# Patient Record
Sex: MALE | Race: WHITE | NOT HISPANIC OR LATINO | Employment: FULL TIME | URBAN - METROPOLITAN AREA
[De-identification: names, ages, dates, MRNs, and addresses within clinical notes are randomized per-mention and may not be internally consistent; named-entity substitution may affect disease eponyms.]

---

## 2019-03-26 ENCOUNTER — OFFICE VISIT (OUTPATIENT)
Dept: NEUROSURGERY | Facility: CLINIC | Age: 54
End: 2019-03-26

## 2019-03-26 VITALS
HEART RATE: 119 BPM | WEIGHT: 219 LBS | BODY MASS INDEX: 28.11 KG/M2 | HEIGHT: 74 IN | DIASTOLIC BLOOD PRESSURE: 90 MMHG | SYSTOLIC BLOOD PRESSURE: 112 MMHG

## 2019-03-26 DIAGNOSIS — M54.16 LUMBAR RADICULOPATHY: ICD-10-CM

## 2019-03-26 DIAGNOSIS — G89.29 CHRONIC PAIN OF LEFT KNEE: ICD-10-CM

## 2019-03-26 DIAGNOSIS — G89.29 CHRONIC LOW BACK PAIN, UNSPECIFIED BACK PAIN LATERALITY, WITH SCIATICA PRESENCE UNSPECIFIED: ICD-10-CM

## 2019-03-26 DIAGNOSIS — M25.562 CHRONIC PAIN OF LEFT KNEE: ICD-10-CM

## 2019-03-26 DIAGNOSIS — M54.5 CHRONIC LOW BACK PAIN, UNSPECIFIED BACK PAIN LATERALITY, WITH SCIATICA PRESENCE UNSPECIFIED: ICD-10-CM

## 2019-03-26 DIAGNOSIS — I10 HYPERTENSION, UNSPECIFIED TYPE: ICD-10-CM

## 2019-03-26 DIAGNOSIS — M25.559 ARTHRALGIA OF HIP, UNSPECIFIED LATERALITY: ICD-10-CM

## 2019-03-26 DIAGNOSIS — R26.9 GAIT DISORDER: Primary | ICD-10-CM

## 2019-03-26 PROBLEM — M54.50 LOW BACK PAIN: Status: ACTIVE | Noted: 2019-03-26

## 2019-03-26 PROCEDURE — 99243 OFF/OP CNSLTJ NEW/EST LOW 30: CPT | Performed by: NEUROLOGICAL SURGERY

## 2019-03-26 RX ORDER — IBUPROFEN 800 MG/1
800 TABLET ORAL EVERY 6 HOURS PRN
Status: ON HOLD | COMMUNITY
Start: 2019-03-13 | End: 2019-06-05

## 2019-03-26 RX ORDER — LISINOPRIL AND HYDROCHLOROTHIAZIDE 12.5; 1 MG/1; MG/1
1 TABLET ORAL DAILY
Status: ON HOLD | COMMUNITY
Start: 2019-03-15 | End: 2019-06-14

## 2019-03-26 RX ORDER — CYCLOBENZAPRINE HCL 10 MG
10 TABLET ORAL 3 TIMES DAILY PRN
Status: ON HOLD | COMMUNITY
Start: 2019-03-15 | End: 2019-06-14

## 2019-03-26 NOTE — PROGRESS NOTES
"Subjective   Patient ID: Archie Oquendo is a 53 y.o. male is being seen for consultation today at the request of VERONICA Morgan lumbar radiculopathy.    History of Present Illness  Patient reports intermittent back pain since his last visit in 2012; it typically takes 6 months to resolve. The current episode began mid December with aching in left hip and knee. It persisted and severely worsened with increase leg and back pain on 3/13. His pain was so severe that he couldn't move and went to [facility]. He was evaluated with CT and DC home. He was referred to  spine and then to [facility] but they referred him back here due to prior surgery.  He has chronic numbness in left foot since 2000. It has \"radiated up\" recently. He has noticed inability to raise toes on left for about 2 weeks (first noticed the day after being in ER). No trouble with bowel or bladder control. No falls. He is using a cane presently. Leg worsens with prolonged walking. He did notice some swelling in both feet due to sleeping on couch; it has resolved after returning to bed and using compression socks.     The following portions of the patient's history were reviewed and updated as appropriate: allergies, current medications, past family history, past medical history, past social history, past surgical history and problem list.    Review of Systems   Constitutional: Positive for fatigue.   HENT: Negative for trouble swallowing.    Eyes: Negative for visual disturbance.   Respiratory: Negative for shortness of breath.    Cardiovascular: Positive for leg swelling.   Gastrointestinal: Negative for nausea.   Genitourinary: Positive for difficulty urinating.   Musculoskeletal: Positive for back pain.   Skin: Negative for rash and wound.   Neurological: Positive for numbness.   Psychiatric/Behavioral: Positive for sleep disturbance.       Objective   Physical Exam   Constitutional: He is oriented to person, place, and time. He " "appears well-developed and well-nourished.   Chronically ill appearing   Pulmonary/Chest: Effort normal.   Musculoskeletal:        Left hip: He exhibits decreased range of motion.        Lumbar back: He exhibits decreased range of motion (Subdural hematoma minimal range of motion.  Patient states that he was told not to do any bending or twisting since 2000.). He exhibits no tenderness, no bony tenderness and no pain ( Negative straight leg raise).   Neurological: He is alert and oriented to person, place, and time.   Reflex Scores:       Patellar reflexes are 2+ on the right side and 2+ on the left side.       Achilles reflexes are 1+ on the right side and 1+ on the left side.  Skin: Skin is warm and dry.   Multiple thoracic and lumbar incisions well-healed   Psychiatric: He has a normal mood and affect. His behavior is normal. Judgment normal.   Vitals reviewed.    Neurologic Exam     Mental Status   Oriented to person, place, and time.   Level of consciousness: alert  Knowledge: good.   Normal comprehension.     Motor Exam   Muscle bulk: normal    Strength   Strength 5/5 except as noted.   Left anterior tibial: 1/5    Sensory Exam   Right leg light touch: normal  Left leg light touch: normal  Right leg vibration: decreased from ankle  Left leg vibration: decreased from knee  Right leg pinprick: normal  Left leg pinprick: normal    Able to distinguish pinprick from dull in bilateral lower extremities.  Temperature intact bilateral lower extremities to cold    Somewhat inconsistent with exam.  When touched with dull able to say dull but when touched with pen states \"I do not feel anything\".  After re-discussing the purpose of the exam being to say dull or sharp, the patient is able to complete exam appropriately.     Gait, Coordination, and Reflexes     Gait  Gait: (Unusual wide-based gait)    Reflexes   Right patellar: 2+  Left patellar: 2+  Right achilles: 1+  Left achilles: 1+  Able to toe walk bilaterally.  " Unable to raise foot on left for heel walking     Overall exam is elaborated. Very unusual gait hard ot describe. Motor deficits on exam today hard to specifically reproduce.    Assessment/Plan   Independent Review of Radiographic Studies:      I personally reviewed the plain x-rays as well as the lumbar CT done recently: These demonstrate complete fusion having taken place at L1-2 and a complete fusion having taken place posterior lateral from T12-L2.  Instrumentation is in good position without evidence of fracture or haloing and no displacement is identified.  Medical Decision Making:    I confirmed and obtained the above history as recorded by the nurse practitioner acting as a scribe. I performed the above examination and it is documented by the nurse practitioner acting as a scribe.  This unfortunate sofia got 11 years of reasonable service from his operations that occurred in 2008.  Unfortunately he now is developed increasing problems.    To best characterize the neural elements I have recommending that he undergo a myelogram above the thoracic spine and the lumbar spine.  Of also recommended in order to better characterize the somewhat unusual weakness that we have discovered during his office visit today would like him to have an EMG nerve conduction test in the legs.  This will help me to determine if there is any surgical intervention that we can do or whether or not he will be best served by advanced pain management techniques.      Archie was seen today for back pain.    Diagnoses and all orders for this visit:    Gait disorder  -     CT lumbar spine without contrast; Future  -     IR MYELOGRAM LUMBAR SPINE; Future  -     CT thoracic spine without contrast; Future  -     IR MYELOGRAM THORACIC SPINE; Future  -     EMG Both Legs; Future  -     Nerve Conduction Test Both Legs; Future    Chronic pain of left knee    Hypertension, unspecified type    Lumbar radiculopathy    Chronic low back pain, unspecified  back pain laterality, with sciatica presence unspecified    Arthralgia of hip, unspecified laterality      Return for Follow up after testing.

## 2019-04-15 ENCOUNTER — HOSPITAL ENCOUNTER (OUTPATIENT)
Dept: INFUSION THERAPY | Facility: HOSPITAL | Age: 54
Discharge: HOME | End: 2019-04-15
Admitting: PSYCHIATRY & NEUROLOGY

## 2019-04-15 DIAGNOSIS — R26.9 GAIT DISORDER: ICD-10-CM

## 2019-04-15 PROCEDURE — 95911 NRV CNDJ TEST 9-10 STUDIES: CPT | Performed by: PSYCHIATRY & NEUROLOGY

## 2019-04-15 PROCEDURE — 95886 MUSC TEST DONE W/N TEST COMP: CPT | Performed by: PSYCHIATRY & NEUROLOGY

## 2019-04-15 PROCEDURE — 95886 MUSC TEST DONE W/N TEST COMP: CPT

## 2019-04-15 PROCEDURE — 95911 NRV CNDJ TEST 9-10 STUDIES: CPT

## 2019-04-15 NOTE — PROCEDURES
EMG and Nerve Conduction Studies    Please see data sheets for details on methods, temperatures and lab standards. EMG muscles tested for upper extremity studies include the deltoid, biceps, triceps, pronator teres, extensor digitorum communis, first dorsal interosseous and abductor pollicis brevis.  EMG muscles tested for lower extremity studies include the vastus lateralis, tibialis anterior, peroneus longus, medial gastrocnemius and extensor digitorum brevis.  Additional muscles tested as needed.  Paraspinal muscles tested as needed.  The complete report includes the data sheets.    Indication: Lumbar radiculopathy  History: 53-year-old male with history of an L1/2 disc herniation with cauda equina syndrome 2000 status post surgery with some persistent weakness and numbness in the right leg with a partial foot drop and numbness to the mid shin with some numbness in the left foot.  He had a second operation in 2008.  He now has recent onset of flareup of low back pain radiating into the left leg and new weakness and numbness in the left leg with a left foot drop.  He denies that the right lower extremity symptoms are any worse than previously      Ht: 188 cm  Wt: 99.3 kg; BMI 28.12  HbA1C: No results found for: HGBA1C  TSH: No results found for: TSH    Technical summary:  Nerve conduction studies were obtained in the left leg with 1 comparison on the right as well as some nerve conductions were obtained in the left arm.  Skin temperature was a very cold in the feet and so the feet were warmed prior to study but it was difficult to maintain them and at times the temperatures were significantly lower.  Temperature of the left palm was at least 33 °C.  Needle examination was obtained on selected muscles in both legs.    Results:  1.  Normal left sural sensory study.  2.  Normal left superficial peroneal sensory study.  3.  Absent left peroneal motor potentials from proximal and distal stimulation sites while  recording over the extensor digitorum brevis.  Recorded over the tibialis anterior there was a slow velocity of 21.1 m/s across the fibular head with a low amplitude of 1.8 mV.  4.  Slow tibial motor velocities bilaterally at 35.8 m/s on the left and 37.8 m/s on the right.  The distal latencies and amplitudes were normal.  5.  Normal left median sensory study.  6.  Normal left ulnar sensory latency with low amplitude of 8.3 µV.  7.  Normal left median motor study.  8.  Slow left ulnar motor conduction velocity in the short segment across the elbow at 39 m/s with normal velocity below the elbow.  Normal distal latency and amplitudes.  9.  Needle examination of selected muscles in both legs showed increased insertional activities with fibrillations and/or positive sharp waves in the left tibialis anterior and vastus lateralis.  There was a moderate increased number of large motor units in the tibialis anterior and peroneus longus muscles bilaterally with increased firing rates and reduced interference patterns.  The left vastus lateralis showed normal motor units and recruitment.  The right vastus lateralis was normal.  Both medial gastrocnemius muscles showed normal insertional activities, motor units and recruitment.  The extensor digitorum brevis muscles showed no insertional activities or motor units.  Short head of the left biceps femoris was normal.  The left semi-tendinosis showed 1+ positive sharp waves with a mild increased number of large motor units increased firing rate and reduced interference pattern.  Lumbar paraspinals were not sampled due to previous surgery.    Impression:  Complex abnormal study showing evidence of peripheral neuropathy which is unusual in that 3 of the 4 sensory nerves that were studied were normal and it was the motor conduction velocities were slowed.  Strong suspicion of a more focal left peroneal neuropathy at the knee present.  In addition there is evidence of a left  acute/chronic L5 radiculopathy and acute L4 radiculopathy.  There was evidence of an old right L5 radiculopathy.  Clinical correlation suggested.  Study results were discussed with the patient.    Neto Pressley M.D.              Dictated utilizing Dragon dictation.

## 2019-04-19 ENCOUNTER — APPOINTMENT (OUTPATIENT)
Dept: CT IMAGING | Facility: HOSPITAL | Age: 54
End: 2019-04-19

## 2019-04-19 ENCOUNTER — HOSPITAL ENCOUNTER (OUTPATIENT)
Dept: CT IMAGING | Facility: HOSPITAL | Age: 54
Discharge: HOME | End: 2019-04-19
Admitting: NEUROLOGICAL SURGERY

## 2019-04-19 ENCOUNTER — HOSPITAL ENCOUNTER (OUTPATIENT)
Dept: GENERAL RADIOLOGY | Facility: HOSPITAL | Age: 54
End: 2019-04-19

## 2019-04-19 ENCOUNTER — HOSPITAL ENCOUNTER (OUTPATIENT)
Dept: GENERAL RADIOLOGY | Facility: HOSPITAL | Age: 54
Discharge: HOME | End: 2019-04-19

## 2019-04-19 VITALS
WEIGHT: 220 LBS | HEART RATE: 93 BPM | DIASTOLIC BLOOD PRESSURE: 70 MMHG | RESPIRATION RATE: 16 BRPM | SYSTOLIC BLOOD PRESSURE: 127 MMHG | BODY MASS INDEX: 28.23 KG/M2 | HEIGHT: 74 IN | OXYGEN SATURATION: 100 % | TEMPERATURE: 97.8 F

## 2019-04-19 DIAGNOSIS — R26.9 GAIT DISORDER: ICD-10-CM

## 2019-04-19 PROCEDURE — 72129 CT CHEST SPINE W/DYE: CPT

## 2019-04-19 PROCEDURE — 72132 CT LUMBAR SPINE W/DYE: CPT

## 2019-04-19 PROCEDURE — 62305 MYELOGRAPHY LUMBAR INJECTION: CPT

## 2019-04-19 PROCEDURE — 25010000002 IOPAMIDOL 61 % SOLUTION: Performed by: RADIOLOGY

## 2019-04-19 PROCEDURE — 72240 MYELOGRAPHY NECK SPINE: CPT

## 2019-04-19 RX ORDER — LIDOCAINE HYDROCHLORIDE 10 MG/ML
10 INJECTION, SOLUTION INFILTRATION; PERINEURAL ONCE
Status: COMPLETED | OUTPATIENT
Start: 2019-04-19 | End: 2019-04-19

## 2019-04-19 RX ADMIN — LIDOCAINE HYDROCHLORIDE 6 ML: 10 INJECTION, SOLUTION INFILTRATION; PERINEURAL at 07:53

## 2019-04-19 RX ADMIN — LIDOCAINE HYDROCHLORIDE 4 ML: 10 INJECTION, SOLUTION INFILTRATION; PERINEURAL at 07:47

## 2019-04-19 RX ADMIN — IOPAMIDOL 15 ML: 612 INJECTION, SOLUTION INTRATHECAL at 07:54

## 2019-04-19 NOTE — DISCHARGE INSTRUCTIONS
EDUCATION /DISCHARGE INSTRUCTIONS:  A myelogram is a special radiology procedure of the spinal cord, spinal nerves and other related structures.  You will be awake during the examination.  An area of your lower back will be cleansed with an antiseptic solution.  The physician will inject a numbing medication in your lower back.  While your back is numb, a needle will be placed in the lower back area.  A small amount of spinal fluid may be withdrawn and sent to the lab if ordered by your physician. While the needle is in the back, an injection of a contrast material (xray dye) will be given through the needle.  The contrast material will allow the physician to see the spinal cord and spinal nerves.  Once injected, the needle will be removed and a band aid will be placed over the injection site.  The table will be tilted during the process to allow the contrast material to flow to particular areas in the spine.  Following the injection and xrays, you will be taken to the CT scan where more pictures will be taken. After the procedure is finished, the contrast material will be absorbed by your body and eliminated through your kidneys.  The radiologist will study and interpret your myelogram and send the results to your physician.    Procedure risks of a myelogram include, but are not limited to:  *  Bleeding   *  seizure  *  Infection   *  Headache, possibly severe requiring  *  Contrast reaction      a blood patch  *  Nerve or cord injury  *  Paralysis and death    Benefits of the procedure:  *  Best examination for delineating pathology related to spinal cord compression from a disc and/or nerve root compression    Alternatives to the procedure:  MRI - a non invasive procedure requiring intravenous contrast injection.  Cannot be done on patients with certain pacemakers or metal in the body.  MRI risks include possible reaction to the contrast material, movement of metal located in the body.  Benefit to MRI:   Non-invasive and usually painless procedure.  THIS EDUCATION INFORMATION WAS REVIEWED PRIOR TO THE PROCEDURE AND CONSENT. Patient initials __________________Time___0655______________  Important information following your myelogram:  *  Lie down with your head elevated no more than 2 pillows for the next 24 hours.   *  Sit up in the car going home.  Sit up to eat and use the restroom only,  for 24 hours.  *  24 HOURS COMPLETE AT ___1000___  *  Tomorrow, after 24 hours complete, take it easy and rest.  *  Do not drive for 48 hours following a myelogram  *  You may remove the bandage and shower in the morning  *  Increase your fluids for the next 24 hours.  Caffeinated drinks are encouraged.   Resume taking your blood thinner or Aspirin on __4/20/19 after 1000__  CALL YOUR PHYSICIAN FOR THE FOLLOWING:  * Pain at the injection site  * Reddness, swelling, bruising or drainage at the injection site  * A fever by mouth of 101.0  * Any new symptoms    If you have problems with a headache that is not relieved with rest and medication, please call the Radiology Triage Nurse desk  [phone]

## 2019-04-23 ENCOUNTER — OFFICE VISIT (OUTPATIENT)
Dept: NEUROSURGERY | Facility: CLINIC | Age: 54
End: 2019-04-23

## 2019-04-23 VITALS
DIASTOLIC BLOOD PRESSURE: 76 MMHG | WEIGHT: 217 LBS | SYSTOLIC BLOOD PRESSURE: 120 MMHG | HEART RATE: 120 BPM | BODY MASS INDEX: 27.85 KG/M2 | HEIGHT: 74 IN | RESPIRATION RATE: 20 BRPM

## 2019-04-23 DIAGNOSIS — M48.062 SPINAL STENOSIS, LUMBAR REGION, WITH NEUROGENIC CLAUDICATION: ICD-10-CM

## 2019-04-23 DIAGNOSIS — M51.26 LUMBAR DISC HERNIATION: ICD-10-CM

## 2019-04-23 DIAGNOSIS — R26.9 GAIT DISORDER: ICD-10-CM

## 2019-04-23 DIAGNOSIS — M54.16 LUMBAR RADICULOPATHY: Primary | ICD-10-CM

## 2019-04-23 DIAGNOSIS — Z98.1 HISTORY OF LUMBAR FUSION: ICD-10-CM

## 2019-04-23 PROCEDURE — 99215 OFFICE O/P EST HI 40 MIN: CPT | Performed by: NEUROLOGICAL SURGERY

## 2019-04-23 RX ORDER — SODIUM CHLORIDE, SODIUM LACTATE, POTASSIUM CHLORIDE, CALCIUM CHLORIDE 600; 310; 30; 20 MG/100ML; MG/100ML; MG/100ML; MG/100ML
100 INJECTION, SOLUTION INTRAVENOUS CONTINUOUS
Status: CANCELLED | OUTPATIENT
Start: 2019-06-03

## 2019-04-23 RX ORDER — CEFAZOLIN SODIUM 2 G/100ML
2 INJECTION, SOLUTION INTRAVENOUS ONCE
Status: CANCELLED | OUTPATIENT
Start: 2019-06-03 | End: 2019-04-23

## 2019-04-23 NOTE — PROGRESS NOTES
"Subjective   Patient ID: Archie Oquendo is a 53 y.o. male is here today for follow-up for gait disturbance/lumbar radiculopathy. Pt is unaccompanied.    History of Present Illness     He returns the office today for follow-up of low back pain that started in December with radiation into the left hip and knee.  He has undergone prior lumbar fusion at L1-2 with Dr. Bailey in 2011 and did well following that procedure.  He has undergone lumbar and thoracic CT myelogram as well as EMG and nerve conduction studies of both legs.     Continues to report chronic, constant pain in both legs.  He has numbness and tingling in the right lateral calf radiating into the right foot that has been present since 2000.  He denies any new pain at this time.  The biggest issue is the weakness in the left foot.  He is unable to dorsiflex the left foot greater than an inch or 2.  This is been walking very difficult.  He reports a fall recently and has to be very cautious with walking.  He denies any new problems since his last office visit.    He presents unaccompanied.    /76 (BP Location: Right arm, Patient Position: Sitting, Cuff Size: Large Adult)   Pulse 120   Resp 20   Ht 188 cm (74\")   Wt 98.4 kg (217 lb)   BMI 27.86 kg/m² \    The following portions of the patient's history were reviewed and updated as appropriate: allergies, current medications, past family history, past medical history, past social history, past surgical history and problem list.    Review of Systems   Genitourinary: Negative for difficulty urinating and enuresis.   Musculoskeletal: Positive for back pain (bilateral ) and gait problem.   Neurological: Positive for weakness (bilateral legs) and numbness (R leg).   Psychiatric/Behavioral: Positive for sleep disturbance.       Objective   Physical Exam   Constitutional: He is oriented to person, place, and time. Vital signs are normal. He appears well-developed and well-nourished. He is cooperative.  " Non-toxic appearance. He does not have a sickly appearance.   Pleasant well-appearing middle-aged male   HENT:   Head: Normocephalic and atraumatic.   Neck: Neck supple. No tracheal deviation present.   Pulmonary/Chest: Effort normal.   Abdominal: Soft.   Musculoskeletal: Normal range of motion. He exhibits no tenderness or deformity.   Strength equal bilateral lower extremities with the exception of 1+/5 left anterior tibialis   Neurological: He is alert and oriented to person, place, and time. He displays no tremor and normal reflexes. A sensory deficit (Chronic sensory changes bilateral lower extremities that are unchanged) is present. He exhibits normal muscle tone. Gait abnormal. Coordination normal. GCS eye subscore is 4. GCS verbal subscore is 5. GCS motor subscore is 6.   Reflex Scores:       Patellar reflexes are 0 on the right side and 0 on the left side.       Achilles reflexes are 1+ on the right side and 1+ on the left side.  Positive straight leg raise on the left  Gait is slow, purposeful, unstable, he is having to raise the left knee in order to raise the left foot to walk   3 beat clonus B     Skin: Skin is warm and dry.   Psychiatric: He has a normal mood and affect. His behavior is normal. Thought content normal.   Vitals reviewed.    Neurologic Exam     Mental Status   Oriented to person, place, and time.     Gait, Coordination, and Reflexes     Reflexes   Right patellar: 0  Left patellar: 0  Right achilles: 1+  Left achilles: 1+      Assessment/Plan   Independent Review of Radiographic Studies:    I personally reviewed the lumbar myelogram/CT and thoracic myelogram CT that was done recently: The thoracic spine shows some mild degenerative arthritic changes without significant spinal cord compression.    Lumbar myelogram CT demonstrates a small filling defect to the left at L4-5 on the myelographic imagery and severe compression at L2-3.  At the CT images demonstrate very severe compression  "bilaterally at L2-3 secondary to facet and ligamentum flavum hypertrophy and disc osteophyte bulging.  At L4-5 on the left there is disc osteophyte complex that is compressing the lateral recess of the traversing and exiting nerve roots.    EMG results:\" Impression:  Complex abnormal study showing evidence of peripheral neuropathy which is unusual in that 3 of the 4 sensory nerves that were studied were normal and it was the motor conduction velocities were slowed.  Strong suspicion of a more focal left peroneal neuropathy at the knee present.  In addition there is evidence of a left acute/chronic L5 radiculopathy and acute L4 radiculopathy.  There was evidence of an old right L5 radiculopathy.  Clinical correlation suggested.  Study results were discussed with the patient.\"    Medical Decision Making:    I confirmed and obtained the above history as recorded by the nurse practitioner acting as a scribe. I performed the above examination and it is documented by the nurse practitioner acting as a scribe.    This very pleasant and unfortunate patient returns to the office today with continuing weakness of the left lower extremity.  He had a pain syndrome but that pain seems to have improved and now is left with significant weakness of dorsiflexion of the left foot.    EMG study results are as above.  There is a question of the neurologist brings out of the proximal nerve palsy but also a left L4 and L5 radiculopathy that would of course correlate with the foot drop.    Myelographic/CT results showed compression of the lateral recess and neural foramen at L4-5 as well as severe lumbar stenosis at L3-4.    Treatment options include continued conservative treatment for consideration of yet further surgery on this poor sofia who is already undergone a lot of spine surgery.    Surgery would entail lumbar decompression at L2-3 and revision lumbar decompression at L4-5 on the left.  The real question is whether or not he " requires further fusion considering the fairly extensive and complete fusion taking place at L1 through L3.  Thankfully he has not developed significant disease above the fusion but rather below.    Certainly the most minimally invasive procedure would be simply to decompress the L3-4 level and try to mobilize minimally invasively the decompression on the left at L4-5.    My concern is however that the reason for the development of the stenosis at L3-4 is likely because of increased mobility at this level which would only be exacerbated by any surgical intervention at this level.  Additionally there is facet disease throughout the lumbar spine as described and he is only 53 years old and he has been through a lot and if we were to successfully fuse the rest of his lumbar spine he is unlikely to develop further problems with regard to the lumbar spine for the rest of his next 40-50 years of life.    The risks, benefits, and alternatives of decompression and fusion were explained in detail to the patient. The alternative is not to perform an operative procedure. The benefit should be, though is not guaranteed, primarily a potential reduction in the neurosensory and/or motor dysfunction; secondarily, a possible reduction in back pain. The risks include, but are not limited to, the possibility of death, infection, stroke, bleeding, blindness, paralysis, blindness, lack of improvement in the patient's pain syndrome, worsening of the pain syndrome, meningitis, osteomyelitis, recurrent disc herniation, need for further operative intervention, recurrence of the pain syndrome, sexual dysfunction, incontinence, inability to urinate without catheterization, inability to have a normal bowel movement, epidural scarring resulting in chronic back pain, leakage of cerebral spinal fluid at the time of surgery or after recovery from surgery requiring further operative intervention, lack of bony fusion requiring further surgery,  instrumentation breakdown or pull out, adjacent level spinal degeneration, spinal instability. I also explained the realistic expectations as they pertain to the procedure. The patient voiced understanding of these risks, benefits, alternatives, and realistic expectations and requests that we proceed with the operative intervention.    After a complete physical exam, the patient has been informed of the consequences, benefits, appropriate us, and office policies regarding the medication being prescribed. A HAROON check will be made on-line, and will be repeated if prescription is renewed after a 90 day period. The patient agrees to adhering to the medication regimen as prescribed.    The patient has been advised that we will manage post-operative pain for 1 month. If further narcotic medication is needed beyond that period, a referral back to the primary care physician or to a pain management specialist will be made. If the patient cancels or fails to show for scheduled follow-up visits or the pain management referral,  further narcotic prescriptions from this practice may cease.    Plan: Lumbar 2 through lumbar 3 lumbar decompression with transforaminal lumbar interbody fusion, L4 through S2 posterior decompression with revision left L4-5 lumbar discectomy, posterior lateral lumbar fusion L2 through S2, possible removal of T12 through L2 spinal instrumentation and replacement and posterior spinal instrumentation T12 through S2.      Archie was seen today for gait disturbance.    Diagnoses and all orders for this visit:    Lumbar radiculopathy  -     Case Request; Standing  -     CBC and Differential; Future  -     Basic metabolic panel; Future  -     Sedimentation rate; Future  -     lactated ringers infusion  -     ECG 12 Lead; Future  -     ceFAZolin (ANCEF) 2 g in sodium chloride 0.9 % 100 mL IVPB  -     Case Request    Gait disorder  -     Case Request; Standing  -     CBC and Differential; Future  -     Basic  metabolic panel; Future  -     Sedimentation rate; Future  -     lactated ringers infusion  -     ECG 12 Lead; Future  -     ceFAZolin (ANCEF) 2 g in sodium chloride 0.9 % 100 mL IVPB  -     Case Request    Spinal stenosis, L3-4  -     Case Request; Standing  -     CBC and Differential; Future  -     Basic metabolic panel; Future  -     Sedimentation rate; Future  -     lactated ringers infusion  -     ECG 12 Lead; Future  -     ceFAZolin (ANCEF) 2 g in sodium chloride 0.9 % 100 mL IVPB  -     Case Request    Lumbar disc herniation -left L4-5  -     Case Request; Standing  -     CBC and Differential; Future  -     Basic metabolic panel; Future  -     Sedimentation rate; Future  -     lactated ringers infusion  -     ECG 12 Lead; Future  -     ceFAZolin (ANCEF) 2 g in sodium chloride 0.9 % 100 mL IVPB  -     Case Request    History of lumbar fusion  -     Case Request; Standing  -     CBC and Differential; Future  -     Basic metabolic panel; Future  -     Sedimentation rate; Future  -     lactated ringers infusion  -     ECG 12 Lead; Future  -     ceFAZolin (ANCEF) 2 g in sodium chloride 0.9 % 100 mL IVPB  -     Case Request    Other orders  -     Follow Anesthesia Guidelines / Standing Orders; Future  -     Obtain informed consent  -     Provide NPO Instructions to Patient; Future  -     Clorhexidine skin prep  -     Follow Anesthesia Guidelines / Standing Orders; Standing  -     Verify NPO Status; Standing  -     SCD (sequential compression device)- to be placed on patient in Pre-op; Standing  -     Inpatient Admission; Standing  -     Type & Screen; Standing      Return for Follow up with nurse practitioner, Recheck 2 weeks after surgery.           75 minutes in encounter, more than 50% [place] wrt to the above topics

## 2019-04-26 ENCOUNTER — TELEPHONE (OUTPATIENT)
Dept: NEUROSURGERY | Facility: CLINIC | Age: 54
End: 2019-04-26

## 2019-04-26 DIAGNOSIS — M54.16 LUMBAR RADICULOPATHY: Primary | ICD-10-CM

## 2019-04-26 DIAGNOSIS — M51.26 LUMBAR DISC HERNIATION: ICD-10-CM

## 2019-04-26 DIAGNOSIS — Z98.890 POST-OPERATIVE STATE: ICD-10-CM

## 2019-04-26 DIAGNOSIS — M48.062 SPINAL STENOSIS, LUMBAR REGION, WITH NEUROGENIC CLAUDICATION: ICD-10-CM

## 2019-04-28 ENCOUNTER — RESULTS ENCOUNTER (OUTPATIENT)
Dept: NEUROSURGERY | Facility: CLINIC | Age: 54
End: 2019-04-28

## 2019-04-28 DIAGNOSIS — R26.9 GAIT DISORDER: ICD-10-CM

## 2019-04-28 DIAGNOSIS — M54.16 LUMBAR RADICULOPATHY: ICD-10-CM

## 2019-04-28 DIAGNOSIS — Z98.1 HISTORY OF LUMBAR FUSION: ICD-10-CM

## 2019-04-28 DIAGNOSIS — M51.26 LUMBAR DISC HERNIATION: ICD-10-CM

## 2019-04-28 DIAGNOSIS — M48.062 SPINAL STENOSIS, LUMBAR REGION, WITH NEUROGENIC CLAUDICATION: ICD-10-CM

## 2019-05-21 ENCOUNTER — TELEPHONE (OUTPATIENT)
Dept: NEUROSURGERY | Facility: CLINIC | Age: 54
End: 2019-05-21

## 2019-05-23 ENCOUNTER — APPOINTMENT (OUTPATIENT)
Dept: PREADMISSION TESTING | Facility: HOSPITAL | Age: 54
End: 2019-05-23

## 2019-05-24 ENCOUNTER — APPOINTMENT (OUTPATIENT)
Dept: PREADMISSION TESTING | Facility: HOSPITAL | Age: 54
End: 2019-05-24

## 2019-05-24 ENCOUNTER — OFFICE VISIT (OUTPATIENT)
Dept: NEUROSURGERY | Facility: CLINIC | Age: 54
End: 2019-05-24

## 2019-05-24 VITALS
HEIGHT: 74 IN | SYSTOLIC BLOOD PRESSURE: 118 MMHG | RESPIRATION RATE: 16 BRPM | DIASTOLIC BLOOD PRESSURE: 70 MMHG | BODY MASS INDEX: 27.34 KG/M2 | WEIGHT: 213 LBS | HEART RATE: 92 BPM

## 2019-05-24 VITALS
TEMPERATURE: 98.4 F | SYSTOLIC BLOOD PRESSURE: 146 MMHG | OXYGEN SATURATION: 97 % | HEART RATE: 94 BPM | HEIGHT: 74 IN | DIASTOLIC BLOOD PRESSURE: 78 MMHG | RESPIRATION RATE: 16 BRPM | BODY MASS INDEX: 27.48 KG/M2 | WEIGHT: 214.1 LBS

## 2019-05-24 DIAGNOSIS — M48.062 SPINAL STENOSIS, LUMBAR REGION, WITH NEUROGENIC CLAUDICATION: ICD-10-CM

## 2019-05-24 DIAGNOSIS — M54.16 LUMBAR RADICULOPATHY: ICD-10-CM

## 2019-05-24 DIAGNOSIS — R26.9 GAIT DISORDER: ICD-10-CM

## 2019-05-24 DIAGNOSIS — M51.26 LUMBAR DISC HERNIATION: ICD-10-CM

## 2019-05-24 DIAGNOSIS — Z98.1 HISTORY OF LUMBAR FUSION: Primary | ICD-10-CM

## 2019-05-24 DIAGNOSIS — Z98.1 HISTORY OF LUMBAR FUSION: ICD-10-CM

## 2019-05-24 LAB
ANION GAP SERPL CALCULATED.3IONS-SCNC: 12.1 MMOL/L
BASOPHILS # BLD AUTO: 0.06 10*3/MM3 (ref 0–0.2)
BASOPHILS NFR BLD AUTO: 0.7 % (ref 0–1.5)
BUN BLD-MCNC: 13 MG/DL (ref 6–20)
BUN/CREAT SERPL: 13.8 (ref 7–25)
CALCIUM SPEC-SCNC: 9.8 MG/DL (ref 8.6–10.5)
CHLORIDE SERPL-SCNC: 99 MMOL/L (ref 98–107)
CO2 SERPL-SCNC: 26.9 MMOL/L (ref 22–29)
CREAT BLD-MCNC: 0.94 MG/DL (ref 0.76–1.27)
DEPRECATED RDW RBC AUTO: 44 FL (ref 37–54)
EOSINOPHIL # BLD AUTO: 0.29 10*3/MM3 (ref 0–0.4)
EOSINOPHIL NFR BLD AUTO: 3.4 % (ref 0.3–6.2)
ERYTHROCYTE [DISTWIDTH] IN BLOOD BY AUTOMATED COUNT: 12.7 % (ref 12.3–15.4)
ERYTHROCYTE [SEDIMENTATION RATE] IN BLOOD: 5 MM/HR (ref 0–20)
GFR SERPL CREATININE-BSD FRML MDRD: 83 ML/MIN/1.73
GLUCOSE BLD-MCNC: 94 MG/DL (ref 65–99)
HCT VFR BLD AUTO: 47.1 % (ref 37.5–51)
HGB BLD-MCNC: 15.5 G/DL (ref 13–17.7)
IMM GRANULOCYTES # BLD AUTO: 0.03 10*3/MM3 (ref 0–0.05)
IMM GRANULOCYTES NFR BLD AUTO: 0.3 % (ref 0–0.5)
LYMPHOCYTES # BLD AUTO: 3.25 10*3/MM3 (ref 0.7–3.1)
LYMPHOCYTES NFR BLD AUTO: 37.8 % (ref 19.6–45.3)
MCH RBC QN AUTO: 31.2 PG (ref 26.6–33)
MCHC RBC AUTO-ENTMCNC: 32.9 G/DL (ref 31.5–35.7)
MCV RBC AUTO: 94.8 FL (ref 79–97)
MONOCYTES # BLD AUTO: 0.77 10*3/MM3 (ref 0.1–0.9)
MONOCYTES NFR BLD AUTO: 9 % (ref 5–12)
NEUTROPHILS # BLD AUTO: 4.19 10*3/MM3 (ref 1.7–7)
NEUTROPHILS NFR BLD AUTO: 48.8 % (ref 42.7–76)
NRBC BLD AUTO-RTO: 0 /100 WBC (ref 0–0.2)
PLATELET # BLD AUTO: 208 10*3/MM3 (ref 140–450)
PMV BLD AUTO: 9.9 FL (ref 6–12)
POTASSIUM BLD-SCNC: 4 MMOL/L (ref 3.5–5.2)
RBC # BLD AUTO: 4.97 10*6/MM3 (ref 4.14–5.8)
SODIUM BLD-SCNC: 138 MMOL/L (ref 136–145)
WBC NRBC COR # BLD: 8.59 10*3/MM3 (ref 3.4–10.8)

## 2019-05-24 PROCEDURE — 80048 BASIC METABOLIC PNL TOTAL CA: CPT | Performed by: NEUROLOGICAL SURGERY

## 2019-05-24 PROCEDURE — 99213 OFFICE O/P EST LOW 20 MIN: CPT | Performed by: NEUROLOGICAL SURGERY

## 2019-05-24 PROCEDURE — 85025 COMPLETE CBC W/AUTO DIFF WBC: CPT | Performed by: NEUROLOGICAL SURGERY

## 2019-05-24 PROCEDURE — 85652 RBC SED RATE AUTOMATED: CPT | Performed by: NEUROLOGICAL SURGERY

## 2019-05-24 PROCEDURE — 93005 ELECTROCARDIOGRAM TRACING: CPT

## 2019-05-24 PROCEDURE — 93010 ELECTROCARDIOGRAM REPORT: CPT | Performed by: INTERNAL MEDICINE

## 2019-05-24 PROCEDURE — 36415 COLL VENOUS BLD VENIPUNCTURE: CPT | Performed by: NEUROLOGICAL SURGERY

## 2019-05-24 NOTE — DISCHARGE INSTRUCTIONS
PLEASE ARRIVE AT 6AM ON 6/5/2019      Take the following medications the morning of surgery with a small sip of water:        General Instructions:  • Do not eat or drink anything after midnight the night before surgery.  • Infants may have breast milk up to four hours before surgery.  • Infants drinking formula may drink formula up to six hours before surgery.   • Patients who avoid smoking, chewing tobacco and alcohol for 4 weeks prior to surgery have a reduced risk of post-operative complications.  Quit smoking as many days before surgery as you can.  • Do not smoke, use chewing tobacco or drink alcohol the day of surgery.   • If applicable bring your C-PAP/ BI-PAP machine.  • Bring any papers given to you in the doctor’s office.  • Wear clean comfortable clothes and socks.  • Do not wear contact lenses, false eyelashes or make-up.  Bring a case for your glasses.   • Bring crutches or walker if applicable.  • Remove all piercings.  Leave jewelry and any other valuables at home.  • Hair extensions with metal clips must be removed prior to surgery.  • The Pre-Admission Testing nurse will instruct you to bring medications if unable to obtain an accurate list in Pre-Admission Testing.        If you were given a blood bank ID arm band remember to bring it with you the day of surgery.    Preventing a Surgical Site Infection:  • For 2 to 3 days before surgery, avoid shaving with a razor because the razor can irritate skin and make it easier to develop an infection.    • Any areas of open skin can increase the risk of a post-operative wound infection by allowing bacteria to enter and travel throughout the body.  Notify your surgeon if you have any skin wounds / rashes even if it is not near the expected surgical site.  The area will need assessed to determine if surgery should be delayed until it is healed.  • The night prior to surgery sleep in a clean bed with clean clothing.  Do not allow pets to sleep with  you.  • Shower on the morning of surgery using a fresh bar of anti-bacterial soap (such as Dial) and clean washcloth.  Dry with a clean towel and dress in clean clothing.  • Ask your surgeon if you will be receiving antibiotics prior to surgery.  • Make sure you, your family, and all healthcare providers clean their hands with soap and water or an alcohol based hand  before caring for you or your wound.    Day of surgery:  Upon arrival, a Pre-op nurse and Anesthesiologist will review your health history, obtain vital signs, and answer questions you may have.  The only belongings needed at this time will be your home medications and if applicable your C-PAP/BI-PAP machine.  If you are staying overnight your family can leave the rest of your belongings in the car and bring them to your room later.  A Pre-op nurse will start an IV and you may receive medication in preparation for surgery, including something to help you relax.  Your family will be able to see you in the Pre-op area.  While you are in surgery your family should notify the waiting room  if they leave the waiting room area and provide a contact phone number.    Please be aware that surgery does come with discomfort.  We want to make every effort to control your discomfort so please discuss any uncontrolled symptoms with your nurse.   Your doctor will most likely have prescribed pain medications.      If you are going home after surgery you will receive individualized written care instructions before being discharged.  A responsible adult must drive you to and from the hospital on the day of your surgery and stay with you for 24 hours.    If you are staying overnight following surgery, you will be transported to your hospital room following the recovery period.  [facility] has all private rooms.    You have received a list of surgical assistants for your reference.  If you have any questions please call Pre-Admission  Testing at [phone].  Deductibles and co-payments are collected on the day of service. Please be prepared to pay the required co-pay, deductible or deposit on the day of service as defined by your plan.  2% CHLORAHEXIDINE GLUCONATE* CLOTH  Preparing or “prepping” skin before surgery can reduce the risk of infection at the surgical site. To make the process easier, [facility] has chosen disposable cloths moistened with a rinse-free, 2% Chlorhexidine Gluconate (CHG) antiseptic solution. The steps below outline the prepping process and should be carefully followed.        Use the prep cloth on the area that is circled in the diagram             Directions Night before Surgery  1) Shower using a fresh bar of anti-bacterial soap (such as Dial) and clean washcloth.  Use a clean towel to completely dry your skin.  2) Do not use any lotions, oils or creams on your skin.  3) Open the package and remove 1 cloth, wipe your skin for 30 seconds in a circular motion.  Allow to dry for 3 minutes.  4) Repeat #3 with second cloth.  5) Do not touch your eyes, ears, or mouth with the prep cloth.  6) Allow the wet prep solution to air dry.  7) Discard the prep cloth and wash your hands with soap and water.   8) Dress in clean bed clothes and sleep on fresh clean bed sheets.   9) You may experience some temporary itching after the prep.    Directions Day of Surgery  1) Repeat steps 1,2,3,4,5,6,7, and 9.   2) Dress in clean clothes before coming to the hospital.

## 2019-05-24 NOTE — PROGRESS NOTES
Subjective   Patient ID: Archie Oquendo is a 53 y.o. male is here today for follow-up of H&P for a lumbar fusion and decompression,6/3. Pt is unaccompanied.    History of Present Illness    The patient returns the office today with progression in symptomatology.  Walking causes fatigue and inability to use the left leg particularly well.  Sitting for a long period of time because of the right leg to go to sleep.  He has trouble dorsiflexing his feet.  Left side much worse than the right side.    The following portions of the patient's history were reviewed and updated as appropriate: allergies, current medications, past family history, past medical history, past social history, past surgical history and problem list.    Review of Systems   Genitourinary: Negative for difficulty urinating and enuresis.   Musculoskeletal: Positive for gait problem.   Neurological: Positive for weakness (L leg) and numbness (back/legs).   Psychiatric/Behavioral: Positive for sleep disturbance (leg cramps).     Past Medical History:   Diagnosis Date   • Hip pain    • Hypertension    • Low back pain    • Pain in left knee        Past Surgical History:   Procedure Laterality Date   • LUMBAR DISCECTOMY     • LUMBAR FUSION     • VASECTOMY         Social History     Socioeconomic History   • Marital status:      Spouse name: Not on file   • Number of children: 2   • Years of education: Not on file   • Highest education level: Not on file   Occupational History   • Occupation:      Comment: full time currently working   Tobacco Use   • Smoking status: Current Every Day Smoker     Packs/day: 0.50     Years: 10.00     Pack years: 5.00     Types: Cigarettes   • Smokeless tobacco: Never Used   Substance and Sexual Activity   • Alcohol use: Yes   • Drug use: Defer   • Sexual activity: Defer       Family History   Problem Relation Age of Onset   • Diabetes Mother    • Hypertension Father    • Gout Father    • Sleep apnea  Father         No Known Allergies      Current Outpatient Medications:   •  cyclobenzaprine (FLEXERIL) 10 MG tablet, , Disp: , Rfl:   •  ibuprofen (IBU) 800 MG tablet, , Disp: , Rfl:   •  lisinopril-hydrochlorothiazide (PRINZIDE,ZESTORETIC) 10-12.5 MG per tablet, , Disp: , Rfl:   •  Multiple Vitamin (MULTI-VITAMIN DAILY PO), Take  by mouth., Disp: , Rfl:   •  Omega-3 Fatty Acids (FISH OIL) 1200 MG capsule delayed-release, Take  by mouth., Disp: , Rfl:     Objective   Physical Exam   Musculoskeletal:   The patient stands slightly bent forward at the waist with his knees bent with an erect lumbar spine slightly angled perhaps 8 to 10 degrees anterior.  Lateral flexion is limited to 8 degrees bilaterally.  Hyperextension is 0 degrees.  Anterior flexion is completely at the hips with no flexion of the lumbar spine.     There is a positive straight leg raising at 50 degrees on the left at 60 degrees on the right with pain into the anterior thigh and in the low back.  Negative Lesegue.  Negative Prasad.   Neurological: He has an abnormal Tandem Gait Test. He has a normal Finger-Nose-Finger Test, a normal Heel to Mccormick Test and a normal Romberg Test.   Reflex Scores:       Tricep reflexes are 1+ on the right side and 1+ on the left side.       Bicep reflexes are 2+ on the right side and 2+ on the left side.       Brachioradialis reflexes are 1+ on the right side and 1+ on the left side.       Patellar reflexes are 0 on the right side and 0 on the left side.       Achilles reflexes are 0 on the right side and 0 on the left side.    Neurologic Exam     Motor Exam     Strength   Right iliopsoas: 4/5  Left iliopsoas: 4/5  Right quadriceps: 5/5  Left quadriceps: 4/5  Right hamstrin/5  Left hamstrin/5  Right glutei: 4/5  Left glutei: 4/5  Right anterior tibial: 4/5  Left anterior tibial: 1/5  Right gastroc: 4/5  Left gastroc: 4/5    Sensory Exam   Sensory deficit distribution on left: L5  And left L5 and S1 decrease in LT  and PP     Gait, Coordination, and Reflexes     Gait  Gait: wide-based    Coordination   Romberg: negative  Finger to nose coordination: normal  Heel to shin coordination: normal  Tandem walking coordination: abnormal    Reflexes   Right brachioradialis: 1+  Left brachioradialis: 1+  Right biceps: 2+  Left biceps: 2+  Right triceps: 1+  Left triceps: 1+  Right patellar: 0  Left patellar: 0  Right achilles: 0  Left achilles: 0  Right plantar: equivocal  Left plantar: equivocal  Right Serna: absent  Left Serna: absentLeft foot drop       Assessment/Plan   Independent Review of Radiographic Studies:    None new  Medical Decision Making:    The patient has appropriate concerns with regard to undergoing a lumbar fusion.    He has specific concerns, brought up by his primary care physician, with regard to mobility and lumbar spine following a lumbar fusion.    I explained to the patient in my opinion at L2 through sacral lumbar fusion is unlikely to increase the patient's near total immobility of the lumbar spine.    I went over various options of treatment with the patient; simple lumbar decompression at  L3-4 L4-5 discectomy, epidural spinal stimulation without decompression to try to deal with the patient's back pain, or a lumbar fusion as previously recommended and described.    I explained that a lumbar fusion has about a 50% chance of reducing low back pain but that a lumbar fusion would likely prevent further deterioration at the fused levels once the areas are decompressed.  The patient previously has undergone an L1-L3 lumbar fusion at these levels there is no evidence of further deterioration neural foraminal narrowing spinal stenosis etc.  At the levels below these areas he is previously undergone an L4-5 discectomy and he has a recurrence of disc protrusion and further degeneration at L4-5 and L3-4 he has severe lumbar spinal stenosis secondary to facet and ligamentum flavum hypertrophy.  I explained to  the patient in my opinion decompressing these areas and then performing fusion should prevent further stenosis/narrowing from developing at these areas again.  I also explained the concept of adjacent level disease and therefore I recommended also extending the fusion to the sacrum bridging the L5-S1 disc space to avoid accelerated degeneration of this disc space because of the fusion mass above.    I explained to the patient that this was my opinion and that certainly there are several different ways to address his primary problems which include weakness in the lower extremity and severe back pain.  These are described above there is a simple decompression or consideration/and or consideration of epidural spinal stimulation with or without lumbar decompression.  I explained that in my opinion either of these procedures would likely result and, over time, further deterioration in the lumbar spine requiring further surgical intervention.    The risks, benefits, and alternatives of decompression and fusion were explained in detail to the patient. The alternative is not to perform an operative procedure. The benefit should be, though is not guaranteed, primarily a potential reduction in the neurosensory and/or motor dysfunction; secondarily, a possible reduction in back pain. The risks include, but are not limited to, the possibility of death, infection, stroke, bleeding, blindness, paralysis, blindness, lack of improvement in the patient's pain syndrome, worsening of the pain syndrome, meningitis, osteomyelitis, recurrent disc herniation, need for further operative intervention, recurrence of the pain syndrome, sexual dysfunction, incontinence, inability to urinate without catheterization, inability to have a normal bowel movement, epidural scarring resulting in chronic back pain, leakage of cerebral spinal fluid at the time of surgery or after recovery from surgery requiring further operative intervention, lack of  bony fusion requiring further surgery, instrumentation breakdown or pull out, adjacent level spinal degeneration, spinal instability. I also explained the realistic expectations as they pertain to the procedure. The patient voiced understanding of these risks, benefits, alternatives, and realistic expectations and requests that we proceed with the operative intervention.    The patient voiced understanding that this was a completely elective spinal procedure and that it was completely his choice to undergo this surgery knowing the risks involved in undergoing the procedure.  I tried to allay his appropriate fears with regard to the surgical intervention and I believe that I was able to do so.      The patient will be prescribed a lumbar brace postoperatively -often a lumbosacral orthosis (OTS TLSO).  This brace is prescribed to control anterior, posterior, and lateral movement in order to restrict this motion and facilitate healing in the postoperative period. This device is to be worn as directed by the medical provider. The name of the company representative will be made available to you for any questions regarding the cost, fit, or adjustment of this brace or any other concern.  This will be an LSO with anterior, posterior, and lateral support to facilitate healing, reduce pain, and restrict mobility after this procedure.  The brace will support weak muscles and stabilize the trunk.    Plan: Lumbar surgery    (The patient has vertebrae)  He is undergone fusion from L1-L3, he has significant lumbar stenosis at L3-4 L4-5 and a disc herniation to the left at L5-L6.    L3-4, L4-5, and L5-L6 lumbar decompression, discectomy, and T11 through S2 lumbar decompression and fusion.      Archie was seen today for h&p for a huge lumbar fusion and decompression,6/3.    Diagnoses and all orders for this visit:    History of lumbar fusion    Lumbar disc herniation -left L4-5    Spinal stenosis, L3-4    Lumbar  radiculopathy      Return for Follow up with nurse practitioner, Recheck 2 weeks after surgery.

## 2019-05-29 ENCOUNTER — HOSPITAL ENCOUNTER (OUTPATIENT)
Dept: CARDIOLOGY | Facility: HOSPITAL | Age: 54
Discharge: HOME | End: 2019-05-29
Admitting: INTERNAL MEDICINE

## 2019-05-29 ENCOUNTER — OFFICE VISIT (OUTPATIENT)
Dept: CARDIOLOGY | Facility: CLINIC | Age: 54
End: 2019-05-29

## 2019-05-29 ENCOUNTER — TELEPHONE (OUTPATIENT)
Dept: CARDIOLOGY | Facility: CLINIC | Age: 54
End: 2019-05-29

## 2019-05-29 VITALS
BODY MASS INDEX: 27.46 KG/M2 | WEIGHT: 214 LBS | SYSTOLIC BLOOD PRESSURE: 122 MMHG | DIASTOLIC BLOOD PRESSURE: 72 MMHG | HEART RATE: 80 BPM | HEIGHT: 74 IN

## 2019-05-29 DIAGNOSIS — R06.09 DYSPNEA ON EXERTION: Primary | ICD-10-CM

## 2019-05-29 DIAGNOSIS — I10 HYPERTENSION, UNSPECIFIED TYPE: ICD-10-CM

## 2019-05-29 DIAGNOSIS — Z01.810 PREOPERATIVE CARDIOVASCULAR EXAMINATION: ICD-10-CM

## 2019-05-29 DIAGNOSIS — R06.09 DYSPNEA ON EXERTION: ICD-10-CM

## 2019-05-29 DIAGNOSIS — E78.2 MIXED HYPERLIPIDEMIA: ICD-10-CM

## 2019-05-29 LAB
BH CV NUCLEAR PRIOR STUDY: 2
BH CV STRESS BP STAGE 1: NORMAL
BH CV STRESS COMMENTS STAGE 1: NORMAL
BH CV STRESS DOSE REGADENOSON STAGE 1: 0.4
BH CV STRESS DURATION MIN STAGE 1: 0
BH CV STRESS DURATION SEC STAGE 1: 10
BH CV STRESS HR STAGE 1: 145
BH CV STRESS PROTOCOL 1: NORMAL
BH CV STRESS RECOVERY BP: NORMAL MMHG
BH CV STRESS RECOVERY HR: 115 BPM
BH CV STRESS STAGE 1: 1
LV EF NUC BP: 56 %
MAXIMAL PREDICTED HEART RATE: 165 BPM
PERCENT MAX PREDICTED HR: 87.88 %
STRESS BASELINE BP: NORMAL MMHG
STRESS BASELINE HR: 88 BPM
STRESS PERCENT HR: 103 %
STRESS POST EXERCISE DUR SEC: 10 SEC
STRESS POST PEAK BP: NORMAL MMHG
STRESS POST PEAK HR: 145 BPM
STRESS TARGET HR: 140 BPM

## 2019-05-29 PROCEDURE — 93017 CV STRESS TEST TRACING ONLY: CPT

## 2019-05-29 PROCEDURE — 78452 HT MUSCLE IMAGE SPECT MULT: CPT | Performed by: INTERNAL MEDICINE

## 2019-05-29 PROCEDURE — A9502 TC99M TETROFOSMIN: HCPCS | Performed by: INTERNAL MEDICINE

## 2019-05-29 PROCEDURE — 93016 CV STRESS TEST SUPVJ ONLY: CPT | Performed by: INTERNAL MEDICINE

## 2019-05-29 PROCEDURE — 78452 HT MUSCLE IMAGE SPECT MULT: CPT

## 2019-05-29 PROCEDURE — 25010000002 REGADENOSON 0.4 MG/5ML SOLUTION: Performed by: INTERNAL MEDICINE

## 2019-05-29 PROCEDURE — 93018 CV STRESS TEST I&R ONLY: CPT | Performed by: INTERNAL MEDICINE

## 2019-05-29 PROCEDURE — 99204 OFFICE O/P NEW MOD 45 MIN: CPT | Performed by: INTERNAL MEDICINE

## 2019-05-29 PROCEDURE — 0 TECHNETIUM TETROFOSMIN KIT: Performed by: INTERNAL MEDICINE

## 2019-05-29 PROCEDURE — 93000 ELECTROCARDIOGRAM COMPLETE: CPT | Performed by: INTERNAL MEDICINE

## 2019-05-29 RX ADMIN — TETROFOSMIN 1 DOSE: 1.38 INJECTION, POWDER, LYOPHILIZED, FOR SOLUTION INTRAVENOUS at 09:35

## 2019-05-29 RX ADMIN — REGADENOSON 0.4 MG: 0.08 INJECTION, SOLUTION INTRAVENOUS at 09:35

## 2019-05-29 RX ADMIN — TETROFOSMIN 1 DOSE: 1.38 INJECTION, POWDER, LYOPHILIZED, FOR SOLUTION INTRAVENOUS at 08:45

## 2019-05-29 NOTE — PROGRESS NOTES
PATIENTINFORMATION    Date of Office Visit: 2019  Encounter Provider: Luzmaria Swift MD  Place of Service: [facility] CARDIOLOGY  Patient Name: Archie Oquendo  : 1965    Subjective:     Encounter Date:2019      Patient ID: Archie Oquendo is a 53 y.o. male.      History of Present Illness    He has a history of hypertension and hyperlipidemia.  He has had two back surgeries and is looking at a third surgery.  He has a lot of symptoms in his legs, which really limit his mobility and makes him unsteady on his feet.  He says, when he does walk, he feels short of breath more than what he feels like is normal for the amount of work he is doing.  He does not have any chest pain, edema, or palpitations.        Review of Systems   Constitution: Negative for fever, malaise/fatigue, weight gain and weight loss.   HENT: Negative for ear pain, hearing loss, nosebleeds and sore throat.    Eyes: Negative for double vision, pain, vision loss in left eye and vision loss in right eye.   Cardiovascular:        See history of present illness.   Respiratory: Positive for snoring. Negative for cough, shortness of breath, sleep disturbances due to breathing and wheezing.    Endocrine: Negative for cold intolerance, heat intolerance and polyuria.   Skin: Negative for itching, poor wound healing and rash.   Musculoskeletal: Positive for joint pain. Negative for joint swelling and myalgias.   Gastrointestinal: Negative for abdominal pain, diarrhea, hematochezia, nausea and vomiting.   Genitourinary: Negative for hematuria and hesitancy.   Neurological: Positive for numbness. Negative for paresthesias and seizures.   Psychiatric/Behavioral: Negative for depression. The patient is not nervous/anxious.            ECG 12 Lead  Date/Time: 2019 8:36 AM  Performed by: Luzmaria Swift MD  Authorized by: Luzmaria Swift MD   Comparison: compared with previous ECG from  "5/24/2019  Rhythm: sinus rhythm  BPM: 80  Conduction: left anterior fascicular block  ST Segments: ST segments normal  T Waves: T waves normal    Clinical impression: normal ECG               Objective:     /72 (BP Location: Right arm)   Pulse 80   Ht 188 cm (74\")   Wt 97.1 kg (214 lb)   BMI 27.48 kg/m²  Body mass index is 27.48 kg/m².     Physical Exam   Constitutional: He appears well-developed.   HENT:   Head: Normocephalic and atraumatic.   Eyes: Conjunctivae and lids are normal. Pupils are equal, round, and reactive to light. Lids are everted and swept, no foreign bodies found.   Neck: Normal range of motion. No JVD present. Carotid bruit is not present. No tracheal deviation present. No thyroid mass present.   Cardiovascular: Normal rate, regular rhythm and normal heart sounds.   Pulses:       Dorsalis pedis pulses are 2+ on the right side, and 2+ on the left side.   Pulmonary/Chest: Effort normal and breath sounds normal.   Abdominal: Normal appearance and bowel sounds are normal.   Musculoskeletal: Normal range of motion.   Neurological: He is alert. He has normal strength.   Skin: Skin is warm, dry and intact.   Psychiatric: He has a normal mood and affect. His behavior is normal.   Vitals reviewed.          Assessment/Plan:         Orders Placed This Encounter   Procedures   • Stress Test With Myocardial Perfusion One Day     Standing Status:   Future     Standing Expiration Date:   5/28/2020     Order Specific Question:   What stress agent will be used?     Answer:   Regadenoson (Lexiscan)     Order Specific Question:   Difficulty walking criteria?     Answer:   Musculoskeletal (hips, knees, feet, back, amputee)     Order Specific Question:   Reason for exam?     Answer:   Angina   • ECG 12 Lead     This order was created via procedure documentation   Preoperative evaluation.  He sounds like is looking at a pretty big surgery here and has limited activity.  I do not think he can complete four " METS.  I recommend a stress test.  He does not feel like he can do the treadmill because of numbness in his feet and inability to lift one of his feet up.  I am going to try to get a Lexiscan nuclear stress test on him today so that we do not hold up his surgery.    If the stress test looks good, I will let Dr. Bailey know that he is okay for surgery and, of course, we will be available if there are any complications.          Discharge Medications           Accurate as of 5/29/19  8:41 AM. If you have any questions, ask your nurse or doctor.               Continue These Medications      Instructions Start Date   Chlorhexidine Gluconate 2 % pads   Apply externally, AS DIRECTED FOR SURGERY       cyclobenzaprine 10 MG tablet  Commonly known as:  FLEXERIL   10 mg, Oral, 3 Times Daily PRN      Fish Oil 1200 MG capsule delayed-release   1 capsule, Oral, Daily       MG tablet  Generic drug:  ibuprofen   800 mg, Oral, Every 6 Hours PRN      lisinopril-hydrochlorothiazide 10-12.5 MG per tablet  Commonly known as:  PRINZIDE,ZESTORETIC   1 tablet, Oral, Daily      MULTI-VITAMIN DAILY PO   1 tablet, Oral, Daily                    Luzmaria Swift MD  05/29/19  8:41 AM

## 2019-05-29 NOTE — TELEPHONE ENCOUNTER
Please let him know that his stress test was normal and I have sent a letter to Dr. Bailey stating that he is safe from a cardiac standpoint to proceed with surgery

## 2019-06-05 ENCOUNTER — ANESTHESIA EVENT (OUTPATIENT)
Dept: PERIOP | Facility: HOSPITAL | Age: 54
End: 2019-06-05

## 2019-06-05 ENCOUNTER — APPOINTMENT (OUTPATIENT)
Dept: GENERAL RADIOLOGY | Facility: HOSPITAL | Age: 54
End: 2019-06-05

## 2019-06-05 ENCOUNTER — ANESTHESIA (OUTPATIENT)
Dept: PERIOP | Facility: HOSPITAL | Age: 54
End: 2019-06-05

## 2019-06-05 ENCOUNTER — HOSPITAL ENCOUNTER (INPATIENT)
Facility: HOSPITAL | Age: 54
LOS: 9 days | Discharge: NURSING, REHAB OR LONG-TERM CARE | End: 2019-06-14
Attending: NEUROLOGICAL SURGERY | Admitting: NEUROLOGICAL SURGERY

## 2019-06-05 DIAGNOSIS — R26.9 GAIT DISORDER: ICD-10-CM

## 2019-06-05 DIAGNOSIS — M54.16 LUMBAR RADICULOPATHY: ICD-10-CM

## 2019-06-05 DIAGNOSIS — E87.1 HYPONATREMIA: Primary | ICD-10-CM

## 2019-06-05 DIAGNOSIS — M48.062 SPINAL STENOSIS, LUMBAR REGION, WITH NEUROGENIC CLAUDICATION: ICD-10-CM

## 2019-06-05 DIAGNOSIS — Z98.1 HISTORY OF LUMBAR FUSION: ICD-10-CM

## 2019-06-05 DIAGNOSIS — M51.26 LUMBAR DISC HERNIATION: ICD-10-CM

## 2019-06-05 LAB
ABO GROUP BLD: NORMAL
BLD GP AB SCN SERPL QL: NEGATIVE
GLUCOSE BLDC GLUCOMTR-MCNC: 139 MG/DL (ref 70–130)
RH BLD: POSITIVE
T&S EXPIRATION DATE: NORMAL

## 2019-06-05 PROCEDURE — 76000 FLUOROSCOPY <1 HR PHYS/QHP: CPT

## 2019-06-05 PROCEDURE — 25010000002 MORPHINE PER 10 MG: Performed by: NEUROLOGICAL SURGERY

## 2019-06-05 PROCEDURE — 25010000003 CEFAZOLIN IN DEXTROSE 2-4 GM/100ML-% SOLUTION: Performed by: NEUROLOGICAL SURGERY

## 2019-06-05 PROCEDURE — 22614 ARTHRD PST TQ 1NTRSPC EA ADD: CPT | Performed by: SPECIALIST/TECHNOLOGIST, OTHER

## 2019-06-05 PROCEDURE — 25010000002 FENTANYL CITRATE (PF) 100 MCG/2ML SOLUTION: Performed by: NURSE ANESTHETIST, CERTIFIED REGISTERED

## 2019-06-05 PROCEDURE — C1713 ANCHOR/SCREW BN/BN,TIS/BN: HCPCS | Performed by: NEUROLOGICAL SURGERY

## 2019-06-05 PROCEDURE — 63047 LAM FACETEC & FORAMOT LUMBAR: CPT | Performed by: NEUROLOGICAL SURGERY

## 2019-06-05 PROCEDURE — 0RGA071 FUSION OF THORACOLUMBAR VERTEBRAL JOINT WITH AUTOLOGOUS TISSUE SUBSTITUTE, POSTERIOR APPROACH, POSTERIOR COLUMN, OPEN APPROACH: ICD-10-PCS | Performed by: NEUROLOGICAL SURGERY

## 2019-06-05 PROCEDURE — 25010000002 VANCOMYCIN 1 G RECONSTITUTED SOLUTION 1 EACH VIAL: Performed by: NEUROLOGICAL SURGERY

## 2019-06-05 PROCEDURE — 0SG1071 FUSION OF 2 OR MORE LUMBAR VERTEBRAL JOINTS WITH AUTOLOGOUS TISSUE SUBSTITUTE, POSTERIOR APPROACH, POSTERIOR COLUMN, OPEN APPROACH: ICD-10-PCS | Performed by: NEUROLOGICAL SURGERY

## 2019-06-05 PROCEDURE — 25010000002 HEPARIN (PORCINE) PER 1000 UNITS: Performed by: NEUROLOGICAL SURGERY

## 2019-06-05 PROCEDURE — 25010000002 ONDANSETRON PER 1 MG: Performed by: NURSE ANESTHETIST, CERTIFIED REGISTERED

## 2019-06-05 PROCEDURE — 25010000002 MIDAZOLAM PER 1 MG: Performed by: ANESTHESIOLOGY

## 2019-06-05 PROCEDURE — 72080 X-RAY EXAM THORACOLMB 2/> VW: CPT

## 2019-06-05 PROCEDURE — 86901 BLOOD TYPING SEROLOGIC RH(D): CPT | Performed by: NEUROLOGICAL SURGERY

## 2019-06-05 PROCEDURE — 25010000002 DEXAMETHASONE PER 1 MG: Performed by: NURSE ANESTHETIST, CERTIFIED REGISTERED

## 2019-06-05 PROCEDURE — 22843 INSERT SPINE FIXATION DEVICE: CPT | Performed by: SPECIALIST/TECHNOLOGIST, OTHER

## 2019-06-05 PROCEDURE — 0QP004Z REMOVAL OF INTERNAL FIXATION DEVICE FROM LUMBAR VERTEBRA, OPEN APPROACH: ICD-10-PCS | Performed by: NEUROLOGICAL SURGERY

## 2019-06-05 PROCEDURE — 22843 INSERT SPINE FIXATION DEVICE: CPT | Performed by: NEUROLOGICAL SURGERY

## 2019-06-05 PROCEDURE — 22614 ARTHRD PST TQ 1NTRSPC EA ADD: CPT | Performed by: NEUROLOGICAL SURGERY

## 2019-06-05 PROCEDURE — 25010000002 PROPOFOL 10 MG/ML EMULSION: Performed by: NURSE ANESTHETIST, CERTIFIED REGISTERED

## 2019-06-05 PROCEDURE — 86850 RBC ANTIBODY SCREEN: CPT | Performed by: NEUROLOGICAL SURGERY

## 2019-06-05 PROCEDURE — 25010000002 SUCCINYLCHOLINE PER 20 MG: Performed by: NURSE ANESTHETIST, CERTIFIED REGISTERED

## 2019-06-05 PROCEDURE — 25010000003 CEFAZOLIN IN DEXTROSE 2-4 GM/100ML-% SOLUTION: Performed by: NURSE ANESTHETIST, CERTIFIED REGISTERED

## 2019-06-05 PROCEDURE — 63047 LAM FACETEC & FORAMOT LUMBAR: CPT | Performed by: SPECIALIST/TECHNOLOGIST, OTHER

## 2019-06-05 PROCEDURE — 22612 ARTHRD PST TQ 1NTRSPC LUMBAR: CPT | Performed by: SPECIALIST/TECHNOLOGIST, OTHER

## 2019-06-05 PROCEDURE — 22612 ARTHRD PST TQ 1NTRSPC LUMBAR: CPT | Performed by: NEUROLOGICAL SURGERY

## 2019-06-05 PROCEDURE — 63048 LAM FACETEC &FORAMOT EA ADDL: CPT | Performed by: NEUROLOGICAL SURGERY

## 2019-06-05 PROCEDURE — 25010000002 PHENYLEPHRINE PER 1 ML: Performed by: NURSE ANESTHETIST, CERTIFIED REGISTERED

## 2019-06-05 PROCEDURE — 82962 GLUCOSE BLOOD TEST: CPT

## 2019-06-05 PROCEDURE — 61783 SCAN PROC SPINAL: CPT | Performed by: NEUROLOGICAL SURGERY

## 2019-06-05 PROCEDURE — 0SG3071 FUSION OF LUMBOSACRAL JOINT WITH AUTOLOGOUS TISSUE SUBSTITUTE, POSTERIOR APPROACH, POSTERIOR COLUMN, OPEN APPROACH: ICD-10-PCS | Performed by: NEUROLOGICAL SURGERY

## 2019-06-05 PROCEDURE — 0SB20ZZ EXCISION OF LUMBAR VERTEBRAL DISC, OPEN APPROACH: ICD-10-PCS | Performed by: NEUROLOGICAL SURGERY

## 2019-06-05 PROCEDURE — 86900 BLOOD TYPING SEROLOGIC ABO: CPT | Performed by: NEUROLOGICAL SURGERY

## 2019-06-05 PROCEDURE — 63048 LAM FACETEC &FORAMOT EA ADDL: CPT | Performed by: SPECIALIST/TECHNOLOGIST, OTHER

## 2019-06-05 PROCEDURE — 0RG6071 FUSION OF THORACIC VERTEBRAL JOINT WITH AUTOLOGOUS TISSUE SUBSTITUTE, POSTERIOR APPROACH, POSTERIOR COLUMN, OPEN APPROACH: ICD-10-PCS | Performed by: NEUROLOGICAL SURGERY

## 2019-06-05 DEVICE — CONNECTOR 5444220 4.75-4.75 SIDE LAT 20
Type: IMPLANTABLE DEVICE | Site: SPINE LUMBAR | Status: FUNCTIONAL
Brand: CD HORIZON® SPINAL SYSTEM

## 2019-06-05 DEVICE — GRFT BONE MAGNIFUSE PC 1.75X10CM: Type: IMPLANTABLE DEVICE | Site: SPINE LUMBAR | Status: FUNCTIONAL

## 2019-06-05 DEVICE — SEALANT WND FIBRIN TISSEEL VAPOR/HEAT/PREFIL/SYR 10ML: Type: IMPLANTABLE DEVICE | Site: SPINE LUMBAR | Status: FUNCTIONAL

## 2019-06-05 RX ORDER — DEXAMETHASONE SODIUM PHOSPHATE 10 MG/ML
INJECTION INTRAMUSCULAR; INTRAVENOUS AS NEEDED
Status: DISCONTINUED | OUTPATIENT
Start: 2019-06-05 | End: 2019-06-05 | Stop reason: SURG

## 2019-06-05 RX ORDER — CYCLOBENZAPRINE HCL 10 MG
10 TABLET ORAL 3 TIMES DAILY PRN
Status: DISCONTINUED | OUTPATIENT
Start: 2019-06-05 | End: 2019-06-14 | Stop reason: HOSPADM

## 2019-06-05 RX ORDER — OXYCODONE AND ACETAMINOPHEN 10; 325 MG/1; MG/1
1 TABLET ORAL EVERY 4 HOURS PRN
Status: DISCONTINUED | OUTPATIENT
Start: 2019-06-05 | End: 2019-06-13

## 2019-06-05 RX ORDER — GABAPENTIN 300 MG/1
600 CAPSULE ORAL ONCE
Status: COMPLETED | OUTPATIENT
Start: 2019-06-05 | End: 2019-06-05

## 2019-06-05 RX ORDER — FLUMAZENIL 0.1 MG/ML
0.2 INJECTION INTRAVENOUS AS NEEDED
Status: DISCONTINUED | OUTPATIENT
Start: 2019-06-05 | End: 2019-06-05 | Stop reason: HOSPADM

## 2019-06-05 RX ORDER — ONDANSETRON 2 MG/ML
INJECTION INTRAMUSCULAR; INTRAVENOUS AS NEEDED
Status: DISCONTINUED | OUTPATIENT
Start: 2019-06-05 | End: 2019-06-05 | Stop reason: SURG

## 2019-06-05 RX ORDER — MORPHINE SULFATE 10 MG/ML
6 INJECTION INTRAMUSCULAR; INTRAVENOUS; SUBCUTANEOUS
Status: DISCONTINUED | OUTPATIENT
Start: 2019-06-05 | End: 2019-06-13

## 2019-06-05 RX ORDER — CEFAZOLIN SODIUM 2 G/100ML
INJECTION, SOLUTION INTRAVENOUS AS NEEDED
Status: DISCONTINUED | OUTPATIENT
Start: 2019-06-05 | End: 2019-06-05 | Stop reason: SURG

## 2019-06-05 RX ORDER — SODIUM CHLORIDE, SODIUM LACTATE, POTASSIUM CHLORIDE, CALCIUM CHLORIDE 600; 310; 30; 20 MG/100ML; MG/100ML; MG/100ML; MG/100ML
9 INJECTION, SOLUTION INTRAVENOUS CONTINUOUS
Status: DISCONTINUED | OUTPATIENT
Start: 2019-06-05 | End: 2019-06-06

## 2019-06-05 RX ORDER — SODIUM CHLORIDE, SODIUM LACTATE, POTASSIUM CHLORIDE, CALCIUM CHLORIDE 600; 310; 30; 20 MG/100ML; MG/100ML; MG/100ML; MG/100ML
75 INJECTION, SOLUTION INTRAVENOUS CONTINUOUS
Status: DISCONTINUED | OUTPATIENT
Start: 2019-06-05 | End: 2019-06-06

## 2019-06-05 RX ORDER — LIDOCAINE HYDROCHLORIDE 20 MG/ML
INJECTION, SOLUTION INFILTRATION; PERINEURAL AS NEEDED
Status: DISCONTINUED | OUTPATIENT
Start: 2019-06-05 | End: 2019-06-05 | Stop reason: SURG

## 2019-06-05 RX ORDER — MIDAZOLAM HYDROCHLORIDE 1 MG/ML
2 INJECTION INTRAMUSCULAR; INTRAVENOUS
Status: DISCONTINUED | OUTPATIENT
Start: 2019-06-05 | End: 2019-06-05 | Stop reason: HOSPADM

## 2019-06-05 RX ORDER — NALOXONE HCL 0.4 MG/ML
0.2 VIAL (ML) INJECTION AS NEEDED
Status: DISCONTINUED | OUTPATIENT
Start: 2019-06-05 | End: 2019-06-05 | Stop reason: HOSPADM

## 2019-06-05 RX ORDER — MIDAZOLAM HYDROCHLORIDE 1 MG/ML
1 INJECTION INTRAMUSCULAR; INTRAVENOUS
Status: DISCONTINUED | OUTPATIENT
Start: 2019-06-05 | End: 2019-06-05 | Stop reason: HOSPADM

## 2019-06-05 RX ORDER — PROMETHAZINE HYDROCHLORIDE 25 MG/1
25 SUPPOSITORY RECTAL ONCE AS NEEDED
Status: DISCONTINUED | OUTPATIENT
Start: 2019-06-05 | End: 2019-06-05 | Stop reason: HOSPADM

## 2019-06-05 RX ORDER — ONDANSETRON 2 MG/ML
4 INJECTION INTRAMUSCULAR; INTRAVENOUS EVERY 6 HOURS PRN
Status: DISCONTINUED | OUTPATIENT
Start: 2019-06-05 | End: 2019-06-14 | Stop reason: HOSPADM

## 2019-06-05 RX ORDER — OXYCODONE AND ACETAMINOPHEN 7.5; 325 MG/1; MG/1
1 TABLET ORAL ONCE AS NEEDED
Status: DISCONTINUED | OUTPATIENT
Start: 2019-06-05 | End: 2019-06-05 | Stop reason: HOSPADM

## 2019-06-05 RX ORDER — FAMOTIDINE 10 MG/ML
20 INJECTION, SOLUTION INTRAVENOUS ONCE
Status: COMPLETED | OUTPATIENT
Start: 2019-06-05 | End: 2019-06-05

## 2019-06-05 RX ORDER — ONDANSETRON 2 MG/ML
4 INJECTION INTRAMUSCULAR; INTRAVENOUS ONCE AS NEEDED
Status: DISCONTINUED | OUTPATIENT
Start: 2019-06-05 | End: 2019-06-05 | Stop reason: HOSPADM

## 2019-06-05 RX ORDER — MEPERIDINE HYDROCHLORIDE 25 MG/ML
12.5 INJECTION INTRAMUSCULAR; INTRAVENOUS; SUBCUTANEOUS
Status: DISCONTINUED | OUTPATIENT
Start: 2019-06-05 | End: 2019-06-05 | Stop reason: HOSPADM

## 2019-06-05 RX ORDER — SODIUM CHLORIDE 9 MG/ML
INJECTION, SOLUTION INTRAVENOUS CONTINUOUS PRN
Status: DISCONTINUED | OUTPATIENT
Start: 2019-06-05 | End: 2019-06-05 | Stop reason: SURG

## 2019-06-05 RX ORDER — SODIUM CHLORIDE 0.9 % (FLUSH) 0.9 %
3 SYRINGE (ML) INJECTION EVERY 12 HOURS SCHEDULED
Status: DISCONTINUED | OUTPATIENT
Start: 2019-06-05 | End: 2019-06-05 | Stop reason: HOSPADM

## 2019-06-05 RX ORDER — SUCCINYLCHOLINE CHLORIDE 20 MG/ML
INJECTION INTRAMUSCULAR; INTRAVENOUS AS NEEDED
Status: DISCONTINUED | OUTPATIENT
Start: 2019-06-05 | End: 2019-06-05 | Stop reason: SURG

## 2019-06-05 RX ORDER — DOCUSATE SODIUM 100 MG/1
100 CAPSULE, LIQUID FILLED ORAL 2 TIMES DAILY PRN
Status: DISCONTINUED | OUTPATIENT
Start: 2019-06-05 | End: 2019-06-14 | Stop reason: HOSPADM

## 2019-06-05 RX ORDER — BISACODYL 5 MG/1
10 TABLET, DELAYED RELEASE ORAL DAILY PRN
Status: DISCONTINUED | OUTPATIENT
Start: 2019-06-05 | End: 2019-06-14 | Stop reason: HOSPADM

## 2019-06-05 RX ORDER — HYDROCODONE BITARTRATE AND ACETAMINOPHEN 7.5; 325 MG/1; MG/1
1 TABLET ORAL EVERY 4 HOURS PRN
Status: DISCONTINUED | OUTPATIENT
Start: 2019-06-05 | End: 2019-06-13

## 2019-06-05 RX ORDER — MORPHINE SULFATE 2 MG/ML
4 INJECTION, SOLUTION INTRAMUSCULAR; INTRAVENOUS
Status: DISCONTINUED | OUTPATIENT
Start: 2019-06-05 | End: 2019-06-13

## 2019-06-05 RX ORDER — SODIUM CHLORIDE, SODIUM LACTATE, POTASSIUM CHLORIDE, CALCIUM CHLORIDE 600; 310; 30; 20 MG/100ML; MG/100ML; MG/100ML; MG/100ML
100 INJECTION, SOLUTION INTRAVENOUS CONTINUOUS
Status: DISCONTINUED | OUTPATIENT
Start: 2019-06-05 | End: 2019-06-06

## 2019-06-05 RX ORDER — CEFAZOLIN SODIUM 2 G/100ML
2 INJECTION, SOLUTION INTRAVENOUS ONCE
Status: DISCONTINUED | OUTPATIENT
Start: 2019-06-05 | End: 2019-06-05 | Stop reason: HOSPADM

## 2019-06-05 RX ORDER — FENTANYL CITRATE 50 UG/ML
50 INJECTION, SOLUTION INTRAMUSCULAR; INTRAVENOUS
Status: DISCONTINUED | OUTPATIENT
Start: 2019-06-05 | End: 2019-06-05 | Stop reason: HOSPADM

## 2019-06-05 RX ORDER — PROMETHAZINE HYDROCHLORIDE 25 MG/ML
12.5 INJECTION, SOLUTION INTRAMUSCULAR; INTRAVENOUS ONCE AS NEEDED
Status: DISCONTINUED | OUTPATIENT
Start: 2019-06-05 | End: 2019-06-05 | Stop reason: HOSPADM

## 2019-06-05 RX ORDER — HYDROCODONE BITARTRATE AND ACETAMINOPHEN 7.5; 325 MG/1; MG/1
1 TABLET ORAL ONCE AS NEEDED
Status: DISCONTINUED | OUTPATIENT
Start: 2019-06-05 | End: 2019-06-05 | Stop reason: HOSPADM

## 2019-06-05 RX ORDER — PROPOFOL 10 MG/ML
VIAL (ML) INTRAVENOUS AS NEEDED
Status: DISCONTINUED | OUTPATIENT
Start: 2019-06-05 | End: 2019-06-05 | Stop reason: SURG

## 2019-06-05 RX ORDER — ALBUTEROL SULFATE 2.5 MG/3ML
2.5 SOLUTION RESPIRATORY (INHALATION) ONCE AS NEEDED
Status: DISCONTINUED | OUTPATIENT
Start: 2019-06-05 | End: 2019-06-05 | Stop reason: HOSPADM

## 2019-06-05 RX ORDER — PROMETHAZINE HYDROCHLORIDE 25 MG/1
25 TABLET ORAL ONCE AS NEEDED
Status: DISCONTINUED | OUTPATIENT
Start: 2019-06-05 | End: 2019-06-05 | Stop reason: HOSPADM

## 2019-06-05 RX ORDER — NALOXONE HCL 0.4 MG/ML
0.4 VIAL (ML) INJECTION
Status: DISCONTINUED | OUTPATIENT
Start: 2019-06-05 | End: 2019-06-13

## 2019-06-05 RX ORDER — HYDRALAZINE HYDROCHLORIDE 20 MG/ML
5 INJECTION INTRAMUSCULAR; INTRAVENOUS
Status: DISCONTINUED | OUTPATIENT
Start: 2019-06-05 | End: 2019-06-05 | Stop reason: HOSPADM

## 2019-06-05 RX ORDER — ACETAMINOPHEN 500 MG
1000 TABLET ORAL ONCE
Status: COMPLETED | OUTPATIENT
Start: 2019-06-05 | End: 2019-06-05

## 2019-06-05 RX ORDER — PROMETHAZINE HYDROCHLORIDE 25 MG/ML
6.25 INJECTION, SOLUTION INTRAMUSCULAR; INTRAVENOUS
Status: DISCONTINUED | OUTPATIENT
Start: 2019-06-05 | End: 2019-06-05 | Stop reason: HOSPADM

## 2019-06-05 RX ORDER — CEFAZOLIN SODIUM 2 G/100ML
2 INJECTION, SOLUTION INTRAVENOUS EVERY 8 HOURS
Status: COMPLETED | OUTPATIENT
Start: 2019-06-05 | End: 2019-06-06

## 2019-06-05 RX ORDER — SODIUM CHLORIDE 0.9 % (FLUSH) 0.9 %
3-10 SYRINGE (ML) INJECTION AS NEEDED
Status: DISCONTINUED | OUTPATIENT
Start: 2019-06-05 | End: 2019-06-05 | Stop reason: HOSPADM

## 2019-06-05 RX ORDER — EPHEDRINE SULFATE 50 MG/ML
5 INJECTION, SOLUTION INTRAVENOUS ONCE AS NEEDED
Status: DISCONTINUED | OUTPATIENT
Start: 2019-06-05 | End: 2019-06-05 | Stop reason: HOSPADM

## 2019-06-05 RX ORDER — ROCURONIUM BROMIDE 10 MG/ML
INJECTION, SOLUTION INTRAVENOUS AS NEEDED
Status: DISCONTINUED | OUTPATIENT
Start: 2019-06-05 | End: 2019-06-05 | Stop reason: SURG

## 2019-06-05 RX ORDER — SUFENTANIL CITRATE 50 UG/ML
INJECTION EPIDURAL; INTRAVENOUS AS NEEDED
Status: DISCONTINUED | OUTPATIENT
Start: 2019-06-05 | End: 2019-06-05 | Stop reason: SURG

## 2019-06-05 RX ORDER — ACETAMINOPHEN 325 MG/1
650 TABLET ORAL ONCE AS NEEDED
Status: DISCONTINUED | OUTPATIENT
Start: 2019-06-05 | End: 2019-06-05 | Stop reason: HOSPADM

## 2019-06-05 RX ORDER — DIPHENHYDRAMINE HCL 25 MG
25 CAPSULE ORAL
Status: DISCONTINUED | OUTPATIENT
Start: 2019-06-05 | End: 2019-06-05 | Stop reason: HOSPADM

## 2019-06-05 RX ORDER — ONDANSETRON 4 MG/1
4 TABLET, FILM COATED ORAL EVERY 6 HOURS PRN
Status: DISCONTINUED | OUTPATIENT
Start: 2019-06-05 | End: 2019-06-14 | Stop reason: HOSPADM

## 2019-06-05 RX ORDER — HYDROMORPHONE HYDROCHLORIDE 1 MG/ML
0.5 INJECTION, SOLUTION INTRAMUSCULAR; INTRAVENOUS; SUBCUTANEOUS
Status: DISCONTINUED | OUTPATIENT
Start: 2019-06-05 | End: 2019-06-05 | Stop reason: HOSPADM

## 2019-06-05 RX ORDER — GLYCOPYRROLATE 0.2 MG/ML
INJECTION INTRAMUSCULAR; INTRAVENOUS AS NEEDED
Status: DISCONTINUED | OUTPATIENT
Start: 2019-06-05 | End: 2019-06-05 | Stop reason: SURG

## 2019-06-05 RX ORDER — MAGNESIUM HYDROXIDE 1200 MG/15ML
LIQUID ORAL AS NEEDED
Status: DISCONTINUED | OUTPATIENT
Start: 2019-06-05 | End: 2019-06-05 | Stop reason: HOSPADM

## 2019-06-05 RX ADMIN — SODIUM CHLORIDE: 9 INJECTION, SOLUTION INTRAVENOUS at 08:23

## 2019-06-05 RX ADMIN — CEFAZOLIN SODIUM 2 G: 2 INJECTION, SOLUTION INTRAVENOUS at 08:30

## 2019-06-05 RX ADMIN — PHENYLEPHRINE HYDROCHLORIDE 200 MCG: 10 INJECTION INTRAVENOUS at 16:47

## 2019-06-05 RX ADMIN — SODIUM CHLORIDE, POTASSIUM CHLORIDE, SODIUM LACTATE AND CALCIUM CHLORIDE 100 ML/HR: 600; 310; 30; 20 INJECTION, SOLUTION INTRAVENOUS at 07:46

## 2019-06-05 RX ADMIN — DEXAMETHASONE SODIUM PHOSPHATE 8 MG: 10 INJECTION INTRAMUSCULAR; INTRAVENOUS at 09:36

## 2019-06-05 RX ADMIN — PHENYLEPHRINE HYDROCHLORIDE 100 MCG: 10 INJECTION INTRAVENOUS at 09:00

## 2019-06-05 RX ADMIN — PHENYLEPHRINE HYDROCHLORIDE 100 MCG: 10 INJECTION INTRAVENOUS at 10:47

## 2019-06-05 RX ADMIN — PHENYLEPHRINE HYDROCHLORIDE 100 MCG: 10 INJECTION INTRAVENOUS at 09:28

## 2019-06-05 RX ADMIN — SUFENTANIL CITRATE 10 MCG: 50 INJECTION EPIDURAL; INTRAVENOUS at 09:23

## 2019-06-05 RX ADMIN — PHENYLEPHRINE HYDROCHLORIDE 100 MCG: 10 INJECTION INTRAVENOUS at 11:07

## 2019-06-05 RX ADMIN — MIDAZOLAM 2 MG: 1 INJECTION INTRAMUSCULAR; INTRAVENOUS at 07:51

## 2019-06-05 RX ADMIN — SUFENTANIL CITRATE 10 MCG: 50 INJECTION EPIDURAL; INTRAVENOUS at 15:36

## 2019-06-05 RX ADMIN — MORPHINE SULFATE 4 MG: 2 INJECTION, SOLUTION INTRAMUSCULAR; INTRAVENOUS at 22:47

## 2019-06-05 RX ADMIN — PHENYLEPHRINE HYDROCHLORIDE 100 MCG: 10 INJECTION INTRAVENOUS at 08:41

## 2019-06-05 RX ADMIN — CEFAZOLIN SODIUM 2 G: 2 INJECTION, SOLUTION INTRAVENOUS at 12:35

## 2019-06-05 RX ADMIN — PHENYLEPHRINE HYDROCHLORIDE 100 MCG: 10 INJECTION INTRAVENOUS at 09:35

## 2019-06-05 RX ADMIN — PHENYLEPHRINE HYDROCHLORIDE 100 MCG: 10 INJECTION INTRAVENOUS at 09:10

## 2019-06-05 RX ADMIN — FENTANYL CITRATE 50 MCG: 50 INJECTION INTRAMUSCULAR; INTRAVENOUS at 19:44

## 2019-06-05 RX ADMIN — PHENYLEPHRINE HYDROCHLORIDE 100 MCG: 10 INJECTION INTRAVENOUS at 08:50

## 2019-06-05 RX ADMIN — PHENYLEPHRINE HYDROCHLORIDE 100 MCG: 10 INJECTION INTRAVENOUS at 10:57

## 2019-06-05 RX ADMIN — ROCURONIUM BROMIDE 45 MG: 10 INJECTION INTRAVENOUS at 08:24

## 2019-06-05 RX ADMIN — LIDOCAINE HYDROCHLORIDE 100 MG: 20 INJECTION, SOLUTION INFILTRATION; PERINEURAL at 08:14

## 2019-06-05 RX ADMIN — SODIUM CHLORIDE, POTASSIUM CHLORIDE, SODIUM LACTATE AND CALCIUM CHLORIDE: 600; 310; 30; 20 INJECTION, SOLUTION INTRAVENOUS at 11:35

## 2019-06-05 RX ADMIN — PHENYLEPHRINE HYDROCHLORIDE 100 MCG: 10 INJECTION INTRAVENOUS at 12:21

## 2019-06-05 RX ADMIN — PHENYLEPHRINE HYDROCHLORIDE 100 MCG: 10 INJECTION INTRAVENOUS at 14:51

## 2019-06-05 RX ADMIN — PHENYLEPHRINE HYDROCHLORIDE 100 MCG: 10 INJECTION INTRAVENOUS at 08:25

## 2019-06-05 RX ADMIN — PHENYLEPHRINE HYDROCHLORIDE 100 MCG: 10 INJECTION INTRAVENOUS at 11:40

## 2019-06-05 RX ADMIN — SODIUM CHLORIDE: 9 INJECTION, SOLUTION INTRAVENOUS at 10:30

## 2019-06-05 RX ADMIN — PHENYLEPHRINE HYDROCHLORIDE 100 MCG: 10 INJECTION INTRAVENOUS at 12:24

## 2019-06-05 RX ADMIN — SUFENTANIL CITRATE 10 MCG: 50 INJECTION EPIDURAL; INTRAVENOUS at 08:12

## 2019-06-05 RX ADMIN — PHENYLEPHRINE HYDROCHLORIDE 100 MCG: 10 INJECTION INTRAVENOUS at 16:52

## 2019-06-05 RX ADMIN — PHENYLEPHRINE HYDROCHLORIDE 100 MCG: 10 INJECTION INTRAVENOUS at 11:44

## 2019-06-05 RX ADMIN — CEFAZOLIN SODIUM 2 G: 2 INJECTION, SOLUTION INTRAVENOUS at 22:47

## 2019-06-05 RX ADMIN — SUFENTANIL CITRATE 10 MCG: 50 INJECTION EPIDURAL; INTRAVENOUS at 16:30

## 2019-06-05 RX ADMIN — PHENYLEPHRINE HYDROCHLORIDE 100 MCG: 10 INJECTION INTRAVENOUS at 09:24

## 2019-06-05 RX ADMIN — PHENYLEPHRINE HYDROCHLORIDE 100 MCG: 10 INJECTION INTRAVENOUS at 14:42

## 2019-06-05 RX ADMIN — FENTANYL CITRATE 50 MCG: 50 INJECTION INTRAMUSCULAR; INTRAVENOUS at 20:00

## 2019-06-05 RX ADMIN — FAMOTIDINE 20 MG: 10 INJECTION INTRAVENOUS at 07:51

## 2019-06-05 RX ADMIN — SUFENTANIL CITRATE 10 MCG: 50 INJECTION EPIDURAL; INTRAVENOUS at 17:06

## 2019-06-05 RX ADMIN — PROPOFOL 180 MG: 10 INJECTION, EMULSION INTRAVENOUS at 08:14

## 2019-06-05 RX ADMIN — ONDANSETRON 4 MG: 2 INJECTION INTRAMUSCULAR; INTRAVENOUS at 17:33

## 2019-06-05 RX ADMIN — SUFENTANIL CITRATE 10 MCG: 50 INJECTION EPIDURAL; INTRAVENOUS at 15:59

## 2019-06-05 RX ADMIN — ACETAMINOPHEN 1000 MG: 500 TABLET, FILM COATED ORAL at 07:49

## 2019-06-05 RX ADMIN — SUCCINYLCHOLINE CHLORIDE 100 MG: 20 INJECTION, SOLUTION INTRAMUSCULAR; INTRAVENOUS; PARENTERAL at 08:14

## 2019-06-05 RX ADMIN — PHENYLEPHRINE HYDROCHLORIDE 100 MCG: 10 INJECTION INTRAVENOUS at 13:41

## 2019-06-05 RX ADMIN — PHENYLEPHRINE HYDROCHLORIDE 100 MCG: 10 INJECTION INTRAVENOUS at 11:28

## 2019-06-05 RX ADMIN — PHENYLEPHRINE HYDROCHLORIDE 100 MCG: 10 INJECTION INTRAVENOUS at 09:05

## 2019-06-05 RX ADMIN — SUFENTANIL CITRATE 10 MCG: 50 INJECTION EPIDURAL; INTRAVENOUS at 13:31

## 2019-06-05 RX ADMIN — SUFENTANIL CITRATE 20 MCG: 50 INJECTION EPIDURAL; INTRAVENOUS at 17:23

## 2019-06-05 RX ADMIN — PHENYLEPHRINE HYDROCHLORIDE 100 MCG: 10 INJECTION INTRAVENOUS at 11:31

## 2019-06-05 RX ADMIN — GABAPENTIN 600 MG: 300 CAPSULE ORAL at 07:49

## 2019-06-05 RX ADMIN — SUFENTANIL CITRATE 10 MCG: 50 INJECTION EPIDURAL; INTRAVENOUS at 10:32

## 2019-06-05 RX ADMIN — ROCURONIUM BROMIDE 5 MG: 10 INJECTION INTRAVENOUS at 08:14

## 2019-06-05 RX ADMIN — SODIUM CHLORIDE: 9 INJECTION, SOLUTION INTRAVENOUS at 14:19

## 2019-06-05 RX ADMIN — PHENYLEPHRINE HYDROCHLORIDE 100 MCG: 10 INJECTION INTRAVENOUS at 09:29

## 2019-06-05 RX ADMIN — PHENYLEPHRINE HYDROCHLORIDE 100 MCG: 10 INJECTION INTRAVENOUS at 16:40

## 2019-06-05 RX ADMIN — GLYCOPYRROLATE 0.2 MG: 0.2 INJECTION INTRAMUSCULAR; INTRAVENOUS at 08:12

## 2019-06-05 RX ADMIN — SODIUM CHLORIDE, POTASSIUM CHLORIDE, SODIUM LACTATE AND CALCIUM CHLORIDE: 600; 310; 30; 20 INJECTION, SOLUTION INTRAVENOUS at 15:55

## 2019-06-05 RX ADMIN — PHENYLEPHRINE HYDROCHLORIDE 100 MCG: 10 INJECTION INTRAVENOUS at 12:03

## 2019-06-05 NOTE — ANESTHESIA PROCEDURE NOTES
Airway  Urgency: elective    Airway not difficult    General Information and Staff    Patient location during procedure: OR  Anesthesiologist: Maksim Yuen MD  CRNA: Brijesh Rodriguez CRNA    Indications and Patient Condition  Indications for airway management: airway protection    Preoxygenated: yes  MILS maintained throughout  Mask difficulty assessment: 1 - vent by mask    Final Airway Details  Final airway type: endotracheal airway      Successful airway: ETT and reinforced tube  Cuffed: yes   Successful intubation technique: direct laryngoscopy  Facilitating devices/methods: cricoid pressure and intubating stylet  Endotracheal tube insertion site: oral  Blade: Elvi  Blade size: 3  ETT size (mm): 7.5  Cormack-Lehane Classification: grade I - full view of glottis  Placement verified by: chest auscultation and capnometry   Inital cuff pressure (cm H2O): 22  Cuff volume (mL): 9  Measured from: lips  ETT to teeth (cm): 24  Number of attempts at approach: 1

## 2019-06-05 NOTE — BRIEF OP NOTE
LUMBAR DISCECTOMY FUSION INSTRUMENTATION WITH STEALTH  Progress Note    Archie Khanave  6/5/2019    Pre-op Diagnosis:   Lumbar radiculopathy [M54.16]  Gait disorder [R26.9]  Spinal stenosis, lumbar region, with neurogenic claudication [M48.062]  Lumbar disc herniation [M51.26]  History of lumbar fusion [Z98.1]       Post-Op Diagnosis Codes:     * Lumbar radiculopathy [M54.16]     * Gait disorder [R26.9]     * Spinal stenosis, lumbar region, with neurogenic claudication [M48.062]     * Lumbar disc herniation [M51.26]     * History of lumbar fusion [Z98.1]    Procedure/CPT® Codes:      Procedure(s):  T11-S2 posterior fusion with L3/4 lumbar decompression and left L5/L6 decompression    Surgeon(s):  Corbin Bailey MD    Anesthesia: General    Staff:   Cell Saver : Kaylan Lai; Vickey Wilkinson  Circulator: Megan Figueroa RN; Jose James RN  Radiology Technologist: Sonja Wren RRT  Scrub Person: Lou Waldrop; Katey Villafuerte  Vendor Representative: Misael Giraldo  Assistant: Luzmaria Child CSA    Estimated Blood Loss: 700 mL    Urine Voided: 800 mL    Specimens:                None          Drains:   Closed/Suction Drain Bulb 15 Fr. (Active)       Closed/Suction Drain Back Bulb 15 Fr. (Active)       Urethral Catheter Silicone 16 Fr. (Active)       Findings: Severe stenosis as exprected    Complications: none      Corbin Bailey MD     Date: 6/5/2019  Time: 4:59 PM

## 2019-06-05 NOTE — INTERVAL H&P NOTE
H&P reviewed. The patient was examined and there are no changes to the H&P.       I again went over the various risks benefits and alternatives of the operative intervention in detail with the patient his daughter and his mother.  I also explained the fact that he has a 6 lumbar vertebrae and that we would be fusing from the second to last, last thoracic vertebrae down into the bottom of the sacrum.  I explained also that we might want to other procedures during the operative intervention as dictated by the circumstances.  The patient again voiced understanding of these risks benefits and alternatives as did his daughter and his mother and request that we proceed.

## 2019-06-05 NOTE — ANESTHESIA PREPROCEDURE EVALUATION
Anesthesia Evaluation     no history of anesthetic complications:               Airway   Mallampati: II  Neck ROM: full  no difficulty expected  Dental - normal exam     Pulmonary - normal exam   (+) sleep apnea,   (-) COPD, asthma, not a smoker    PE comment: nonlabored  Cardiovascular - normal exam    Rhythm: regular  Rate: normal    (+) hypertension well controlled 2 medications or greater,   (-) valvular problems/murmurs, past MI, CAD, dysrhythmias, angina      Neuro/Psych  (+) numbness (lumbar radiculopathy),     (-) seizures, TIA, CVA  GI/Hepatic/Renal/Endo - negative ROS   (-) GERD, liver disease, no renal disease, diabetes, hypothyroidism, hyperthyroidism    Musculoskeletal     (+) arthralgias, back pain,   Abdominal    Substance History      OB/GYN          Other                        Anesthesia Plan    ASA 2     general     intravenous induction   Anesthetic plan, all risks, benefits, and alternatives have been provided, discussed and informed consent has been obtained with: patient.

## 2019-06-06 LAB
ANION GAP SERPL CALCULATED.3IONS-SCNC: 9.5 MMOL/L
BUN BLD-MCNC: 13 MG/DL (ref 6–20)
BUN/CREAT SERPL: 14.9 (ref 7–25)
CALCIUM SPEC-SCNC: 7.8 MG/DL (ref 8.6–10.5)
CHLORIDE SERPL-SCNC: 98 MMOL/L (ref 98–107)
CO2 SERPL-SCNC: 26.5 MMOL/L (ref 22–29)
CREAT BLD-MCNC: 0.87 MG/DL (ref 0.76–1.27)
DEPRECATED RDW RBC AUTO: 45 FL (ref 37–54)
ERYTHROCYTE [DISTWIDTH] IN BLOOD BY AUTOMATED COUNT: 12.8 % (ref 12.3–15.4)
GFR SERPL CREATININE-BSD FRML MDRD: 91 ML/MIN/1.73
GLUCOSE BLD-MCNC: 120 MG/DL (ref 65–99)
HCT VFR BLD AUTO: 35.5 % (ref 37.5–51)
HGB BLD-MCNC: 11.6 G/DL (ref 13–17.7)
MCH RBC QN AUTO: 31.4 PG (ref 26.6–33)
MCHC RBC AUTO-ENTMCNC: 32.7 G/DL (ref 31.5–35.7)
MCV RBC AUTO: 95.9 FL (ref 79–97)
PLATELET # BLD AUTO: 165 10*3/MM3 (ref 140–450)
PMV BLD AUTO: 9.7 FL (ref 6–12)
POTASSIUM BLD-SCNC: 3.7 MMOL/L (ref 3.5–5.2)
RBC # BLD AUTO: 3.7 10*6/MM3 (ref 4.14–5.8)
SODIUM BLD-SCNC: 134 MMOL/L (ref 136–145)
WBC NRBC COR # BLD: 10.75 10*3/MM3 (ref 3.4–10.8)

## 2019-06-06 PROCEDURE — 25010000002 MORPHINE PER 10 MG: Performed by: NEUROLOGICAL SURGERY

## 2019-06-06 PROCEDURE — 80048 BASIC METABOLIC PNL TOTAL CA: CPT | Performed by: NEUROLOGICAL SURGERY

## 2019-06-06 PROCEDURE — 0T9B70Z DRAINAGE OF BLADDER WITH DRAINAGE DEVICE, VIA NATURAL OR ARTIFICIAL OPENING: ICD-10-PCS | Performed by: NEUROLOGICAL SURGERY

## 2019-06-06 PROCEDURE — 99024 POSTOP FOLLOW-UP VISIT: CPT | Performed by: NURSE PRACTITIONER

## 2019-06-06 PROCEDURE — 25010000003 CEFAZOLIN IN DEXTROSE 2-4 GM/100ML-% SOLUTION: Performed by: NEUROLOGICAL SURGERY

## 2019-06-06 PROCEDURE — 25010000002 ONDANSETRON PER 1 MG: Performed by: NEUROLOGICAL SURGERY

## 2019-06-06 PROCEDURE — 51701 INSERT BLADDER CATHETER: CPT

## 2019-06-06 PROCEDURE — 51798 US URINE CAPACITY MEASURE: CPT

## 2019-06-06 PROCEDURE — 25810000003 SODIUM CHLORIDE 0.9 % WITH KCL 20 MEQ 20-0.9 MEQ/L-% SOLUTION: Performed by: NEUROLOGICAL SURGERY

## 2019-06-06 PROCEDURE — 97110 THERAPEUTIC EXERCISES: CPT

## 2019-06-06 PROCEDURE — 85027 COMPLETE CBC AUTOMATED: CPT | Performed by: NEUROLOGICAL SURGERY

## 2019-06-06 PROCEDURE — 97162 PT EVAL MOD COMPLEX 30 MIN: CPT

## 2019-06-06 RX ORDER — BISACODYL 10 MG
10 SUPPOSITORY, RECTAL RECTAL DAILY PRN
Status: DISCONTINUED | OUTPATIENT
Start: 2019-06-06 | End: 2019-06-14 | Stop reason: HOSPADM

## 2019-06-06 RX ORDER — SODIUM CHLORIDE AND POTASSIUM CHLORIDE 150; 900 MG/100ML; MG/100ML
100 INJECTION, SOLUTION INTRAVENOUS CONTINUOUS
Status: DISCONTINUED | OUTPATIENT
Start: 2019-06-06 | End: 2019-06-10

## 2019-06-06 RX ADMIN — OXYCODONE HYDROCHLORIDE AND ACETAMINOPHEN 1 TABLET: 10; 325 TABLET ORAL at 21:02

## 2019-06-06 RX ADMIN — CEFAZOLIN SODIUM 2 G: 2 INJECTION, SOLUTION INTRAVENOUS at 06:16

## 2019-06-06 RX ADMIN — POTASSIUM CHLORIDE AND SODIUM CHLORIDE 100 ML/HR: 900; 150 INJECTION, SOLUTION INTRAVENOUS at 09:35

## 2019-06-06 RX ADMIN — DOCUSATE SODIUM 100 MG: 100 CAPSULE, LIQUID FILLED ORAL at 08:17

## 2019-06-06 RX ADMIN — OXYCODONE HYDROCHLORIDE AND ACETAMINOPHEN 1 TABLET: 10; 325 TABLET ORAL at 12:13

## 2019-06-06 RX ADMIN — ONDANSETRON HYDROCHLORIDE 4 MG: 2 SOLUTION INTRAMUSCULAR; INTRAVENOUS at 21:46

## 2019-06-06 RX ADMIN — MORPHINE SULFATE 4 MG: 2 INJECTION, SOLUTION INTRAMUSCULAR; INTRAVENOUS at 01:39

## 2019-06-06 RX ADMIN — MORPHINE SULFATE 4 MG: 2 INJECTION, SOLUTION INTRAMUSCULAR; INTRAVENOUS at 19:25

## 2019-06-06 RX ADMIN — LISINOPRIL: 10 TABLET ORAL at 09:01

## 2019-06-06 RX ADMIN — OXYCODONE HYDROCHLORIDE AND ACETAMINOPHEN 1 TABLET: 10; 325 TABLET ORAL at 02:38

## 2019-06-06 RX ADMIN — MORPHINE SULFATE 6 MG: 10 INJECTION INTRAVENOUS at 05:51

## 2019-06-06 RX ADMIN — BISACODYL 10 MG: 10 SUPPOSITORY RECTAL at 12:13

## 2019-06-06 RX ADMIN — CYCLOBENZAPRINE 10 MG: 10 TABLET, FILM COATED ORAL at 17:09

## 2019-06-06 RX ADMIN — OXYCODONE HYDROCHLORIDE AND ACETAMINOPHEN 1 TABLET: 10; 325 TABLET ORAL at 16:19

## 2019-06-06 RX ADMIN — CYCLOBENZAPRINE 10 MG: 10 TABLET, FILM COATED ORAL at 09:34

## 2019-06-06 RX ADMIN — CYCLOBENZAPRINE 10 MG: 10 TABLET, FILM COATED ORAL at 03:35

## 2019-06-06 RX ADMIN — POTASSIUM CHLORIDE AND SODIUM CHLORIDE 100 ML/HR: 900; 150 INJECTION, SOLUTION INTRAVENOUS at 18:47

## 2019-06-06 RX ADMIN — OXYCODONE HYDROCHLORIDE AND ACETAMINOPHEN 1 TABLET: 10; 325 TABLET ORAL at 08:17

## 2019-06-06 NOTE — PAYOR COMM NOTE
"Bj Oquendo (53 y.o. Male)                      ATTENTION;   BRIAN MICKIEDedra CASE REF # [id number] , CLINICALS FOR REVIEW.                  REPLY TO UR DEPT, MIKIE MATAMOROS [facility]  OR UR  [phone]         Date of Birth Social Security Number Address Home Phone MRN    1965  [address] [place], [place] [id number] [phone] [id number]    [employer] Marital Status          Patient Refused        Admission Date Admission Type Admitting Provider Attending Provider Department, Room/Bed    6/5/19 Elective Corbin Bailey MD Hodes, Jonathan E, MD [facility] INTENSIVE CARE, I388/1    Discharge Date Discharge Disposition Discharge Destination                       Attending Provider:  Corbin Bailey MD    Allergies:  No Known Allergies    Isolation:  None   Infection:  None   Code Status:  CPR    Ht:  188 cm (74\")   Wt:  94.3 kg (207 lb 14.3 oz)    Admission Cmt:  None   Principal Problem:  None                Active Insurance as of 6/5/2019     Primary Coverage     Payor Plan Insurance Group Employer/Plan Group    [employer] BLUE SHIELD PPO [id number]     Payor Plan Address Payor Plan Phone Number Payor Plan Fax Number Effective Dates    [address] [phone]  7/1/2016 - None Entered    [place] [address]       Subscriber Name Subscriber Birth Date Member ID       BJ OQUENDO 1965 [id number]                 Emergency Contacts      (Rel.) Home Phone Work Phone Mobile Phone    Patricia Oquendo (Daughter) -- -- [phone]    IBIS OQUENDO (Daughter) -- -- [phone]               History & Physical      Corbin Bailey MD at 6/5/2019  7:49 AM          H&P reviewed. The patient was examined and there are no changes to the H&P.       I again went over the various risks benefits and alternatives of the operative intervention in detail with the patient his daughter and his mother.  I also explained " the fact that he has a 6 lumbar vertebrae and that we would be fusing from the second to last, last thoracic vertebrae down into the bottom of the sacrum.  I explained also that we might want to other procedures during the operative intervention as dictated by the circumstances.  The patient again voiced understanding of these risks benefits and alternatives as did his daughter and his mother and request that we proceed.          Electronically signed by Corbin Bailey MD at 6/5/2019  7:54 AM   Source Note             Subjective   Patient ID: Archie Oquendo is a 53 y.o. male is here today for follow-up of H&P for a lumbar fusion and decompression,6/3. Pt is unaccompanied.    History of Present Illness    The patient returns the office today with progression in symptomatology.  Walking causes fatigue and inability to use the left leg particularly well.  Sitting for a long period of time because of the right leg to go to sleep.  He has trouble dorsiflexing his feet.  Left side much worse than the right side.    The following portions of the patient's history were reviewed and updated as appropriate: allergies, current medications, past family history, past medical history, past social history, past surgical history and problem list.    Review of Systems   Genitourinary: Negative for difficulty urinating and enuresis.   Musculoskeletal: Positive for gait problem.   Neurological: Positive for weakness (L leg) and numbness (back/legs).   Psychiatric/Behavioral: Positive for sleep disturbance (leg cramps).     Past Medical History:   Diagnosis Date   • Hip pain    • Hypertension    • Low back pain    • Pain in left knee        Past Surgical History:   Procedure Laterality Date   • LUMBAR DISCECTOMY     • LUMBAR FUSION     • VASECTOMY         Social History     Socioeconomic History   • Marital status:      Spouse name: Not on file   • Number of children: 2   • Years of education: Not on file   • Highest  education level: Not on file   Occupational History   • Occupation:      Comment: full time currently working   Tobacco Use   • Smoking status: Current Every Day Smoker     Packs/day: 0.50     Years: 10.00     Pack years: 5.00     Types: Cigarettes   • Smokeless tobacco: Never Used   Substance and Sexual Activity   • Alcohol use: Yes   • Drug use: Defer   • Sexual activity: Defer       Family History   Problem Relation Age of Onset   • Diabetes Mother    • Hypertension Father    • Gout Father    • Sleep apnea Father         No Known Allergies      Current Outpatient Medications:   •  cyclobenzaprine (FLEXERIL) 10 MG tablet, , Disp: , Rfl:   •  ibuprofen (IBU) 800 MG tablet, , Disp: , Rfl:   •  lisinopril-hydrochlorothiazide (PRINZIDE,ZESTORETIC) 10-12.5 MG per tablet, , Disp: , Rfl:   •  Multiple Vitamin (MULTI-VITAMIN DAILY PO), Take  by mouth., Disp: , Rfl:   •  Omega-3 Fatty Acids (FISH OIL) 1200 MG capsule delayed-release, Take  by mouth., Disp: , Rfl:     Objective   Physical Exam   Musculoskeletal:   The patient stands slightly bent forward at the waist with his knees bent with an erect lumbar spine slightly angled perhaps 8 to 10 degrees anterior.  Lateral flexion is limited to 8 degrees bilaterally.  Hyperextension is 0 degrees.  Anterior flexion is completely at the hips with no flexion of the lumbar spine.     There is a positive straight leg raising at 50 degrees on the left at 60 degrees on the right with pain into the anterior thigh and in the low back.  Negative Lesegue.  Negative Prasad.   Neurological: He has an abnormal Tandem Gait Test. He has a normal Finger-Nose-Finger Test, a normal Heel to Mccormick Test and a normal Romberg Test.   Reflex Scores:       Tricep reflexes are 1+ on the right side and 1+ on the left side.       Bicep reflexes are 2+ on the right side and 2+ on the left side.       Brachioradialis reflexes are 1+ on the right side and 1+ on the left side.       Patellar  reflexes are 0 on the right side and 0 on the left side.       Achilles reflexes are 0 on the right side and 0 on the left side.    Neurologic Exam     Motor Exam     Strength   Right iliopsoas: 4/5  Left iliopsoas: 4/5  Right quadriceps: 5/5  Left quadriceps: 4/5  Right hamstrin/5  Left hamstrin/5  Right glutei: 4/5  Left glutei: 4/5  Right anterior tibial: 4/5  Left anterior tibial: 1/5  Right gastroc: 4/5  Left gastroc: 4/5    Sensory Exam   Sensory deficit distribution on left: L5  And left L5 and S1 decrease in LT and PP     Gait, Coordination, and Reflexes     Gait  Gait: wide-based    Coordination   Romberg: negative  Finger to nose coordination: normal  Heel to shin coordination: normal  Tandem walking coordination: abnormal    Reflexes   Right brachioradialis: 1+  Left brachioradialis: 1+  Right biceps: 2+  Left biceps: 2+  Right triceps: 1+  Left triceps: 1+  Right patellar: 0  Left patellar: 0  Right achilles: 0  Left achilles: 0  Right plantar: equivocal  Left plantar: equivocal  Right Serna: absent  Left Serna: absentLeft foot drop       Assessment/Plan   Independent Review of Radiographic Studies:    None new  Medical Decision Making:    The patient has appropriate concerns with regard to undergoing a lumbar fusion.    He has specific concerns, brought up by his primary care physician, with regard to mobility and lumbar spine following a lumbar fusion.    I explained to the patient in my opinion at L2 through sacral lumbar fusion is unlikely to increase the patient's near total immobility of the lumbar spine.    I went over various options of treatment with the patient; simple lumbar decompression at  L3-4 L4-5 discectomy, epidural spinal stimulation without decompression to try to deal with the patient's back pain, or a lumbar fusion as previously recommended and described.    I explained that a lumbar fusion has about a 50% chance of reducing low back pain but that a lumbar fusion would  likely prevent further deterioration at the fused levels once the areas are decompressed.  The patient previously has undergone an L1-L3 lumbar fusion at these levels there is no evidence of further deterioration neural foraminal narrowing spinal stenosis etc.  At the levels below these areas he is previously undergone an L4-5 discectomy and he has a recurrence of disc protrusion and further degeneration at L4-5 and L3-4 he has severe lumbar spinal stenosis secondary to facet and ligamentum flavum hypertrophy.  I explained to the patient in my opinion decompressing these areas and then performing fusion should prevent further stenosis/narrowing from developing at these areas again.  I also explained the concept of adjacent level disease and therefore I recommended also extending the fusion to the sacrum bridging the L5-S1 disc space to avoid accelerated degeneration of this disc space because of the fusion mass above.    I explained to the patient that this was my opinion and that certainly there are several different ways to address his primary problems which include weakness in the lower extremity and severe back pain.  These are described above there is a simple decompression or consideration/and or consideration of epidural spinal stimulation with or without lumbar decompression.  I explained that in my opinion either of these procedures would likely result and, over time, further deterioration in the lumbar spine requiring further surgical intervention.    The risks, benefits, and alternatives of decompression and fusion were explained in detail to the patient. The alternative is not to perform an operative procedure. The benefit should be, though is not guaranteed, primarily a potential reduction in the neurosensory and/or motor dysfunction; secondarily, a possible reduction in back pain. The risks include, but are not limited to, the possibility of death, infection, stroke, bleeding, blindness, paralysis,  blindness, lack of improvement in the patient's pain syndrome, worsening of the pain syndrome, meningitis, osteomyelitis, recurrent disc herniation, need for further operative intervention, recurrence of the pain syndrome, sexual dysfunction, incontinence, inability to urinate without catheterization, inability to have a normal bowel movement, epidural scarring resulting in chronic back pain, leakage of cerebral spinal fluid at the time of surgery or after recovery from surgery requiring further operative intervention, lack of bony fusion requiring further surgery, instrumentation breakdown or pull out, adjacent level spinal degeneration, spinal instability. I also explained the realistic expectations as they pertain to the procedure. The patient voiced understanding of these risks, benefits, alternatives, and realistic expectations and requests that we proceed with the operative intervention.    The patient voiced understanding that this was a completely elective spinal procedure and that it was completely his choice to undergo this surgery knowing the risks involved in undergoing the procedure.  I tried to allay his appropriate fears with regard to the surgical intervention and I believe that I was able to do so.    Plan: Lumbar surgery    (The patient has vertebrae)  He is undergone fusion from L1-L3, he has significant lumbar stenosis at L3-4 L4-5 and a disc herniation to the left at L5-L6.    L3-4, L4-5, and L5-L6 lumbar decompression, discectomy, and T11 through S2 lumbar decompression and fusion.      Archie was seen today for h&p for a huge lumbar fusion and decompression,6/3.    Diagnoses and all orders for this visit:    History of lumbar fusion    Lumbar disc herniation -left L4-5    Spinal stenosis, L3-4    Lumbar radiculopathy      Return for Follow up with nurse practitioner, Recheck 2 weeks after surgery.     Electronically signed by Corbin Bailey MD at 5/24/2019  2:04 PM                 ICU  Vital Signs     Row Name 06/06/19 0838 06/06/19 0603 06/06/19 0503 06/06/19 0403 06/06/19 0400       Vitals    Temp  98.8 °F (37.1 °C)  --  --  --  99.9 °F (37.7 °C)    Temp src  Oral  --  --  --  --    Pulse  --  113  113  110  --    BP  --  113/69  114/67  108/68  --    Noninvasive MAP (mmHg)  --  81  85  81  --       Oxygen Therapy    SpO2  --  91 %  92 %  92 %  --    Row Name 06/06/19 0303 06/06/19 0203 06/06/19 0103 06/06/19 0003 06/06/19 0000       Vitals    Pulse  108  104  101  97  --    BP  105/71  108/66  114/70  105/69  --    Noninvasive MAP (mmHg)  79  79  79  80  --       Oxygen Therapy    SpO2  91 %  90 %  94 %  95 %  --    Device (Oxygen Therapy)  --  --  --  --  room air    Row Name 06/05/19 2303 06/05/19 2300 06/05/19 2203 06/05/19 2103 06/05/19 2100       Vitals    Temp  --  99 °F (37.2 °C)  --  --  --    Pulse  101  --  101  98  --    BP  112/68  --  110/71  127/71  --    Noninvasive MAP (mmHg)  78  --  84  87  --       Oxygen Therapy    SpO2  93 %  --  94 %  99 %  --    Device (Oxygen Therapy)  --  --  --  --  room air    Row Name 06/05/19 2003 06/05/19 2000 06/05/19 1945 06/05/19 1930 06/05/19 1915       Vitals    Temp  --  98.4 °F (36.9 °C)  --  --  --    Temp src  --  Oral  --  --  --    Pulse  97  99  109  107  98    Heart Rate Source  --  Monitor  Monitor  Monitor  Monitor    Resp  --  16  16  16  16    Resp Rate Source  --  Visual  Visual  Visual  Visual    BP  117/77  117/77  119/70  126/69  113/68    Noninvasive MAP (mmHg)  82  82  91  --  79    BP Location  --  --  --  --  Left arm    BP Method  --  --  --  --  Automatic    Patient Position  --  --  --  --  Lying       Oxygen Therapy    SpO2  98 %  97 %  98 %  97 %  100 %    Pulse Oximetry Type  --  --  Continuous  Continuous  Continuous    Device (Oxygen Therapy)  --  nasal cannula  nasal cannula  nasal cannula  nasal cannula    Flow (L/min)  --  2  2  2  2    Row Name 06/05/19 1900 06/05/19 1845 06/05/19 1830 06/05/19 1825 06/05/19  1820       Vitals    Pulse  106  113  105  106  108    Heart Rate Source  Monitor  Monitor  Monitor  Monitor  Monitor    Resp  16  16  16  16  16    Resp Rate Source  Visual  Visual  Visual  Visual  Visual    BP  123/65  114/77  118/80  117/74  120/81    Noninvasive MAP (mmHg)  94  90  87  83  86    BP Location  Left arm  Left arm  Left arm  Left arm  Left arm    BP Method  Automatic  Automatic  Automatic  Automatic  Automatic    Patient Position  Lying  Lying  Lying  Lying  Lying       Oxygen Therapy    SpO2  100 %  100 %  93 %  99 %  99 %    Pulse Oximetry Type  Continuous  Continuous  Continuous  Continuous  Continuous    Device (Oxygen Therapy)  nasal cannula  nasal cannula  nasal cannula  nasal cannula  nasal cannula    Flow (L/min)  2  2  2  4  4    Row Name 06/05/19 1815                   Vitals    Temp  99 °F (37.2 °C)        Temp src  Oral        Pulse  107        Heart Rate Source  Monitor        Resp  16        Resp Rate Source  Visual        BP  118/73        Noninvasive MAP (mmHg)  79        BP Location  Left arm        BP Method  Automatic        Patient Position  Lying           Oxygen Therapy    SpO2  99 %        Pulse Oximetry Type  Continuous        Device (Oxygen Therapy)  nasal cannula        Flow (L/min)  4            Lines, Drains & Airways    Active LDAs     Name:   Placement date:   Placement time:   Site:   Days:    Peripheral IV 06/05/19 0701 Right Hand   06/05/19    0701    Hand   1    Closed/Suction Drain Bulb 15 Fr.   06/05/19    1617    --   less than 1    Closed/Suction Drain Back Bulb 15 Fr.   06/05/19    1621    Back   less than 1         Inactive LDAs     Name:   Placement date:   Placement time:   Removal date:   Removal time:   Site:   Days:    [REMOVED] Peripheral IV 05/29/19 0845 Right Antecubital   05/29/19    0845    05/29/19    0939    Antecubital   less than 1    [REMOVED] Urethral Catheter Silicone 16 Fr.   06/05/19    0820    06/06/19    0630     less than 1    [REMOVED]  ETT    06/05/19    0834 created via procedure documentation    06/05/19    1808     less than 1                Hospital Medications (all)       Dose Frequency Start End    acetaminophen (TYLENOL) tablet 1,000 mg 1,000 mg Once 6/5/2019 6/5/2019    Sig - Route: Take 2 tablets by mouth 1 (One) Time. - Oral    bisacodyl (DULCOLAX) EC tablet 10 mg 10 mg Daily PRN 6/5/2019     Sig - Route: Take 2 tablets by mouth Daily As Needed for Constipation. - Oral    bisacodyl (DULCOLAX) suppository 10 mg 10 mg Daily PRN 6/6/2019     Sig - Route: Insert 1 suppository into the rectum Daily As Needed for Constipation. - Rectal    ceFAZolin in dextrose (ANCEF) IVPB solution 2 g 2 g Every 8 Hours 6/5/2019 6/6/2019    Sig - Route: Infuse 100 mL into a venous catheter Every 8 (Eight) Hours. - Intravenous    cyclobenzaprine (FLEXERIL) tablet 10 mg 10 mg 3 Times Daily PRN 6/5/2019     Sig - Route: Take 1 tablet by mouth 3 (Three) Times a Day As Needed for Muscle Spasms. - Oral    docusate sodium (COLACE) capsule 100 mg 100 mg 2 Times Daily PRN 6/5/2019     Sig - Route: Take 1 capsule by mouth 2 (Two) Times a Day As Needed for Constipation. - Oral    famotidine (PEPCID) injection 20 mg 20 mg Once 6/5/2019 6/5/2019    Sig - Route: Infuse 2 mL into a venous catheter 1 (One) Time. - Intravenous    gabapentin (NEURONTIN) capsule 600 mg 600 mg Once 6/5/2019 6/5/2019    Sig - Route: Take 2 capsules by mouth 1 (One) Time. - Oral    HYDROcodone-acetaminophen (NORCO) 7.5-325 MG per tablet 1 tablet 1 tablet Every 4 Hours PRN 6/5/2019 6/15/2019    Sig - Route: Take 1 tablet by mouth Every 4 (Four) Hours As Needed for Moderate Pain . - Oral    lactated ringers infusion 9 mL/hr Continuous 6/5/2019     Sig - Route: Infuse 9 mL/hr into a venous catheter Continuous. - Intravenous    lactated ringers infusion 75 mL/hr Continuous 6/5/2019     Sig - Route: Infuse 75 mL/hr into a venous catheter Continuous. - Intravenous    lisinopril (PRINIVIL,ZESTRIL) 10 mg,  "hydrochlorothiazide (MICROZIDE) 12.5 mg for ZESTORETIC 10-12.5  Every 24 Hours Scheduled 6/6/2019     Sig - Route: Take  by mouth Daily. - Oral    morphine injection 4 mg 4 mg Every 2 Hours PRN 6/5/2019 6/15/2019    Sig - Route: Infuse 2 mL into a venous catheter Every 2 (Two) Hours As Needed for Moderate Pain . - Intravenous    Linked Group 1:  \"And\" Linked Group Details        Morphine injection 6 mg 6 mg Every 2 Hours PRN 6/5/2019 6/15/2019    Sig - Route: Infuse 0.6 mL into a venous catheter Every 2 (Two) Hours As Needed for Severe Pain . - Intravenous    Linked Group 2:  \"And\" Linked Group Details        naloxone (NARCAN) injection 0.4 mg 0.4 mg Every 5 Minutes PRN 6/5/2019     Sig - Route: Infuse 1 mL into a venous catheter Every 5 (Five) Minutes As Needed for Respiratory Depression. - Intravenous    Linked Group 2:  \"And\" Linked Group Details        naloxone (NARCAN) injection 0.4 mg 0.4 mg Every 5 Minutes PRN 6/5/2019     Sig - Route: Infuse 1 mL into a venous catheter Every 5 (Five) Minutes As Needed for Respiratory Depression. - Intravenous    Linked Group 1:  \"And\" Linked Group Details        ondansetron (ZOFRAN) injection 4 mg 4 mg Every 6 Hours PRN 6/5/2019     Sig - Route: Infuse 2 mL into a venous catheter Every 6 (Six) Hours As Needed for Nausea or Vomiting. - Intravenous    Linked Group 3:  \"Or\" Linked Group Details        ondansetron (ZOFRAN) tablet 4 mg 4 mg Every 6 Hours PRN 6/5/2019     Sig - Route: Take 1 tablet by mouth Every 6 (Six) Hours As Needed for Nausea or Vomiting. - Oral    Linked Group 3:  \"Or\" Linked Group Details        oxyCODONE-acetaminophen (PERCOCET)  MG per tablet 1 tablet 1 tablet Every 4 Hours PRN 6/5/2019 6/15/2019    Sig - Route: Take 1 tablet by mouth Every 4 (Four) Hours As Needed for Severe Pain . - Oral    sodium chloride 0.9 % with KCl 20 mEq/L infusion 100 mL/hr Continuous 6/6/2019     Sig - Route: Infuse 100 mL/hr into a venous catheter Continuous. - " "Intravenous    acetaminophen (TYLENOL) suppository 650 mg (Discontinued) 650 mg Once As Needed 6/5/2019 6/5/2019    Sig - Route: Insert 2 suppositories into the rectum 1 (One) Time As Needed for Mild Pain . - Rectal    Reason for Discontinue: Patient Transfer    Linked Group 4:  \"Or\" Linked Group Details        acetaminophen (TYLENOL) tablet 650 mg (Discontinued) 650 mg Once As Needed 6/5/2019 6/5/2019    Sig - Route: Take 2 tablets by mouth 1 (One) Time As Needed for Mild Pain . - Oral    Reason for Discontinue: Patient Transfer    Linked Group 4:  \"Or\" Linked Group Details        albuterol (PROVENTIL) nebulizer solution 0.083% 2.5 mg/3mL (Discontinued) 2.5 mg Once As Needed 6/5/2019 6/5/2019    Sig - Route: Take 2.5 mg by nebulization 1 (One) Time As Needed for Wheezing or Shortness of Air (bronchospasm). - Nebulization    Reason for Discontinue: Patient Transfer    bupivacaine-EPINEPHrine PF 30 mL, lidocaine 1 % 30 mL mixture (Discontinued)  As Needed 6/5/2019 6/5/2019    Sig: As Needed.    Reason for Discontinue: Patient Discharge    ceFAZolin in dextrose (ANCEF) IVPB solution 2 g (Discontinued) 2 g Once 6/5/2019 6/5/2019    Sig - Route: Infuse 100 mL into a venous catheter 1 (One) Time. - Intravenous    Reason for Discontinue: Patient Transfer    diphenhydrAMINE (BENADRYL) capsule 25 mg (Discontinued) 25 mg Every 30 Minutes PRN 6/5/2019 6/5/2019    Sig - Route: Take 1 capsule by mouth Every 30 (Thirty) Minutes As Needed for Itching (May repeat x 1). - Oral    Reason for Discontinue: Patient Transfer    ePHEDrine injection 5 mg (Discontinued) 5 mg Once As Needed 6/5/2019 6/5/2019    Sig - Route: Infuse 0.1 mL into a venous catheter 1 (One) Time As Needed (symptomatic hypotension - Notify attending anesthesiologist if this needs to be given). - Intravenous    Reason for Discontinue: Patient Transfer    fentaNYL citrate (PF) (SUBLIMAZE) injection 50 mcg (Discontinued) 50 mcg Every 5 Minutes PRN 6/5/2019 6/5/2019 "    Sig - Route: Infuse 1 mL into a venous catheter Every 5 (Five) Minutes As Needed for Moderate Pain  or Severe Pain . - Intravenous    Reason for Discontinue: Patient Transfer    flumazenil (ROMAZICON) injection 0.2 mg (Discontinued) 0.2 mg As Needed 6/5/2019 6/5/2019    Sig - Route: Infuse 2 mL into a venous catheter As Needed (for benzodiazepine induced unresponsiveness or sedation). - Intravenous    Reason for Discontinue: Patient Transfer    gelatin absorbable (GELFOAM) sponge (Discontinued)  As Needed 6/5/2019 6/5/2019    Sig: As Needed.    Reason for Discontinue: Patient Discharge    hydrALAZINE (APRESOLINE) injection 5 mg (Discontinued) 5 mg Every 10 Minutes PRN 6/5/2019 6/5/2019    Sig - Route: Infuse 0.25 mL into a venous catheter Every 10 (Ten) Minutes As Needed for High Blood Pressure (for systolic blood pressure greater than 180 mmHg or diastolic blood pressure greater than 105 mmHg). - Intravenous    Reason for Discontinue: Patient Transfer    HYDROcodone-acetaminophen (NORCO) 7.5-325 MG per tablet 1 tablet (Discontinued) 1 tablet Once As Needed 6/5/2019 6/5/2019    Sig - Route: Take 1 tablet by mouth 1 (One) Time As Needed for Mild Pain . - Oral    Reason for Discontinue: Patient Transfer    HYDROmorphone (DILAUDID) injection 0.5 mg (Discontinued) 0.5 mg Every 5 Minutes PRN 6/5/2019 6/5/2019    Sig - Route: Infuse 0.5 mL into a venous catheter Every 5 (Five) Minutes As Needed for Moderate Pain  or Severe Pain . - Intravenous    Reason for Discontinue: Patient Transfer    lactated ringers infusion (Discontinued) 100 mL/hr Continuous 6/5/2019 6/6/2019    Sig - Route: Infuse 100 mL/hr into a venous catheter Continuous. - Intravenous    meperidine (DEMEROL) injection 12.5 mg (Discontinued) 12.5 mg Every 5 Minutes PRN 6/5/2019 6/5/2019    Sig - Route: Infuse 0.5 mL into a venous catheter Every 5 (Five) Minutes As Needed for Shivering (May repeat x 1). - Intravenous    Reason for Discontinue: Patient  "Transfer    midazolam (VERSED) injection 1 mg (Discontinued) 1 mg Every 5 Minutes PRN 6/5/2019 6/5/2019    Sig - Route: Infuse 1 mL into a venous catheter Every 5 (Five) Minutes As Needed for Anxiety (Max 4mg midazolam TOTAL). - Intravenous    Reason for Discontinue: Patient Transfer    Linked Group 5:  \"Or\" Linked Group Details        midazolam (VERSED) injection 1 mg (Discontinued) 1 mg Every 5 Minutes PRN 6/5/2019 6/5/2019    Sig - Route: Infuse 1 mL into a venous catheter Every 5 (Five) Minutes As Needed for Anxiety. - Intravenous    Reason for Discontinue: Patient Transfer    midazolam (VERSED) injection 2 mg (Discontinued) 2 mg Every 5 Minutes PRN 6/5/2019 6/5/2019    Sig - Route: Infuse 2 mL into a venous catheter Every 5 (Five) Minutes As Needed for Anxiety (Max 4mg midazolam TOTAL). - Intravenous    Reason for Discontinue: Patient Transfer    Linked Group 5:  \"Or\" Linked Group Details        naloxone (NARCAN) injection 0.2 mg (Discontinued) 0.2 mg As Needed 6/5/2019 6/5/2019    Sig - Route: Infuse 0.5 mL into a venous catheter As Needed for Opioid Reversal or Respiratory Depression (unresponsiveness, decrease oxygen saturation). - Intravenous    Reason for Discontinue: Patient Transfer    ondansetron (ZOFRAN) injection 4 mg (Discontinued) 4 mg Once As Needed 6/5/2019 6/5/2019    Sig - Route: Infuse 2 mL into a venous catheter 1 (One) Time As Needed for Nausea or Vomiting. - Intravenous    Reason for Discontinue: Patient Transfer    oxyCODONE-acetaminophen (PERCOCET) 7.5-325 MG per tablet 1 tablet (Discontinued) 1 tablet Once As Needed 6/5/2019 6/5/2019    Sig - Route: Take 1 tablet by mouth 1 (One) Time As Needed for Moderate Pain . - Oral    Reason for Discontinue: Patient Transfer    promethazine (PHENERGAN) injection 12.5 mg (Discontinued) 12.5 mg Once As Needed 6/5/2019 6/5/2019    Sig - Route: Inject 0.5 mL into the appropriate muscle as directed by prescriber 1 (One) Time As Needed for Nausea or " "Vomiting. - Intramuscular    Reason for Discontinue: Patient Transfer    Linked Group 6:  \"Or\" Linked Group Details        promethazine (PHENERGAN) injection 6.25 mg (Discontinued) 6.25 mg Every 10 Minutes PRN 6/5/2019 6/5/2019    Sig - Route: Infuse 0.25 mL into a venous catheter Every 10 (Ten) Minutes As Needed for Nausea or Vomiting. - Intravenous    Reason for Discontinue: Patient Transfer    Linked Group 6:  \"Or\" Linked Group Details        promethazine (PHENERGAN) suppository 25 mg (Discontinued) 25 mg Once As Needed 6/5/2019 6/5/2019    Sig - Route: Insert 1 suppository into the rectum 1 (One) Time As Needed for Nausea or Vomiting. - Rectal    Reason for Discontinue: Patient Transfer    Linked Group 6:  \"Or\" Linked Group Details        promethazine (PHENERGAN) tablet 25 mg (Discontinued) 25 mg Once As Needed 6/5/2019 6/5/2019    Sig - Route: Take 1 tablet by mouth 1 (One) Time As Needed for Nausea or Vomiting. - Oral    Reason for Discontinue: Patient Transfer    Linked Group 6:  \"Or\" Linked Group Details        sodium chloride (NS) irrigation solution (Discontinued)  As Needed 6/5/2019 6/5/2019    Sig: As Needed.    Reason for Discontinue: Patient Discharge    sodium chloride 0.9 % flush 3 mL (Discontinued) 3 mL Every 12 Hours Scheduled 6/5/2019 6/5/2019    Sig - Route: Infuse 3 mL into a venous catheter Every 12 (Twelve) Hours. - Intravenous    Reason for Discontinue: Patient Transfer    sodium chloride 0.9 % flush 3-10 mL (Discontinued) 3-10 mL As Needed 6/5/2019 6/5/2019    Sig - Route: Infuse 3-10 mL into a venous catheter As Needed for Line Care. - Intravenous    Reason for Discontinue: Patient Transfer    sodium chloride 1,000 mL with heparin (porcine) 30,000 Units mixture (Discontinued)  As Needed 6/5/2019 6/5/2019    Sig: As Needed.    Reason for Discontinue: Patient Discharge    sodium chloride 1,000 mL with vancomycin 1,000 mg irrigation (Discontinued)  As Needed 6/5/2019 6/5/2019    Sig: As " Needed.    Reason for Discontinue: Patient Discharge    thrombin (THROMBIN-JMI) spray kit (Discontinued)  As Needed 6/5/2019 6/5/2019    Sig: As Needed.    Reason for Discontinue: Patient Discharge            Orders (last 48 hrs)     Start     Ordered    06/06/19 0900  lisinopril (PRINIVIL,ZESTRIL) 10 mg, hydrochlorothiazide (MICROZIDE) 12.5 mg for ZESTORETIC 10-12.5  Every 24 Hours Scheduled      06/05/19 2114 06/06/19 0859  bisacodyl (DULCOLAX) suppository 10 mg  Daily PRN      06/06/19 0900    06/06/19 0845  sodium chloride 0.9 % with KCl 20 mEq/L infusion  Continuous      06/06/19 0751    06/06/19 0751  CBC (No Diff)  Once      06/06/19 0750    06/06/19 0751  Basic Metabolic Panel  Once      06/06/19 0750    06/06/19 0600  Discontinue Indwelling Urinary Catheter in AM  Once      06/05/19 2114 06/06/19 0000  Vital Signs  Every 4 Hours     Comments:  Do neuro checks with vital signs please    06/05/19 2023 06/05/19 2315  ceFAZolin in dextrose (ANCEF) IVPB solution 2 g  Every 8 Hours      06/05/19 2114 06/05/19 2200  Ambulate Patient  4 Times Daily      06/05/19 2114 06/05/19 2200  Turn cough deep breathe  Every Hour      06/05/19 2114 06/05/19 2200  lactated ringers infusion  Continuous      06/05/19 2114 06/05/19 2115  Code Status and Medical Interventions:  Continuous      06/05/19 2114 06/05/19 2115  Place sequential compression device  Once      06/05/19 2114 06/05/19 2115  Incentive Spirometry  Every Hour While Awake      06/05/19 2114 06/05/19 2115  Diet Regular  Diet Effective Now      06/05/19 2114 06/05/19 2115  PT Consult: Eval & Treat As Tolerated; post op spine  Once      06/05/19 2114 06/05/19 2115  Continue Indwelling Urinary Catheter Already in Place  Once     Comments:  If Patient Alert & Urine Output Greater Than 25 mL/hr    06/05/19 2114    06/05/19 2115  Notify Provider if Bladder Distention Continues  Until Discontinued      06/05/19 2114 06/05/19 2115   Catheter Care  Every Shift      06/05/19 2114 06/05/19 2114  Morphine injection 6 mg  Every 2 Hours PRN      06/05/19 2114 06/05/19 2114  naloxone (NARCAN) injection 0.4 mg  Every 5 Minutes PRN      06/05/19 2114 06/05/19 2114  morphine injection 4 mg  Every 2 Hours PRN      06/05/19 2114 06/05/19 2114  naloxone (NARCAN) injection 0.4 mg  Every 5 Minutes PRN      06/05/19 2114 06/05/19 2114  ondansetron (ZOFRAN) tablet 4 mg  Every 6 Hours PRN      06/05/19 2114 06/05/19 2114  ondansetron (ZOFRAN) injection 4 mg  Every 6 Hours PRN      06/05/19 2114 06/05/19 2114  oxyCODONE-acetaminophen (PERCOCET)  MG per tablet 1 tablet  Every 4 Hours PRN      06/05/19 2114 06/05/19 2114  cyclobenzaprine (FLEXERIL) tablet 10 mg  3 Times Daily PRN      06/05/19 2114 06/05/19 2114  docusate sodium (COLACE) capsule 100 mg  2 Times Daily PRN      06/05/19 2114 06/05/19 2114  bisacodyl (DULCOLAX) EC tablet 10 mg  Daily PRN      06/05/19 2114 06/05/19 2114  HYDROcodone-acetaminophen (NORCO) 7.5-325 MG per tablet 1 tablet  Every 4 Hours PRN      06/05/19 2114 06/05/19 2113  Inpatient Pulmonology Consult  Once     Specialty:  Pulmonary Disease  Provider:  Maximiliano Coates MD    06/05/19 2113 06/05/19 2047  POC Glucose Once  Once      06/05/19 2042 06/05/19 2024  Notify physician (specify)  Until Discontinued      06/05/19 2023 06/05/19 2024  Place Sequential Compression Device  Once      06/05/19 2023 06/05/19 2024  Maintain Sequential Compression Device  Continuous      06/05/19 2023 06/05/19 1909  Transfer Patient  Once      06/05/19 1908 06/05/19 1720  Assess Need for Indwelling Urinary Catheter - Follow Removal Protocol  Continuous     Comments:  Indwelling Urinary Catheter Removal Criteria  Discontinue Indwelling Urinary Catheter Unless One of the Following is Present  Urinary Retention or Obstruction  Chronic Rae Catheter Use  End of Life  Critical Illness with Strict  I/O   Tract or Abdominal Surgery  Stage 3/4 Sacral / Perineal Wound  Required Activity Restriction: Trauma  Required Activity Restriction: Spine Surgery  If Patient is Being Followed by Urology Contact Them PRIOR to Removal  Do Not Remove Indwelling Urinary Catheter Order is Present with a CLINICAL REASON to Maintain the Catheter. Provider is Required to Include a Clinical Reason to Maintain a Urinary Catheter    Chronic Rae Catheter Use (Present on Admission)  Assess for Continued Need & Document Medical Necessity  If Infection is Suspected, Contact the Provider        06/05/19 1719 06/05/19 1720  Catheter Care  Every Shift      06/05/19 1719 06/05/19 1720  Vital signs every 5 minutes for 15 minutes, every 15 minutes thereafter.  Once,   Status:  Canceled      06/05/19 1719 06/05/19 1720  Call Anesthesiologist for additional IV Fluid bolus for Hypotension/Tachycardia  Until Discontinued,   Status:  Canceled      06/05/19 1719 06/05/19 1720  Notify Anesthesia of Any Acute Changes in Patient Condition  Until Discontinued,   Status:  Canceled      06/05/19 1719 06/05/19 1720  Notify Anesthesia for Unrelieved Pain  Until Discontinued,   Status:  Canceled      06/05/19 1719 06/05/19 1720  Pulse Oximetry, Continuous  Continuous      06/05/19 1719 06/05/19 1720  Once DC criteria to floor met, follow surgeon's orders.  Until Discontinued,   Status:  Canceled      06/05/19 1719 06/05/19 1720  Discharge patient from PACU when discharge criteria is met.  Until Discontinued,   Status:  Canceled      06/05/19 1719 06/05/19 1719  acetaminophen (TYLENOL) tablet 650 mg  Once As Needed,   Status:  Discontinued      06/05/19 1719 06/05/19 1719  acetaminophen (TYLENOL) suppository 650 mg  Once As Needed,   Status:  Discontinued      06/05/19 1719 06/05/19 1719  promethazine (PHENERGAN) injection 6.25 mg  Every 10 Minutes PRN,   Status:  Discontinued      06/05/19 1719 06/05/19 1719   promethazine (PHENERGAN) injection 12.5 mg  Once As Needed,   Status:  Discontinued      06/05/19 1719 06/05/19 1719  promethazine (PHENERGAN) suppository 25 mg  Once As Needed,   Status:  Discontinued      06/05/19 1719 06/05/19 1719  promethazine (PHENERGAN) tablet 25 mg  Once As Needed,   Status:  Discontinued      06/05/19 1719 06/05/19 1719  HYDROcodone-acetaminophen (NORCO) 7.5-325 MG per tablet 1 tablet  Once As Needed,   Status:  Discontinued      06/05/19 1719 06/05/19 1719  HYDROmorphone (DILAUDID) injection 0.5 mg  Every 5 Minutes PRN,   Status:  Discontinued      06/05/19 1719 06/05/19 1719  oxyCODONE-acetaminophen (PERCOCET) 7.5-325 MG per tablet 1 tablet  Once As Needed,   Status:  Discontinued      06/05/19 1719 06/05/19 1719  fentaNYL citrate (PF) (SUBLIMAZE) injection 50 mcg  Every 5 Minutes PRN,   Status:  Discontinued      06/05/19 1719 06/05/19 1719  hydrALAZINE (APRESOLINE) injection 5 mg  Every 10 Minutes PRN,   Status:  Discontinued      06/05/19 1719 06/05/19 1719  naloxone (NARCAN) injection 0.2 mg  As Needed,   Status:  Discontinued      06/05/19 1719 06/05/19 1719  flumazenil (ROMAZICON) injection 0.2 mg  As Needed,   Status:  Discontinued      06/05/19 1719 06/05/19 1719  ondansetron (ZOFRAN) injection 4 mg  Once As Needed,   Status:  Discontinued      06/05/19 1719 06/05/19 1719  midazolam (VERSED) injection 1 mg  Every 5 Minutes PRN,   Status:  Discontinued      06/05/19 1719 06/05/19 1719  meperidine (DEMEROL) injection 12.5 mg  Every 5 Minutes PRN,   Status:  Discontinued      06/05/19 1719 06/05/19 1719  POC Glucose PRN  As Needed,   Status:  Canceled     Comments:  On all diabetic patients...Notify Anesthesia if blood sugar is less than 80 mg/dL or greater than 220 mg/dL      06/05/19 1719 06/05/19 1719  ePHEDrine injection 5 mg  Once As Needed,   Status:  Discontinued      06/05/19 1719 06/05/19 1719  diphenhydrAMINE (BENADRYL) capsule  25 mg  Every 30 Minutes PRN,   Status:  Discontinued      06/05/19 1719    06/05/19 1719  albuterol (PROVENTIL) nebulizer solution 0.083% 2.5 mg/3mL  Once As Needed,   Status:  Discontinued      06/05/19 1719    06/05/19 1616  FL C Arm During Surgery  1 Time Imaging      06/05/19 1615    06/05/19 1615  XR Spine Thoracolumbar 2 View  1 Time Imaging      06/05/19 1615    06/05/19 1120  sodium chloride 1,000 mL with heparin (porcine) 30,000 Units mixture  As Needed,   Status:  Discontinued      06/05/19 1120    06/05/19 0935  gelatin absorbable (GELFOAM) sponge  As Needed,   Status:  Discontinued      06/05/19 0935    06/05/19 0935  bupivacaine-EPINEPHrine PF 30 mL, lidocaine 1 % 30 mL mixture  As Needed,   Status:  Discontinued      06/05/19 0935    06/05/19 0935  sodium chloride (NS) irrigation solution  As Needed,   Status:  Discontinued      06/05/19 0935    06/05/19 0935  thrombin (THROMBIN-JMI) spray kit  As Needed,   Status:  Discontinued      06/05/19 0935    06/05/19 0934  sodium chloride 1,000 mL with vancomycin 1,000 mg irrigation  As Needed,   Status:  Discontinued      06/05/19 0935    06/05/19 0900  sodium chloride 0.9 % flush 3 mL  Every 12 Hours Scheduled,   Status:  Discontinued      06/05/19 0646    06/05/19 0820  Insert Rae Catheter  Once,   Status:  Canceled      06/05/19 0834    06/05/19 0648  lactated ringers infusion  Continuous      06/05/19 0646    06/05/19 0648  famotidine (PEPCID) injection 20 mg  Once      06/05/19 0646    06/05/19 0648  acetaminophen (TYLENOL) tablet 1,000 mg  Once      06/05/19 0646    06/05/19 0648  gabapentin (NEURONTIN) capsule 600 mg  Once      06/05/19 0646    06/05/19 0647  Oxygen Therapy- Nasal Cannula; Titrate for SPO2: equal to or greater than, 90%  Continuous,   Status:  Canceled      06/05/19 0646    06/05/19 0647  Pulse Oximetry, Continuous  Continuous,   Status:  Canceled      06/05/19 0646    06/05/19 0647  POC Glucose Once  Once,   Status:  Canceled      Comments:  For all diabetic patients and all patients who are to be admitted. Notify Anesthesiologist for blood sugar > 180.      06/05/19 0646    06/05/19 0647  Insert Peripheral IV  Once,   Status:  Canceled      06/05/19 0646    06/05/19 0647  Saline Lock & Maintain IV Access  Continuous,   Status:  Canceled      06/05/19 0646    06/05/19 0647  May take Beta Blocker from home with sip of water.  Once,   Status:  Canceled      06/05/19 0646    06/05/19 0647  POC Glucose Once  Once     Comments:  For all diabetic patients. Notify Anesthesiologist for blood sugar greater than 180 or less than 60..      06/05/19 0646    06/05/19 0646  midazolam (VERSED) injection 1 mg  Every 5 Minutes PRN,   Status:  Discontinued      06/05/19 0646    06/05/19 0646  midazolam (VERSED) injection 2 mg  Every 5 Minutes PRN,   Status:  Discontinued      06/05/19 0646    06/05/19 0646  Vital Signs - Per Anesthesia Protocol  As Needed,   Status:  Canceled      06/05/19 0646    06/05/19 0646  sodium chloride 0.9 % flush 3-10 mL  As Needed,   Status:  Discontinued      06/05/19 0646    06/05/19 0621  lactated ringers infusion  Continuous,   Status:  Discontinued      06/05/19 0619    06/05/19 0621  ceFAZolin in dextrose (ANCEF) IVPB solution 2 g  Once,   Status:  Discontinued      06/05/19 0619    06/05/19 0620  Type & Screen  STAT      06/05/19 0619    06/05/19 0620  Inpatient Admission  Once      06/05/19 0619    06/05/19 0620  Follow Anesthesia Guidelines / Standing Orders  Once,   Status:  Canceled      06/05/19 0619    06/05/19 0620  SCD (sequential compression device)- to be placed on patient in Pre-op  Once,   Status:  Canceled      06/05/19 0619    06/05/19 0620  Verify NPO Status  Until Discontinued,   Status:  Canceled      06/05/19 0619    Unscheduled  Oxygen Therapy- Nasal Cannula; Titrate for SPO2: per policy  Continuous PRN      06/05/19 1719    Unscheduled  Bladder Scan if Patient Unable to Void 4-6 Hours After Catheter  Removal  As Needed      06/05/19 2114    Unscheduled  Straight Cath Every 4-6 Hours As Needed If Patient is Unable to Void After 4-6 Hours, Bladder Scan Volume is Greater Than 350-500mL & Patient Has Symptoms of Bladder Discomfort / Distention  As Needed      06/05/19 2114    Unscheduled  Consult Pharmacist For Review of Medications That May Cause Urinary Retention - RN To Place Order for Consult it Needed  As Needed      06/05/19 2114    Unscheduled  Schedule / Prompt Voiding For Patients With Urinary Incontinence  As Needed      06/05/19 2114           Operative/Procedure Notes (last 48 hours) (Notes from 6/4/2019 11:00 AM through 6/6/2019 11:00 AM)      Corbin Bailey MD at 6/5/2019  9:13 AM          LUMBAR DISCECTOMY FUSION INSTRUMENTATION WITH STEALTH  Progress Note    Archie Oquendo  6/5/2019    Pre-op Diagnosis:   Lumbar radiculopathy [M54.16]  Gait disorder [R26.9]  Spinal stenosis, lumbar region, with neurogenic claudication [M48.062]  Lumbar disc herniation [M51.26]  History of lumbar fusion [Z98.1]       Post-Op Diagnosis Codes:     * Lumbar radiculopathy [M54.16]     * Gait disorder [R26.9]     * Spinal stenosis, lumbar region, with neurogenic claudication [M48.062]     * Lumbar disc herniation [M51.26]     * History of lumbar fusion [Z98.1]    Procedure/CPT® Codes:      Procedure(s):  T11-S2 posterior fusion with L3/4 lumbar decompression and left L5/L6 decompression    Surgeon(s):  Corbin Bailey MD    Anesthesia: General    Staff:   Cell Saver : Kaylan Lai; Vickey Wilkinson  Circulator: Megan Figueroa RN; Jose James RN  Radiology Technologist: Sonja Wren RRT  Scrub Person: Lou Waldrop; Katey Villafuerte  Vendor Representative: Misael Giraldo  Assistant: Luzmaria Child CSA    Estimated Blood Loss: 700 mL    Urine Voided: 800 mL    Specimens:                None          Drains:   Closed/Suction Drain Bulb 15 Fr. (Active)       Closed/Suction Drain Back Bulb 15 Fr.  (Active)       Urethral Catheter Silicone 16 Fr. (Active)       Findings: Severe stenosis as exprected    Complications: none      Corbin Bailey MD     Date: 6/5/2019  Time: 4:59 PM        Electronically signed by Corbin Bailey MD at 6/5/2019  5:01 PM          Physician Progress Notes (last 48 hours) (Notes from 6/4/2019 11:00 AM through 6/6/2019 11:00 AM)      Nidia Castañeda APRN at 6/6/2019  9:23 AM            [facility] PROGRESS NOTE    PATIENT IDENTIFICATION:   Name:  Archie Oquendo      MRN:  [id number]     53 y.o.  male               CC: POD #1 s/p T11-S2 posterior fusion with L3/4 lumbar decompression and left L5/L6 decompression      Subjective     Interval History: has complaints of muscle tightness in his chest, arms and low back. He continues to report N/T in both legs, R>L. L distal weakness unchanged post op. rates overall pain 2/10. + flatus    Objective     Vital signs in last 24 hours:  Temp:  [98.4 °F (36.9 °C)-99.9 °F (37.7 °C)] 98.8 °F (37.1 °C)  Heart Rate:  [] 113  Resp:  [16] 16  BP: (105-127)/(65-81) 113/69  ICP ranges-    Intake/Output last 3 shifts:  I/O last 3 completed shifts:  In: 6312 [I.V.:5967; Blood:345]  Out: 3790 [Urine:2550; Drains:540; Blood:700]    Intake/Output this shift:  No intake/output data recorded.      JV drain OP last 24 hours:  #1 380  #2 160      Physical Exam:  General:   Awake, alert, and oriented x 3. NAD  Appears well  Thoracic/lumbar incision site CDI, flat, well approximated  JV drains with high-volume bloody  Decreased strength distal left lower extremity, unchanged postop L: 3+/5 dorsiflexion, 4+/5 plantarflexion  Numbness unchanged right lower extremity below the knee with decreased sensation to soft touch  Able to turn in bed with assistance  FC Dc'd  Extremities:  Patient is wearing DON and SCD bilaterally    LABS:    Lab Results (last 24 hours)     Procedure Component Value Units Date/Time    POC Glucose Once  [[id number]]  (Abnormal) Collected:  06/05/19 2042    Specimen:  Blood Updated:  06/05/19 2046     Glucose 139 mg/dL     CBC (No Diff) [[id number]] Collected:  06/06/19 0908    Specimen:  Blood Updated:  06/06/19 0920    Basic Metabolic Panel [[id number]] Collected:  06/06/19 0908    Specimen:  Blood Updated:  06/06/19 0920          IMAGING STUDIES:  No new imaging      Meds reviewed/changed: Yes    Current Facility-Administered Medications   Medication Dose Route Frequency Provider Last Rate Last Dose   • bisacodyl (DULCOLAX) EC tablet 10 mg  10 mg Oral Daily PRN Corbin Bailey MD       • bisacodyl (DULCOLAX) suppository 10 mg  10 mg Rectal Daily PRN Corbin Bailey MD       • cyclobenzaprine (FLEXERIL) tablet 10 mg  10 mg Oral TID PRN Corbin Bailey MD   10 mg at 06/06/19 0335   • docusate sodium (COLACE) capsule 100 mg  100 mg Oral BID PRN Corbin Bailey MD   100 mg at 06/06/19 0817   • HYDROcodone-acetaminophen (NORCO) 7.5-325 MG per tablet 1 tablet  1 tablet Oral Q4H PRN Corbin Bailey MD       • lactated ringers infusion  9 mL/hr Intravenous Continuous Maksim Yuen MD       • lactated ringers infusion  75 mL/hr Intravenous Continuous Corbin Bailey MD 75 mL/hr at 06/05/19 2249 75 mL/hr at 06/05/19 2249   • lisinopril (PRINIVIL,ZESTRIL) 10 mg, hydrochlorothiazide (MICROZIDE) 12.5 mg for ZESTORETIC 10-12.5   Oral Q24H Corbin Bailey MD       • morphine injection 4 mg  4 mg Intravenous Q2H PRN Corbin Bailey MD   4 mg at 06/06/19 0139    And   • naloxone (NARCAN) injection 0.4 mg  0.4 mg Intravenous Q5 Min PRN Corbin Bailey MD       • Morphine injection 6 mg  6 mg Intravenous Q2H PRN Corbin Bailey MD   6 mg at 06/06/19 0551    And   • naloxone (NARCAN) injection 0.4 mg  0.4 mg Intravenous Q5 Min PRN Corbin Bailey MD       • ondansetron (ZOFRAN) tablet 4 mg  4 mg Oral Q6H PRN Corbin Bailey MD        Or   • ondansetron (ZOFRAN) injection 4 mg  4 mg  Intravenous Q6H PRN Corbin Bailey MD       • oxyCODONE-acetaminophen (PERCOCET)  MG per tablet 1 tablet  1 tablet Oral Q4H PRN Corbin Bailey MD   1 tablet at 06/06/19 0817   • sodium chloride 0.9 % with KCl 20 mEq/L infusion  100 mL/hr Intravenous Continuous Corbin Bailey MD           Assessment/Plan     ASSESSMENT:      Gait disorder    Lumbar radiculopathy    Spinal stenosis, L3-4    Lumbar disc herniation -left L4-5    History of lumbar fusion      PLAN: He is doing well and stable postop day 1 status post multilevel thoracic and lumbar decompression and fusion.  TLSO brace has been ordered and is pending delivery this morning.  He is not to get out of bed or have the head of bed higher than 30 degrees until the brace has been delivered.  Pain is well controlled with muscle relaxers and narcotics.  JV drains have high volume output, will remain in likely for a couple of days.    The thoracic/lumbar incision site is healing well.  Rae catheter has been discontinued.  Positive flatus, pending suppository for BM.  Continue the same for now.  Hopeful up with PT seen.    I discussed the patients findings and my recommendations with patient, family, nursing staff and Dr Bailey       LOS: 1 day       VERONICA Pearce  6/6/2019  9:23 AM          Electronically signed by Nidia Castañeda APRN at 6/6/2019  9:45 AM          Consult Notes (last 48 hours) (Notes from 6/4/2019 11:00 AM through 6/6/2019 11:00 AM)      Maximiliano Coates MD at 6/5/2019 10:50 PM      Consult Orders    1. Inpatient Pulmonology Consult [[id number]] ordered by Corbin Bailey MD at 06/05/19 2033                               Referring Provider: Dr. Bailey  Reason for Consultation: ICU care    Patient Care Team:  Yudy Luna APRN as PCP - General (Family Medicine)  Corbin Bailey MD as Surgeon (Neurosurgery)    Chief complaint:   Post op lumbar discectomy and fusion    History of present illness:  Subjective   This is  a 53 year old male patient who has been suffering of low back pain radiating to the legs in addition to weakness and numbness in his legs.  He was diagnosed with spinal stenosis and underwent a lengthy surgery today with lumbar discectomy and fusion.  He returned to the ICU postoperatively.  We are consulted for medical management.    He has back pain obviously after his surgery tonight but denies worsening weakness or numbness in his legs.  He has a Rae catheter.  Last bowel movement was yesterday.    Patient has hypertension which is reportedly controlled with medications.  He does not have a diagnosis of sleep apnea he said that he was told that he needs to be tested and hasn't had the chance to do so.  He did endorse loud snoring and daytime fatigue.  He is not aware of witnessed apnea.     Review of Systems  Constitutional: No fever or chills.  No change in appetite  ENMT: No sinus congestion or postnasal drip.  Loud snoring  Cardiovascular: No chest pain, palpitation or legs swelling.    Respiratory: No dyspnea, cough or wheezing.  Gastrointestinal: No constipation, diarrhea or abdominal pain.  Last bowel movement was yesterday.  Neurology: No headache or dizziness..  Has numbness and lower extremities which is the same as usual.  Also reported weakness in his legs, similar to baseline.  Musculoskeletal: No joints pain, stiffness or swelling.  Has chronic low back pain radiating to the legs and chronic numbness in his extremities.  Psychiatry: No anxiety or depression.  Genitourinary: No dysuria or frequent urination but reported current sensation of fullness despite having a catheter  Endo: No weight changes. No cold or warm intolerance.  Lymphatic: No swollen glands.  Integumentary: No rash.    History  Past Medical History:   Diagnosis Date   • Hip pain    • Hypertension    • Low back pain    • Lumbar disc herniation    • Pain in left knee    • Sleep apnea     DOES NOT WEAR CPAP   • Spinal stenosis of  lumbar region    ,   Past Surgical History:   Procedure Laterality Date   • LUMBAR DISCECTOMY     • LUMBAR FUSION     • VASECTOMY     ,   Family History   Problem Relation Age of Onset   • Diabetes Mother    • Hypertension Father    • Gout Father    • Sleep apnea Father    • Malig Hyperthermia Neg Hx    ,   Social History     Tobacco Use   • Smoking status: Current Every Day Smoker     Packs/day: 0.50     Years: 10.00     Pack years: 5.00     Types: Cigarettes   • Smokeless tobacco: Never Used   Substance Use Topics   • Alcohol use: Yes     Alcohol/week: 3.0 oz     Types: 5 Cans of beer per week   • Drug use: No   ,   Medications Prior to Admission   Medication Sig Dispense Refill Last Dose   • cyclobenzaprine (FLEXERIL) 10 MG tablet Take 10 mg by mouth 3 (Three) Times a Day As Needed for Muscle Spasms.   5/31/2019 at Unknown time   • Multiple Vitamin (MULTI-VITAMIN DAILY PO) Take 1 tablet by mouth Daily.   5/31/2019 at Unknown time   • Omega-3 Fatty Acids (FISH OIL) 1200 MG capsule delayed-release Take 1 capsule by mouth Daily.   5/31/2019 at Unknown time   • lisinopril-hydrochlorothiazide (PRINZIDE,ZESTORETIC) 10-12.5 MG per tablet Take 1 tablet by mouth Daily.   6/5/2019 at 0600   , Scheduled Meds:    ceFAZolin 2 g Intravenous Q8H   [START ON 6/6/2019] lisinopril-hydroCHLOROthiazide (ZESTORETIC) 10-12.5 mg combo dose  Oral Q24H   , Continuous Infusions:    lactated ringers 100 mL/hr Last Rate: 0 mL/hr (06/05/19 1029)   lactated ringers 9 mL/hr    lactated ringers 75 mL/hr Last Rate: 75 mL/hr (06/05/19 2249)    and Allergies:  Patient has no known allergies.    Objective     Vital Signs   Temp:  [98.4 °F (36.9 °C)-99 °F (37.2 °C)] 98.4 °F (36.9 °C)  Heart Rate:  [] 99  Resp:  [16] 16  BP: (113-130)/(65-81) 117/77    Physical Exam:  Constitutional: Middle-aged white male.  Not in acute distress.  Eyes: Injected conjunctiva, EOMI. pupils equal reactive light  ENMT: Neal 3.  Mallampati score 4.  Large  tongue no oral thrush.   Neck: Large. Trachea midline. No thyromegaly  Heart: RRR, no murmur.  No legs edema.  Lungs: Good and equal air entry bilaterally.  Non labored breathing.   Abdomen: Obese. Soft. No tenderness or dullness. No HSM.  Extremities: No cyanosis, clubbing or pitting edema: . Moves all extremities.  Neuro: Conscious, alert, oriented x3.  Strength 5/5 overall.  No sensory deficit in legs.  Psych: Appropriate mood and affect.    Integumentary: No rash or ecchymosis.  Lymphatic: No palpable cervical or supraclavicular lymph nodes.        Assessment   1. Lumbar spine stenosis with radiculopathy s/p lumbar discectomy 6/5/19  2. Loud snoring with suspected sleep apnea  3. HTN    Recommendations:  · Continue home dose of Lisinopril/HCTZ for HTN  · Pain management per surgery  · Neuro check and monitor post operatively in ICU  · DVT proph with SCD for now and pharmacological DVT proph with ok with surgery          Maximiliano Coates MD  06/05/19  10:50 PM              Electronically signed by Maximiliano Coates MD at 6/6/2019 12:51 AM

## 2019-06-06 NOTE — PROGRESS NOTES
"  PROGRESS NOTE  Patient Name: Archie Oquendo  Age/Sex: 53 y.o. male  : 1965  MRN: [id number]    Date of Admission: 2019  Date of Encounter Visit: 19   LOS: 1 day   Patient Care Team:  Yudy Luna APRN as PCP - General (Family Medicine)  Corbin Bailey MD as Surgeon (Neurosurgery)    Chief Complaint: s/p lumbar disc fusion    Hospital course: Doing well postoperatively, was having some pain after he just work with the physical therapy, denies any respiratory problem or GI issues  No fever or chills    REVIEW OF SYSTEMS:   CONSTITUTIONAL: no fever or chills  CARDIOVASCULAR: No chest pain, chest pressure or chest discomfort. No palpitations or edema.   RESPIRATORY: No shortness of breath, cough or sputum.   GASTROINTESTINAL: No anorexia, nausea, vomiting or diarrhea. No abdominal pain or blood.   HEMATOLOGIC: No bleeding or bruising.     Ventilator/Non-Invasive Ventilation Settings (From admission, onward)    None            Vital Signs  Temp:  [98.4 °F (36.9 °C)-99.9 °F (37.7 °C)] 99.4 °F (37.4 °C)  Heart Rate:  [] 120  Resp:  [16] 16  BP: (103-127)/(65-81) 115/78  SpO2:  [87 %-100 %] 95 %  on  Flow (L/min):  [2-4] 2 Device (Oxygen Therapy): room air    Intake/Output Summary (Last 24 hours) at 2019 1449  Last data filed at 2019 1400  Gross per 24 hour   Intake 4635 ml   Output 5220 ml   Net -585 ml     Flowsheet Rows      First Filed Value   Admission Height  188 cm (74\") Documented at 2019 0659   Admission Weight  94.3 kg (207 lb 14.3 oz) Documented at 2019 0659        Body mass index is 26.69 kg/m².      19  0659   Weight: 94.3 kg (207 lb 14.3 oz)       Physical Exam:  GEN:  No acute distress, alert, cooperative, well developed   EYES:   Sclera clear. No icterus. PERRL. Normal EOM  ENT:   External ears/nose normal, no oral lesions, no thrush, mucous membranes moist  NECK:  Supple, midline trachea, no JVD  LUNGS: Normal chest on inspection, CTAB, no " wheezes. No rhonchi. No crackles. Respirations regular, even and unlabored.   CV:  Regular rhythm and rate. Normal S1/S2. No murmurs, gallops, or rubs noted.  ABD:  Soft, non-tender and non-distended. Normal bowel sounds. No guarding  EXT:   No cyanosis. No redness. No edema.   Skin: dry, intact, no bleeding    Results Review:      Results from last 7 days   Lab Units 06/06/19  0908   SODIUM mmol/L 134*   POTASSIUM mmol/L 3.7   CHLORIDE mmol/L 98   CO2 mmol/L 26.5   BUN mg/dL 13   CREATININE mg/dL 0.87   CALCIUM mg/dL 7.8*   ANION GAP mmol/L 9.5                 Results from last 7 days   Lab Units 06/06/19  0908   WBC 10*3/mm3 10.75   HEMOGLOBIN g/dL 11.6*   HEMATOCRIT % 35.5*   PLATELETS 10*3/mm3 165   MCV fL 95.9                   Invalid input(s): LDLCALC          Glucose   Date/Time Value Ref Range Status   06/05/2019 2042 139 (H) 70 - 130 mg/dL Final                           Imaging:   Imaging Results (all)     Procedure Component Value Units Date/Time    XR Spine Thoracolumbar 2 View [[id number]] Collected:  06/06/19 0740     Updated:  06/06/19 0814    Narrative:       LUMBAR SPINE OPERATIVE X-RAYS     HISTORY: Spinal fusion.     A total of 24 seconds fluoroscopy and 582 operative images were provided  for Dr. Bailey during spinal surgery. Images acquired show the  lumbosacral region following multilevel laminectomy and it appears that  posterior fusion hardware seen on preoperative imaging in April 2019 has  been removed.     This report was finalized on 6/6/2019 8:11 AM by Dr. Romeo Gaston M.D.       FL C Arm During Surgery [[id number]] Resulted:  06/05/19 1637     Updated:  06/05/19 1637    Narrative:       This procedure was auto-finalized with no dictation required.            Medication Review:     lisinopril-hydroCHLOROthiazide (ZESTORETIC) 10-12.5 mg combo dose  Oral Q24H         sodium chloride 0.9 % with KCl 20 mEq 100 mL/hr Last Rate: 100 mL/hr (06/06/19 0935)       ASSESSMENT:   1. Lumbar spine  stenosis with radiculopathy s/p lumbar discectomy 6/5/19  2. Loud snoring with suspected sleep apnea  3. HTN    PLAN:  Seems to be well controlled on the current regimen  Discussed with patient  Blood pressure is well controlled on the lisinopril/hydrochlorothiazide home regimen  We will follow  Disposition: Out of the ICU once okay with neurosurgery    Cristian Gomez MD  06/06/19  2:49 PM          Dictated utilizing Dragon dictation

## 2019-06-06 NOTE — PROGRESS NOTES
Discharge Planning Assessment  [facility]     Patient Name: Archie Oquendo  MRN: [id number]  Today's Date: 6/6/2019    Admit Date: 6/5/2019    Discharge Needs Assessment     Row Name 06/06/19 1546       Living Environment    Lives With  alone    Current Living Arrangements  home/apartment/condo    Primary Care Provided by  self;parent(s)    Provides Primary Care For  no one    Family Caregiver if Needed  child(evette), adult;parent(s)    Quality of Family Relationships  supportive    Able to Return to Prior Arrangements  yes       Resource/Environmental Concerns    Resource/Environmental Concerns  none    Transportation Concerns  car, none       Transition Planning    Patient/Family Anticipates Transition to  home with family    Patient/Family Anticipated Services at Transition  none    Transportation Anticipated  family or friend will provide       Discharge Needs Assessment    Concerns to be Addressed  no discharge needs identified    Equipment Currently Used at Home  cane, straight;walker, standard    Anticipated Changes Related to Illness  none    Equipment Needed After Discharge  none        Discharge Plan     Row Name 06/06/19 1547       Plan    Plan  Home  May agree to Home health      Plan Comments  CCP spoke with patient at bedside to discuss discharge planning.  CCP role explained.  Verified face sheet.  Pt emergency contact is his daughter Patricia, [phone] , and Sandra [phone]. Pt PCP is Yudy MARTIN.  Pt lives in ran house with a basement alone.  He states he has difficulty with stairs occasionally.  He is normally independent with ADL's.  He uses a cane or a rolling walker to ambulate.   He has no home health history.  She has not been to rehab.  Pt obtains his  medications from [facility] on [address].   Plan is home He may agree to  at discharge for Physicial therapy.  CCP to follow up for choice          Destination      No service coordination in this encounter.       Durable Medical Equipment      No service coordination in this encounter.      Dialysis/Infusion      No service coordination in this encounter.      Home Medical Care      No service coordination in this encounter.      Therapy      No service coordination in this encounter.      Community Resources      No service coordination in this encounter.          Demographic Summary    No documentation.       Functional Status    No documentation.       Psychosocial    No documentation.       Abuse/Neglect    No documentation.       Legal    No documentation.       Substance Abuse    No documentation.       Patient Forms    No documentation.           Tierney Jin RN

## 2019-06-06 NOTE — ANESTHESIA POSTPROCEDURE EVALUATION
Patient: Archie Oquendo    Procedure Summary     Date:  06/05/19 Room / Location:  [facility] OR [address] [place] [address] MAIN OR    Anesthesia Start:  0810 Anesthesia Stop:  1815    Procedure:  LUMBAR THREE LUMBAR FOUR  AND LUMBAR FIVE LUMBAR SIX DECOMPRESSION. THORACIC ELEVEN TO SACREL TWO POSTERIOR LATERAL ARTHRODESIS(FUSION) WITH SPINAL INSTRUMENTATION. REMOVAL OF LUMBAR INSTRUMENTATION. (Left Spine Lumbar) Diagnosis:       Lumbar radiculopathy      Gait disorder      Spinal stenosis, lumbar region, with neurogenic claudication      Lumbar disc herniation      History of lumbar fusion      (Lumbar radiculopathy [M54.16])      (Gait disorder [R26.9])      (Spinal stenosis, lumbar region, with neurogenic claudication [M48.062])      (Lumbar disc herniation [M51.26])      (History of lumbar fusion [Z98.1])    Surgeon:  Corbin Bailey MD Provider:  Maksim Yuen MD    Anesthesia Type:  general ASA Status:  2          Anesthesia Type: general  Last vitals  BP   119/70 (06/05/19 1945)   Temp   37.2 °C (99 °F) (06/05/19 1815)   Pulse   109 (06/05/19 1945)   Resp   16 (06/05/19 1945)     SpO2   98 % (06/05/19 1945)     Post Anesthesia Care and Evaluation    Patient location during evaluation: bedside  Patient participation: complete - patient participated  Level of consciousness: awake  Pain score: 1  Pain management: adequate  Airway patency: patent  Anesthetic complications: No anesthetic complications    Cardiovascular status: acceptable  Respiratory status: acceptable  Hydration status: acceptable    Comments: --------------------            06/05/19 1945     --------------------   BP:       119/70     Pulse:     109       Resp:       16       Temp:                SpO2:      98%      --------------------

## 2019-06-06 NOTE — PLAN OF CARE
Problem: Patient Care Overview  Goal: Plan of Care Review   06/06/19 1802   Coping/Psychosocial   Plan of Care Reviewed With patient   Plan of Care Review   Progress no change   OTHER   Outcome Summary po pain meds q4; sat to side of bed with pt; lg output from drains; unable to void x1 so str. cath performed; flat affect; hopeful to move out of icu

## 2019-06-06 NOTE — THERAPY EVALUATION
Acute Care - Physical Therapy Initial Evaluation  [facility]     Patient Name: Archie Oquendo  : 1965  MRN: [id number]  Today's Date: 2019   Onset of Illness/Injury or Date of Surgery: 19     Referring Physician: Leo      Admit Date: 2019    Visit Dx:     ICD-10-CM ICD-9-CM   1. Lumbar radiculopathy M54.16 724.4   2. Gait disorder R26.9 781.2   3. Spinal stenosis, L3-4 M48.062 724.03   4. Lumbar disc herniation -left L4-5 M51.26 722.10   5. History of lumbar fusion Z98.1 V45.4     Patient Active Problem List   Diagnosis   • Hip pain   • Low back pain   • Gait disorder   • Pain in left knee   • Hypertension   • Lumbar radiculopathy   • Spinal stenosis, L3-4   • Lumbar disc herniation -left L4-5   • History of lumbar fusion     Past Medical History:   Diagnosis Date   • Hip pain    • Hypertension    • Low back pain    • Lumbar disc herniation    • Pain in left knee    • Sleep apnea     DOES NOT WEAR CPAP   • Spinal stenosis of lumbar region      Past Surgical History:   Procedure Laterality Date   • LUMBAR DISCECTOMY     • LUMBAR FUSION     • VASECTOMY          PT ASSESSMENT (last 12 hours)      Physical Therapy Evaluation     Row Name 19 1358          PT Evaluation Time/Intention    Subjective Information  complains of;pain  -AR     Document Type  evaluation  -AR     Mode of Treatment  physical therapy  -AR     Patient Effort  good  -AR     Symptoms Noted During/After Treatment  none  -AR     Row Name 19 4642          General Information    Patient Profile Reviewed?  yes  -AR     Onset of Illness/Injury or Date of Surgery  19  -AR     Referring Physician  Leo  -AR     Patient Observations  alert;cooperative;agree to therapy  -AR     General Observations of Patient  wearin back brace on arrival  -AR     Prior Level of Function  independent:  -AR     Equipment Currently Used at Home  none  -AR     Pertinent History of Current Functional Problem  T11-S2 fusion w/  decompression  -AR     Existing Precautions/Restrictions  fall;spinal  (Significant)  Brace on when HOB >30 deg or out of bed   -AR     Barriers to Rehab  none identified  -AR     Row Name 06/06/19 1358          Relationship/Environment    Lives With  alone  -AR     Row Name 06/06/19 1358          Resource/Environmental Concerns    Current Living Arrangements  home/apartment/condo  -AR     Row Name 06/06/19 1358          Home Main Entrance    Number of Stairs, Main Entrance  four  -AR     Row Name 06/06/19 1358          Cognitive Assessment/Intervention- PT/OT    Orientation Status (Cognition)  oriented x 4  -AR     Follows Commands (Cognition)  WNL  -AR     Row Name 06/06/19 1358          Bed Mobility Assessment/Treatment    Bed Mobility Assessment/Treatment  supine-sit;sit-supine  -AR     Supine-Sit Wise (Bed Mobility)  minimum assist (75% patient effort);moderate assist (50% patient effort);2 person assist  -AR     Sit-Supine Wise (Bed Mobility)  moderate assist (50% patient effort);2 person assist  -AR     Assistive Device (Bed Mobility)  bed rails  -AR     Comment (Bed Mobility)  HOB flat, VC and TC for log rolling  -AR     Row Name 06/06/19 1358          Transfer Assessment/Treatment    Transfer Assessment/Treatment  stand-sit transfer;sit-stand transfer  -AR     Sit-Stand Wise (Transfers)  minimum assist (75% patient effort);2 person assist  -AR     Stand-Sit Wise (Transfers)  minimum assist (75% patient effort);2 person assist  -AR     Row Name 06/06/19 1353          Sit-Stand Transfer    Assistive Device (Sit-Stand Transfers)  walker, front-wheeled  -AR     Row Name 06/06/19 1358          Stand-Sit Transfer    Assistive Device (Stand-Sit Transfers)  walker, front-wheeled  -AR     Row Name 06/06/19 1356          Gait/Stairs Assessment/Training    Gait/Stairs Assessment/Training  gait pattern;distance ambulated  -AR     Wise Level (Gait)  minimum assist (75% patient  effort);2 person assist  -AR     Assistive Device (Gait)  walker, front-wheeled  -AR     Distance in Feet (Gait)  2  -AR     Pattern (Gait)  swing-to  -AR     Deviations/Abnormal Patterns (Gait)  gait speed decreased;festinating/shuffling  -AR     Bilateral Gait Deviations  heel strike decreased;weight shift ability decreased  -AR     Row Name 06/06/19 1358          General ROM    GENERAL ROM COMMENTS  B LE WFL   -AR     Row Name 06/06/19 1358          MMT (Manual Muscle Testing)    General MMT Comments  R LE grossly 4/5, LLE grossly -3/5, unable to DF L ankle  -AR     Row Name 06/06/19 1358          Motor Assessment/Intervention    Additional Documentation  Therapeutic Exercise (Group);Therapeutic Exercise Interventions (Group);Balance (Group)  -AR     Row Name 06/06/19 1358          Therapeutic Exercise    Comment (Therapeutic Exercise)  R AP 10x, LAQ 10x sitting EOB  -AR     Row Name 06/06/19 1358          Balance    Balance  static sitting balance  -AR     Row Name 06/06/19 1358          Static Sitting Balance    Level of [place] (Unsupported Sitting, Static Balance)  contact guard assist  -AR     Sitting Position (Unsupported Sitting, Static Balance)  sitting on edge of bed  -AR     Time Able to Maintain Position (Unsupported Sitting, Static Balance)  4 to 5 minutes  -AR     Row Name 06/06/19 1358          Pain Assessment    Additional Documentation  Pain Scale: Numbers Pre/Post-Treatment (Group)  -AR     Row Name 06/06/19 1358          Pain Scale: Numbers Pre/Post-Treatment    Pain Scale: Numbers, Pretreatment  5/10 reports 5/10  -AR     Pain Location - Orientation  incisional  -AR     Pain Location  back  -AR     Pain Intervention(s)  Repositioned;Medication (See MAR)  -AR     Row Name             Wound 06/05/19 1741 Other (See comments) back incision    Wound - Properties Group Date first assessed: 06/05/19  -SL Time first assessed: 1741  -SL Side: Other (See comments)  -SL Location: back  -SL Type:  incision  -SL    Row Name 06/06/19 1350          Physical Therapy Clinical Impression    Patient/Family Goals Statement (PT Clinical Impression)  DC home  -AR     Criteria for Skilled Interventions Met (PT Clinical Impression)  yes  -AR     Pathology/Pathophysiology Noted (Describe Specifically for Each System)  musculoskeletal  -AR     Impairments Found (describe specific impairments)  aerobic capacity/endurance;gait, locomotion, and balance  -AR     Rehab Potential (PT Clinical Summary)  good, to achieve stated therapy goals  -AR     Patient/Family Concerns, Equipment Needs at Discharge (PT)  possible need for BSC pending DC plans  -AR     Row Name 06/06/19 1358          Vital Signs    Pretreatment Heart Rate (beats/min)  120  -AR     Intratreatment Heart Rate (beats/min)  152 peak witness 152, RN aware.  -AR     Posttreatment Heart Rate (beats/min)  125  -AR     Row Name 06/06/19 1359          Physical Therapy Goals    Bed Mobility Goal Selection (PT)  bed mobility, PT goal 1  -AR     Transfer Goal Selection (PT)  transfer, PT goal 1  -AR     Gait Training Goal Selection (PT)  gait training, PT goal 1  -AR     Row Name 06/06/19 2361          Bed Mobility Goal 1 (PT)    Activity/Assistive Device (Bed Mobility Goal 1, PT)  sit to supine/supine to sit  -AR     [place] Level/Cues Needed (Bed Mobility Goal 1, PT)  supervision required  -AR     Time Frame (Bed Mobility Goal 1, PT)  1 week  -AR     Row Name 06/06/19 6709          Transfer Goal 1 (PT)    Activity/Assistive Device (Transfer Goal 1, PT)  sit-to-stand/stand-to-sit;walker, rolling  -AR     [place] Level/Cues Needed (Transfer Goal 1, PT)  standby assist  -AR     Time Frame (Transfer Goal 1, PT)  1 week  -AR     Row Name 06/06/19 1351          Gait Training Goal 1 (PT)    Activity/Assistive Device (Gait Training Goal 1, PT)  gait (walking locomotion);walker, rolling  -AR     [place] Level (Gait Training Goal 1, PT)  contact guard assist  -AR      Distance (Gait Goal 1, PT)  150  -AR     Time Frame (Gait Training Goal 1, PT)  1 week  -AR     Row Name 06/06/19 1359          Positioning and Restraints    Pre-Treatment Position  in bed  -AR     Post Treatment Position  bed  -AR     In Bed  supine;notified nsg;call light within reach;encouraged to call for assist;exit alarm on  -AR     Row Name 06/06/19 1359          Living Environment    Home Accessibility  stairs to enter home  -AR       User Key  (r) = Recorded By, (t) = Taken By, (c) = Cosigned By    Initials Name Provider Type    Corrine Zafar, RN Registered Nurse    Kassidy Correa, PT Physical Therapist        Physical Therapy Education     Title: PT OT SLP Therapies (In Progress)     Topic: Physical Therapy (In Progress)     Point: Mobility training (In Progress)     Learning Progress Summary           Patient Acceptance, E, NR by AR at 6/6/2019  3:25 PM                   Point: Home exercise program (In Progress)     Learning Progress Summary           Patient Acceptance, E, NR by AR at 6/6/2019  3:25 PM                   Point: Body mechanics (In Progress)     Learning Progress Summary           Patient Acceptance, E, NR by AR at 6/6/2019  3:25 PM                   Point: Precautions (In Progress)     Learning Progress Summary           Patient Acceptance, E, NR by AR at 6/6/2019  3:25 PM                               User Key     Initials Effective Dates Name Provider Type Discipline    AR 04/03/18 -  Kassidy Jorge, PT Physical Therapist PT              PT Recommendation and Plan  Anticipated Discharge Disposition (PT): (pending progress while inpatient - pt hopeful for home)  Planned Therapy Interventions (PT Eval): balance training, bed mobility training, gait training, home exercise program, patient/family education, ROM (range of motion), stair training, strengthening, transfer training  Therapy Frequency (PT Clinical Impression): daily  Outcome Summary/Treatment Plan  (PT)  Anticipated Equipment Needs at Discharge (PT): (owns a RW and cane)  Patient/Family Concerns, Equipment Needs at Discharge (PT): possible need for BSC pending DC plans  Anticipated Discharge Disposition (PT): (pending progress while inpatient - pt hopeful for home)  Plan of Care Reviewed With: patient  Outcome Summary: Pt admitted 6/5 w/ T11-S2 fusion.  Pt reports he lives alones, owns RW but was not using.  Today pt demonstrates L leg weakness - unable to DF L ankle, impaired activity tolerance, and independence w/ mobility but able to take few shuffling steps at EOB w/ min A x2 using RW.  Educated on spinal precautions, PT POC, and brace precautions.  Pt hopeful for DC to home w/ mother assisting as needed - plan to update as pt progress.       Time Calculation:   PT Charges     Row Name 06/06/19 1527             Time Calculation    Start Time  1358  -AR      Stop Time  1425  -AR      Time Calculation (min)  27 min  -AR      PT Received On  06/06/19  -AR      PT - Next Appointment  06/07/19  -AR      PT Goal Re-Cert Due Date  06/13/19  -AR        User Key  (r) = Recorded By, (t) = Taken By, (c) = Cosigned By    Initials Name Provider Type    AR Kassidy Jorge PT Physical Therapist        Therapy Charges for Today     Code Description Service Date Service Provider Modifiers Qty    28724619315 HC PT EVAL MOD COMPLEXITY 2 6/6/2019 Kassidy Jorge, PT GP 1    93912663731 HC PT THER PROC EA 15 MIN 6/6/2019 Kassidy Jorge, PT GP 1    47000159200 HC PT THER SUPP EA 15 MIN 6/6/2019 Kassidy Jorge PT GP 2                 Kassidy Jorge PT  6/6/2019

## 2019-06-06 NOTE — PLAN OF CARE
Problem: Patient Care Overview  Goal: Plan of Care Review  Outcome: Ongoing (interventions implemented as appropriate)   06/06/19 0632   Coping/Psychosocial   Plan of Care Reviewed With patient   Plan of Care Review   Progress no change   OTHER   Outcome Summary Pt admitted to ICU overnight. VSS. Neuro status stable other than L foot dorsiflexion and difficulty wiggling toes on L side. Pain has high pain tolerance, scoring his pain levels from 2-4 but unable to move extremeties without wincing or elevate HOB above 15 degrees. Morphine and percocet given with little changes. Flexeril appeared to have the best impact. Drains working well, filling every few hours. 10-15 inch midline incision on back open to air. Rae catheter pulled at 0630. Will continue to monitor.

## 2019-06-06 NOTE — CONSULTS
Referring Provider: Dr. Bailey  Reason for Consultation: ICU care    Patient Care Team:  Yudy Luna APRN as PCP - General (Family Medicine)  Corbin Bailey MD as Surgeon (Neurosurgery)    Chief complaint:   Post op lumbar discectomy and fusion    History of present illness:  Subjective   This is a 53 year old male patient who has been suffering of low back pain radiating to the legs in addition to weakness and numbness in his legs.  He was diagnosed with spinal stenosis and underwent a lengthy surgery today with lumbar discectomy and fusion.  He returned to the ICU postoperatively.  We are consulted for medical management.    He has back pain obviously after his surgery tonight but denies worsening weakness or numbness in his legs.  He has a Rae catheter.  Last bowel movement was yesterday.    Patient has hypertension which is reportedly controlled with medications.  He does not have a diagnosis of sleep apnea he said that he was told that he needs to be tested and hasn't had the chance to do so.  He did endorse loud snoring and daytime fatigue.  He is not aware of witnessed apnea.     Review of Systems  Constitutional: No fever or chills.  No change in appetite  ENMT: No sinus congestion or postnasal drip.  Loud snoring  Cardiovascular: No chest pain, palpitation or legs swelling.    Respiratory: No dyspnea, cough or wheezing.  Gastrointestinal: No constipation, diarrhea or abdominal pain.  Last bowel movement was yesterday.  Neurology: No headache or dizziness..  Has numbness and lower extremities which is the same as usual.  Also reported weakness in his legs, similar to baseline.  Musculoskeletal: No joints pain, stiffness or swelling.  Has chronic low back pain radiating to the legs and chronic numbness in his extremities.  Psychiatry: No anxiety or depression.  Genitourinary: No dysuria or frequent urination but reported current sensation of fullness despite having a  catheter  Endo: No weight changes. No cold or warm intolerance.  Lymphatic: No swollen glands.  Integumentary: No rash.    History  Past Medical History:   Diagnosis Date   • Hip pain    • Hypertension    • Low back pain    • Lumbar disc herniation    • Pain in left knee    • Sleep apnea     DOES NOT WEAR CPAP   • Spinal stenosis of lumbar region    ,   Past Surgical History:   Procedure Laterality Date   • LUMBAR DISCECTOMY     • LUMBAR FUSION     • VASECTOMY     ,   Family History   Problem Relation Age of Onset   • Diabetes Mother    • Hypertension Father    • Gout Father    • Sleep apnea Father    • Malig Hyperthermia Neg Hx    ,   Social History     Tobacco Use   • Smoking status: Current Every Day Smoker     Packs/day: 0.50     Years: 10.00     Pack years: 5.00     Types: Cigarettes   • Smokeless tobacco: Never Used   Substance Use Topics   • Alcohol use: Yes     Alcohol/week: 3.0 oz     Types: 5 Cans of beer per week   • Drug use: No   ,   Medications Prior to Admission   Medication Sig Dispense Refill Last Dose   • cyclobenzaprine (FLEXERIL) 10 MG tablet Take 10 mg by mouth 3 (Three) Times a Day As Needed for Muscle Spasms.   5/31/2019 at Unknown time   • Multiple Vitamin (MULTI-VITAMIN DAILY PO) Take 1 tablet by mouth Daily.   5/31/2019 at Unknown time   • Omega-3 Fatty Acids (FISH OIL) 1200 MG capsule delayed-release Take 1 capsule by mouth Daily.   5/31/2019 at Unknown time   • lisinopril-hydrochlorothiazide (PRINZIDE,ZESTORETIC) 10-12.5 MG per tablet Take 1 tablet by mouth Daily.   6/5/2019 at 0600   , Scheduled Meds:    ceFAZolin 2 g Intravenous Q8H   [START ON 6/6/2019] lisinopril-hydroCHLOROthiazide (ZESTORETIC) 10-12.5 mg combo dose  Oral Q24H   , Continuous Infusions:    lactated ringers 100 mL/hr Last Rate: 0 mL/hr (06/05/19 1029)   lactated ringers 9 mL/hr    lactated ringers 75 mL/hr Last Rate: 75 mL/hr (06/05/19 4939)    and Allergies:  Patient has no known allergies.    Objective     Vital  Signs   Temp:  [98.4 °F (36.9 °C)-99 °F (37.2 °C)] 98.4 °F (36.9 °C)  Heart Rate:  [] 99  Resp:  [16] 16  BP: (113-130)/(65-81) 117/77    Physical Exam:  Constitutional: Middle-aged white male.  Not in acute distress.  Eyes: Injected conjunctiva, EOMI. pupils equal reactive light  ENMT: Neal 3.  Mallampati score 4.  Large tongue no oral thrush.   Neck: Large. Trachea midline. No thyromegaly  Heart: RRR, no murmur.  No legs edema.  Lungs: Good and equal air entry bilaterally.  Non labored breathing.   Abdomen: Obese. Soft. No tenderness or dullness. No HSM.  Extremities: No cyanosis, clubbing or pitting edema: . Moves all extremities.  Neuro: Conscious, alert, oriented x3.  Strength 5/5 overall.  No sensory deficit in legs.  Psych: Appropriate mood and affect.    Integumentary: No rash or ecchymosis.  Lymphatic: No palpable cervical or supraclavicular lymph nodes.        Assessment   1. Lumbar spine stenosis with radiculopathy s/p lumbar discectomy 6/5/19  2. Loud snoring with suspected sleep apnea  3. HTN    Recommendations:  · Continue home dose of Lisinopril/HCTZ for HTN  · Pain management per surgery  · Neuro check and monitor post operatively in ICU  · DVT proph with SCD for now and pharmacological DVT proph with ok with surgery          Maximiliano Coates MD  06/05/19  10:50 PM

## 2019-06-06 NOTE — PLAN OF CARE
Problem: Patient Care Overview  Goal: Plan of Care Review   06/06/19 8165   Coping/Psychosocial   Plan of Care Reviewed With patient   OTHER   Outcome Summary Pt admitted 6/5 w/ T11-S2 fusion. Pt reports he lives alones, owns RW but was not using. Today pt demonstrates L leg weakness - unable to DF L ankle, impaired activity tolerance, and independence w/ mobility but able to take few shuffling steps at EOB w/ min A x2 using RW. Educated on spinal precautions, PT POC, and brace precautions. Pt hopeful for DC to home w/ mother assisting as needed - plan to update as pt progress.

## 2019-06-06 NOTE — PROGRESS NOTES
[facility] PROGRESS NOTE    PATIENT IDENTIFICATION:   Name:  Archie Oquendo      MRN:  [id number]     53 y.o.  male               CC: POD #1 s/p T11-S2 posterior fusion with L3/4 lumbar decompression and left L5/L6 decompression      Subjective     Interval History: has complaints of muscle tightness in his chest, arms and low back. He continues to report N/T in both legs, R>L. L distal weakness unchanged post op. rates overall pain 2/10. + flatus    Objective     Vital signs in last 24 hours:  Temp:  [98.4 °F (36.9 °C)-99.9 °F (37.7 °C)] 98.8 °F (37.1 °C)  Heart Rate:  [] 113  Resp:  [16] 16  BP: (105-127)/(65-81) 113/69  ICP ranges-    Intake/Output last 3 shifts:  I/O last 3 completed shifts:  In: 6312 [I.V.:5967; Blood:345]  Out: 3790 [Urine:2550; Drains:540; Blood:700]    Intake/Output this shift:  No intake/output data recorded.      JV drain OP last 24 hours:  #1 380  #2 160      Physical Exam:  General:   Awake, alert, and oriented x 3. NAD  Appears well  Thoracic/lumbar incision site CDI, flat, well approximated  JV drains with high-volume bloody  Decreased strength distal left lower extremity, unchanged postop L: 3+/5 dorsiflexion, 4+/5 plantarflexion  Numbness unchanged right lower extremity below the knee with decreased sensation to soft touch  Able to turn in bed with assistance  FC Dc'd  Extremities:  Patient is wearing DON and SCD bilaterally    LABS:    Lab Results (last 24 hours)     Procedure Component Value Units Date/Time    POC Glucose Once [[id number]]  (Abnormal) Collected:  06/05/19 2042    Specimen:  Blood Updated:  06/05/19 2046     Glucose 139 mg/dL     CBC (No Diff) [[id number]] Collected:  06/06/19 0908    Specimen:  Blood Updated:  06/06/19 0920    Basic Metabolic Panel [[id number]] Collected:  06/06/19 0908    Specimen:  Blood Updated:  06/06/19 0920          IMAGING STUDIES:  No new imaging      Meds reviewed/changed: Yes    Current Facility-Administered  Medications   Medication Dose Route Frequency Provider Last Rate Last Dose   • bisacodyl (DULCOLAX) EC tablet 10 mg  10 mg Oral Daily PRN Corbin Bailey MD       • bisacodyl (DULCOLAX) suppository 10 mg  10 mg Rectal Daily PRN Corbin Bailey MD       • cyclobenzaprine (FLEXERIL) tablet 10 mg  10 mg Oral TID PRN Corbin Bailey MD   10 mg at 06/06/19 0335   • docusate sodium (COLACE) capsule 100 mg  100 mg Oral BID PRN Corbin Bailey MD   100 mg at 06/06/19 0817   • HYDROcodone-acetaminophen (NORCO) 7.5-325 MG per tablet 1 tablet  1 tablet Oral Q4H PRN Corbin Bailey MD       • lactated ringers infusion  9 mL/hr Intravenous Continuous Maksim Yuen MD       • lactated ringers infusion  75 mL/hr Intravenous Continuous Corbin Bailey MD 75 mL/hr at 06/05/19 2249 75 mL/hr at 06/05/19 2249   • lisinopril (PRINIVIL,ZESTRIL) 10 mg, hydrochlorothiazide (MICROZIDE) 12.5 mg for ZESTORETIC 10-12.5   Oral Q24H Corbin Bailey MD       • morphine injection 4 mg  4 mg Intravenous Q2H PRN Corbin Bailey MD   4 mg at 06/06/19 0139    And   • naloxone (NARCAN) injection 0.4 mg  0.4 mg Intravenous Q5 Min PRN Corbin Bailey MD       • Morphine injection 6 mg  6 mg Intravenous Q2H PRN Corbin Bailey MD   6 mg at 06/06/19 0551    And   • naloxone (NARCAN) injection 0.4 mg  0.4 mg Intravenous Q5 Min PRN Corbin Bailey MD       • ondansetron (ZOFRAN) tablet 4 mg  4 mg Oral Q6H PRN Corbin Bailey MD        Or   • ondansetron (ZOFRAN) injection 4 mg  4 mg Intravenous Q6H PRN Corbin Bailey MD       • oxyCODONE-acetaminophen (PERCOCET)  MG per tablet 1 tablet  1 tablet Oral Q4H PRN Corbin Bailey MD   1 tablet at 06/06/19 0817   • sodium chloride 0.9 % with KCl 20 mEq/L infusion  100 mL/hr Intravenous Continuous Corbin Bailey MD           Assessment/Plan     ASSESSMENT:      Gait disorder    Lumbar radiculopathy    Spinal stenosis, L3-4    Lumbar disc herniation -left  L4-5    History of lumbar fusion      PLAN: He is doing well and stable postop day 1 status post multilevel thoracic and lumbar decompression and fusion.  TLSO brace has been ordered and is pending delivery this morning.  He is not to get out of bed or have the head of bed higher than 30 degrees until the brace has been delivered.  Pain is well controlled with muscle relaxers and narcotics.  JV drains have high volume output, will remain in likely for a couple of days.    The thoracic/lumbar incision site is healing well.  Rae catheter has been discontinued.  Positive flatus, pending suppository for BM.  Continue the same for now.  Hopeful up with PT seen.    I discussed the patients findings and my recommendations with patient, family, nursing staff and Dr Bailey       LOS: 1 day       VERONICA Pearce  6/6/2019  9:23 AM

## 2019-06-07 LAB
ANION GAP SERPL CALCULATED.3IONS-SCNC: 9.4 MMOL/L
BASOPHILS # BLD AUTO: 0.02 10*3/MM3 (ref 0–0.2)
BASOPHILS NFR BLD AUTO: 0.2 % (ref 0–1.5)
BUN BLD-MCNC: 10 MG/DL (ref 6–20)
BUN/CREAT SERPL: 15.9 (ref 7–25)
CALCIUM SPEC-SCNC: 7.7 MG/DL (ref 8.6–10.5)
CHLORIDE SERPL-SCNC: 95 MMOL/L (ref 98–107)
CO2 SERPL-SCNC: 24.6 MMOL/L (ref 22–29)
CREAT BLD-MCNC: 0.63 MG/DL (ref 0.76–1.27)
DEPRECATED RDW RBC AUTO: 46.6 FL (ref 37–54)
EOSINOPHIL # BLD AUTO: 0.01 10*3/MM3 (ref 0–0.4)
EOSINOPHIL NFR BLD AUTO: 0.1 % (ref 0.3–6.2)
ERYTHROCYTE [DISTWIDTH] IN BLOOD BY AUTOMATED COUNT: 13 % (ref 12.3–15.4)
GFR SERPL CREATININE-BSD FRML MDRD: 132 ML/MIN/1.73
GLUCOSE BLD-MCNC: 129 MG/DL (ref 65–99)
HCT VFR BLD AUTO: 31.7 % (ref 37.5–51)
HGB BLD-MCNC: 10.3 G/DL (ref 13–17.7)
IMM GRANULOCYTES # BLD AUTO: 0.1 10*3/MM3 (ref 0–0.05)
IMM GRANULOCYTES NFR BLD AUTO: 0.9 % (ref 0–0.5)
LYMPHOCYTES # BLD AUTO: 1.16 10*3/MM3 (ref 0.7–3.1)
LYMPHOCYTES NFR BLD AUTO: 10.6 % (ref 19.6–45.3)
MCH RBC QN AUTO: 31.6 PG (ref 26.6–33)
MCHC RBC AUTO-ENTMCNC: 32.5 G/DL (ref 31.5–35.7)
MCV RBC AUTO: 97.2 FL (ref 79–97)
MONOCYTES # BLD AUTO: 1.1 10*3/MM3 (ref 0.1–0.9)
MONOCYTES NFR BLD AUTO: 10.1 % (ref 5–12)
NEUTROPHILS # BLD AUTO: 8.55 10*3/MM3 (ref 1.7–7)
NEUTROPHILS NFR BLD AUTO: 78.1 % (ref 42.7–76)
NRBC BLD AUTO-RTO: 0 /100 WBC (ref 0–0.2)
PLATELET # BLD AUTO: 136 10*3/MM3 (ref 140–450)
PMV BLD AUTO: 9.6 FL (ref 6–12)
POTASSIUM BLD-SCNC: 3.7 MMOL/L (ref 3.5–5.2)
RBC # BLD AUTO: 3.26 10*6/MM3 (ref 4.14–5.8)
SODIUM BLD-SCNC: 129 MMOL/L (ref 136–145)
WBC NRBC COR # BLD: 10.94 10*3/MM3 (ref 3.4–10.8)

## 2019-06-07 PROCEDURE — 99024 POSTOP FOLLOW-UP VISIT: CPT | Performed by: NURSE PRACTITIONER

## 2019-06-07 PROCEDURE — 80048 BASIC METABOLIC PNL TOTAL CA: CPT | Performed by: NURSE PRACTITIONER

## 2019-06-07 PROCEDURE — 0T9B70Z DRAINAGE OF BLADDER WITH DRAINAGE DEVICE, VIA NATURAL OR ARTIFICIAL OPENING: ICD-10-PCS | Performed by: NEUROLOGICAL SURGERY

## 2019-06-07 PROCEDURE — 51798 US URINE CAPACITY MEASURE: CPT

## 2019-06-07 PROCEDURE — 51702 INSERT TEMP BLADDER CATH: CPT

## 2019-06-07 PROCEDURE — 85025 COMPLETE CBC W/AUTO DIFF WBC: CPT | Performed by: NURSE PRACTITIONER

## 2019-06-07 PROCEDURE — 97110 THERAPEUTIC EXERCISES: CPT

## 2019-06-07 PROCEDURE — 25810000003 SODIUM CHLORIDE 0.9 % WITH KCL 20 MEQ 20-0.9 MEQ/L-% SOLUTION: Performed by: NEUROLOGICAL SURGERY

## 2019-06-07 RX ORDER — SODIUM CHLORIDE 1000 MG
2 TABLET, SOLUBLE MISCELLANEOUS
Status: DISCONTINUED | OUTPATIENT
Start: 2019-06-07 | End: 2019-06-14 | Stop reason: HOSPADM

## 2019-06-07 RX ADMIN — BISACODYL 10 MG: 5 TABLET ORAL at 14:08

## 2019-06-07 RX ADMIN — LISINOPRIL: 10 TABLET ORAL at 10:20

## 2019-06-07 RX ADMIN — OXYCODONE HYDROCHLORIDE AND ACETAMINOPHEN 1 TABLET: 10; 325 TABLET ORAL at 01:08

## 2019-06-07 RX ADMIN — OXYCODONE HYDROCHLORIDE AND ACETAMINOPHEN 1 TABLET: 10; 325 TABLET ORAL at 06:46

## 2019-06-07 RX ADMIN — POTASSIUM CHLORIDE AND SODIUM CHLORIDE 100 ML/HR: 900; 150 INJECTION, SOLUTION INTRAVENOUS at 04:42

## 2019-06-07 RX ADMIN — CYCLOBENZAPRINE 10 MG: 10 TABLET, FILM COATED ORAL at 15:09

## 2019-06-07 RX ADMIN — POTASSIUM CHLORIDE AND SODIUM CHLORIDE 100 ML/HR: 900; 150 INJECTION, SOLUTION INTRAVENOUS at 17:14

## 2019-06-07 RX ADMIN — SODIUM CHLORIDE TAB 1 GM 2 G: 1 TAB at 12:52

## 2019-06-07 RX ADMIN — OXYCODONE HYDROCHLORIDE AND ACETAMINOPHEN 1 TABLET: 10; 325 TABLET ORAL at 13:21

## 2019-06-07 RX ADMIN — DOCUSATE SODIUM 100 MG: 100 CAPSULE, LIQUID FILLED ORAL at 09:00

## 2019-06-07 RX ADMIN — OXYCODONE HYDROCHLORIDE AND ACETAMINOPHEN 1 TABLET: 10; 325 TABLET ORAL at 18:25

## 2019-06-07 RX ADMIN — CYCLOBENZAPRINE 10 MG: 10 TABLET, FILM COATED ORAL at 06:47

## 2019-06-07 NOTE — PAYOR COMM NOTE
"Bj Oquendo (53 y.o. Male)                            ATTENTION ;    CLINICAL FOR REVIEW, CASE REF [id number]                           REPLY TO UR DEPT,  [phone] OR CALL  MIKIE MATAMOROS NEVAEH                          Date of Birth Social Security Number Address Home Phone MRN    1965  [address] [place] [phone] [id number]    [facility] Mar[facility] Status          Patient Refused        Admission Date Admission Type Admitting Provider Attending Provider Department, Room/Bed    6/5/19 Elective Corbin Bailey MD Hodes, Jonathan E, MD [facility] INTENSIVE CARE, I388/1    Discharge Date Discharge Disposition Discharge Destination                       Attending Provider:  Corbin Bailey MD    Allergies:  No Known Allergies    Isolation:  None   Infection:  None   Code Status:  CPR    Ht:  188 cm (74\")   Wt:  94.3 kg (207 lb 14.3 oz)    Admission Cmt:  None   Principal Problem:  None                Active Insurance as of 6/5/2019     Primary Coverage     Payor Plan Insurance Group Employer/Plan Group    [employer] BLUE [employer] BLUE SHI[employer] PPO [id number]     Payor Plan Address Payor Plan Phone Number Payor Plan Fax Number Effective Dates    [phone]  7/1/2016 - None Entered    [place] [address]       Subscriber Name Subscriber Birth Date Member ID       BJ OQUENDO 1965 [id number]                 Emergency Contacts      (Rel.) Home Phone Work Phone Mobile Phone    Patricia Oquendo (Daughter) -- -- [phone]    IBIS OQUENDO (Daughter) -- -- [phone]            Lines, Drains & Airways    Active LDAs     Name:   Placement date:   Placement time:   Site:   Days:    Peripheral IV 06/05/19 0701 Right Hand   06/05/19    0701    Hand   2    Peripheral IV 06/06/19 0000 Anterior;Left Wrist   06/06/19    0000    Wrist   1    Closed/Suction Drain Bulb 15 Fr.   06/05/19    " "1617    --   1    Closed/Suction Drain Back Bulb 15 Fr.   06/05/19    1621    Back   1    Urethral Catheter Latex   06/07/19    0000     less than 1                Hospital Medications (active)       Dose Frequency Start End    bisacodyl (DULCOLAX) EC tablet 10 mg 10 mg Daily PRN 6/5/2019     Sig - Route: Take 2 tablets by mouth Daily As Needed for Constipation. - Oral    bisacodyl (DULCOLAX) suppository 10 mg 10 mg Daily PRN 6/6/2019     Sig - Route: Insert 1 suppository into the rectum Daily As Needed for Constipation. - Rectal    cyclobenzaprine (FLEXERIL) tablet 10 mg 10 mg 3 Times Daily PRN 6/5/2019     Sig - Route: Take 1 tablet by mouth 3 (Three) Times a Day As Needed for Muscle Spasms. - Oral    docusate sodium (COLACE) capsule 100 mg 100 mg 2 Times Daily PRN 6/5/2019     Sig - Route: Take 1 capsule by mouth 2 (Two) Times a Day As Needed for Constipation. - Oral    HYDROcodone-acetaminophen (NORCO) 7.5-325 MG per tablet 1 tablet 1 tablet Every 4 Hours PRN 6/5/2019 6/15/2019    Sig - Route: Take 1 tablet by mouth Every 4 (Four) Hours As Needed for Moderate Pain . - Oral    lisinopril (PRINIVIL,ZESTRIL) 10 mg, hydrochlorothiazide (MICROZIDE) 12.5 mg for ZESTORETIC 10-12.5  Every 24 Hours Scheduled 6/6/2019     Sig - Route: Take  by mouth Daily. - Oral    morphine injection 4 mg 4 mg Every 2 Hours PRN 6/5/2019 6/15/2019    Sig - Route: Infuse 2 mL into a venous catheter Every 2 (Two) Hours As Needed for Moderate Pain . - Intravenous    Linked Group 1:  \"And\" Linked Group Details        Morphine injection 6 mg 6 mg Every 2 Hours PRN 6/5/2019 6/15/2019    Sig - Route: Infuse 0.6 mL into a venous catheter Every 2 (Two) Hours As Needed for Severe Pain . - Intravenous    Linked Group 2:  \"And\" Linked Group Details        naloxone (NARCAN) injection 0.4 mg 0.4 mg Every 5 Minutes PRN 6/5/2019     Sig - Route: Infuse 1 mL into a venous catheter Every 5 (Five) Minutes As Needed for Respiratory Depression. - Intravenous " "   Linked Group 2:  \"And\" Linked Group Details        naloxone (NARCAN) injection 0.4 mg 0.4 mg Every 5 Minutes PRN 6/5/2019     Sig - Route: Infuse 1 mL into a venous catheter Every 5 (Five) Minutes As Needed for Respiratory Depression. - Intravenous    Linked Group 1:  \"And\" Linked Group Details        ondansetron (ZOFRAN) injection 4 mg 4 mg Every 6 Hours PRN 6/5/2019     Sig - Route: Infuse 2 mL into a venous catheter Every 6 (Six) Hours As Needed for Nausea or Vomiting. - Intravenous    Linked Group 3:  \"Or\" Linked Group Details        ondansetron (ZOFRAN) tablet 4 mg 4 mg Every 6 Hours PRN 6/5/2019     Sig - Route: Take 1 tablet by mouth Every 6 (Six) Hours As Needed for Nausea or Vomiting. - Oral    Linked Group 3:  \"Or\" Linked Group Details        oxyCODONE-acetaminophen (PERCOCET)  MG per tablet 1 tablet 1 tablet Every 4 Hours PRN 6/5/2019 6/15/2019    Sig - Route: Take 1 tablet by mouth Every 4 (Four) Hours As Needed for Severe Pain . - Oral    sodium chloride 0.9 % with KCl 20 mEq/L infusion 100 mL/hr Continuous 6/6/2019     Sig - Route: Infuse 100 mL/hr into a venous catheter Continuous. - Intravenous    sodium chloride tablet 2 g 2 g 3 Times Daily With Meals 6/7/2019     Sig - Route: Take 2 tablets by mouth 3 (Three) Times a Day With Meals. - Oral          Orders (last 24 hrs)     Start     Ordered    06/08/19 0600  CBC & Differential  Morning Draw      06/07/19 1001    06/07/19 1200  sodium chloride tablet 2 g  3 Times Daily With Meals      06/07/19 0938    06/07/19 1055  Transfer Patient  Once      06/07/19 1054    06/07/19 1000  Inpatient Physical Medicine Rehab Consult  Once     Specialty:  Physical Medicine and Rehabilitation  Provider:  Romeo Francisco MD    06/07/19 1000    06/07/19 1000  Inpatient Case Management  Consult  Once     Provider:  (Not yet assigned)    06/07/19 1000    06/07/19 0600  CBC & Differential  Morning Draw      06/06/19 1455    06/07/19 0600  " Basic Metabolic Panel  Morning Draw      06/06/19 1455    06/07/19 0600  CBC Auto Differential  PROCEDURE ONCE      06/07/19 0002    06/07/19 0008  Insert Indwelling Urinary Catheter  Once      06/07/19 0007    06/07/19 0008  Assess Need for Indwelling Urinary Catheter - Follow Removal Protocol  Continuous     Comments:  Indwelling Urinary Catheter Removal Criteria  Discontinue Indwelling Urinary Catheter Unless One of the Following is Present  Urinary Retention or Obstruction  Chronic Rae Catheter Use  End of Life  Critical Illness with Strict I/O   Tract or Abdominal Surgery  Stage 3/4 Sacral / Perineal Wound  Required Activity Restriction: Trauma  Required Activity Restriction: Spine Surgery  If Patient is Being Followed by Urology Contact Them PRIOR to Removal  Do Not Remove Indwelling Urinary Catheter Order is Present with a CLINICAL REASON to Maintain the Catheter. Provider is Required to Include a Clinical Reason to Maintain a Urinary Catheter    Chronic Rae Catheter Use (Present on Admission)  Assess for Continued Need & Document Medical Necessity  If Infection is Suspected, Contact the Provider        06/07/19 0007 06/07/19 0008  Catheter Care  Every Shift      06/07/19 0007 06/06/19 0900  lisinopril (PRINIVIL,ZESTRIL) 10 mg, hydrochlorothiazide (MICROZIDE) 12.5 mg for ZESTORETIC 10-12.5  Every 24 Hours Scheduled      06/05/19 2114 06/06/19 0859  bisacodyl (DULCOLAX) suppository 10 mg  Daily PRN      06/06/19 0900    06/06/19 0845  sodium chloride 0.9 % with KCl 20 mEq/L infusion  Continuous      06/06/19 0751    06/05/19 2114  Morphine injection 6 mg  Every 2 Hours PRN      06/05/19 2114 06/05/19 2114  naloxone (NARCAN) injection 0.4 mg  Every 5 Minutes PRN      06/05/19 2114 06/05/19 2114  morphine injection 4 mg  Every 2 Hours PRN      06/05/19 2114 06/05/19 2114  naloxone (NARCAN) injection 0.4 mg  Every 5 Minutes PRN      06/05/19 2114 06/05/19 2114  ondansetron (ZOFRAN)  tablet 4 mg  Every 6 Hours PRN      06/05/19 2114 06/05/19 2114  ondansetron (ZOFRAN) injection 4 mg  Every 6 Hours PRN      06/05/19 2114 06/05/19 2114  oxyCODONE-acetaminophen (PERCOCET)  MG per tablet 1 tablet  Every 4 Hours PRN      06/05/19 2114 06/05/19 2114  cyclobenzaprine (FLEXERIL) tablet 10 mg  3 Times Daily PRN      06/05/19 2114 06/05/19 2114  docusate sodium (COLACE) capsule 100 mg  2 Times Daily PRN      06/05/19 2114 06/05/19 2114  bisacodyl (DULCOLAX) EC tablet 10 mg  Daily PRN      06/05/19 2114 06/05/19 2114  HYDROcodone-acetaminophen (NORCO) 7.5-325 MG per tablet 1 tablet  Every 4 Hours PRN      06/05/19 2114    Unscheduled  Oxygen Therapy- Nasal Cannula; Titrate for SPO2: per policy  Continuous PRN      06/05/19 1719    Unscheduled  Bladder Scan if Patient Unable to Void 4-6 Hours After Catheter Removal  As Needed      06/05/19 2114    Unscheduled  Straight Cath Every 4-6 Hours As Needed If Patient is Unable to Void After 4-6 Hours, Bladder Scan Volume is Greater Than 350-500mL & Patient Has Symptoms of Bladder Discomfort / Distention  As Needed      06/05/19 2114    Unscheduled  Consult Pharmacist For Review of Medications That May Cause Urinary Retention - RN To Place Order for Consult it Needed  As Needed      06/05/19 2114    Unscheduled  Schedule / Prompt Voiding For Patients With Urinary Incontinence  As Needed      06/05/19 2114             Operative/Procedure Notes       Corbin Bailey MD at 6/5/2019  9:13 AM          LUMBAR DISCECTOMY FUSION INSTRUMENTATION WITH STEALTH  Progress Note    Archie Oquendo  6/5/2019    Pre-op Diagnosis:   Lumbar radiculopathy [M54.16]  Gait disorder [R26.9]  Spinal stenosis, lumbar region, with neurogenic claudication [M48.062]  Lumbar disc herniation [M51.26]  History of lumbar fusion [Z98.1]       Post-Op Diagnosis Codes:     * Lumbar radiculopathy [M54.16]     * Gait disorder [R26.9]     * Spinal stenosis, lumbar region,  "with neurogenic claudication [M48.062]     * Lumbar disc herniation [M51.26]     * History of lumbar fusion [Z98.1]    Procedure/CPT® Codes:      Procedure(s):  T11-S2 posterior fusion with L3/4 lumbar decompression and left L5/L6 decompression    Surgeon(s):  Corbin Bailey MD    Anesthesia: General    Staff:   Cell Saver : Kaylan Lai; Vickey Wilkinson  Circulator: Megan Figueroa RN; Jose James RN  Radiology Technologist: Sonja Wren RRT  Scrub Person: Lou Waldrop; Katey Villafuerte  Vendor Representative: Misael Giraldo  Assistant: Luzmaria Child CSA    Estimated Blood Loss: 700 mL    Urine Voided: 800 mL    Specimens:                None          Drains:   Closed/Suction Drain Bulb 15 Fr. (Active)       Closed/Suction Drain Back Bulb 15 Fr. (Active)       Urethral Catheter Silicone 16 Fr. (Active)       Findings: Severe stenosis as exprected    Complications: none      Corbin Bailey MD     Date: 6/5/2019  Time: 4:59 PM        Electronically signed by Corbin Bailey MD at 6/5/2019  5:01 PM          Physician Progress Notes (last 24 hours) (Notes from 6/6/2019  2:22 PM through 6/7/2019  2:22 PM)      Nidia Castañeda APRN at 6/7/2019  9:23 AM            [facility] PROGRESS NOTE    PATIENT IDENTIFICATION:   Name:  Archie Oquendo      MRN:  [id number]     53 y.o.  male               CC: POD #2 s/p T11-S2 posterior fusion with L3/4 lumbar decompression and left L5/L6 decompression      Subjective     Interval History: has complaints of ongoing BLE weakness, R LE numbness, low back pain, \"hurts to move\"    Objective     Vital signs in last 24 hours:  Temp:  [98 °F (36.7 °C)-100.3 °F (37.9 °C)] 98 °F (36.7 °C)  Heart Rate:  [108-152] 109  Resp:  [16-18] 18  BP: ()/(65-80) 122/72  ICP ranges-    Intake/Output last 3 shifts:  I/O last 3 completed shifts:  In: 2290 [P.O.:600; I.V.:1690]  Out: 6035 [Urine:4450; Drains:1585]    Intake/Output this shift:  No " intake/output data recorded.        JV drain OP last 24 hours:   # 1: 640  # 2: 305    Physical Exam:  General:   Awake, alert, and oriented x 3. NAD  Appears well  Bruising on forehead  Cranial nerves grossly intact  O2 per nasal cannula  Rae catheter   well-healing thoracic/lumbar incision site, flat intact and well approximated   left lower extremity weakness unchanged postop  Decreased sensory to soft touch right lower extremity  SCDs bilaterally  JV drains with high-volume bloody output      LABS:    Lab Results (last 24 hours)     Procedure Component Value Units Date/Time    CBC & Differential [[id number]] Collected:  06/07/19 0436    Specimen:  Blood Updated:  06/07/19 0453    Narrative:       The following orders were created for panel order CBC & Differential.  Procedure                               Abnormality         Status                     ---------                               -----------         ------                     CBC Auto Differential[[id number]]        Abnormal            Final result                 Please view results for these tests on the individual orders.    Basic Metabolic Panel [[id number]]  (Abnormal) Collected:  06/07/19 0436    Specimen:  Blood Updated:  06/07/19 0522     Glucose 129 mg/dL      BUN 10 mg/dL      Creatinine 0.63 mg/dL      Sodium 129 mmol/L      Potassium 3.7 mmol/L      Chloride 95 mmol/L      CO2 24.6 mmol/L      Calcium 7.7 mg/dL      eGFR Non African Amer 132 mL/min/1.73      BUN/Creatinine Ratio 15.9     Anion Gap 9.4 mmol/L     Narrative:       GFR Normal >60  Chronic Kidney Disease <60  Kidney Failure <15    CBC Auto Differential [[id number]]  (Abnormal) Collected:  06/07/19 0436    Specimen:  Blood Updated:  06/07/19 0453     WBC 10.94 10*3/mm3      RBC 3.26 10*6/mm3      Hemoglobin 10.3 g/dL      Hematocrit 31.7 %      MCV 97.2 fL      MCH 31.6 pg      MCHC 32.5 g/dL      RDW 13.0 %      RDW-SD 46.6 fl      MPV 9.6 fL      Platelets 136 10*3/mm3       Neutrophil % 78.1 %      Lymphocyte % 10.6 %      Monocyte % 10.1 %      Eosinophil % 0.1 %      Basophil % 0.2 %      Immature Grans % 0.9 %      Neutrophils, Absolute 8.55 10*3/mm3      Lymphocytes, Absolute 1.16 10*3/mm3      Monocytes, Absolute 1.10 10*3/mm3      Eosinophils, Absolute 0.01 10*3/mm3      Basophils, Absolute 0.02 10*3/mm3      Immature Grans, Absolute 0.10 10*3/mm3      nRBC 0.0 /100 WBC           IMAGING STUDIES:  No new imaging      Meds reviewed/changed: Yes    Current Facility-Administered Medications   Medication Dose Route Frequency Provider Last Rate Last Dose   • bisacodyl (DULCOLAX) EC tablet 10 mg  10 mg Oral Daily PRN Corbin Bailey MD       • bisacodyl (DULCOLAX) suppository 10 mg  10 mg Rectal Daily PRN Corbin Bailey MD   10 mg at 06/06/19 1213   • cyclobenzaprine (FLEXERIL) tablet 10 mg  10 mg Oral TID PRN Corbin Bailey MD   10 mg at 06/07/19 0647   • docusate sodium (COLACE) capsule 100 mg  100 mg Oral BID PRN Corbin Bailey MD   100 mg at 06/07/19 0900   • HYDROcodone-acetaminophen (NORCO) 7.5-325 MG per tablet 1 tablet  1 tablet Oral Q4H PRN Corbin Bailey MD       • lisinopril (PRINIVIL,ZESTRIL) 10 mg, hydrochlorothiazide (MICROZIDE) 12.5 mg for ZESTORETIC 10-12.5   Oral Q24H Corbin Bailey MD       • morphine injection 4 mg  4 mg Intravenous Q2H PRN Corbin Bailey MD   4 mg at 06/06/19 1925    And   • naloxone (NARCAN) injection 0.4 mg  0.4 mg Intravenous Q5 Min PRN Corbin Bailey MD       • Morphine injection 6 mg  6 mg Intravenous Q2H PRN Corbin Bailey MD   6 mg at 06/06/19 0551    And   • naloxone (NARCAN) injection 0.4 mg  0.4 mg Intravenous Q5 Min PRN Hodes, Corbin E, MD       • ondansetron (ZOFRAN) tablet 4 mg  4 mg Oral Q6H PRN Corbin Bailey MD        Or   • ondansetron (ZOFRAN) injection 4 mg  4 mg Intravenous Q6H PRN Corbin Bailey MD   4 mg at 06/06/19 5666   • oxyCODONE-acetaminophen (PERCOCET)  MG per tablet 1  tablet  1 tablet Oral Q4H PRN Corbin Bailey MD   1 tablet at 19 0646   • sodium chloride 0.9 % with KCl 20 mEq/L infusion  100 mL/hr Intravenous Continuous Corbin Bailey  mL/hr at 19 0442 100 mL/hr at 19 0442       Assessment/Plan     ASSESSMENT:      Gait disorder    Lumbar radiculopathy    Spinal stenosis, L3-4    Lumbar disc herniation -left L4-5    History of lumbar fusion      PLAN: Doing well postop day 2.  Okay to transfer out of ICU. Hgb lower today, will check CBC tomorrow. Drains will remain in.  Sodium down to 129 today.  We will start salt tablets.  Dr. Francisco to be consulted for BAR eval. Out of bed 3 times daily and brace, up in chair.  Continue aggressive PT.      I discussed the patients findings and my recommendations with patient, nursing staff and Dr Bailey       LOS: 2 days       VERONICA Pearce  2019  9:23 AM          Electronically signed by Nidia Castañeda APRN at 2019 10:01 AM     Cristian Gomez MD at 2019  2:49 PM            PROGRESS NOTE  Patient Name: Archie Oquendo  Age/Sex: 53 y.o. male  : 1965  MRN: [id number]    Date of Admission: 2019  Date of Encounter Visit: 19   LOS: 1 day   Patient Care Team:  Yudy Luna APRN as PCP - General (Family Medicine)  Corbin Bailey MD as Surgeon (Neurosurgery)    Chief Complaint: s/p lumbar disc fusion    Hospital course: Doing well postoperatively, was having some pain after he just work with the physical therapy, denies any respiratory problem or GI issues  No fever or chills    REVIEW OF SYSTEMS:   CONSTITUTIONAL: no fever or chills  CARDIOVASCULAR: No chest pain, chest pressure or chest discomfort. No palpitations or edema.   RESPIRATORY: No shortness of breath, cough or sputum.   GASTROINTESTINAL: No anorexia, nausea, vomiting or diarrhea. No abdominal pain or blood.   HEMATOLOGIC: No bleeding or bruising.     Ventilator/Non-Invasive Ventilation Settings (From  "admission, onward)    None            Vital Signs  Temp:  [98.4 °F (36.9 °C)-99.9 °F (37.7 °C)] 99.4 °F (37.4 °C)  Heart Rate:  [] 120  Resp:  [16] 16  BP: (103-127)/(65-81) 115/78  SpO2:  [87 %-100 %] 95 %  on  Flow (L/min):  [2-4] 2 Device (Oxygen Therapy): room air    Intake/Output Summary (Last 24 hours) at 6/6/2019 1449  Last data filed at 6/6/2019 1400  Gross per 24 hour   Intake 4635 ml   Output 5220 ml   Net -585 ml     Flowsheet Rows      First Filed Value   Admission Height  188 cm (74\") Documented at 06/05/2019 0659   Admission Weight  94.3 kg (207 lb 14.3 oz) Documented at 06/05/2019 0659        Body mass index is 26.69 kg/m².      06/05/19  0659   Weight: 94.3 kg (207 lb 14.3 oz)       Physical Exam:  GEN:  No acute distress, alert, cooperative, well developed   EYES:   Sclera clear. No icterus. PERRL. Normal EOM  ENT:   External ears/nose normal, no oral lesions, no thrush, mucous membranes moist  NECK:  Supple, midline trachea, no JVD  LUNGS: Normal chest on inspection, CTAB, no wheezes. No rhonchi. No crackles. Respirations regular, even and unlabored.   CV:  Regular rhythm and rate. Normal S1/S2. No murmurs, gallops, or rubs noted.  ABD:  Soft, non-tender and non-distended. Normal bowel sounds. No guarding  EXT:   No cyanosis. No redness. No edema.   Skin: dry, intact, no bleeding    Results Review:      Results from last 7 days   Lab Units 06/06/19  0908   SODIUM mmol/L 134*   POTASSIUM mmol/L 3.7   CHLORIDE mmol/L 98   CO2 mmol/L 26.5   BUN mg/dL 13   CREATININE mg/dL 0.87   CALCIUM mg/dL 7.8*   ANION GAP mmol/L 9.5                 Results from last 7 days   Lab Units 06/06/19  0908   WBC 10*3/mm3 10.75   HEMOGLOBIN g/dL 11.6*   HEMATOCRIT % 35.5*   PLATELETS 10*3/mm3 165   MCV fL 95.9                   Invalid input(s): LDLCALC          Glucose   Date/Time Value Ref Range Status   06/05/2019 2042 139 (H) 70 - 130 mg/dL Final                           Imaging:   Imaging Results (all)     " Procedure Component Value Units Date/Time    XR Spine Thoracolumbar 2 View [[id number]] Collected:  06/06/19 0740     Updated:  06/06/19 0814    Narrative:       LUMBAR SPINE OPERATIVE X-RAYS     HISTORY: Spinal fusion.     A total of 24 seconds fluoroscopy and 582 operative images were provided  for Dr. Bailey during spinal surgery. Images acquired show the  lumbosacral region following multilevel laminectomy and it appears that  posterior fusion hardware seen on preoperative imaging in April 2019 has  been removed.     This report was finalized on 6/6/2019 8:11 AM by Dr. Romeo Gaston M.D.       FL C Arm During Surgery [[id number]] Resulted:  06/05/19 1637     Updated:  06/05/19 1637    Narrative:       This procedure was auto-finalized with no dictation required.            Medication Review:     lisinopril-hydroCHLOROthiazide (ZESTORETIC) 10-12.5 mg combo dose  Oral Q24H         sodium chloride 0.9 % with KCl 20 mEq 100 mL/hr Last Rate: 100 mL/hr (06/06/19 0935)       ASSESSMENT:   1. Lumbar spine stenosis with radiculopathy s/p lumbar discectomy 6/5/19  2. Loud snoring with suspected sleep apnea  3. HTN    PLAN:  Seems to be well controlled on the current regimen  Discussed with patient  Blood pressure is well controlled on the lisinopril/hydrochlorothiazide home regimen  We will follow  Disposition: Out of the ICU once okay with neurosurgery    Cristian Gomez MD  06/06/19  2:49 PM          Dictated utilizing Dragon dictation    Electronically signed by Cristian Gomez MD at 6/6/2019  5:56 PM

## 2019-06-07 NOTE — PROGRESS NOTES
"  [facility] PROGRESS NOTE    PATIENT IDENTIFICATION:   Name:  Archie Oquendo      MRN:  [id number]     53 y.o.  male               CC: POD #2 s/p T11-S2 posterior fusion with L3/4 lumbar decompression and left L5/L6 decompression      Subjective     Interval History: has complaints of ongoing BLE weakness, R LE numbness, low back pain, \"hurts to move\"    Objective     Vital signs in last 24 hours:  Temp:  [98 °F (36.7 °C)-100.3 °F (37.9 °C)] 98 °F (36.7 °C)  Heart Rate:  [108-152] 109  Resp:  [16-18] 18  BP: ()/(65-80) 122/72  ICP ranges-    Intake/Output last 3 shifts:  I/O last 3 completed shifts:  In: 2290 [P.O.:600; I.V.:1690]  Out: 6035 [Urine:4450; Drains:1585]    Intake/Output this shift:  No intake/output data recorded.        JV drain OP last 24 hours:   # 1: 640  # 2: 305    Physical Exam:  General:   Awake, alert, and oriented x 3. NAD  Appears well  Bruising on forehead  Cranial nerves grossly intact  O2 per nasal cannula  Rae catheter   well-healing thoracic/lumbar incision site, flat intact and well approximated   left lower extremity weakness unchanged postop  Decreased sensory to soft touch right lower extremity  SCDs bilaterally  JV drains with high-volume bloody output      LABS:    Lab Results (last 24 hours)     Procedure Component Value Units Date/Time    CBC & Differential [[id number]] Collected:  06/07/19 0436    Specimen:  Blood Updated:  06/07/19 0453    Narrative:       The following orders were created for panel order CBC & Differential.  Procedure                               Abnormality         Status                     ---------                               -----------         ------                     CBC Auto Differential[[id number]]        Abnormal            Final result                 Please view results for these tests on the individual orders.    Basic Metabolic Panel [[id number]]  (Abnormal) Collected:  06/07/19 0436    Specimen:  Blood " Updated:  06/07/19 0522     Glucose 129 mg/dL      BUN 10 mg/dL      Creatinine 0.63 mg/dL      Sodium 129 mmol/L      Potassium 3.7 mmol/L      Chloride 95 mmol/L      CO2 24.6 mmol/L      Calcium 7.7 mg/dL      eGFR Non African Amer 132 mL/min/1.73      BUN/Creatinine Ratio 15.9     Anion Gap 9.4 mmol/L     Narrative:       GFR Normal >60  Chronic Kidney Disease <60  Kidney Failure <15    CBC Auto Differential [[id number]]  (Abnormal) Collected:  06/07/19 0436    Specimen:  Blood Updated:  06/07/19 0453     WBC 10.94 10*3/mm3      RBC 3.26 10*6/mm3      Hemoglobin 10.3 g/dL      Hematocrit 31.7 %      MCV 97.2 fL      MCH 31.6 pg      MCHC 32.5 g/dL      RDW 13.0 %      RDW-SD 46.6 fl      MPV 9.6 fL      Platelets 136 10*3/mm3      Neutrophil % 78.1 %      Lymphocyte % 10.6 %      Monocyte % 10.1 %      Eosinophil % 0.1 %      Basophil % 0.2 %      Immature Grans % 0.9 %      Neutrophils, Absolute 8.55 10*3/mm3      Lymphocytes, Absolute 1.16 10*3/mm3      Monocytes, Absolute 1.10 10*3/mm3      Eosinophils, Absolute 0.01 10*3/mm3      Basophils, Absolute 0.02 10*3/mm3      Immature Grans, Absolute 0.10 10*3/mm3      nRBC 0.0 /100 WBC           IMAGING STUDIES:  No new imaging      Meds reviewed/changed: Yes    Current Facility-Administered Medications   Medication Dose Route Frequency Provider Last Rate Last Dose   • bisacodyl (DULCOLAX) EC tablet 10 mg  10 mg Oral Daily PRN Corbin Bailey MD       • bisacodyl (DULCOLAX) suppository 10 mg  10 mg Rectal Daily PRN Corbin Bailey MD   10 mg at 06/06/19 1213   • cyclobenzaprine (FLEXERIL) tablet 10 mg  10 mg Oral TID PRN Corbin Bailey MD   10 mg at 06/07/19 0647   • docusate sodium (COLACE) capsule 100 mg  100 mg Oral BID PRN Corbin Bailey MD   100 mg at 06/07/19 0900   • HYDROcodone-acetaminophen (NORCO) 7.5-325 MG per tablet 1 tablet  1 tablet Oral Q4H PRN Corbin Bailey MD       • lisinopril (PRINIVIL,ZESTRIL) 10 mg, hydrochlorothiazide  (MICROZIDE) 12.5 mg for ZESTORETIC 10-12.5   Oral Q24H Corbin Bailey MD       • morphine injection 4 mg  4 mg Intravenous Q2H PRN Corbin Bailey MD   4 mg at 06/06/19 1925    And   • naloxone (NARCAN) injection 0.4 mg  0.4 mg Intravenous Q5 Min PRN Corbin Bailey MD       • Morphine injection 6 mg  6 mg Intravenous Q2H PRN Corbin Bailey MD   6 mg at 06/06/19 0551    And   • naloxone (NARCAN) injection 0.4 mg  0.4 mg Intravenous Q5 Min PRN Corbin Bailey MD       • ondansetron (ZOFRAN) tablet 4 mg  4 mg Oral Q6H PRN Corbin Bailey MD        Or   • ondansetron (ZOFRAN) injection 4 mg  4 mg Intravenous Q6H PRN Corbin Bailey MD   4 mg at 06/06/19 2146   • oxyCODONE-acetaminophen (PERCOCET)  MG per tablet 1 tablet  1 tablet Oral Q4H PRN Corbin Bailey MD   1 tablet at 06/07/19 0646   • sodium chloride 0.9 % with KCl 20 mEq/L infusion  100 mL/hr Intravenous Continuous Corbin Bailey  mL/hr at 06/07/19 0442 100 mL/hr at 06/07/19 0442       Assessment/Plan     ASSESSMENT:      Gait disorder    Lumbar radiculopathy    Spinal stenosis, L3-4    Lumbar disc herniation -left L4-5    History of lumbar fusion      PLAN: Doing well postop day 2.  Okay to transfer out of ICU. Hgb lower today, will check CBC tomorrow. Drains will remain in.  Sodium down to 129 today.  We will start salt tablets.  Dr. Francisco to be consulted for BAR eval. Out of bed 3 times daily and brace, up in chair.  Continue aggressive PT.      I discussed the patients findings and my recommendations with patient, nursing staff and Dr Bailey       LOS: 2 days       Nidia Castañeda, VERONICA  6/7/2019  9:23 AM

## 2019-06-07 NOTE — PROGRESS NOTES
"  PROGRESS NOTE  Patient Name: Archie Oquendo  Age/Sex: 53 y.o. male  : 1965  MRN: [id number]    Date of Admission: 2019  Date of Encounter Visit: 19   LOS: 2 days   Patient Care Team:  Yudy Luna APRN as PCP - General (Family Medicine)  Corbin Bailey MD as Surgeon (Neurosurgery)    Chief Complaint: s/p lumbar disc fusion, constipation    Hospital course: Doing well postoperatively, patient is having some abdominal distention and constipation despite laxatives.  He is unable to do much walking given his recent surgery.  No fever or chills    REVIEW OF SYSTEMS:   CONSTITUTIONAL: no fever or chills  CARDIOVASCULAR: No chest pain, chest pressure or chest discomfort. No palpitations or edema.   RESPIRATORY: No shortness of breath, cough or sputum.   GASTROINTESTINAL: No anorexia, nausea, vomiting or diarrhea.  Positive abdominal distention.   HEMATOLOGIC: No bleeding or bruising.     Ventilator/Non-Invasive Ventilation Settings (From admission, onward)    None            Vital Signs  Temp:  [98 °F (36.7 °C)-100.3 °F (37.9 °C)] 98.2 °F (36.8 °C)  Heart Rate:  [108-129] 122  Resp:  [16-18] 18  BP: ()/(65-83) 116/83  SpO2:  [91 %-99 %] 97 %  on  Flow (L/min):  [2] 2 Device (Oxygen Therapy): nasal cannula    Intake/Output Summary (Last 24 hours) at 2019 1455  Last data filed at 2019 1433  Gross per 24 hour   Intake 582 ml   Output 3475 ml   Net -2893 ml     Flowsheet Rows      First Filed Value   Admission Height  188 cm (74\") Documented at 2019 0659   Admission Weight  94.3 kg (207 lb 14.3 oz) Documented at 2019 0659        Body mass index is 26.69 kg/m².      19  0659   Weight: 94.3 kg (207 lb 14.3 oz)       Physical Exam:  GEN:  No acute distress, alert, cooperative, well developed   EYES:   Sclera clear. No icterus. PERRL. Normal EOM  ENT:   External ears/nose normal, no oral lesions, no thrush, mucous membranes moist  NECK:  Supple, midline trachea, " no JVD  LUNGS: Normal chest on inspection, CTAB, no wheezes. No rhonchi. No crackles. Respirations regular, even and unlabored.   CV:  Regular rhythm and rate. Normal S1/S2. No murmurs, gallops, or rubs noted.  ABD: Soft but slightly distended, nontender except to deep palpation, positive bowel sounds   EXT:   No cyanosis. No redness. No edema.   Skin: dry, intact, no bleeding    Results Review:      Results from last 7 days   Lab Units 06/07/19  0436 06/06/19  0908   SODIUM mmol/L 129* 134*   POTASSIUM mmol/L 3.7 3.7   CHLORIDE mmol/L 95* 98   CO2 mmol/L 24.6 26.5   BUN mg/dL 10 13   CREATININE mg/dL 0.63* 0.87   CALCIUM mg/dL 7.7* 7.8*   ANION GAP mmol/L 9.4 9.5                 Results from last 7 days   Lab Units 06/07/19  0436 06/06/19  0908   WBC 10*3/mm3 10.94* 10.75   HEMOGLOBIN g/dL 10.3* 11.6*   HEMATOCRIT % 31.7* 35.5*   PLATELETS 10*3/mm3 136* 165   MCV fL 97.2* 95.9   NEUTROPHIL % % 78.1*  --    LYMPHOCYTE % % 10.6*  --    MONOCYTES % % 10.1  --    EOSINOPHIL % % 0.1*  --    BASOPHIL % % 0.2  --    IMM GRAN % % 0.9*  --                    Invalid input(s): LDLCALC          Glucose   Date/Time Value Ref Range Status   06/05/2019 2042 139 (H) 70 - 130 mg/dL Final                           Imaging:   Imaging Results (all)     Procedure Component Value Units Date/Time    XR Spine Thoracolumbar 2 View [[id number]] Collected:  06/06/19 0740     Updated:  06/06/19 0814    Narrative:       LUMBAR SPINE OPERATIVE X-RAYS     HISTORY: Spinal fusion.     A total of 24 seconds fluoroscopy and 582 operative images were provided  for Dr. Bailey during spinal surgery. Images acquired show the  lumbosacral region following multilevel laminectomy and it appears that  posterior fusion hardware seen on preoperative imaging in April 2019 has  been removed.     This report was finalized on 6/6/2019 8:11 AM by Dr. Romeo Gaston M.D.       FL C Arm During Surgery [[id number]] Resulted:  06/05/19 1637     Updated:  06/05/19 1637     Narrative:       This procedure was auto-finalized with no dictation required.            Medication Review:     lisinopril-hydroCHLOROthiazide (ZESTORETIC) 10-12.5 mg combo dose  Oral Q24H   sodium chloride 2 g Oral TID With Meals         sodium chloride 0.9 % with KCl 20 mEq 100 mL/hr Last Rate: 100 mL/hr (06/07/19 0442)       ASSESSMENT:   1. Lumbar spine stenosis with radiculopathy s/p lumbar discectomy 6/5/19  2. Loud snoring with suspected sleep apnea  3. HTN    PLAN:  Seems to be well controlled on the current regimen  Laxatives were added, consider relistor if no better since he is getting his Lortab almost every 4 hours  Discussed with patient  Blood pressure is well controlled on the lisinopril/hydrochlorothiazide home regimen  We will follow  Disposition: Out of the ICU once okay with neurosurgery    Cristian Gomez MD  06/07/19  2:55 PM          Dictated utilizing Dragon dictation

## 2019-06-07 NOTE — THERAPY TREATMENT NOTE
Acute Care - Physical Therapy Treatment Note  [facility]     Patient Name: Archie Oquendo  : 1965  MRN: [id number]  Today's Date: 2019  Onset of Illness/Injury or Date of Surgery: 19     Referring Physician: Leo    Admit Date: 2019    Visit Dx:    ICD-10-CM ICD-9-CM   1. Hyponatremia E87.1 276.1   2. Lumbar radiculopathy M54.16 724.4   3. Gait disorder R26.9 781.2   4. Spinal stenosis, L3-4 M48.062 724.03   5. Lumbar disc herniation -left L4-5 M51.26 722.10   6. History of lumbar fusion Z98.1 V45.4     Patient Active Problem List   Diagnosis   • Hip pain   • Low back pain   • Gait disorder   • Pain in left knee   • Hypertension   • Lumbar radiculopathy   • Spinal stenosis, L3-4   • Lumbar disc herniation -left L4-5   • History of lumbar fusion       Therapy Treatment    Rehabilitation Treatment Summary     Row Name 19 1511             Treatment Time/Intention    Discipline  physical therapist  -AR      Document Type  therapy note (daily note)  -AR      Subjective Information  complains of;pain  -AR      Mode of Treatment  physical therapy  -AR      Therapy Frequency (PT Clinical Impression)  daily  -AR      Patient Effort  good  -AR      Existing Precautions/Restrictions  fall;spinal;brace worn when out of bed brace on when HOB >30 deg, and when OOB  -AR      Recorded by [AR] Kassidy Jorge, PT 19 151      Row Name 19 151             Vital Signs    Pretreatment Heart Rate (beats/min)  121  -AR      Intratreatment Heart Rate (beats/min)  135  -AR      Posttreatment Heart Rate (beats/min)  124  -AR      Recorded by [AR] Kassidy Jorge, PT 19 151      Row Name 19 151             Cognitive Assessment/Intervention- PT/OT    Orientation Status (Cognition)  oriented x 4  -AR      Follows Commands (Cognition)  WNL  -AR      Recorded by [AR] Kassidy Jorge, PT 19 1515      Row Name 19 151             Bed Mobility Assessment/Treatment    Bed  Mobility Assessment/Treatment  rolling right;rolling left  -AR      Rolling Left [place] (Bed Mobility)  moderate assist (50% patient effort);minimum assist (75% patient effort)  -AR      Rolling Right [place] (Bed Mobility)  moderate assist (50% patient effort);minimum assist (75% patient effort)  -AR      Supine-Sit [place] (Bed Mobility)  moderate assist (50% patient effort);minimum assist (75% patient effort);2 person assist  -AR      Sit-Supine [place] (Bed Mobility)  not tested  -AR      Assistive Device (Bed Mobility)  bed rails  -AR      Comment (Bed Mobility)  HOB flat, cues for log rolling  -AR      Recorded by [AR] Kassidy Jorge, PT 06/07/19 1515      Row Name 06/07/19 1511             Sit-Stand Transfer    Sit-Stand [place] (Transfers)  minimum assist (75% patient effort);2 person assist  -AR      Assistive Device (Sit-Stand Transfers)  walker, front-wheeled  -AR      Recorded by [AR] Kassidy Jorge, PT 06/07/19 1515      Row Name 06/07/19 1511             Stand-Sit Transfer    Stand-Sit [place] (Transfers)  minimum assist (75% patient effort);2 person assist  -AR      Assistive Device (Stand-Sit Transfers)  walker, front-wheeled  -AR      Recorded by [AR] Kassidy Jorge, PT 06/07/19 1515      Row Name 06/07/19 1511             Gait/Stairs Assessment/Training    Gait/Stairs Assessment/Training  gait pattern;distance ambulated  -AR      [place] Level (Gait)  minimum assist (75% patient effort);contact guard  -AR      Assistive Device (Gait)  walker, front-wheeled  -AR      Distance in Feet (Gait)  3  -AR      Pattern (Gait)  swing-to  -AR      Deviations/Abnormal Patterns (Gait)  gait speed decreased;festinating/shuffling;left sided deviations  -AR      Left Sided Gait Deviations  foot drop/toe drag;weight shift ability decreased  -AR      Recorded by [AR] Kassidy Jorge, PT 06/07/19 1515      Row Name 06/07/19 1511             Therapeutic Exercise    Comment  (Therapeutic Exercise)  R AP, B LE 5x  -AR      Recorded by [AR] Kassidy Jorge, PT 06/07/19 1515      Row Name 06/07/19 1511             Balance    Balance  static sitting balance  -AR      Recorded by [AR] Kassidy Jorge, PT 06/07/19 1515      Row Name 06/07/19 1511             Static Sitting Balance    Level of [place] (Unsupported Sitting, Static Balance)  contact guard assist  -AR      Sitting Position (Unsupported Sitting, Static Balance)  sitting on edge of bed  -AR      Time Able to Maintain Position (Unsupported Sitting, Static Balance)  4 to 5 minutes  -AR      Recorded by [AR] Kassidy Jorge, PT 06/07/19 1515      Row Name 06/07/19 1511             Positioning and Restraints    Pre-Treatment Position  in bed  -AR      Post Treatment Position  chair  -AR      In Chair  notified nsg;sitting;call light within reach;with family/caregiver  -AR      Recorded by [AR] Kassidy Jorge, PT 06/07/19 1515      Row Name 06/07/19 1511             Pain Assessment    Additional Documentation  Pain Scale: Numbers Pre/Post-Treatment (Group)  -AR      Recorded by [AR] Kassidy Jorge, PT 06/07/19 1515      Row Name 06/07/19 1511             Pain Scale: Numbers Pre/Post-Treatment    Pain Scale: Numbers, Pretreatment  2/10  -AR      Pain Scale: Numbers, Post-Treatment  3/10 appears in 6/10 pain, Rn aware  -AR      Pain Location - Orientation  incisional  -AR      Pain Location  back  -AR      Pain Intervention(s)  Repositioned;Medication (See MAR)  -AR      Recorded by [AR] Kassidy Jorge, PT 06/07/19 1515      Row Name                Wound 06/05/19 1741 Other (See comments) back incision    Wound - Properties Group Date first assessed: 06/05/19 [SL] Time first assessed: 1741 [SL] Side: Other (See comments) [SL] Location: back [SL] Type: incision [SL] Recorded by:  [SL] Corrine Vigil, RN 06/05/19 1741    Row Name 06/07/19 1511             Outcome Summary/Treatment Plan (PT)    Anticipated Discharge  Disposition (PT)  inpatient rehabilitation facility  -AR      Recorded by [AR] Kassidy Jorge, PT 06/07/19 4981        User Key  (r) = Recorded By, (t) = Taken By, (c) = Cosigned By    Initials Name Effective Dates Discipline    Corrine Zafar RN 06/16/16 -  Nurse    Kassidy Correa, PT 04/03/18 -  PT          Wound 06/05/19 1745 Other (See comments) back incision (Active)   Dressing Appearance open to air 6/7/2019  4:00 AM   Closure Liquid skin adhesive;Open to air 6/7/2019  4:00 AM   Periwound intact;dry 6/7/2019  4:00 AM   Drainage Amount none 6/7/2019  4:00 AM   Dressing Care, Wound open to air 6/6/2019  8:00 PM           Physical Therapy Education     Title: PT OT SLP Therapies (In Progress)     Topic: Physical Therapy (In Progress)     Point: Mobility training (In Progress)     Learning Progress Summary           Patient Acceptance, E, NR by AR at 6/7/2019  3:15 PM    Acceptance, E, NR by AR at 6/6/2019  3:25 PM                   Point: Home exercise program (In Progress)     Learning Progress Summary           Patient Acceptance, E, NR by AR at 6/7/2019  3:15 PM    Acceptance, E, NR by AR at 6/6/2019  3:25 PM                   Point: Body mechanics (In Progress)     Learning Progress Summary           Patient Acceptance, E, NR by AR at 6/7/2019  3:15 PM    Acceptance, E, NR by AR at 6/6/2019  3:25 PM                   Point: Precautions (In Progress)     Learning Progress Summary           Patient Acceptance, E, NR by AR at 6/7/2019  3:15 PM    Acceptance, E, NR by AR at 6/6/2019  3:25 PM                               User Key     Initials Effective Dates Name Provider Type Discipline    AR 04/03/18 -  Kassidy Jorge, PT Physical Therapist PT                PT Recommendation and Plan  Anticipated Discharge Disposition (PT): inpatient rehabilitation facility  Planned Therapy Interventions (PT Eval): balance training, bed mobility training, gait training, home exercise program,  patient/family education, ROM (range of motion), stair training, strengthening, transfer training  Therapy Frequency (PT Clinical Impression): daily  Outcome Summary/Treatment Plan (PT)  Anticipated Equipment Needs at Discharge (PT): (owns a RW and cane)  Patient/Family Concerns, Equipment Needs at Discharge (PT): possible need for BSC pending DC plans  Anticipated Discharge Disposition (PT): inpatient rehabilitation facility  Plan of Care Reviewed With: patient  Outcome Summary: Slowly improving activity tolerance during PT.  Able to take few steps in room bed>chair w/ min A x2 using RW.  Increased time with all mobility 2/2 pain.  Educated on spinal precautions, activity recom. and PT POC.  May benefit from DC to acute rehab pending progress while inpatient.       Time Calculation:   PT Charges     Row Name 06/07/19 1519             Time Calculation    Start Time  1455  -AR      Stop Time  1519  -AR      Time Calculation (min)  24 min  -AR      PT Received On  06/07/19  -AR      PT - Next Appointment  06/08/19  -AR        User Key  (r) = Recorded By, (t) = Taken By, (c) = Cosigned By    Initials Name Provider Type    AR Kassidy Jorge PT Physical Therapist        Therapy Charges for Today     Code Description Service Date Service Provider Modifiers Qty    23436599701 HC PT EVAL MOD COMPLEXITY 2 6/6/2019 Kassidy Jorge, PT GP 1    38649828544 HC PT THER PROC EA 15 MIN 6/6/2019 Kassidy Jorge, PT GP 1    16902426315 HC PT THER SUPP EA 15 MIN 6/6/2019 Kassidy Jorge, PT GP 2    17314463741 HC PT THER PROC EA 15 MIN 6/7/2019 Kassidy Jorge PT GP 1    36040354776 HC PT THER SUPP EA 15 MIN 6/7/2019 Kassidy Jorge PT GP 1               Kassidy Jorge PT  6/7/2019

## 2019-06-07 NOTE — PLAN OF CARE
Problem: Patient Care Overview  Goal: Plan of Care Review   06/07/19 0182   Coping/Psychosocial   Plan of Care Reviewed With patient   OTHER   Outcome Summary Slowly improving activity tolerance during PT. Able to take few steps in room bed>chair w/ min A x2 using RW. Increased time with all mobility 2/2 pain. Educated on spinal precautions, activity recom. and PT POC. May benefit from DC to acute rehab pending progress while inpatient.

## 2019-06-07 NOTE — PLAN OF CARE
Problem: Patient Care Overview  Goal: Plan of Care Review  Outcome: Ongoing (interventions implemented as appropriate)   06/07/19 0519   Coping/Psychosocial   Plan of Care Reviewed With patient   Plan of Care Review   Progress no change   OTHER   Outcome Summary No significant changes in pt condition noted overnight, vitals stable throughout the shift. Pt still complains of significant pain with movement, taking PRN percocet 10 q4 hrs. Attempted to have BM beginning of shift, assisted to bedside commode 2 assist and with gait belt, was a 2 assist back to bed needing significant assistance. Significant weakness noted in both legs. Only able to pass a moderate amount of tan, red streaked, mucoid appearing stool. Still unable to void despite getting up to bedside commode. Rea re-inserted for retention. Still awaiting AM lab results. Will continue to monitor closely. VERONICA Townsend RN

## 2019-06-08 LAB
BASOPHILS # BLD AUTO: 0.03 10*3/MM3 (ref 0–0.2)
BASOPHILS NFR BLD AUTO: 0.4 % (ref 0–1.5)
DEPRECATED RDW RBC AUTO: 45.4 FL (ref 37–54)
DIFFERENTIAL METHOD BLD: ABNORMAL
EOSINOPHIL # BLD AUTO: 0.1 10*3/MM3 (ref 0–0.4)
EOSINOPHIL NFR BLD AUTO: 1.3 % (ref 0.3–6.2)
ERYTHROCYTE [DISTWIDTH] IN BLOOD BY AUTOMATED COUNT: 12.9 % (ref 12.3–15.4)
HCT VFR BLD AUTO: 27.8 % (ref 37.5–51)
HGB BLD-MCNC: 9.2 G/DL (ref 13–17.7)
IMM GRANULOCYTES # BLD AUTO: 0.04 10*3/MM3 (ref 0–0.05)
IMM GRANULOCYTES NFR BLD AUTO: 0.5 % (ref 0–0.5)
LYMPHOCYTES # BLD AUTO: 1.5 10*3/MM3 (ref 0.7–3.1)
LYMPHOCYTES NFR BLD AUTO: 19.8 % (ref 19.6–45.3)
MCH RBC QN AUTO: 31.7 PG (ref 26.6–33)
MCHC RBC AUTO-ENTMCNC: 33.1 G/DL (ref 31.5–35.7)
MCV RBC AUTO: 95.9 FL (ref 79–97)
MONOCYTES # BLD AUTO: 0.59 10*3/MM3 (ref 0.1–0.9)
MONOCYTES NFR BLD AUTO: 7.8 % (ref 5–12)
NEUTROPHILS # BLD AUTO: 5.32 10*3/MM3 (ref 1.7–7)
NEUTROPHILS NFR BLD AUTO: 70.2 % (ref 42.7–76)
NRBC BLD AUTO-RTO: 0 /100 WBC (ref 0–0.2)
PLATELET # BLD AUTO: 134 10*3/MM3 (ref 140–450)
PMV BLD AUTO: 10 FL (ref 6–12)
RBC # BLD AUTO: 2.9 10*6/MM3 (ref 4.14–5.8)
WBC # BLD AUTO: 7.58 10*3/MM3 (ref 3.4–10.8)
WBC NRBC COR # BLD: 7.58 10*3/MM3 (ref 3.4–10.8)

## 2019-06-08 PROCEDURE — 85025 COMPLETE CBC W/AUTO DIFF WBC: CPT | Performed by: NURSE PRACTITIONER

## 2019-06-08 PROCEDURE — 25810000003 SODIUM CHLORIDE 0.9 % WITH KCL 20 MEQ 20-0.9 MEQ/L-% SOLUTION: Performed by: NEUROLOGICAL SURGERY

## 2019-06-08 PROCEDURE — 97110 THERAPEUTIC EXERCISES: CPT

## 2019-06-08 RX ADMIN — SODIUM CHLORIDE TAB 1 GM 2 G: 1 TAB at 12:22

## 2019-06-08 RX ADMIN — SODIUM CHLORIDE TAB 1 GM 2 G: 1 TAB at 19:08

## 2019-06-08 RX ADMIN — POTASSIUM CHLORIDE AND SODIUM CHLORIDE 100 ML/HR: 900; 150 INJECTION, SOLUTION INTRAVENOUS at 03:24

## 2019-06-08 RX ADMIN — CYCLOBENZAPRINE 10 MG: 10 TABLET, FILM COATED ORAL at 03:24

## 2019-06-08 RX ADMIN — LISINOPRIL: 10 TABLET ORAL at 08:25

## 2019-06-08 RX ADMIN — CYCLOBENZAPRINE 10 MG: 10 TABLET, FILM COATED ORAL at 21:11

## 2019-06-08 RX ADMIN — SODIUM CHLORIDE TAB 1 GM 2 G: 1 TAB at 08:25

## 2019-06-08 RX ADMIN — OXYCODONE HYDROCHLORIDE AND ACETAMINOPHEN 1 TABLET: 10; 325 TABLET ORAL at 01:10

## 2019-06-08 RX ADMIN — POTASSIUM CHLORIDE AND SODIUM CHLORIDE 100 ML/HR: 900; 150 INJECTION, SOLUTION INTRAVENOUS at 14:22

## 2019-06-08 RX ADMIN — OXYCODONE HYDROCHLORIDE AND ACETAMINOPHEN 1 TABLET: 10; 325 TABLET ORAL at 08:26

## 2019-06-08 RX ADMIN — OXYCODONE HYDROCHLORIDE AND ACETAMINOPHEN 1 TABLET: 10; 325 TABLET ORAL at 12:22

## 2019-06-08 RX ADMIN — OXYCODONE HYDROCHLORIDE AND ACETAMINOPHEN 1 TABLET: 10; 325 TABLET ORAL at 17:33

## 2019-06-08 RX ADMIN — SODIUM CHLORIDE TAB 1 GM 2 G: 1 TAB at 00:00

## 2019-06-08 RX ADMIN — CYCLOBENZAPRINE 10 MG: 10 TABLET, FILM COATED ORAL at 12:22

## 2019-06-08 NOTE — PROGRESS NOTES
Chart reviewed. Therapy notes reviewed.  53 year old male s/p June 5 T11-S2 posterior fusion with L3-4 lumbar decompression and left L5/L6 decompression.  Bed mobility min-mod assist. Transfers min 2. Gait CTG-min assist RW for 3 feet.  Patient would require precertification for acute inpt rehab which would not be obtained on the weekend.   See how patient progresses over the weekend and re-assess on Monday.

## 2019-06-08 NOTE — THERAPY TREATMENT NOTE
Acute Care - Physical Therapy Treatment Note  [facility]     Patient Name: Archie Oquendo  : 1965  MRN: [id number]  Today's Date: 2019  Onset of Illness/Injury or Date of Surgery: 19     Referring Physician: Leo    Admit Date: 2019    Visit Dx:    ICD-10-CM ICD-9-CM   1. Hyponatremia E87.1 276.1   2. Lumbar radiculopathy M54.16 724.4   3. Gait disorder R26.9 781.2   4. Spinal stenosis, L3-4 M48.062 724.03   5. Lumbar disc herniation -left L4-5 M51.26 722.10   6. History of lumbar fusion Z98.1 V45.4     Patient Active Problem List   Diagnosis   • Hip pain   • Low back pain   • Gait disorder   • Pain in left knee   • Hypertension   • Lumbar radiculopathy   • Spinal stenosis, L3-4   • Lumbar disc herniation -left L4-5   • History of lumbar fusion       Therapy Treatment    Rehabilitation Treatment Summary     Row Name 19 1438             Treatment Time/Intention    Discipline  physical therapist  -      Document Type  therapy note (daily note)  -      Subjective Information  complains of;pain  -CH      Mode of Treatment  physical therapy  -      Patient/Family Observations  pt supine in bed, no acute distress noted at rest  -      Patient Effort  good  -      Existing Precautions/Restrictions  brace worn when out of bed;fall brace when HOB >30 and with OOB  -CH      Recorded by [CH] Phyllis Otero, PT 19 1621      Row Name 19 1438             Cognitive Assessment/Intervention    Additional Documentation  Cognitive Assessment/Intervention (Group)  -CH      Recorded by [CH] Phyllis Otero, PT 19 1621      Row Name 19 1438             Cognitive Assessment/Intervention- PT/OT    Orientation Status (Cognition)  oriented x 4  -CH      Follows Commands (Cognition)  WFL  -CH      Personal Safety Interventions  fall prevention program maintained;gait belt;nonskid shoes/slippers when out of bed  -CH      Recorded by [CH] Phyllsi Otero, PT  06/08/19 1621      Row Name 06/08/19 1438             Bed Mobility Assessment/Treatment    Rolling Left [place] (Bed Mobility)  verbal cues;nonverbal cues (demo/gesture);minimum assist (75% patient effort)  -      Rolling Right [place] (Bed Mobility)  verbal cues;nonverbal cues (demo/gesture);minimum assist (75% patient effort)  -      Supine-Sit [place] (Bed Mobility)  verbal cues;nonverbal cues (demo/gesture);moderate assist (50% patient effort)  -      Sit-Supine [place] (Bed Mobility)  verbal cues;nonverbal cues (demo/gesture);moderate assist (50% patient effort)  -      Comment (Bed Mobility)  brace donned in supine while rolling  -CH      Recorded by [] Phyllis Otero, PT 06/08/19 1621      Row Name 06/08/19 1438             Transfer Assessment/Treatment    Comment (Transfers)  pt stood for extended period of time >5 minutes while waiting for bsc, pt declined to sit to wait  -CH      Recorded by [] Phyllis Otero, PT 06/08/19 1621      Row Name 06/08/19 1438             Sit-Stand Transfer    Sit-Stand [place] (Transfers)  verbal cues;nonverbal cues (demo/gesture);moderate assist (50% patient effort)  -      Assistive Device (Sit-Stand Transfers)  walker, front-wheeled  -CH      Recorded by [] Phyllis Otero, PT 06/08/19 1621      Row Name 06/08/19 1438             Stand-Sit Transfer    Stand-Sit [place] (Transfers)  verbal cues;nonverbal cues (demo/gesture);moderate assist (50% patient effort)  -      Assistive Device (Stand-Sit Transfers)  walker, front-wheeled  -CH      Recorded by [] Phyllis Otero, PT 06/08/19 1621      Row Name 06/08/19 1438             Gait/Stairs Assessment/Training    [place] Level (Gait)  verbal cues;nonverbal cues (demo/gesture);minimum assist (75% patient effort);2 person assist  -      Assistive Device (Gait)  walker, front-wheeled  -CH      Distance in Feet (Gait)  3 ft, several shuffling steps bed to bs   -CH      Deviations/Abnormal Patterns (Gait)  landy decreased;gait speed decreased;stride length decreased  -CH      Left Sided Gait Deviations  foot drop/toe drag;weight shift ability decreased  -CH      Recorded by [] Phyllis Otero, PT 06/08/19 1621      Row Name 06/08/19 1438             Therapeutic Exercise    Comment (Therapeutic Exercise)  -- standing marchines and weight shifting x10 on each LE  -CH      Recorded by [] Phyllis Otero, PT 06/08/19 1621      Row Name 06/08/19 1438             Positioning and Restraints    Pre-Treatment Position  in bed  -CH      Post Treatment Position  bsc  -CH      On BS commode  sitting;call light within reach;encouraged to call for assist;notified nsg Aide aware pt sitting on BSC and will call out when done  -CH      Recorded by [] Phyllis Otero, PT 06/08/19 1621      Row Name 06/08/19 1438             Pain Assessment    Additional Documentation  Pain Scale: Numbers Pre/Post-Treatment (Group)  -CH      Recorded by [] Phyllis Otero, PT 06/08/19 1621      Row Name 06/08/19 1438             Pain Scale: Numbers Pre/Post-Treatment    Pain Scale: Numbers, Post-Treatment  7/10  -CH      Pain Location  back  -CH      Pain Intervention(s)  Repositioned  -CH      Recorded by [] Phyllis Otero, PT 06/08/19 1621      Row Name                Wound 06/05/19 1741 Other (See comments) back incision    Wound - Properties Group Date first assessed: 06/05/19 [SL] Time first assessed: 1741 [SL] Side: Other (See comments) [SL] Location: back [SL] Type: incision [SL] Recorded by:  [SL] Corrine Vigil RN 06/05/19 1741    Row Name 06/08/19 1438             Plan of Care Review    Plan of Care Reviewed With  patient;family  -CH      Recorded by [] Phyllis Otero, PT 06/08/19 1621      Row Name 06/08/19 1438             Outcome Summary/Treatment Plan (PT)    Anticipated Discharge Disposition (PT)  inpatient rehabilitation facility  -       Recorded by [] Phyllis Otero, PT 06/08/19 1621        User Key  (r) = Recorded By, (t) = Taken By, (c) = Cosigned By    Initials Name Effective Dates Discipline     Phyllis Otero, PT 04/03/18 -  PT    Corrine Zafar RN 06/16/16 -  Nurse          Wound 06/05/19 1741 Other (See comments) back incision (Active)   Dressing Appearance open to air 6/8/2019  8:26 AM   Closure Liquid skin adhesive;Open to air 6/8/2019  8:26 AM   Drainage Amount none 6/8/2019  8:26 AM   Dressing Care, Wound open to air 6/8/2019  8:26 AM           Physical Therapy Education     Title: PT OT SLP Therapies (Done)     Topic: Physical Therapy (Done)     Point: Mobility training (Done)     Learning Progress Summary           Patient Acceptance, E,TB,D, VU,NR by  at 6/8/2019  4:22 PM    Acceptance, E, NR by AR at 6/7/2019  3:15 PM    Acceptance, E, NR by AR at 6/6/2019  3:25 PM                   Point: Home exercise program (Done)     Learning Progress Summary           Patient Acceptance, E,TB,D, VU,NR by  at 6/8/2019  4:22 PM    Acceptance, E, NR by AR at 6/7/2019  3:15 PM    Acceptance, E, NR by AR at 6/6/2019  3:25 PM                   Point: Body mechanics (Done)     Learning Progress Summary           Patient Acceptance, E,TB,D, VU,NR by  at 6/8/2019  4:22 PM    Acceptance, E, NR by AR at 6/7/2019  3:15 PM    Acceptance, E, NR by AR at 6/6/2019  3:25 PM                   Point: Precautions (Done)     Learning Progress Summary           Patient Acceptance, E,TB,D, VU,NR by  at 6/8/2019  4:22 PM    Acceptance, E, NR by AR at 6/7/2019  3:15 PM    Acceptance, E, NR by AR at 6/6/2019  3:25 PM                               User Key     Initials Effective Dates Name Provider Type Discipline     04/03/18 -  Phyllis Otero, PT Physical Therapist PT    AR 04/03/18 -  Kassidy Jorge, PT Physical Therapist PT                PT Recommendation and Plan  Anticipated Discharge Disposition (PT): inpatient  rehabilitation facility  Outcome Summary/Treatment Plan (PT)  Anticipated Discharge Disposition (PT): inpatient rehabilitation facility  Plan of Care Reviewed With: patient  Outcome Summary: Pt demonstates some increased activity tolerance and functional strength as he was able to perform standing exercises for extended period of time. Pt also required less assistance with mobility and transfers.PT will continue to follow to address strength, mobility, and gait.  Outcome Measures     Row Name 06/08/19 1600             How much help from another person do you currently need...    Turning from your back to your side while in flat bed without using bedrails?  3  -CH      Moving from lying on back to sitting on the side of a flat bed without bedrails?  2  -CH      Moving to and from a bed to a chair (including a wheelchair)?  3  -CH      Standing up from a chair using your arms (e.g., wheelchair, bedside chair)?  3  -CH      Climbing 3-5 steps with a railing?  2  -CH      To walk in hospital room?  2  -CH      AM-PAC 6 Clicks Score  15  -CH         Functional Assessment    Outcome Measure Options  AM-PAC 6 Clicks Basic Mobility (PT)  -        User Key  (r) = Recorded By, (t) = Taken By, (c) = Cosigned By    Initials Name Provider Type    Phyllis Mcknight, PT Physical Therapist         Time Calculation:   PT Charges     Row Name 06/08/19 1623             Time Calculation    Start Time  1401  -      Stop Time  1438  -      Time Calculation (min)  37 min  -      PT Received On  06/08/19  -      PT - Next Appointment  06/09/19  -         Time Calculation- PT    Total Timed Code Minutes- PT  25 minute(s)  -        User Key  (r) = Recorded By, (t) = Taken By, (c) = Cosigned By    Initials Name Provider Type    Phyllis Mcknight, PT Physical Therapist        Therapy Charges for Today     Code Description Service Date Service Provider Modifiers Qty    93315733154  PT THER PROC EA 15 MIN 6/8/2019  Phyllis Oetro, PT GP 2          PT G-Codes  Outcome Measure Options: AM-PAC 6 Clicks Basic Mobility (PT)  AM-PAC 6 Clicks Score: 15    Phyllis Otero, PT  6/8/2019

## 2019-06-08 NOTE — PLAN OF CARE
Problem: Patient Care Overview  Goal: Plan of Care Review  Outcome: Ongoing (interventions implemented as appropriate)   06/08/19 1861   Coping/Psychosocial   Plan of Care Reviewed With patient   OTHER   Outcome Summary Pt demonstates some increased activity tolerance and functional strength as he was able to perform standing exercises for extended period of time. Pt also required less assistance with mobility and transfers.PT will continue to follow to address strength, mobility, and gait.

## 2019-06-08 NOTE — PLAN OF CARE
Problem: Patient Care Overview  Goal: Plan of Care Review  Outcome: Ongoing (interventions implemented as appropriate)   06/08/19 0637   Coping/Psychosocial   Plan of Care Reviewed With patient   Plan of Care Review   Progress no change   OTHER   Outcome Summary No changes during night. VSS. Pain meds x1. Will CTM.

## 2019-06-08 NOTE — PROGRESS NOTES
Continued Stay Note  [facility]     Patient Name: Archie Oquendo  MRN: [id number]  Today's Date: 6/8/2019    Admit Date: 6/5/2019    Discharge Plan     Row Name 06/08/19 1143       Plan    Plan  [facility] evaluating- and will reassess on Monday, 6/10/19    Plan Comments  CCP consult for acute rehab versus SNF.  Per Dr. Francisco note on 6/7/19- [facility] will reevaluate on Monday, 6/10/19... CCP will follow and assist as needed... Shelly Talbot RN,CCP         Discharge Codes    No documentation.             Shelly Talbot RN

## 2019-06-08 NOTE — PROGRESS NOTES
POD 3 T11 - S2  Patient still complaining of intense surgical pain  Not able to ambulate without intense back pain  Has been able to stand with physical therapy    No focal neurologic deficits lower extremity  No evidence DVT  Incision is clean dry and intact  Drain still with excessive output    H&H is decreased from 11.6 / 35.4 to 9.2 / 27.8  Na 129 asymptomatic    HR 80 130 pain related    Continue to mobilize  Keep drains in place for today  Recheck H&H in the a.m. and sodium  Plan for rehab

## 2019-06-09 LAB
ALBUMIN SERPL-MCNC: 3.1 G/DL (ref 3.5–5.2)
ALBUMIN/GLOB SERPL: 1.3 G/DL
ALP SERPL-CCNC: 51 U/L (ref 39–117)
ALT SERPL W P-5'-P-CCNC: 36 U/L (ref 1–41)
ANION GAP SERPL CALCULATED.3IONS-SCNC: 8.6 MMOL/L
ANION GAP SERPL CALCULATED.3IONS-SCNC: 9.2 MMOL/L
AST SERPL-CCNC: 37 U/L (ref 1–40)
BASOPHILS # BLD AUTO: 0.02 10*3/MM3 (ref 0–0.2)
BASOPHILS # BLD AUTO: 0.02 10*3/MM3 (ref 0–0.2)
BASOPHILS NFR BLD AUTO: 0.3 % (ref 0–1.5)
BASOPHILS NFR BLD AUTO: 0.3 % (ref 0–1.5)
BILIRUB SERPL-MCNC: 0.3 MG/DL (ref 0.2–1.2)
BUN BLD-MCNC: 6 MG/DL (ref 6–20)
BUN BLD-MCNC: 7 MG/DL (ref 6–20)
BUN/CREAT SERPL: 11.7 (ref 7–25)
BUN/CREAT SERPL: 9.8 (ref 7–25)
CALCIUM SPEC-SCNC: 7.7 MG/DL (ref 8.6–10.5)
CALCIUM SPEC-SCNC: 7.9 MG/DL (ref 8.6–10.5)
CHLORIDE SERPL-SCNC: 104 MMOL/L (ref 98–107)
CHLORIDE SERPL-SCNC: 99 MMOL/L (ref 98–107)
CO2 SERPL-SCNC: 26.8 MMOL/L (ref 22–29)
CO2 SERPL-SCNC: 27.4 MMOL/L (ref 22–29)
CREAT BLD-MCNC: 0.6 MG/DL (ref 0.76–1.27)
CREAT BLD-MCNC: 0.61 MG/DL (ref 0.76–1.27)
DEPRECATED RDW RBC AUTO: 45.2 FL (ref 37–54)
DEPRECATED RDW RBC AUTO: 45.3 FL (ref 37–54)
EOSINOPHIL # BLD AUTO: 0.16 10*3/MM3 (ref 0–0.4)
EOSINOPHIL # BLD AUTO: 0.27 10*3/MM3 (ref 0–0.4)
EOSINOPHIL NFR BLD AUTO: 2.8 % (ref 0.3–6.2)
EOSINOPHIL NFR BLD AUTO: 4.3 % (ref 0.3–6.2)
ERYTHROCYTE [DISTWIDTH] IN BLOOD BY AUTOMATED COUNT: 12.6 % (ref 12.3–15.4)
ERYTHROCYTE [DISTWIDTH] IN BLOOD BY AUTOMATED COUNT: 12.7 % (ref 12.3–15.4)
GFR SERPL CREATININE-BSD FRML MDRD: 137 ML/MIN/1.73
GFR SERPL CREATININE-BSD FRML MDRD: 140 ML/MIN/1.73
GLOBULIN UR ELPH-MCNC: 2.4 GM/DL
GLUCOSE BLD-MCNC: 120 MG/DL (ref 65–99)
GLUCOSE BLD-MCNC: 95 MG/DL (ref 65–99)
HCT VFR BLD AUTO: 27 % (ref 37.5–51)
HCT VFR BLD AUTO: 28 % (ref 37.5–51)
HGB BLD-MCNC: 8.6 G/DL (ref 13–17.7)
HGB BLD-MCNC: 8.8 G/DL (ref 13–17.7)
IMM GRANULOCYTES # BLD AUTO: 0.02 10*3/MM3 (ref 0–0.05)
IMM GRANULOCYTES # BLD AUTO: 0.03 10*3/MM3 (ref 0–0.05)
IMM GRANULOCYTES NFR BLD AUTO: 0.3 % (ref 0–0.5)
IMM GRANULOCYTES NFR BLD AUTO: 0.5 % (ref 0–0.5)
LYMPHOCYTES # BLD AUTO: 1.18 10*3/MM3 (ref 0.7–3.1)
LYMPHOCYTES # BLD AUTO: 1.64 10*3/MM3 (ref 0.7–3.1)
LYMPHOCYTES NFR BLD AUTO: 20.5 % (ref 19.6–45.3)
LYMPHOCYTES NFR BLD AUTO: 26.2 % (ref 19.6–45.3)
MCH RBC QN AUTO: 30.8 PG (ref 26.6–33)
MCH RBC QN AUTO: 30.9 PG (ref 26.6–33)
MCHC RBC AUTO-ENTMCNC: 31.4 G/DL (ref 31.5–35.7)
MCHC RBC AUTO-ENTMCNC: 31.9 G/DL (ref 31.5–35.7)
MCV RBC AUTO: 97.1 FL (ref 79–97)
MCV RBC AUTO: 97.9 FL (ref 79–97)
MONOCYTES # BLD AUTO: 0.57 10*3/MM3 (ref 0.1–0.9)
MONOCYTES # BLD AUTO: 0.57 10*3/MM3 (ref 0.1–0.9)
MONOCYTES NFR BLD AUTO: 9.1 % (ref 5–12)
MONOCYTES NFR BLD AUTO: 9.9 % (ref 5–12)
NEUTROPHILS # BLD AUTO: 3.73 10*3/MM3 (ref 1.7–7)
NEUTROPHILS # BLD AUTO: 3.82 10*3/MM3 (ref 1.7–7)
NEUTROPHILS NFR BLD AUTO: 59.6 % (ref 42.7–76)
NEUTROPHILS NFR BLD AUTO: 66.2 % (ref 42.7–76)
NRBC BLD AUTO-RTO: 0 /100 WBC (ref 0–0.2)
NRBC BLD AUTO-RTO: 0.2 /100 WBC (ref 0–0.2)
PLATELET # BLD AUTO: 180 10*3/MM3 (ref 140–450)
PLATELET # BLD AUTO: 203 10*3/MM3 (ref 140–450)
PMV BLD AUTO: 9.4 FL (ref 6–12)
PMV BLD AUTO: 9.9 FL (ref 6–12)
POTASSIUM BLD-SCNC: 3.4 MMOL/L (ref 3.5–5.2)
POTASSIUM BLD-SCNC: 3.7 MMOL/L (ref 3.5–5.2)
PROT SERPL-MCNC: 5.5 G/DL (ref 6–8.5)
RBC # BLD AUTO: 2.78 10*6/MM3 (ref 4.14–5.8)
RBC # BLD AUTO: 2.86 10*6/MM3 (ref 4.14–5.8)
SODIUM BLD-SCNC: 135 MMOL/L (ref 136–145)
SODIUM BLD-SCNC: 140 MMOL/L (ref 136–145)
WBC NRBC COR # BLD: 5.77 10*3/MM3 (ref 3.4–10.8)
WBC NRBC COR # BLD: 6.26 10*3/MM3 (ref 3.4–10.8)

## 2019-06-09 PROCEDURE — 80048 BASIC METABOLIC PNL TOTAL CA: CPT | Performed by: NEUROLOGICAL SURGERY

## 2019-06-09 PROCEDURE — 97110 THERAPEUTIC EXERCISES: CPT

## 2019-06-09 PROCEDURE — 80053 COMPREHEN METABOLIC PANEL: CPT | Performed by: INTERNAL MEDICINE

## 2019-06-09 PROCEDURE — 97162 PT EVAL MOD COMPLEX 30 MIN: CPT

## 2019-06-09 PROCEDURE — 25810000003 SODIUM CHLORIDE 0.9 % WITH KCL 20 MEQ 20-0.9 MEQ/L-% SOLUTION: Performed by: NEUROLOGICAL SURGERY

## 2019-06-09 PROCEDURE — 85025 COMPLETE CBC W/AUTO DIFF WBC: CPT | Performed by: NEUROLOGICAL SURGERY

## 2019-06-09 PROCEDURE — 85025 COMPLETE CBC W/AUTO DIFF WBC: CPT | Performed by: INTERNAL MEDICINE

## 2019-06-09 RX ORDER — TAMSULOSIN HYDROCHLORIDE 0.4 MG/1
0.4 CAPSULE ORAL DAILY
Status: DISCONTINUED | OUTPATIENT
Start: 2019-06-09 | End: 2019-06-14 | Stop reason: HOSPADM

## 2019-06-09 RX ORDER — POTASSIUM CHLORIDE 750 MG/1
40 CAPSULE, EXTENDED RELEASE ORAL AS NEEDED
Status: DISCONTINUED | OUTPATIENT
Start: 2019-06-09 | End: 2019-06-14 | Stop reason: HOSPADM

## 2019-06-09 RX ADMIN — SODIUM CHLORIDE TAB 1 GM 2 G: 1 TAB at 12:10

## 2019-06-09 RX ADMIN — POTASSIUM CHLORIDE 40 MEQ: 750 CAPSULE, EXTENDED RELEASE ORAL at 23:17

## 2019-06-09 RX ADMIN — CYCLOBENZAPRINE 10 MG: 10 TABLET, FILM COATED ORAL at 09:47

## 2019-06-09 RX ADMIN — SODIUM CHLORIDE TAB 1 GM 2 G: 1 TAB at 18:02

## 2019-06-09 RX ADMIN — POTASSIUM CHLORIDE AND SODIUM CHLORIDE 100 ML/HR: 900; 150 INJECTION, SOLUTION INTRAVENOUS at 20:58

## 2019-06-09 RX ADMIN — OXYCODONE HYDROCHLORIDE AND ACETAMINOPHEN 1 TABLET: 10; 325 TABLET ORAL at 10:27

## 2019-06-09 RX ADMIN — OXYCODONE HYDROCHLORIDE AND ACETAMINOPHEN 1 TABLET: 10; 325 TABLET ORAL at 23:16

## 2019-06-09 RX ADMIN — OXYCODONE HYDROCHLORIDE AND ACETAMINOPHEN 1 TABLET: 10; 325 TABLET ORAL at 06:07

## 2019-06-09 RX ADMIN — TAMSULOSIN HYDROCHLORIDE 0.4 MG: 0.4 CAPSULE ORAL at 12:10

## 2019-06-09 RX ADMIN — OXYCODONE HYDROCHLORIDE AND ACETAMINOPHEN 1 TABLET: 10; 325 TABLET ORAL at 00:30

## 2019-06-09 RX ADMIN — POTASSIUM CHLORIDE AND SODIUM CHLORIDE 100 ML/HR: 900; 150 INJECTION, SOLUTION INTRAVENOUS at 09:45

## 2019-06-09 RX ADMIN — POTASSIUM CHLORIDE AND SODIUM CHLORIDE 100 ML/HR: 900; 150 INJECTION, SOLUTION INTRAVENOUS at 00:30

## 2019-06-09 RX ADMIN — OXYCODONE HYDROCHLORIDE AND ACETAMINOPHEN 1 TABLET: 10; 325 TABLET ORAL at 19:02

## 2019-06-09 RX ADMIN — CYCLOBENZAPRINE 10 MG: 10 TABLET, FILM COATED ORAL at 19:03

## 2019-06-09 RX ADMIN — SODIUM CHLORIDE TAB 1 GM 2 G: 1 TAB at 09:47

## 2019-06-09 RX ADMIN — SODIUM CHLORIDE 250 ML: 9 INJECTION, SOLUTION INTRAVENOUS at 18:17

## 2019-06-09 RX ADMIN — OXYCODONE HYDROCHLORIDE AND ACETAMINOPHEN 1 TABLET: 10; 325 TABLET ORAL at 14:45

## 2019-06-09 RX ADMIN — DOCUSATE SODIUM 100 MG: 100 CAPSULE, LIQUID FILLED ORAL at 09:48

## 2019-06-09 RX ADMIN — LISINOPRIL: 10 TABLET ORAL at 09:48

## 2019-06-09 NOTE — PLAN OF CARE
Problem: Patient Care Overview  Goal: Plan of Care Review  Outcome: Ongoing (interventions implemented as appropriate)   06/09/19 1302   Coping/Psychosocial   Plan of Care Reviewed With patient   OTHER   Outcome Summary Pt mobility is better this visit, able to walk out into the hallway and back to the chair very slowly.

## 2019-06-09 NOTE — THERAPY TREATMENT NOTE
Acute Care - Physical Therapy Treatment Note  [facility]     Patient Name: Archie Oquendo  : 1965  MRN: [id number]  Today's Date: 2019  Onset of Illness/Injury or Date of Surgery: 19     Referring Physician: Leo    Admit Date: 2019    Visit Dx:    ICD-10-CM ICD-9-CM   1. Hyponatremia E87.1 276.1   2. Lumbar radiculopathy M54.16 724.4   3. Gait disorder R26.9 781.2   4. Spinal stenosis, L3-4 M48.062 724.03   5. Lumbar disc herniation -left L4-5 M51.26 722.10   6. History of lumbar fusion Z98.1 V45.4     Patient Active Problem List   Diagnosis   • Hip pain   • Low back pain   • Gait disorder   • Pain in left knee   • Hypertension   • Lumbar radiculopathy   • Spinal stenosis, L3-4   • Lumbar disc herniation -left L4-5   • History of lumbar fusion       Therapy Treatment    Rehabilitation Treatment Summary     Row Name 19 1257             Treatment Time/Intention    Discipline  physical therapist  -CS      Document Type  therapy note (daily note)  -CS      Subjective Information  complains of;pain  -CS      Mode of Treatment  physical therapy  -CS      Patient/Family Observations  pt supine in bed, no acute distress. Family present.   -CS      Therapy Frequency (PT Clinical Impression)  daily  -CS      Patient Effort  good  -CS      Existing Precautions/Restrictions  brace worn when out of bed;fall  -CS      Recorded by [CS] Devin Moore, PT 19 1302      Row Name 19 1257             Vital Signs    Post Systolic BP Rehab  130  -CS      Post Treatment Diastolic BP  90  -CS      Recorded by [CS] Devin Moore, PT 19 1302      Row Name 19 1257             Cognitive Assessment/Intervention- PT/OT    Orientation Status (Cognition)  oriented x 4  -CS      Follows Commands (Cognition)  WFL  -CS      Personal Safety Interventions  fall prevention program maintained;gait belt;nonskid shoes/slippers when out of bed;supervised activity  -CS      Recorded by [CS]  Devin Moore, PT 06/09/19 1302      Row Name 06/09/19 1257             Bed Mobility Assessment/Treatment    Bed Mobility Assessment/Treatment  supine-sit  -CS      Rolling Left [place] (Bed Mobility)  verbal cues;nonverbal cues (demo/gesture);minimum assist (75% patient effort)  -CS      Rolling Right [place] (Bed Mobility)  verbal cues;nonverbal cues (demo/gesture);minimum assist (75% patient effort)  -CS      Supine-Sit [place] (Bed Mobility)  verbal cues;nonverbal cues (demo/gesture);moderate assist (50% patient effort)  -CS      Assistive Device (Bed Mobility)  bed rails  -CS      Comment (Bed Mobility)  brace donned in supine while log rolling  -CS      Recorded by [CS] Devin Moore, PT 06/09/19 1302      Row Name 06/09/19 1257             Transfer Assessment/Treatment    Transfer Assessment/Treatment  stand-sit transfer;sit-stand transfer;bed-chair transfer  -CS      Recorded by [CS] Devin Moore, PT 06/09/19 1302      Row Name 06/09/19 1257             Sit-Stand Transfer    Sit-Stand [place] (Transfers)  verbal cues;nonverbal cues (demo/gesture);minimum assist (75% patient effort);2 person assist  -CS      Assistive Device (Sit-Stand Transfers)  walker, front-wheeled  -CS      Recorded by [CS] Devin Moore, PT 06/09/19 1302      Row Name 06/09/19 1257             Stand-Sit Transfer    Stand-Sit [place] (Transfers)  verbal cues;nonverbal cues (demo/gesture);moderate assist (50% patient effort);2 person assist  -CS      Assistive Device (Stand-Sit Transfers)  walker, front-wheeled  -CS      Recorded by [CS] Devin Moore, PT 06/09/19 1302      Row Name 06/09/19 1257             Gait/Stairs Assessment/Training    Gait/Stairs Assessment/Training  gait pattern;distance ambulated  -CS      [place] Level (Gait)  verbal cues;nonverbal cues (demo/gesture);minimum assist (75% patient effort);2 person assist  -CS      Assistive Device (Gait)  walker, front-wheeled  -CS       Distance in Feet (Gait)  25  -CS      Pattern (Gait)  swing-to  -CS      Deviations/Abnormal Patterns (Gait)  landy decreased;gait speed decreased;stride length decreased  -CS      Bilateral Gait Deviations  heel strike decreased;weight shift ability decreased  -CS      Left Sided Gait Deviations  foot drop/toe drag;weight shift ability decreased  -CS      Recorded by [CS] Devin Moore, PT 06/09/19 1302      Row Name 06/09/19 1257             Positioning and Restraints    Pre-Treatment Position  in bed  -CS      Post Treatment Position  chair  -CS      In Chair  reclined;call light within reach;encouraged to call for assist;with family/caregiver  -CS      Recorded by [CS] Devin Moore, PT 06/09/19 1302      Row Name 06/09/19 1257             Pain Assessment    Additional Documentation  Pain Scale: FACES Pre/Post-Treatment (Group)  -CS      Recorded by [CS] Devin Moore, PT 06/09/19 1302      Row Name 06/09/19 1257             Pain Scale: Numbers Pre/Post-Treatment    Pain Location - Orientation  incisional  -CS      Pain Location  back  -CS      Pain Intervention(s)  Repositioned;Ambulation/increased activity  -CS      Recorded by [CS] Devin Moore, PT 06/09/19 1302      Row Name 06/09/19 1257             Pain Scale: FACES Pre/Post-Treatment    Pain: FACES Scale, Pretreatment  4-->hurts little more  -CS      Pain: FACES Scale, Post-Treatment  6-->hurts even more  -CS      Recorded by [CS] Devin Moore, PT 06/09/19 1302      Row Name                Wound 06/05/19 1741 Other (See comments) back incision    Wound - Properties Group Date first assessed: 06/05/19 [SL] Time first assessed: 1741 [SL] Side: Other (See comments) [SL] Location: back [SL] Type: incision [SL] Recorded by:  [SL] Corrine Vigil RN 06/05/19 1741    Row Name 06/09/19 1257             Coping    Observed Emotional State  accepting;calm;cooperative  -CS      Verbalized Emotional State  acceptance  -CS       Recorded by [CS] Devin Moore, PT 06/09/19 1302      Row Name 06/09/19 1257             Plan of Care Review    Plan of Care Reviewed With  patient;daughter  -CS      Recorded by [CS] Devin Moore, PT 06/09/19 1302      Row Name 06/09/19 1257             Outcome Summary/Treatment Plan (PT)    Anticipated Discharge Disposition (PT)  inpatient rehabilitation facility  -CS      Recorded by [CS] Devin Moore, PT 06/09/19 1302        User Key  (r) = Recorded By, (t) = Taken By, (c) = Cosigned By    Initials Name Effective Dates Discipline    SL Corrine Vigil RN 06/16/16 -  Nurse    CS Devin Moore, PT 05/14/18 -  PT          Wound 06/05/19 1748 Other (See comments) back incision (Active)   Dressing Appearance open to air 6/9/2019  9:53 AM   Closure Liquid skin adhesive;Open to air 6/9/2019  9:53 AM   Periwound intact;dry 6/9/2019  9:53 AM   Periwound Temperature warm 6/9/2019  9:53 AM   Periwound Skin Turgor soft 6/9/2019  9:53 AM   Drainage Amount none 6/9/2019  9:53 AM   Dressing Care, Wound open to air 6/9/2019  9:53 AM   Periwound Care, Wound dry periwound area maintained 6/9/2019  9:53 AM           Physical Therapy Education     Title: PT OT SLP Therapies (Done)     Topic: Physical Therapy (Done)     Point: Mobility training (Done)     Learning Progress Summary           Patient Acceptance, E,TB, VU by CS at 6/9/2019  1:02 PM    Acceptance, E,TB,D, VU,NR by CH at 6/8/2019  4:22 PM    Acceptance, E, NR by AR at 6/7/2019  3:15 PM    Acceptance, E, NR by AR at 6/6/2019  3:25 PM                   Point: Home exercise program (Done)     Learning Progress Summary           Patient Acceptance, E,TB, VU by CS at 6/9/2019  1:02 PM    Acceptance, E,TB,D, VU,NR by CH at 6/8/2019  4:22 PM    Acceptance, E, NR by AR at 6/7/2019  3:15 PM    Acceptance, E, NR by AR at 6/6/2019  3:25 PM                   Point: Body mechanics (Done)     Learning Progress Summary           Patient Acceptance, E,TB, VU by   at 6/9/2019  1:02 PM    Acceptance, E,TB,D, VU,NR by  at 6/8/2019  4:22 PM    Acceptance, E, NR by AR at 6/7/2019  3:15 PM    Acceptance, E, NR by AR at 6/6/2019  3:25 PM                   Point: Precautions (Done)     Learning Progress Summary           Patient Acceptance, E,TB, VU by  at 6/9/2019  1:02 PM    Acceptance, E,TB,D, VU,NR by  at 6/8/2019  4:22 PM    Acceptance, E, NR by AR at 6/7/2019  3:15 PM    Acceptance, E, NR by AR at 6/6/2019  3:25 PM                               User Key     Initials Effective Dates Name Provider Type Discipline     04/03/18 -  Phyllis Otero, PT Physical Therapist PT    AR 04/03/18 -  Kassidy Jorge, PT Physical Therapist PT     05/14/18 -  Devin Moore, PT Physical Therapist PT                PT Recommendation and Plan  Anticipated Discharge Disposition (PT): inpatient rehabilitation facility  Therapy Frequency (PT Clinical Impression): daily  Outcome Summary/Treatment Plan (PT)  Anticipated Discharge Disposition (PT): inpatient rehabilitation facility  Plan of Care Reviewed With: patient  Outcome Summary: Pt mobility is better this visit, able to walk out into the hallway and back to the chair very slowly.   Outcome Measures     Row Name 06/09/19 1300 06/08/19 1600          How much help from another person do you currently need...    Turning from your back to your side while in flat bed without using bedrails?  3  -CS  3  -CH     Moving from lying on back to sitting on the side of a flat bed without bedrails?  2  -CS  2  -CH     Moving to and from a bed to a chair (including a wheelchair)?  3  -CS  3  -CH     Standing up from a chair using your arms (e.g., wheelchair, bedside chair)?  3  -CS  3  -CH     Climbing 3-5 steps with a railing?  2  -CS  2  -CH     To walk in hospital room?  2  -CS  2  -CH     AM-PAC 6 Clicks Score  15  -CS  15  -CH        Functional Assessment    Outcome Measure Options  AM-PAC 6 Clicks Basic Mobility (PT)  -CS  AM-PAC 6  Clicks Basic Mobility (PT)  -       User Key  (r) = Recorded By, (t) = Taken By, (c) = Cosigned By    Initials Name Provider Type     Phyllis Otero, PT Physical Therapist    Devin Abarca PT Physical Therapist         Time Calculation:   PT Charges     Row Name 06/09/19 1304             Time Calculation    Start Time  1128  -CS      Stop Time  1157  -CS      Time Calculation (min)  29 min  -CS      PT Received On  06/09/19  -CS      PT - Next Appointment  06/10/19  -        User Key  (r) = Recorded By, (t) = Taken By, (c) = Cosigned By    Initials Name Provider Type    Devin Abarca, PT Physical Therapist        Therapy Charges for Today     Code Description Service Date Service Provider Modifiers Qty    73422973512 HC PT EVAL MOD COMPLEXITY 2 6/9/2019 Devin Moore, PT GP 1    21595201378 HC PT THER PROC EA 15 MIN 6/9/2019 Devin Moore, PT GP 1    63585185329 HC PT THER SUPP EA 15 MIN 6/9/2019 Devin Moore, PT GP 1          PT G-Codes  Outcome Measure Options: AM-PAC 6 Clicks Basic Mobility (PT)  AM-PAC 6 Clicks Score: 15    Devin Moore PT  6/9/2019

## 2019-06-09 NOTE — PLAN OF CARE
Problem: Patient Care Overview  Goal: Plan of Care Review  Outcome: Ongoing (interventions implemented as appropriate)   06/09/19 9426   Coping/Psychosocial   Plan of Care Reviewed With patient   Plan of Care Review   Progress improving   OTHER   Outcome Summary A+Ox4. Ambulating x2 w/ RW- very slow to ambulate took PT 30 minutes to get patient from bed to door and back. JV #2 pulled per order by Dr. Sung. JV #1 still too much output. Rae d/c'd and patient has voided spontaneously w/o difficulty. Sodium up to 140 but to stay on sodium tablets for now per Dr. Sung. Possibly a candidate for [facility]. Continue to monitor.

## 2019-06-09 NOTE — PLAN OF CARE
Problem: Patient Care Overview  Goal: Plan of Care Review  Outcome: Ongoing (interventions implemented as appropriate)   06/09/19 7753   Coping/Psychosocial   Plan of Care Reviewed With patient   Plan of Care Review   Progress improving   OTHER   Outcome Summary No changes during the night. Pain meds x1. VSS. Will CTM.

## 2019-06-09 NOTE — PROGRESS NOTES
POD 3 T11 - S2  Patient still complaining of intense surgical pain but rating it at 3 out of 10 when resting and 5 out of 10 when ambulating  Denies any radicular leg pain but still weak and right lower extremity  Has been able to stand with physical therapy  Patient wishes to have Garibay removed  Still on clear liquid diet but wishes solid food     Stable deficits lower extremity   no evidence DVT  Incision is clean dry and intact  Drain 1 50 cc 24 hr  Drain 2 200 cc 24 hour    H&H is decreased from 11.6 / 35.4 to 9.2  / 27.8 today 8.6 / 27  HR 90 -110  Na 140 asymptomatic          Continue to mobilize  Keep drains in place for today  H&H slight decrease but feel HR pain related and will continue to observe  Na much improved on Na tabs  Will DC Drain 1  Advance to solid diet  DC garibay and place on Flomax and BS if no void  Plan for rehab

## 2019-06-10 LAB
ABO GROUP BLD: NORMAL
BASOPHILS # BLD AUTO: 0.02 10*3/MM3 (ref 0–0.2)
BASOPHILS NFR BLD AUTO: 0.4 % (ref 0–1.5)
BLD GP AB SCN SERPL QL: NEGATIVE
DEPRECATED RDW RBC AUTO: 45.3 FL (ref 37–54)
EOSINOPHIL # BLD AUTO: 0.25 10*3/MM3 (ref 0–0.4)
EOSINOPHIL NFR BLD AUTO: 4.5 % (ref 0.3–6.2)
ERYTHROCYTE [DISTWIDTH] IN BLOOD BY AUTOMATED COUNT: 12.9 % (ref 12.3–15.4)
HCT VFR BLD AUTO: 26.2 % (ref 37.5–51)
HGB BLD-MCNC: 8.3 G/DL (ref 13–17.7)
IMM GRANULOCYTES # BLD AUTO: 0.02 10*3/MM3 (ref 0–0.05)
IMM GRANULOCYTES NFR BLD AUTO: 0.4 % (ref 0–0.5)
LYMPHOCYTES # BLD AUTO: 1.67 10*3/MM3 (ref 0.7–3.1)
LYMPHOCYTES NFR BLD AUTO: 30.1 % (ref 19.6–45.3)
MCH RBC QN AUTO: 30.9 PG (ref 26.6–33)
MCHC RBC AUTO-ENTMCNC: 31.7 G/DL (ref 31.5–35.7)
MCV RBC AUTO: 97.4 FL (ref 79–97)
MONOCYTES # BLD AUTO: 0.55 10*3/MM3 (ref 0.1–0.9)
MONOCYTES NFR BLD AUTO: 9.9 % (ref 5–12)
NEUTROPHILS # BLD AUTO: 3.04 10*3/MM3 (ref 1.7–7)
NEUTROPHILS NFR BLD AUTO: 54.7 % (ref 42.7–76)
NRBC BLD AUTO-RTO: 0 /100 WBC (ref 0–0.2)
PLATELET # BLD AUTO: 199 10*3/MM3 (ref 140–450)
PMV BLD AUTO: 9.1 FL (ref 6–12)
POTASSIUM BLD-SCNC: 4 MMOL/L (ref 3.5–5.2)
RBC # BLD AUTO: 2.69 10*6/MM3 (ref 4.14–5.8)
RH BLD: POSITIVE
T&S EXPIRATION DATE: NORMAL
WBC NRBC COR # BLD: 5.55 10*3/MM3 (ref 3.4–10.8)

## 2019-06-10 PROCEDURE — 36430 TRANSFUSION BLD/BLD COMPNT: CPT

## 2019-06-10 PROCEDURE — 86900 BLOOD TYPING SEROLOGIC ABO: CPT | Performed by: NEUROLOGICAL SURGERY

## 2019-06-10 PROCEDURE — 25810000003 SODIUM CHLORIDE 0.9 % WITH KCL 20 MEQ 20-0.9 MEQ/L-% SOLUTION: Performed by: NEUROLOGICAL SURGERY

## 2019-06-10 PROCEDURE — 97110 THERAPEUTIC EXERCISES: CPT

## 2019-06-10 PROCEDURE — P9016 RBC LEUKOCYTES REDUCED: HCPCS

## 2019-06-10 PROCEDURE — 86923 COMPATIBILITY TEST ELECTRIC: CPT

## 2019-06-10 PROCEDURE — 97535 SELF CARE MNGMENT TRAINING: CPT

## 2019-06-10 PROCEDURE — 84132 ASSAY OF SERUM POTASSIUM: CPT | Performed by: INTERNAL MEDICINE

## 2019-06-10 PROCEDURE — 86901 BLOOD TYPING SEROLOGIC RH(D): CPT | Performed by: NEUROLOGICAL SURGERY

## 2019-06-10 PROCEDURE — 86850 RBC ANTIBODY SCREEN: CPT | Performed by: NEUROLOGICAL SURGERY

## 2019-06-10 PROCEDURE — 85025 COMPLETE CBC W/AUTO DIFF WBC: CPT | Performed by: NEUROLOGICAL SURGERY

## 2019-06-10 PROCEDURE — 86900 BLOOD TYPING SEROLOGIC ABO: CPT

## 2019-06-10 PROCEDURE — 99024 POSTOP FOLLOW-UP VISIT: CPT | Performed by: NURSE PRACTITIONER

## 2019-06-10 PROCEDURE — 97165 OT EVAL LOW COMPLEX 30 MIN: CPT

## 2019-06-10 RX ADMIN — SODIUM CHLORIDE TAB 1 GM 2 G: 1 TAB at 08:55

## 2019-06-10 RX ADMIN — CYCLOBENZAPRINE 10 MG: 10 TABLET, FILM COATED ORAL at 18:05

## 2019-06-10 RX ADMIN — DOCUSATE SODIUM 100 MG: 100 CAPSULE, LIQUID FILLED ORAL at 08:55

## 2019-06-10 RX ADMIN — SODIUM CHLORIDE TAB 1 GM 2 G: 1 TAB at 17:52

## 2019-06-10 RX ADMIN — POTASSIUM CHLORIDE 40 MEQ: 750 CAPSULE, EXTENDED RELEASE ORAL at 04:37

## 2019-06-10 RX ADMIN — LISINOPRIL: 10 TABLET ORAL at 08:55

## 2019-06-10 RX ADMIN — CYCLOBENZAPRINE 10 MG: 10 TABLET, FILM COATED ORAL at 08:55

## 2019-06-10 RX ADMIN — OXYCODONE HYDROCHLORIDE AND ACETAMINOPHEN 1 TABLET: 10; 325 TABLET ORAL at 22:02

## 2019-06-10 RX ADMIN — OXYCODONE HYDROCHLORIDE AND ACETAMINOPHEN 1 TABLET: 10; 325 TABLET ORAL at 04:37

## 2019-06-10 RX ADMIN — POTASSIUM CHLORIDE AND SODIUM CHLORIDE 100 ML/HR: 900; 150 INJECTION, SOLUTION INTRAVENOUS at 06:47

## 2019-06-10 RX ADMIN — TAMSULOSIN HYDROCHLORIDE 0.4 MG: 0.4 CAPSULE ORAL at 08:55

## 2019-06-10 RX ADMIN — OXYCODONE HYDROCHLORIDE AND ACETAMINOPHEN 1 TABLET: 10; 325 TABLET ORAL at 18:06

## 2019-06-10 RX ADMIN — OXYCODONE HYDROCHLORIDE AND ACETAMINOPHEN 1 TABLET: 10; 325 TABLET ORAL at 08:55

## 2019-06-10 NOTE — PAYOR COMM NOTE
"UR CONTACT:  SAVANNAH  P: [phone]        F: [phone]        Bj Oquendo (53 y.o. Male)     Date of Birth Social Security Number Address Home Phone MRN    1965  [address] [facility] [phone] [id number]    [employer] Status          Patient Refused        Admission Date Admission Type Admitting Provider Attending Provider Department, Room/Bed    6/5/19 Elective Corbin Bailey MD Hodes, Jonathan E, MD [address], P591/1    Discharge Date Discharge Disposition Discharge Destination                       Attending Provider:  Corbin Bailey MD    Allergies:  No Known Allergies    Isolation:  None   Infection:  None   Code Status:  CPR    Ht:  188 cm (74\")   Wt:  94.3 kg (207 lb 14.3 oz)    Admission Cmt:  None   Principal Problem:  None                Active Insurance as of 6/5/2019     Primary Coverage     Payor Plan Insurance Group Employer/Plan Group    [employer]HEM BLUE CROSS [facility] [employer] [facility] PPO [id number]     Payor Plan Address Payor Plan Phone Number Payor Plan Fax Number Effective Dates    [address] [phone]  7/1/2016 - None Entered    [place] [address]       Subscriber Name Subscriber Birth Date Member ID       BJ OQUENDO 1965 [id number]                 Emergency Contacts      (Rel.) Home Phone Work Phone Mobile Phone    Patricia Oquendo (Daughter) -- -- [phone]    IBIS OQUENDO (Daughter) -- -- [phone]            ICU Vital Signs     Row Name 06/10/19 1339 06/10/19 1312 06/10/19 1000 06/10/19 0927 06/10/19 0445       Vitals    Temp  98.5 °F (36.9 °C)  98.6 °F (37 °C)  --  98 °F (36.7 °C)  98.8 °F (37.1 °C)    Temp src  Oral  Oral  --  Oral  Oral    Pulse  111  108  108  111  106    Heart Rate Source  --  Monitor  --  Monitor  Monitor    Resp  --  20  --  21  18    Resp Rate Source  --  Visual  --  Visual  Visual    BP  140/83  140/80  --  142/81  138/85    " Noninvasive MAP (mmHg)  --  --  --  --  101    BP Location  --  Left arm  --  Right arm  Right arm    BP Method  --  Automatic  --  Automatic  Automatic    Patient Position  --  Sitting  --  Lying  Lying       Oxygen Therapy    SpO2  96 %  96 %  94 %  98 %  94 %    Pulse Oximetry Type  Continuous  Continuous  --  Continuous  Continuous    Device (Oxygen Therapy)  room air  room air  --  room air  room air        Lines, Drains & Airways    Active LDAs     Name:   Placement date:   Placement time:   Site:   Days:    Peripheral IV 06/05/19 0701 Right Hand   06/05/19    0701    Hand   5    Peripheral IV 06/10/19 1048 Right Forearm   06/10/19    1048    Forearm   less than 1    Closed/Suction Drain Bulb 15 Fr.   06/05/19    1617    --   4                Hospital Medications (active)       Dose Frequency Start End    bisacodyl (DULCOLAX) EC tablet 10 mg 10 mg Daily PRN 6/5/2019     Sig - Route: Take 2 tablets by mouth Daily As Needed for Constipation. - Oral    bisacodyl (DULCOLAX) suppository 10 mg 10 mg Daily PRN 6/6/2019     Sig - Route: Insert 1 suppository into the rectum Daily As Needed for Constipation. - Rectal    cyclobenzaprine (FLEXERIL) tablet 10 mg 10 mg 3 Times Daily PRN 6/5/2019     Sig - Route: Take 1 tablet by mouth 3 (Three) Times a Day As Needed for Muscle Spasms. - Oral    docusate sodium (COLACE) capsule 100 mg 100 mg 2 Times Daily PRN 6/5/2019     Sig - Route: Take 1 capsule by mouth 2 (Two) Times a Day As Needed for Constipation. - Oral    HYDROcodone-acetaminophen (NORCO) 7.5-325 MG per tablet 1 tablet 1 tablet Every 4 Hours PRN 6/5/2019 6/15/2019    Sig - Route: Take 1 tablet by mouth Every 4 (Four) Hours As Needed for Moderate Pain . - Oral    lisinopril (PRINIVIL,ZESTRIL) 10 mg, hydrochlorothiazide (MICROZIDE) 12.5 mg for ZESTORETIC 10-12.5  Every 24 Hours Scheduled 6/6/2019     Sig - Route: Take  by mouth Daily. - Oral    morphine injection 4 mg 4 mg Every 2 Hours PRN 6/5/2019 6/15/2019    Sig  "- Route: Infuse 2 mL into a venous catheter Every 2 (Two) Hours As Needed for Moderate Pain . - Intravenous    Linked Group 1:  \"And\" Linked Group Details        Morphine injection 6 mg 6 mg Every 2 Hours PRN 6/5/2019 6/15/2019    Sig - Route: Infuse 0.6 mL into a venous catheter Every 2 (Two) Hours As Needed for Severe Pain . - Intravenous    Linked Group 2:  \"And\" Linked Group Details        naloxone (NARCAN) injection 0.4 mg 0.4 mg Every 5 Minutes PRN 6/5/2019     Sig - Route: Infuse 1 mL into a venous catheter Every 5 (Five) Minutes As Needed for Respiratory Depression. - Intravenous    Linked Group 2:  \"And\" Linked Group Details        naloxone (NARCAN) injection 0.4 mg 0.4 mg Every 5 Minutes PRN 6/5/2019     Sig - Route: Infuse 1 mL into a venous catheter Every 5 (Five) Minutes As Needed for Respiratory Depression. - Intravenous    Linked Group 1:  \"And\" Linked Group Details        ondansetron (ZOFRAN) injection 4 mg 4 mg Every 6 Hours PRN 6/5/2019     Sig - Route: Infuse 2 mL into a venous catheter Every 6 (Six) Hours As Needed for Nausea or Vomiting. - Intravenous    Linked Group 3:  \"Or\" Linked Group Details        ondansetron (ZOFRAN) tablet 4 mg 4 mg Every 6 Hours PRN 6/5/2019     Sig - Route: Take 1 tablet by mouth Every 6 (Six) Hours As Needed for Nausea or Vomiting. - Oral    Linked Group 3:  \"Or\" Linked Group Details        oxyCODONE-acetaminophen (PERCOCET)  MG per tablet 1 tablet 1 tablet Every 4 Hours PRN 6/5/2019 6/15/2019    Sig - Route: Take 1 tablet by mouth Every 4 (Four) Hours As Needed for Severe Pain . - Oral    potassium chloride (MICRO-K) CR capsule 40 mEq 40 mEq As Needed 6/9/2019     Sig - Route: Take 4 capsules by mouth As Needed (Potassium Replacement.  See Admin Instructions). - Oral    sodium chloride 0.9 % bolus 250 mL 250 mL Once 6/9/2019 6/9/2019    Sig - Route: Infuse 250 mL into a venous catheter 1 (One) Time. - Intravenous    sodium chloride tablet 2 g 2 g 3 Times Daily " With Meals 6/7/2019     Sig - Route: Take 2 tablets by mouth 3 (Three) Times a Day With Meals. - Oral    tamsulosin (FLOMAX) 24 hr capsule 0.4 mg 0.4 mg Daily 6/9/2019     Sig - Route: Take 1 capsule by mouth Daily. - Oral    sodium chloride 0.9 % with KCl 20 mEq/L infusion (Discontinued) 100 mL/hr Continuous 6/6/2019 6/10/2019    Sig - Route: Infuse 100 mL/hr into a venous catheter Continuous. - Intravenous             Physician Progress Notes      Nidia Castañeda APRN at 6/10/2019 12:04 PM            [facility] ADVANCED NEUROSURGERY PROGRESS NOTE    PATIENT IDENTIFICATION:   Name:  Archie Oquendo      MRN:  [id number]     53 y.o.  male               CC: POD #5 s/p T11-S2 posterior fusion with L3/4 lumbar decompression and left L5/L6 decompression/ LLE weakness      Subjective     Interval History: has complaints of low back pain and muscle tightness, remains numb R LE below the know, strength unchanged post op LLE     Objective     Vital signs in last 24 hours:  Temp:  [98 °F (36.7 °C)-99.3 °F (37.4 °C)] 98 °F (36.7 °C)  Heart Rate:  [106-127] 108  Resp:  [18-21] 21  BP: (134-147)/(76-87) 142/81  ICP ranges-    Intake/Output last 3 shifts:  I/O last 3 completed shifts:  In: 4715 [P.O.:1680; I.V.:2785; IV Piggyback:250]  Out: 39872 [Urine:39669; Drains:715]    Intake/Output this shift:  I/O this shift:  In: -   Out: 695 [Urine:650; Drains:45]    JV drain 405 mL's last 24 hours    Physical Exam:  General:   Awake, alert, and oriented x 3. NAD  Appears well  forehead and B facial bruising, tender with ecchymosis  JV drain with serosanguineous output  Cranial nerves grossly intact  Tachycardia   well-healing thoracic/lumbar incision site, flat intact and well approximated   left lower extremity weakness unchanged postop, 2/5 dorsiflexion strength  Decreased sensory to soft touch right lower extremity  SCDs bilaterally        LABS:    Lab Results (last 24 hours)     Procedure Component Value Units Date/Time    CBC &  Differential [[id number]] Collected:  06/09/19 1839    Specimen:  Blood Updated:  06/09/19 2146    Narrative:       The following orders were created for panel order CBC & Differential.  Procedure                               Abnormality         Status                     ---------                               -----------         ------                     CBC Auto Differential[[id number]]        Abnormal            Final result                 Please view results for these tests on the individual orders.    CBC Auto Differential [[id number]]  (Abnormal) Collected:  06/09/19 1839    Specimen:  Blood Updated:  06/09/19 2146     WBC 6.26 10*3/mm3      RBC 2.86 10*6/mm3      Hemoglobin 8.8 g/dL      Hematocrit 28.0 %      MCV 97.9 fL      MCH 30.8 pg      MCHC 31.4 g/dL      RDW 12.6 %      RDW-SD 45.3 fl      MPV 9.9 fL      Platelets 203 10*3/mm3      Neutrophil % 59.6 %      Lymphocyte % 26.2 %      Monocyte % 9.1 %      Eosinophil % 4.3 %      Basophil % 0.3 %      Immature Grans % 0.5 %      Neutrophils, Absolute 3.73 10*3/mm3      Lymphocytes, Absolute 1.64 10*3/mm3      Monocytes, Absolute 0.57 10*3/mm3      Eosinophils, Absolute 0.27 10*3/mm3      Basophils, Absolute 0.02 10*3/mm3      Immature Grans, Absolute 0.03 10*3/mm3      nRBC 0.2 /100 WBC     Comprehensive Metabolic Panel [[id number]]  (Abnormal) Collected:  06/09/19 1923    Specimen:  Blood Updated:  06/09/19 1951     Glucose 120 mg/dL      BUN 7 mg/dL      Creatinine 0.60 mg/dL      Sodium 135 mmol/L      Potassium 3.4 mmol/L      Chloride 99 mmol/L      CO2 26.8 mmol/L      Calcium 7.7 mg/dL      Total Protein 5.5 g/dL      Albumin 3.10 g/dL      ALT (SGPT) 36 U/L      AST (SGOT) 37 U/L      Alkaline Phosphatase 51 U/L      Total Bilirubin 0.3 mg/dL      eGFR Non African Amer 140 mL/min/1.73      Globulin 2.4 gm/dL      A/G Ratio 1.3 g/dL      BUN/Creatinine Ratio 11.7     Anion Gap 9.2 mmol/L     Narrative:       GFR Normal >60  Chronic Kidney Disease  <60  Kidney Failure <15    Potassium [[id number]]  (Normal) Collected:  06/10/19 0755    Specimen:  Blood Updated:  06/10/19 0831     Potassium 4.0 mmol/L     CBC & Differential [[id number]] Collected:  06/10/19 0755    Specimen:  Blood Updated:  06/10/19 0813    Narrative:       The following orders were created for panel order CBC & Differential.  Procedure                               Abnormality         Status                     ---------                               -----------         ------                     CBC Auto Differential[[id number]]        Abnormal            Final result                 Please view results for these tests on the individual orders.    CBC Auto Differential [[id number]]  (Abnormal) Collected:  06/10/19 0755    Specimen:  Blood Updated:  06/10/19 0813     WBC 5.55 10*3/mm3      RBC 2.69 10*6/mm3      Hemoglobin 8.3 g/dL      Hematocrit 26.2 %      MCV 97.4 fL      MCH 30.9 pg      MCHC 31.7 g/dL      RDW 12.9 %      RDW-SD 45.3 fl      MPV 9.1 fL      Platelets 199 10*3/mm3      Neutrophil % 54.7 %      Lymphocyte % 30.1 %      Monocyte % 9.9 %      Eosinophil % 4.5 %      Basophil % 0.4 %      Immature Grans % 0.4 %      Neutrophils, Absolute 3.04 10*3/mm3      Lymphocytes, Absolute 1.67 10*3/mm3      Monocytes, Absolute 0.55 10*3/mm3      Eosinophils, Absolute 0.25 10*3/mm3      Basophils, Absolute 0.02 10*3/mm3      Immature Grans, Absolute 0.02 10*3/mm3      nRBC 0.0 /100 WBC           IMAGING STUDIES:    No new imaging    Meds reviewed/changed: Yes    Current Facility-Administered Medications   Medication Dose Route Frequency Provider Last Rate Last Dose   • bisacodyl (DULCOLAX) EC tablet 10 mg  10 mg Oral Daily PRN Corbin Bailey MD   10 mg at 06/07/19 1408   • bisacodyl (DULCOLAX) suppository 10 mg  10 mg Rectal Daily PRN Corbin Bailey MD   10 mg at 06/06/19 1213   • cyclobenzaprine (FLEXERIL) tablet 10 mg  10 mg Oral TID PRN Corbin Bailey MD   10 mg at 06/10/19  0855   • docusate sodium (COLACE) capsule 100 mg  100 mg Oral BID PRN Corbin Bailey MD   100 mg at 06/10/19 0855   • HYDROcodone-acetaminophen (NORCO) 7.5-325 MG per tablet 1 tablet  1 tablet Oral Q4H PRN Corbin Bailey MD       • lisinopril (PRINIVIL,ZESTRIL) 10 mg, hydrochlorothiazide (MICROZIDE) 12.5 mg for ZESTORETIC 10-12.5   Oral Q24H Corbin Bailey MD       • morphine injection 4 mg  4 mg Intravenous Q2H PRN Corbin Bailey MD   4 mg at 06/06/19 1925    And   • naloxone (NARCAN) injection 0.4 mg  0.4 mg Intravenous Q5 Min PRN Corbin Bailey MD       • Morphine injection 6 mg  6 mg Intravenous Q2H PRN Corbin Bailey MD   6 mg at 06/06/19 0551    And   • naloxone (NARCAN) injection 0.4 mg  0.4 mg Intravenous Q5 Min PRN Corbin Bailey MD       • ondansetron (ZOFRAN) tablet 4 mg  4 mg Oral Q6H PRN Corbin Bailey MD        Or   • ondansetron (ZOFRAN) injection 4 mg  4 mg Intravenous Q6H PRN Corbin Bailey MD   4 mg at 06/06/19 2146   • oxyCODONE-acetaminophen (PERCOCET)  MG per tablet 1 tablet  1 tablet Oral Q4H PRN Corbin Bailey MD   1 tablet at 06/10/19 0855   • potassium chloride (MICRO-K) CR capsule 40 mEq  40 mEq Oral PRN Veto Lo MD   40 mEq at 06/10/19 0437   • sodium chloride tablet 2 g  2 g Oral TID With Meals Nidia Castañeda APRN   2 g at 06/10/19 0855   • tamsulosin (FLOMAX) 24 hr capsule 0.4 mg  0.4 mg Oral Daily Koko Sung MD   0.4 mg at 06/10/19 0855       Assessment/Plan     ASSESSMENT:      Gait disorder    Lumbar radiculopathy    Spinal stenosis, L3-4    Lumbar disc herniation -left L4-5    History of lumbar fusion      PLAN: 1 JV drain remains in with high volume output, 405 mL's last 24 hours.  Hemoglobin 8.3 this morning.  He is tachycardic, likely due to pain.  He is wearing the TLSO brace when sitting up and out of bed.  Continue working with physical therapy.  Pending Dr. Francisco evaluation.  Hopeful DC to [facility]  "rehab later this week.    I discussed the patients findings and my recommendations with patient, nursing staff and Dr Bailey       LOS: 5 days       VERONICA Pearce  6/10/2019  12:04 PM      \"Dictated utilizing Dragon dictation\".        Electronically signed by Nidia Castañeda APRN at 6/10/2019 12:10 PM           Seun Guzman, RN      Case Management   Progress Notes   Addendum   Date of Service:  6/10/2019 11:25 AM   Creation Time:  6/10/2019 11:25 AM                     Continued Stay Note  [facility]     Patient Name: Archie Oquendo             MRN: [id number]  Today's Date: 6/10/2019                     Admit Date: 6/5/2019          Discharge Plan      Row Name 06/10/19 1124           Plan     Plan Comments  Transfer from ICU to  06/07/19 @ 1846.  Awaiting Dr Francisco to see for poss acute rehab at d/c.  Awaiting OT eval order.  CCP to follow.                        "

## 2019-06-10 NOTE — PAYOR COMM NOTE
"UR CONTACT:  SAVANNAH  P: [phone]        F: [phone]        Bj Oquendo (53 y.o. Male)     Date of Birth Social Security Number Address Home Phone MRN    1965  [address] [facility] [address] [phone] [id number]    [employer] Marital Status          Patient Refused        Admission Date Admission Type Admitting Provider Attending Provider Department, Room/Bed    6/5/19 Elective Corbin Bailey MD Hodes, Jonathan E, MD [address], P591/1    Discharge Date Discharge Disposition Discharge Destination                       Attending Provider:  oCrbin Bailey MD    Allergies:  No Known Allergies    Isolation:  None   Infection:  None   Code Status:  CPR    Ht:  188 cm (74\")   Wt:  94.3 kg (207 lb 14.3 oz)    Admission Cmt:  None   Principal Problem:  None                Active Insurance as of 6/5/2019     Primary Coverage     Payor Plan Insurance Group Employer/Plan Group    [employer] BLUE CROSS [facility] [employer] BLUE SHIELD O [id number]     Payor Plan Address Payor Plan Phone Number Payor Plan Fax Number Effective Dates    [address] [phone]  7/1/2016 - None Entered    [place] [address]       Subscriber Name Subscriber Birth Date Member ID       BJ OQUENDO 1965 [id number]                 Emergency Contacts      (Rel.) Home Phone Work Phone Mobile Phone    Patricia Oquendo (Daughter) -- -- [phone]    IBIS OQUENDO (Daughter) -- -- [phone]            ICU Vital Signs     Row Name 06/10/19 0927 06/10/19 0445 06/10/19 0000 06/09/19 2316 06/09/19 2100       Vitals    Temp  98 °F (36.7 °C)  98.8 °F (37.1 °C)  --  --  99.3 °F (37.4 °C)    Temp src  Oral  Oral  --  --  Oral    Pulse  111  106  108  --  127  (Abnormal)     Heart Rate Source  Monitor  Monitor  --  --  Monitor    Resp  21  18  --  --  18    Resp Rate Source  Visual  Visual  --  --  Visual    BP  142/81  138/85  --  --  142/76    " Noninvasive MAP (mmHg)  --  101  --  --  --    BP Location  Right arm  Right arm  --  --  Right arm    BP Method  Automatic  Automatic  --  --  Automatic    Patient Position  Lying  Lying  --  --  Lying       Oxygen Therapy    SpO2  98 %  94 %  89 %  (Abnormal)   --  95 %    Pulse Oximetry Type  Continuous  Continuous  --  --  --    Device (Oxygen Therapy)  room air  room air  --  room air  room air        Lines, Drains & Airways    Active LDAs     Name:   Placement date:   Placement time:   Site:   Days:    Peripheral IV 06/05/19 0701 Right Hand   06/05/19 0701    Hand   5    Closed/Suction Drain Bulb 15 Fr.   06/05/19    1617    --   4                Hospital Medications (active)       Dose Frequency Start End    bisacodyl (DULCOLAX) EC tablet 10 mg 10 mg Daily PRN 6/5/2019     Sig - Route: Take 2 tablets by mouth Daily As Needed for Constipation. - Oral    bisacodyl (DULCOLAX) suppository 10 mg 10 mg Daily PRN 6/6/2019     Sig - Route: Insert 1 suppository into the rectum Daily As Needed for Constipation. - Rectal    cyclobenzaprine (FLEXERIL) tablet 10 mg 10 mg 3 Times Daily PRN 6/5/2019     Sig - Route: Take 1 tablet by mouth 3 (Three) Times a Day As Needed for Muscle Spasms. - Oral    docusate sodium (COLACE) capsule 100 mg 100 mg 2 Times Daily PRN 6/5/2019     Sig - Route: Take 1 capsule by mouth 2 (Two) Times a Day As Needed for Constipation. - Oral    HYDROcodone-acetaminophen (NORCO) 7.5-325 MG per tablet 1 tablet 1 tablet Every 4 Hours PRN 6/5/2019 6/15/2019    Sig - Route: Take 1 tablet by mouth Every 4 (Four) Hours As Needed for Moderate Pain . - Oral    lisinopril (PRINIVIL,ZESTRIL) 10 mg, hydrochlorothiazide (MICROZIDE) 12.5 mg for ZESTORETIC 10-12.5  Every 24 Hours Scheduled 6/6/2019     Sig - Route: Take  by mouth Daily. - Oral    morphine injection 4 mg 4 mg Every 2 Hours PRN 6/5/2019 6/15/2019    Sig - Route: Infuse 2 mL into a venous catheter Every 2 (Two) Hours As Needed for Moderate Pain . -  "Intravenous    Linked Group 1:  \"And\" Linked Group Details        Morphine injection 6 mg 6 mg Every 2 Hours PRN 6/5/2019 6/15/2019    Sig - Route: Infuse 0.6 mL into a venous catheter Every 2 (Two) Hours As Needed for Severe Pain . - Intravenous    Linked Group 2:  \"And\" Linked Group Details        naloxone (NARCAN) injection 0.4 mg 0.4 mg Every 5 Minutes PRN 6/5/2019     Sig - Route: Infuse 1 mL into a venous catheter Every 5 (Five) Minutes As Needed for Respiratory Depression. - Intravenous    Linked Group 2:  \"And\" Linked Group Details        naloxone (NARCAN) injection 0.4 mg 0.4 mg Every 5 Minutes PRN 6/5/2019     Sig - Route: Infuse 1 mL into a venous catheter Every 5 (Five) Minutes As Needed for Respiratory Depression. - Intravenous    Linked Group 1:  \"And\" Linked Group Details        ondansetron (ZOFRAN) injection 4 mg 4 mg Every 6 Hours PRN 6/5/2019     Sig - Route: Infuse 2 mL into a venous catheter Every 6 (Six) Hours As Needed for Nausea or Vomiting. - Intravenous    Linked Group 3:  \"Or\" Linked Group Details        ondansetron (ZOFRAN) tablet 4 mg 4 mg Every 6 Hours PRN 6/5/2019     Sig - Route: Take 1 tablet by mouth Every 6 (Six) Hours As Needed for Nausea or Vomiting. - Oral    Linked Group 3:  \"Or\" Linked Group Details        oxyCODONE-acetaminophen (PERCOCET)  MG per tablet 1 tablet 1 tablet Every 4 Hours PRN 6/5/2019 6/15/2019    Sig - Route: Take 1 tablet by mouth Every 4 (Four) Hours As Needed for Severe Pain . - Oral    potassium chloride (MICRO-K) CR capsule 40 mEq 40 mEq As Needed 6/9/2019     Sig - Route: Take 4 capsules by mouth As Needed (Potassium Replacement.  See Admin Instructions). - Oral    sodium chloride 0.9 % bolus 250 mL 250 mL Once 6/9/2019 6/9/2019    Sig - Route: Infuse 250 mL into a venous catheter 1 (One) Time. - Intravenous    sodium chloride tablet 2 g 2 g 3 Times Daily With Meals 6/7/2019     Sig - Route: Take 2 tablets by mouth 3 (Three) Times a Day With Meals. - " Oral    tamsulosin (FLOMAX) 24 hr capsule 0.4 mg 0.4 mg Daily 6/9/2019     Sig - Route: Take 1 capsule by mouth Daily. - Oral    sodium chloride 0.9 % with KCl 20 mEq/L infusion (Discontinued) 100 mL/hr Continuous 6/6/2019 6/10/2019    Sig - Route: Infuse 100 mL/hr into a venous catheter Continuous. - Intravenous             Physician Progress Notes       Koko Sung MD at 6/9/2019 11:17 AM        POD 3 T11 - S2  Patient still complaining of intense surgical pain but rating it at 3 out of 10 when resting and 5 out of 10 when ambulating  Denies any radicular leg pain but still weak and right lower extremity  Has been able to stand with physical therapy  Patient wishes to have Garibay removed  Still on clear liquid diet but wishes solid food     Stable deficits lower extremity   no evidence DVT  Incision is clean dry and intact  Drain 1 50 cc 24 hr  Drain 2 200 cc 24 hour    H&H is decreased from 11.6 / 35.4 to  9.2  / 27.8 today 8.6 / 27  HR 90 -110  Na 140 asymptomatic          Continue to mobilize  Keep drains in place for today  H&H slight decrease but feel HR pain related and will continue to observe  Na much improved on Na tabs  Will DC Drain 1  Advance to solid diet  DC garibay and place on Flomax and BS if no void  Plan for rehab        Electronically signed by Koko Sung MD at 6/9/2019 11:22 AM     Koko Sung MD at 6/8/2019  4:42 PM        POD 3 T11 - S2  Patient still complaining of intense surgical pain  Not able to ambulate without intense back pain  Has been able to stand with physical therapy    No focal neurologic deficits lower extremity  No evidence DVT  Incision is clean dry and intact  Drain still with excessive output    H&H is decreased from 11.6 / 35.4 to 9.2 / 27.8  Na 129 asymptomatic    HR 80 130 pain related    Continue to mobilize  Keep drains in place for today  Recheck H&H in the a.m. and sodium  Plan for rehab    Electronically signed by Koko Sung MD at  6/8/2019  4:47 PM               Shelly Talbot, RN      Case Management   Progress Notes   Signed   Date of Service:  6/8/2019 11:46 AM   Creation Time:  6/8/2019 11:46 AM            Signed                 Continued Stay Note  [facility]     Patient Name: Archie Oquendo             MRN: [id number]  Today's Date: 6/8/2019                       Admit Date: 6/5/2019          Discharge Plan      Row Name 06/08/19 1143           Plan     Plan  [facility] evaluating- and will reassess on Monday, 6/10/19     Plan Comments  CCP consult for acute rehab versus SNF.  Per Dr. Francisco note on 6/7/19- [facility] will reevaluate on Monday, 6/10/19... CCP will follow and assist as needed... Shelly Talbot RN,CCP

## 2019-06-10 NOTE — THERAPY EVALUATION
Acute Care - Occupational Therapy Initial Evaluation  [facility]     Patient Name: Archie Oquendo  : 1965  MRN: [id number]  Today's Date: 6/10/2019  Onset of Illness/Injury or Date of Surgery: 19     Referring Physician: Leo    Admit Date: 2019       ICD-10-CM ICD-9-CM   1. Hyponatremia E87.1 276.1   2. Lumbar radiculopathy M54.16 724.4   3. Gait disorder R26.9 781.2   4. Spinal stenosis, L3-4 M48.062 724.03   5. Lumbar disc herniation -left L4-5 M51.26 722.10   6. History of lumbar fusion Z98.1 V45.4     Patient Active Problem List   Diagnosis   • Hip pain   • Low back pain   • Gait disorder   • Pain in left knee   • Hypertension   • Lumbar radiculopathy   • Spinal stenosis, L3-4   • Lumbar disc herniation -left L4-5   • History of lumbar fusion     Past Medical History:   Diagnosis Date   • Hip pain    • Hypertension    • Low back pain    • Lumbar disc herniation    • Pain in left knee    • Sleep apnea     DOES NOT WEAR CPAP   • Spinal stenosis of lumbar region      Past Surgical History:   Procedure Laterality Date   • LUMBAR DISCECTOMY     • LUMBAR FUSION     • VASECTOMY            OT ASSESSMENT FLOWSHEET (last 12 hours)      Occupational Therapy Evaluation     Row Name 06/10/19 1328                   OT Evaluation Time/Intention    Subjective Information  complains of;weakness;fatigue;pain  -SG        Document Type  evaluation  -SG        Mode of Treatment  individual therapy;occupational therapy  -SG        Patient Effort  fair  -SG           General Information    Patient Profile Reviewed?  yes  -SG        Patient Observations  alert;cooperative;agree to therapy  -SG        Prior Level of Function  independent:;ADL's per pt  -SG        Existing Precautions/Restrictions  brace worn when out of bed brace on when HOB >30 degrees  -SG           Cognitive Assessment/Intervention- PT/OT    Orientation Status (Cognition)  oriented x 4  -SG        Follows Commands (Cognition)  WFL  -SG            Bed Mobility Assessment/Treatment    Rolling Right Walton (Bed Mobility)  minimum assist (75% patient effort)  -SG        Comment (Bed Mobility)  c/o pain with attempts of bed mobility. pt states increased pain and fatigue today  -SG           ADL Assessment/Intervention    BADL Assessment/Intervention  upper body dressing;lower body dressing;grooming  -SG           Upper Body Dressing Assessment/Training    Upper Body Dressing Walton Level  upper body dressing skills;doff;don;supervision  -SG           Lower Body Dressing Assessment/Training    Lower Body Dressing Walton Level  lower body dressing skills;dependent (less than 25% patient effort)  -SG           Grooming Assessment/Training    Walton Level (Grooming)  grooming skills;supervision  -SG           BADL Safety/Performance    Impairments, BADL Safety/Performance  endurance/activity tolerance;strength  -SG        Skilled BADL Treatment/Intervention  BADL process/adaptation training  -SG           General ROM    GENERAL ROM COMMENTS  BUE's WFL AROM  -SG           MMT (Manual Muscle Testing)    General MMT Comments  not assessed due to back safety  -SG           Sensory Assessment/Intervention    Sensory General Assessment  no sensation deficits identified BUE's   -SG           Positioning and Restraints    Pre-Treatment Position  in bed  -SG        Post Treatment Position  bed  -SG        In Bed  call light within reach;encouraged to call for assist;exit alarm on  -SG           Pain Scale: Numbers Pre/Post-Treatment    Pain Scale: Numbers, Pretreatment  3/10  -SG        Pain Scale: Numbers, Post-Treatment  3/10  -SG           Wound 06/05/19 1741 Other (See comments) back incision    Wound - Properties Group Date first assessed: 06/05/19  -SL Time first assessed: 1741  -SL Side: Other (See comments)  -SL Location: back  -SL Type: incision  -SL       Clinical Impression (OT)    OT Diagnosis  need for assist with personal care  -SG         Criteria for Skilled Therapeutic Interventions Met (OT Eval)  yes;treatment indicated  -SG        Therapy Frequency (OT Eval)  5 times/wk  -SG        Care Plan Review (OT)  evaluation/treatment results reviewed  -SG           Planned OT Interventions    Planned Therapy Interventions (OT Eval)  adaptive equipment training;BADL retraining;transfer/mobility retraining  -SG           OT Goals    Bed Mobility Goal Selection (OT)  bed mobility, OT goal 1  -SG        Transfer Goal Selection (OT)  transfer, OT goal 1  -SG        Dressing Goal Selection (OT)  dressing, OT goal 1  -SG           Bed Mobility Goal 1 (OT)    Activity/Assistive Device (Bed Mobility Goal 1, OT)  sit to supine/supine to sit  -SG        Dundy Level/Cues Needed (Bed Mobility Goal 1, OT)  minimum assist (75% or more patient effort)  -SG        Time Frame (Bed Mobility Goal 1, OT)  1 week  -SG        Progress/Outcomes (Bed Mobility Goal 1, OT)  goal ongoing  -SG           Dressing Goal 1 (OT)    Activity/Assistive Device (Dressing Goal 1, OT)  lower body dressing  -SG        Dundy/Cues Needed (Dressing Goal 1, OT)  moderate assist (50-74% patient effort)  -SG        Time Frame (Dressing Goal 1, OT)  1 week  -SG        Progress/Outcome (Dressing Goal 1, OT)  goal ongoing  -SG           Patient Education Goal (OT)    Activity (Patient Education Goal, OT)  pt to state good knowledge for back safety and adls and AE  -SG        Dundy/Cues/Accuracy (Memory Goal 2, OT)  demonstrates adequately;verbalizes understanding  -SG        Time Frame (Patient Education Goal, OT)  1 week  -SG        Progress/Outcome (Patient Education Goal, OT)  goal ongoing  -SG          User Key  (r) = Recorded By, (t) = Taken By, (c) = Cosigned By    Initials Name Effective Dates    Gail Alexander, OTR 06/08/18 -     Corrine Zafar RN 06/16/16 -          Occupational Therapy Education     Title: PT OT SLP Therapies (In Progress)      Topic: Occupational Therapy (In Progress)     Point: ADL training (Done)     Description: Instruct learner(s) on proper safety adaptation and remediation techniques during self care or transfers.   Instruct in proper use of assistive devices.    Learning Progress Summary           Patient Acceptance, E,TB,D, VU,NR by  at 6/10/2019  1:36 PM    Comment:  back safety teaching and discussed AE to assist with adls.  role of OT and POC                               User Key     Initials Effective Dates Name Provider Type Discipline     06/08/18 -  Gail Sahu OTR Occupational Therapist OT                  OT Recommendation and Plan  Planned Therapy Interventions (OT Eval): adaptive equipment training, BADL retraining, transfer/mobility retraining  Therapy Frequency (OT Eval): 5 times/wk  Plan of Care Review  Plan of Care Reviewed With: patient  Plan of Care Reviewed With: patient  Outcome Summary: Pt presents to OT with decreased strength and endurance, pt states increased pain today with attempts at movement which limits safety and independence for adls. Pt using BUE's functionally and able to martha/doff gown with SBA, grooming tasks SBA, LE adls max to dependent today.  Pt will continue to benefit from OT for AE teaching, back safety teaching and increasing functional strength and endurance for adls.      Outcome Measures     Row Name 06/10/19 1341 06/10/19 0900 06/09/19 1300       How much help from another person do you currently need...    Turning from your back to your side while in flat bed without using bedrails?  --  3  -MA  3  -CS    Moving from lying on back to sitting on the side of a flat bed without bedrails?  --  3  -MA  2  -CS    Moving to and from a bed to a chair (including a wheelchair)?  --  3  -MA  3  -CS    Standing up from a chair using your arms (e.g., wheelchair, bedside chair)?  --  3  -MA  3  -CS    Climbing 3-5 steps with a railing?  --  2  -MA  2  -CS    To walk in hospital room?  --   2  -MA  2  -CS    AM-PAC 6 Clicks Score  --  16  -MA  15  -CS       How much help from another is currently needed...    Putting on and taking off regular lower body clothing?  1  -SG  --  --    Bathing (including washing, rinsing, and drying)  2  -SG  --  --    Toileting (which includes using toilet bed pan or urinal)  2  -SG  --  --    Putting on and taking off regular upper body clothing  3  -SG  --  --    Taking care of personal grooming (such as brushing teeth)  3  -SG  --  --    Eating meals  4  -SG  --  --    Score  15  -SG  --  --       Functional Assessment    Outcome Measure Options  AM-PAC 6 Clicks Daily Activity (OT)  -SG  AM-PAC 6 Clicks Basic Mobility (PT)  -MA  AM-[facility] 6 Clicks Basic Mobility (PT)  -CS    Row Name 06/08/19 1600             How much help from another person do you currently need...    Turning from your back to your side while in flat bed without using bedrails?  3  -CH      Moving from lying on back to sitting on the side of a flat bed without bedrails?  2  -CH      Moving to and from a bed to a chair (including a wheelchair)?  3  -CH      Standing up from a chair using your arms (e.g., wheelchair, bedside chair)?  3  -CH      Climbing 3-5 steps with a railing?  2  -CH      To walk in hospital room?  2  -CH      AM-PAC 6 Clicks Score  15  -CH         Functional Assessment    Outcome Measure Options  AM-[facility] 6 Clicks Basic Mobility (PT)  -        User Key  (r) = Recorded By, (t) = Taken By, (c) = Cosigned By    Initials Name Provider Type    SG Gail Sahu, OTR Occupational Therapist    CH Phyllis Otero, PT Physical Therapist    Corinna Dave, PT Physical Therapist    Devin Abarca, PT Physical Therapist          Time Calculation:   Time Calculation- OT     Row Name 06/10/19 1341             Time Calculation- OT    OT Start Time  1307  -SG      OT Stop Time  1325  -      OT Time Calculation (min)  18 min  -      Total Timed Code Minutes- OT  9 minute(s)  -       OT Received On  06/10/19  -BRADLEY      OT Goal Re-Cert Due Date  06/17/19  -        User Key  (r) = Recorded By, (t) = Taken By, (c) = Cosigned By    Initials Name Provider Type    Gail Alexander OTR Occupational Therapist        Therapy Charges for Today     Code Description Service Date Service Provider Modifiers Qty    [id number]  OT EVAL LOW COMPLEXITY 2 6/10/2019 Gail Sahu OTR GO 1    [id number]  OT SELF CARE/MGMT/TRAIN EA 15 MIN 6/10/2019 Gail Sahu OTR GO 1               JAMIE Rodriguez  6/10/2019

## 2019-06-10 NOTE — PLAN OF CARE
Problem: Patient Care Overview  Goal: Plan of Care Review  Outcome: Ongoing (interventions implemented as appropriate)   06/10/19 0546   Coping/Psychosocial   Plan of Care Reviewed With patient   Plan of Care Review   Progress improving   OTHER   Outcome Summary Tachycardic at the beginning of the shift with 's-130. This morning, HR= 90's-110's. Pt is taking flexeril and percocet for pain. JV drain has put out 230 mls of serosanguinous fluid. Continues to have some urinary retention. the last time he voided 700 and bladder scan revealed 104. Pt states he feels like he is emptying his bladder. Potassium replaced per protocol this shift. Reched scheduled for 08:00 this morning. No other changes in assessment.        Problem: Skin Injury Risk (Adult)  Goal: Identify Related Risk Factors and Signs and Symptoms  Outcome: Ongoing (interventions implemented as appropriate)    Goal: Skin Health and Integrity  Outcome: Ongoing (interventions implemented as appropriate)      Problem: Fall Risk (Adult)  Goal: Identify Related Risk Factors and Signs and Symptoms  Outcome: Ongoing (interventions implemented as appropriate)    Goal: Absence of Fall  Outcome: Ongoing (interventions implemented as appropriate)      Problem: Pain, Acute (Adult)  Goal: Identify Related Risk Factors and Signs and Symptoms  Outcome: Ongoing (interventions implemented as appropriate)    Goal: Acceptable Pain Control/Comfort Level  Outcome: Ongoing (interventions implemented as appropriate)

## 2019-06-10 NOTE — PLAN OF CARE
Problem: Patient Care Overview  Goal: Plan of Care Review   06/10/19 0906   OTHER   Outcome Summary Pt has increased pain this date and is unable to ambulate as far. Recieved pain meds 1hr prior to session. Will continue to progress as tolerated.

## 2019-06-10 NOTE — NURSING NOTE
RN encouraged patient to turn and allow staff to change soiled sheets.  Patient refuses.  Education patient importance of daily hygiene and turning.  Patient verbalized understanding.

## 2019-06-10 NOTE — NURSING NOTE
Discussion with patient regarding rehab program, evaluation and precert process.  Desires acute inpatient rehab at [facility].  Precert initiated.

## 2019-06-10 NOTE — PLAN OF CARE
Problem: Patient Care Overview  Goal: Plan of Care Review  Outcome: Ongoing (interventions implemented as appropriate)   06/10/19 5084   Coping/Psychosocial   Plan of Care Reviewed With patient   OTHER   Outcome Summary Pt presents to OT with decreased strength and endurance, pt states increased pain today with attempts at movement which limits safety and independence for adls. Pt using BUE's functionally and able to martha/doff gown with SBA, grooming tasks SBA, LE adls max to dependent today. Pt will continue to benefit from OT for AE teaching, back safety teaching and increasing functional strength and endurance for adls.

## 2019-06-10 NOTE — PROGRESS NOTES
[facility] PROGRESS NOTE    PATIENT IDENTIFICATION:   Name:  Archie Oquendo      MRN:  [id number]     53 y.o.  male               CC: POD #5 s/p T11-S2 posterior fusion with L3/4 lumbar decompression and left L5/L6 decompression/ LLE weakness      Subjective     Interval History: has complaints of low back pain and muscle tightness, remains numb R LE below the know, strength unchanged post op LLE     Objective     Vital signs in last 24 hours:  Temp:  [98 °F (36.7 °C)-99.3 °F (37.4 °C)] 98 °F (36.7 °C)  Heart Rate:  [106-127] 108  Resp:  [18-21] 21  BP: (134-147)/(76-87) 142/81  ICP ranges-    Intake/Output last 3 shifts:  I/O last 3 completed shifts:  In: 4715 [P.O.:1680; I.V.:2785; IV Piggyback:250]  Out: 33629 [Urine:62853; Drains:715]    Intake/Output this shift:  I/O this shift:  In: -   Out: 695 [Urine:650; Drains:45]    JV drain 405 mL's last 24 hours    Physical Exam:  General:   Awake, alert, and oriented x 3. NAD  Appears well  forehead and B facial bruising, tender with ecchymosis  JV drain with serosanguineous output  Cranial nerves grossly intact  Tachycardia   well-healing thoracic/lumbar incision site, flat intact and well approximated   left lower extremity weakness unchanged postop, 2/5 dorsiflexion strength  Decreased sensory to soft touch right lower extremity  SCDs bilaterally        LABS:    Lab Results (last 24 hours)     Procedure Component Value Units Date/Time    CBC & Differential [[id number]] Collected:  06/09/19 1839    Specimen:  Blood Updated:  06/09/19 2146    Narrative:       The following orders were created for panel order CBC & Differential.  Procedure                               Abnormality         Status                     ---------                               -----------         ------                     CBC Auto Differential[[id number]]        Abnormal            Final result                 Please view results for these tests on the individual  orders.    CBC Auto Differential [[id number]]  (Abnormal) Collected:  06/09/19 1839    Specimen:  Blood Updated:  06/09/19 2146     WBC 6.26 10*3/mm3      RBC 2.86 10*6/mm3      Hemoglobin 8.8 g/dL      Hematocrit 28.0 %      MCV 97.9 fL      MCH 30.8 pg      MCHC 31.4 g/dL      RDW 12.6 %      RDW-SD 45.3 fl      MPV 9.9 fL      Platelets 203 10*3/mm3      Neutrophil % 59.6 %      Lymphocyte % 26.2 %      Monocyte % 9.1 %      Eosinophil % 4.3 %      Basophil % 0.3 %      Immature Grans % 0.5 %      Neutrophils, Absolute 3.73 10*3/mm3      Lymphocytes, Absolute 1.64 10*3/mm3      Monocytes, Absolute 0.57 10*3/mm3      Eosinophils, Absolute 0.27 10*3/mm3      Basophils, Absolute 0.02 10*3/mm3      Immature Grans, Absolute 0.03 10*3/mm3      nRBC 0.2 /100 WBC     Comprehensive Metabolic Panel [[id number]]  (Abnormal) Collected:  06/09/19 1923    Specimen:  Blood Updated:  06/09/19 1951     Glucose 120 mg/dL      BUN 7 mg/dL      Creatinine 0.60 mg/dL      Sodium 135 mmol/L      Potassium 3.4 mmol/L      Chloride 99 mmol/L      CO2 26.8 mmol/L      Calcium 7.7 mg/dL      Total Protein 5.5 g/dL      Albumin 3.10 g/dL      ALT (SGPT) 36 U/L      AST (SGOT) 37 U/L      Alkaline Phosphatase 51 U/L      Total Bilirubin 0.3 mg/dL      eGFR Non African Amer 140 mL/min/1.73      Globulin 2.4 gm/dL      A/G Ratio 1.3 g/dL      BUN/Creatinine Ratio 11.7     Anion Gap 9.2 mmol/L     Narrative:       GFR Normal >60  Chronic Kidney Disease <60  Kidney Failure <15    Potassium [[id number]]  (Normal) Collected:  06/10/19 0755    Specimen:  Blood Updated:  06/10/19 0831     Potassium 4.0 mmol/L     CBC & Differential [[id number]] Collected:  06/10/19 0755    Specimen:  Blood Updated:  06/10/19 0813    Narrative:       The following orders were created for panel order CBC & Differential.  Procedure                               Abnormality         Status                     ---------                               -----------          ------                     CBC Auto Differential[[id number]]        Abnormal            Final result                 Please view results for these tests on the individual orders.    CBC Auto Differential [[id number]]  (Abnormal) Collected:  06/10/19 0755    Specimen:  Blood Updated:  06/10/19 0813     WBC 5.55 10*3/mm3      RBC 2.69 10*6/mm3      Hemoglobin 8.3 g/dL      Hematocrit 26.2 %      MCV 97.4 fL      MCH 30.9 pg      MCHC 31.7 g/dL      RDW 12.9 %      RDW-SD 45.3 fl      MPV 9.1 fL      Platelets 199 10*3/mm3      Neutrophil % 54.7 %      Lymphocyte % 30.1 %      Monocyte % 9.9 %      Eosinophil % 4.5 %      Basophil % 0.4 %      Immature Grans % 0.4 %      Neutrophils, Absolute 3.04 10*3/mm3      Lymphocytes, Absolute 1.67 10*3/mm3      Monocytes, Absolute 0.55 10*3/mm3      Eosinophils, Absolute 0.25 10*3/mm3      Basophils, Absolute 0.02 10*3/mm3      Immature Grans, Absolute 0.02 10*3/mm3      nRBC 0.0 /100 WBC           IMAGING STUDIES:    No new imaging    Meds reviewed/changed: Yes    Current Facility-Administered Medications   Medication Dose Route Frequency Provider Last Rate Last Dose   • bisacodyl (DULCOLAX) EC tablet 10 mg  10 mg Oral Daily PRN Corbin Bailey MD   10 mg at 06/07/19 1408   • bisacodyl (DULCOLAX) suppository 10 mg  10 mg Rectal Daily PRN Corbin Bailey MD   10 mg at 06/06/19 1213   • cyclobenzaprine (FLEXERIL) tablet 10 mg  10 mg Oral TID PRN Corbin Bailey MD   10 mg at 06/10/19 0855   • docusate sodium (COLACE) capsule 100 mg  100 mg Oral BID PRN Corbin Bailey MD   100 mg at 06/10/19 0855   • HYDROcodone-acetaminophen (NORCO) 7.5-325 MG per tablet 1 tablet  1 tablet Oral Q4H PRN Corbin Bailey MD       • lisinopril (PRINIVIL,ZESTRIL) 10 mg, hydrochlorothiazide (MICROZIDE) 12.5 mg for ZESTORETIC 10-12.5   Oral Q24H Corbin Bailey MD       • morphine injection 4 mg  4 mg Intravenous Q2H PRN Corbin Bailey MD   4 mg at 06/06/19 1925    And   • naloxone  "(NARCAN) injection 0.4 mg  0.4 mg Intravenous Q5 Min PRN Corbin Bailey MD       • Morphine injection 6 mg  6 mg Intravenous Q2H PRN Corbin Bailey MD   6 mg at 06/06/19 0551    And   • naloxone (NARCAN) injection 0.4 mg  0.4 mg Intravenous Q5 Min PRN Corbin Bailey MD       • ondansetron (ZOFRAN) tablet 4 mg  4 mg Oral Q6H PRN Corbin Bailey MD        Or   • ondansetron (ZOFRAN) injection 4 mg  4 mg Intravenous Q6H PRN Corbin Bailey MD   4 mg at 06/06/19 2146   • oxyCODONE-acetaminophen (PERCOCET)  MG per tablet 1 tablet  1 tablet Oral Q4H PRN Corbin Bailey MD   1 tablet at 06/10/19 0855   • potassium chloride (MICRO-K) CR capsule 40 mEq  40 mEq Oral PRN Veto Lo MD   40 mEq at 06/10/19 0437   • sodium chloride tablet 2 g  2 g Oral TID With Meals Nidia Castañeda APRN   2 g at 06/10/19 0855   • tamsulosin (FLOMAX) 24 hr capsule 0.4 mg  0.4 mg Oral Daily Koko Sung MD   0.4 mg at 06/10/19 0855       Assessment/Plan     ASSESSMENT:      Gait disorder    Lumbar radiculopathy    Spinal stenosis, L3-4    Lumbar disc herniation -left L4-5    History of lumbar fusion      PLAN: 1 JV drain remains in with high volume output, 405 mL's last 24 hours.  Hemoglobin 8.3 this morning.  He is tachycardic, likely due to pain.  He is wearing the TLSO brace when sitting up and out of bed.  Continue working with physical therapy.  Pending Dr. Francisco evaluation.  Hopeful DC to [facility] acute rehab later this week.    I discussed the patients findings and my recommendations with patient, nursing staff and Dr Bailey       LOS: 5 days       VERONICA Pearce  6/10/2019  12:04 PM      \"Dictated utilizing Dragon dictation\".      "

## 2019-06-10 NOTE — PLAN OF CARE
Problem: Patient Care Overview  Goal: Plan of Care Review  Outcome: Ongoing (interventions implemented as appropriate)   06/10/19 1509   Coping/Psychosocial   Plan of Care Reviewed With patient;family   Plan of Care Review   Progress improving   OTHER   Outcome Summary c/o pain 6/10 prn pain meds given; AAOx4; anxious and difficult at times; BLE weakness, numbness unchanged; incision to midline back CDI       Problem: Laminectomy/Foraminotomy/Discectomy (Adult)  Goal: Signs and Symptoms of Listed Potential Problems Will be Absent, Minimized or Managed (Laminectomy/Foraminotomy/Discectomy)  Outcome: Ongoing (interventions implemented as appropriate)   06/10/19 1509   Goal/Outcome Evaluation   Problems Assessed (Laminectomy/Laminotomy/Discectomy) all   Problems Present (Laminectomy/otomy) functional decline/self-care deficit       Problem: Fall Risk (Adult)  Goal: Identify Related Risk Factors and Signs and Symptoms  Outcome: Ongoing (interventions implemented as appropriate)    Goal: Absence of Fall  Outcome: Ongoing (interventions implemented as appropriate)   06/10/19 1509   Fall Risk (Adult)   Absence of Fall making progress toward outcome       Problem: Pain, Acute (Adult)  Goal: Identify Related Risk Factors and Signs and Symptoms  Outcome: Ongoing (interventions implemented as appropriate)   06/10/19 1509   Pain, Acute (Adult)   Related Risk Factors (Acute Pain) disease process;surgery   Signs and Symptoms (Acute Pain) verbalization of pain descriptors     Goal: Acceptable Pain Control/Comfort Level  Outcome: Ongoing (interventions implemented as appropriate)   06/10/19 1509   Pain, Acute (Adult)   Acceptable Pain Control/Comfort Level making progress toward outcome

## 2019-06-10 NOTE — THERAPY TREATMENT NOTE
Acute Care - Physical Therapy Treatment Note  [facility]     Patient Name: Archie Oquendo  : 1965  MRN: [id number]  Today's Date: 6/10/2019  Onset of Illness/Injury or Date of Surgery: 19     Referring Physician: Leo    Admit Date: 2019    Visit Dx:    ICD-10-CM ICD-9-CM   1. Hyponatremia E87.1 276.1   2. Lumbar radiculopathy M54.16 724.4   3. Gait disorder R26.9 781.2   4. Spinal stenosis, L3-4 M48.062 724.03   5. Lumbar disc herniation -left L4-5 M51.26 722.10   6. History of lumbar fusion Z98.1 V45.4     Patient Active Problem List   Diagnosis   • Hip pain   • Low back pain   • Gait disorder   • Pain in left knee   • Hypertension   • Lumbar radiculopathy   • Spinal stenosis, L3-4   • Lumbar disc herniation -left L4-5   • History of lumbar fusion       Therapy Treatment    Rehabilitation Treatment Summary     Row Name 06/10/19 0953             Treatment Time/Intention    Discipline  physical therapist  -MA      Document Type  therapy note (daily note)  -MA      Subjective Information  complains of;weakness;fatigue;pain  -MA      Mode of Treatment  physical therapy;individual therapy  -MA      Patient/Family Observations  supine in bed, no acute distress at rest   -MA      Patient Effort  adequate  -MA      Existing Precautions/Restrictions  brace worn when out of bed;fall  -MA      Recorded by [MA] Corinna Mckee, PT 06/10/19 0958      Row Name 06/10/19 0953             Vital Signs    Pretreatment Heart Rate (beats/min)  109  -MA      Intratreatment Heart Rate (beats/min)  131  -MA      Posttreatment Heart Rate (beats/min)  116  -MA      O2 Delivery Pre Treatment  room air  -MA      Recorded by [MA] Corinna Mckee, PT 06/10/19 0958      Row Name 06/10/19 0953             Cognitive Assessment/Intervention- PT/OT    Orientation Status (Cognition)  oriented x 4  -MA      Follows Commands (Cognition)  WNL  -MA      Personal Safety Interventions  fall prevention program maintained;gait  "belt;muscle strengthening facilitated;nonskid shoes/slippers when out of bed;supervised activity  -MA      Recorded by [MA] Corinna Mckee, PT 06/10/19 0958      Row Name 06/10/19 0953             Bed Mobility Assessment/Treatment    Supine-Sit [place] (Bed Mobility)  minimum assist (75% patient effort)  -MA      Sit-Supine [place] (Bed Mobility)  moderate assist (50% patient effort)  -MA      Assistive Device (Bed Mobility)  bed rails  -MA      Recorded by [MA] Corinna Mckee, PT 06/10/19 0958      Row Name 06/10/19 0953             Sit-Stand Transfer    Sit-Stand [place] (Transfers)  minimum assist (75% patient effort);2 person assist  -MA      Assistive Device (Sit-Stand Transfers)  walker, front-wheeled  -MA      Recorded by [MA] Corinna Mckee, PT 06/10/19 0958      Row Name 06/10/19 0953             Stand-Sit Transfer    Stand-Sit [place] (Transfers)  minimum assist (75% patient effort);2 person assist  -MA      Assistive Device (Stand-Sit Transfers)  walker, front-wheeled  -MA      Recorded by [MA] Corinna Mckee, PT 06/10/19 0958      Row Name 06/10/19 0953             Gait/Stairs Assessment/Training    [place] Level (Gait)  minimum assist (75% patient effort);2 person assist  -MA      Assistive Device (Gait)  walker, front-wheeled  -MA      Distance in Feet (Gait)  3  -MA      Pattern (Gait)  step-to  -MA      Deviations/Abnormal Patterns (Gait)  landy decreased;stride length decreased  -MA      Bilateral Gait Deviations  heel strike decreased  -MA      Left Sided Gait Deviations  foot drop/toe drag  -MA      Comment (Gait/Stairs)  steppage gait bilaterally, distance limited by pain, reports LEs feel \"really stuff,\" unwilling to try to continue to walk to loosen LEs  -MA      Recorded by [MA] Corinna Mckee, PT 06/10/19 0958      Row Name 06/10/19 0953             Positioning and Restraints    Pre-Treatment Position  in bed  -MA      Post Treatment Position  bed  -MA      In Bed  " supine;notified nsg;call light within reach;encouraged to call for assist;exit alarm on  -MA      Recorded by [MA] Corinna Mckee, PT 06/10/19 0958      Row Name 06/10/19 0953             Pain Assessment    Additional Documentation  Pain Scale: Word Pre/Post-Treatment (Group)  -MA      Recorded by [MA] Corinna Mckee, PT 06/10/19 0958      Row Name 06/10/19 0953             Pain Scale: Word Pre/Post-Treatment    Pain: Word Scale, Pretreatment  4 - moderate pain  -MA      Pain: Word Scale, Post-Treatment  8 - severe pain  -MA      Pre/Post Treatment Pain Comment  back pain, pt received pain meds about 1 hr ago   -MA      Recorded by [MA] Corinna Mckee, PT 06/10/19 0958      Row Name                Wound 06/05/19 1741 Other (See comments) back incision    Wound - Properties Group Date first assessed: 06/05/19 [SL] Time first assessed: 1741 [SL] Side: Other (See comments) [SL] Location: back [SL] Type: incision [SL] Recorded by:  [SL] Corrine Vigil RN 06/05/19 1741      User Key  (r) = Recorded By, (t) = Taken By, (c) = Cosigned By    Initials Name Effective Dates Discipline    SL Corrine Vigil RN 06/16/16 -  Nurse    Corinna Dave, PT 10/19/18 -  PT          Wound 06/05/19 1741 Other (See comments) back incision (Active)   Dressing Appearance open to air 6/9/2019 11:16 PM   Closure Liquid skin adhesive;Open to air 6/9/2019 11:16 PM   Periwound intact;dry 6/9/2019 11:16 PM   Periwound Temperature warm 6/9/2019 11:16 PM   Periwound Skin Turgor soft 6/9/2019 11:16 PM   Drainage Amount none 6/9/2019 11:16 PM   Dressing Care, Wound open to air 6/9/2019 11:16 PM           Physical Therapy Education     Title: PT OT SLP Therapies (Done)     Topic: Physical Therapy (Done)     Point: Mobility training (Done)     Learning Progress Summary           Patient Acceptance, E, VU,NR by MA at 6/10/2019  9:58 AM    Acceptance, E,TB, VU by ALLA at 6/9/2019  1:02 PM    Acceptance, E,TB,D, VU,NR by  at 6/8/2019   4:22 PM    Acceptance, E, NR by AR at 6/7/2019  3:15 PM    Acceptance, E, NR by AR at 6/6/2019  3:25 PM                   Point: Home exercise program (Done)     Learning Progress Summary           Patient Acceptance, E, VU,NR by MA at 6/10/2019  9:58 AM    Acceptance, E,TB, VU by  at 6/9/2019  1:02 PM    Acceptance, E,TB,D, VU,NR by  at 6/8/2019  4:22 PM    Acceptance, E, NR by AR at 6/7/2019  3:15 PM    Acceptance, E, NR by AR at 6/6/2019  3:25 PM                   Point: Body mechanics (Done)     Learning Progress Summary           Patient Acceptance, E, VU,NR by MA at 6/10/2019  9:58 AM    Acceptance, E,TB, VU by  at 6/9/2019  1:02 PM    Acceptance, E,TB,D, VU,NR by  at 6/8/2019  4:22 PM    Acceptance, E, NR by AR at 6/7/2019  3:15 PM    Acceptance, E, NR by AR at 6/6/2019  3:25 PM                   Point: Precautions (Done)     Learning Progress Summary           Patient Acceptance, E, VU,NR by MA at 6/10/2019  9:58 AM    Acceptance, E,TB, VU by  at 6/9/2019  1:02 PM    Acceptance, E,TB,D, VU,NR by  at 6/8/2019  4:22 PM    Acceptance, E, NR by AR at 6/7/2019  3:15 PM    Acceptance, E, NR by AR at 6/6/2019  3:25 PM                               User Key     Initials Effective Dates Name Provider Type Discipline     04/03/18 -  Phyllis Otero, PT Physical Therapist PT    AR 04/03/18 -  Kassidy Jorge, PT Physical Therapist PT    MA 10/19/18 -  Corinna Mckee, PT Physical Therapist PT     05/14/18 -  Devin Moore, PT Physical Therapist PT                PT Recommendation and Plan     Outcome Summary: Pt has increased pain this date and is unable to ambulate as far. Recieved pain meds 1hr prior to session. Will continue to progress as tolerated.   Outcome Measures     Row Name 06/10/19 0900 06/09/19 1300 06/08/19 1600       How much help from another person do you currently need...    Turning from your back to your side while in flat bed without using bedrails?  3  -MA  3  -CS  3  -CH     Moving from lying on back to sitting on the side of a flat bed without bedrails?  3  -MA  2  -CS  2  -CH    Moving to and from a bed to a chair (including a wheelchair)?  3  -MA  3  -CS  3  -CH    Standing up from a chair using your arms (e.g., wheelchair, bedside chair)?  3  -MA  3  -CS  3  -CH    Climbing 3-5 steps with a railing?  2  -MA  2  -CS  2  -CH    To walk in hospital room?  2  -MA  2  -CS  2  -CH    AM-PAC 6 Clicks Score  16  -MA  15  -CS  15  -CH       Functional Assessment    Outcome Measure Options  AM-PAC 6 Clicks Basic Mobility (PT)  -MA  AM-PAC 6 Clicks Basic Mobility (PT)  -CS  AM-PAC 6 Clicks Basic Mobility (PT)  -CH      User Key  (r) = Recorded By, (t) = Taken By, (c) = Cosigned By    Initials Name Provider Type     Phyllis Otero, PT Physical Therapist    Corinna Dave, PT Physical Therapist    Devin Abarca, PT Physical Therapist         Time Calculation:   PT Charges     Row Name 06/10/19 1000             Time Calculation    Start Time  0931  -MA      Stop Time  0951  -MA      Time Calculation (min)  20 min  -MA      PT Received On  06/10/19  -MA      PT - Next Appointment  06/11/19  -MA         Time Calculation- PT    Total Timed Code Minutes- PT  20 minute(s)  -MA        User Key  (r) = Recorded By, (t) = Taken By, (c) = Cosigned By    Initials Name Provider Type    Corinna Dave, PT Physical Therapist        Therapy Charges for Today     Code Description Service Date Service Provider Modifiers Qty    34281926622 HC PT THER PROC EA 15 MIN 6/10/2019 Corinna Mckee, PT GP 1    16143417742 HC PT THER SUPP EA 15 MIN 6/10/2019 Corinna Mckee, PT GP 1          PT G-Codes  Outcome Measure Options: AM-PAC 6 Clicks Basic Mobility (PT)  AM-PAC 6 Clicks Score: 16    Corinna Mckee, PT  6/10/2019

## 2019-06-10 NOTE — PROGRESS NOTES
Continued Stay Note  [facility]     Patient Name: Archie Oquendo  MRN: [id number]  Today's Date: 6/10/2019    Admit Date: 6/5/2019    Discharge Plan     Row Name 06/10/19 1124       Plan    Plan Comments  Transfer from ICU to  06/07/19 @ 1846.  Awaiting Dr Francisco to see for poss acute rehab at d/c.  Awaiting OT eval order.  CCP to follow.                     Seun Guzman RN

## 2019-06-11 PROBLEM — R33.9 URINARY RETENTION: Status: ACTIVE | Noted: 2019-06-11

## 2019-06-11 PROBLEM — E87.6 HYPOKALEMIA: Status: ACTIVE | Noted: 2019-06-11

## 2019-06-11 PROBLEM — I10 BENIGN ESSENTIAL HTN: Status: ACTIVE | Noted: 2019-06-11

## 2019-06-11 LAB
ABO + RH BLD: NORMAL
ABO + RH BLD: NORMAL
BH BB BLOOD EXPIRATION DATE: NORMAL
BH BB BLOOD EXPIRATION DATE: NORMAL
BH BB BLOOD TYPE BARCODE: 5100
BH BB BLOOD TYPE BARCODE: 5100
BH BB DISPENSE STATUS: NORMAL
BH BB DISPENSE STATUS: NORMAL
BH BB PRODUCT CODE: NORMAL
BH BB PRODUCT CODE: NORMAL
BH BB UNIT NUMBER: NORMAL
BH BB UNIT NUMBER: NORMAL
GLUCOSE BLDC GLUCOMTR-MCNC: 105 MG/DL (ref 70–130)
GLUCOSE BLDC GLUCOMTR-MCNC: 121 MG/DL (ref 70–130)
UNIT  ABO: NORMAL
UNIT  ABO: NORMAL
UNIT  RH: NORMAL
UNIT  RH: NORMAL

## 2019-06-11 PROCEDURE — 99024 POSTOP FOLLOW-UP VISIT: CPT | Performed by: NEUROLOGICAL SURGERY

## 2019-06-11 PROCEDURE — 82962 GLUCOSE BLOOD TEST: CPT

## 2019-06-11 PROCEDURE — 97110 THERAPEUTIC EXERCISES: CPT

## 2019-06-11 RX ADMIN — CYCLOBENZAPRINE 10 MG: 10 TABLET, FILM COATED ORAL at 11:59

## 2019-06-11 RX ADMIN — OXYCODONE HYDROCHLORIDE AND ACETAMINOPHEN 1 TABLET: 10; 325 TABLET ORAL at 16:06

## 2019-06-11 RX ADMIN — HYDROCODONE BITARTRATE AND ACETAMINOPHEN 1 TABLET: 7.5; 325 TABLET ORAL at 20:25

## 2019-06-11 RX ADMIN — LISINOPRIL: 10 TABLET ORAL at 09:24

## 2019-06-11 RX ADMIN — CYCLOBENZAPRINE 10 MG: 10 TABLET, FILM COATED ORAL at 20:25

## 2019-06-11 RX ADMIN — SODIUM CHLORIDE TAB 1 GM 2 G: 1 TAB at 09:25

## 2019-06-11 RX ADMIN — OXYCODONE HYDROCHLORIDE AND ACETAMINOPHEN 1 TABLET: 10; 325 TABLET ORAL at 03:45

## 2019-06-11 RX ADMIN — DOCUSATE SODIUM 100 MG: 100 CAPSULE, LIQUID FILLED ORAL at 08:14

## 2019-06-11 RX ADMIN — HYDROCODONE BITARTRATE AND ACETAMINOPHEN 1 TABLET: 7.5; 325 TABLET ORAL at 08:14

## 2019-06-11 RX ADMIN — OXYCODONE HYDROCHLORIDE AND ACETAMINOPHEN 1 TABLET: 10; 325 TABLET ORAL at 06:00

## 2019-06-11 RX ADMIN — CYCLOBENZAPRINE 10 MG: 10 TABLET, FILM COATED ORAL at 03:45

## 2019-06-11 NOTE — PROGRESS NOTES
Name: Archie Oquendo ADMIT: 2019   : 1965  PCP: Yudy Luna APRN    MRN: [id number] LOS: 6 days   AGE/SEX: 53 y.o. male  ROOM: [facility]   Subjective   No chief complaint on file.  cc- back pain    Pain is fair  Working with therapists    ROS  No f/c  No n/v  No cp/palp  No soa/cough    Objective   Vital Signs  Temp:  [97.7 °F (36.5 °C)-98.6 °F (37 °C)] 98 °F (36.7 °C)  Heart Rate:  [102-114] 113  Resp:  [19-20] 19  BP: (140-155)/(80-99) 146/99  SpO2:  [92 %-99 %] 99 %  on   ;   Device (Oxygen Therapy): room air  Body mass index is 26.69 kg/m².    Physical Exam   Constitutional: He is oriented to person, place, and time. He appears well-developed and well-nourished. No distress.   HENT:   Head: Normocephalic and atraumatic.   Mouth/Throat: Oropharynx is clear and moist.   Eyes: Conjunctivae are normal. No scleral icterus.   Neck: Normal range of motion. Neck supple.   Cardiovascular: Normal rate, regular rhythm and normal heart sounds.   No murmur heard.  Pulmonary/Chest: Effort normal and breath sounds normal. No respiratory distress.   Abdominal: Soft. Bowel sounds are normal. There is no tenderness.   Genitourinary:   Genitourinary Comments: Deferred    Musculoskeletal: He exhibits no edema or deformity.   Neurological: He is alert and oriented to person, place, and time.   Skin: Skin is warm and dry. He is not diaphoretic.   Psychiatric: He has a normal mood and affect. His behavior is normal. Thought content normal.   Nursing note and vitals reviewed.      Results Review:       I reviewed the patient's new clinical results.  Results from last 7 days   Lab Units 06/10/19  0755 19  1839 19  1031 19  0503   WBC 10*3/mm3 5.55 6.26 5.77 7.58  7.58   HEMOGLOBIN g/dL 8.3* 8.8* 8.6* 9.2*   PLATELETS 10*3/mm3 199 203 180 134*     Results from last 7 days   Lab Units 06/10/19  0755 19  1923 19  1031 19  0436 19  0908   SODIUM mmol/L  --  135* 140 129*  134*   POTASSIUM mmol/L 4.0 3.4* 3.7 3.7 3.7   CHLORIDE mmol/L  --  99 104 95* 98   CO2 mmol/L  --  26.8 27.4 24.6 26.5   BUN mg/dL  --  7 6 10 13   CREATININE mg/dL  --  0.60* 0.61* 0.63* 0.87   GLUCOSE mg/dL  --  120* 95 129* 120*   Estimated Creatinine Clearance: 185.5 mL/min (A) (by C-G formula based on SCr of 0.6 mg/dL (L)).  Results from last 7 days   Lab Units 06/09/19  1923   ALBUMIN g/dL 3.10*   BILIRUBIN mg/dL 0.3   ALK PHOS U/L 51   AST (SGOT) U/L 37   ALT (SGPT) U/L 36     Results from last 7 days   Lab Units 06/09/19  1923 06/09/19  1031 06/07/19  0436 06/06/19  0908   CALCIUM mg/dL 7.7* 7.9* 7.7* 7.8*   ALBUMIN g/dL 3.10*  --   --   --          Coag     HbA1C No results found for: HGBA1C  Infection     Radiology(recent) No radiology results for the last day  No results found for: TROPONINT, TROPONINI, BNP  No components found for: TSH;2      lisinopril-hydroCHLOROthiazide (ZESTORETIC) 10-12.5 mg combo dose  Oral Q24H   sodium chloride 2 g Oral TID With Meals   tamsulosin 0.4 mg Oral Daily      Diet Regular; Thin      Assessment/Plan      Active Hospital Problems    Diagnosis  POA   • Benign essential HTN [I10]  Yes   • Urinary retention [R33.9]  Unknown   • Hypokalemia [E87.6]  Unknown   • Spinal stenosis, L3-4 [M48.062]  Yes   • Lumbar disc herniation -left L4-5 [M51.26]  Yes   • History of lumbar fusion [Z98.1]  Not Applicable   • Lumbar radiculopathy [M54.16]  Yes   • Gait disorder [R26.9]  Yes      Resolved Hospital Problems   No resolved problems to display.       · Lisinopril and HCTZ for HTN  · Flomax for likely BPH/retention (improved)  · Working with PT  · K improved      Reviewed records      Jordan Craven MD  [facility]  06/11/19  12:27 PM

## 2019-06-11 NOTE — THERAPY TREATMENT NOTE
Acute Care - Physical Therapy Treatment Note  [facility]     Patient Name: Archie Oquendo  : 1965  MRN: [id number]  Today's Date: 2019  Onset of Illness/Injury or Date of Surgery: 19     Referring Physician: Leo    Admit Date: 2019    Visit Dx:    ICD-10-CM ICD-9-CM   1. Hyponatremia E87.1 276.1   2. Lumbar radiculopathy M54.16 724.4   3. Gait disorder R26.9 781.2   4. Spinal stenosis, L3-4 M48.062 724.03   5. Lumbar disc herniation -left L4-5 M51.26 722.10   6. History of lumbar fusion Z98.1 V45.4     Patient Active Problem List   Diagnosis   • Hip pain   • Low back pain   • Gait disorder   • Pain in left knee   • Hypertension   • Lumbar radiculopathy   • Spinal stenosis, L3-4   • Lumbar disc herniation -left L4-5   • History of lumbar fusion       Therapy Treatment    Rehabilitation Treatment Summary     Row Name 19 1005             Treatment Time/Intention    Discipline  physical therapist  -MA      Document Type  therapy note (daily note)  -MA      Subjective Information  complains of;weakness;fatigue;pain  -MA      Mode of Treatment  physical therapy;individual therapy  -MA2      Patient/Family Observations  supine in bed, no acute distress at rest, family present  -MA2      Patient Effort  fair  -MA2      Existing Precautions/Restrictions  fall;brace worn when out of bed;spinal  -MA2      Recorded by [MA] Corinna Mckee, PT 19 1017  [MA2] Corinna Mckee, PT 19 1021      Row Name 19 1005             Vital Signs    O2 Delivery Pre Treatment  room air  -MA      Recorded by [MA] Corinna Mckee, PT 19 1021      Row Name 19 1005             Cognitive Assessment/Intervention- PT/OT    Orientation Status (Cognition)  oriented x 4  -MA      Follows Commands (Cognition)  WNL  -MA      Personal Safety Interventions  fall prevention program maintained;gait belt;muscle strengthening facilitated;nonskid shoes/slippers when out of bed;supervised activity  -MA       Recorded by [MA] Corinna Mckee, PT 06/11/19 1021      Row Name 06/11/19 1005             Bed Mobility Assessment/Treatment    Supine-Sit [place] (Bed Mobility)  contact guard  -MA      Sit-Supine [place] (Bed Mobility)  moderate assist (50% patient effort);2 person assist  -MA      Assistive Device (Bed Mobility)  bed rails  -MA      Comment (Bed Mobility)  takes increased time, pt will not allow PT to assist for supine to sit   -MA      Recorded by [MA] Corinna Mckee, PT 06/11/19 1021      Row Name 06/11/19 1005             Sit-Stand Transfer    Sit-Stand [place] (Transfers)  minimum assist (75% patient effort);2 person assist  -MA      Assistive Device (Sit-Stand Transfers)  walker, front-wheeled  -MA      Recorded by [MA] Corinna Mckee, PT 06/11/19 1021      Row Name 06/11/19 1005             Stand-Sit Transfer    Stand-Sit [place] (Transfers)  minimum assist (75% patient effort);2 person assist  -MA      Assistive Device (Stand-Sit Transfers)  walker, front-wheeled  -MA      Recorded by [MA] Corinna Mckee, PT 06/11/19 1021      Row Name 06/11/19 1005             Gait/Stairs Assessment/Training    [place] Level (Gait)  minimum assist (75% patient effort);2 person assist  -MA      Assistive Device (Gait)  walker, front-wheeled  -MA      Distance in Feet (Gait)  10  -MA      Pattern (Gait)  step-to  -MA      Deviations/Abnormal Patterns (Gait)  lnady decreased;stride length decreased  -MA      Bilateral Gait Deviations  knee buckling, bilateral  -MA      Comment (Gait/Stairs)  Bilateral knees flexed despite cues for upright posture. minimal clearance of LE with swing phase  -MA      Recorded by [MA] Corinna Mckee, PT 06/11/19 1021      Row Name 06/11/19 1005             Static Sitting Balance    Level of [place] (Unsupported Sitting, Static Balance)  contact guard assist  -MA      Sitting Position (Unsupported Sitting, Static Balance)  sitting on edge of bed  -MA      Time Able to  Maintain Position (Unsupported Sitting, Static Balance)  4 to 5 minutes  -MA      Recorded by [MA] Corinna Mckee, PT 06/11/19 1021      Row Name 06/11/19 1005             Positioning and Restraints    Pre-Treatment Position  in bed  -MA      Post Treatment Position  bed  -MA      In Bed  supine;call light within reach;encouraged to call for assist;exit alarm on;with nsg;with family/caregiver w/ CNA  -MA      Recorded by [MA] Corinna Mckee, PT 06/11/19 1021      Row Name 06/11/19 1005             Pain Scale: Word Pre/Post-Treatment    Pain: Word Scale, Pretreatment  4 - moderate pain  -MA      Pain: Word Scale, Post-Treatment  6 - moderate-severe pain  -MA      Pre/Post Treatment Pain Comment  back pain   -MA      Recorded by [MA] Corinna Mckee, PT 06/11/19 1021      Row Name                Wound 06/05/19 1741 Other (See comments) back incision    Wound - Properties Group Date first assessed: 06/05/19 [SL] Time first assessed: 1741 [SL] Side: Other (See comments) [SL] Location: back [SL] Type: incision [SL] Recorded by:  [SL] Corrine Vigil RN 06/05/19 1741      User Key  (r) = Recorded By, (t) = Taken By, (c) = Cosigned By    Initials Name Effective Dates Discipline    SL Corrine Vigli RN 06/16/16 -  Nurse    Corinna Dave, PT 10/19/18 -  PT          Wound 06/05/19 1741 Other (See comments) back incision (Active)   Dressing Appearance open to air 6/11/2019  9:00 AM   Closure Liquid skin adhesive 6/11/2019  9:00 AM   Periwound intact;dry 6/11/2019  9:00 AM   Periwound Temperature warm 6/11/2019  9:00 AM   Periwound Skin Turgor soft 6/11/2019  9:00 AM   Drainage Amount none 6/11/2019  9:00 AM   Dressing Care, Wound open to air 6/10/2019  8:00 PM           Physical Therapy Education     Title: PT OT SLP Therapies (In Progress)     Topic: Physical Therapy (Done)     Point: Mobility training (Done)     Learning Progress Summary           Patient Acceptance, E, VU,NR by MA at 6/11/2019 10:21 AM     Acceptance, E, VU,NR by MA at 6/10/2019  9:58 AM    Acceptance, E,TB, VU by CS at 6/9/2019  1:02 PM    Acceptance, E,TB,D, VU,NR by  at 6/8/2019  4:22 PM    Acceptance, E, NR by AR at 6/7/2019  3:15 PM    Acceptance, E, NR by AR at 6/6/2019  3:25 PM                   Point: Home exercise program (Done)     Learning Progress Summary           Patient Acceptance, E, VU,NR by MA at 6/11/2019 10:21 AM    Acceptance, E, VU,NR by MA at 6/10/2019  9:58 AM    Acceptance, E,TB, VU by CS at 6/9/2019  1:02 PM    Acceptance, E,TB,D, VU,NR by  at 6/8/2019  4:22 PM    Acceptance, E, NR by AR at 6/7/2019  3:15 PM    Acceptance, E, NR by AR at 6/6/2019  3:25 PM                   Point: Body mechanics (Done)     Learning Progress Summary           Patient Acceptance, E, VU,NR by MA at 6/11/2019 10:21 AM    Acceptance, E, VU,NR by MA at 6/10/2019  9:58 AM    Acceptance, E,TB, VU by CS at 6/9/2019  1:02 PM    Acceptance, E,TB,D, VU,NR by  at 6/8/2019  4:22 PM    Acceptance, E, NR by AR at 6/7/2019  3:15 PM    Acceptance, E, NR by AR at 6/6/2019  3:25 PM                   Point: Precautions (Done)     Learning Progress Summary           Patient Acceptance, E, VU,NR by MA at 6/11/2019 10:21 AM    Acceptance, E, VU,NR by MA at 6/10/2019  9:58 AM    Acceptance, E,TB, VU by CS at 6/9/2019  1:02 PM    Acceptance, E,TB,D, VU,NR by  at 6/8/2019  4:22 PM    Acceptance, E, NR by AR at 6/7/2019  3:15 PM    Acceptance, E, NR by AR at 6/6/2019  3:25 PM                               User Key     Initials Effective Dates Name Provider Type Discipline    CH 04/03/18 -  Phyllis Otero, PT Physical Therapist PT    AR 04/03/18 -  Kassidy Jorge, PT Physical Therapist PT    MA 10/19/18 -  Corinna Mckee, PT Physical Therapist PT    CS 05/14/18 -  Devin Moore, PT Physical Therapist PT                PT Recommendation and Plan     Outcome Summary: Pt requries increased time for all mobility. Reports increased pain with all mobility. 2  assist for safety. Cues for safety throughout. Will continue to progress as tolerated.   Outcome Measures     Row Name 06/11/19 1000 06/10/19 1341 06/10/19 0900       How much help from another person do you currently need...    Turning from your back to your side while in flat bed without using bedrails?  3  -MA  --  3  -MA    Moving from lying on back to sitting on the side of a flat bed without bedrails?  3  -MA  --  3  -MA    Moving to and from a bed to a chair (including a wheelchair)?  3  -MA  --  3  -MA    Standing up from a chair using your arms (e.g., wheelchair, bedside chair)?  3  -MA  --  3  -MA    Climbing 3-5 steps with a railing?  2  -MA  --  2  -MA    To walk in hospital room?  3  -MA  --  2  -MA    AM-PAC 6 Clicks Score  17  -MA  --  16  -MA       How much help from another is currently needed...    Putting on and taking off regular lower body clothing?  --  1  -SG  --    Bathing (including washing, rinsing, and drying)  --  2  -SG  --    Toileting (which includes using toilet bed pan or urinal)  --  2  -SG  --    Putting on and taking off regular upper body clothing  --  3  -SG  --    Taking care of personal grooming (such as brushing teeth)  --  3  -SG  --    Eating meals  --  4  -SG  --    Score  --  15  -SG  --       Functional Assessment    Outcome Measure Options  AM-PAC 6 Clicks Basic Mobility (PT)  -MA  AM-PAC 6 Clicks Daily Activity (OT)  -SG  AM-PAC 6 Clicks Basic Mobility (PT)  -MA    Row Name 06/09/19 1300 06/08/19 1600          How much help from another person do you currently need...    Turning from your back to your side while in flat bed without using bedrails?  3  -CS  3  -CH     Moving from lying on back to sitting on the side of a flat bed without bedrails?  2  -CS  2  -CH     Moving to and from a bed to a chair (including a wheelchair)?  3  -CS  3  -CH     Standing up from a chair using your arms (e.g., wheelchair, bedside chair)?  3  -CS  3  -CH     Climbing 3-5 steps with a  railing?  2  -CS  2  -CH     To walk in hospital room?  2  -CS  2  -CH     AM-PAC 6 Clicks Score  15  -CS  15  -CH        Functional Assessment    Outcome Measure Options  AM-PAC 6 Clicks Basic Mobility (PT)  -CS  AM-PAC 6 Clicks Basic Mobility (PT)  -CH       User Key  (r) = Recorded By, (t) = Taken By, (c) = Cosigned By    Initials Name Provider Type    SG Gail Sahu, OTR Occupational Therapist    CH Phyllis Otero, PT Physical Therapist    MA Corinna Mckee, PT Physical Therapist    CS Devin Moore, PT Physical Therapist         Time Calculation:   PT Charges     Row Name 06/11/19 1022             Time Calculation    Start Time  0936  -MA      Stop Time  1006  -MA      Time Calculation (min)  30 min  -MA      PT Received On  06/11/19  -MA      PT - Next Appointment  06/12/19  -MA         Time Calculation- PT    Total Timed Code Minutes- PT  30 minute(s)  -MA        User Key  (r) = Recorded By, (t) = Taken By, (c) = Cosigned By    Initials Name Provider Type    Corinna Dave, PT Physical Therapist        Therapy Charges for Today     Code Description Service Date Service Provider Modifiers Qty    19301394131 HC PT THER PROC EA 15 MIN 6/10/2019 Corinna Mckee, PT GP 1    35551902366 HC PT THER SUPP EA 15 MIN 6/10/2019 Corinna Mckee, PT GP 1    89081599550 HC PT THER PROC EA 15 MIN 6/11/2019 Corinna Mckee, PT GP 2    83373500229 HC PT THER SUPP EA 15 MIN 6/11/2019 Corinna Mckee, PT GP 2          PT G-Codes  Outcome Measure Options: AM-PAC 6 Clicks Basic Mobility (PT)  AM-PAC 6 Clicks Score: 17  Score: 15    Corinna Mckee PT  6/11/2019

## 2019-06-11 NOTE — PLAN OF CARE
Problem: Patient Care Overview  Goal: Plan of Care Review   06/11/19 1021   OTHER   Outcome Summary Pt requries increased time for all mobility. Reports increased pain with all mobility. 2 assist for safety. Cues for safety throughout. Will continue to progress as tolerated.

## 2019-06-11 NOTE — NURSING NOTE
Denial received from [facility] for acute inpatient rehab.  Dr. Francisco informed--will follow progress today and consider appeal tomorrow.  Discussed with CCP

## 2019-06-11 NOTE — CONSULTS
[facility]    ConsultsDate of Admit: 6/5/2019  Date of Consult: 06/10/19    Subjective   53 y.o. male with history of HTN, MELECIO and back pain here for T11-S2 posterior fusion, L3/4 decompression and L5/6 decompression on June 5 on Dr. Bailey. We were asked to consult for hypertension and patient had been restarted on his home dose of lisinopril on 6/9. Patient denies complaints of chest pain, shortness of breath, abdominal pain, changes in bowel or bladder habits, edema. BP currently 155/88 on exam but he does report that he's still having trouble getting comfortable due to pain from surgery which may likely be causing the BPs to remain slightly elevated.     Past Medical History:   Diagnosis Date   • Hip pain    • Hypertension    • Low back pain    • Lumbar disc herniation    • Pain in left knee    • Sleep apnea     DOES NOT WEAR CPAP   • Spinal stenosis of lumbar region      Past Surgical History:   Procedure Laterality Date   • LUMBAR DISCECTOMY     • LUMBAR FUSION     • VASECTOMY       Current medications reviewed.    Family History   Problem Relation Age of Onset   • Diabetes Mother    • Hypertension Father    • Gout Father    • Sleep apnea Father    • Malig Hyperthermia Neg Hx      Social History     Tobacco Use   • Smoking status: Current Every Day Smoker     Packs/day: 0.50     Years: 10.00     Pack years: 5.00     Types: Cigarettes   • Smokeless tobacco: Never Used   Substance Use Topics   • Alcohol use: Yes     Alcohol/week: 3.0 oz     Types: 5 Cans of beer per week   • Drug use: No     Review of Systems   Constitutional: Negative.  Negative for chills and fever.   HENT: Negative.  Negative for congestion and sore throat.    Eyes: Negative.  Negative for visual disturbance.   Respiratory: Negative.  Negative for cough and shortness of breath.    Cardiovascular: Negative.  Negative for chest pain, palpitations and leg swelling.   Gastrointestinal:  Negative.  Negative for abdominal pain, diarrhea, nausea and vomiting.   Endocrine: Negative.    Genitourinary: Negative.  Negative for dysuria, frequency and urgency.   Musculoskeletal: Positive for back pain. Negative for arthralgias and myalgias.   Skin: Negative.  Negative for color change, pallor, rash and wound.   Allergic/Immunologic: Negative.    Neurological: Positive for numbness (BLE, chronic). Negative for dizziness, weakness and light-headedness.   Hematological: Negative.    Psychiatric/Behavioral: Negative.  Negative for agitation, behavioral problems, confusion and decreased concentration.     Objective      Vital Signs  Temp:  [98 °F (36.7 °C)-98.8 °F (37.1 °C)] 98.5 °F (36.9 °C)  Heart Rate:  [106-112] 106  Resp:  [18-21] 20  BP: (138-155)/(80-97) 155/88    Physical Exam   Constitutional: He is oriented to person, place, and time. He appears well-developed and well-nourished.   HENT:   Head: Normocephalic and atraumatic.   Eyes: EOM are normal. Pupils are equal, round, and reactive to light.   Neck: Normal range of motion. Neck supple.   Cardiovascular: Regular rhythm and intact distal pulses. Tachycardia present.   Murmur heard.  Pulmonary/Chest: Effort normal. No respiratory distress. He has decreased breath sounds in the right lower field and the left lower field.   Abdominal: Bowel sounds are normal. He exhibits distension. There is no tenderness. There is no rigidity, no rebound and no guarding.   Musculoskeletal: He exhibits edema (trace edema BLE). He exhibits no tenderness.   Decreased ROM due to back pain after surgery   Neurological: He is alert and oriented to person, place, and time.   Skin: Skin is warm and dry.   Psychiatric: He has a normal mood and affect. His behavior is normal. Judgment and thought content normal.   Nursing note and vitals reviewed.    Results Review:  I reviewed the patient's new clinical results.  I reviewed the patient's new imaging results and agree with the  interpretation.  I personally viewed and interpreted the patient's EKG/Telemetry data    Estimated Creatinine Clearance: 185.5 mL/min (A) (by C-G formula based on SCr of 0.6 mg/dL (L)).    Assessment/Plan   Active Hospital Problems    Diagnosis  POA   • Spinal stenosis, L3-4 [M48.062]  Yes   • Lumbar disc herniation -left L4-5 [M51.26]  Yes   • History of lumbar fusion [Z98.1]  Not Applicable   • Lumbar radiculopathy [M54.16]  Yes   • Gait disorder [R26.9]  Yes      Resolved Hospital Problems   No resolved problems to display.     HTN  -continue home dose lisinopril and may add PRN hydralazine with parameters, but elevated BPs likely due to pain and should improve with continuation of home regimen and pain control      · discussed with patient.  · Thank you for asking [facility] to be involved in this patient's care. Will follow along with you.    Lauren Moore, VERONICA  06/10/19  9:43 PM

## 2019-06-11 NOTE — CONSULTS
Consults    Patient Care Team:  Yudy Luna APRN as PCP - General (Family Medicine)  Corbin Bailey MD as Surgeon (Neurosurgery)    Chief complaint:  History of lumbar spinal stenosis L3-4 and L4-5/disc herniation left L5-L6 with bilateral lower extremity weakness and numbness and altered gait  s/p June 5 T11-S2 posterior fusion with L3-4 lumbar decompression and left L5/L6 decompression.  Previous history of cauda equina syndrome and fusion L1-3  Hypertension  Anemia          Subjective     History of Present Illness     53 year old male with a history of lumbar spinal stenosis L3-4 and L4-5/disc herniation left L5-L6 with bilateral lower extremity weakness and numbness and altered gait.  He is now s/p June 5 T11-S2 posterior fusion with L3-4 lumbar decompression and left L5/L6 decompression.  He has a previous history of cauda equina syndrome and fusion L1-3 several years ago.  Prior to surgery he had noted increased symptoms in the lower extremities with the right lower extremity numb from the knee down and the left lower extremity numb from the shin down.  He has chronic weakness in the right lower extremity but started in March developing weakness in the left lower extremity with a left foot drop.  He describes a steppage gait.  His right foot would invert and he would not be able to dorsiflex the left foot.  Does not have any bracing on the lower extremities.  Was not using any device for mobility prior to admission.  Denied any bowel or bladder changes.    Postoperatively, he is to wear a TLSO brace when sitting up and out of bed.  He complains of back pain.  Notes difficulty with his mobility.  Slow with his ambulation.  He has been up to a bedside commode but not up to the bathroom.  He reports voiding okay.  No bowel movement yet.  No upper extremity symptoms.  With physical therapy, bed mobility sit to supine moderate assist to, transfers min 2.  Gait 10 feet min 2 RW.  With Occupational  Therapy, LBD dependent.    Tachycardia - up to 131 in PT session.  He still has a drain in place.  Anemia postoperatively.  Hypertension with blood pressure in the 160/98 range.  Pain management-on Flexeril, Percocet .  Flomax for BPH/retention      Review of Systems   Comfortable with his breathing.  No upper extremity weakness or numbness.  Back pain.  Weakness in his legs.  No bowel movement yet.  Past Medical History:   Diagnosis Date   • Hip pain    • Hypertension    • Low back pain    • Lumbar disc herniation    • Pain in left knee    • Sleep apnea     DOES NOT WEAR CPAP   • Spinal stenosis of lumbar region    ,   Past Surgical History:   Procedure Laterality Date   • LUMBAR DISCECTOMY     • LUMBAR FUSION     • VASECTOMY     ,   Family History   Problem Relation Age of Onset   • Diabetes Mother    • Hypertension Father    • Gout Father    • Sleep apnea Father    • Malig Hyperthermia Neg Hx    ,   Social History     Tobacco Use   • Smoking status: Current Every Day Smoker     Packs/day: 0.50     Years: 10.00     Pack years: 5.00     Types: Cigarettes   • Smokeless tobacco: Never Used   Substance Use Topics   • Alcohol use: Yes     Alcohol/week: 3.0 oz     Types: 5 Cans of beer per week   • Drug use: No   , He lives in [place], [facility], in a home with 8 steps to enter that he was managing prior to this hospitalization.  Walkout ranch.  Raised toilet.  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  Medications Prior to Admission   Medication Sig Dispense Refill Last Dose   • cyclobenzaprine (FLEXERIL) 10 MG tablet Take 10 mg by mouth 3 (Three) Times a Day As Needed for Muscle Spasms.   5/31/2019 at Unknown time   • Multiple Vitamin (MULTI-VITAMIN DAILY PO) Take 1 tablet by mouth Daily.   5/31/2019 at Unknown time   • Omega-3 Fatty Acids (FISH OIL) 1200 MG capsule delayed-release Take 1 capsule by mouth Daily.   5/31/2019 at Unknown time   • lisinopril-hydrochlorothiazide (PRINZIDE,ZESTORETIC) 10-12.5 MG per  tablet Take 1 tablet by mouth Daily.   6/5/2019 at 0600   , Scheduled Meds:    lisinopril-hydroCHLOROthiazide (ZESTORETIC) 10-12.5 mg combo dose  Oral Q24H   sodium chloride 2 g Oral TID With Meals   tamsulosin 0.4 mg Oral Daily   , Continuous Infusions:   , PRN Meds:  •  bisacodyl  •  bisacodyl  •  cyclobenzaprine  •  docusate sodium  •  HYDROcodone-acetaminophen  •  Morphine **AND** naloxone  •  Morphine **AND** naloxone  •  ondansetron **OR** ondansetron  •  oxyCODONE-acetaminophen  •  potassium chloride and Allergies:  Patient has no known allergies.    Objective      Vital Signs  Temp:  [97.7 °F (36.5 °C)-98.5 °F (36.9 °C)] 98 °F (36.7 °C)  Heart Rate:  [102-114] 112  Resp:  [17-20] 17  BP: (135-160)/(86-99) 160/98    Physical Exam  MENTAL STATUS -  AWAKE / ALERT  HEENT-   SCLERA NON-ICTERIC, CONJUNCTIVA PINK, OP MOIST, NO JVD, EARS UNREMARKABLE EXTERNALLY  LUNGS - CTA, NO WHEEZES, RALES OR RHONCHI  HEART- RRR, NO RUB, MURMUR, OR GALLOP  ABD - NORMOACTIVE BOWEL SOUNDS, SOFT, NT.  Mildly distended.     Back-drain in place with blood-tinged serous fluid in the bulb  EXT - NO EDEMA OR CYANOSIS  NEURO -good strength with bilateral elbow flexion, elbow extension, finger flexion, hand intrinsics.  Pain inhibition in the lower extremities but is able to take resistance with bilateral knee extension, weak right ankle dorsiflexion and no significant active left dorsiflexion.  Weakness bilateral EHL.  Proprioception present at the bilateral great toes.    Results Review:    I reviewed the patient's new clinical results.  Results from last 7 days   Lab Units 06/10/19  0755 06/09/19  1839 06/09/19  1031   WBC 10*3/mm3 5.55 6.26 5.77   HEMOGLOBIN g/dL 8.3* 8.8* 8.6*   HEMATOCRIT % 26.2* 28.0* 27.0*   PLATELETS 10*3/mm3 199 203 180     Results from last 7 days   Lab Units 06/10/19  0755 06/09/19  1923 06/09/19  1031 06/07/19  0436   SODIUM mmol/L  --  135* 140 129*   POTASSIUM mmol/L 4.0 3.4* 3.7 3.7   CHLORIDE mmol/L  --   99 104 95*   CO2 mmol/L  --  26.8 27.4 24.6   BUN mg/dL  --  7 6 10   CREATININE mg/dL  --  0.60* 0.61* 0.63*   CALCIUM mg/dL  --  7.7* 7.9* 7.7*   BILIRUBIN mg/dL  --  0.3  --   --    ALK PHOS U/L  --  51  --   --    ALT (SGPT) U/L  --  36  --   --    AST (SGOT) U/L  --  37  --   --    GLUCOSE mg/dL  --  120* 95 129*           Assessment/Plan       Gait disorder    Lumbar radiculopathy    Spinal stenosis, L3-4    Lumbar disc herniation -left L4-5    History of lumbar fusion    Benign essential HTN    Urinary retention    Hypokalemia      Assessment & Plan  History of lumbar spinal stenosis L3-4 and L4-5/disc herniation left L5-L6 with bilateral lower extremity weakness and numbness and altered gait  s/p June 5 T11-S2 posterior fusion with L3-4 lumbar decompression and left L5/L6 decompression.  Previous history of cauda equina syndrome and fusion L1-3  Hypertension  Anemia  Postoperative pain  DVT prophylaxis-utilize SCDs    The patient is a 53-year-old male with impairments with his mobility, transfers, gait, functional mobility, and performance of activities of daily living.  Comorbidities include hypertension, anemia, pain management, postoperative drain still in place.  Given his functional impairments and comorbidities, would recommend acute inpatient rehabilitation.  This would be a comprehensive interdisciplinary program with physical therapy 1.5 hours and occupational therapy 1.5 hours 5 days a week to address functional mobility and ADL training with adaptive equipment and proper back mechanics, transfer training, strengthening, balance, progressive ambulation including stairs, and assessment for orthotics for ankle dorsiflexion weakness.  He describes a steppage gait with left foot drop premorbidly.  Redilatation nursing for carryover and monitoring of his incision, drain output, pain management, bowel and bladder, and skin.  Ongoing physician follow-up for pain management, hypertension, anemia,  monitoring his drain output.  Weekly team conferences.  Goal be for the patient to achieve a level of supervision with his mobility and self-care including stairs for return home.  I discussed the patients findings and my recommendations with patient    Romeo Francisco MD  06/11/19  7:53 PM    Time:

## 2019-06-11 NOTE — PROGRESS NOTES
"Postoperative day #6 status post extensive lumbar decompression and fusion.    Subjective: Overall pain is improving.  He was able to ambulate a little bit.    Objective:/86 (BP Location: Right arm, Patient Position: Lying)   Pulse 103   Temp 98.3 °F (36.8 °C) (Oral)   Resp 18   Ht 188 cm (74\")   Wt 94.3 kg (207 lb 14.3 oz)   SpO2 99%   BMI 26.69 kg/m²     He still has preoperative preoperatively present left foot drop.  The wound is clean clear dry.    Intake/Output       06/09/19 0701 - 06/10/19 0700 06/10/19 0701 - 06/11/19 0700 06/11/19 0701 - 06/12/19 0700      3013-9789 6036-5505 Total 2144-0480 3246-7753 Total 1371-5842 2964-8676 Total       Intake    P.O.  --  1680 1680  --  -- --  510  -- 510    I.V.  700  1185 1885  --  -- --  --  -- --    Blood  --  -- --  600  -- 600  --  -- --    Volume (Transfuse RBC) -- -- -- 300 -- 300 -- -- --    Volume (Transfuse RBC) -- -- -- 300 -- 300 -- -- --    IV Piggyback  250  -- 250  --  -- --  --  -- --    Total Intake 950 2865 3815 600 -- 600 510 -- 510       Output    Urine  4550  3250 7800  3350  3375 6725  1400  -- 1400    Drains  175  250 425  195  210 405  100  -- 100    Total Output 4725 3500 8225 3545 3585 7130 1500 -- 1500      Assessment: Overall doing well.    Plan: Rehabilitation when possible.  If the drain is still putting out quite a bit of fluid he can go with the drain.  "

## 2019-06-11 NOTE — PLAN OF CARE
Problem: Patient Care Overview  Goal: Plan of Care Review  Outcome: Ongoing (interventions implemented as appropriate)   06/11/19 7087   Coping/Psychosocial   Plan of Care Reviewed With patient;family   Plan of Care Review   Progress improving   OTHER   Outcome Summary Pt AOx4, VSS, pain controlled with PRN meds. Up x2 assist with TLSO brace and walker. Pt reports unchanged numbness and tingling to BLE. Pt requires much encouragement to mobilize. JV intact. Plan for rehab. Will continue to monitor.        Problem: Skin Injury Risk (Adult)  Goal: Identify Related Risk Factors and Signs and Symptoms  Outcome: Ongoing (interventions implemented as appropriate)    Goal: Skin Health and Integrity  Outcome: Ongoing (interventions implemented as appropriate)      Problem: Fall Risk (Adult)  Goal: Absence of Fall  Outcome: Ongoing (interventions implemented as appropriate)      Problem: Pain, Acute (Adult)  Goal: Identify Related Risk Factors and Signs and Symptoms  Outcome: Ongoing (interventions implemented as appropriate)    Goal: Acceptable Pain Control/Comfort Level  Outcome: Ongoing (interventions implemented as appropriate)

## 2019-06-11 NOTE — PROGRESS NOTES
Continued Stay Note  [facility]     Patient Name: Archie Oquendo  MRN: [id number]  Today's Date: 6/11/2019    Admit Date: 6/5/2019    Discharge Plan     Row Name 06/11/19 0912       Plan    Plan  [facility] initiated precert     Patient/Family in Agreement with Plan  yes    Plan Comments  per Carlos/ [facility] they have initiated precert. MURRAY Hernandez        Discharge Codes    No documentation.       Expected Discharge Date and Time     Expected Discharge Date Expected Discharge Time    Jun 11, 2019             Arlin Le

## 2019-06-12 LAB
ANION GAP SERPL CALCULATED.3IONS-SCNC: 9.9 MMOL/L
BUN BLD-MCNC: 9 MG/DL (ref 6–20)
BUN/CREAT SERPL: 12.9 (ref 7–25)
CALCIUM SPEC-SCNC: 9.3 MG/DL (ref 8.6–10.5)
CHLORIDE SERPL-SCNC: 94 MMOL/L (ref 98–107)
CO2 SERPL-SCNC: 28.1 MMOL/L (ref 22–29)
CREAT BLD-MCNC: 0.7 MG/DL (ref 0.76–1.27)
GFR SERPL CREATININE-BSD FRML MDRD: 117 ML/MIN/1.73
GLUCOSE BLD-MCNC: 110 MG/DL (ref 65–99)
GLUCOSE BLDC GLUCOMTR-MCNC: 148 MG/DL (ref 70–130)
HCT VFR BLD AUTO: 40.8 % (ref 37.5–51)
HGB BLD-MCNC: 13.2 G/DL (ref 13–17.7)
POTASSIUM BLD-SCNC: 4.3 MMOL/L (ref 3.5–5.2)
SODIUM BLD-SCNC: 132 MMOL/L (ref 136–145)

## 2019-06-12 PROCEDURE — 99024 POSTOP FOLLOW-UP VISIT: CPT | Performed by: NURSE PRACTITIONER

## 2019-06-12 PROCEDURE — 85018 HEMOGLOBIN: CPT | Performed by: NURSE PRACTITIONER

## 2019-06-12 PROCEDURE — 25010000002 MORPHINE PER 10 MG: Performed by: NEUROLOGICAL SURGERY

## 2019-06-12 PROCEDURE — 82962 GLUCOSE BLOOD TEST: CPT

## 2019-06-12 PROCEDURE — 85014 HEMATOCRIT: CPT | Performed by: NURSE PRACTITIONER

## 2019-06-12 PROCEDURE — 97110 THERAPEUTIC EXERCISES: CPT

## 2019-06-12 PROCEDURE — 80048 BASIC METABOLIC PNL TOTAL CA: CPT | Performed by: INTERNAL MEDICINE

## 2019-06-12 RX ADMIN — TAMSULOSIN HYDROCHLORIDE 0.4 MG: 0.4 CAPSULE ORAL at 08:59

## 2019-06-12 RX ADMIN — DOCUSATE SODIUM 100 MG: 100 CAPSULE, LIQUID FILLED ORAL at 08:57

## 2019-06-12 RX ADMIN — MORPHINE SULFATE 4 MG: 2 INJECTION, SOLUTION INTRAMUSCULAR; INTRAVENOUS at 10:36

## 2019-06-12 RX ADMIN — HYDROCODONE BITARTRATE AND ACETAMINOPHEN 1 TABLET: 7.5; 325 TABLET ORAL at 04:52

## 2019-06-12 RX ADMIN — SODIUM CHLORIDE TAB 1 GM 2 G: 1 TAB at 08:59

## 2019-06-12 RX ADMIN — OXYCODONE HYDROCHLORIDE AND ACETAMINOPHEN 1 TABLET: 10; 325 TABLET ORAL at 18:34

## 2019-06-12 RX ADMIN — CYCLOBENZAPRINE 10 MG: 10 TABLET, FILM COATED ORAL at 10:37

## 2019-06-12 RX ADMIN — LISINOPRIL: 10 TABLET ORAL at 08:59

## 2019-06-12 RX ADMIN — SODIUM CHLORIDE TAB 1 GM 2 G: 1 TAB at 13:45

## 2019-06-12 RX ADMIN — HYDROCODONE BITARTRATE AND ACETAMINOPHEN 1 TABLET: 7.5; 325 TABLET ORAL at 00:31

## 2019-06-12 RX ADMIN — HYDROCODONE BITARTRATE AND ACETAMINOPHEN 1 TABLET: 7.5; 325 TABLET ORAL at 08:57

## 2019-06-12 RX ADMIN — MORPHINE SULFATE 4 MG: 2 INJECTION, SOLUTION INTRAMUSCULAR; INTRAVENOUS at 13:43

## 2019-06-12 RX ADMIN — CYCLOBENZAPRINE 10 MG: 10 TABLET, FILM COATED ORAL at 04:52

## 2019-06-12 RX ADMIN — MORPHINE SULFATE 4 MG: 2 INJECTION, SOLUTION INTRAMUSCULAR; INTRAVENOUS at 18:34

## 2019-06-12 RX ADMIN — SODIUM CHLORIDE TAB 1 GM 2 G: 1 TAB at 18:34

## 2019-06-12 RX ADMIN — OXYCODONE HYDROCHLORIDE AND ACETAMINOPHEN 1 TABLET: 10; 325 TABLET ORAL at 10:37

## 2019-06-12 RX ADMIN — OXYCODONE HYDROCHLORIDE AND ACETAMINOPHEN 1 TABLET: 10; 325 TABLET ORAL at 13:44

## 2019-06-12 NOTE — PAYOR COMM NOTE
"UR CONTACT:  SAVANNAH  P: [phone]        F: [phone]  REF #[id number]      Bj Oquendo (53 y.o. Male)     Date of Birth Social Security Number Address Home Phone MRN    1965  [address] [place], [place] [id number] [phone] [id number]    Holiness Marital Status          Patient Refused        Admission Date Admission Type Admitting Provider Attending Provider Department, Room/Bed    6/5/19 Elective Corbin Bailey MD Hodes, Jonathan E, MD [address], P591/1    Discharge Date Discharge Disposition Discharge Destination                       Attending Provider:  Coribn Bailey MD    Allergies:  No Known Allergies    Isolation:  None   Infection:  None   Code Status:  CPR    Ht:  188 cm (74\")   Wt:  94.3 kg (207 lb 14.3 oz)    Admission Cmt:  None   Principal Problem:  None                Active Insurance as of 6/5/2019     Primary Coverage     Payor Plan Insurance Group Employer/Plan Group    [employer]UE [employer] PPO [id number]     Payor Plan Address Payor Plan Phone Number Payor Plan Fax Number Effective Dates    [phone]  7/1/2016 - None Entered    [place] [address]       Subscriber Name Subscriber Birth Date Member ID       BJ OQUENDO 1965 [id number]                 Emergency Contacts      (Rel.) Home Phone Work Phone Mobile Phone    Patricia Oquendo (Daughter) -- -- [phone]    IBIS OQUENDO (Daughter) -- -- [phone]            ICU Vital Signs     Row Name 06/12/19 0453 06/11/19 2114 06/11/19 2021 06/11/19 1803 06/11/19 1351       Vitals    Temp  98 °F (36.7 °C)  --  98.4 °F (36.9 °C)  98 °F (36.7 °C)  98.3 °F (36.8 °C)    Temp src  Oral  --  Oral  Oral  Oral    Pulse  102  --  109  112  103    Heart Rate Source  Monitor  --  Monitor  Monitor  Monitor    Resp  17  --  16  17  18    Resp Rate Source  Visual  --  Visual  Visual  Visual    BP  141/92  --  142/98 "  160/98  135/86    BP Location  Right arm  --  Right arm  Right arm  Right arm    BP Method  Automatic  --  Automatic  Automatic  Automatic    Patient Position  Lying  --  Lying  Lying  Lying       Oxygen Therapy    SpO2  93 %  --  95 %  96 %  99 %    Pulse Oximetry Type  Continuous  --  Continuous  Continuous  Continuous    Device (Oxygen Therapy)  room air  room air  room air  room air  room air        Lines, Drains & Airways    Active LDAs     Name:   Placement date:   Placement time:   Site:   Days:    Peripheral IV 06/05/19 0701 Right Hand   06/05/19    0701    Hand   7    Peripheral IV 06/10/19 1048 Right Forearm   06/10/19    1048    Forearm   1    Closed/Suction Drain Bulb 15 Fr.   06/05/19    1617    --   6                Hospital Medications (active)       Dose Frequency Start End    bisacodyl (DULCOLAX) EC tablet 10 mg 10 mg Daily PRN 6/5/2019     Sig - Route: Take 2 tablets by mouth Daily As Needed for Constipation. - Oral    bisacodyl (DULCOLAX) suppository 10 mg 10 mg Daily PRN 6/6/2019     Sig - Route: Insert 1 suppository into the rectum Daily As Needed for Constipation. - Rectal    cyclobenzaprine (FLEXERIL) tablet 10 mg 10 mg 3 Times Daily PRN 6/5/2019     Sig - Route: Take 1 tablet by mouth 3 (Three) Times a Day As Needed for Muscle Spasms. - Oral    docusate sodium (COLACE) capsule 100 mg 100 mg 2 Times Daily PRN 6/5/2019     Sig - Route: Take 1 capsule by mouth 2 (Two) Times a Day As Needed for Constipation. - Oral    HYDROcodone-acetaminophen (NORCO) 7.5-325 MG per tablet 1 tablet 1 tablet Every 4 Hours PRN 6/5/2019 6/15/2019    Sig - Route: Take 1 tablet by mouth Every 4 (Four) Hours As Needed for Moderate Pain . - Oral    lisinopril (PRINIVIL,ZESTRIL) 10 mg, hydrochlorothiazide (MICROZIDE) 12.5 mg for ZESTORETIC 10-12.5  Every 24 Hours Scheduled 6/6/2019     Sig - Route: Take  by mouth Daily. - Oral    morphine injection 4 mg 4 mg Every 2 Hours PRN 6/5/2019 6/15/2019    Sig - Route: Infuse 2  "mL into a venous catheter Every 2 (Two) Hours As Needed for Moderate Pain . - Intravenous    Linked Group 1:  \"And\" Linked Group Details        Morphine injection 6 mg 6 mg Every 2 Hours PRN 6/5/2019 6/15/2019    Sig - Route: Infuse 0.6 mL into a venous catheter Every 2 (Two) Hours As Needed for Severe Pain . - Intravenous    Linked Group 2:  \"And\" Linked Group Details        naloxone (NARCAN) injection 0.4 mg 0.4 mg Every 5 Minutes PRN 6/5/2019     Sig - Route: Infuse 1 mL into a venous catheter Every 5 (Five) Minutes As Needed for Respiratory Depression. - Intravenous    Linked Group 2:  \"And\" Linked Group Details        naloxone (NARCAN) injection 0.4 mg 0.4 mg Every 5 Minutes PRN 6/5/2019     Sig - Route: Infuse 1 mL into a venous catheter Every 5 (Five) Minutes As Needed for Respiratory Depression. - Intravenous    Linked Group 1:  \"And\" Linked Group Details        ondansetron (ZOFRAN) injection 4 mg 4 mg Every 6 Hours PRN 6/5/2019     Sig - Route: Infuse 2 mL into a venous catheter Every 6 (Six) Hours As Needed for Nausea or Vomiting. - Intravenous    Linked Group 3:  \"Or\" Linked Group Details        ondansetron (ZOFRAN) tablet 4 mg 4 mg Every 6 Hours PRN 6/5/2019     Sig - Route: Take 1 tablet by mouth Every 6 (Six) Hours As Needed for Nausea or Vomiting. - Oral    Linked Group 3:  \"Or\" Linked Group Details        oxyCODONE-acetaminophen (PERCOCET)  MG per tablet 1 tablet 1 tablet Every 4 Hours PRN 6/5/2019 6/15/2019    Sig - Route: Take 1 tablet by mouth Every 4 (Four) Hours As Needed for Severe Pain . - Oral    potassium chloride (MICRO-K) CR capsule 40 mEq 40 mEq As Needed 6/9/2019     Sig - Route: Take 4 capsules by mouth As Needed (Potassium Replacement.  See Admin Instructions). - Oral    sodium chloride tablet 2 g 2 g 3 Times Daily With Meals 6/7/2019     Sig - Route: Take 2 tablets by mouth 3 (Three) Times a Day With Meals. - Oral    tamsulosin (FLOMAX) 24 hr capsule 0.4 mg 0.4 mg Daily 6/9/2019 " "    Sig - Route: Take 1 capsule by mouth Daily. - Oral             Physician Progress Notes      Corbin Bailey MD at 2019  5:36 PM          Postoperative day #6 status post extensive lumbar decompression and fusion.    Subjective: Overall pain is improving.  He was able to ambulate a little bit.    Objective:/86 (BP Location: Right arm, Patient Position: Lying)   Pulse 103   Temp 98.3 °F (36.8 °C) (Oral)   Resp 18   Ht 188 cm (74\")   Wt 94.3 kg (207 lb 14.3 oz)   SpO2 99%   BMI 26.69 kg/m²      He still has preoperative preoperatively present left foot drop.  The wound is clean clear dry.    Intake/Output       19 0701 - 06/10/19 0700 06/10/19 0701 - 19 0700 19 0701 - 19 0700      [id number] 6186-7763 Total 8571-8646 4415-0802 Total 6095-0788 2233-5945 Total       Intake    P.O.  --  1680 1680  --  -- --  510  -- 510    I.V.  700  1185 1885  --  -- --  --  -- --    Blood  --  -- --  600  -- 600  --  -- --    Volume (Transfuse RBC) -- -- -- 300 -- 300 -- -- --    Volume (Transfuse RBC) -- -- -- 300 -- 300 -- -- --    IV Piggyback  250  -- 250  --  -- --  --  -- --    Total Intake 950 2865 3815 600 -- 600 510 -- 510       Output    Urine  4550  3250 7800  3350  3375 6725  1400  -- 1400    Drains  175  250 425  195  210 405  100  -- 100    Total Output 4725 3500 8225 3545 3585 7130 1500 -- 1500      Assessment: Overall doing well.    Plan: Rehabilitation when possible.  If the drain is still putting out quite a bit of fluid he can go with the drain.    Electronically signed by Corbin Bailey MD at 2019  5:38 PM     Jordan Craven MD at 2019 12:27 PM              Name: Archie SIMONA Oquendo ADMIT: 2019   : 1965  PCP: Yudy Luna APRN    MRN: [id number] LOS: 6 days   AGE/SEX: 53 y.o. male  ROOM: [facility]   Subjective   No chief complaint on file.  cc- back pain    Pain is fair  Working with therapists    ROS  No f/c  No n/v  No cp/palp  No " soa/cough    Objective   Vital Signs  Temp:  [97.7 °F (36.5 °C)-98.6 °F (37 °C)] 98 °F (36.7 °C)  Heart Rate:  [102-114] 113  Resp:  [19-20] 19  BP: (140-155)/(80-99) 146/99  SpO2:  [92 %-99 %] 99 %  on   ;   Device (Oxygen Therapy): room air  Body mass index is 26.69 kg/m².    Physical Exam   Constitutional: He is oriented to person, place, and time. He appears well-developed and well-nourished. No distress.   HENT:   Head: Normocephalic and atraumatic.   Mouth/Throat: Oropharynx is clear and moist.   Eyes: Conjunctivae are normal. No scleral icterus.   Neck: Normal range of motion. Neck supple.   Cardiovascular: Normal rate, regular rhythm and normal heart sounds.   No murmur heard.  Pulmonary/Chest: Effort normal and breath sounds normal. No respiratory distress.   Abdominal: Soft. Bowel sounds are normal. There is no tenderness.   Genitourinary:   Genitourinary Comments: Deferred    Musculoskeletal: He exhibits no edema or deformity.   Neurological: He is alert and oriented to person, place, and time.   Skin: Skin is warm and dry. He is not diaphoretic.   Psychiatric: He has a normal mood and affect. His behavior is normal. Thought content normal.   Nursing note and vitals reviewed.      Results Review:       I reviewed the patient's new clinical results.  Results from last 7 days   Lab Units 06/10/19  0755 06/09/19  1839 06/09/19  1031 06/08/19  0503   WBC 10*3/mm3 5.55 6.26 5.77 7.58  7.58   HEMOGLOBIN g/dL 8.3* 8.8* 8.6* 9.2*   PLATELETS 10*3/mm3 199 203 180 134*     Results from last 7 days   Lab Units 06/10/19  0755 06/09/19  1923 06/09/19  1031 06/07/19  0436 06/06/19  0908   SODIUM mmol/L  --  135* 140 129* 134*   POTASSIUM mmol/L 4.0 3.4* 3.7 3.7 3.7   CHLORIDE mmol/L  --  99 104 95* 98   CO2 mmol/L  --  26.8 27.4 24.6 26.5   BUN mg/dL  --  7 6 10 13   CREATININE mg/dL  --  0.60* 0.61* 0.63* 0.87   GLUCOSE mg/dL  --  120* 95 129* 120*   Estimated Creatinine Clearance: 185.5 mL/min (A) (by C-G formula  based on SCr of 0.6 mg/dL (L)).  Results from last 7 days   Lab Units 06/09/19  1923   ALBUMIN g/dL 3.10*   BILIRUBIN mg/dL 0.3   ALK PHOS U/L 51   AST (SGOT) U/L 37   ALT (SGPT) U/L 36     Results from last 7 days   Lab Units 06/09/19  1923 06/09/19  1031 06/07/19  0436 06/06/19  0908   CALCIUM mg/dL 7.7* 7.9* 7.7* 7.8*   ALBUMIN g/dL 3.10*  --   --   --          Coag     HbA1C No results found for: HGBA1C  Infection     Radiology(recent) No radiology results for the last day  No results found for: TROPONINT, TROPONINI, BNP  No components found for: TSH;2      lisinopril-hydroCHLOROthiazide (ZESTORETIC) 10-12.5 mg combo dose  Oral Q24H   sodium chloride 2 g Oral TID With Meals   tamsulosin 0.4 mg Oral Daily      Diet Regular; Thin      Assessment/Plan     Active Hospital Problems    Diagnosis  POA   • Benign essential HTN [I10]  Yes   • Urinary retention [R33.9]  Unknown   • Hypokalemia [E87.6]  Unknown   • Spinal stenosis, L3-4 [M48.062]  Yes   • Lumbar disc herniation -left L4-5 [M51.26]  Yes   • History of lumbar fusion [Z98.1]  Not Applicable   • Lumbar radiculopathy [M54.16]  Yes   • Gait disorder [R26.9]  Yes      Resolved Hospital Problems   No resolved problems to display.       · Lisinopril and HCTZ for HTN  · Flomax for likely BPH/retention (improved)  · Working with PT  · K improved      Reviewed records      Jordan Craven MD  [facility]  06/11/19  12:27 PM    Electronically signed by Jordan Craven MD at 6/11/2019 12:43 PM          Consult Notes      Romeo Francisco MD at 6/11/2019  7:53 PM          Consults    Patient Care Team:  Yudy Luna APRN as PCP - General (Family Medicine)  Corbin Bailey MD as Surgeon (Neurosurgery)    Chief complaint:  History of lumbar spinal stenosis L3-4 and L4-5/disc herniation left L5-L6 with bilateral lower extremity weakness and numbness and altered gait  s/p Flavia 5 T11-S2 posterior fusion with L3-4 lumbar  decompression and left L5/L6 decompression.  Previous history of cauda equina syndrome and fusion L1-3  Hypertension  Anemia          Subjective     History of Present Illness     53 year old male with a history of lumbar spinal stenosis L3-4 and L4-5/disc herniation left L5-L6 with bilateral lower extremity weakness and numbness and altered gait.  He is now s/p June 5 T11-S2 posterior fusion with L3-4 lumbar decompression and left L5/L6 decompression.  He has a previous history of cauda equina syndrome and fusion L1-3 several years ago.  Prior to surgery he had noted increased symptoms in the lower extremities with the right lower extremity numb from the knee down and the left lower extremity numb from the shin down.  He has chronic weakness in the right lower extremity but started in March developing weakness in the left lower extremity with a left foot drop.  He describes a steppage gait.  His right foot would invert and he would not be able to dorsiflex the left foot.  Does not have any bracing on the lower extremities.  Was not using any device for mobility prior to admission.  Denied any bowel or bladder changes.    Postoperatively, he is to wear a TLSO brace when sitting up and out of bed.  He complains of back pain.  Notes difficulty with his mobility.  Slow with his ambulation.  He has been up to a bedside commode but not up to the bathroom.  He reports voiding okay.  No bowel movement yet.  No upper extremity symptoms.  With physical therapy, bed mobility sit to supine moderate assist to, transfers min 2.  Gait 10 feet min 2 RW.  With Occupational Therapy, LBD dependent.    Tachycardia - up to 131 in PT session.  He still has a drain in place.  Anemia postoperatively.  Hypertension with blood pressure in the 160/98 range.  Pain management-on Flexeril, Percocet .  Flomax for BPH/retention      Review of Systems   Comfortable with his breathing.  No upper extremity weakness or numbness.  Back pain.  Weakness  in his legs.  No bowel movement yet.  Past Medical History:   Diagnosis Date   • Hip pain    • Hypertension    • Low back pain    • Lumbar disc herniation    • Pain in left knee    • Sleep apnea     DOES NOT WEAR CPAP   • Spinal stenosis of lumbar region    ,   Past Surgical History:   Procedure Laterality Date   • LUMBAR DISCECTOMY     • LUMBAR FUSION     • VASECTOMY     ,   Family History   Problem Relation Age of Onset   • Diabetes Mother    • Hypertension Father    • Gout Father    • Sleep apnea Father    • Malig Hyperthermia Neg Hx    ,   Social History     Tobacco Use   • Smoking status: Current Every Day Smoker     Packs/day: 0.50     Years: 10.00     Pack years: 5.00     Types: Cigarettes   • Smokeless tobacco: Never Used   Substance Use Topics   • Alcohol use: Yes     Alcohol/week: 3.0 oz     Types: 5 Cans of beer per week   • Drug use: No   , He lives in [place], [facility], in a home with 8 steps to enter that he was managing prior to this hospitalization.  Walkout ranch.  Raised toilet.  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  Medications Prior to Admission   Medication Sig Dispense Refill Last Dose   • cyclobenzaprine (FLEXERIL) 10 MG tablet Take 10 mg by mouth 3 (Three) Times a Day As Needed for Muscle Spasms.   5/31/2019 at Unknown time   • Multiple Vitamin (MULTI-VITAMIN DAILY PO) Take 1 tablet by mouth Daily.   5/31/2019 at Unknown time   • Omega-3 Fatty Acids (FISH OIL) 1200 MG capsule delayed-release Take 1 capsule by mouth Daily.   5/31/2019 at Unknown time   • lisinopril-hydrochlorothiazide (PRINZIDE,ZESTORETIC) 10-12.5 MG per tablet Take 1 tablet by mouth Daily.   6/5/2019 at 0600   , Scheduled Meds:    lisinopril-hydroCHLOROthiazide (ZESTORETIC) 10-12.5 mg combo dose  Oral Q24H   sodium chloride 2 g Oral TID With Meals   tamsulosin 0.4 mg Oral Daily   , Continuous Infusions:   , PRN Meds:  •  bisacodyl  •  bisacodyl  •  cyclobenzaprine  •  docusate sodium  •   HYDROcodone-acetaminophen  •  Morphine **AND** naloxone  •  Morphine **AND** naloxone  •  ondansetron **OR** ondansetron  •  oxyCODONE-acetaminophen  •  potassium chloride and Allergies:  Patient has no known allergies.    Objective      Vital Signs  Temp:  [97.7 °F (36.5 °C)-98.5 °F (36.9 °C)] 98 °F (36.7 °C)  Heart Rate:  [102-114] 112  Resp:  [17-20] 17  BP: (135-160)/(86-99) 160/98    Physical Exam  MENTAL STATUS -  AWAKE / ALERT  HEENT-   SCLERA NON-ICTERIC, CONJUNCTIVA PINK, OP MOIST, NO JVD, EARS UNREMARKABLE EXTERNALLY  LUNGS - CTA, NO WHEEZES, RALES OR RHONCHI  HEART- RRR, NO RUB, MURMUR, OR GALLOP  ABD - NORMOACTIVE BOWEL SOUNDS, SOFT, NT.  Mildly distended.     Back-drain in place with blood-tinged serous fluid in the bulb  EXT - NO EDEMA OR CYANOSIS  NEURO -good strength with bilateral elbow flexion, elbow extension, finger flexion, hand intrinsics.  Pain inhibition in the lower extremities but is able to take resistance with bilateral knee extension, weak right ankle dorsiflexion and no significant active left dorsiflexion.  Weakness bilateral EHL.  Proprioception present at the bilateral great toes.    Results Review:    I reviewed the patient's new clinical results.  Results from last 7 days   Lab Units 06/10/19  0755 06/09/19  1839 06/09/19  1031   WBC 10*3/mm3 5.55 6.26 5.77   HEMOGLOBIN g/dL 8.3* 8.8* 8.6*   HEMATOCRIT % 26.2* 28.0* 27.0*   PLATELETS 10*3/mm3 199 203 180     Results from last 7 days   Lab Units 06/10/19  0755 06/09/19  1923 06/09/19  1031 06/07/19  0436   SODIUM mmol/L  --  135* 140 129*   POTASSIUM mmol/L 4.0 3.4* 3.7 3.7   CHLORIDE mmol/L  --  99 104 95*   CO2 mmol/L  --  26.8 27.4 24.6   BUN mg/dL  --  7 6 10   CREATININE mg/dL  --  0.60* 0.61* 0.63*   CALCIUM mg/dL  --  7.7* 7.9* 7.7*   BILIRUBIN mg/dL  --  0.3  --   --    ALK PHOS U/L  --  51  --   --    ALT (SGPT) U/L  --  36  --   --    AST (SGOT) U/L  --  37  --   --    GLUCOSE mg/dL  --  120* 95 129*            Assessment/Plan       Gait disorder    Lumbar radiculopathy    Spinal stenosis, L3-4    Lumbar disc herniation -left L4-5    History of lumbar fusion    Benign essential HTN    Urinary retention    Hypokalemia      Assessment & Plan  History of lumbar spinal stenosis L3-4 and L4-5/disc herniation left L5-L6 with bilateral lower extremity weakness and numbness and altered gait  s/p June 5 T11-S2 posterior fusion with L3-4 lumbar decompression and left L5/L6 decompression.  Previous history of cauda equina syndrome and fusion L1-3  Hypertension  Anemia  Postoperative pain  DVT prophylaxis-utilize SCDs    The patient is a 53-year-old male with impairments with his mobility, transfers, gait, functional mobility, and performance of activities of daily living.  Comorbidities include hypertension, anemia, pain management, postoperative drain still in place.  Given his functional impairments and comorbidities, would recommend acute inpatient rehabilitation.  This would be a comprehensive interdisciplinary program with physical therapy 1.5 hours and occupational therapy 1.5 hours 5 days a week to address functional mobility and ADL training with adaptive equipment and proper back mechanics, transfer training, strengthening, balance, progressive ambulation including stairs, and assessment for orthotics for ankle dorsiflexion weakness.  He describes a steppage gait with left foot drop premorbidly.  Redilatation nursing for carryover and monitoring of his incision, drain output, pain management, bowel and bladder, and skin.  Ongoing physician follow-up for pain management, hypertension, anemia, monitoring his drain output.  Weekly team conferences.  Goal be for the patient to achieve a level of supervision with his mobility and self-care including stairs for return home.  I discussed the patients findings and my recommendations with patient    Romeo Francisco MD  06/11/19  7:53 PM    Time:         Electronically signed  by Romeo Francisco MD at 6/11/2019  8:15 PM         Corrine Voss, RN   Registered Nurse      Plan of Care   Signed   Date of Service:  6/12/2019  6:11 AM   Creation Time:  6/12/2019  6:11 AM            Signed           Problem: Patient Care Overview  Goal: Plan of Care Review  Outcome: Ongoing (interventions implemented as appropriate)    06/11/19 2114 06/12/19 0608   Coping/Psychosocial   Plan of Care Reviewed With patient --    Plan of Care Review   Progress --  no change   OTHER   Outcome Summary --  vss. pt taking norco and flexeril for pain. states that his pain is now a 3 but constant. refuses to use ice on his back. No neuro changes. John drain has put out approximately 200 mls this shift. No other changes in assessment.                  Arlin Le      Case Management   Progress Notes   Addendum   Date of Service:  6/11/2019  9:12 AM   Creation Time:  6/11/2019  9:12 AM                     Continued Stay Note  [facility]     Patient Name: Archie Oquendo             MRN: [id number]  Today's Date: 6/11/2019                     Admit Date: 6/5/2019          Discharge Plan      Row Name 06/11/19 0912           Plan     Plan  BAR initiated precert      Patient/Family in Agreement with Plan  yes     Plan Comments  per Carlos/ YARIEL they have initiated precert. MURRAY Hernandez Frances M, RN   Registered Nurse   Physical Medicine and Rehabilitation   Nursing Note   Signed   Date of Service:  6/11/2019 10:53 AM   Creation Time:  6/11/2019 10:53 AM            Signed                 Denial received from [facility] for acute inpatient rehab.  Dr. Francisco informed--will follow progress today and consider appeal tomorrow.  Discussed with CCP

## 2019-06-12 NOTE — THERAPY TREATMENT NOTE
Acute Care - Physical Therapy Treatment Note  [facility]     Patient Name: Archie Oquendo  : 1965  MRN: [id number]  Today's Date: 2019  Onset of Illness/Injury or Date of Surgery: 19     Referring Physician: Leo    Admit Date: 2019    Visit Dx:    ICD-10-CM ICD-9-CM   1. Hyponatremia E87.1 276.1   2. Lumbar radiculopathy M54.16 724.4   3. Gait disorder R26.9 781.2   4. Spinal stenosis, L3-4 M48.062 724.03   5. Lumbar disc herniation -left L4-5 M51.26 722.10   6. History of lumbar fusion Z98.1 V45.4     Patient Active Problem List   Diagnosis   • Hip pain   • Low back pain   • Gait disorder   • Pain in left knee   • Hypertension   • Lumbar radiculopathy   • Spinal stenosis, L3-4   • Lumbar disc herniation -left L4-5   • History of lumbar fusion   • Benign essential HTN   • Urinary retention   • Hypokalemia       Therapy Treatment    Rehabilitation Treatment Summary     Row Name 19 1000             Treatment Time/Intention    Discipline  physical therapist  -MA      Document Type  therapy note (daily note)  -MA      Subjective Information  complains of;weakness;fatigue;pain  -MA      Mode of Treatment  physical therapy;individual therapy  -MA      Patient/Family Observations  supine in bed, no acute distress  -MA      Patient Effort  fair  -MA      Existing Precautions/Restrictions  fall;brace worn when out of bed;spinal  -MA      Recorded by [MA] Corinna Mckee, PT 19 1004      Row Name 19 1000             Vital Signs    O2 Delivery Pre Treatment  room air  -MA      Recorded by [MA] Corinna Mckee, PT 19 1004      Row Name 19 1000             Cognitive Assessment/Intervention- PT/OT    Orientation Status (Cognition)  oriented x 4  -MA      Follows Commands (Cognition)  WNL  -MA      Personal Safety Interventions  fall prevention program maintained;gait belt;muscle strengthening facilitated;nonskid shoes/slippers when out of bed;supervised activity  -MA       Recorded by [MA] Corinna Mckee, PT 06/12/19 1004      Row Name 06/12/19 1000             Bed Mobility Assessment/Treatment    Supine-Sit Coffey (Bed Mobility)  contact guard  -MA      Sit-Supine Coffey (Bed Mobility)  not tested  -MA2      Recorded by [MA] Corinna Mckee, PT 06/12/19 1004  [MA2] Corinna Mckee, PT 06/12/19 1009      Row Name 06/12/19 1000             Transfer Assessment/Treatment    Comment (Transfers)  2nd person for safety   -MA      Recorded by [MA] Corinna Mckee, PT 06/12/19 1009      Row Name 06/12/19 1000             Sit-Stand Transfer    Sit-Stand Coffey (Transfers)  minimum assist (75% patient effort)  -MA      Assistive Device (Sit-Stand Transfers)  walker, front-wheeled  -MA      Recorded by [MA] Corinna Mckee, PT 06/12/19 1009      Row Name 06/12/19 1000             Stand-Sit Transfer    Stand-Sit Coffey (Transfers)  minimum assist (75% patient effort)  -MA      Assistive Device (Stand-Sit Transfers)  walker, front-wheeled  -MA      Recorded by [MA] Corinna Mckee, PT 06/12/19 1009      Row Name 06/12/19 1000             Gait/Stairs Assessment/Training    Coffey Level (Gait)  minimum assist (75% patient effort);2 person assist  -MA      Assistive Device (Gait)  walker, front-wheeled  -MA      Distance in Feet (Gait)  20  -MA      Pattern (Gait)  step-to  -MA      Deviations/Abnormal Patterns (Gait)  landy decreased;stride length decreased  -MA      Bilateral Gait Deviations  knee buckling, bilateral  -MA      Left Sided Gait Deviations  foot drop/toe drag;weight shift ability decreased  -MA      Comment (Gait/Stairs)  B knee flexion despite cues for upright posture, steppage gait with L LE. May benefit from L AFO   -MA      Recorded by [MA] Corinna Mckee, PT 06/12/19 1009      Row Name 06/12/19 1000             Positioning and Restraints    Pre-Treatment Position  in bed  -MA      Post Treatment Position  chair  -MA      In Chair  sitting;call light within  reach;encouraged to call for assist;notified nsg  -MA      Recorded by [MA] Corinna Mckee, PT 06/12/19 1009      Row Name 06/12/19 1000             Pain Scale: Word Pre/Post-Treatment    Pain: Word Scale, Pretreatment  2 - mild pain  -MA      Pain: Word Scale, Post-Treatment  6 - moderate-severe pain  -MA      Pre/Post Treatment Pain Comment  back pain   -MA      Recorded by [MA] Corinna Mckee, PT 06/12/19 1009      Row Name                Wound 06/05/19 1741 Other (See comments) back incision    Wound - Properties Group Date first assessed: 06/05/19 [SL] Time first assessed: 1741 [SL] Side: Other (See comments) [SL] Location: back [SL] Type: incision [SL] Recorded by:  [SL] Corrine Vigil RN 06/05/19 1741      User Key  (r) = Recorded By, (t) = Taken By, (c) = Cosigned By    Initials Name Effective Dates Discipline    SL Corrine Vigil RN 06/16/16 -  Nurse    Corinan Dave, PT 10/19/18 -  PT          Wound 06/05/19 1741 Other (See comments) back incision (Active)   Dressing Appearance open to air 6/11/2019  9:14 PM   Closure Liquid skin adhesive 6/11/2019  9:14 PM   Periwound intact;dry 6/11/2019  9:14 PM   Periwound Temperature warm 6/11/2019  9:14 PM   Periwound Skin Turgor soft 6/11/2019  9:14 PM   Drainage Amount none 6/11/2019  9:14 PM   Dressing Care, Wound open to air 6/11/2019  9:14 PM           Physical Therapy Education     Title: PT OT SLP Therapies (In Progress)     Topic: Physical Therapy (Done)     Point: Mobility training (Done)     Learning Progress Summary           Patient Acceptance, E, VU,NR by MA at 6/12/2019 10:09 AM    Acceptance, E, VU,NR by MA at 6/11/2019 10:21 AM    Acceptance, E, VU,NR by MA at 6/10/2019  9:58 AM    Acceptance, E,TB, VU by CS at 6/9/2019  1:02 PM    Acceptance, E,TB,D, VU,NR by  at 6/8/2019  4:22 PM    Acceptance, E, NR by AR at 6/7/2019  3:15 PM    Acceptance, E, NR by AR at 6/6/2019  3:25 PM                   Point: Home exercise program  (Done)     Learning Progress Summary           Patient Acceptance, E, VU,NR by MA at 6/12/2019 10:09 AM    Acceptance, E, VU,NR by MA at 6/11/2019 10:21 AM    Acceptance, E, VU,NR by MA at 6/10/2019  9:58 AM    Acceptance, E,TB, VU by  at 6/9/2019  1:02 PM    Acceptance, E,TB,D, VU,NR by  at 6/8/2019  4:22 PM    Acceptance, E, NR by AR at 6/7/2019  3:15 PM    Acceptance, E, NR by AR at 6/6/2019  3:25 PM                   Point: Body mechanics (Done)     Learning Progress Summary           Patient Acceptance, E, VU,NR by MA at 6/12/2019 10:09 AM    Acceptance, E, VU,NR by MA at 6/11/2019 10:21 AM    Acceptance, E, VU,NR by MA at 6/10/2019  9:58 AM    Acceptance, E,TB, VU by  at 6/9/2019  1:02 PM    Acceptance, E,TB,D, VU,NR by  at 6/8/2019  4:22 PM    Acceptance, E, NR by AR at 6/7/2019  3:15 PM    Acceptance, E, NR by AR at 6/6/2019  3:25 PM                   Point: Precautions (Done)     Learning Progress Summary           Patient Acceptance, E, VU,NR by MA at 6/12/2019 10:09 AM    Acceptance, E, VU,NR by MA at 6/11/2019 10:21 AM    Acceptance, E, VU,NR by MA at 6/10/2019  9:58 AM    Acceptance, E,TB, VU by  at 6/9/2019  1:02 PM    Acceptance, E,TB,D, VU,NR by  at 6/8/2019  4:22 PM    Acceptance, E, NR by AR at 6/7/2019  3:15 PM    Acceptance, E, NR by AR at 6/6/2019  3:25 PM                               User Key     Initials Effective Dates Name Provider Type Discipline     04/03/18 -  Phyllis Otero, PT Physical Therapist PT    AR 04/03/18 -  Kassidy Jorge, PT Physical Therapist PT    MA 10/19/18 -  Corinna Mckee, PT Physical Therapist PT     05/14/18 -  Teresa, Devin T, PT Physical Therapist PT                PT Recommendation and Plan     Outcome Summary: Pt continues to require 2 assist for mobility. L foot drop and steppage gait on L. Knees flexed throughout despite cues for upright posture. Will continue to progress as tolerated.   Outcome Measures     Row Name 06/12/19 1000 06/11/19  1000 06/10/19 1341       How much help from another person do you currently need...    Turning from your back to your side while in flat bed without using bedrails?  3  -MA  3  -MA  --    Moving from lying on back to sitting on the side of a flat bed without bedrails?  3  -MA  3  -MA  --    Moving to and from a bed to a chair (including a wheelchair)?  3  -MA  3  -MA  --    Standing up from a chair using your arms (e.g., wheelchair, bedside chair)?  3  -MA  3  -MA  --    Climbing 3-5 steps with a railing?  2  -MA  2  -MA  --    To walk in hospital room?  3  -MA  3  -MA  --    AM-PAC 6 Clicks Score  17  -MA  17  -MA  --       How much help from another is currently needed...    Putting on and taking off regular lower body clothing?  --  --  1  -SG    Bathing (including washing, rinsing, and drying)  --  --  2  -SG    Toileting (which includes using toilet bed pan or urinal)  --  --  2  -SG    Putting on and taking off regular upper body clothing  --  --  3  -SG    Taking care of personal grooming (such as brushing teeth)  --  --  3  -SG    Eating meals  --  --  4  -SG    Score  --  --  15  -SG       Functional Assessment    Outcome Measure Options  AM-PAC 6 Clicks Basic Mobility (PT)  -MA  AM-PAC 6 Clicks Basic Mobility (PT)  -MA  AM-PAC 6 Clicks Daily Activity (OT)  -SG    Row Name 06/10/19 0900 06/09/19 1300          How much help from another person do you currently need...    Turning from your back to your side while in flat bed without using bedrails?  3  -MA  3  -CS     Moving from lying on back to sitting on the side of a flat bed without bedrails?  3  -MA  2  -CS     Moving to and from a bed to a chair (including a wheelchair)?  3  -MA  3  -CS     Standing up from a chair using your arms (e.g., wheelchair, bedside chair)?  3  -MA  3  -CS     Climbing 3-5 steps with a railing?  2  -MA  2  -CS     To walk in hospital room?  2  -MA  2  -CS     AM-PAC 6 Clicks Score  16  -MA  15  -CS        Functional Assessment     Outcome Measure Options  AM-PAC 6 Clicks Basic Mobility (PT)  -MA  AM-PAC 6 Clicks Basic Mobility (PT)  -CS       User Key  (r) = Recorded By, (t) = Taken By, (c) = Cosigned By    Initials Name Provider Type    Gail Alexander, OTR Occupational Therapist    Corinna Dave, PT Physical Therapist    Devin Abarca, PT Physical Therapist         Time Calculation:   PT Charges     Row Name 06/12/19 1012             Time Calculation    Start Time  0928  -MA      Stop Time  1001  -MA      Time Calculation (min)  33 min  -MA      PT Received On  06/12/19  -MA      PT - Next Appointment  06/13/19  -MA         Time Calculation- PT    Total Timed Code Minutes- PT  33 minute(s)  -MA        User Key  (r) = Recorded By, (t) = Taken By, (c) = Cosigned By    Initials Name Provider Type    Corinna Dave, PT Physical Therapist        Therapy Charges for Today     Code Description Service Date Service Provider Modifiers Qty    89325422722 HC PT THER PROC EA 15 MIN 6/11/2019 Corinna Mckee, PT GP 2    28546161461 HC PT THER SUPP EA 15 MIN 6/11/2019 Corinna Mckee, PT GP 2    93222448356 HC PT THER PROC EA 15 MIN 6/12/2019 Corinna Mckee, PT GP 2    66534151490 HC PT THER SUPP EA 15 MIN 6/12/2019 Corinna Mckee, PT GP 2          PT G-Codes  Outcome Measure Options: AM-PAC 6 Clicks Basic Mobility (PT)  AM-PAC 6 Clicks Score: 17  Score: 15    Corinna Mckee PT  6/12/2019

## 2019-06-12 NOTE — PROGRESS NOTES
Name: Archie Oquendo ADMIT: 2019   : 1965  PCP: uYdy Luna APRN    MRN: [id number] LOS: 7 days   AGE/SEX: 53 y.o. male  ROOM: [facility]   Subjective   No chief complaint on file.  cc- back pain    Pain is fair  Working with therapists    ROS  No f/c  No n/v  No cp/palp  No soa/cough    Objective   Vital Signs  Temp:  [98 °F (36.7 °C)-98.4 °F (36.9 °C)] 98 °F (36.7 °C)  Heart Rate:  [102-113] 102  Resp:  [16-19] 17  BP: (135-160)/(86-99) 141/92  SpO2:  [93 %-99 %] 93 %  on   ;   Device (Oxygen Therapy): room air  Body mass index is 26.69 kg/m².    Physical Exam   Constitutional: He is oriented to person, place, and time. He appears well-developed and well-nourished. No distress.   HENT:   Head: Normocephalic and atraumatic.   Mouth/Throat: Oropharynx is clear and moist.   Eyes: Conjunctivae are normal. No scleral icterus.   Neck: Normal range of motion. Neck supple.   Cardiovascular: Normal rate, regular rhythm and normal heart sounds.   No murmur heard.  Pulmonary/Chest: Effort normal and breath sounds normal. No respiratory distress.   Abdominal: Soft. Bowel sounds are normal. There is no tenderness.   Genitourinary:   Genitourinary Comments: Deferred    Musculoskeletal: He exhibits no edema or deformity.   Neurological: He is alert and oriented to person, place, and time.   Skin: Skin is warm and dry. He is not diaphoretic.   Psychiatric: He has a normal mood and affect. His behavior is normal. Thought content normal.   Nursing note and vitals reviewed.      Results Review:       I reviewed the patient's new clinical results.  Results from last 7 days   Lab Units 06/10/19  0755 19  1839 19  1031 19  0503   WBC 10*3/mm3 5.55 6.26 5.77 7.58  7.58   HEMOGLOBIN g/dL 8.3* 8.8* 8.6* 9.2*   PLATELETS 10*3/mm3 199 203 180 134*     Results from last 7 days   Lab Units 19  0830 06/10/19  0755 19  1923 19  1031 19  0436   SODIUM mmol/L 132*  --  135* 140  129*   POTASSIUM mmol/L 4.3 4.0 3.4* 3.7 3.7   CHLORIDE mmol/L 94*  --  99 104 95*   CO2 mmol/L 28.1  --  26.8 27.4 24.6   BUN mg/dL 9  --  7 6 10   CREATININE mg/dL 0.70*  --  0.60* 0.61* 0.63*   GLUCOSE mg/dL 110*  --  120* 95 129*   Estimated Creatinine Clearance: 159 mL/min (A) (by C-G formula based on SCr of 0.7 mg/dL (L)).  Results from last 7 days   Lab Units 06/09/19  1923   ALBUMIN g/dL 3.10*   BILIRUBIN mg/dL 0.3   ALK PHOS U/L 51   AST (SGOT) U/L 37   ALT (SGPT) U/L 36     Results from last 7 days   Lab Units 06/12/19  0830 06/09/19  1923 06/09/19  1031 06/07/19  0436   CALCIUM mg/dL 9.3 7.7* 7.9* 7.7*   ALBUMIN g/dL  --  3.10*  --   --          Coag     HbA1C No results found for: HGBA1C  Infection     Radiology(recent) No radiology results for the last day  No results found for: TROPONINT, TROPONINI, BNP  No components found for: TSH;2      lisinopril-hydroCHLOROthiazide (ZESTORETIC) 10-12.5 mg combo dose  Oral Q24H   sodium chloride 2 g Oral TID With Meals   tamsulosin 0.4 mg Oral Daily      Diet Regular; Thin      Assessment/Plan      Active Hospital Problems    Diagnosis  POA   • Benign essential HTN [I10]  Yes   • Urinary retention [R33.9]  Unknown   • Hypokalemia [E87.6]  Unknown   • Spinal stenosis, L3-4 [M48.062]  Yes   • Lumbar disc herniation -left L4-5 [M51.26]  Yes   • History of lumbar fusion [Z98.1]  Not Applicable   • Lumbar radiculopathy [M54.16]  Yes   • Gait disorder [R26.9]  Yes      Resolved Hospital Problems   No resolved problems to display.       · Lisinopril and HCTZ for HTN  · Flomax for likely BPH/retention (improved)  · Working with PT  · K improved  · Renal fx stable    Seems ready for rehab, I am off the rest of the week please re consult my partners if we can be of any further assistance    Reviewed records      Jordan Craven MD  [facility]  06/12/19  12:27 PM

## 2019-06-12 NOTE — PROGRESS NOTES
[facility] [place] [facility] PROGRESS NOTE    PATIENT IDENTIFICATION:   Name:  Archie Oquendo      MRN:  [id number]     53 y.o.  male               CC: POD #7 s/p extensive lumbar decompression and fusion.        Subjective     Interval History: has complaints of low back pain.    Objective     Vital signs in last 24 hours:  Temp:  [98 °F (36.7 °C)-98.7 °F (37.1 °C)] 98.7 °F (37.1 °C)  Heart Rate:  [102-138] 113  Resp:  [16-17] 17  BP: (134-160)/(52-98) 134/52  ICP ranges-    Intake/Output last 3 shifts:  I/O last 3 completed shifts:  In: 910 [P.O.:910]  Out: 56226 [Urine:95345; Drains:580]    Intake/Output this shift:  I/O this shift:  In: -   Out: 40 [Drains:40]    JV drain with high-volume bloody output: 370 mL's last 24 hours    Physical Exam:  General:   Awake, alert, and oriented x 3.   Appears well  Tachycardic  JV drain with serosanguineous output  Well-healing midline lumbar thoracic incision site flat, normal surrounding skin  Left foot drop unchanged postop  Wearing SCDs  Ambulating with assistance          Lab Results (last 24 hours)     Procedure Component Value Units Date/Time    Basic Metabolic Panel [[id number]]  (Abnormal) Collected:  06/12/19 0830    Specimen:  Blood Updated:  06/12/19 0908     Glucose 110 mg/dL      BUN 9 mg/dL      Creatinine 0.70 mg/dL      Sodium 132 mmol/L      Potassium 4.3 mmol/L      Chloride 94 mmol/L      CO2 28.1 mmol/L      Calcium 9.3 mg/dL      eGFR Non African Amer 117 mL/min/1.73      BUN/Creatinine Ratio 12.9     Anion Gap 9.9 mmol/L     Narrative:       GFR Normal >60  Chronic Kidney Disease <60  Kidney Failure <15    Hemoglobin & Hematocrit, Blood [[id number]]  (Normal) Collected:  06/12/19 1049    Specimen:  Blood Updated:  06/12/19 1126     Hemoglobin 13.2 g/dL      Hematocrit 40.8 %           IMAGING STUDIES:    No new imaging      Meds reviewed/changed: Yes    Current Facility-Administered Medications   Medication Dose Route Frequency Provider Last  Rate Last Dose   • bisacodyl (DULCOLAX) EC tablet 10 mg  10 mg Oral Daily PRN Corbin Bailey MD   10 mg at 06/07/19 1408   • bisacodyl (DULCOLAX) suppository 10 mg  10 mg Rectal Daily PRN Corbin Bailey MD   10 mg at 06/06/19 1213   • cyclobenzaprine (FLEXERIL) tablet 10 mg  10 mg Oral TID PRN Corbin Bailey MD   10 mg at 06/12/19 1037   • docusate sodium (COLACE) capsule 100 mg  100 mg Oral BID PRN Corbin Bailey MD   100 mg at 06/12/19 0857   • HYDROcodone-acetaminophen (NORCO) 7.5-325 MG per tablet 1 tablet  1 tablet Oral Q4H PRN Corbin Bailey MD   1 tablet at 06/12/19 0857   • lisinopril (PRINIVIL,ZESTRIL) 10 mg, hydrochlorothiazide (MICROZIDE) 12.5 mg for ZESTORETIC 10-12.5   Oral Q24H Corbin Bailey MD       • morphine injection 4 mg  4 mg Intravenous Q2H PRN Corbin Bailey MD   4 mg at 06/12/19 1343    And   • naloxone (NARCAN) injection 0.4 mg  0.4 mg Intravenous Q5 Min PRN Corbin Bailey MD       • Morphine injection 6 mg  6 mg Intravenous Q2H PRN Corbin Bailey MD   6 mg at 06/06/19 0551    And   • naloxone (NARCAN) injection 0.4 mg  0.4 mg Intravenous Q5 Min PRN Corbin Bailey MD       • ondansetron (ZOFRAN) tablet 4 mg  4 mg Oral Q6H PRN Corbin Bailey MD        Or   • ondansetron (ZOFRAN) injection 4 mg  4 mg Intravenous Q6H PRN Corbin Bailey MD   4 mg at 06/06/19 2146   • oxyCODONE-acetaminophen (PERCOCET)  MG per tablet 1 tablet  1 tablet Oral Q4H PRN Corbin Bailey MD   1 tablet at 06/12/19 1344   • potassium chloride (MICRO-K) CR capsule 40 mEq  40 mEq Oral PRN Veto Lo MD   40 mEq at 06/10/19 0437   • sodium chloride tablet 2 g  2 g Oral TID With Meals Nidia Castañeda APRN   2 g at 06/12/19 1345   • tamsulosin (FLOMAX) 24 hr capsule 0.4 mg  0.4 mg Oral Daily Koko Sung MD   0.4 mg at 06/12/19 0860       Assessment/Plan     ASSESSMENT:      Gait disorder    Lumbar radiculopathy    Spinal stenosis, L3-4    Lumbar disc  "herniation -left L4-5    History of lumbar fusion    Benign essential HTN    Urinary retention    Hypokalemia      PLAN: He continues to improve postop.  He reports ongoing low back pain and states that he is not taking enough pain medication.  I reminded the him that he is to ask for the pain medication as the nurses do not bring it unless asked.  Insurance pre-[facility] for [facility] acute rehab pending.  Continue the same for now.  Hemoglobin 13.2.  Recheck in a.m.  JV drain remains in.    Reiterated the importance of wearing SCDs to decrease risk of postoperative DVTs given his decreased mobility.  He expressed understanding    I discussed the patients findings and my recommendations with patient, family, nursing staff and Dr Bailey       LOS: 7 days       Nidia Castañeda, APRN  6/12/2019  3:31 PM      \"Dictated utilizing Dragon dictation\".      "

## 2019-06-12 NOTE — PLAN OF CARE
Problem: Patient Care Overview  Goal: Plan of Care Review  Outcome: Ongoing (interventions implemented as appropriate)   06/11/19 2114 06/12/19 0608   Coping/Psychosocial   Plan of Care Reviewed With patient --    Plan of Care Review   Progress --  no change   OTHER   Outcome Summary --  vss. pt taking norco and flexeril for pain. states that his pain is now a 3 but constant. refuses to use ice on his back. No neuro changes. John drain has put out approximately 200 mls this shift. No other changes in assessment.        Problem: Skin Injury Risk (Adult)  Goal: Identify Related Risk Factors and Signs and Symptoms  Outcome: Ongoing (interventions implemented as appropriate)    Goal: Skin Health and Integrity  Outcome: Ongoing (interventions implemented as appropriate)      Problem: Fall Risk (Adult)  Goal: Identify Related Risk Factors and Signs and Symptoms  Outcome: Ongoing (interventions implemented as appropriate)    Goal: Absence of Fall  Outcome: Ongoing (interventions implemented as appropriate)      Problem: Pain, Acute (Adult)  Goal: Identify Related Risk Factors and Signs and Symptoms  Outcome: Ongoing (interventions implemented as appropriate)    Goal: Acceptable Pain Control/Comfort Level  Outcome: Ongoing (interventions implemented as appropriate)

## 2019-06-12 NOTE — PROGRESS NOTES
Continued Stay Note  [facility]     Patient Name: Archie Oquendo  MRN: [id number]  Today's Date: 6/12/2019    Admit Date: 6/5/2019    Discharge Plan     Row Name 06/12/19 1135       Plan    Plan  BAR precert pending    Plan Comments  Spoke with Pebbles with [facility] and Dr. Francisco has started the appeal process.  Will follow.  Spoke with Rosanne MORA with Dr. Hodges.        Discharge Codes    No documentation.       Expected Discharge Date and Time     Expected Discharge Date Expected Discharge Time    Jun 11, 2019             Jing Cameron RN

## 2019-06-12 NOTE — PLAN OF CARE
Problem: Patient Care Overview  Goal: Plan of Care Review   06/12/19 1010   OTHER   Outcome Summary Pt continues to require 2 assist for mobility. L foot drop and steppage gait on L. Knees flexed throughout despite cues for upright posture. Will continue to progress as tolerated.

## 2019-06-12 NOTE — NURSING NOTE
Continue to follow, discussed with Dr. Francisco. Await results of peer to peer appeal. Will f/u for result of peer to peer and update bed availability 6/13.    Pebbles Art RN  Rehab Admissions Nurse  [phone]

## 2019-06-12 NOTE — NURSING NOTE
Return call from Starr, peer to peer complete and denial overturned. Anticipate bed available 6/13/19 pending patient medical stability. Will f/u in ICNTIA Art RN  Rehab Admissions Nurse  [phone]

## 2019-06-12 NOTE — OP NOTE
LUMBAR DISCECTOMY FUSION INSTRUMENTATION WITH STEALTH  Procedure Note    Archie Oquendo  6/5/2019  [id number]    SURGEON  Corbin Bailey MD    ASSISTANT:  Luzmaria Child CSA  INDICATION:  The patient has undergone extensive lumbar surgery in the past.  He has had a instrumented fusion L1-L3 and has developed severe lumbar stenosis one level below the fused vertebrae.  Radiographically he has a 6 lumbar vertebrae.  He has foot drop on the left preoperatively as well as significant weakness in both lower extremities and very severe pain.  It is indicated to decompress the lumbar spine satisfactorily and to stabilize the decompressed spine.    The risks, benefits, and alternatives of decompression and fusion were explained in detail to the patient. The alternative is not to perform an operative procedure. The benefit should be, though is not guaranteed, primarily a potential reduction in the neurosensory and/or motor dysfunction; secondarily, a possible reduction in back pain. The risks include, but are not limited to, the possibility of death, infection, stroke, bleeding, blindness, paralysis, blindness, lack of improvement in the patient's pain syndrome, worsening of the pain syndrome, meningitis, osteomyelitis, recurrent disc herniation, need for further operative intervention, recurrence of the pain syndrome, sexual dysfunction, incontinence, inability to urinate without catheterization, inability to have a normal bowel movement, epidural scarring resulting in chronic back pain, leakage of cerebral spinal fluid at the time of surgery or after recovery from surgery requiring further operative intervention, lack of bony fusion requiring further surgery, instrumentation breakdown or pull out, adjacent level spinal degeneration, spinal instability. I also explained the realistic expectations as they pertain to the procedure. The patient voiced understanding of these risks, benefits, alternatives, and  realistic expectations and requests that we proceed with the operative intervention.      Pre-op Diagnosis:   Lumbar radiculopathy [M54.16]  Gait disorder [R26.9]  Spinal stenosis, lumbar region, with neurogenic claudication [M48.062]  Lumbar disc herniation [M51.26]  History of lumbar fusion [Z98.1]    Post-Op Diagnosis Codes:     * Lumbar radiculopathy [M54.16]     * Gait disorder [R26.9]     * Spinal stenosis, lumbar region, with neurogenic claudication [M48.062]     * Lumbar disc herniation [M51.26]     * History of lumbar fusion [Z98.1]    PROCEDURE PERFORMED:  Procedure(s):  LUMBAR THREE LUMBAR FOUR  AND LUMBAR FIVE LUMBAR SIX DECOMPRESSION. THORACIC ELEVEN TO SACREL TWO POSTERIOR LATERAL ARTHRODESIS(FUSION) WITH SPINAL INSTRUMENTATION. REMOVAL OF LUMBAR INSTRUMENTATION.    1.  Lumbar 3/4 and 4/5 revision lumbar laminectomy medial facetectomy and foraminotomies   2.  Left lumbar 5/6 lumbar decompressive laminectomy medial facetectomy and foraminotomy  3.  T11/12, T12/L1, L1/L2, L2/L3, L3/L4, L4/L5, L5/L6, L6/S1, S1/S2 posterior lateral arthrodesis with allograft and autograft bone  4.  T10-S2 segmental pedicle screw and shiela instrumentation (T11 and T12: 5.5 X 50 mm bilaterally, L1,2,3: 6.5 X 55 mm Bilaterally, L4, L5: 7.5 X 55mm bilaterally, L6: 7.5 X 50 mm bilaterally, S1: 7.5 X 45 mm bilaterally, S2: 9.5 X 90 mm bilaterally  - long rods)  5.  Stereotactic guidance for spine surgery  6.  Electrophysiological monitoring 7 hours  7.  Neuro junction monitoring bilaterally L1, L2, L3, L4, L5, L6, S1.  8.  Removal of previously placed lumbar instrumentation L1-L3  9.  Microdissection technique with micro-illumination and the aid of the operating microscope    Anesthesia: General    Staff:   Cell Saver : Kaylan Lai; Vickey Wilkinson  Circulator: Megan Figueroa RN; Corrine Vigil RN; Jose James RN  Radiology Technologist: Sonja Wren RRT  Scrub Person: Lou Waldrop; Christo  Katey  Vendor Representative: Misael Giraldo  Assistant: Luzmaria Child CSA    Estimated Blood Loss: 700 mL    Specimens:   Order Name Source Comment Collection Info Order Time   TYPE AND SCREEN   Collected By: Ignacia Rivera RN 6/5/2019  6:19 AM            Drains:   Closed/Suction Drain Bulb 15 Fr. (Active)   Site Description Unable to view 6/11/2019  9:14 PM   Dressing Status Clean;Dry;Intact 6/11/2019  9:14 PM   Drainage Appearance Serosanguineous 6/11/2019  9:14 PM   Status To bulb suction 6/11/2019  9:14 PM   Output (mL) 40 mL 6/12/2019  2:10 PM       [REMOVED] Closed/Suction Drain Back Bulb 15 Fr. (Removed)   Site Description Unable to view 6/9/2019  9:53 AM   Dressing Status Clean;Dry;Intact 6/9/2019  9:53 AM   Drainage Appearance Serosanguineous 6/9/2019  9:53 AM   Status To bulb suction 6/9/2019  9:53 AM   Output (mL) 20 mL 6/9/2019 10:15 AM       [REMOVED] Urethral Catheter Silicone 16 Fr. (Removed)   Daily Indications < 24 hr post op 6/6/2019  4:00 AM   Site Assessment Clean;Skin intact 6/5/2019  9:00 PM   Collection Container Standard drainage bag 6/6/2019  4:00 AM   Securement Method Securing device 6/6/2019  4:00 AM   Catheter care complete Yes 6/5/2019  8:00 PM   Output (mL) 1150 mL 6/6/2019  6:16 AM       Findings: Severe lumbar spinal stenosis 3 4 and at L4-5 and on the left at L5-L6    Complications: None    Details: Detail: The patient was brought to the operating room where general endotracheal anesthesia was induced.   Supine on the gurney EMG electrodes were placed into the adductor medius, vastus lateralis, extensor hallucis longus, and medial gastrocnemius muscles bilaterally.  Continuous EMG monitoring was then accomplished throughout the  procedure.  No changes in EMG electrode recording were noted that could not be rapidly changed and eliminated by a minor change and operative intervention.  Neural junction monitoring: During placement of pedicle screws an electrified tap at 6  mA was advanced under stereotactic guidance into the pedicles at L4 and L5 bilaterally. Finally placement of the pedicle screws was accomplished with electrification of the screws to 20 mA.  No abnormal EMG activity was noted during the neural junction monitoring tests.    Positioning/approach: The patient required four people to position the patient on the operative table.  Once the patient was placed onto the Herber table care was taken to pad all susceptible areas.  The thoraco- lumbar area was prepped and draped in the usual sterile manner.    Incision was made from the T10 to the lower sacral spinous processes and the paraspinous musculature was then elevated from the spinous process and the lamina and and thoracic spine the transverse process and rib heads and on the lumbar spine spurs in the lateral aspect of the pedicles and of the transverse processes.  X-ray confirmation of the appropriate levels was then accomplished. I identified the previously placed screws and removed the 3 pedicle screws on the left of the 2 pedicle screws on the right after removing the rods.     A post was placed into the right iliac crest and attached to a stereotactic array.  Also, a clamp was used in order to immobilize the stereotactic array attached to the exposed spinous process of T10.  A rotational CT was then accomplished and reconstructed on a separate workstation for stereotactic guidance.  Using a stereotactic localizing device to identify the appropriate entry point over each pedicle a stereotactic trajectory was devised and using the stereotactic guided Midas drill a starting hole was created on the pedicle.    Microscope: The operating microscope was then draped sterilely and brought into the field and the rest of the operation performed under operative magnification with microdissection technique and micro-illumination.    Decompression: Using stereotaxis identified the L3-4 and L4-5 severely stenotic areas I  removed the spinous process of L3-L4 and L5.  Performed a complete laminectomy of L4 to the superior laminotomy of L5 and inferior laminotomy of L3.  I encountered severely and ligamentum flavum and this was removed and decompressing the thecal sac.  On the left I removed the superior facet of L4 completely unroofing the neural foramina.  A medial facetectomy, foraminotomy, and removal of the hypertrophic ligamentum flavum was then done.  A Santana elevator was used to palpate the pedicles.  I could pass the Leming elevator out into the neural foramen without any impediment of the nerve root.    On the left at L5-L6 I then performed a inferior laminotomy of L5 and superior laminectomy of L6 and a neural foraminotomy removing severely thickened ligamentum flavum as well as identifying a calcified disc that contributed significantly to the nerve root compression.  I carved this disc all the way and I unroofed the canal completely.  This allowed complete decompression of the exiting nerve root and the traversing nerve root.    Placement of screws stereotactic guidance: The electrified tap was then advanced into each of the pedicles. The T11, T12, L1, L2, L3, L4, L5, L6, S1, and S2 were targeted bilaterally.  The electrified tap was localized and stereotactic space and once the depth of the pedicle was identified a trajectory was once again saved in the Stealth station and the length of the screw was identified to be used.  Finally placement of the pedicle screws was accomplished with electrification of the screws to 20 mA.  No abnormal EMG activity was noted during the neural junction monitoring tests.    I then templated the shiela as necessary cut the shiela bilaterally and bent the shiela in order to fit into all of the screw heads.  On the right side I needed to use some cross-link areas to the shiela which was accomplished.    Posterior lateral arthrodesis: I then exposed the transverse process and rib heads as appropriate  at T11, T12, L1, L2, L3, L4, L5, L6, S1, and S2.  Using the high-speed drill I performed arthrodesis by decorticating the transverse process and rib heads as appropriate.  I then placed autograft bone that had been harvested as well as allograft bone into the posterior lateral gutter.    Closure: Copious irrigation, hemostasis was obtained, drains were placed, the wound was then closed appropriate skin reapproximated with Monocryl and Dermabond.  The patient was then awakened from general endotracheal anesthesia extubated and taken the recovery room in stable and good condition.      Corbin Bailey MD     Date: 6/12/2019  Time: 2:21 PM

## 2019-06-13 LAB — GLUCOSE BLDC GLUCOMTR-MCNC: 115 MG/DL (ref 70–130)

## 2019-06-13 PROCEDURE — 82962 GLUCOSE BLOOD TEST: CPT

## 2019-06-13 PROCEDURE — 25010000002 MORPHINE PER 10 MG: Performed by: NEUROLOGICAL SURGERY

## 2019-06-13 PROCEDURE — 99024 POSTOP FOLLOW-UP VISIT: CPT | Performed by: NURSE PRACTITIONER

## 2019-06-13 PROCEDURE — 97110 THERAPEUTIC EXERCISES: CPT | Performed by: PHYSICAL THERAPIST

## 2019-06-13 RX ORDER — HYDROCODONE BITARTRATE AND ACETAMINOPHEN 7.5; 325 MG/1; MG/1
2 TABLET ORAL EVERY 4 HOURS
Status: DISCONTINUED | OUTPATIENT
Start: 2019-06-13 | End: 2019-06-14 | Stop reason: HOSPADM

## 2019-06-13 RX ORDER — OXYCODONE AND ACETAMINOPHEN 10; 325 MG/1; MG/1
2 TABLET ORAL EVERY 4 HOURS PRN
Status: DISCONTINUED | OUTPATIENT
Start: 2019-06-13 | End: 2019-06-14 | Stop reason: HOSPADM

## 2019-06-13 RX ADMIN — OXYCODONE HYDROCHLORIDE AND ACETAMINOPHEN 1 TABLET: 10; 325 TABLET ORAL at 05:34

## 2019-06-13 RX ADMIN — CYCLOBENZAPRINE 10 MG: 10 TABLET, FILM COATED ORAL at 11:33

## 2019-06-13 RX ADMIN — HYDROCODONE BITARTRATE AND ACETAMINOPHEN 1 TABLET: 7.5; 325 TABLET ORAL at 11:33

## 2019-06-13 RX ADMIN — DOCUSATE SODIUM 100 MG: 100 CAPSULE, LIQUID FILLED ORAL at 00:11

## 2019-06-13 RX ADMIN — HYDROCODONE BITARTRATE AND ACETAMINOPHEN 2 TABLET: 7.5; 325 TABLET ORAL at 23:15

## 2019-06-13 RX ADMIN — HYDROCODONE BITARTRATE AND ACETAMINOPHEN 2 TABLET: 7.5; 325 TABLET ORAL at 20:39

## 2019-06-13 RX ADMIN — MORPHINE SULFATE 4 MG: 2 INJECTION, SOLUTION INTRAMUSCULAR; INTRAVENOUS at 12:59

## 2019-06-13 RX ADMIN — CYCLOBENZAPRINE 10 MG: 10 TABLET, FILM COATED ORAL at 20:39

## 2019-06-13 RX ADMIN — LISINOPRIL: 10 TABLET ORAL at 08:47

## 2019-06-13 RX ADMIN — CYCLOBENZAPRINE 10 MG: 10 TABLET, FILM COATED ORAL at 00:05

## 2019-06-13 RX ADMIN — OXYCODONE HYDROCHLORIDE AND ACETAMINOPHEN 1 TABLET: 10; 325 TABLET ORAL at 15:49

## 2019-06-13 RX ADMIN — SODIUM CHLORIDE TAB 1 GM 2 G: 1 TAB at 11:33

## 2019-06-13 RX ADMIN — SODIUM CHLORIDE TAB 1 GM 2 G: 1 TAB at 08:49

## 2019-06-13 RX ADMIN — TAMSULOSIN HYDROCHLORIDE 0.4 MG: 0.4 CAPSULE ORAL at 08:47

## 2019-06-13 RX ADMIN — SODIUM CHLORIDE TAB 1 GM 2 G: 1 TAB at 17:31

## 2019-06-13 RX ADMIN — OXYCODONE HYDROCHLORIDE AND ACETAMINOPHEN 1 TABLET: 10; 325 TABLET ORAL at 00:03

## 2019-06-13 RX ADMIN — DOCUSATE SODIUM 100 MG: 100 CAPSULE, LIQUID FILLED ORAL at 20:49

## 2019-06-13 RX ADMIN — DOCUSATE SODIUM 100 MG: 100 CAPSULE, LIQUID FILLED ORAL at 08:47

## 2019-06-13 NOTE — PAYOR COMM NOTE
"UR CONTACT:  SAVANNAH  P: [phone]        F: [phone]        Bj Oquendo (53 y.o. Male)     Date of Birth Social Security Number Address Home Phone MRN    1965  [address] [facility] [phone] [id number]    Faith Marital Status          Patient Refused        Admission Date Admission Type Admitting Provider Attending Provider Department, Room/Bed    6/5/19 Elective Corbin Bailey MD Hodes, Jonathan E, MD [address], P591/1    Discharge Date Discharge Disposition Discharge Destination                       Attending Provider:  Corbin Bailey MD    Allergies:  No Known Allergies    Isolation:  None   Infection:  None   Code Status:  CPR    Ht:  188 cm (74\")   Wt:  94.3 kg (207 lb 14.3 oz)    Admission Cmt:  None   Principal Problem:  None                Active Insurance as of 6/5/2019     Primary Coverage     Payor Plan Insurance Group Employer/Plan Group    [employer] BLUE CROSS [facility] [employer] PPO [id number]     Payor Plan Address Payor Plan Phone Number Payor Plan Fax Number Effective Dates    [address] [phone]  7/1/2016 - None Entered    [place] [address]       Subscriber Name Subscriber Birth Date Member ID       BJ OQUENDO 1965 [id number]                 Emergency Contacts      (Rel.) Home Phone Work Phone Mobile Phone    Patricia Oquendo (Daughter) -- -- [phone]    IBIS OQUENDO (Daughter) -- -- [phone]            ICU Vital Signs     Row Name 06/13/19 0554 06/12/19 2045 06/12/19 2037 06/12/19 1830 06/12/19 1410       Vitals    Temp  97.9 °F (36.6 °C)  --  98.4 °F (36.9 °C)  97.8 °F (36.6 °C)  98.7 °F (37.1 °C)    Temp src  Oral  --  Oral  Oral  Oral    Pulse  105  --  126  (Abnormal)   118  113    Heart Rate Source  Monitor  --  Monitor  Monitor  Monitor    Resp  18  --  18  18  17    Resp Rate Source  Visual  --  Visual  Visual  Visual    BP  131/77  --  " 120/82  130/88  134/52    Noninvasive MAP (mmHg)  90  --  93  --  --    BP Location  Right arm  --  Right arm  Right arm  Right arm    BP Method  Automatic  --  Automatic  Automatic  Automatic    Patient Position  Lying  --  Lying  Lying  Lying       Oxygen Therapy    SpO2  98 %  --  --  97 %  97 %    Pulse Oximetry Type  Continuous  --  Continuous  Continuous  Continuous    Device (Oxygen Therapy)  nasal cannula  nasal cannula  nasal cannula  nasal cannula  room air    Flow (L/min)  2  2  2  2  --    Row Name 06/12/19 1018 06/12/19 0900                Vitals    Temp  98 °F (36.7 °C)  --       Temp src  Oral  --       Pulse  138  (Abnormal)   --       Heart Rate Source  Monitor  --       Resp  16  --       Resp Rate Source  Visual  --       BP  135/97  --       BP Location  Left arm  --       BP Method  Automatic  --       Patient Position  Sitting  --          Oxygen Therapy    SpO2  96 %  --       Pulse Oximetry Type  Continuous  --       Device (Oxygen Therapy)  room air  nasal cannula       Flow (L/min)  --  2           Lines, Drains & Airways    Active LDAs     Name:   Placement date:   Placement time:   Site:   Days:    Peripheral IV 06/05/19 0701 Right Hand   06/05/19    0701    Hand   8    Peripheral IV 06/10/19 1048 Right Forearm   06/10/19    1048    Forearm   2    Closed/Suction Drain Bulb 15 Fr.   06/05/19    1617    --   7                Hospital Medications (active)       Dose Frequency Start End    bisacodyl (DULCOLAX) EC tablet 10 mg 10 mg Daily PRN 6/5/2019     Sig - Route: Take 2 tablets by mouth Daily As Needed for Constipation. - Oral    bisacodyl (DULCOLAX) suppository 10 mg 10 mg Daily PRN 6/6/2019     Sig - Route: Insert 1 suppository into the rectum Daily As Needed for Constipation. - Rectal    cyclobenzaprine (FLEXERIL) tablet 10 mg 10 mg 3 Times Daily PRN 6/5/2019     Sig - Route: Take 1 tablet by mouth 3 (Three) Times a Day As Needed for Muscle Spasms. - Oral    docusate sodium (COLACE)  "capsule 100 mg 100 mg 2 Times Daily PRN 6/5/2019     Sig - Route: Take 1 capsule by mouth 2 (Two) Times a Day As Needed for Constipation. - Oral    HYDROcodone-acetaminophen (NORCO) 7.5-325 MG per tablet 1 tablet 1 tablet Every 4 Hours PRN 6/5/2019 6/15/2019    Sig - Route: Take 1 tablet by mouth Every 4 (Four) Hours As Needed for Moderate Pain . - Oral    lisinopril (PRINIVIL,ZESTRIL) 10 mg, hydrochlorothiazide (MICROZIDE) 12.5 mg for ZESTORETIC 10-12.5  Every 24 Hours Scheduled 6/6/2019     Sig - Route: Take  by mouth Daily. - Oral    morphine injection 4 mg 4 mg Every 2 Hours PRN 6/5/2019 6/15/2019    Sig - Route: Infuse 2 mL into a venous catheter Every 2 (Two) Hours As Needed for Moderate Pain . - Intravenous    Linked Group 1:  \"And\" Linked Group Details        Morphine injection 6 mg 6 mg Every 2 Hours PRN 6/5/2019 6/15/2019    Sig - Route: Infuse 0.6 mL into a venous catheter Every 2 (Two) Hours As Needed for Severe Pain . - Intravenous    Linked Group 2:  \"And\" Linked Group Details        naloxone (NARCAN) injection 0.4 mg 0.4 mg Every 5 Minutes PRN 6/5/2019     Sig - Route: Infuse 1 mL into a venous catheter Every 5 (Five) Minutes As Needed for Respiratory Depression. - Intravenous    Linked Group 2:  \"And\" Linked Group Details        naloxone (NARCAN) injection 0.4 mg 0.4 mg Every 5 Minutes PRN 6/5/2019     Sig - Route: Infuse 1 mL into a venous catheter Every 5 (Five) Minutes As Needed for Respiratory Depression. - Intravenous    Linked Group 1:  \"And\" Linked Group Details        ondansetron (ZOFRAN) injection 4 mg 4 mg Every 6 Hours PRN 6/5/2019     Sig - Route: Infuse 2 mL into a venous catheter Every 6 (Six) Hours As Needed for Nausea or Vomiting. - Intravenous    Linked Group 3:  \"Or\" Linked Group Details        ondansetron (ZOFRAN) tablet 4 mg 4 mg Every 6 Hours PRN 6/5/2019     Sig - Route: Take 1 tablet by mouth Every 6 (Six) Hours As Needed for Nausea or Vomiting. - Oral    Linked Group 3:  \"Or\" " Linked Group Details        oxyCODONE-acetaminophen (PERCOCET)  MG per tablet 1 tablet 1 tablet Every 4 Hours PRN 6/5/2019 6/15/2019    Sig - Route: Take 1 tablet by mouth Every 4 (Four) Hours As Needed for Severe Pain . - Oral    potassium chloride (MICRO-K) CR capsule 40 mEq 40 mEq As Needed 6/9/2019     Sig - Route: Take 4 capsules by mouth As Needed (Potassium Replacement.  See Admin Instructions). - Oral    sodium chloride tablet 2 g 2 g 3 Times Daily With Meals 6/7/2019     Sig - Route: Take 2 tablets by mouth 3 (Three) Times a Day With Meals. - Oral    tamsulosin (FLOMAX) 24 hr capsule 0.4 mg 0.4 mg Daily 6/9/2019     Sig - Route: Take 1 capsule by mouth Daily. - Oral             Physician Progress Notes      Nidia Castañeda, VERONICA at 6/12/2019  3:30 PM            [facility] ADVANCED NEUROSURGERY PROGRESS NOTE    PATIENT IDENTIFICATION:   Name:  Archie Oquendo      MRN:  [id number]     53 y.o.  male               CC: POD #7 s/p extensive lumbar decompression and fusion.        Subjective     Interval History: has complaints of low back pain.    Objective     Vital signs in last 24 hours:  Temp:  [98 °F (36.7 °C)-98.7 °F (37.1 °C)] 98.7 °F (37.1 °C)  Heart Rate:  [102-138] 113  Resp:  [16-17] 17  BP: (134-160)/(52-98) 134/52  ICP ranges-    Intake/Output last 3 shifts:  I/O last 3 completed shifts:  In: 910 [P.O.:910]  Out: 71174 [Urine:42919; Drains:580]    Intake/Output this shift:  I/O this shift:  In: -   Out: 40 [Drains:40]    JV drain with high-volume bloody output: 370 mL's last 24 hours    Physical Exam:  General:   Awake, alert, and oriented x 3.   Appears well  Tachycardic  JV drain with serosanguineous output  Well-healing midline lumbar thoracic incision site flat, normal surrounding skin  Left foot drop unchanged postop  Wearing SCDs  Ambulating with assistance          Lab Results (last 24 hours)     Procedure Component Value Units Date/Time    Basic Metabolic Panel [[id number]]  (Abnormal)  Collected:  06/12/19 0830    Specimen:  Blood Updated:  06/12/19 0908     Glucose 110 mg/dL      BUN 9 mg/dL      Creatinine 0.70 mg/dL      Sodium 132 mmol/L      Potassium 4.3 mmol/L      Chloride 94 mmol/L      CO2 28.1 mmol/L      Calcium 9.3 mg/dL      eGFR Non African Amer 117 mL/min/1.73      BUN/Creatinine Ratio 12.9     Anion Gap 9.9 mmol/L     Narrative:       GFR Normal >60  Chronic Kidney Disease <60  Kidney Failure <15    Hemoglobin & Hematocrit, Blood [[id number]]  (Normal) Collected:  06/12/19 1049    Specimen:  Blood Updated:  06/12/19 1126     Hemoglobin 13.2 g/dL      Hematocrit 40.8 %           IMAGING STUDIES:    No new imaging      Meds reviewed/changed: Yes    Current Facility-Administered Medications   Medication Dose Route Frequency Provider Last Rate Last Dose   • bisacodyl (DULCOLAX) EC tablet 10 mg  10 mg Oral Daily PRN Corbin Bailey MD   10 mg at 06/07/19 1408   • bisacodyl (DULCOLAX) suppository 10 mg  10 mg Rectal Daily PRN Corbin Bailey MD   10 mg at 06/06/19 1213   • cyclobenzaprine (FLEXERIL) tablet 10 mg  10 mg Oral TID PRN Corbin Bailey MD   10 mg at 06/12/19 1037   • docusate sodium (COLACE) capsule 100 mg  100 mg Oral BID PRN Corbin Bailey MD   100 mg at 06/12/19 0857   • HYDROcodone-acetaminophen (NORCO) 7.5-325 MG per tablet 1 tablet  1 tablet Oral Q4H PRN Corbin Bailey MD   1 tablet at 06/12/19 0857   • lisinopril (PRINIVIL,ZESTRIL) 10 mg, hydrochlorothiazide (MICROZIDE) 12.5 mg for ZESTORETIC 10-12.5   Oral Q24H Corbin Bailey MD       • morphine injection 4 mg  4 mg Intravenous Q2H PRN Corbin Bailey MD   4 mg at 06/12/19 1343    And   • naloxone (NARCAN) injection 0.4 mg  0.4 mg Intravenous Q5 Min PRN Corbin Bailey MD       • Morphine injection 6 mg  6 mg Intravenous Q2H PRN Corbin Bailey MD   6 mg at 06/06/19 0551    And   • naloxone (NARCAN) injection 0.4 mg  0.4 mg Intravenous Q5 Min PRN Corbin Bailey MD       • ondansetron  "(ZOFRAN) tablet 4 mg  4 mg Oral Q6H PRN Corbin Bailey MD        Or   • ondansetron (ZOFRAN) injection 4 mg  4 mg Intravenous Q6H PRN Corbin Bailey MD   4 mg at 19 4966   • oxyCODONE-acetaminophen (PERCOCET)  MG per tablet 1 tablet  1 tablet Oral Q4H PRN Corbin Bailey MD   1 tablet at 19 1344   • potassium chloride (MICRO-K) CR capsule 40 mEq  40 mEq Oral PRN Veto Lo MD   40 mEq at 06/10/19 0437   • sodium chloride tablet 2 g  2 g Oral TID With Meals Nidia Castañeda APRN   2 g at 19 1345   • tamsulosin (FLOMAX) 24 hr capsule 0.4 mg  0.4 mg Oral Daily Koko Sung MD   0.4 mg at 19 0840       Assessment/Plan     ASSESSMENT:      Gait disorder    Lumbar radiculopathy    Spinal stenosis, L3-4    Lumbar disc herniation -left L4-5    History of lumbar fusion    Benign essential HTN    Urinary retention    Hypokalemia      PLAN: He continues to improve postop.  He reports ongoing low back pain and states that he is not taking enough pain medication.  I reminded the him that he is to ask for the pain medication as the nurses do not bring it unless asked.  Insurance pre-[facility] for [facility] pending.  Continue the same for now.  Hemoglobin 13.2.  Recheck in a.m.  JV drain remains in.    Reiterated the importance of wearing SCDs to decrease risk of postoperative DVTs given his decreased mobility.  He expressed understanding    I discussed the patients findings and my recommendations with patient, family, nursing staff and Dr Bailey       LOS: 7 days       VERONICA Pearce  2019  3:31 PM      \"Dictated utilizing Dragon dictation\".        Electronically signed by Nidia Castañeda APRN at 2019  3:35 PM     Jordan Craven MD at 2019  9:31 AM              Name: Archie Oquendo ADMIT: 2019   : 1965  PCP: Yudy Luna APRN    MRN: [id number] LOS: 7 days   AGE/SEX: 53 y.o. male  ROOM: [facility]   Subjective   No chief " complaint on file.  cc- back pain    Pain is fair  Working with therapists    ROS  No f/c  No n/v  No cp/palp  No soa/cough    Objective   Vital Signs  Temp:  [98 °F (36.7 °C)-98.4 °F (36.9 °C)] 98 °F (36.7 °C)  Heart Rate:  [102-113] 102  Resp:  [16-19] 17  BP: (135-160)/(86-99) 141/92  SpO2:  [93 %-99 %] 93 %  on   ;   Device (Oxygen Therapy): room air  Body mass index is 26.69 kg/m².    Physical Exam   Constitutional: He is oriented to person, place, and time. He appears well-developed and well-nourished. No distress.   HENT:   Head: Normocephalic and atraumatic.   Mouth/Throat: Oropharynx is clear and moist.   Eyes: Conjunctivae are normal. No scleral icterus.   Neck: Normal range of motion. Neck supple.   Cardiovascular: Normal rate, regular rhythm and normal heart sounds.   No murmur heard.  Pulmonary/Chest: Effort normal and breath sounds normal. No respiratory distress.   Abdominal: Soft. Bowel sounds are normal. There is no tenderness.   Genitourinary:   Genitourinary Comments: Deferred    Musculoskeletal: He exhibits no edema or deformity.   Neurological: He is alert and oriented to person, place, and time.   Skin: Skin is warm and dry. He is not diaphoretic.   Psychiatric: He has a normal mood and affect. His behavior is normal. Thought content normal.   Nursing note and vitals reviewed.      Results Review:       I reviewed the patient's new clinical results.  Results from last 7 days   Lab Units 06/10/19  0755 06/09/19  1839 06/09/19  1031 06/08/19  0503   WBC 10*3/mm3 5.55 6.26 5.77 7.58  7.58   HEMOGLOBIN g/dL 8.3* 8.8* 8.6* 9.2*   PLATELETS 10*3/mm3 199 203 180 134*     Results from last 7 days   Lab Units 06/12/19  0830 06/10/19  0755 06/09/19  1923 06/09/19  1031 06/07/19  0436   SODIUM mmol/L 132*  --  135* 140 129*   POTASSIUM mmol/L 4.3 4.0 3.4* 3.7 3.7   CHLORIDE mmol/L 94*  --  99 104 95*   CO2 mmol/L 28.1  --  26.8 27.4 24.6   BUN mg/dL 9  --  7 6 10   CREATININE mg/dL 0.70*  --  0.60* 0.61*  0.63*   GLUCOSE mg/dL 110*  --  120* 95 129*   Estimated Creatinine Clearance: 159 mL/min (A) (by C-G formula based on SCr of 0.7 mg/dL (L)).  Results from last 7 days   Lab Units 06/09/19  1923   ALBUMIN g/dL 3.10*   BILIRUBIN mg/dL 0.3   ALK PHOS U/L 51   AST (SGOT) U/L 37   ALT (SGPT) U/L 36     Results from last 7 days   Lab Units 06/12/19  0830 06/09/19  1923 06/09/19  1031 06/07/19  0436   CALCIUM mg/dL 9.3 7.7* 7.9* 7.7*   ALBUMIN g/dL  --  3.10*  --   --          Coag     HbA1C No results found for: HGBA1C  Infection     Radiology(recent) No radiology results for the last day  No results found for: TROPONINT, TROPONINI, BNP  No components found for: TSH;2      lisinopril-hydroCHLOROthiazide (ZESTORETIC) 10-12.5 mg combo dose  Oral Q24H   sodium chloride 2 g Oral TID With Meals   tamsulosin 0.4 mg Oral Daily      Diet Regular; Thin      Assessment/Plan     Active Hospital Problems    Diagnosis  POA   • Benign essential HTN [I10]  Yes   • Urinary retention [R33.9]  Unknown   • Hypokalemia [E87.6]  Unknown   • Spinal stenosis, L3-4 [M48.062]  Yes   • Lumbar disc herniation -left L4-5 [M51.26]  Yes   • History of lumbar fusion [Z98.1]  Not Applicable   • Lumbar radiculopathy [M54.16]  Yes   • Gait disorder [R26.9]  Yes      Resolved Hospital Problems   No resolved problems to display.       · Lisinopril and HCTZ for HTN  · Flomax for likely BPH/retention (improved)  · Working with PT  · K improved  · Renal fx stable    Seems ready for rehab, I am off the rest of the week please re consult my partners if we can be of any further assistance    Reviewed records      Jordan Craven MD  [facility]  06/12/19  12:27 PM    Electronically signed by Jordan Craven MD at 6/12/2019  9:38 AM             Dolores Art RN   Registered Nurse   Physical Medicine and Rehabilitation   Nursing Note   Signed   Date of Service:  6/12/2019  5:07 PM   Creation Time:  6/12/2019  5:07 PM             Signed                 Return call from [facility], peer to peer complete and denial overturned. Anticipate bed available 6/13/19 pending patient medical stability. Will f/u in CINTIA Art RN  Rehab Admissions Nurse  [phone]

## 2019-06-13 NOTE — NURSING NOTE
Per CCP, Neurosurgery to be change discharge to Friday. Confirmed with patient insurance that precert is good for Friday admit and bed available 6/13/19. Will f/u in A.M. For discharge to rehab.    Pebbles Art RN  Rehab Admissions Nurse  [phone]

## 2019-06-13 NOTE — THERAPY TREATMENT NOTE
Acute Care - Physical Therapy Treatment Note  [facility]     Patient Name: Archie Oquendo  : 1965  MRN: [id number]  Today's Date: 2019  Onset of Illness/Injury or Date of Surgery: 19     Referring Physician: Leo    Admit Date: 2019    Visit Dx:    ICD-10-CM ICD-9-CM   1. Hyponatremia E87.1 276.1   2. Lumbar radiculopathy M54.16 724.4   3. Gait disorder R26.9 781.2   4. Spinal stenosis, L3-4 M48.062 724.03   5. Lumbar disc herniation -left L4-5 M51.26 722.10   6. History of lumbar fusion Z98.1 V45.4     Patient Active Problem List   Diagnosis   • Hip pain   • Low back pain   • Gait disorder   • Pain in left knee   • Hypertension   • Lumbar radiculopathy   • Spinal stenosis, L3-4   • Lumbar disc herniation -left L4-5   • History of lumbar fusion   • Benign essential HTN   • Urinary retention   • Hypokalemia       Therapy Treatment    Rehabilitation Treatment Summary     Row Name 19             Treatment Time/Intention    Discipline  physical therapist  -      Document Type  therapy note (daily note)  -DORA      Subjective Information  complains of;pain  -DORA      Mode of Treatment  physical therapy  -KH      Patient/Family Observations  in bed  -KH      Therapy Frequency (PT Clinical Impression)  daily  -KH      Patient Effort  adequate  -      Existing Precautions/Restrictions  fall;brace worn when out of bed  -DORA      Recorded by [DORA] Jess Horta, PT 19      Row Name 19             Bed Mobility Assessment/Treatment    Rolling Right [place] (Bed Mobility)  supervision  -      Supine-Sit [place] (Bed Mobility)  supervision  -DORA      Recorded by [DORA] Jess Horta, PT 19 171      Row Name 19             Sit-Stand Transfer    Sit-Stand [place] (Transfers)  contact guard  -DORA      Assistive Device (Sit-Stand Transfers)  walker, front-wheeled  -DORA      Recorded by [DORA] Jess Horta, PT  06/13/19 1719      Row Name 06/13/19 1717             Stand-Sit Transfer    Stand-Sit [place] (Transfers)  contact guard  -KH      Assistive Device (Stand-Sit Transfers)  walker, front-wheeled  -KH      Recorded by [DORA] Jess Horta, PT 06/13/19 1719      Row Name 06/13/19 1717             Gait/Stairs Assessment/Training    [place] Level (Gait)  contact guard  -KH      Assistive Device (Gait)  walker, front-wheeled  -KH      Distance in Feet (Gait)  35  -KH      Pattern (Gait)  step-to  -KH      Deviations/Abnormal Patterns (Gait)  antalgic;gait speed decreased;stride length decreased  -KH      Left Sided Gait Deviations  foot drop/toe drag  -KH      Recorded by [DORA] Jess Horta, PT 06/13/19 1719      Row Name 06/13/19 1717             Positioning and Restraints    Pre-Treatment Position  in bed  -KH      Post Treatment Position  bed  -KH      In Bed  fowlers;call light within reach;encouraged to call for assist;with family/caregiver;notified nsg  -KH      Recorded by [DORA] Jess Horta, PT 06/13/19 1719      Row Name 06/13/19 1717             Pain Assessment    Additional Documentation  Pain Scale: Numbers Pre/Post-Treatment (Group)  -KH      Recorded by [Jess Chappell, PT 06/13/19 1719      Row Name 06/13/19 1717             Pain Scale: Numbers Pre/Post-Treatment    Pain Scale: Numbers, Pretreatment  2/10  -KH      Pain Scale: Numbers, Post-Treatment  3/10  -KH      Pain Location  back  -KH      Pain Intervention(s)  Repositioned;Ambulation/increased activity  -KH      Recorded by [DORA] Jess Horta, PT 06/13/19 1719      Row Name                Wound 06/05/19 1741 Other (See comments) back incision    Wound - Properties Group Date first assessed: 06/05/19 [SL] Time first assessed: 1741 [SL] Side: Other (See comments) [SL] Location: back [SL] Type: incision [SL] Recorded by:  [SL] Corrine Vigil RN 06/05/19 1741    Row Name 06/13/19  1717             Outcome Summary/Treatment Plan (PT)    Anticipated Discharge Disposition (PT)  inpatient rehabilitation facility  -DORA      Recorded by [DORA] Jess Horta, PT 06/13/19 1719        User Key  (r) = Recorded By, (t) = Taken By, (c) = Cosigned By    Initials Name Effective Dates Discipline    Jess Rodriguez, PT 02/05/19 -  PT    Corrine Zafar RN 06/16/16 -  Nurse          Wound 06/05/19 1741 Other (See comments) back incision (Active)   Dressing Appearance open to air 6/13/2019  8:45 AM   Closure Liquid skin adhesive 6/13/2019  8:45 AM   Periwound intact;dry 6/13/2019  8:45 AM   Periwound Temperature warm 6/13/2019  8:45 AM   Periwound Skin Turgor soft 6/13/2019  8:45 AM   Drainage Amount none 6/13/2019  8:45 AM   Dressing Care, Wound open to air 6/13/2019  8:45 AM           Physical Therapy Education     Title: PT OT SLP Therapies (In Progress)     Topic: Physical Therapy (Done)     Point: Mobility training (Done)     Learning Progress Summary           Patient Acceptance, E, VU,NR by DORA at 6/13/2019  5:19 PM    Acceptance, E, VU,NR by MA at 6/12/2019 10:09 AM    Acceptance, E, VU,NR by MA at 6/11/2019 10:21 AM    Acceptance, E, VU,NR by MA at 6/10/2019  9:58 AM    Acceptance, E,TB, VU by CS at 6/9/2019  1:02 PM    Acceptance, E,TB,D, VU,NR by CH at 6/8/2019  4:22 PM    Acceptance, E, NR by AR at 6/7/2019  3:15 PM    Acceptance, E, NR by AR at 6/6/2019  3:25 PM                   Point: Home exercise program (Done)     Learning Progress Summary           Patient Acceptance, E, VU,NR by DORA at 6/13/2019  5:19 PM    Acceptance, E, VU,NR by MA at 6/12/2019 10:09 AM    Acceptance, E, VU,NR by MA at 6/11/2019 10:21 AM    Acceptance, E, VU,NR by MA at 6/10/2019  9:58 AM    Acceptance, E,TB, VU by CS at 6/9/2019  1:02 PM    Acceptance, E,WANDA MCGREGOR VU,NR by  at 6/8/2019  4:22 PM    Acceptance, E, NR by AR at 6/7/2019  3:15 PM    Acceptance, E, NR by AR at 6/6/2019  3:25 PM                    Point: Body mechanics (Done)     Learning Progress Summary           Patient Acceptance, E, VU,NR by  at 6/13/2019  5:19 PM    Acceptance, E, VU,NR by MA at 6/12/2019 10:09 AM    Acceptance, E, VU,NR by MA at 6/11/2019 10:21 AM    Acceptance, E, VU,NR by MA at 6/10/2019  9:58 AM    Acceptance, E,TB, VU by  at 6/9/2019  1:02 PM    Acceptance, E,TB,D, VU,NR by  at 6/8/2019  4:22 PM    Acceptance, E, NR by AR at 6/7/2019  3:15 PM    Acceptance, E, NR by AR at 6/6/2019  3:25 PM                   Point: Precautions (Done)     Learning Progress Summary           Patient Acceptance, E, VU,NR by  at 6/13/2019  5:19 PM    Acceptance, E, VU,NR by MA at 6/12/2019 10:09 AM    Acceptance, E, VU,NR by MA at 6/11/2019 10:21 AM    Acceptance, E, VU,NR by MA at 6/10/2019  9:58 AM    Acceptance, E,TB, VU by  at 6/9/2019  1:02 PM    Acceptance, E,TB,D, VU,NR by  at 6/8/2019  4:22 PM    Acceptance, E, NR by AR at 6/7/2019  3:15 PM    Acceptance, E, NR by AR at 6/6/2019  3:25 PM                               User Key     Initials Effective Dates Name Provider Type Discipline     02/05/19 -  Jess Horta, PT Physical Therapist PT     04/03/18 -  Phyllis Otero, PT Physical Therapist PT    AR 04/03/18 -  Kassidy Jorge, PT Physical Therapist PT    MA 10/19/18 -  Corinna Mckee, PT Physical Therapist PT     05/14/18 -  Dvein Moore, PT Physical Therapist PT                PT Recommendation and Plan  Anticipated Discharge Disposition (PT): inpatient rehabilitation facility  Therapy Frequency (PT Clinical Impression): daily  Outcome Summary/Treatment Plan (PT)  Anticipated Discharge Disposition (PT): inpatient rehabilitation facility  Outcome Summary: Pt agreeable to therapy. Improved bed mobilty and sit to stand. Tolerated increased ambulation distance. L foot drop persists.   Outcome Measures     Row Name 06/13/19 1700 06/12/19 1000 06/11/19 1000       How much help from another person do  you currently need...    Turning from your back to your side while in flat bed without using bedrails?  4  -KH  3  -MA  3  -MA    Moving from lying on back to sitting on the side of a flat bed without bedrails?  4  -KH  3  -MA  3  -MA    Moving to and from a bed to a chair (including a wheelchair)?  3  -KH  3  -MA  3  -MA    Standing up from a chair using your arms (e.g., wheelchair, bedside chair)?  3  -KH  3  -MA  3  -MA    Climbing 3-5 steps with a railing?  2  -KH  2  -MA  2  -MA    To walk in hospital room?  3  -KH  3  -MA  3  -MA    AM-PAC 6 Clicks Score  19  -KH  17  -MA  17  -MA       Functional Assessment    Outcome Measure Options  AM-PAC 6 Clicks Basic Mobility (PT)  -KH  AM-PAC 6 Clicks Basic Mobility (PT)  -MA  AM-PAC 6 Clicks Basic Mobility (PT)  -MA      User Key  (r) = Recorded By, (t) = Taken By, (c) = Cosigned By    Initials Name Provider Type    Jess Rodriguez, PT Physical Therapist    MA Corinna Mckee, PT Physical Therapist         Time Calculation:   PT Charges     Row Name 06/13/19 1717             Time Calculation    Start Time  1645  -      Stop Time  1717  -      Time Calculation (min)  32 min  -         Time Calculation- PT    Total Timed Code Minutes- PT  32 minute(s)  -        User Key  (r) = Recorded By, (t) = Taken By, (c) = Cosigned By    Initials Name Provider Type    Jess Rodriguez, PT Physical Therapist        Therapy Charges for Today     Code Description Service Date Service Provider Modifiers Qty    [id number]  PT THER PROC EA 15 MIN 6/13/2019 Jess Horta, PT GP 2          PT G-Codes  Outcome Measure Options: AM-PAC 6 Clicks Basic Mobility (PT)  AM-PAC 6 Clicks Score: 19  Score: 15    Jess Horta, PT  6/13/2019

## 2019-06-13 NOTE — PROGRESS NOTES
"Postoperative visit      Dr. Bailey out of town.  Dr. Hodges is covering in his absence.     Lying in bed with head of bed 45 degrees; brace on.  Complains of pain primarily spasms.  \"My pain is 3/10 and that is totally unacceptable.\"     Blood pressure 136/86, pulse 108, temperature 97.8 °F (36.6 °C), temperature source Oral, resp. rate 18, height 188 cm (74\"), weight 94.3 kg (207 lb 14.3 oz), SpO2 98 %.      JV drain intact  Reluctant to move  No calf swelling on exam    .  Results from last 7 days   Lab Units 06/12/19  1049 06/10/19  0755 06/09/19  1839 06/09/19  1031   WBC 10*3/mm3  --  5.55 6.26 5.77   HEMOGLOBIN g/dL 13.2 8.3* 8.8* 8.6*   HEMATOCRIT % 40.8 26.2* 28.0* 27.0*   PLATELETS 10*3/mm3  --  199 203 180           T11-S2 posterior fusion with L3-4 lumbar decompression and left L5-6 compression with Dr. Bailey  Severe back pain  Reluctance to move, refuses to wear SCDs,     Again explained the importance of mobility and wearing the SCDs for DVT prevention, or mention of pneumonia, bedsores etc.  Will increase the Flexeril to 4 times daily as needed.  The patient has been approved by acute rehab - transfer tomorrow      "

## 2019-06-13 NOTE — PLAN OF CARE
Problem: Patient Care Overview  Goal: Plan of Care Review   06/13/19 1712   OTHER   Outcome Summary Pt agreeable to therapy. Improved bed mobilty and sit to stand. Tolerated increased ambulation distance. L foot drop persists.

## 2019-06-13 NOTE — PROGRESS NOTES
Continued Stay Note  [facility]     Patient Name: Archie Oquendo  MRN: [id number]  Today's Date: 6/13/2019    Admit Date: 6/5/2019    Discharge Plan     Row Name 06/13/19 0927       Plan    Plan  [facility] acute rehab    Plan Comments  Spoke with Pebbles and patient has been approved for acute rehab.  Insurance precert obtained.  Bed is available today.  Left message with Pepper MARTIN with Dr. Bailey.        Discharge Codes    No documentation.       Expected Discharge Date and Time     Expected Discharge Date Expected Discharge Time    Jun 11, 2019             Jing Cameron RN

## 2019-06-14 ENCOUNTER — HOSPITAL ENCOUNTER (INPATIENT)
Facility: HOSPITAL | Age: 54
LOS: 18 days | Discharge: HOME | End: 2019-07-02
Attending: PHYSICAL MEDICINE & REHABILITATION | Admitting: PHYSICAL MEDICINE & REHABILITATION

## 2019-06-14 VITALS
SYSTOLIC BLOOD PRESSURE: 139 MMHG | TEMPERATURE: 98.3 F | HEIGHT: 74 IN | OXYGEN SATURATION: 100 % | RESPIRATION RATE: 18 BRPM | BODY MASS INDEX: 26.68 KG/M2 | HEART RATE: 109 BPM | DIASTOLIC BLOOD PRESSURE: 87 MMHG | WEIGHT: 207.89 LBS

## 2019-06-14 DIAGNOSIS — E87.1 HYPONATREMIA: ICD-10-CM

## 2019-06-14 DIAGNOSIS — Z74.09 IMPAIRED MOBILITY: Primary | ICD-10-CM

## 2019-06-14 LAB
GLUCOSE BLDC GLUCOMTR-MCNC: 103 MG/DL (ref 70–130)
GLUCOSE BLDC GLUCOMTR-MCNC: 114 MG/DL (ref 70–130)

## 2019-06-14 PROCEDURE — 97162 PT EVAL MOD COMPLEX 30 MIN: CPT

## 2019-06-14 PROCEDURE — 97110 THERAPEUTIC EXERCISES: CPT

## 2019-06-14 PROCEDURE — 82962 GLUCOSE BLOOD TEST: CPT

## 2019-06-14 PROCEDURE — 99024 POSTOP FOLLOW-UP VISIT: CPT | Performed by: NEUROLOGICAL SURGERY

## 2019-06-14 RX ORDER — BISACODYL 10 MG
10 SUPPOSITORY, RECTAL RECTAL DAILY PRN
Status: DISCONTINUED | OUTPATIENT
Start: 2019-06-14 | End: 2019-07-02

## 2019-06-14 RX ORDER — ONDANSETRON 4 MG/1
4 TABLET, FILM COATED ORAL EVERY 6 HOURS PRN
Status: CANCELLED | OUTPATIENT
Start: 2019-06-14

## 2019-06-14 RX ORDER — TAMSULOSIN HYDROCHLORIDE 0.4 MG/1
0.4 CAPSULE ORAL DAILY
Status: CANCELLED | OUTPATIENT
Start: 2019-06-15

## 2019-06-14 RX ORDER — ONDANSETRON 2 MG/ML
4 INJECTION INTRAMUSCULAR; INTRAVENOUS EVERY 6 HOURS PRN
Status: CANCELLED | OUTPATIENT
Start: 2019-06-14

## 2019-06-14 RX ORDER — HYDROCODONE BITARTRATE AND ACETAMINOPHEN 10; 325 MG/1; MG/1
0.5 TABLET ORAL ONCE
Status: COMPLETED | OUTPATIENT
Start: 2019-06-14 | End: 2019-06-14

## 2019-06-14 RX ORDER — BISACODYL 5 MG/1
10 TABLET, DELAYED RELEASE ORAL DAILY PRN
Status: DISCONTINUED | OUTPATIENT
Start: 2019-06-14 | End: 2019-07-02

## 2019-06-14 RX ORDER — SENNA AND DOCUSATE SODIUM 50; 8.6 MG/1; MG/1
2 TABLET, FILM COATED ORAL NIGHTLY
Status: DISCONTINUED | OUTPATIENT
Start: 2019-06-14 | End: 2019-06-27

## 2019-06-14 RX ORDER — ONDANSETRON 2 MG/ML
4 INJECTION INTRAMUSCULAR; INTRAVENOUS EVERY 6 HOURS PRN
Status: DISCONTINUED | OUTPATIENT
Start: 2019-06-14 | End: 2019-07-02

## 2019-06-14 RX ORDER — OXYCODONE AND ACETAMINOPHEN 10; 325 MG/1; MG/1
2 TABLET ORAL EVERY 4 HOURS PRN
Status: CANCELLED | OUTPATIENT
Start: 2019-06-14 | End: 2019-06-15

## 2019-06-14 RX ORDER — DOCUSATE SODIUM 100 MG/1
100 CAPSULE, LIQUID FILLED ORAL 2 TIMES DAILY PRN
Status: DISCONTINUED | OUTPATIENT
Start: 2019-06-14 | End: 2019-06-15

## 2019-06-14 RX ORDER — SODIUM CHLORIDE 1000 MG
2 TABLET, SOLUBLE MISCELLANEOUS
Status: CANCELLED | OUTPATIENT
Start: 2019-06-14

## 2019-06-14 RX ORDER — HYDROCODONE BITARTRATE AND ACETAMINOPHEN 10; 325 MG/1; MG/1
1.5 TABLET ORAL EVERY 4 HOURS
Status: DISCONTINUED | OUTPATIENT
Start: 2019-06-14 | End: 2019-06-20

## 2019-06-14 RX ORDER — ONDANSETRON 4 MG/1
4 TABLET, FILM COATED ORAL EVERY 6 HOURS PRN
Status: DISCONTINUED | OUTPATIENT
Start: 2019-06-14 | End: 2019-07-02

## 2019-06-14 RX ORDER — BISACODYL 10 MG
10 SUPPOSITORY, RECTAL RECTAL DAILY PRN
Status: DISCONTINUED | OUTPATIENT
Start: 2019-06-14 | End: 2019-06-17 | Stop reason: SDUPTHER

## 2019-06-14 RX ORDER — TAMSULOSIN HYDROCHLORIDE 0.4 MG/1
0.4 CAPSULE ORAL DAILY
Status: DISCONTINUED | OUTPATIENT
Start: 2019-06-15 | End: 2019-06-28

## 2019-06-14 RX ORDER — SODIUM CHLORIDE 1000 MG
2 TABLET, SOLUBLE MISCELLANEOUS
Status: DISCONTINUED | OUTPATIENT
Start: 2019-06-14 | End: 2019-06-15

## 2019-06-14 RX ORDER — BISACODYL 5 MG/1
10 TABLET, DELAYED RELEASE ORAL DAILY PRN
Status: CANCELLED | OUTPATIENT
Start: 2019-06-14

## 2019-06-14 RX ORDER — CYCLOBENZAPRINE HCL 10 MG
10 TABLET ORAL 3 TIMES DAILY PRN
Status: CANCELLED | OUTPATIENT
Start: 2019-06-14

## 2019-06-14 RX ORDER — DOCUSATE SODIUM 100 MG/1
100 CAPSULE, LIQUID FILLED ORAL 2 TIMES DAILY PRN
Status: CANCELLED | OUTPATIENT
Start: 2019-06-14

## 2019-06-14 RX ORDER — CYCLOBENZAPRINE HCL 10 MG
10 TABLET ORAL 3 TIMES DAILY PRN
Status: ON HOLD | COMMUNITY
End: 2019-07-02 | Stop reason: SDUPTHER

## 2019-06-14 RX ORDER — HYDROCODONE BITARTRATE AND ACETAMINOPHEN 7.5; 325 MG/1; MG/1
2 TABLET ORAL EVERY 4 HOURS
Status: CANCELLED | OUTPATIENT
Start: 2019-06-14

## 2019-06-14 RX ORDER — CHLORAL HYDRATE 500 MG
1200 CAPSULE ORAL
COMMUNITY
End: 2019-07-02 | Stop reason: HOSPADM

## 2019-06-14 RX ORDER — POTASSIUM CHLORIDE 750 MG/1
40 CAPSULE, EXTENDED RELEASE ORAL AS NEEDED
Status: CANCELLED | OUTPATIENT
Start: 2019-06-14

## 2019-06-14 RX ORDER — HYDROCODONE BITARTRATE AND ACETAMINOPHEN 10; 325 MG/1; MG/1
1 TABLET ORAL EVERY 4 HOURS
Status: DISCONTINUED | OUTPATIENT
Start: 2019-06-14 | End: 2019-06-14

## 2019-06-14 RX ORDER — HYDROCODONE BITARTRATE AND ACETAMINOPHEN 7.5; 325 MG/1; MG/1
2 TABLET ORAL EVERY 4 HOURS
Status: DISCONTINUED | OUTPATIENT
Start: 2019-06-14 | End: 2019-06-14

## 2019-06-14 RX ORDER — LISINOPRIL AND HYDROCHLOROTHIAZIDE 12.5; 1 MG/1; MG/1
1 TABLET ORAL DAILY
COMMUNITY
End: 2019-07-02 | Stop reason: HOSPADM

## 2019-06-14 RX ORDER — BISACODYL 10 MG
10 SUPPOSITORY, RECTAL RECTAL DAILY PRN
Status: CANCELLED | OUTPATIENT
Start: 2019-06-14

## 2019-06-14 RX ORDER — CYCLOBENZAPRINE HCL 10 MG
10 TABLET ORAL 3 TIMES DAILY PRN
Status: DISCONTINUED | OUTPATIENT
Start: 2019-06-14 | End: 2019-07-02 | Stop reason: HOSPADM

## 2019-06-14 RX ORDER — POLYETHYLENE GLYCOL 3350 17 G/17G
17 POWDER, FOR SOLUTION ORAL DAILY PRN
Status: DISCONTINUED | OUTPATIENT
Start: 2019-06-14 | End: 2019-06-15

## 2019-06-14 RX ORDER — IBUPROFEN 800 MG/1
800 TABLET ORAL EVERY 6 HOURS PRN
COMMUNITY
End: 2019-07-02 | Stop reason: HOSPADM

## 2019-06-14 RX ADMIN — HYDROCODONE BITARTRATE AND ACETAMINOPHEN 0.5 TABLET: 10; 325 TABLET ORAL at 16:28

## 2019-06-14 RX ADMIN — CYCLOBENZAPRINE 10 MG: 10 TABLET, FILM COATED ORAL at 10:27

## 2019-06-14 RX ADMIN — DOCUSATE SODIUM 100 MG: 100 CAPSULE, LIQUID FILLED ORAL at 10:27

## 2019-06-14 RX ADMIN — HYDROCODONE BITARTRATE AND ACETAMINOPHEN 2 TABLET: 7.5; 325 TABLET ORAL at 04:35

## 2019-06-14 RX ADMIN — LISINOPRIL: 10 TABLET ORAL at 10:27

## 2019-06-14 RX ADMIN — HYDROCODONE BITARTRATE AND ACETAMINOPHEN 2 TABLET: 7.5; 325 TABLET ORAL at 10:27

## 2019-06-14 RX ADMIN — SODIUM CHLORIDE TAB 1 GM 2 G: 1 TAB at 18:12

## 2019-06-14 RX ADMIN — POLYETHYLENE GLYCOL 3350 17 G: 17 POWDER, FOR SOLUTION ORAL at 16:02

## 2019-06-14 RX ADMIN — HYDROCODONE BITARTRATE AND ACETAMINOPHEN 1.5 TABLET: 10; 325 TABLET ORAL at 20:06

## 2019-06-14 RX ADMIN — HYDROCODONE BITARTRATE AND ACETAMINOPHEN 2 TABLET: 7.5; 325 TABLET ORAL at 11:41

## 2019-06-14 RX ADMIN — CYCLOBENZAPRINE 10 MG: 10 TABLET, FILM COATED ORAL at 04:35

## 2019-06-14 RX ADMIN — SENNOSIDES,DOCUSATE SODIUM 2 TABLET: 50; 8.6 TABLET, FILM COATED ORAL at 20:07

## 2019-06-14 RX ADMIN — CYCLOBENZAPRINE 10 MG: 10 TABLET, FILM COATED ORAL at 18:41

## 2019-06-14 RX ADMIN — HYDROCODONE BITARTRATE AND ACETAMINOPHEN 1.5 TABLET: 10; 325 TABLET ORAL at 23:56

## 2019-06-14 RX ADMIN — TAMSULOSIN HYDROCHLORIDE 0.4 MG: 0.4 CAPSULE ORAL at 10:27

## 2019-06-14 RX ADMIN — SODIUM CHLORIDE TAB 1 GM 2 G: 1 TAB at 10:28

## 2019-06-14 RX ADMIN — HYDROCODONE BITARTRATE AND ACETAMINOPHEN 1 TABLET: 10; 325 TABLET ORAL at 16:02

## 2019-06-14 NOTE — DISCHARGE SUMMARY
Archie Oquendo  1965    Patient Care Team:  Yudy Luna APRN as PCP - General (Family Medicine)  Corbin Bailey MD as Surgeon (Neurosurgery)    Date of Admit: 6/5/2019    Date of Discharge:  6/14/2019    Discharge Diagnosis:  Gait disorder    Lumbar radiculopathy    Spinal stenosis, L3-4    Lumbar disc herniation -left L4-5    History of lumbar fusion    Benign essential HTN    Urinary retention    Hypokalemia      Procedures Performed  Procedure(s):  LUMBAR THREE LUMBAR FOUR  AND LUMBAR FIVE LUMBAR SIX DECOMPRESSION. THORACIC ELEVEN TO SACREL TWO POSTERIOR LATERAL ARTHRODESIS(FUSION) WITH SPINAL INSTRUMENTATION. REMOVAL OF LUMBAR INSTRUMENTATION.       Consultants:   Consults     Date and Time Order Name Status Description    6/10/2019 1838 Inpatient Internal Medicine Consult      6/7/2019 1000 Inpatient Physical Medicine Rehab Consult      6/5/2019 2113 Inpatient Pulmonology Consult Completed           Condition on Discharge: stable    Discharge disposition: home    Pertinent Test Results: none    Brief HPI: The patient presented with intractable back pain and leg left lower extremity.    Hospital Course: Patient was admitted to the hospital and underwent the above-noted procedure.  Postoperatively he had a routine postoperative course.  He did have significant drainage from the lumbar wound.  This is substantially reduced though the drain remains in place.  He is participating in therapies.  He is and ambulating.  He is voiding spontaneously.  Lower extremity strength is not yet improved.    Discharge Physical Exam:    General Appearance No acute distress   Neck No adenopathy, supple, trachea midline, no thyromegaly, no carotid bruit, no JVD   Lungs Clear to auscultation,respirations regular, even and unlabored   Heart Regular rhythm and normal rate, normal S1 and S2, no murmur, no gallop, no rub, no click   Chest wall No abnormalities observed   Abdomen Normal bowel sounds, no masses, no  hepatomegaly, soft   Extremities Moves all extremities well, no edema, no cyanosis, no redness   Neurological Alert and oriented x 3     Discharge Medications     Your medication list      You have not been prescribed any medications.         Discharge Diet:     Diet Order   Procedures   • Diet Regular; Thin       Activity at Discharge:       Call for: Patient instructed to call for fever >100.5, chills, wound swelling/drainage/redness, change in neurologic status including but not limited to wound issues    Follow-up Appointments  Future Appointments   Date Time Provider Department Center   6/20/2019 11:00 AM Sofiya Coffey, APRN [facility] None         Test Results Pending at Discharge  None     Corbin Bailey MD  06/14/19  10:00 AM    30 min spent in reviewing records, discussion and examination of the patient and discussion with other members of the patient's medical team.

## 2019-06-14 NOTE — PROGRESS NOTES
Continued Stay Note  [facility]     Patient Name: Archie Oquendo  MRN: [id number]  Today's Date: 6/14/2019    Admit Date: 6/5/2019    Discharge Plan     Row Name 06/14/19 0840       Plan    Plan  [facility] Acute Rehab    Patient/Family in Agreement with Plan  yes    Plan Comments  Received call from Carlos. Bed ready today for dischage to [facility].         Discharge Codes    No documentation.       Expected Discharge Date and Time     Expected Discharge Date Expected Discharge Time    Jun 11, 2019             Gail Tucker RN

## 2019-06-14 NOTE — PROGRESS NOTES
Case Management Discharge Note    Final Note: [facility] acute rehab    Destination - Selection Complete      Service Provider Request Status Selected Services Address Phone Number Fax Number    [facility] [address] [facility] [address] [phone] --      Durable Medical Equipment      No service has been selected for the patient.      Dialysis/Infusion      No service has been selected for the patient.      Home Medical Care      No service has been selected for the patient.      Therapy      No service has been selected for the patient.      Community Resources      No service has been selected for the patient.             Final Discharge Disposition Code: 62 - inpatient rehab facility

## 2019-06-14 NOTE — PAYOR COMM NOTE
"UR CONTACT:  SAVANNAH  P: [phone]        F: [phone]  DISCHARGED.  CONCURRENT REVIEW AND DC SUMMARY        Bj Todd (53 y.o. Male)     Date of Birth Social Security Number Address Home Phone MRN    1965  [address] [place] [id number] [phone] [id number]    [employer] Status          Patient Refused        Admission Date Admission Type Admitting Provider Attending Provider Department, Room/Bed    6/5/19 Elective Corbin Bailey MD Hodes, Jonathan E, MD [facility] 5 PARK, P591/1    Discharge Date Discharge Disposition Discharge Destination                       Attending Provider:  Corbin Bailey MD    Allergies:  No Known Allergies    Isolation:  None   Infection:  None   Code Status:  CPR    Ht:  188 cm (74\")   Wt:  94.3 kg (207 lb 14.3 oz)    Admission Cmt:  None   Principal Problem:  None                Active Insurance as of 6/5/2019     Primary Coverage     Payor Plan Insurance Group Employer/Plan Group    [employer]HEM BLUE CROSS ANTHEM BLUE CROSS BLUE SHIELD PPO [id number]     Payor Plan Address Payor Plan Phone Number Payor Plan Fax Number Effective Dates    [address] [phone]  7/1/2016 - None Entered    [place] [address]       Subscriber Name Subscriber Birth Date Member ID       BJ TODD 1965 [id number]                 Emergency Contacts      (Rel.) Home Phone Work Phone Mobile Phone    Patricia Todd (Daughter) -- -- [phone]    IBIS TODD (Daughter) -- -- [phone]               Physician Progress Notes      Pepper Sterling, APRN at 6/13/2019  4:55 PM     Attestation signed by Conor Hodges MD at 6/13/2019  5:18 PM    I have reviewed the documentation above and agree.                    Postoperative visit      Dr. Bailey out of town.  Dr. Hodges is covering in his absence.     Lying in bed with head of bed 45 degrees; brace on.  Complains of pain primarily spasms. " " \"My pain is 3/10 and that is totally unacceptable.\"     Blood pressure 136/86, pulse 108, temperature 97.8 °F (36.6 °C), temperature source Oral, resp. rate 18, height 188 cm (74\"), weight 94.3 kg (207 lb 14.3 oz), SpO2 98 %.      JV drain intact  Reluctant to move  No calf swelling on exam    .  Results from last 7 days   Lab Units 06/12/19  1049 06/10/19  0755 06/09/19  1839 06/09/19  1031   WBC 10*3/mm3  --  5.55 6.26 5.77   HEMOGLOBIN g/dL 13.2 8.3* 8.8* 8.6*   HEMATOCRIT % 40.8 26.2* 28.0* 27.0*   PLATELETS 10*3/mm3  --  199 203 180           T11-S2 posterior fusion with L3-4 lumbar decompression and left L5-6 compression with Dr. Bailey  Severe back pain  Reluctance to move, refuses to wear SCDs,     Again explained the importance of mobility and wearing the SCDs for DVT prevention, or mention of pneumonia, bedsores etc.  Will increase the Flexeril to 4 times daily as needed.  The patient has been approved by acute rehab - transfer tomorrow        Electronically signed by Conor Hodges MD at 6/13/2019  5:18 PM          Discharge Summary      Corbin Bailey MD at 6/14/2019 10:00 AM          Archie Oquendo  1965    Patient Care Team:  Yudy Luna APRN as PCP - General (Family Medicine)  Corbin Bailey MD as Surgeon (Neurosurgery)    Date of Admit: 6/5/2019    Date of Discharge:  6/14/2019    Discharge Diagnosis:  Gait disorder    Lumbar radiculopathy    Spinal stenosis, L3-4    Lumbar disc herniation -left L4-5    History of lumbar fusion    Benign essential HTN    Urinary retention    Hypokalemia      Procedures Performed  Procedure(s):  LUMBAR THREE LUMBAR FOUR  AND LUMBAR FIVE LUMBAR SIX DECOMPRESSION. THORACIC ELEVEN TO SACREL TWO POSTERIOR LATERAL ARTHRODESIS(FUSION) WITH SPINAL INSTRUMENTATION. REMOVAL OF LUMBAR INSTRUMENTATION.       Consultants:   Consults     Date and Time Order Name Status Description    6/10/2019 1838 Inpatient Internal Medicine Consult      6/7/2019 " 1000 Inpatient Physical Medicine Rehab Consult      6/5/2019 2113 Inpatient Pulmonology Consult Completed           Condition on Discharge: stable    Discharge disposition: home    Pertinent Test Results: none    Brief HPI: The patient presented with intractable back pain and leg left lower extremity.    Hospital Course: Patient was admitted to the hospital and underwent the above-noted procedure.  Postoperatively he had a routine postoperative course.  He did have significant drainage from the lumbar wound.  This is substantially reduced though the drain remains in place.  He is participating in therapies.  He is and ambulating.  He is voiding spontaneously.  Lower extremity strength is not yet improved.    Discharge Physical Exam:    General Appearance No acute distress   Neck No adenopathy, supple, trachea midline, no thyromegaly, no carotid bruit, no JVD   Lungs Clear to auscultation,respirations regular, even and unlabored   Heart Regular rhythm and normal rate, normal S1 and S2, no murmur, no gallop, no rub, no click   Chest wall No abnormalities observed   Abdomen Normal bowel sounds, no masses, no hepatomegaly, soft   Extremities Moves all extremities well, no edema, no cyanosis, no redness   Neurological Alert and oriented x 3     Discharge Medications     Your medication list      You have not been prescribed any medications.         Discharge Diet:     Diet Order   Procedures   • Diet Regular; Thin       Activity at Discharge:       Call for: Patient instructed to call for fever >100.5, chills, wound swelling/drainage/redness, change in neurologic status including but not limited to wound issues    Follow-up Appointments  Future Appointments   Date Time Provider Department Center   6/20/2019 11:00 AM Sofiya Coffey, APRN [facility] None         Test Results Pending at Discharge  None     Corbin Bailey MD  06/14/19  10:00 AM    30 min spent in reviewing records, discussion and examination of the  patient and discussion with other members of the patient's medical team.    Electronically signed by Corbin Bailey MD at 6/14/2019 10:02 AM

## 2019-06-14 NOTE — PLAN OF CARE
Problem: Patient Care Overview  Goal: Plan of Care Review  Outcome: Ongoing (interventions implemented as appropriate)   06/14/19 0528   Coping/Psychosocial   Plan of Care Reviewed With patient   Plan of Care Review   Progress no change   OTHER   Outcome Summary Pt c/o pain 3/10. Refused to turn in bed. JV drained out 120cc this shift. Approved for [facility] rehab.       Problem: Laminectomy/Foraminotomy/Discectomy (Adult)  Goal: Signs and Symptoms of Listed Potential Problems Will be Absent, Minimized or Managed (Laminectomy/Foraminotomy/Discectomy)  Outcome: Ongoing (interventions implemented as appropriate)

## 2019-06-15 PROBLEM — M54.16 LUMBAR POLYRADICULOPATHY: Status: ACTIVE | Noted: 2019-06-15

## 2019-06-15 LAB
ALBUMIN SERPL-MCNC: 3.2 G/DL (ref 3.5–5.2)
ALBUMIN/GLOB SERPL: 1 G/DL
ALP SERPL-CCNC: 99 U/L (ref 39–117)
ALT SERPL W P-5'-P-CCNC: 77 U/L (ref 1–41)
ANION GAP SERPL CALCULATED.3IONS-SCNC: 9.7 MMOL/L
AST SERPL-CCNC: 31 U/L (ref 1–40)
BASOPHILS # BLD AUTO: 0.02 10*3/MM3 (ref 0–0.2)
BASOPHILS NFR BLD AUTO: 0.2 % (ref 0–1.5)
BILIRUB SERPL-MCNC: 0.3 MG/DL (ref 0.2–1.2)
BUN BLD-MCNC: 12 MG/DL (ref 6–20)
BUN/CREAT SERPL: 17.4 (ref 7–25)
CALCIUM SPEC-SCNC: 8.9 MG/DL (ref 8.6–10.5)
CHLORIDE SERPL-SCNC: 94 MMOL/L (ref 98–107)
CO2 SERPL-SCNC: 29.3 MMOL/L (ref 22–29)
CREAT BLD-MCNC: 0.69 MG/DL (ref 0.76–1.27)
DEPRECATED RDW RBC AUTO: 44.7 FL (ref 37–54)
EOSINOPHIL # BLD AUTO: 0.35 10*3/MM3 (ref 0–0.4)
EOSINOPHIL NFR BLD AUTO: 4.4 % (ref 0.3–6.2)
ERYTHROCYTE [DISTWIDTH] IN BLOOD BY AUTOMATED COUNT: 13 % (ref 12.3–15.4)
GFR SERPL CREATININE-BSD FRML MDRD: 119 ML/MIN/1.73
GLOBULIN UR ELPH-MCNC: 3.1 GM/DL
GLUCOSE BLD-MCNC: 110 MG/DL (ref 65–99)
HCT VFR BLD AUTO: 36.9 % (ref 37.5–51)
HGB BLD-MCNC: 11.8 G/DL (ref 13–17.7)
IMM GRANULOCYTES # BLD AUTO: 0.15 10*3/MM3 (ref 0–0.05)
IMM GRANULOCYTES NFR BLD AUTO: 1.9 % (ref 0–0.5)
LYMPHOCYTES # BLD AUTO: 2.32 10*3/MM3 (ref 0.7–3.1)
LYMPHOCYTES NFR BLD AUTO: 28.9 % (ref 19.6–45.3)
MCH RBC QN AUTO: 29.7 PG (ref 26.6–33)
MCHC RBC AUTO-ENTMCNC: 32 G/DL (ref 31.5–35.7)
MCV RBC AUTO: 92.9 FL (ref 79–97)
MONOCYTES # BLD AUTO: 0.87 10*3/MM3 (ref 0.1–0.9)
MONOCYTES NFR BLD AUTO: 10.8 % (ref 5–12)
NEUTROPHILS # BLD AUTO: 4.33 10*3/MM3 (ref 1.7–7)
NEUTROPHILS NFR BLD AUTO: 53.8 % (ref 42.7–76)
NRBC BLD AUTO-RTO: 0 /100 WBC (ref 0–0.2)
PLATELET # BLD AUTO: 353 10*3/MM3 (ref 140–450)
PMV BLD AUTO: 8.7 FL (ref 6–12)
POTASSIUM BLD-SCNC: 4.1 MMOL/L (ref 3.5–5.2)
PROT SERPL-MCNC: 6.3 G/DL (ref 6–8.5)
RBC # BLD AUTO: 3.97 10*6/MM3 (ref 4.14–5.8)
SODIUM BLD-SCNC: 133 MMOL/L (ref 136–145)
WBC NRBC COR # BLD: 8.04 10*3/MM3 (ref 3.4–10.8)

## 2019-06-15 PROCEDURE — 97110 THERAPEUTIC EXERCISES: CPT

## 2019-06-15 PROCEDURE — 80053 COMPREHEN METABOLIC PANEL: CPT | Performed by: PHYSICAL MEDICINE & REHABILITATION

## 2019-06-15 PROCEDURE — 97165 OT EVAL LOW COMPLEX 30 MIN: CPT

## 2019-06-15 PROCEDURE — 85025 COMPLETE CBC W/AUTO DIFF WBC: CPT | Performed by: PHYSICAL MEDICINE & REHABILITATION

## 2019-06-15 PROCEDURE — 97535 SELF CARE MNGMENT TRAINING: CPT

## 2019-06-15 RX ORDER — GABAPENTIN 300 MG/1
300 CAPSULE ORAL 3 TIMES DAILY
Status: DISCONTINUED | OUTPATIENT
Start: 2019-06-17 | End: 2019-07-02 | Stop reason: HOSPADM

## 2019-06-15 RX ORDER — SODIUM CHLORIDE 1000 MG
1 TABLET, SOLUBLE MISCELLANEOUS
Status: DISCONTINUED | OUTPATIENT
Start: 2019-06-15 | End: 2019-06-16

## 2019-06-15 RX ORDER — DOCUSATE SODIUM 100 MG/1
100 CAPSULE, LIQUID FILLED ORAL 2 TIMES DAILY
Status: DISCONTINUED | OUTPATIENT
Start: 2019-06-15 | End: 2019-07-02 | Stop reason: HOSPADM

## 2019-06-15 RX ORDER — GABAPENTIN 100 MG/1
100 CAPSULE ORAL 3 TIMES DAILY
Status: COMPLETED | OUTPATIENT
Start: 2019-06-15 | End: 2019-06-15

## 2019-06-15 RX ORDER — GABAPENTIN 100 MG/1
200 CAPSULE ORAL 3 TIMES DAILY
Status: COMPLETED | OUTPATIENT
Start: 2019-06-16 | End: 2019-06-16

## 2019-06-15 RX ORDER — POLYETHYLENE GLYCOL 3350 17 G/17G
17 POWDER, FOR SOLUTION ORAL NIGHTLY
Status: DISCONTINUED | OUTPATIENT
Start: 2019-06-15 | End: 2019-06-27

## 2019-06-15 RX ADMIN — SODIUM CHLORIDE TAB 1 GM 2 G: 1 TAB at 08:00

## 2019-06-15 RX ADMIN — HYDROCODONE BITARTRATE AND ACETAMINOPHEN 1.5 TABLET: 10; 325 TABLET ORAL at 19:59

## 2019-06-15 RX ADMIN — HYDROCODONE BITARTRATE AND ACETAMINOPHEN 1.5 TABLET: 10; 325 TABLET ORAL at 16:01

## 2019-06-15 RX ADMIN — HYDROCODONE BITARTRATE AND ACETAMINOPHEN 1.5 TABLET: 10; 325 TABLET ORAL at 03:53

## 2019-06-15 RX ADMIN — HYDROCODONE BITARTRATE AND ACETAMINOPHEN 1.5 TABLET: 10; 325 TABLET ORAL at 11:51

## 2019-06-15 RX ADMIN — POLYETHYLENE GLYCOL 3350 17 G: 17 POWDER, FOR SOLUTION ORAL at 20:01

## 2019-06-15 RX ADMIN — GABAPENTIN 100 MG: 100 CAPSULE ORAL at 20:01

## 2019-06-15 RX ADMIN — DOCUSATE SODIUM 100 MG: 100 CAPSULE, LIQUID FILLED ORAL at 06:16

## 2019-06-15 RX ADMIN — DOCUSATE SODIUM 100 MG: 100 CAPSULE, LIQUID FILLED ORAL at 20:01

## 2019-06-15 RX ADMIN — SODIUM CHLORIDE TAB 1 GM 1 G: 1 TAB at 11:51

## 2019-06-15 RX ADMIN — TAMSULOSIN HYDROCHLORIDE 0.4 MG: 0.4 CAPSULE ORAL at 08:00

## 2019-06-15 RX ADMIN — CYCLOBENZAPRINE 10 MG: 10 TABLET, FILM COATED ORAL at 08:04

## 2019-06-15 RX ADMIN — GABAPENTIN 100 MG: 100 CAPSULE ORAL at 16:01

## 2019-06-15 RX ADMIN — LISINOPRIL: 10 TABLET ORAL at 08:00

## 2019-06-15 RX ADMIN — SENNOSIDES,DOCUSATE SODIUM 2 TABLET: 50; 8.6 TABLET, FILM COATED ORAL at 20:01

## 2019-06-15 RX ADMIN — SODIUM CHLORIDE TAB 1 GM 1 G: 1 TAB at 17:17

## 2019-06-15 RX ADMIN — GABAPENTIN 100 MG: 100 CAPSULE ORAL at 09:56

## 2019-06-15 RX ADMIN — CYCLOBENZAPRINE 10 MG: 10 TABLET, FILM COATED ORAL at 16:01

## 2019-06-15 RX ADMIN — HYDROCODONE BITARTRATE AND ACETAMINOPHEN 1.5 TABLET: 10; 325 TABLET ORAL at 08:00

## 2019-06-16 LAB
ANION GAP SERPL CALCULATED.3IONS-SCNC: 10.1 MMOL/L
BUN BLD-MCNC: 14 MG/DL (ref 6–20)
BUN/CREAT SERPL: 17.7 (ref 7–25)
CALCIUM SPEC-SCNC: 8.9 MG/DL (ref 8.6–10.5)
CHLORIDE SERPL-SCNC: 97 MMOL/L (ref 98–107)
CO2 SERPL-SCNC: 28.9 MMOL/L (ref 22–29)
CREAT BLD-MCNC: 0.79 MG/DL (ref 0.76–1.27)
GFR SERPL CREATININE-BSD FRML MDRD: 102 ML/MIN/1.73
GLUCOSE BLD-MCNC: 104 MG/DL (ref 65–99)
POTASSIUM BLD-SCNC: 4.2 MMOL/L (ref 3.5–5.2)
SODIUM BLD-SCNC: 136 MMOL/L (ref 136–145)

## 2019-06-16 PROCEDURE — 80048 BASIC METABOLIC PNL TOTAL CA: CPT | Performed by: PHYSICAL MEDICINE & REHABILITATION

## 2019-06-16 PROCEDURE — 97110 THERAPEUTIC EXERCISES: CPT

## 2019-06-16 RX ADMIN — CYCLOBENZAPRINE 10 MG: 10 TABLET, FILM COATED ORAL at 15:55

## 2019-06-16 RX ADMIN — SENNOSIDES,DOCUSATE SODIUM 2 TABLET: 50; 8.6 TABLET, FILM COATED ORAL at 20:00

## 2019-06-16 RX ADMIN — HYDROCODONE BITARTRATE AND ACETAMINOPHEN 1.5 TABLET: 10; 325 TABLET ORAL at 15:56

## 2019-06-16 RX ADMIN — HYDROCODONE BITARTRATE AND ACETAMINOPHEN 1.5 TABLET: 10; 325 TABLET ORAL at 11:46

## 2019-06-16 RX ADMIN — DOCUSATE SODIUM 100 MG: 100 CAPSULE, LIQUID FILLED ORAL at 19:59

## 2019-06-16 RX ADMIN — GABAPENTIN 200 MG: 100 CAPSULE ORAL at 08:06

## 2019-06-16 RX ADMIN — SODIUM CHLORIDE TAB 1 GM 1 G: 1 TAB at 08:06

## 2019-06-16 RX ADMIN — HYDROCODONE BITARTRATE AND ACETAMINOPHEN 1.5 TABLET: 10; 325 TABLET ORAL at 19:58

## 2019-06-16 RX ADMIN — CYCLOBENZAPRINE 10 MG: 10 TABLET, FILM COATED ORAL at 00:01

## 2019-06-16 RX ADMIN — HYDROCODONE BITARTRATE AND ACETAMINOPHEN 1.5 TABLET: 10; 325 TABLET ORAL at 08:06

## 2019-06-16 RX ADMIN — GABAPENTIN 200 MG: 100 CAPSULE ORAL at 15:56

## 2019-06-16 RX ADMIN — POLYETHYLENE GLYCOL 3350 17 G: 17 POWDER, FOR SOLUTION ORAL at 19:58

## 2019-06-16 RX ADMIN — HYDROCODONE BITARTRATE AND ACETAMINOPHEN 1.5 TABLET: 10; 325 TABLET ORAL at 00:01

## 2019-06-16 RX ADMIN — HYDROCODONE BITARTRATE AND ACETAMINOPHEN 1.5 TABLET: 10; 325 TABLET ORAL at 03:59

## 2019-06-16 RX ADMIN — GABAPENTIN 200 MG: 100 CAPSULE ORAL at 19:59

## 2019-06-16 RX ADMIN — TAMSULOSIN HYDROCHLORIDE 0.4 MG: 0.4 CAPSULE ORAL at 08:06

## 2019-06-16 RX ADMIN — LISINOPRIL: 10 TABLET ORAL at 08:05

## 2019-06-16 RX ADMIN — CYCLOBENZAPRINE 10 MG: 10 TABLET, FILM COATED ORAL at 08:06

## 2019-06-16 RX ADMIN — DOCUSATE SODIUM 100 MG: 100 CAPSULE, LIQUID FILLED ORAL at 08:06

## 2019-06-17 ENCOUNTER — APPOINTMENT (OUTPATIENT)
Dept: GENERAL RADIOLOGY | Facility: HOSPITAL | Age: 54
End: 2019-06-17

## 2019-06-17 LAB
ANION GAP SERPL CALCULATED.3IONS-SCNC: 9.7 MMOL/L
BASOPHILS # BLD AUTO: 0.06 10*3/MM3 (ref 0–0.2)
BASOPHILS NFR BLD AUTO: 0.7 % (ref 0–1.5)
BUN BLD-MCNC: 13 MG/DL (ref 6–20)
BUN/CREAT SERPL: 18.8 (ref 7–25)
CALCIUM SPEC-SCNC: 8.7 MG/DL (ref 8.6–10.5)
CHLORIDE SERPL-SCNC: 94 MMOL/L (ref 98–107)
CO2 SERPL-SCNC: 30.3 MMOL/L (ref 22–29)
CREAT BLD-MCNC: 0.69 MG/DL (ref 0.76–1.27)
DEPRECATED RDW RBC AUTO: 45.1 FL (ref 37–54)
EOSINOPHIL # BLD AUTO: 0.3 10*3/MM3 (ref 0–0.4)
EOSINOPHIL NFR BLD AUTO: 3.4 % (ref 0.3–6.2)
ERYTHROCYTE [DISTWIDTH] IN BLOOD BY AUTOMATED COUNT: 12.9 % (ref 12.3–15.4)
GFR SERPL CREATININE-BSD FRML MDRD: 119 ML/MIN/1.73
GLUCOSE BLD-MCNC: 109 MG/DL (ref 65–99)
HCT VFR BLD AUTO: 38.5 % (ref 37.5–51)
HGB BLD-MCNC: 12.1 G/DL (ref 13–17.7)
IMM GRANULOCYTES # BLD AUTO: 0.12 10*3/MM3 (ref 0–0.05)
IMM GRANULOCYTES NFR BLD AUTO: 1.4 % (ref 0–0.5)
LYMPHOCYTES # BLD AUTO: 2.63 10*3/MM3 (ref 0.7–3.1)
LYMPHOCYTES NFR BLD AUTO: 29.7 % (ref 19.6–45.3)
MCH RBC QN AUTO: 29.9 PG (ref 26.6–33)
MCHC RBC AUTO-ENTMCNC: 31.4 G/DL (ref 31.5–35.7)
MCV RBC AUTO: 95.1 FL (ref 79–97)
MONOCYTES # BLD AUTO: 0.75 10*3/MM3 (ref 0.1–0.9)
MONOCYTES NFR BLD AUTO: 8.5 % (ref 5–12)
NEUTROPHILS # BLD AUTO: 5 10*3/MM3 (ref 1.7–7)
NEUTROPHILS NFR BLD AUTO: 56.3 % (ref 42.7–76)
NRBC BLD AUTO-RTO: 0 /100 WBC (ref 0–0.2)
PLATELET # BLD AUTO: 388 10*3/MM3 (ref 140–450)
PMV BLD AUTO: 8.6 FL (ref 6–12)
POTASSIUM BLD-SCNC: 4 MMOL/L (ref 3.5–5.2)
RBC # BLD AUTO: 4.05 10*6/MM3 (ref 4.14–5.8)
SODIUM BLD-SCNC: 134 MMOL/L (ref 136–145)
WBC NRBC COR # BLD: 8.86 10*3/MM3 (ref 3.4–10.8)

## 2019-06-17 PROCEDURE — 80048 BASIC METABOLIC PNL TOTAL CA: CPT | Performed by: PHYSICAL MEDICINE & REHABILITATION

## 2019-06-17 PROCEDURE — 97535 SELF CARE MNGMENT TRAINING: CPT

## 2019-06-17 PROCEDURE — 72100 X-RAY EXAM L-S SPINE 2/3 VWS: CPT

## 2019-06-17 PROCEDURE — 97110 THERAPEUTIC EXERCISES: CPT

## 2019-06-17 PROCEDURE — 85025 COMPLETE CBC W/AUTO DIFF WBC: CPT | Performed by: PHYSICAL MEDICINE & REHABILITATION

## 2019-06-17 PROCEDURE — 99024 POSTOP FOLLOW-UP VISIT: CPT | Performed by: NURSE PRACTITIONER

## 2019-06-17 RX ADMIN — HYDROCODONE BITARTRATE AND ACETAMINOPHEN 1.5 TABLET: 10; 325 TABLET ORAL at 03:56

## 2019-06-17 RX ADMIN — HYDROCODONE BITARTRATE AND ACETAMINOPHEN 1.5 TABLET: 10; 325 TABLET ORAL at 08:23

## 2019-06-17 RX ADMIN — LISINOPRIL: 10 TABLET ORAL at 08:18

## 2019-06-17 RX ADMIN — CYCLOBENZAPRINE 10 MG: 10 TABLET, FILM COATED ORAL at 16:27

## 2019-06-17 RX ADMIN — CYCLOBENZAPRINE 10 MG: 10 TABLET, FILM COATED ORAL at 08:23

## 2019-06-17 RX ADMIN — TAMSULOSIN HYDROCHLORIDE 0.4 MG: 0.4 CAPSULE ORAL at 08:18

## 2019-06-17 RX ADMIN — BISACODYL 10 MG: 10 SUPPOSITORY RECTAL at 16:19

## 2019-06-17 RX ADMIN — GABAPENTIN 300 MG: 300 CAPSULE ORAL at 08:24

## 2019-06-17 RX ADMIN — DOCUSATE SODIUM 100 MG: 100 CAPSULE, LIQUID FILLED ORAL at 20:09

## 2019-06-17 RX ADMIN — HYDROCODONE BITARTRATE AND ACETAMINOPHEN 1.5 TABLET: 10; 325 TABLET ORAL at 00:03

## 2019-06-17 RX ADMIN — GABAPENTIN 300 MG: 300 CAPSULE ORAL at 20:10

## 2019-06-17 RX ADMIN — DOCUSATE SODIUM 100 MG: 100 CAPSULE, LIQUID FILLED ORAL at 08:24

## 2019-06-17 RX ADMIN — HYDROCODONE BITARTRATE AND ACETAMINOPHEN 1.5 TABLET: 10; 325 TABLET ORAL at 20:09

## 2019-06-17 RX ADMIN — GABAPENTIN 300 MG: 300 CAPSULE ORAL at 16:23

## 2019-06-17 RX ADMIN — HYDROCODONE BITARTRATE AND ACETAMINOPHEN 1.5 TABLET: 10; 325 TABLET ORAL at 11:48

## 2019-06-17 RX ADMIN — HYDROCODONE BITARTRATE AND ACETAMINOPHEN 1.5 TABLET: 10; 325 TABLET ORAL at 16:23

## 2019-06-17 RX ADMIN — CYCLOBENZAPRINE 10 MG: 10 TABLET, FILM COATED ORAL at 00:03

## 2019-06-18 PROCEDURE — 97110 THERAPEUTIC EXERCISES: CPT

## 2019-06-18 PROCEDURE — 99024 POSTOP FOLLOW-UP VISIT: CPT | Performed by: NURSE PRACTITIONER

## 2019-06-18 PROCEDURE — 97535 SELF CARE MNGMENT TRAINING: CPT

## 2019-06-18 RX ADMIN — DOCUSATE SODIUM 100 MG: 100 CAPSULE, LIQUID FILLED ORAL at 08:58

## 2019-06-18 RX ADMIN — SENNOSIDES,DOCUSATE SODIUM 2 TABLET: 50; 8.6 TABLET, FILM COATED ORAL at 20:37

## 2019-06-18 RX ADMIN — HYDROCODONE BITARTRATE AND ACETAMINOPHEN 1.5 TABLET: 10; 325 TABLET ORAL at 08:58

## 2019-06-18 RX ADMIN — HYDROCODONE BITARTRATE AND ACETAMINOPHEN 1.5 TABLET: 10; 325 TABLET ORAL at 04:15

## 2019-06-18 RX ADMIN — GABAPENTIN 300 MG: 300 CAPSULE ORAL at 20:37

## 2019-06-18 RX ADMIN — HYDROCODONE BITARTRATE AND ACETAMINOPHEN 1.5 TABLET: 10; 325 TABLET ORAL at 00:23

## 2019-06-18 RX ADMIN — CYCLOBENZAPRINE 10 MG: 10 TABLET, FILM COATED ORAL at 20:37

## 2019-06-18 RX ADMIN — HYDROCODONE BITARTRATE AND ACETAMINOPHEN 1.5 TABLET: 10; 325 TABLET ORAL at 20:37

## 2019-06-18 RX ADMIN — DOCUSATE SODIUM 100 MG: 100 CAPSULE, LIQUID FILLED ORAL at 20:37

## 2019-06-18 RX ADMIN — CYCLOBENZAPRINE 10 MG: 10 TABLET, FILM COATED ORAL at 08:58

## 2019-06-18 RX ADMIN — HYDROCODONE BITARTRATE AND ACETAMINOPHEN 1.5 TABLET: 10; 325 TABLET ORAL at 15:37

## 2019-06-18 RX ADMIN — LISINOPRIL: 10 TABLET ORAL at 08:49

## 2019-06-18 RX ADMIN — HYDROCODONE BITARTRATE AND ACETAMINOPHEN 1.5 TABLET: 10; 325 TABLET ORAL at 11:32

## 2019-06-18 RX ADMIN — GABAPENTIN 300 MG: 300 CAPSULE ORAL at 08:58

## 2019-06-18 RX ADMIN — TAMSULOSIN HYDROCHLORIDE 0.4 MG: 0.4 CAPSULE ORAL at 08:49

## 2019-06-18 RX ADMIN — GABAPENTIN 300 MG: 300 CAPSULE ORAL at 15:06

## 2019-06-19 PROCEDURE — 97535 SELF CARE MNGMENT TRAINING: CPT | Performed by: OCCUPATIONAL THERAPIST

## 2019-06-19 PROCEDURE — 97110 THERAPEUTIC EXERCISES: CPT | Performed by: OCCUPATIONAL THERAPIST

## 2019-06-19 PROCEDURE — 97110 THERAPEUTIC EXERCISES: CPT

## 2019-06-19 RX ADMIN — CYCLOBENZAPRINE 10 MG: 10 TABLET, FILM COATED ORAL at 16:25

## 2019-06-19 RX ADMIN — HYDROCODONE BITARTRATE AND ACETAMINOPHEN 1.5 TABLET: 10; 325 TABLET ORAL at 11:57

## 2019-06-19 RX ADMIN — HYDROCODONE BITARTRATE AND ACETAMINOPHEN 1.5 TABLET: 10; 325 TABLET ORAL at 00:18

## 2019-06-19 RX ADMIN — GABAPENTIN 300 MG: 300 CAPSULE ORAL at 08:22

## 2019-06-19 RX ADMIN — HYDROCODONE BITARTRATE AND ACETAMINOPHEN 1.5 TABLET: 10; 325 TABLET ORAL at 20:01

## 2019-06-19 RX ADMIN — GABAPENTIN 300 MG: 300 CAPSULE ORAL at 20:02

## 2019-06-19 RX ADMIN — CYCLOBENZAPRINE 10 MG: 10 TABLET, FILM COATED ORAL at 08:22

## 2019-06-19 RX ADMIN — HYDROCODONE BITARTRATE AND ACETAMINOPHEN 1.5 TABLET: 10; 325 TABLET ORAL at 16:25

## 2019-06-19 RX ADMIN — HYDROCODONE BITARTRATE AND ACETAMINOPHEN 1.5 TABLET: 10; 325 TABLET ORAL at 08:22

## 2019-06-19 RX ADMIN — DOCUSATE SODIUM 100 MG: 100 CAPSULE, LIQUID FILLED ORAL at 08:22

## 2019-06-19 RX ADMIN — GABAPENTIN 300 MG: 300 CAPSULE ORAL at 16:26

## 2019-06-19 RX ADMIN — DOCUSATE SODIUM 100 MG: 100 CAPSULE, LIQUID FILLED ORAL at 20:02

## 2019-06-19 RX ADMIN — LISINOPRIL: 10 TABLET ORAL at 08:22

## 2019-06-19 RX ADMIN — HYDROCODONE BITARTRATE AND ACETAMINOPHEN 1.5 TABLET: 10; 325 TABLET ORAL at 04:04

## 2019-06-20 LAB
ANION GAP SERPL CALCULATED.3IONS-SCNC: 9.7 MMOL/L
BASOPHILS # BLD AUTO: 0.05 10*3/MM3 (ref 0–0.2)
BASOPHILS NFR BLD AUTO: 0.7 % (ref 0–1.5)
BUN BLD-MCNC: 12 MG/DL (ref 6–20)
BUN/CREAT SERPL: 17.4 (ref 7–25)
CALCIUM SPEC-SCNC: 8.9 MG/DL (ref 8.6–10.5)
CHLORIDE SERPL-SCNC: 95 MMOL/L (ref 98–107)
CO2 SERPL-SCNC: 30.3 MMOL/L (ref 22–29)
CREAT BLD-MCNC: 0.69 MG/DL (ref 0.76–1.27)
DEPRECATED RDW RBC AUTO: 44.9 FL (ref 37–54)
EOSINOPHIL # BLD AUTO: 0.25 10*3/MM3 (ref 0–0.4)
EOSINOPHIL NFR BLD AUTO: 3.3 % (ref 0.3–6.2)
ERYTHROCYTE [DISTWIDTH] IN BLOOD BY AUTOMATED COUNT: 12.8 % (ref 12.3–15.4)
GFR SERPL CREATININE-BSD FRML MDRD: 119 ML/MIN/1.73
GLUCOSE BLD-MCNC: 104 MG/DL (ref 65–99)
HCT VFR BLD AUTO: 37.9 % (ref 37.5–51)
HGB BLD-MCNC: 12 G/DL (ref 13–17.7)
IMM GRANULOCYTES # BLD AUTO: 0.08 10*3/MM3 (ref 0–0.05)
IMM GRANULOCYTES NFR BLD AUTO: 1.1 % (ref 0–0.5)
LYMPHOCYTES # BLD AUTO: 2.18 10*3/MM3 (ref 0.7–3.1)
LYMPHOCYTES NFR BLD AUTO: 28.9 % (ref 19.6–45.3)
MCH RBC QN AUTO: 30.1 PG (ref 26.6–33)
MCHC RBC AUTO-ENTMCNC: 31.7 G/DL (ref 31.5–35.7)
MCV RBC AUTO: 95 FL (ref 79–97)
MONOCYTES # BLD AUTO: 0.69 10*3/MM3 (ref 0.1–0.9)
MONOCYTES NFR BLD AUTO: 9.2 % (ref 5–12)
NEUTROPHILS # BLD AUTO: 4.29 10*3/MM3 (ref 1.7–7)
NEUTROPHILS NFR BLD AUTO: 56.8 % (ref 42.7–76)
NRBC BLD AUTO-RTO: 0 /100 WBC (ref 0–0.2)
PLATELET # BLD AUTO: 380 10*3/MM3 (ref 140–450)
PMV BLD AUTO: 8.6 FL (ref 6–12)
POTASSIUM BLD-SCNC: 4.4 MMOL/L (ref 3.5–5.2)
RBC # BLD AUTO: 3.99 10*6/MM3 (ref 4.14–5.8)
SODIUM BLD-SCNC: 135 MMOL/L (ref 136–145)
WBC NRBC COR # BLD: 7.54 10*3/MM3 (ref 3.4–10.8)

## 2019-06-20 PROCEDURE — 85025 COMPLETE CBC W/AUTO DIFF WBC: CPT | Performed by: PHYSICAL MEDICINE & REHABILITATION

## 2019-06-20 PROCEDURE — 80048 BASIC METABOLIC PNL TOTAL CA: CPT | Performed by: PHYSICAL MEDICINE & REHABILITATION

## 2019-06-20 PROCEDURE — 97535 SELF CARE MNGMENT TRAINING: CPT

## 2019-06-20 PROCEDURE — 97110 THERAPEUTIC EXERCISES: CPT

## 2019-06-20 RX ORDER — HYDROCODONE BITARTRATE AND ACETAMINOPHEN 10; 325 MG/1; MG/1
1 TABLET ORAL EVERY 4 HOURS
Status: DISCONTINUED | OUTPATIENT
Start: 2019-06-20 | End: 2019-06-25

## 2019-06-20 RX ADMIN — GABAPENTIN 300 MG: 300 CAPSULE ORAL at 08:24

## 2019-06-20 RX ADMIN — CYCLOBENZAPRINE 10 MG: 10 TABLET, FILM COATED ORAL at 23:53

## 2019-06-20 RX ADMIN — CYCLOBENZAPRINE 10 MG: 10 TABLET, FILM COATED ORAL at 08:24

## 2019-06-20 RX ADMIN — HYDROCODONE BITARTRATE AND ACETAMINOPHEN 1 TABLET: 10; 325 TABLET ORAL at 23:54

## 2019-06-20 RX ADMIN — DOCUSATE SODIUM 100 MG: 100 CAPSULE, LIQUID FILLED ORAL at 20:00

## 2019-06-20 RX ADMIN — CYCLOBENZAPRINE 10 MG: 10 TABLET, FILM COATED ORAL at 16:00

## 2019-06-20 RX ADMIN — HYDROCODONE BITARTRATE AND ACETAMINOPHEN 1.5 TABLET: 10; 325 TABLET ORAL at 03:56

## 2019-06-20 RX ADMIN — HYDROCODONE BITARTRATE AND ACETAMINOPHEN 1.5 TABLET: 10; 325 TABLET ORAL at 08:24

## 2019-06-20 RX ADMIN — HYDROCODONE BITARTRATE AND ACETAMINOPHEN 1.5 TABLET: 10; 325 TABLET ORAL at 00:07

## 2019-06-20 RX ADMIN — GABAPENTIN 300 MG: 300 CAPSULE ORAL at 16:00

## 2019-06-20 RX ADMIN — CYCLOBENZAPRINE 10 MG: 10 TABLET, FILM COATED ORAL at 00:07

## 2019-06-20 RX ADMIN — GABAPENTIN 300 MG: 300 CAPSULE ORAL at 20:00

## 2019-06-20 RX ADMIN — HYDROCODONE BITARTRATE AND ACETAMINOPHEN 1.5 TABLET: 10; 325 TABLET ORAL at 11:53

## 2019-06-20 RX ADMIN — LISINOPRIL: 10 TABLET ORAL at 08:24

## 2019-06-20 RX ADMIN — HYDROCODONE BITARTRATE AND ACETAMINOPHEN 1 TABLET: 10; 325 TABLET ORAL at 19:59

## 2019-06-20 RX ADMIN — HYDROCODONE BITARTRATE AND ACETAMINOPHEN 1 TABLET: 10; 325 TABLET ORAL at 16:00

## 2019-06-21 PROCEDURE — 97535 SELF CARE MNGMENT TRAINING: CPT

## 2019-06-21 PROCEDURE — 99024 POSTOP FOLLOW-UP VISIT: CPT | Performed by: NURSE PRACTITIONER

## 2019-06-21 PROCEDURE — 97110 THERAPEUTIC EXERCISES: CPT | Performed by: PHYSICAL THERAPIST

## 2019-06-21 PROCEDURE — 97110 THERAPEUTIC EXERCISES: CPT

## 2019-06-21 RX ADMIN — LISINOPRIL: 10 TABLET ORAL at 08:03

## 2019-06-21 RX ADMIN — HYDROCODONE BITARTRATE AND ACETAMINOPHEN 1 TABLET: 10; 325 TABLET ORAL at 08:03

## 2019-06-21 RX ADMIN — GABAPENTIN 300 MG: 300 CAPSULE ORAL at 15:48

## 2019-06-21 RX ADMIN — HYDROCODONE BITARTRATE AND ACETAMINOPHEN 1 TABLET: 10; 325 TABLET ORAL at 03:54

## 2019-06-21 RX ADMIN — CYCLOBENZAPRINE 10 MG: 10 TABLET, FILM COATED ORAL at 15:48

## 2019-06-21 RX ADMIN — CYCLOBENZAPRINE 10 MG: 10 TABLET, FILM COATED ORAL at 08:03

## 2019-06-21 RX ADMIN — HYDROCODONE BITARTRATE AND ACETAMINOPHEN 1 TABLET: 10; 325 TABLET ORAL at 20:22

## 2019-06-21 RX ADMIN — CYCLOBENZAPRINE 10 MG: 10 TABLET, FILM COATED ORAL at 20:25

## 2019-06-21 RX ADMIN — GABAPENTIN 300 MG: 300 CAPSULE ORAL at 20:22

## 2019-06-21 RX ADMIN — TAMSULOSIN HYDROCHLORIDE 0.4 MG: 0.4 CAPSULE ORAL at 08:03

## 2019-06-21 RX ADMIN — DOCUSATE SODIUM 100 MG: 100 CAPSULE, LIQUID FILLED ORAL at 20:22

## 2019-06-21 RX ADMIN — GABAPENTIN 300 MG: 300 CAPSULE ORAL at 08:03

## 2019-06-21 RX ADMIN — HYDROCODONE BITARTRATE AND ACETAMINOPHEN 1 TABLET: 10; 325 TABLET ORAL at 15:48

## 2019-06-21 RX ADMIN — HYDROCODONE BITARTRATE AND ACETAMINOPHEN 1 TABLET: 10; 325 TABLET ORAL at 11:47

## 2019-06-21 RX ADMIN — DOCUSATE SODIUM 100 MG: 100 CAPSULE, LIQUID FILLED ORAL at 08:03

## 2019-06-22 PROCEDURE — 97110 THERAPEUTIC EXERCISES: CPT

## 2019-06-22 RX ADMIN — GABAPENTIN 300 MG: 300 CAPSULE ORAL at 16:09

## 2019-06-22 RX ADMIN — HYDROCODONE BITARTRATE AND ACETAMINOPHEN 1 TABLET: 10; 325 TABLET ORAL at 00:11

## 2019-06-22 RX ADMIN — LISINOPRIL: 10 TABLET ORAL at 08:12

## 2019-06-22 RX ADMIN — DOCUSATE SODIUM 100 MG: 100 CAPSULE, LIQUID FILLED ORAL at 20:14

## 2019-06-22 RX ADMIN — HYDROCODONE BITARTRATE AND ACETAMINOPHEN 1 TABLET: 10; 325 TABLET ORAL at 12:05

## 2019-06-22 RX ADMIN — GABAPENTIN 300 MG: 300 CAPSULE ORAL at 20:14

## 2019-06-22 RX ADMIN — HYDROCODONE BITARTRATE AND ACETAMINOPHEN 1 TABLET: 10; 325 TABLET ORAL at 08:11

## 2019-06-22 RX ADMIN — HYDROCODONE BITARTRATE AND ACETAMINOPHEN 1 TABLET: 10; 325 TABLET ORAL at 16:09

## 2019-06-22 RX ADMIN — GABAPENTIN 300 MG: 300 CAPSULE ORAL at 08:29

## 2019-06-22 RX ADMIN — CYCLOBENZAPRINE 10 MG: 10 TABLET, FILM COATED ORAL at 16:09

## 2019-06-22 RX ADMIN — CYCLOBENZAPRINE 10 MG: 10 TABLET, FILM COATED ORAL at 08:15

## 2019-06-22 RX ADMIN — HYDROCODONE BITARTRATE AND ACETAMINOPHEN 1 TABLET: 10; 325 TABLET ORAL at 20:13

## 2019-06-22 RX ADMIN — HYDROCODONE BITARTRATE AND ACETAMINOPHEN 1 TABLET: 10; 325 TABLET ORAL at 04:14

## 2019-06-22 RX ADMIN — TAMSULOSIN HYDROCHLORIDE 0.4 MG: 0.4 CAPSULE ORAL at 08:12

## 2019-06-22 RX ADMIN — DOCUSATE SODIUM 100 MG: 100 CAPSULE, LIQUID FILLED ORAL at 08:12

## 2019-06-23 PROCEDURE — 97110 THERAPEUTIC EXERCISES: CPT

## 2019-06-23 RX ORDER — LIDOCAINE 50 MG/G
1 PATCH TOPICAL
Status: DISCONTINUED | OUTPATIENT
Start: 2019-06-23 | End: 2019-06-24

## 2019-06-23 RX ADMIN — HYDROCODONE BITARTRATE AND ACETAMINOPHEN 1 TABLET: 10; 325 TABLET ORAL at 00:05

## 2019-06-23 RX ADMIN — HYDROCODONE BITARTRATE AND ACETAMINOPHEN 1 TABLET: 10; 325 TABLET ORAL at 12:18

## 2019-06-23 RX ADMIN — HYDROCODONE BITARTRATE AND ACETAMINOPHEN 1 TABLET: 10; 325 TABLET ORAL at 04:15

## 2019-06-23 RX ADMIN — CYCLOBENZAPRINE 10 MG: 10 TABLET, FILM COATED ORAL at 15:57

## 2019-06-23 RX ADMIN — CYCLOBENZAPRINE 10 MG: 10 TABLET, FILM COATED ORAL at 00:05

## 2019-06-23 RX ADMIN — LISINOPRIL: 10 TABLET ORAL at 08:21

## 2019-06-23 RX ADMIN — HYDROCODONE BITARTRATE AND ACETAMINOPHEN 1 TABLET: 10; 325 TABLET ORAL at 19:54

## 2019-06-23 RX ADMIN — HYDROCODONE BITARTRATE AND ACETAMINOPHEN 1 TABLET: 10; 325 TABLET ORAL at 23:58

## 2019-06-23 RX ADMIN — CYCLOBENZAPRINE 10 MG: 10 TABLET, FILM COATED ORAL at 23:58

## 2019-06-23 RX ADMIN — HYDROCODONE BITARTRATE AND ACETAMINOPHEN 1 TABLET: 10; 325 TABLET ORAL at 08:12

## 2019-06-23 RX ADMIN — HYDROCODONE BITARTRATE AND ACETAMINOPHEN 1 TABLET: 10; 325 TABLET ORAL at 15:57

## 2019-06-23 RX ADMIN — DOCUSATE SODIUM 100 MG: 100 CAPSULE, LIQUID FILLED ORAL at 20:31

## 2019-06-23 RX ADMIN — GABAPENTIN 300 MG: 300 CAPSULE ORAL at 20:31

## 2019-06-23 RX ADMIN — DOCUSATE SODIUM 100 MG: 100 CAPSULE, LIQUID FILLED ORAL at 08:21

## 2019-06-23 RX ADMIN — GABAPENTIN 300 MG: 300 CAPSULE ORAL at 08:13

## 2019-06-23 RX ADMIN — CYCLOBENZAPRINE 10 MG: 10 TABLET, FILM COATED ORAL at 08:12

## 2019-06-23 RX ADMIN — LIDOCAINE 1 PATCH: 50 PATCH TOPICAL at 20:32

## 2019-06-23 RX ADMIN — GABAPENTIN 300 MG: 300 CAPSULE ORAL at 15:57

## 2019-06-24 LAB
ANION GAP SERPL CALCULATED.3IONS-SCNC: 9.2 MMOL/L
BASOPHILS # BLD AUTO: 0.05 10*3/MM3 (ref 0–0.2)
BASOPHILS NFR BLD AUTO: 0.8 % (ref 0–1.5)
BUN BLD-MCNC: 15 MG/DL (ref 6–20)
BUN/CREAT SERPL: 23.1 (ref 7–25)
CALCIUM SPEC-SCNC: 9.1 MG/DL (ref 8.6–10.5)
CHLORIDE SERPL-SCNC: 100 MMOL/L (ref 98–107)
CO2 SERPL-SCNC: 29.8 MMOL/L (ref 22–29)
CREAT BLD-MCNC: 0.65 MG/DL (ref 0.76–1.27)
DEPRECATED RDW RBC AUTO: 44.8 FL (ref 37–54)
EOSINOPHIL # BLD AUTO: 0.32 10*3/MM3 (ref 0–0.4)
EOSINOPHIL NFR BLD AUTO: 5.4 % (ref 0.3–6.2)
ERYTHROCYTE [DISTWIDTH] IN BLOOD BY AUTOMATED COUNT: 12.9 % (ref 12.3–15.4)
GFR SERPL CREATININE-BSD FRML MDRD: 128 ML/MIN/1.73
GLUCOSE BLD-MCNC: 100 MG/DL (ref 65–99)
HCT VFR BLD AUTO: 38.3 % (ref 37.5–51)
HGB BLD-MCNC: 12 G/DL (ref 13–17.7)
IMM GRANULOCYTES # BLD AUTO: 0.04 10*3/MM3 (ref 0–0.05)
IMM GRANULOCYTES NFR BLD AUTO: 0.7 % (ref 0–0.5)
LYMPHOCYTES # BLD AUTO: 1.98 10*3/MM3 (ref 0.7–3.1)
LYMPHOCYTES NFR BLD AUTO: 33.3 % (ref 19.6–45.3)
MCH RBC QN AUTO: 29.6 PG (ref 26.6–33)
MCHC RBC AUTO-ENTMCNC: 31.3 G/DL (ref 31.5–35.7)
MCV RBC AUTO: 94.3 FL (ref 79–97)
MONOCYTES # BLD AUTO: 0.56 10*3/MM3 (ref 0.1–0.9)
MONOCYTES NFR BLD AUTO: 9.4 % (ref 5–12)
NEUTROPHILS # BLD AUTO: 2.99 10*3/MM3 (ref 1.7–7)
NEUTROPHILS NFR BLD AUTO: 50.4 % (ref 42.7–76)
NRBC BLD AUTO-RTO: 0 /100 WBC (ref 0–0.2)
PLATELET # BLD AUTO: 389 10*3/MM3 (ref 140–450)
PMV BLD AUTO: 8.9 FL (ref 6–12)
POTASSIUM BLD-SCNC: 3.8 MMOL/L (ref 3.5–5.2)
RBC # BLD AUTO: 4.06 10*6/MM3 (ref 4.14–5.8)
SODIUM BLD-SCNC: 139 MMOL/L (ref 136–145)
WBC NRBC COR # BLD: 5.94 10*3/MM3 (ref 3.4–10.8)

## 2019-06-24 PROCEDURE — 85025 COMPLETE CBC W/AUTO DIFF WBC: CPT | Performed by: PHYSICAL MEDICINE & REHABILITATION

## 2019-06-24 PROCEDURE — 97110 THERAPEUTIC EXERCISES: CPT

## 2019-06-24 PROCEDURE — 97535 SELF CARE MNGMENT TRAINING: CPT

## 2019-06-24 PROCEDURE — 80048 BASIC METABOLIC PNL TOTAL CA: CPT | Performed by: PHYSICAL MEDICINE & REHABILITATION

## 2019-06-24 RX ORDER — LIDOCAINE 50 MG/G
1 PATCH TOPICAL NIGHTLY
Status: DISCONTINUED | OUTPATIENT
Start: 2019-06-24 | End: 2019-07-02

## 2019-06-24 RX ADMIN — GABAPENTIN 300 MG: 300 CAPSULE ORAL at 20:09

## 2019-06-24 RX ADMIN — DOCUSATE SODIUM 100 MG: 100 CAPSULE, LIQUID FILLED ORAL at 08:14

## 2019-06-24 RX ADMIN — LIDOCAINE 1 PATCH: 50 PATCH TOPICAL at 20:09

## 2019-06-24 RX ADMIN — HYDROCODONE BITARTRATE AND ACETAMINOPHEN 1 TABLET: 10; 325 TABLET ORAL at 11:48

## 2019-06-24 RX ADMIN — HYDROCODONE BITARTRATE AND ACETAMINOPHEN 1 TABLET: 10; 325 TABLET ORAL at 20:09

## 2019-06-24 RX ADMIN — GABAPENTIN 300 MG: 300 CAPSULE ORAL at 16:06

## 2019-06-24 RX ADMIN — DOCUSATE SODIUM 100 MG: 100 CAPSULE, LIQUID FILLED ORAL at 20:09

## 2019-06-24 RX ADMIN — GABAPENTIN 300 MG: 300 CAPSULE ORAL at 08:14

## 2019-06-24 RX ADMIN — TAMSULOSIN HYDROCHLORIDE 0.4 MG: 0.4 CAPSULE ORAL at 08:14

## 2019-06-24 RX ADMIN — CYCLOBENZAPRINE 10 MG: 10 TABLET, FILM COATED ORAL at 16:06

## 2019-06-24 RX ADMIN — HYDROCODONE BITARTRATE AND ACETAMINOPHEN 1 TABLET: 10; 325 TABLET ORAL at 16:06

## 2019-06-24 RX ADMIN — HYDROCODONE BITARTRATE AND ACETAMINOPHEN 1 TABLET: 10; 325 TABLET ORAL at 03:57

## 2019-06-24 RX ADMIN — CYCLOBENZAPRINE 10 MG: 10 TABLET, FILM COATED ORAL at 08:14

## 2019-06-24 RX ADMIN — HYDROCODONE BITARTRATE AND ACETAMINOPHEN 1 TABLET: 10; 325 TABLET ORAL at 08:14

## 2019-06-24 RX ADMIN — LISINOPRIL: 10 TABLET ORAL at 08:14

## 2019-06-25 PROCEDURE — 86335 IMMUNFIX E-PHORSIS/URINE/CSF: CPT | Performed by: NURSE PRACTITIONER

## 2019-06-25 PROCEDURE — 97110 THERAPEUTIC EXERCISES: CPT

## 2019-06-25 PROCEDURE — 99024 POSTOP FOLLOW-UP VISIT: CPT | Performed by: NURSE PRACTITIONER

## 2019-06-25 PROCEDURE — 97535 SELF CARE MNGMENT TRAINING: CPT

## 2019-06-25 RX ORDER — HYDROCODONE BITARTRATE AND ACETAMINOPHEN 5; 325 MG/1; MG/1
1 TABLET ORAL EVERY 4 HOURS PRN
Status: DISCONTINUED | OUTPATIENT
Start: 2019-06-25 | End: 2019-07-02 | Stop reason: HOSPADM

## 2019-06-25 RX ADMIN — LISINOPRIL: 10 TABLET ORAL at 07:42

## 2019-06-25 RX ADMIN — HYDROCODONE BITARTRATE AND ACETAMINOPHEN 1 TABLET: 5; 325 TABLET ORAL at 11:46

## 2019-06-25 RX ADMIN — GABAPENTIN 300 MG: 300 CAPSULE ORAL at 16:38

## 2019-06-25 RX ADMIN — DOCUSATE SODIUM 100 MG: 100 CAPSULE, LIQUID FILLED ORAL at 20:04

## 2019-06-25 RX ADMIN — HYDROCODONE BITARTRATE AND ACETAMINOPHEN 1 TABLET: 10; 325 TABLET ORAL at 00:06

## 2019-06-25 RX ADMIN — GABAPENTIN 300 MG: 300 CAPSULE ORAL at 07:43

## 2019-06-25 RX ADMIN — GABAPENTIN 300 MG: 300 CAPSULE ORAL at 20:04

## 2019-06-25 RX ADMIN — LIDOCAINE 1 PATCH: 50 PATCH TOPICAL at 20:05

## 2019-06-25 RX ADMIN — HYDROCODONE BITARTRATE AND ACETAMINOPHEN 1 TABLET: 10; 325 TABLET ORAL at 04:03

## 2019-06-25 RX ADMIN — HYDROCODONE BITARTRATE AND ACETAMINOPHEN 1 TABLET: 5; 325 TABLET ORAL at 16:38

## 2019-06-25 RX ADMIN — HYDROCODONE BITARTRATE AND ACETAMINOPHEN 1 TABLET: 10; 325 TABLET ORAL at 07:42

## 2019-06-25 RX ADMIN — CYCLOBENZAPRINE 10 MG: 10 TABLET, FILM COATED ORAL at 00:06

## 2019-06-25 RX ADMIN — DOCUSATE SODIUM 100 MG: 100 CAPSULE, LIQUID FILLED ORAL at 07:43

## 2019-06-25 RX ADMIN — TAMSULOSIN HYDROCHLORIDE 0.4 MG: 0.4 CAPSULE ORAL at 07:42

## 2019-06-25 RX ADMIN — HYDROCODONE BITARTRATE AND ACETAMINOPHEN 1 TABLET: 5; 325 TABLET ORAL at 20:36

## 2019-06-26 LAB
ANION GAP SERPL CALCULATED.3IONS-SCNC: 13.4 MMOL/L (ref 5–15)
BASOPHILS # BLD AUTO: 0.03 10*3/MM3 (ref 0–0.2)
BASOPHILS NFR BLD AUTO: 0.3 % (ref 0–1.5)
BUN BLD-MCNC: 15 MG/DL (ref 6–20)
BUN/CREAT SERPL: 17.9 (ref 7–25)
CALCIUM SPEC-SCNC: 9.3 MG/DL (ref 8.6–10.5)
CHLORIDE SERPL-SCNC: 95 MMOL/L (ref 98–107)
CO2 SERPL-SCNC: 25.6 MMOL/L (ref 22–29)
CREAT BLD-MCNC: 0.84 MG/DL (ref 0.76–1.27)
DEPRECATED RDW RBC AUTO: 45.9 FL (ref 37–54)
EOSINOPHIL # BLD AUTO: 0.4 10*3/MM3 (ref 0–0.4)
EOSINOPHIL NFR BLD AUTO: 4.4 % (ref 0.3–6.2)
ERYTHROCYTE [DISTWIDTH] IN BLOOD BY AUTOMATED COUNT: 13.2 % (ref 12.3–15.4)
GFR SERPL CREATININE-BSD FRML MDRD: 95 ML/MIN/1.73
GLUCOSE BLD-MCNC: 94 MG/DL (ref 65–99)
HCT VFR BLD AUTO: 39.9 % (ref 37.5–51)
HGB BLD-MCNC: 12.9 G/DL (ref 13–17.7)
IMM GRANULOCYTES # BLD AUTO: 0.03 10*3/MM3 (ref 0–0.05)
IMM GRANULOCYTES NFR BLD AUTO: 0.3 % (ref 0–0.5)
LYMPHOCYTES # BLD AUTO: 2.4 10*3/MM3 (ref 0.7–3.1)
LYMPHOCYTES NFR BLD AUTO: 26.7 % (ref 19.6–45.3)
MCH RBC QN AUTO: 30.9 PG (ref 26.6–33)
MCHC RBC AUTO-ENTMCNC: 32.3 G/DL (ref 31.5–35.7)
MCV RBC AUTO: 95.7 FL (ref 79–97)
MONOCYTES # BLD AUTO: 0.95 10*3/MM3 (ref 0.1–0.9)
MONOCYTES NFR BLD AUTO: 10.6 % (ref 5–12)
NEUTROPHILS # BLD AUTO: 5.19 10*3/MM3 (ref 1.7–7)
NEUTROPHILS NFR BLD AUTO: 57.7 % (ref 42.7–76)
PLATELET # BLD AUTO: 370 10*3/MM3 (ref 140–450)
PMV BLD AUTO: 8.9 FL (ref 6–12)
POTASSIUM BLD-SCNC: 4 MMOL/L (ref 3.5–5.2)
RBC # BLD AUTO: 4.17 10*6/MM3 (ref 4.14–5.8)
SODIUM BLD-SCNC: 134 MMOL/L (ref 136–145)
TSH SERPL DL<=0.05 MIU/L-ACNC: 2.46 MIU/ML (ref 0.27–4.2)
WBC NRBC COR # BLD: 9 10*3/MM3 (ref 3.4–10.8)

## 2019-06-26 PROCEDURE — 93005 ELECTROCARDIOGRAM TRACING: CPT | Performed by: INTERNAL MEDICINE

## 2019-06-26 PROCEDURE — 97110 THERAPEUTIC EXERCISES: CPT

## 2019-06-26 PROCEDURE — 85025 COMPLETE CBC W/AUTO DIFF WBC: CPT | Performed by: INTERNAL MEDICINE

## 2019-06-26 PROCEDURE — 93010 ELECTROCARDIOGRAM REPORT: CPT | Performed by: INTERNAL MEDICINE

## 2019-06-26 PROCEDURE — 84443 ASSAY THYROID STIM HORMONE: CPT | Performed by: INTERNAL MEDICINE

## 2019-06-26 PROCEDURE — 80048 BASIC METABOLIC PNL TOTAL CA: CPT | Performed by: INTERNAL MEDICINE

## 2019-06-26 PROCEDURE — 97535 SELF CARE MNGMENT TRAINING: CPT

## 2019-06-26 RX ADMIN — CYCLOBENZAPRINE 10 MG: 10 TABLET, FILM COATED ORAL at 00:06

## 2019-06-26 RX ADMIN — HYDROCODONE BITARTRATE AND ACETAMINOPHEN 1 TABLET: 5; 325 TABLET ORAL at 00:08

## 2019-06-26 RX ADMIN — CYCLOBENZAPRINE 10 MG: 10 TABLET, FILM COATED ORAL at 13:08

## 2019-06-26 RX ADMIN — GABAPENTIN 300 MG: 300 CAPSULE ORAL at 20:48

## 2019-06-26 RX ADMIN — DOCUSATE SODIUM 100 MG: 100 CAPSULE, LIQUID FILLED ORAL at 20:48

## 2019-06-26 RX ADMIN — LIDOCAINE 1 PATCH: 50 PATCH TOPICAL at 20:49

## 2019-06-26 RX ADMIN — HYDROCODONE BITARTRATE AND ACETAMINOPHEN 1 TABLET: 5; 325 TABLET ORAL at 13:08

## 2019-06-26 RX ADMIN — GABAPENTIN 300 MG: 300 CAPSULE ORAL at 15:52

## 2019-06-26 RX ADMIN — CYCLOBENZAPRINE 10 MG: 10 TABLET, FILM COATED ORAL at 20:48

## 2019-06-26 RX ADMIN — GABAPENTIN 300 MG: 300 CAPSULE ORAL at 08:17

## 2019-06-26 RX ADMIN — HYDROCODONE BITARTRATE AND ACETAMINOPHEN 1 TABLET: 5; 325 TABLET ORAL at 03:56

## 2019-06-26 RX ADMIN — HYDROCODONE BITARTRATE AND ACETAMINOPHEN 1 TABLET: 5; 325 TABLET ORAL at 08:17

## 2019-06-26 RX ADMIN — DOCUSATE SODIUM 100 MG: 100 CAPSULE, LIQUID FILLED ORAL at 08:17

## 2019-06-26 RX ADMIN — LISINOPRIL: 10 TABLET ORAL at 08:17

## 2019-06-26 RX ADMIN — HYDROCODONE BITARTRATE AND ACETAMINOPHEN 1 TABLET: 5; 325 TABLET ORAL at 20:48

## 2019-06-26 RX ADMIN — HYDROCODONE BITARTRATE AND ACETAMINOPHEN 1 TABLET: 5; 325 TABLET ORAL at 17:05

## 2019-06-27 LAB
ANION GAP SERPL CALCULATED.3IONS-SCNC: 11.7 MMOL/L (ref 5–15)
BASOPHILS # BLD AUTO: 0.04 10*3/MM3 (ref 0–0.2)
BASOPHILS NFR BLD AUTO: 0.5 % (ref 0–1.5)
BUN BLD-MCNC: 12 MG/DL (ref 6–20)
BUN/CREAT SERPL: 16.9 (ref 7–25)
CALCIUM SPEC-SCNC: 9 MG/DL (ref 8.6–10.5)
CHLORIDE SERPL-SCNC: 99 MMOL/L (ref 98–107)
CO2 SERPL-SCNC: 27.3 MMOL/L (ref 22–29)
CREAT BLD-MCNC: 0.71 MG/DL (ref 0.76–1.27)
DEPRECATED RDW RBC AUTO: 47.2 FL (ref 37–54)
EOSINOPHIL # BLD AUTO: 0.36 10*3/MM3 (ref 0–0.4)
EOSINOPHIL NFR BLD AUTO: 4.2 % (ref 0.3–6.2)
ERYTHROCYTE [DISTWIDTH] IN BLOOD BY AUTOMATED COUNT: 13.5 % (ref 12.3–15.4)
GFR SERPL CREATININE-BSD FRML MDRD: 115 ML/MIN/1.73
GLUCOSE BLD-MCNC: 99 MG/DL (ref 65–99)
HCT VFR BLD AUTO: 39.7 % (ref 37.5–51)
HGB BLD-MCNC: 12.6 G/DL (ref 13–17.7)
IMM GRANULOCYTES # BLD AUTO: 0.04 10*3/MM3 (ref 0–0.05)
IMM GRANULOCYTES NFR BLD AUTO: 0.5 % (ref 0–0.5)
LYMPHOCYTES # BLD AUTO: 1.95 10*3/MM3 (ref 0.7–3.1)
LYMPHOCYTES NFR BLD AUTO: 22.9 % (ref 19.6–45.3)
MCH RBC QN AUTO: 30.4 PG (ref 26.6–33)
MCHC RBC AUTO-ENTMCNC: 31.7 G/DL (ref 31.5–35.7)
MCV RBC AUTO: 95.9 FL (ref 79–97)
MONOCYTES # BLD AUTO: 0.85 10*3/MM3 (ref 0.1–0.9)
MONOCYTES NFR BLD AUTO: 10 % (ref 5–12)
NEUTROPHILS # BLD AUTO: 5.29 10*3/MM3 (ref 1.7–7)
NEUTROPHILS NFR BLD AUTO: 61.9 % (ref 42.7–76)
PLATELET # BLD AUTO: 318 10*3/MM3 (ref 140–450)
PMV BLD AUTO: 8.9 FL (ref 6–12)
POTASSIUM BLD-SCNC: 3.8 MMOL/L (ref 3.5–5.2)
RBC # BLD AUTO: 4.14 10*6/MM3 (ref 4.14–5.8)
SODIUM BLD-SCNC: 138 MMOL/L (ref 136–145)
WBC NRBC COR # BLD: 8.53 10*3/MM3 (ref 3.4–10.8)

## 2019-06-27 PROCEDURE — 99024 POSTOP FOLLOW-UP VISIT: CPT | Performed by: NURSE PRACTITIONER

## 2019-06-27 PROCEDURE — 80048 BASIC METABOLIC PNL TOTAL CA: CPT | Performed by: PHYSICAL MEDICINE & REHABILITATION

## 2019-06-27 PROCEDURE — 97110 THERAPEUTIC EXERCISES: CPT

## 2019-06-27 PROCEDURE — 99254 IP/OBS CNSLTJ NEW/EST MOD 60: CPT | Performed by: INTERNAL MEDICINE

## 2019-06-27 PROCEDURE — 85025 COMPLETE CBC W/AUTO DIFF WBC: CPT | Performed by: PHYSICAL MEDICINE & REHABILITATION

## 2019-06-27 PROCEDURE — 97535 SELF CARE MNGMENT TRAINING: CPT

## 2019-06-27 RX ORDER — METOPROLOL SUCCINATE 25 MG/1
25 TABLET, EXTENDED RELEASE ORAL
Status: DISCONTINUED | OUTPATIENT
Start: 2019-06-27 | End: 2019-07-02 | Stop reason: HOSPADM

## 2019-06-27 RX ORDER — POLYETHYLENE GLYCOL 3350 17 G/17G
17 POWDER, FOR SOLUTION ORAL DAILY PRN
Status: DISCONTINUED | OUTPATIENT
Start: 2019-06-27 | End: 2019-07-02 | Stop reason: HOSPADM

## 2019-06-27 RX ORDER — SENNA AND DOCUSATE SODIUM 50; 8.6 MG/1; MG/1
2 TABLET, FILM COATED ORAL 2 TIMES DAILY PRN
Status: DISCONTINUED | OUTPATIENT
Start: 2019-06-27 | End: 2019-07-02 | Stop reason: HOSPADM

## 2019-06-27 RX ORDER — LISINOPRIL 10 MG/1
10 TABLET ORAL
Status: DISCONTINUED | OUTPATIENT
Start: 2019-06-27 | End: 2019-07-02 | Stop reason: HOSPADM

## 2019-06-27 RX ADMIN — DOCUSATE SODIUM 100 MG: 100 CAPSULE, LIQUID FILLED ORAL at 09:17

## 2019-06-27 RX ADMIN — GABAPENTIN 300 MG: 300 CAPSULE ORAL at 08:37

## 2019-06-27 RX ADMIN — HYDROCODONE BITARTRATE AND ACETAMINOPHEN 1 TABLET: 5; 325 TABLET ORAL at 04:57

## 2019-06-27 RX ADMIN — TAMSULOSIN HYDROCHLORIDE 0.4 MG: 0.4 CAPSULE ORAL at 08:37

## 2019-06-27 RX ADMIN — LISINOPRIL 10 MG: 10 TABLET ORAL at 11:21

## 2019-06-27 RX ADMIN — HYDROCODONE BITARTRATE AND ACETAMINOPHEN 1 TABLET: 5; 325 TABLET ORAL at 01:06

## 2019-06-27 RX ADMIN — LIDOCAINE 1 PATCH: 50 PATCH TOPICAL at 21:08

## 2019-06-27 RX ADMIN — GABAPENTIN 300 MG: 300 CAPSULE ORAL at 21:08

## 2019-06-27 RX ADMIN — DOCUSATE SODIUM 100 MG: 100 CAPSULE, LIQUID FILLED ORAL at 21:08

## 2019-06-27 RX ADMIN — HYDROCODONE BITARTRATE AND ACETAMINOPHEN 1 TABLET: 5; 325 TABLET ORAL at 09:17

## 2019-06-27 RX ADMIN — CYCLOBENZAPRINE 10 MG: 10 TABLET, FILM COATED ORAL at 16:51

## 2019-06-27 RX ADMIN — GABAPENTIN 300 MG: 300 CAPSULE ORAL at 16:51

## 2019-06-27 RX ADMIN — METOPROLOL SUCCINATE 25 MG: 25 TABLET, FILM COATED, EXTENDED RELEASE ORAL at 11:25

## 2019-06-27 RX ADMIN — CYCLOBENZAPRINE 10 MG: 10 TABLET, FILM COATED ORAL at 09:17

## 2019-06-27 RX ADMIN — HYDROCODONE BITARTRATE AND ACETAMINOPHEN 1 TABLET: 5; 325 TABLET ORAL at 16:51

## 2019-06-28 LAB — B2 TRANSFERRIN FLD QL: ABNORMAL

## 2019-06-28 PROCEDURE — 97535 SELF CARE MNGMENT TRAINING: CPT

## 2019-06-28 PROCEDURE — 97110 THERAPEUTIC EXERCISES: CPT

## 2019-06-28 RX ADMIN — LIDOCAINE 1 PATCH: 50 PATCH TOPICAL at 21:03

## 2019-06-28 RX ADMIN — HYDROCODONE BITARTRATE AND ACETAMINOPHEN 1 TABLET: 5; 325 TABLET ORAL at 00:07

## 2019-06-28 RX ADMIN — DOCUSATE SODIUM 100 MG: 100 CAPSULE, LIQUID FILLED ORAL at 21:04

## 2019-06-28 RX ADMIN — HYDROCODONE BITARTRATE AND ACETAMINOPHEN 1 TABLET: 5; 325 TABLET ORAL at 13:12

## 2019-06-28 RX ADMIN — GABAPENTIN 300 MG: 300 CAPSULE ORAL at 16:01

## 2019-06-28 RX ADMIN — HYDROCODONE BITARTRATE AND ACETAMINOPHEN 1 TABLET: 5; 325 TABLET ORAL at 18:25

## 2019-06-28 RX ADMIN — HYDROCODONE BITARTRATE AND ACETAMINOPHEN 1 TABLET: 5; 325 TABLET ORAL at 05:50

## 2019-06-28 RX ADMIN — METOPROLOL SUCCINATE 25 MG: 25 TABLET, FILM COATED, EXTENDED RELEASE ORAL at 08:02

## 2019-06-28 RX ADMIN — CYCLOBENZAPRINE 10 MG: 10 TABLET, FILM COATED ORAL at 00:07

## 2019-06-28 RX ADMIN — GABAPENTIN 300 MG: 300 CAPSULE ORAL at 08:02

## 2019-06-28 RX ADMIN — GABAPENTIN 300 MG: 300 CAPSULE ORAL at 21:03

## 2019-06-28 RX ADMIN — CYCLOBENZAPRINE 10 MG: 10 TABLET, FILM COATED ORAL at 13:14

## 2019-06-28 RX ADMIN — LISINOPRIL 10 MG: 10 TABLET ORAL at 08:02

## 2019-06-28 RX ADMIN — CYCLOBENZAPRINE 10 MG: 10 TABLET, FILM COATED ORAL at 21:12

## 2019-06-29 PROCEDURE — 97110 THERAPEUTIC EXERCISES: CPT

## 2019-06-29 RX ORDER — METOPROLOL SUCCINATE 25 MG/1
25 TABLET, EXTENDED RELEASE ORAL ONCE
Status: COMPLETED | OUTPATIENT
Start: 2019-06-29 | End: 2019-06-29

## 2019-06-29 RX ADMIN — HYDROCODONE BITARTRATE AND ACETAMINOPHEN 1 TABLET: 5; 325 TABLET ORAL at 00:24

## 2019-06-29 RX ADMIN — METOPROLOL SUCCINATE 25 MG: 25 TABLET, FILM COATED, EXTENDED RELEASE ORAL at 19:30

## 2019-06-29 RX ADMIN — METOPROLOL SUCCINATE 25 MG: 25 TABLET, FILM COATED, EXTENDED RELEASE ORAL at 08:11

## 2019-06-29 RX ADMIN — GABAPENTIN 300 MG: 300 CAPSULE ORAL at 20:21

## 2019-06-29 RX ADMIN — DOCUSATE SODIUM 100 MG: 100 CAPSULE, LIQUID FILLED ORAL at 20:21

## 2019-06-29 RX ADMIN — HYDROCODONE BITARTRATE AND ACETAMINOPHEN 1 TABLET: 5; 325 TABLET ORAL at 18:08

## 2019-06-29 RX ADMIN — GABAPENTIN 300 MG: 300 CAPSULE ORAL at 08:11

## 2019-06-29 RX ADMIN — LISINOPRIL 10 MG: 10 TABLET ORAL at 08:11

## 2019-06-29 RX ADMIN — CYCLOBENZAPRINE 10 MG: 10 TABLET, FILM COATED ORAL at 15:34

## 2019-06-29 RX ADMIN — HYDROCODONE BITARTRATE AND ACETAMINOPHEN 1 TABLET: 5; 325 TABLET ORAL at 06:02

## 2019-06-29 RX ADMIN — HYDROCODONE BITARTRATE AND ACETAMINOPHEN 1 TABLET: 5; 325 TABLET ORAL at 12:04

## 2019-06-29 RX ADMIN — DOCUSATE SODIUM 100 MG: 100 CAPSULE, LIQUID FILLED ORAL at 08:10

## 2019-06-29 RX ADMIN — CYCLOBENZAPRINE 10 MG: 10 TABLET, FILM COATED ORAL at 06:02

## 2019-06-29 RX ADMIN — GABAPENTIN 300 MG: 300 CAPSULE ORAL at 15:34

## 2019-06-30 PROCEDURE — 97110 THERAPEUTIC EXERCISES: CPT | Performed by: PHYSICAL THERAPIST

## 2019-06-30 RX ADMIN — CYCLOBENZAPRINE 10 MG: 10 TABLET, FILM COATED ORAL at 08:18

## 2019-06-30 RX ADMIN — HYDROCODONE BITARTRATE AND ACETAMINOPHEN 1 TABLET: 5; 325 TABLET ORAL at 18:30

## 2019-06-30 RX ADMIN — HYDROCODONE BITARTRATE AND ACETAMINOPHEN 1 TABLET: 5; 325 TABLET ORAL at 11:41

## 2019-06-30 RX ADMIN — GABAPENTIN 300 MG: 300 CAPSULE ORAL at 08:11

## 2019-06-30 RX ADMIN — GABAPENTIN 300 MG: 300 CAPSULE ORAL at 21:17

## 2019-06-30 RX ADMIN — LISINOPRIL 10 MG: 10 TABLET ORAL at 08:11

## 2019-06-30 RX ADMIN — HYDROCODONE BITARTRATE AND ACETAMINOPHEN 1 TABLET: 5; 325 TABLET ORAL at 00:16

## 2019-06-30 RX ADMIN — DOCUSATE SODIUM 100 MG: 100 CAPSULE, LIQUID FILLED ORAL at 08:11

## 2019-06-30 RX ADMIN — CYCLOBENZAPRINE 10 MG: 10 TABLET, FILM COATED ORAL at 21:17

## 2019-06-30 RX ADMIN — DOCUSATE SODIUM 100 MG: 100 CAPSULE, LIQUID FILLED ORAL at 21:17

## 2019-06-30 RX ADMIN — CYCLOBENZAPRINE 10 MG: 10 TABLET, FILM COATED ORAL at 00:16

## 2019-06-30 RX ADMIN — HYDROCODONE BITARTRATE AND ACETAMINOPHEN 1 TABLET: 5; 325 TABLET ORAL at 06:26

## 2019-06-30 RX ADMIN — METOPROLOL SUCCINATE 25 MG: 25 TABLET, FILM COATED, EXTENDED RELEASE ORAL at 08:11

## 2019-07-01 LAB
ANION GAP SERPL CALCULATED.3IONS-SCNC: 10.2 MMOL/L (ref 5–15)
BASOPHILS # BLD AUTO: 0.04 10*3/MM3 (ref 0–0.2)
BASOPHILS NFR BLD AUTO: 0.5 % (ref 0–1.5)
BUN BLD-MCNC: 10 MG/DL (ref 6–20)
BUN/CREAT SERPL: 11.9 (ref 7–25)
CALCIUM SPEC-SCNC: 8.6 MG/DL (ref 8.6–10.5)
CHLORIDE SERPL-SCNC: 98 MMOL/L (ref 98–107)
CO2 SERPL-SCNC: 25.8 MMOL/L (ref 22–29)
CREAT BLD-MCNC: 0.84 MG/DL (ref 0.76–1.27)
DEPRECATED RDW RBC AUTO: 43.8 FL (ref 37–54)
EOSINOPHIL # BLD AUTO: 0.61 10*3/MM3 (ref 0–0.4)
EOSINOPHIL NFR BLD AUTO: 7.1 % (ref 0.3–6.2)
ERYTHROCYTE [DISTWIDTH] IN BLOOD BY AUTOMATED COUNT: 13 % (ref 12.3–15.4)
GFR SERPL CREATININE-BSD FRML MDRD: 95 ML/MIN/1.73
GLUCOSE BLD-MCNC: 104 MG/DL (ref 65–99)
HCT VFR BLD AUTO: 32.3 % (ref 37.5–51)
HGB BLD-MCNC: 10.4 G/DL (ref 13–17.7)
IMM GRANULOCYTES # BLD AUTO: 0.04 10*3/MM3 (ref 0–0.05)
IMM GRANULOCYTES NFR BLD AUTO: 0.5 % (ref 0–0.5)
LYMPHOCYTES # BLD AUTO: 1.22 10*3/MM3 (ref 0.7–3.1)
LYMPHOCYTES NFR BLD AUTO: 14.2 % (ref 19.6–45.3)
MCH RBC QN AUTO: 30.1 PG (ref 26.6–33)
MCHC RBC AUTO-ENTMCNC: 32.2 G/DL (ref 31.5–35.7)
MCV RBC AUTO: 93.4 FL (ref 79–97)
MONOCYTES # BLD AUTO: 0.91 10*3/MM3 (ref 0.1–0.9)
MONOCYTES NFR BLD AUTO: 10.6 % (ref 5–12)
NEUTROPHILS # BLD AUTO: 5.77 10*3/MM3 (ref 1.7–7)
NEUTROPHILS NFR BLD AUTO: 67.1 % (ref 42.7–76)
NRBC BLD AUTO-RTO: 0 /100 WBC (ref 0–0.2)
PLATELET # BLD AUTO: 241 10*3/MM3 (ref 140–450)
PMV BLD AUTO: 9.4 FL (ref 6–12)
POTASSIUM BLD-SCNC: 4.1 MMOL/L (ref 3.5–5.2)
RBC # BLD AUTO: 3.46 10*6/MM3 (ref 4.14–5.8)
SODIUM BLD-SCNC: 134 MMOL/L (ref 136–145)
WBC NRBC COR # BLD: 8.59 10*3/MM3 (ref 3.4–10.8)

## 2019-07-01 PROCEDURE — 97535 SELF CARE MNGMENT TRAINING: CPT

## 2019-07-01 PROCEDURE — 99024 POSTOP FOLLOW-UP VISIT: CPT | Performed by: NURSE PRACTITIONER

## 2019-07-01 PROCEDURE — 97110 THERAPEUTIC EXERCISES: CPT

## 2019-07-01 PROCEDURE — 80048 BASIC METABOLIC PNL TOTAL CA: CPT | Performed by: PHYSICAL MEDICINE & REHABILITATION

## 2019-07-01 PROCEDURE — 85025 COMPLETE CBC W/AUTO DIFF WBC: CPT | Performed by: PHYSICAL MEDICINE & REHABILITATION

## 2019-07-01 RX ADMIN — CYCLOBENZAPRINE 10 MG: 10 TABLET, FILM COATED ORAL at 18:12

## 2019-07-01 RX ADMIN — LISINOPRIL 10 MG: 10 TABLET ORAL at 08:16

## 2019-07-01 RX ADMIN — HYDROCODONE BITARTRATE AND ACETAMINOPHEN 1 TABLET: 5; 325 TABLET ORAL at 12:34

## 2019-07-01 RX ADMIN — GABAPENTIN 300 MG: 300 CAPSULE ORAL at 15:48

## 2019-07-01 RX ADMIN — CYCLOBENZAPRINE 10 MG: 10 TABLET, FILM COATED ORAL at 08:19

## 2019-07-01 RX ADMIN — HYDROCODONE BITARTRATE AND ACETAMINOPHEN 1 TABLET: 5; 325 TABLET ORAL at 00:04

## 2019-07-01 RX ADMIN — HYDROCODONE BITARTRATE AND ACETAMINOPHEN 1 TABLET: 5; 325 TABLET ORAL at 06:35

## 2019-07-01 RX ADMIN — GABAPENTIN 300 MG: 300 CAPSULE ORAL at 08:16

## 2019-07-01 RX ADMIN — DOCUSATE SODIUM 100 MG: 100 CAPSULE, LIQUID FILLED ORAL at 22:00

## 2019-07-01 RX ADMIN — DOCUSATE SODIUM 100 MG: 100 CAPSULE, LIQUID FILLED ORAL at 08:16

## 2019-07-01 RX ADMIN — METOPROLOL SUCCINATE 25 MG: 25 TABLET, FILM COATED, EXTENDED RELEASE ORAL at 08:16

## 2019-07-01 RX ADMIN — GABAPENTIN 300 MG: 300 CAPSULE ORAL at 22:00

## 2019-07-01 RX ADMIN — CYCLOBENZAPRINE 10 MG: 10 TABLET, FILM COATED ORAL at 22:01

## 2019-07-01 RX ADMIN — HYDROCODONE BITARTRATE AND ACETAMINOPHEN 1 TABLET: 5; 325 TABLET ORAL at 18:12

## 2019-07-01 NOTE — PROGRESS NOTES
Occupational Therapy: Branch    Physical Therapy: Individual: 90 minutes.    Speech Language Pathology:  Branch    Signed by: Pebbles Hendrix PT

## 2019-07-01 NOTE — PLAN OF CARE
Problem: Skin Injury Risk (Adult)  Goal: Skin Health and Integrity  Outcome: Ongoing (interventions implemented as appropriate)      Problem: Fall Risk (Adult)  Goal: Absence of Fall  Outcome: Ongoing (interventions implemented as appropriate)      Problem: Patient Care Overview  Goal: Plan of Care Review  Outcome: Ongoing (interventions implemented as appropriate)   07/01/19 1553   Patient Care Overview   IRF Plan of Care Review progress ongoing, continue   Progress, Functional Goals demonstrating adequate progress   Coping/Psychosocial   Plan of Care Reviewed With patient   OTHER   Outcome Summary Alert, flat affect. Now mod independent in room with walker. He can don/doff TLSO brace and AFO LLE. He still lhas some yellow tissue sepration mid back incision. It was looked today by Nidia Palmer and she will schedule pt FU neurosurgery 1 month. Back incision dressed as ordered following shower. He will be discharged tomorrow after therapy.       Problem: Mobility, Physical Impaired (Adult)  Goal: Enhanced Mobility Skills  Outcome: Ongoing (interventions implemented as appropriate)    Goal: Enhanced Functional Ability  Outcome: Ongoing (interventions implemented as appropriate)      Problem: Pain, Chronic (Adult)  Goal: Acceptable Pain/Comfort Level and Functional Ability  Outcome: Ongoing (interventions implemented as appropriate)

## 2019-07-01 NOTE — PLAN OF CARE
Problem: Skin Injury Risk (Adult)  Goal: Skin Health and Integrity  Outcome: Ongoing (interventions implemented as appropriate)      Problem: Fall Risk (Adult)  Goal: Absence of Fall  Outcome: Ongoing (interventions implemented as appropriate)      Problem: Patient Care Overview  Goal: Plan of Care Review  Outcome: Ongoing (interventions implemented as appropriate)   07/01/19 0158   Patient Care Overview   IRF Plan of Care Review progress ongoing, continue   Progress, Functional Goals demonstrating adequate progress   Coping/Psychosocial   Plan of Care Reviewed With patient   OTHER   Outcome Summary Pt rested well this shift. Requested pain med q6hrs. Dressing changed on back incision- cleasnsed with NS, silver agent applied to center on yellow sloughed area, island dressing applied to incision and drain wound. Pt refuses bed alarm. Compliant with call light for assistance to br. Pt anticipates d/c wednesday. Pt requests flexeril given w/gabapentin.        Problem: Mobility, Physical Impaired (Adult)  Goal: Enhanced Mobility Skills  Outcome: Ongoing (interventions implemented as appropriate)      Problem: Pain, Chronic (Adult)  Goal: Acceptable Pain/Comfort Level and Functional Ability  Outcome: Ongoing (interventions implemented as appropriate)

## 2019-07-01 NOTE — PROGRESS NOTES
LOS: 17 days   Patient Care Team:  Yudy Luna APRN as PCP - General (Family Medicine)  Corbin Bailey MD as Surgeon (Neurosurgery)    Chief Complaint:     History of lumbar spinal stenosis L3-4 and L4-5/disc herniation left L5-L6 with bilateral lower extremity weakness and numbness and altered gait -lumbar polyradiculopathy  s/p June 5 T11-S2 posterior fusion with L3-4 lumbar decompression and left L5/L6 decompression.  Previous history of cauda equina syndrome and fusion L1-3  Hypertension   Anemia            Subjective     History of Present Illness    Subjective  Patient seen earlier today on rounds.  Back pain improved. Still weak distal LLE.  Feels ready for home tomorrow after therapies.  Tapered pain med use to 4 times a day.        History taken from: patient    Objective     Vital Signs  Temp:  [99.2 °F (37.3 °C)-99.8 °F (37.7 °C)] 99.2 °F (37.3 °C)  Heart Rate:  [] 109  Resp:  [16-18] 16  BP: (117-125)/(55-61) 125/59    Objective  Physical Exam  MENTAL STATUS -  AWAKE / ALERT  HEENT-     LUNGS -normal respiration  HEART-S1-S2  ABD -nondistended    Back incision-dressed  EXT - NO EDEMA OR CYANOSIS  NEURO -.  Improved strength of bilateral knee extension and right ADF. Left ADF 2-/5.  Continues weak with bilateral EHL               Results Review:     I reviewed the patient's new clinical results.  Results from last 7 days   Lab Units 07/01/19  0454 06/27/19  0652 06/26/19  1454   WBC 10*3/mm3 8.59 8.53 9.00   HEMOGLOBIN g/dL 10.4* 12.6* 12.9*   HEMATOCRIT % 32.3* 39.7 39.9   PLATELETS 10*3/mm3 241 318 370     Results from last 7 days   Lab Units 07/01/19  0454 06/27/19  0652 06/26/19  1454   SODIUM mmol/L 134* 138 134*   POTASSIUM mmol/L 4.1 3.8 4.0   CHLORIDE mmol/L 98 99 95*   CO2 mmol/L 25.8 27.3 25.6   BUN mg/dL 10 12 15   CREATININE mg/dL 0.84 0.71* 0.84   CALCIUM mg/dL 8.6 9.0 9.3   GLUCOSE mg/dL 104* 99 94       Medication Review:   Scheduled Meds:    docusate sodium 100 mg  Oral BID   gabapentin 300 mg Oral TID   lidocaine 1 patch Transdermal Nightly   lisinopril 10 mg Oral Q24H   metoprolol succinate XL 25 mg Oral Q24H     Continuous Infusions:   PRN Meds:.•  bisacodyl  •  bisacodyl  •  cyclobenzaprine  •  HYDROcodone-acetaminophen  •  ondansetron **OR** ondansetron  •  polyethylene glycol  •  sennosides-docusate sodium      Assessment/Plan       Lumbar polyradiculopathy      Assessment & Plan  History of lumbar spinal stenosis L3-4 and L4-5/disc herniation left L5-L6 with bilateral lower extremity weakness and numbness and altered gait  s/p June 5 T11-S2 posterior fusion with L3-4 lumbar decompression and left L5/L6 decompression.  Flavia 15-neurologic exam unchanged.  Complains of increased back pain radiating down the left leg.  Discussed adding gabapentin titrating up over the next 2 days to 300 mg 3 times daily.  June 16-show some slight improvement with left ankle eversion and right ankle dorsiflexion.  He has just trace ankle dorsiflexion on the left side.    Large output from lumbar drain-to discontinue when output less than 100 cc over 12 hours -parameters updated to less than 50 cc per  24-hour.   Flavia 15-drain output last evening was around 230 cc per nursing  June 16-output 60 cc overnight but then 175 cc over 3 hours this morning-still blood tinged serous fluid.  Border gauze dressing placed over the superior aspect of his incision to monitor the amount of drainage.  The incision line itself without active drainage seen and appears unremarkable.  June 19-Per neurosurgery yesterday,will need to remain in until output less than 50 mL's in 24 hours  June 20 -   Superior aspect of incision with scant linear drainage on island dressing, with some areas of superficial delayed healing. He has a scab along mid-incision below that. Lower aspect of incision healing. Continue to monitor .  June 27-An area at the upper half of his incision shows increased width of yellow  bianca-neurosurgery service to assess this afternoon.  He continues with a drain in place but the bulb is no longer place to suction.  Neurosurgery sent drainage for Beta 2 transferrin and decide about drain removal pending results.          Previous history of cauda equina syndrome and fusion L1-3    -Flomax    GI-prn bowel meds    Hypertension-lisinopril/metoprolol  Sinus tachycardia - on afternoons of June 25 and 26 - hydrochlorothiazide discontinued and placed on metoprolol    Anemia-hemoglobin level appears inaccurate at 13.2 on June 12.,  And been in the 8 range prior.  Recheck a.m. of Flavia 15-hemoglobin 11.8    Hyponatremia-  Flavia 15-on June 7 sodium 129-started on sodium chloride tablets 2 g 3 times daily.  Will decrease to 1 g 3 times daily on Flavia 15 and re-check BMP  June 16-sodium improved to 136.  Discontinue sodium chloride tablets and follow labs    Postoperative pain-hydrocodone 15 mg every 4 hours scheduled..  He was placed on a scheduled dose on June 13.  Flexeril 10 mg 3 times daily as needed which he is been taking routinely.  Flavia 15-add gabapentin 100 mg 3 times daily for 1 day then 20 mg 3 times daily for 1 day, then increase to 300 mg 3 times daily.  Patient demonstrates a startle reflex, which may interact with pain sensitivity  June 20 - Decrease Norco to 10 mg one tab q 4 hours scheduled. Pain 4/10  Flavia 21-possibly decrease Norco to 7.5 mg tab every 4 scheduled over the weekend-will defer to covering physician.  July 1 - taking Norco 5 mg four times a day. Discussed taper off over time.     DVT prophylaxis-utilize SCDs     The patient is a 53-year-old male with impairments with his mobility, transfers, gait, functional mobility, and performance of activities of daily living.  Comorbidities include hypertension, anemia, pain management, postoperative drain still in place.  Given his functional impairments and comorbidities, admit for acute inpatient rehabilitation.  This would be a  comprehensive interdisciplinary program with physical therapy 1.5 hours and occupational therapy 1.5 hours 5 days a week to address functional mobility and ADL training with adaptive equipment and proper back mechanics, transfer training, strengthening, balance, progressive ambulation including stairs, and assessment for orthotics for ankle dorsiflexion weakness.  He describes a steppage gait with left foot drop premorbidly.  Redilatation nursing for carryover and monitoring of his incision, drain output, pain management, bowel and bladder, and skin.  Ongoing physician follow-up for pain management, hypertension, anemia, monitoring his drain output.  Weekly team conferences.  Goal be for the patient to achieve a level of supervision with his mobility and self-care including stairs for return home.        June 16-strength appears mildly improved in the distal lower extremities.  In physical therapy,  bed mobility supervision.  Transfers minimum assist.  Gait minimum assist from the bed out into the hallway and back in.  Continue rehabilitation efforts.    TEAM CONF - June 18 - BED SBA.  TRANSFERS MIN ASSIST. GAIT 35-40  FEET MIN ASSIST RW, LEFT FOOT DROP.  TOILET TRANSFERS MIN ASSIST. SHOWER TRANSFERS MIN ASSIST.  CONTINUES WITH JV DRAIN DUE TO OUTPUT. SEEN BY NEUROSURGERY YESTERDAY AND HAD FOLLOW UP L-SPINE X-RAYS.  WILL HYPERVENTILATE WITH PAIN, ANXIETY. DID NOT WISH TO SPEAK WITH . WILL ASK PSYCHOLOGY FOR INPUT AND DISCUSS OPTION OF SSRI WITH PATIENT.  WALLKED TO BATHROOM WITH OT, MIN ASSIST FOR TOILETING. KNOWS HOW TO USE ADAPTIVE EQUIPMENT. UBD SET UP. LBD MOD ASSIST.  BATHING MOD ASSIST. PAIN - ON SCHEDULED NORCO 15 MG Q 4 HOURS, GABAPENTIN 300 MG TID, FLEXERIL 10 MG TID PRN  REC THERAPY TO BE INVOLVED.  ELOS- 2  WEEKS    June 21-gait improved to 80 feet contact-guard assist rolling walker.  Transfers contact-guard.  Better activity tolerance.  June 27 - transfers SBA. Gait 250 feet MAKSIMADedra Walters  Dwayne Francisco MD  07/01/19  6:37 PM    Time:

## 2019-07-01 NOTE — PROGRESS NOTES
Inpatient Rehabilitation Functional Measures Assessment    Functional Measures  JOSE Eating:  [facility] Grooming: [facility] Bathing:  [facility] Upper Body Dressing:  [facility] Lower Body Dressing:  [facility] Toileting:  [facility] Bladder Management  Level of Assistance:  [place]  Frequency/Number of Accidents this Shift:  [facility] Bowel Management  Level of Assistance: [place]  Frequency/Number of Accidents this Shift: [facility] Bed/Chair/Wheelchair Transfer:  [facility] Toilet Transfer:  [facility] Tub/Shower Transfer:  [place]    Previously Documented Mode of Locomotion at Discharge: Field  JOSE Expected Mode of Locomotion at Discharge: [facility] Walk/Wheelchair:  [facility] Stairs:  [facility] Comprehension:  Auditory comprehension is the usual mode. Comprehension  Score = 7, Independent.  Patient comprehends complex/abstract information in  their primary language.  Patient is completely independent for auditory  comprehension.  There are no activity limitations.  JOSE Expression:  Vocal expression is the usual mode. Expression Score = 7,  Independent.  Patient expresses complex/abstract information in their primary  language.  Patient is completely independent for vocal expression.  There are no  activity limitations.  JOSE Social Interaction:  Social Interaction Score = 7, Independent. Patient is  completely independent for social interaction.  There are no activity  limitations.  JOSE Problem Solving:  Activity was not observed.  JOSE Memory:  Memory Score = 7, Independent.  Patient is completely independent  for memory.  There are no activity limitations.    Therapy Mode Minutes  Occupational Therapy: [place]  Physical Therapy: [place]  Speech Language Pathology:  [place]    Signed by: Jenae Garcia RN

## 2019-07-01 NOTE — PROGRESS NOTES
Pt to discharge tomorrow afternoon to his home with daily assistance from his daughter and mother. Referral has been made to [facility] in [place] for outpatient PT.   Nursing will instruct family tomorrow on daily dressing change to pt's incision.    pt has a rolling walker, BSC and shower seat. AFO has been ordered and should arrive before discharge tomorrow.

## 2019-07-01 NOTE — CONSULTS
Nutrition Services    Patient Name:  Archie Oquendo  YOB: 1965  MRN: [id number]  Admit Date:  6/14/2019    Pt on regular diet and tolerating. Po intake good %. Visited and discussed food pref's. Will continue to follow.    Electronically signed by:  Elizabeth Salamanca RD  07/01/19 2:02 PM

## 2019-07-01 NOTE — PROGRESS NOTES
[facility] PROGRESS NOTE    PATIENT IDENTIFICATION:   Name:  Archie Oquendo      MRN:  [id number]     53 y.o.  male               CC: PO multi level lumbar/thoracic decompression and fusion       Subjective     Interval History: has complaints of reduced pain overall post op, but has ongoing, chronic pain unchanged. Walking well with PT, ready to go home    Objective     Vital signs in last 24 hours:  Temp:  [99.1 °F (37.3 °C)-99.8 °F (37.7 °C)] 99.6 °F (37.6 °C)  Heart Rate:  [] 92  Resp:  [16-18] 18  BP: (117-120)/(55-64) 117/61  ICP ranges-    Intake/Output last 3 shifts:  I/O last 3 completed shifts:  In: 240 [P.O.:240]  Out: -     Intake/Output this shift:  I/O this shift:  In: 240 [P.O.:240]  Out: -       Physical Exam:  General:   Awake, alert, and oriented x 3. NAD  Appears well  Sitting upright in bed wearing well fitting TLSO brace  DILLON well  Well healing thoracic/lumbar incision site, area that is slightly open and uncovered in the upper 2/3s aspect with yellowish granulation tissue.  Nontender, normal surrounding skin, no drainage to firm palpation  Inferior portion of the incision site covered with silver nitrate dressing, normal surrounding skin, nontender  Right lower lumbar drain site covered in dry dressing, healing well and nontender  Corrective lenses  Gait and station stable with the use of a walker  Wearing left AFO brace  Left anterior tibialis 2+/5      LABS:    Lab Results (last 24 hours)     Procedure Component Value Units Date/Time    Basic Metabolic Panel [[id number]]  (Abnormal) Collected:  07/01/19 0454    Specimen:  Blood Updated:  07/01/19 0600     Glucose 104 mg/dL      BUN 10 mg/dL      Creatinine 0.84 mg/dL      Sodium 134 mmol/L      Potassium 4.1 mmol/L      Chloride 98 mmol/L      CO2 25.8 mmol/L      Calcium 8.6 mg/dL      eGFR Non African Amer 95 mL/min/1.73      BUN/Creatinine Ratio 11.9     Anion Gap 10.2 mmol/L     Narrative:       GFR Normal  >60  Chronic Kidney Disease <60  Kidney Failure <15    CBC & Differential [[id number]] Collected:  07/01/19 0454    Specimen:  Blood Updated:  07/01/19 0547    Narrative:       The following orders were created for panel order CBC & Differential.  Procedure                               Abnormality         Status                     ---------                               -----------         ------                     CBC Auto Differential[[id number]]        Abnormal            Final result                 Please view results for these tests on the individual orders.    CBC Auto Differential [[id number]]  (Abnormal) Collected:  07/01/19 0454    Specimen:  Blood Updated:  07/01/19 0547     WBC 8.59 10*3/mm3      RBC 3.46 10*6/mm3      Hemoglobin 10.4 g/dL      Hematocrit 32.3 %      MCV 93.4 fL      MCH 30.1 pg      MCHC 32.2 g/dL      RDW 13.0 %      RDW-SD 43.8 fl      MPV 9.4 fL      Platelets 241 10*3/mm3      Neutrophil % 67.1 %      Lymphocyte % 14.2 %      Monocyte % 10.6 %      Eosinophil % 7.1 %      Basophil % 0.5 %      Immature Grans % 0.5 %      Neutrophils, Absolute 5.77 10*3/mm3      Lymphocytes, Absolute 1.22 10*3/mm3      Monocytes, Absolute 0.91 10*3/mm3      Eosinophils, Absolute 0.61 10*3/mm3      Basophils, Absolute 0.04 10*3/mm3      Immature Grans, Absolute 0.04 10*3/mm3      nRBC 0.0 /100 WBC           IMAGING STUDIES:    No new imaging      Meds reviewed/changed: Yes    Current Facility-Administered Medications   Medication Dose Route Frequency Provider Last Rate Last Dose   • bisacodyl (DULCOLAX) EC tablet 10 mg  10 mg Oral Daily PRN Romeo Francisco MD       • bisacodyl (DULCOLAX) suppository 10 mg  10 mg Rectal Daily PRN Romeo Francisco MD   10 mg at 06/17/19 1619   • cyclobenzaprine (FLEXERIL) tablet 10 mg  10 mg Oral TID PRN Romeo Francisco MD   10 mg at 07/01/19 0819   • docusate sodium (COLACE) capsule 100 mg  100 mg Oral BID Romeo Francisco MD   100 mg at  "07/01/19 0816   • gabapentin (NEURONTIN) capsule 300 mg  300 mg Oral TID Romeo Francisco MD   300 mg at 07/01/19 0816   • HYDROcodone-acetaminophen (NORCO) 5-325 MG per tablet 1 tablet  1 tablet Oral Q4H PRN Sofiya Coffey, APRN   1 tablet at 07/01/19 0635   • lidocaine (LIDODERM) 5 % 1 patch  1 patch Transdermal Nightly Davion Bhatt MD   1 patch at 06/28/19 2103   • lisinopril (PRINIVIL,ZESTRIL) tablet 10 mg  10 mg Oral Q24H Luzmaria Swift MD   10 mg at 07/01/19 0816   • metoprolol succinate XL (TOPROL-XL) 24 hr tablet 25 mg  25 mg Oral Q24H Luzmaria Swift MD   25 mg at 07/01/19 0816   • ondansetron (ZOFRAN) tablet 4 mg  4 mg Oral Q6H PRN Romeo Francisco MD        Or   • ondansetron (ZOFRAN) injection 4 mg  4 mg Intravenous Q6H PRN Romeo Francisco MD       • polyethylene glycol (MIRALAX) powder 17 g  17 g Oral Daily PRN Romeo Francisco MD       • sennosides-docusate sodium (SENOKOT-S) 8.6-50 MG tablet 2 tablet  2 tablet Oral BID PRN Romeo Francisco MD           Assessment/Plan     ASSESSMENT:      Lumbar polyradiculopathy      PLAN: The thoracic/lumbar incision site is healing well.  It appears stable from last evaluation on Thursday.  Much of the incision is covered in silver dressing. From our standpoint he is stable to go home.  We will arrange outpatient follow-up in 1 month with additional thoracic and lumbar x-rays.  He is to wear the TLSO brace as directed.  Ongoing postoperative restrictions per rehab. Please call if needed.    Addendum: Beta-2 transferrin was detected from the lumbar drain fluid. As d/w Dr Bailey, this is no problem and he is still fine to go home     I discussed the patients findings and my recommendations with patient, nursing staff and Dr Bailey       LOS: 17 days       Nidia Castañeda, VERONICA  7/1/2019  10:00 AM      \"Dictated utilizing Dragon dictation\".      "

## 2019-07-01 NOTE — PROGRESS NOTES
Inpatient Rehabilitation Functional Measures Assessment and Plan of Care    Plan of Care  Updated Problems/Interventions  Field    Functional Measures  JOSE Eating:  [facility] Grooming: [facility] Bathing:  [facility] Upper Body Dressing:  [facility] Lower Body Dressing:  [facility] Toileting:  [facility] Bladder Management  Level of Assistance:  [place]  Frequency/Number of Accidents this Shift:  [facility] Bowel Management  Level of Assistance: [place]  Frequency/Number of Accidents this Shift: [facility] Bed/Chair/Wheelchair Transfer:  [facility] Toilet Transfer:  [facility] Tub/Shower Transfer:  [place]    Previously Documented Mode of Locomotion at Discharge: Field  JOSE Expected Mode of Locomotion at Discharge: [facility] Walk/Wheelchair:  [facility] Stairs:  [facility] Comprehension:  [facility] Expression:  [facility] Social Interaction:  [facility] Problem Solving:  [facility] Memory:  [place]    Therapy Mode Minutes  Occupational Therapy: Individual: 105 minutes.  Physical Therapy: [place]  Speech Language Pathology:  [place]    Signed by: JAMIE Steele/ALISSON

## 2019-07-01 NOTE — THERAPY TREATMENT NOTE
"Inpatient Rehabilitation - Physical Therapy Treatment Note  [facility]     Patient Name: Arhcie Oquendo  : 1965  MRN: [id number]    Today's Date: 2019                 Admit Date: 2019      Visit Dx:      ICD-10-CM ICD-9-CM   1. Impaired mobility Z74.09 799.89   2. Hyponatremia E87.1 276.1       Patient Active Problem List   Diagnosis   • Hip pain   • Low back pain   • Gait disorder   • Pain in left knee   • Hypertension   • Lumbar radiculopathy   • Spinal stenosis, L3-4   • Lumbar disc herniation -left L4-5   • History of lumbar fusion   • Benign essential HTN   • Urinary retention   • Hypokalemia   • Lumbar polyradiculopathy       Therapy Treatment    IRF Treatment Summary     Row Name 19 1010             Evaluation/Treatment Time and Intent    Subjective Information  complains of;pain \"My pass went well.\" \"No problems.\"  -LB      Existing Precautions/Restrictions  fall;brace worn when out of bed;spinal L AFO with ambulation   -LB      Document Type  therapy note (daily note)  -LB      Recorded by [LB] Pepper Giles, PT      Row Name 19 1010             Cognition/Psychosocial- PT/OT    Affect/Mental Status (Cognitive)  WNL  -LB      Orientation Status (Cognition)  oriented x 4  -LB      Follows Commands (Cognition)  WFL  -LB      Personal Safety Interventions  fall prevention program maintained;gait belt  -LB      Cognitive Function (Cognitive)  WFL  -LB      Safety Deficit (Cognitive)  -- none noted during PT   -LB      Recorded by [LB] Pepper Giles, PT      Row Name 19 1010             Bed Mobility Assessment/Treatment    Rolling Left [place] (Bed Mobility)  independent  -LB      Rolling Right [place] (Bed Mobility)  independent  -LB      Supine-Sit [place] (Bed Mobility)  independent  -LB      Sit-Supine [place] (Bed Mobility)  independent  -LB      Comment (Bed Mobility)  Sit to stand and stand to sit from armless chair with SBA and verbal cues " for hand placement  -LB      Recorded by [LB] Pepper Giles, PT      Row Name 07/01/19 1010             Chair-Bed Transfer    Chair-Bed [place] (Transfers)  stand by assist  -LB      Assistive Device (Chair-Bed Transfers)  wheelchair  -LB      Recorded by [LB] Pepper Giles, PT      Row Name 07/01/19 1010             Sit-Stand Transfer    Sit-Stand [place] (Transfers)  supervision  -LB      Assistive Device (Sit-Stand Transfers)  walker, front-wheeled  -LB      Recorded by [LB] Pepper Giles, PT      Row Name 07/01/19 1010             Stand-Sit Transfer    Stand-Sit [place] (Transfers)  supervision  -LB      Assistive Device (Stand-Sit Transfers)  walker, front-wheeled  -LB      Recorded by [LB] Pepper Giles, PT      Row Name 07/01/19 1010             Car Transfer    Type (Car Transfer)  sit-stand;stand-sit  -LB      [place] Level (Car Transfer)  stand by assist  -LB      Assistive Device (Car Transfer)  walker, front-wheeled  -LB      Recorded by [LB] Pepper Giles, PT      Row Name 07/01/19 1010             Gait/Stairs Assessment/Training    [place] Level (Gait)  stand by assist;other (see comments) tactile cues to wt shift forward over the L LE  -LB      Assistive Device (Gait)  walker, front-wheeled L AFO   -LB      Distance in Feet (Gait)  240' x 3, 10' x 2 over red mat on floor  -LB      Pattern (Gait)  swing-through  -LB      Deviations/Abnormal Patterns (Gait)  steppage L LE minimally   -LB      Bilateral Gait Deviations  forward flexed posture  -LB      [place] Level (Stairs)  stand by assist;contact guard  -LB      Assistive Device (Stairs)  walker, 4-wheeled  -LB      Number of Steps (Stairs)  curb   -LB      [place] Level (Ramp)  contact guard  -LB      Assistive Device (Ramp)  walker, front-wheeled  -LB      Comment (Gait/Stairs)  STAIRS - pt ascended and descended 12 steps with 2 rails with SBA  step to step pattern.   -LB      Recorded by [LB] Pepper Giles,  "PT      Row Name 07/01/19 1010             Safety Issues, Functional Mobility    Impairments Affecting Function (Mobility)  pain  -LB      Recorded by [LB] Pepper Giles, PT      Row Name 07/01/19 1010             MMT Left Lower Ext    Lt Ankle Plantarflexion MMT, Gross Movement  -- with moderate resistance in sitting  -LB      Lt Ankle Dorsiflexion MMT, Gross Movement  (0/5) zero  -LB      Lt Ankle Subtalar Inversion MMT, Gross Movement  (2+/5) poor plus  -LB      Lt Ankle Subtalar Eversion MMT, Gross Movement  (2+/5) poor plus  -LB      Recorded by [LB] Pepper Giles, PT      Row Name 07/01/19 1010             Pain Scale: Numbers Pre/Post-Treatment    Pain Scale: Numbers, Pretreatment  2/10  -LB      Pain Scale: Numbers, Post-Treatment  2/10  -LB      Pain Location - Orientation  lower  -LB      Pain Location  back  -LB      Pain Intervention(s)  Repositioned  -LB      Recorded by [LB] Pepper Giles, PT      Row Name 07/01/19 1010             Orthotic Management    Orthosis Location  AFO (ankle foot orthosis)  -LB      Recorded by [LB] Pepper Giles, PT      Row Name 07/01/19 1010             Ankle Foot Orthosis Management    Type (Ankle/Foot Orthosis)  left;AFO (ankle foot orthosis)  -LB      Fabrication Comment (Ankle Foot Orthosis)  posterior leaf carbon fiber  -LB      Therapeutic Indications (Ankle Foot Orthosis)  compensation for lost function  -LB      Recorded by [LB] Pepper Giles, PT      Row Name 07/01/19 1010             Lower Extremity Standing Therapeutic Exercise    Comment, Standing Lower Extremity (Therapeutic Exercise)  L LE stepups onto a 4\" step within the ll bars   -LB      Recorded by [LB] Pepper Giles, PT      Row Name 07/01/19 1010             Lower Extremity Seated Therapeutic Exercise    Performed, Seated Lower Extremity (Therapeutic Exercise)  ankle dorsiflexion/plantarflexion;other (see comments)  dorsiflexion PROM, ankle inversion /eversion AAROM  -LB      Exercise Type, Seated " Lower Extremity (Therapeutic Exercise)  AROM (active range of motion);AAROM (active assistive range of motion);PROM (passive range of motion) dorsiflexion-PROM,ankle inv/ever-AAROM,plantar flexion-RROM  -LB      Recorded by [LB] Pepper Giles PT      Row Name 07/01/19 1010             Lower Extremity Supine Therapeutic Exercise    Performed, Supine Lower Extremity (Therapeutic Exercise)  SAQ (short arc quad) over bolster;ankle dorsiflexion/plantarflexion;quadriceps sets L ankle inversion and eversion   -LB      Exercise Type, Supine Lower Extremity (Therapeutic Exercise)  AROM (active range of motion);AAROM (active assistive range of motion) except PROM dorsiflexion  -LB      Sets/Reps Detail, Supine Lower Extremity (Therapeutic Exercise)  2/5  -LB      Recorded by [LB] Pepper Giles PT      Row Name 07/01/19 1010             Positioning and Restraints    Pre-Treatment Position  in bed  -LB      In Wheelchair  sitting;call light within reach;encouraged to call for assist  -LB      Recorded by [REECE] Pepper Giles, PT        User Key  (r) = Recorded By, (t) = Taken By, (c) = Cosigned By    Initials Name Effective Dates    LB Pepper Giles, PT 06/08/18 -         Wound 06/05/19 1741 Other (See comments) back incision (Active)   Dressing Appearance intact;dry 6/30/2019  8:00 PM   Closure Liquid skin adhesive 7/1/2019  8:30 AM   Base dry;yellow 7/1/2019  8:30 AM   Periwound dry;other (see comments) 7/1/2019  8:30 AM   Periwound Temperature warm 6/30/2019  8:00 PM   Drainage Amount none 7/1/2019  8:30 AM   Care, Wound cleansed with;soap and water 7/1/2019  8:30 AM   Dressing Care, Wound gauze 7/1/2019  8:30 AM     Physical Therapy Education     Title: PT OT SLP Therapies (Done)     Topic: Physical Therapy (Done)     Point: Mobility training (Done)     Learning Progress Summary           Patient Acceptance, E,TB,D, DU by REECE at 7/1/2019  1:45 PM    Comment:  car transfers    Acceptance, E,TB,D, VU,DU by SAHARA at 6/30/2019   9:15 AM    Acceptance, E,TB,D, VU,DU by LB at 6/29/2019 12:29 PM    Comment:  Pt and daughter reviewed verbally car transfers and providing CGA on steps. Pt performed steps with 2 rails with CGA and did not require verbal cues.    Acceptance, E, VU,NR by  at 6/28/2019 11:48 AM    Acceptance, E, VU,NR,DU by  at 6/27/2019 11:00 AM    Acceptance, E, NR by  at 6/25/2019 10:59 AM    Acceptance, E, VU,NR by  at 6/24/2019 10:24 AM    Acceptance, E, VU,NR by MA at 6/23/2019 11:06 AM    Acceptance, E, NR by MA at 6/22/2019 11:40 AM    Acceptance, E,D, VU,DU,NR by HOLLYK at 6/21/2019  2:28 PM    Comment:  PT observed pt's dgt Sandra assist pt come to stand and ambulate with roll wx (no braces on eithter ankle) with CGA. Pt's dgt needed Max VCs to keep hand on pt at all times d/t letting go of pt and only providing SBA at times.    Acceptance, E, VU,NR by  at 6/20/2019 11:01 AM    Acceptance, E, VU,NR by  at 6/19/2019 10:21 AM    Acceptance, E, NR by  at 6/18/2019 10:50 AM    Acceptance, E, NR by  at 6/17/2019 12:05 PM    Nonacceptance, E,TB,D, NR by  at 6/16/2019 11:06 AM    Acceptance, E, NR by LB at 6/15/2019 12:20 PM    Acceptance, E, VU,NR by  at 6/14/2019  3:22 PM   Family Acceptance, E,D, VU,DU,NR by JK at 6/21/2019  2:28 PM    Comment:  PT observed pt's dgt Sandra assist pt come to stand and ambulate with roll wx (no braces on eithter ankle) with CGA. Pt's dgt needed Max VCs to keep hand on pt at all times d/t letting go of pt and only providing SBA at times.                   Point: Home exercise program (Done)     Learning Progress Summary           Patient Acceptance, E,TB,D, SIMA,DU by JS at 6/30/2019  9:15 AM    Acceptance, CHAS,WANDA MCGREGOR, SIMA,DU by LB at 6/29/2019 12:29 PM    Comment:  Pt and daughter reviewed verbally car transfers and providing CGA on steps. Pt performed steps with 2 rails with CGA and did not require verbal cues.    Acceptance, SIMA DOHERTY,NR by  at 6/28/2019 11:48 AM    CHAS Calderon,  VU,NR,DU by  at 6/27/2019 11:00 AM    Acceptance, E, NR by  at 6/25/2019 10:59 AM    Acceptance, E, VU,NR by  at 6/24/2019 10:24 AM    Acceptance, E, VU,NR by MA at 6/23/2019 11:06 AM    Acceptance, E, NR by MA at 6/22/2019 11:40 AM    Acceptance, E, VU,NR by  at 6/20/2019 11:01 AM    Acceptance, E, VU,NR by  at 6/19/2019 10:21 AM    Acceptance, E, NR by  at 6/18/2019 10:50 AM    Acceptance, E, NR by  at 6/17/2019 12:05 PM    Acceptance, E, VU,NR by  at 6/14/2019  3:22 PM                   Point: Body mechanics (Done)     Learning Progress Summary           Patient Acceptance, E,TB,D, VU,DU by  at 6/30/2019  9:15 AM    Acceptance, E,TB,D, VU,DU by REECE at 6/29/2019 12:29 PM    Comment:  Pt and daughter reviewed verbally car transfers and providing CGA on steps. Pt performed steps with 2 rails with CGA and did not require verbal cues.    Acceptance, E, VU,NR by  at 6/28/2019 11:48 AM    Acceptance, E, VU,NR,DU by  at 6/27/2019 11:00 AM    Acceptance, E, NR by  at 6/25/2019 10:59 AM    Acceptance, E, VU,NR by  at 6/24/2019 10:24 AM    Acceptance, E, VU,NR by MA at 6/23/2019 11:06 AM    Acceptance, E, NR by MA at 6/22/2019 11:40 AM    Acceptance, E, VU,NR by  at 6/20/2019 11:01 AM    Acceptance, E, VU,NR by  at 6/19/2019 10:21 AM    Acceptance, E, NR by  at 6/18/2019 10:50 AM    Acceptance, E, NR by  at 6/17/2019 12:05 PM    Nonacceptance, E,TB,D, NR by  at 6/16/2019 11:06 AM    Acceptance, E, VU,NR by  at 6/14/2019  3:22 PM                   Point: Precautions (Done)     Learning Progress Summary           Patient Acceptance, E,TB,D, VU,DU by JS at 6/30/2019  9:15 AM    Acceptance, E,TB,D, VU,DU by LB at 6/29/2019 12:29 PM    Comment:  Pt and daughter reviewed verbally car transfers and providing CGA on steps. Pt performed steps with 2 rails with CGA and did not require verbal cues.    Acceptance, E, VU,NR by  at 6/28/2019 11:48 AM    Acceptance, E, VU,NR,DU by  at 6/27/2019  11:00 AM    Acceptance, E, NR by  at 6/25/2019 10:59 AM    Acceptance, E, VU,NR by  at 6/24/2019 10:24 AM    Acceptance, E, VU,NR by MA at 6/23/2019 11:06 AM    Acceptance, E, NR by MA at 6/22/2019 11:40 AM    Acceptance, E, VU,NR by  at 6/20/2019 11:01 AM    Acceptance, E, VU,NR by  at 6/19/2019 10:21 AM    Acceptance, E, NR by  at 6/18/2019 10:50 AM    Acceptance, E, NR by  at 6/17/2019 12:05 PM    Nonacceptance, E,TB,D, NR by  at 6/16/2019 11:06 AM    Acceptance, E, VU,NR by  at 6/14/2019  3:22 PM                               User Key     Initials Effective Dates Name Provider Type Discipline     06/08/18 -  Pepper Glies, PT Physical Therapist PT     04/06/17 -  Kathe Segura, PT Physical Therapist PT     04/03/18 -  Pepper Grgeg, PT Physical Therapist PT    [facility] 03/07/18 -  Pepper Romero, PTA Physical Therapy Assistant PT     04/03/18 -  Frieda Camejo, PT Physical Therapist PT     04/03/18 -  Kaye Chowdary, PT Physical Therapist PT    [facility] 10/19/18 -  Corinna Mckee, PT Physical Therapist PT                  PT Recommendation and Plan        Daily Summary of Progress (PT)  Recommendations: Physical Therapy: Pt required SBA with ambulation on level surface and CGA on the steps. PT was notified pt going on home pass after PT session completed. Pt was in bed when PT went to talk with pt. Pt's daughter Sandra present and reported they felt comfortable with car transfers and described the technique. Reviewed with pt and daughterthe importance of wearing his back brace and AFO on the pass tomorrow.                Time Calculation:     PT Charges     Row Name 07/01/19 1346 07/01/19 1231          Time Calculation    Start Time  1300  -LB  1010  -LB     Stop Time  1335  -LB  1105  -LB     Time Calculation (min)  35 min  -LB  55 min  -LB        Time Calculation- PT    Total Timed Code Minutes- PT  35 minute(s)  -LB  55 minute(s)  -LB       User Key  (r) = Recorded By, (t) = Taken By, (c) =  Cosigned By    Initials Name Provider Type    LB Pepper Giles, PT Physical Therapist          Therapy Charges for Today     Code Description Service Date Service Provider Modifiers Qty    [id number] HC PT THER PROC EA 15 MIN 7/1/2019 Pepper Giles, PT GP 6                   Pepper Giles, PT  7/1/2019

## 2019-07-01 NOTE — THERAPY TREATMENT NOTE
"Inpatient Rehabilitation - Occupational Therapy Treatment Note    Psychiatric     Patient Name: Archie Oquendo  : 1965  MRN: [id number]    Today's Date: 2019                 Admit Date: 2019      Visit Dx:    ICD-10-CM ICD-9-CM   1. Impaired mobility Z74.09 799.89   2. Hyponatremia E87.1 276.1       Patient Active Problem List   Diagnosis   • Hip pain   • Low back pain   • Gait disorder   • Pain in left knee   • Hypertension   • Lumbar radiculopathy   • Spinal stenosis, L3-4   • Lumbar disc herniation -left L4-5   • History of lumbar fusion   • Benign essential HTN   • Urinary retention   • Hypokalemia   • Lumbar polyradiculopathy         Therapy Treatment    IRF Treatment Summary     Row Name 19 1513 19 1010          Evaluation/Treatment Time and Intent    Subjective Information  no complaints  -CC  complains of;pain \"My pass went well.\" \"No problems.\"  -LB     Existing Precautions/Restrictions  fall;brace worn when out of bed;spinal L AFO with ambulation   -CC  fall;brace worn when out of bed;spinal L AFO with ambulation   -LB     Document Type  therapy note (daily note)  -CC  therapy note (daily note)  -LB     Mode of Treatment  occupational therapy  -CC  --     Recorded by [CC] Flory Land OTR [LB] Pepper Giles, PT     Row Name 19 1513 19 1010          Cognition/Psychosocial- PT/OT    Affect/Mental Status (Cognitive)  WNL  -CC  WNL  -LB     Orientation Status (Cognition)  oriented x 4  -CC  oriented x 4  -LB     Follows Commands (Cognition)  --  WFL  -LB     Personal Safety Interventions  fall prevention program maintained;gait belt;nonskid shoes/slippers when out of bed  -CC  fall prevention program maintained;gait belt  -LB     Cognitive Function (Cognitive)  --  WFL  -LB     Safety Deficit (Cognitive)  --  -- none noted during PT   -LB     Recorded by [CC] Flory Land OTR [LB] Pepper Giles, PT     Row Name 19 1513 19 1010          Bed " Mobility Assessment/Treatment    Rolling Left [place] (Bed Mobility)  --  independent  -LB     Rolling Right [place] (Bed Mobility)  independent  -CC  independent  -LB     Supine-Sit [place] (Bed Mobility)  independent  -CC  independent  -LB     Sit-Supine [place] (Bed Mobility)  independent  -CC  independent  -LB     Comment (Bed Mobility)  --  Sit to stand and stand to sit from armless chair with SBA and verbal cues for hand placement  -LB     Recorded by [CC] Flory Land, JAMIE [LB] Pepper Giles, PT     Row Name 07/01/19 1513             Functional Mobility    Functional Mobility- Ind. Level  standby assist;contact guard assist  -CC      Functional Mobility- Device  rolling walker  -CC      Functional Mobility-Distance (Feet)  -- to and from gym and shower room  -CC      Recorded by [CC] Flory Land OTR      Row Name 07/01/19 1010             Chair-Bed Transfer    Chair-Bed [place] (Transfers)  stand by assist  -LB      Assistive Device (Chair-Bed Transfers)  wheelchair  -LB      Recorded by [LB] Pepper Giles, PT      Row Name 07/01/19 1513 07/01/19 1010          Sit-Stand Transfer    Sit-Stand [place] (Transfers)  conditional independence;supervision  -CC  supervision  -LB     Assistive Device (Sit-Stand Transfers)  walker, front-wheeled  -CC  walker, front-wheeled  -LB     Recorded by [CC] Flory Land OTR [LB] Pepper Giles, PT     Row Name 07/01/19 1513 07/01/19 1010          Stand-Sit Transfer    Stand-Sit [place] (Transfers)  conditional independence;supervision  -CC  supervision  -LB     Assistive Device (Stand-Sit Transfers)  walker, front-wheeled  -CC  walker, front-wheeled  -LB     Recorded by [CC] Flory Land OTR [LB] Pepper Giles, PT     Row Name 07/01/19 1513             Toilet Transfer    Type (Toilet Transfer)  sit-stand;stand-sit  -CC      [place] Level (Toilet Transfer)  conditional independence;stand by assist  -CC      Assistive  Device (Toilet Transfer)  commode, 3-in-1  -CC      Recorded by [CC] Flory Land OTR      Row Name 07/01/19 1513             Shower Transfer    Type (Shower Transfer)  sit-stand;stand-sit  -CC      [place] Level (Shower Transfer)  stand by assist;supervision  -CC      Assistive Device (Shower Transfer)  grab bars/tub rail;walker, front-wheeled  -CC      Recorded by [CC] Flory Land OTR      Row Name 07/01/19 1010             Car Transfer    Type (Car Transfer)  sit-stand;stand-sit  -LB      [place] Level (Car Transfer)  stand by assist  -LB      Assistive Device (Car Transfer)  walker, front-wheeled  -LB      Recorded by [LB] Pepper Giles PT      Row Name 07/01/19 1010             Gait/Stairs Assessment/Training    [place] Level (Gait)  stand by assist;other (see comments) tactile cues to wt shift forward over the L LE  -LB      Assistive Device (Gait)  walker, front-wheeled L AFO   -LB      Distance in Feet (Gait)  240' x 3, 10' x 2 over red mat on floor  -LB      Pattern (Gait)  swing-through  -LB      Deviations/Abnormal Patterns (Gait)  steppage L LE minimally   -LB      Bilateral Gait Deviations  forward flexed posture  -LB      [place] Level (Stairs)  stand by assist;contact guard  -LB      Assistive Device (Stairs)  walker, 4-wheeled  -LB      Number of Steps (Stairs)  curb   -LB      [place] Level (Ramp)  contact guard  -LB      Assistive Device (Ramp)  walker, front-wheeled  -LB      Comment (Gait/Stairs)  STAIRS - pt ascended and descended 12 steps with 2 rails with SBA  step to step pattern.   -LB      Recorded by [LB] Pepper Giles, PT      Row Name 07/01/19 1010             Safety Issues, Functional Mobility    Impairments Affecting Function (Mobility)  pain  -LB      Recorded by [LB] Pepper Giles PT      Row Name 07/01/19 1513             Bathing Assessment/Treatment    Bathing [place] Level  bathing skills;upper body;lower body;standby  assist;supervision  -CC      Assistive Device (Bathing)  grab bar/tub rail;hand held shower spray hose;long-handled sponge;shower chair  -CC      Bathing Position  supported sitting;supported standing  -CC      Bathing Setup Assistance  adaptive equipment set-up;adjust water temperature;obtain supplies  -CC      Recorded by [CC] Flory Land OTR      Row Name 07/01/19 1513             Upper Body Dressing Assessment/Treatment    Upper Body Dressing Task  upper body dressing skills;doff;don;pull over garment;set up assistance  -CC      Upper Body Dressing Position  supported sitting  -CC      Comment (Upper Body Dressing)  I with brace  -CC      Recorded by [CC] Flory Land OTR      Row Name 07/01/19 1513             Lower Body Dressing Assessment/Treatment    Lower Body Dressing [place] Level  doff;don;pants/bottoms;shoes/slippers;socks;underwear;supervision;standby assist  -CC      Lower Body Dressing Dressing Device  long-handled shoe horn;reacher;sock-aid  -CC      Lower Body Dressing Position  supported sitting;supported standing  -CC      Lower Body Dressing Setup Assistance  adaptive equipment set-up  -CC      Comment (Lower Body Dressing)  elastic laces  -CC      Recorded by [CC] Flory Land OTR      Row Name 07/01/19 1513             Grooming Assessment/Treatment    Grooming [place] Level  grooming skills;hair care, combing/brushing;wash face, hands;set up;supervision  -CC      Grooming Position  supported sitting  -CC      Recorded by [CC] Flory Land OTR      Row Name 07/01/19 1513             Toileting Assessment/Treatment    Toileting [place] Level  toileting skills;conditional independence;set up assistance  -CC      Assistive Device Use (Toileting)  commode chair;raised toilet seat  -CC      Toileting Position  unsupported sitting;unsupported standing  -CC      Recorded by [CC] Flory Land OTR      Row Name 07/01/19 1010             MMT Left Lower Ext    Lt  Ankle Plantarflexion MMT, Gross Movement  -- with moderate resistance in sitting  -LB      Lt Ankle Dorsiflexion MMT, Gross Movement  (0/5) zero  -LB      Lt Ankle Subtalar Inversion MMT, Gross Movement  (2+/5) poor plus  -LB      Lt Ankle Subtalar Eversion MMT, Gross Movement  (2+/5) poor plus  -LB      Recorded by [LB] Pepper Giles, PT      Row Name 07/01/19 1513 07/01/19 1010          Pain Scale: Numbers Pre/Post-Treatment    Pain Scale: Numbers, Pretreatment  0/10 - no pain  -CC  2/10  -LB     Pain Scale: Numbers, Post-Treatment  0/10 - no pain  -CC  2/10  -LB     Pain Location - Orientation  --  lower  -LB     Pain Location  --  back  -LB     Pain Intervention(s)  --  Repositioned  -LB     Recorded by [CC] Flory Land OTR [LB] Pepper Giles, PT     Row Name 07/01/19 1010             Orthotic Management    Orthosis Location  AFO (ankle foot orthosis)  -LB      Recorded by [LB] Pepper Giles, PT      Row Name 07/01/19 1010             Ankle Foot Orthosis Management    Type (Ankle/Foot Orthosis)  left;AFO (ankle foot orthosis)  -LB      Fabrication Comment (Ankle Foot Orthosis)  posterior leaf carbon fiber  -LB      Therapeutic Indications (Ankle Foot Orthosis)  compensation for lost function  -LB      Recorded by [LB] Pepper Giles, PT      Row Name 07/01/19 1513             Upper Extremity Seated Therapeutic Exercise    Performed, Seated Upper Extremity (Therapeutic Exercise)  shoulder abduction/adduction;scapular protraction/retraction;elbow flexion/extension;forearm supination/pronation;wrist flexion/extension;digit flexion/extension  -CC      Device, Seated Upper Extremity (Therapeutic Exercise)  free weights, cuff  -CC      Exercise Type, Seated Upper Extremity (Therapeutic Exercise)  resistive exercise  -CC      Expected Outcomes, Seated Upper Extremity (Therapeutic Exercise)  improve functional tolerance, self-care activity  -CC      Restrictions, Seated Upper Extremity (Therapeutic Exercise)   "spinal  -CC      Sets/Reps Detail, Seated Upper Extremity (Therapeutic Exercise)  3# hand wt 20x3.   -CC      Comment, Seated Upper Extremity (Therapeutic Exercise)  review of HEP  -CC      Recorded by [CC] Flory Land OTR      Row Name 07/01/19 1010             Lower Extremity Standing Therapeutic Exercise    Comment, Standing Lower Extremity (Therapeutic Exercise)  L LE stepups onto a 4\" step within the ll bars   -LB      Recorded by [LB] Pepper Giles, PT      Row Name 07/01/19 1010             Lower Extremity Seated Therapeutic Exercise    Performed, Seated Lower Extremity (Therapeutic Exercise)  ankle dorsiflexion/plantarflexion;other (see comments)  dorsiflexion PROM, ankle inversion /eversion AAROM  -LB      Exercise Type, Seated Lower Extremity (Therapeutic Exercise)  AROM (active range of motion);AAROM (active assistive range of motion);PROM (passive range of motion) dorsiflexion-PROM,ankle inv/ever-AAROM,plantar flexion-RROM  -LB      Recorded by [LB] Pepper Giles, PT      Row Name 07/01/19 1010             Lower Extremity Supine Therapeutic Exercise    Performed, Supine Lower Extremity (Therapeutic Exercise)  SAQ (short arc quad) over bolster;ankle dorsiflexion/plantarflexion;quadriceps sets L ankle inversion and eversion   -LB      Exercise Type, Supine Lower Extremity (Therapeutic Exercise)  AROM (active range of motion);AAROM (active assistive range of motion) except PROM dorsiflexion  -LB      Sets/Reps Detail, Supine Lower Extremity (Therapeutic Exercise)  2/5  -LB      Recorded by [LB] Pepper Giles, PT      Row Name 07/01/19 1513 07/01/19 1010          Positioning and Restraints    Pre-Treatment Position  sitting in chair/recliner  -CC  in bed  -LB     Post Treatment Position  bed  -CC  --     In Bed  supine;call light within reach;exit alarm on;patient within staff view  -CC  --     In Wheelchair  --  sitting;call light within reach;encouraged to call for assist  -LB     Recorded by [CC] " Flory Land, OTR [LB] Pepper Giles, PT       User Key  (r) = Recorded By, (t) = Taken By, (c) = Cosigned By    Initials Name Effective Dates    LB Pepper Giles, PT 06/08/18 -     CC Flory Land, OTR 06/08/18 -           Wound 06/05/19 1741 Other (See comments) back incision (Active)   Dressing Appearance intact;dry 6/30/2019  8:00 PM   Closure Liquid skin adhesive 7/1/2019  8:30 AM   Base dry;yellow 7/1/2019  8:30 AM   Periwound dry;other (see comments) 7/1/2019  8:30 AM   [place]iwound Temperature warm 6/30/2019  8:00 PM   Drainage Amount none 7/1/2019  8:30 AM   Care, Wound cleansed with;soap and water 7/1/2019  8:30 AM   Dressing Care, Wound gauze 7/1/2019  8:30 AM         OT Recommendation and Plan                 OT IRF GOALS     Row Name 06/27/19 1200 06/21/19 1221          Bathing Goal 1 (OT-IRF)    Activity/Device (Bathing Goal 1, OT-IRF)  bathing skills, all  -CC  bathing skills, all  -CC     [place] Level (Bathing Goal 1, OT-IRF)  set-up required;contact guard assist  -CC  set-up required;contact guard assist  -CC     Time Frame (Bathing Goal 1, OT-IRF)  short term goal (STG);5 - 7 days  -CC  short term goal (STG);5 - 7 days  -CC     Progress/Outcomes (Bathing Goal 1, OT-IRF)  goal met  -CC  goal met  -CC        Bathing Goal 2 (OT-IRF)    Activity/Device (Bathing Goal 2, OT-IRF)  bathing skills, all  -CC  bathing skills, all  -CC     [place] Level (Bathing Goal 2, OT-IRF)  supervision required  -CC  supervision required  -CC     Time Frame (Bathing Goal 2, OT-IRF)  long term goal (LTG);2 weeks  -CC  long term goal (LTG);2 weeks  -CC     Progress/Outcomes (Bathing Goal 2, OT-IRF)  goal ongoing  -CC  goal ongoing  -CC        UB Dressing Goal 1 (OT-IRF)    Activity/Device (UB Dressing Goal 1, OT-IRF)  --  upper body dressing  -CC     [place] (UB Dress Goal 1, OT-IRF)  --  set-up required  -CC     Time Frame (UB Dressing Goal 1, OT-IRF)  --  short term goal (STG);5 - 7 days  -CC      Progress/Outcomes (UB Dressing Goal 1, OT-IRF)  --  goal met  -CC        UB Dressing Goal 2 (OT-IRF)    Activity/Device (UB Dressing Goal 2, OT-IRF)  upper body dressing  -CC  upper body dressing  -CC     [place] (UB Dress Goal 2, OT-IRF)  conditional independence  -CC  conditional independence  -CC     Time Frame (UB Dressing Goal 2, OT-IRF)  long term goal (LTG);2 weeks  -CC  long term goal (LTG);2 weeks  -CC     Progress/Outcomes (UB Dressing Goal 2, OT-IRF)  goal ongoing  -CC  goal ongoing  -CC        LB Dressing Goal 1 (OT-IRF)    Activity/Device (LB Dressing Goal 1, OT-IRF)  --  lower body dressing;reacher  -CC     [place] (LB Dressing Goal 1, OT-IRF)  --  contact guard assist;standby assist  -CC     Time Frame (LB Dressing Goal 1, OT-IRF)  --  short term goal (STG);5 - 7 days  -CC     Progress/Outcomes (LB Dressing Goal 1, OT-IRF)  --  goal met  -CC        LB Dressing Goal 2 (OT-IRF)    Activity/Device (LB Dressing Goal 2, OT-IRF)  lower body dressing  -CC  lower body dressing  -CC     [place] (LB Dressing Goal 2, OT-IRF)  supervision required  -CC  supervision required  -CC     Time Frame (LB Dressing Goal 2, OT-IRF)  long term goal (LTG);2 weeks  -CC  long term goal (LTG);2 weeks  -CC     Progress/Outcomes (LB Dressing Goal 2, OT-IRF)  goal ongoing  -CC  goal ongoing  -CC        Grooming Goal 1 (OT-IRF)    Activity/Device (Grooming Goal 1, OT-IRF)  --  grooming skills, all  -CC     [place] (Grooming Goal 1, OT-IRF)  --  supervision required  -CC     Time Frame (Grooming Goal 1, OT-IRF)  --  short term goal (STG);5 - 7 days  -CC     Progress/Outcomes (Grooming Goal 1, OT-IRF)  --  goal met  -CC        Grooming Goal 2 (OT-IRF)    Activity/Device (Grooming Goal 2, OT-IRF)  grooming skills, all  -CC  grooming skills, all  -CC     [place] (Grooming Goal 2, OT-IRF)  conditional independence  -CC  conditional independence  -CC     Time Frame (Grooming Goal 2, OT-IRF)  long term goal  (LTG);2 weeks  -CC  long term goal (LTG);2 weeks  -CC     Progress/Outcomes (Grooming Goal 2, OT-IRF)  goal met  -CC  goal ongoing  -CC        Toileting Goal 1 (OT-IRF)    Activity/Device (Toileting Goal 1, OT-IRF)  --  toileting skills, all  -CC     [place] Level (Toileting Goal 1, OT-IRF)  --  standby assist  -CC     Time Frame (Toileting Goal 1, OT-IRF)  --  short term goal (STG);5 - 7 days  -CC     Progress/Outcomes (Toileting Goal 1, OT-IRF)  --  goal met  -CC        Toileting Goal 2 (OT-IRF)    Activity/Device (Toileting Goal 2, OT-IRF)  toileting skills, all  -CC  toileting skills, all  -CC     [place] Level (Toileting Goal 2, OT-IRF)  conditional independence  -CC  conditional independence  -CC     Time Frame (Toileting Goal 2, OT-IRF)  long term goal (LTG);2 weeks  -CC  long term goal (LTG);2 weeks  -CC     Progress/Outcomes (Toileting Goal 2, OT-IRF)  goal ongoing  -CC  goal ongoing  -CC        Caregiver Training Goal 1 (OT-IRF)    Caregiver Training Goal 1 (OT-IRF)  Pt I with HEP and home safety  -CC  Pt I with HEP and home safety  -CC     Time Frame (Caregiver Training Goal 1, OT-IRF)  long term goal (LTG);by discharge  -CC  long term goal (LTG);by discharge  -CC     Progress/Outcomes (Caregiver Training Goal 1, OT-IRF)  goal ongoing  -CC  goal ongoing  -CC       User Key  (r) = Recorded By, (t) = Taken By, (c) = Cosigned By    Initials Name Provider Type    CC Flory Land OTR Occupational Therapist          Occupational Therapy Education     Title: PT OT SLP Therapies (Done)     Topic: Occupational Therapy (Done)     Point: ADL training (Done)     Description: Instruct learner(s) on proper safety adaptation and remediation techniques during self care or transfers.   Instruct in proper use of assistive devices.    Learning Progress Summary           Patient Acceptance, E,TB,WANDA, SIMA,DU by SAHARA at 6/30/2019  9:15 AM    Acceptance, E,TB,WANDA, SIMA,NOAH by REECE at 6/29/2019 12:29 PM    Comment:  Pt and  daughter reviewed verbally car transfers and providing CGA on steps. Pt performed steps with 2 rails with CGA and did not require verbal cues.    Acceptance, E, VU by CC at 6/27/2019  2:23 PM    Comment:  Family conf with pt, dgt and mother. status, HEP, precautions and DME discussed.    Acceptance, E,TB,D, VU,DU by CC at 6/25/2019  1:26 PM    Comment:  elastic laces placed in shoes    Acceptance, E,TB,D, DU,VU by CC at 6/21/2019 12:20 PM    Comment:  Use of sock aid    Acceptance, E,TB, VU by SG at 6/19/2019  3:31 PM    Comment:  LH sponge for LB    Acceptance, E,TB,D, VU,NR by RE at 6/15/2019 10:40 AM    Comment:  Safe performance of ADL's. Program and plan.   Family Acceptance, E, VU by CC at 6/27/2019  2:23 PM    Comment:  Family conf with pt, dgt and mother. status, HEP, precautions and DME discussed.                   Point: Home exercise program (Done)     Description: Instruct learner(s) on appropriate technique for monitoring, assisting and/or progressing therapeutic exercises/activities.    Learning Progress Summary           Patient Acceptance, E,TB,D,H, VU,DU by CC at 7/1/2019  3:13 PM    Comment:  UE HEP    Acceptance, E,TB,D, VU,DU by JS at 6/30/2019  9:15 AM    Acceptance, E,TB,D, VU,DU by LB at 6/29/2019 12:29 PM    Comment:  Pt and daughter reviewed verbally car transfers and providing CGA on steps. Pt performed steps with 2 rails with CGA and did not require verbal cues.    Acceptance, E, VU by CC at 6/27/2019  2:23 PM    Comment:  Family conf with pt, dgt and mother. status, HEP, precautions and DME discussed.    Acceptance, E,TB,D, VU,NR by RE at 6/15/2019 10:40 AM    Comment:  Safe performance of ADL's. Program and plan.   Family Acceptance, E, VU by CC at 6/27/2019  2:23 PM    Comment:  Family conf with pt, dgt and mother. status, HEP, precautions and DME discussed.                   Point: Precautions (Done)     Description: Instruct learner(s) on prescribed precautions during self-care and  functional transfers.    Learning Progress Summary           Patient Acceptance, E,TB,D, VU,DU by JS at 6/30/2019  9:15 AM    Acceptance, E,TB,D, VU,DU by LB at 6/29/2019 12:29 PM    Comment:  Pt and daughter reviewed verbally car transfers and providing CGA on steps. Pt performed steps with 2 rails with CGA and did not require verbal cues.    Acceptance, E, VU by CC at 6/27/2019  2:23 PM    Comment:  Family conf with pt, dgt and mother. status, HEP, precautions and DME discussed.    Acceptance, E,TB,D, VU,NR by RE at 6/15/2019 10:40 AM    Comment:  Safe performance of ADL's. Program and plan.   Family Acceptance, E, VU by CC at 6/27/2019  2:23 PM    Comment:  Family conf with pt, dgt and mother. status, HEP, precautions and DME discussed.                   Point: Body mechanics (Done)     Description: Instruct learner(s) on proper positioning and spine alignment during self-care, functional mobility activities and/or exercises.    Learning Progress Summary           Patient Acceptance, E,TB,D, VU,DU by JS at 6/30/2019  9:15 AM    Acceptance, E,TB,D, VU,DU by LB at 6/29/2019 12:29 PM    Comment:  Pt and daughter reviewed verbally car transfers and providing CGA on steps. Pt performed steps with 2 rails with CGA and did not require verbal cues.    Acceptance, E, VU by CC at 6/27/2019  2:23 PM    Comment:  Family conf with pt, dgt and mother. status, HEP, precautions and DME discussed.    Acceptance, E,TB,D, VU,NR by RE at 6/15/2019 10:40 AM    Comment:  Safe performance of ADL's. Program and plan.   Family Acceptance, E, VU by CC at 6/27/2019  2:23 PM    Comment:  Family conf with pt, dgt and mother. status, HEP, precautions and DME discussed.                               User Key     Initials Effective Dates Name Provider Type Discipline    LB 06/08/18 -  Pepper Giles, PT Physical Therapist PT    CC 06/08/18 -  Flory Land OTURSULA Occupational Therapist OT    RE 06/08/18 -  Hailee Pelayo OTR Occupational  Therapist OT    SG 12/26/18 -  Nataly Lester OTR Occupational Therapist OT    JS 04/06/17 -  Kathe Segura PT Physical Therapist PT                       Time Calculation:     Time Calculation- OT     Row Name 07/01/19 1430 07/01/19 0830          Time Calculation- OT    OT Start Time  1430  -CC  0830  -CC     OT Stop Time  1515  -CC  0930  -CC     OT Time Calculation (min)  45 min  -CC  60 min  -CC       User Key  (r) = Recorded By, (t) = Taken By, (c) = Cosigned By    Initials Name Provider Type    CC Flory Land OTR Occupational Therapist          Therapy Charges for Today     Code Description Service Date Service Provider Modifiers Qty    83671163953 HC OT SELF CARE/MGMT/TRAIN EA 15 MIN 7/1/2019 Flory Land OTR GO 4    39950259611 HC OT THER PROC EA 15 MIN 7/1/2019 Flory Land OTR GO 3                   JAMIE Steele  7/1/2019

## 2019-07-01 NOTE — PROGRESS NOTES
Inpatient Rehabilitation Plan of Care Note    Plan of Care  Updated Problems/Interventions  Mobility    [PT] Bed/Chair/Wheelchair(Active)  Current Status(07/01/2019): SBA  Weekly Goal(07/03/2019): Mod I  Discharge Goal: Mod I    [PT] Bed Mobility(Active)  Current Status(07/01/2019): Mod I log roll  Weekly Goal(07/03/2019): MOd I  Discharge Goal: Mod I    [PT] Walk(Active)  Current Status(07/01/2019): 240ft SBA rwx, L foot drop, AFO  Weekly Goal(07/03/2019): Up in room with rwx with Mod I  Discharge Goal: 200ft Mod I rwx, AFO    [PT] Stairs(Active)  Current Status(07/01/2019): 8 with 2 HR CGA  Weekly Goal(07/03/2019): PT  Discharge Goal: 6 with 2 HR CGA/SBA    Signed by: Pebbles Hendrix, PT

## 2019-07-01 NOTE — PROGRESS NOTES
Inpatient Rehabilitation Functional Measures Assessment    Functional Measures  JOSE Eating:  Branch  JOSE Grooming: Branch  JOSE Bathing:  Branch  JOSE Upper Body Dressing:  Branch  JOSE Lower Body Dressing:  Branch  JOSE Toileting:  Branch    JOSE Bladder Management  Level of Assistance:  [place]  Frequency/Number of Accidents this Shift:  Branch    JOSE Bowel Management  Level of Assistance: [place]  Frequency/Number of Accidents this Shift: Branch    JOSE Bed/Chair/Wheelchair Transfer:  Activity was not observed.  JOSE Toilet Transfer:  Branch  [facility] Tub/Shower Transfer:  [place]    Previously Documented Mode of Locomotion at Discharge: Field  JOSE Expected Mode of Locomotion at Discharge: [facility] Walk/Wheelchair:  WHEELCHAIR OBSERVATION   Activity was not observed.    WALK OBSERVATION   Walk Distance Scale = 3.  Distance walked is greater than 150 feet. Walk Score  = 5.  Patient requires supervision or set up for walking. Stand by assistance.  Patient walked a distance of  240 feet. Patient requires the following assistive  device(s): Rolling walker. Orthosis.  JOSE Stairs:  Stairs Score = 1.  Incidental assistance with lifting or lowering,  contact guard or steadying was provided. Patient requires/performs 75% or more  of effort and minimal contact assistance with negotiating stairs. Patient  negotiated 1 stairs.  Patient requires the following assistive device(s):  Rolling walker. Orthosis.    JOSE Comprehension:  Branch  JOSE Expression:  Branch  JOSE Social Interaction:  [facility] Problem Solving:  [facility] Memory:  [place]    Therapy Mode Minutes  Occupational Therapy: Branch  Physical Therapy: [place]  Speech Language Pathology:  [place]    Signed by: Pebbles Hendrix PT

## 2019-07-01 NOTE — PROGRESS NOTES
Inpatient Rehabilitation Plan of Care Note    Plan of Care  Care Plan Reviewed - No updates at this time.    Safety    Performed Intervention(s)  Offer bathroom during hourly rounds.  call light and personal items within reach  Bed alarm and chair alarm in use      Psychosocial    Performed Intervention(s)  Encourage patient to express feelings   PRN      Sphincter Control    Performed Intervention(s)  Offer bathroom during hourly rounding.  Proper food and diet  Bowel meds as ordered      Pain    Performed Intervention(s)  Rate pain during hourly rounding.  Offer pain med and evaluate effectiveness      Body Systems    Performed Intervention(s)  Monitor drain site and wounds for infection  Keep sites clean  Monitor labs and vitals for signs of infection  Change back dressing and drain dressing daily    Signed by: Margaret Miranda RN

## 2019-07-01 NOTE — PROGRESS NOTES
Case Management  Inpatient Rehabilitation Plan of Care and Discharge Plan Note    Rehabilitation Diagnosis:  Branch  Date of Onset:  Branch    Medical Summary:  Branch  Past Medical History: Branch    Plan of Care  Updated Problems/Interventions  Field    Expected Intensity:  Branch  Interdisciplinary Team:  Branch  Estimated Length of Stay/Anticipated Discharge Date: Branch  Anticipated Discharge Destination:  Anticipated discharge destination from inpatient rehabilitation is community  discharge with assistance. Family conference held 6/27/2019.  eferral made to  dasha at [place] for outpatient physical therapy. Pt will have 24 hour  assistance from his daughter and mother.      Based on the patient's medical and functional status, their prognosis and  expected level of functional improvement is:  Branch    Signed by: CLOVER Reed

## 2019-07-01 NOTE — PROGRESS NOTES
Inpatient Rehabilitation Functional Measures Assessment    Functional Measures  JOSE Eating:  [facility] Grooming: [facility] Bathing:  [facility] Upper Body Dressing:  [facility] Lower Body Dressing:  [facility] Toileting:  [facility] Bladder Management  Level of Assistance:  [place]  Frequency/Number of Accidents this Shift:  [facility] Bowel Management  Level of Assistance: [place]  Frequency/Number of Accidents this Shift: [facility] Bed/Chair/Wheelchair Transfer:  [facility] Toilet Transfer:  [facility] Tub/Shower Transfer:  [place]    Previously Documented Mode of Locomotion at Discharge: Field  JOSE Expected Mode of Locomotion at Discharge: [facility] Walk/Wheelchair:  [facility] Stairs:  [facility] Comprehension:  Auditory comprehension is the usual mode. Comprehension  Score = 7, Independent.  Patient comprehends complex/abstract information in  their primary language.  Patient is completely independent for auditory  comprehension.  There are no activity limitations.  JOSE Expression:  Vocal expression is the usual mode. Expression Score = 6,  Modified Independent.  Patient expresses complex/abstract information in their  primary language with only mild difficulty with tasks.  JOSE Social Interaction:  Social Interaction Score = 6, Modified Independent.  Patient is modified independent for social interaction, requiring: Requires  additional time.  JOSE Problem Solving:  Problem Solving Score = 7, Independent.  Patient makes  appropriate decisions in order to solve complex problems.  Patient is completely  independent for problem solving.  There are no activity limitations.  JOSE Memory:  Memory Score = 7, Independent.  Patient is completely independent  for memory.  There are no activity limitations.    Therapy Mode Minutes  Occupational Therapy: Branch  Physical Therapy: [place]  Speech Language Pathology:  Branch    Signed by: Margaret Miranda RN

## 2019-07-01 NOTE — PROGRESS NOTES
Inpatient Rehabilitation Plan of Care Note    Plan of Care  Care Plan Reviewed - No updates at this time.    Safety    Performed Intervention(s)  Offer bathroom during hourly rounds.  call light and personal items within reach  Bed alarm and chair alarm in use      Psychosocial    Performed Intervention(s)  Encourage patient to express feelings   PRN      Sphincter Control    Performed Intervention(s)  Offer bathroom during hourly rounding.  Proper food and diet  Bowel meds as ordered      Pain    Performed Intervention(s)  Rate pain during hourly rounding.  Offer pain med and evaluate effectiveness      Body Systems    Performed Intervention(s)  Monitor drain site and wounds for infection  Keep sites clean  Monitor labs and vitals for signs of infection  Change back dressing and drain dressing daily    Signed by: Jenae Garcia RN

## 2019-07-01 NOTE — PROGRESS NOTES
Inpatient Rehabilitation Functional Measures Assessment and Plan of Care    Plan of Care  Updated Problems/Interventions  Mobility    [OT] Toilet Transfers(Active)  Current Status(07/01/2019): SBA/Mod I  Weekly Goal(07/03/2019): Mod I  Discharge Goal: Mod I    [OT] Tub/Shower Transfers(Active)  Current Status(07/01/2019): SBA/Mod I  Weekly Goal(07/02/2019): Mod I  Discharge Goal: Mod I        Self Care    [OT] Bathing(Active)  Current Status(07/01/2019): SBA  Weekly Goal(07/03/2019): Mod I  Discharge Goal: Mod I    [OT] Dressing (Lower)(Active)  Current Status(07/01/2019): SBA  Weekly Goal(07/02/2019): Mod I  Discharge Goal: Mod I    [OT] Grooming(Active)  Current Status(07/01/2019): Mod I  Weekly Goal(07/01/2019): Mod I  Discharge Goal: Mod I    [OT] Toileting(Active)  Current Status(07/01/2019): SBA/Mod I  Weekly Goal(07/02/2019): Mod I  Discharge Goal: Mod I    [OT] Dressing (Upper)(Active)  Current Status(07/01/2019): Set-up  Weekly Goal(07/03/2019): Mod I  Discharge Goal: Mod I    Functional Measures  JOSE Eating:  Branch  JOSE Grooming: Branch  JOSE Bathing:  Branch  JOSE Upper Body Dressing:  Branch  JOSE Lower Body Dressing:  Branch  JOSE Toileting:  Branch    JOSE Bladder Management  Level of Assistance:  Branch  Frequency/Number of Accidents this Shift:  Branch    JOSE Bowel Management  Level of Assistance: [place]  Frequency/Number of Accidents this Shift: Branch    JOSE Bed/Chair/Wheelchair Transfer:  Branch  JOSE Toilet Transfer:  Branch  JOSE Tub/Shower Transfer:  Branch    Previously Documented Mode of Locomotion at Discharge: Field  [facility] Expected Mode of Locomotion at Discharge: Branch  JOSE Walk/Wheelchair:  Branch  [facility] Stairs:  Branch    [facility] Comprehension:  Branch  [facility] Expression:  Branch  [facility] Social Interaction:  Branch  [facility] Problem Solving:  Branch  JOSE Memory:  Branch    Therapy Mode Minutes  Occupational Therapy: Branch  Physical Therapy: Branch  Speech Language Pathology:  Branch    Signed by: Flory  Emery, OTR/L

## 2019-07-02 ENCOUNTER — TELEPHONE (OUTPATIENT)
Dept: NEUROSURGERY | Facility: CLINIC | Age: 54
End: 2019-07-02

## 2019-07-02 VITALS
TEMPERATURE: 98.7 F | BODY MASS INDEX: 26.37 KG/M2 | HEART RATE: 107 BPM | HEIGHT: 74 IN | RESPIRATION RATE: 18 BRPM | DIASTOLIC BLOOD PRESSURE: 72 MMHG | OXYGEN SATURATION: 96 % | WEIGHT: 205.47 LBS | SYSTOLIC BLOOD PRESSURE: 120 MMHG

## 2019-07-02 DIAGNOSIS — M51.26 LUMBAR DISC HERNIATION: ICD-10-CM

## 2019-07-02 DIAGNOSIS — G89.29 CHRONIC LOW BACK PAIN, UNSPECIFIED BACK PAIN LATERALITY, WITH SCIATICA PRESENCE UNSPECIFIED: Primary | ICD-10-CM

## 2019-07-02 DIAGNOSIS — M54.5 CHRONIC LOW BACK PAIN, UNSPECIFIED BACK PAIN LATERALITY, WITH SCIATICA PRESENCE UNSPECIFIED: Primary | ICD-10-CM

## 2019-07-02 DIAGNOSIS — Z98.890 POST-OPERATIVE STATE: ICD-10-CM

## 2019-07-02 PROCEDURE — 97535 SELF CARE MNGMENT TRAINING: CPT

## 2019-07-02 PROCEDURE — 97110 THERAPEUTIC EXERCISES: CPT

## 2019-07-02 RX ORDER — GABAPENTIN 300 MG/1
300 CAPSULE ORAL 3 TIMES DAILY
Qty: 90 CAPSULE | Refills: 1 | Status: SHIPPED | OUTPATIENT
Start: 2019-07-02 | End: 2019-11-26

## 2019-07-02 RX ORDER — HYDROCODONE BITARTRATE AND ACETAMINOPHEN 5; 325 MG/1; MG/1
1 TABLET ORAL EVERY 6 HOURS PRN
Qty: 35 TABLET | Refills: 0 | Status: SHIPPED | OUTPATIENT
Start: 2019-07-02 | End: 2019-07-12

## 2019-07-02 RX ORDER — METOPROLOL SUCCINATE 25 MG/1
25 TABLET, EXTENDED RELEASE ORAL
Qty: 30 TABLET | Refills: 1 | Status: SHIPPED | OUTPATIENT
Start: 2019-07-03

## 2019-07-02 RX ORDER — POLYETHYLENE GLYCOL 3350 17 G/17G
17 POWDER, FOR SOLUTION ORAL DAILY PRN
Start: 2019-07-02 | End: 2019-08-27

## 2019-07-02 RX ORDER — CYCLOBENZAPRINE HCL 10 MG
10 TABLET ORAL 3 TIMES DAILY PRN
Qty: 30 TABLET | Refills: 0 | Status: SHIPPED | OUTPATIENT
Start: 2019-07-02 | End: 2023-02-10

## 2019-07-02 RX ORDER — SENNA AND DOCUSATE SODIUM 50; 8.6 MG/1; MG/1
2 TABLET, FILM COATED ORAL 2 TIMES DAILY PRN
Start: 2019-07-02 | End: 2019-08-27

## 2019-07-02 RX ORDER — LISINOPRIL 10 MG/1
10 TABLET ORAL
Qty: 30 TABLET | Refills: 1 | Status: SHIPPED | OUTPATIENT
Start: 2019-07-03 | End: 2023-02-10

## 2019-07-02 RX ORDER — PSEUDOEPHEDRINE HCL 30 MG
100 TABLET ORAL 2 TIMES DAILY
Start: 2019-07-02 | End: 2019-08-27

## 2019-07-02 RX ADMIN — DOCUSATE SODIUM 100 MG: 100 CAPSULE, LIQUID FILLED ORAL at 08:19

## 2019-07-02 RX ADMIN — METOPROLOL SUCCINATE 25 MG: 25 TABLET, FILM COATED, EXTENDED RELEASE ORAL at 08:19

## 2019-07-02 RX ADMIN — LISINOPRIL 10 MG: 10 TABLET ORAL at 08:19

## 2019-07-02 RX ADMIN — HYDROCODONE BITARTRATE AND ACETAMINOPHEN 1 TABLET: 5; 325 TABLET ORAL at 06:27

## 2019-07-02 RX ADMIN — GABAPENTIN 300 MG: 300 CAPSULE ORAL at 08:19

## 2019-07-02 RX ADMIN — CYCLOBENZAPRINE 10 MG: 10 TABLET, FILM COATED ORAL at 08:19

## 2019-07-02 RX ADMIN — HYDROCODONE BITARTRATE AND ACETAMINOPHEN 1 TABLET: 5; 325 TABLET ORAL at 00:04

## 2019-07-02 NOTE — PROGRESS NOTES
TEAM CONF - July 2 - MODIFIED INDEPENDENT WITH BATHING AND DRESSING AND TRANSFERS AND GAIT >350 FEET WITH ROLLING WALKER. FITTED WITH LEFT CARBON FIBER AFO. DID 12 STAIRS SBA.  CAN DON AFO AND BACK BRACE INDEPENDENTLY.  CONTINENT BOWEL AND BLADDER.  ELOS - HOME TODAY.  OUTPATIENT PT AT [facility].  FOLLOW UP WITH NEUROSURGERY.

## 2019-07-02 NOTE — THERAPY DISCHARGE NOTE
Inpatient Rehabilitation - Physical Therapy Treatment Note/Discharge  [facility]     Patient Name: Archie Oquendo  : 1965  MRN: [id number]  Today's Date: 2019             Admit Date: 2019    Visit Dx:    ICD-10-CM ICD-9-CM   1. Impaired mobility Z74.09 799.89   2. Hyponatremia E87.1 276.1     Patient Active Problem List   Diagnosis   • Hip pain   • Low back pain   • Gait disorder   • Pain in left knee   • Hypertension   • Lumbar radiculopathy   • Spinal stenosis, L3-4   • Lumbar disc herniation -left L4-5   • History of lumbar fusion   • Benign essential HTN   • Urinary retention   • Hypokalemia   • Lumbar polyradiculopathy       Physical Therapy Education     Title: PT OT SLP Therapies (Done)     Topic: Physical Therapy (Done)     Point: Mobility training (Done)     Learning Progress Summary           Patient Acceptance, H, VU,DU by  at 2019 12:04 PM    Comment:  strengthening LE HEP    Acceptance, E,TB,D, DU by REECE at 2019  1:45 PM    Comment:  car transfers    Acceptance, E,TB,D, VU,DU by SAHARA at 2019  9:15 AM    Acceptance, E,TB,D, VU,DU by REECE at 2019 12:29 PM    Comment:  Pt and daughter reviewed verbally car transfers and providing CGA on steps. Pt performed steps with 2 rails with CGA and did not require verbal cues.    Acceptance, E, VU,NR by  at 2019 11:48 AM    Acceptance, E, VU,NR,DU by  at 2019 11:00 AM    Acceptance, E, NR by  at 2019 10:59 AM    Acceptance, E, VU,NR by  at 2019 10:24 AM    Acceptance, E, VU,NR by MA at 2019 11:06 AM    Acceptance, E, NR by MA at 2019 11:40 AM    Acceptance, E,D, VU,DU,NR by SANJANA at 2019  2:28 PM    Comment:  PT observed pt's dgt Sandra assist pt come to stand and ambulate with roll wx (no braces on eithter ankle) with CGA. Pt's dgt needed Max VCs to keep hand on pt at all times d/t letting go of pt and only providing SBA at times.    Acceptance, E, VU,NR by  at 2019 11:01 AM     Acceptance, E, VU,NR by  at 6/19/2019 10:21 AM    Acceptance, E, NR by  at 6/18/2019 10:50 AM    Acceptance, E, NR by  at 6/17/2019 12:05 PM    Nonacceptance, E,TB,D, NR by KP at 6/16/2019 11:06 AM    Acceptance, E, NR by LB1 at 6/15/2019 12:20 PM    Acceptance, E, VU,NR by  at 6/14/2019  3:22 PM   Family Acceptance, E,D, VU,DU,NR by JK at 6/21/2019  2:28 PM    Comment:  PT observed pt's dgt Sandra assist pt come to stand and ambulate with roll wx (no braces on eithter ankle) with CGA. Pt's dgt needed Max VCs to keep hand on pt at all times d/t letting go of pt and only providing SBA at times.                   Point: Home exercise program (Done)     Learning Progress Summary           Patient Acceptance, H, VU,DU by  at 7/2/2019 12:04 PM    Comment:  strengthening LE HEP    Acceptance, E,TB,D, VU,DU by JS at 6/30/2019  9:15 AM    Acceptance, E,TB,D, VU,DU by LB at 6/29/2019 12:29 PM    Comment:  Pt and daughter reviewed verbally car transfers and providing CGA on steps. Pt performed steps with 2 rails with CGA and did not require verbal cues.    Acceptance, E, VU,NR by  at 6/28/2019 11:48 AM    Acceptance, E, VU,NR,DU by  at 6/27/2019 11:00 AM    Acceptance, E, NR by  at 6/25/2019 10:59 AM    Acceptance, E, VU,NR by  at 6/24/2019 10:24 AM    Acceptance, E, VU,NR by MA at 6/23/2019 11:06 AM    Acceptance, E, NR by MA at 6/22/2019 11:40 AM    Acceptance, E, VU,NR by  at 6/20/2019 11:01 AM    Acceptance, E, VU,NR by  at 6/19/2019 10:21 AM    Acceptance, E, NR by  at 6/18/2019 10:50 AM    Acceptance, E, NR by  at 6/17/2019 12:05 PM    Acceptance, E, VU,NR by  at 6/14/2019  3:22 PM                   Point: Body mechanics (Done)     Learning Progress Summary           Patient Acceptance, SIMA CUEVAS DU by  at 7/2/2019 12:04 PM    Comment:  strengthening LE HEP    Acceptance, MECHE DOHERTY D, VU, DU by JS at 6/30/2019  9:15 AM    AcceptanceCHAS TB, D, VU, DU by LB at 6/29/2019 12:29 PM    Comment:  Pt and  daughter reviewed verbally car transfers and providing CGA on steps. Pt performed steps with 2 rails with CGA and did not require verbal cues.    Acceptance, E, VU,NR by  at 6/28/2019 11:48 AM    Acceptance, E, VU,NR,DU by  at 6/27/2019 11:00 AM    Acceptance, E, NR by  at 6/25/2019 10:59 AM    Acceptance, E, VU,NR by  at 6/24/2019 10:24 AM    Acceptance, E, VU,NR by MA at 6/23/2019 11:06 AM    Acceptance, E, NR by MA at 6/22/2019 11:40 AM    Acceptance, E, VU,NR by  at 6/20/2019 11:01 AM    Acceptance, E, VU,NR by  at 6/19/2019 10:21 AM    Acceptance, E, NR by  at 6/18/2019 10:50 AM    Acceptance, E, NR by  at 6/17/2019 12:05 PM    Nonacceptance, E,TB,D, NR by  at 6/16/2019 11:06 AM    Acceptance, E, VU,NR by  at 6/14/2019  3:22 PM                   Point: Precautions (Done)     Learning Progress Summary           Patient Acceptance, H, VU,DU by  at 7/2/2019 12:04 PM    Comment:  strengthening LE HEP    Acceptance, E,TB,D, VU,DU by JS at 6/30/2019  9:15 AM    Acceptance, E,TB,D, VU,DU by LB at 6/29/2019 12:29 PM    Comment:  Pt and daughter reviewed verbally car transfers and providing CGA on steps. Pt performed steps with 2 rails with CGA and did not require verbal cues.    Acceptance, E, VU,NR by  at 6/28/2019 11:48 AM    Acceptance, E, VU,NR,DU by  at 6/27/2019 11:00 AM    Acceptance, E, NR by  at 6/25/2019 10:59 AM    Acceptance, E, VU,NR by  at 6/24/2019 10:24 AM    Acceptance, E, VU,NR by MA at 6/23/2019 11:06 AM    Acceptance, E, NR by MA at 6/22/2019 11:40 AM    Acceptance, E, VU,NR by  at 6/20/2019 11:01 AM    Acceptance, E, VU,NR by  at 6/19/2019 10:21 AM    Acceptance, E, NR by  at 6/18/2019 10:50 AM    Acceptance, E, NR by  at 6/17/2019 12:05 PM    Nonacceptance, E,TB,D, NR by  at 6/16/2019 11:06 AM    Acceptance, E, SIMA,NR by  at 6/14/2019  3:22 PM                               User Key     Initials Effective Dates Name Provider Type Discipline    LB 06/08/18 -   Pepper Giles, PT Physical Therapist PT    JS 04/06/17 -  Kathe Segura, PT Physical Therapist PT     04/03/18 -  Pepper Gregg, PT Physical Therapist PT    LB1 03/07/18 -  Pepper Romero, PTA Physical Therapy Assistant PT    JK 04/03/18 -  Frieda Camejo, PT Physical Therapist PT    KP 04/03/18 -  Kaye Chowdary, PT Physical Therapist PT    MA 10/19/18 -  Corinna Mckee, PT Physical Therapist PT              PT IRF GOALS     Row Name 07/02/19 1200             Stairs Goal 2 (PT-IRF)    Progress/Outcomes (Stairs Goal 2, PT-IRF)  goal met  -        User Key  (r) = Recorded By, (t) = Taken By, (c) = Cosigned By    Initials Name Provider Type     Pepper Gregg, PT Physical Therapist          Therapy Treatment    IRF Treatment Summary     Row Name 07/02/19 1153 07/02/19 1149          Evaluation/Treatment Time and Intent    Subjective Information  no complaints  -  no complaints  -     Existing Precautions/Restrictions  fall;brace worn when out of bed;spinal  -CC  fall;brace worn when out of bed;spinal  -LH     Document Type  discharge treatment  -  discharge treatment  -     Mode of Treatment  individual therapy;occupational therapy  -  individual therapy;physical therapy  -     Patient/Family Observations  w/c in room  -CC  pt seated in WC no acute distress  -     Unable to Perform (Evaluation/Treatment)  --  -- rwx ([place]) and AFO ([facility]) delivered-pt has Kin [facility] info  -LH     Recorded by [CC] Flory Land OTR [LH] Pepper Gregg, PT     Row Name 07/02/19 1153 07/02/19 1149          Cognition/Psychosocial- PT/OT    Affect/Mental Status (Cognitive)  WNL  -CC  WNL  -LH     Orientation Status (Cognition)  oriented x 4  -CC  oriented x 4  -LH     Follows Commands (Cognition)  WFL  -CC  WFL  -LH     Personal Safety Interventions  fall prevention program maintained;gait belt;nonskid shoes/slippers when out of bed  -CC  fall prevention program maintained;gait belt;nonskid shoes/slippers when out  of bed;supervised activity  -     Cognitive Function (Cognitive)  WFL  -CC  --     Recorded by [CC] Flory Land OTR [LH] Pepper Gregg, PT     Row Name 07/02/19 1153             Bed Mobility Assessment/Treatment    Rolling Left [place] (Bed Mobility)  independent  -CC      Rolling Right [place] (Bed Mobility)  independent  -CC      Supine-Sit [place] (Bed Mobility)  independent  -CC      Sit-Supine [place] (Bed Mobility)  independent  -CC      Recorded by [CC] Flory Land OTR      Row Name 07/02/19 1153             Functional Mobility    Functional Mobility- Ind. Level  conditional independence  -CC      Functional Mobility- Device  rolling walker  -      Functional Mobility-Distance (Feet)  -- in room  -CC      Recorded by [CC] Flory Land OTR      Row Name 07/02/19 1153 07/02/19 1149          Sit-Stand Transfer    Sit-Stand [place] (Transfers)  conditional independence  -  conditional independence  -     Assistive Device (Sit-Stand Transfers)  walker, front-wheeled  -  walker, front-wheeled  -     Recorded by [CC] Flory Land OTR [LH] Pepper Gregg, PT     Row Name 07/02/19 1153 07/02/19 1149          Stand-Sit Transfer    Stand-Sit [place] (Transfers)  conditional independence  -  conditional independence  -     Assistive Device (Stand-Sit Transfers)  walker, front-wheeled  -  walker, front-wheeled  -     Recorded by [CC] Flory Land OTR [LH] Pepper Gregg, PT     Row Name 07/02/19 1153             Toilet Transfer    Type (Toilet Transfer)  sit-stand;stand-sit  -CC      [place] Level (Toilet Transfer)  conditional independence  -      Assistive Device (Toilet Transfer)  commode, 3-in-1;walker, front-wheeled unable to safely and I access regular commode in home   -CC      Recorded by [CC] Flory Land OTR      Row Name 07/02/19 1149             Gait/Stairs Assessment/Training    [place] Level (Gait)  conditional  independence  -LH      Assistive Device (Gait)  walker, front-wheeled  -      Distance in Feet (Gait)  350  -LH      Pattern (Gait)  swing-through  -LH      Deviations/Abnormal Patterns (Gait)  base of support, wide  -LH      Bilateral Gait Deviations  forward flexed posture  -LH      Left Sided Gait Deviations  foot drop/toe drag  -LH      Recorded by [LH] Pepper Gregg, PT      Row Name 07/02/19 1153             Bathing Assessment/Treatment    Bathing [place] Level  bathing skills;lower body;upper body;conditional independence  -CC      Assistive Device (Bathing)  grab bar/tub rail;hand held shower spray hose;shower chair  -CC      Bathing Position  supported sitting;supported standing  -CC      Recorded by [CC] Flory Land OTR      Row Name 07/02/19 1153             Upper Body Dressing Assessment/Treatment    Upper Body Dressing Task  upper body dressing skills;doff;don;pull over garment;conditional independence  -CC      Upper Body Dressing Position  supported sitting  -CC      Comment (Upper Body Dressing)  I with brace  -CC      Recorded by [CC] Flory Land OTR      Row Name 07/02/19 1153             Lower Body Dressing Assessment/Treatment    Lower Body Dressing [place] Level  doff;don;pants/bottoms;shoes/slippers;socks;underwear;conditional independence  -CC      Lower Body Dressing Dressing Device  long-handled shoe horn;reacher;sock-aid  -CC      Lower Body Dressing Position  supported sitting;supported standing  -CC      Comment (Lower Body Dressing)  elastic laces; I with AFO  -CC      Recorded by [CC] Flory Land OTR      Row Name 07/02/19 1153             Grooming Assessment/Treatment    Grooming [place] Level  grooming skills  -CC      Grooming Position  supported standing  -CC      Comment (Grooming)  wx level  -CC      Recorded by [CC] Flory Land OTR      Row Name 07/02/19 1153             Toileting Assessment/Treatment    Toileting [place] Level   toileting skills;conditional independence  -CC      Toileting Position  -- wx level  -CC      Recorded by [CC] Flory Land OTR      Row Name 07/02/19 1153             General ROM    RT Upper Ext  -- WFL  -CC      LT Upper Ext  -- WFL  -CC      Recorded by [CC] Flory Land OTR      Row Name 07/02/19 1153             MMT (Manual Muscle Testing)    Rt Upper Ext  Rt Shoulder WFL;Rt Scapula WFL;Rt Elbow/Forearm WFL;Rt Wrist WFL  -CC      Lt Upper Ext  Lt Shoulder WFL;Lt Scapula WFL;Lt Elbow/Forearm WFL;Lt Wrist Flexion  -CC      Recorded by [CC] Flory Land OTR      Row Name 07/02/19 1153             Hand  Strength Testing    Right Hand, Setting 1 (Dynamometer Testing)  112  -CC      Left Hand, Setting 1 (Dynamometer Testing)  94  -CC      Right Hand: Tip (Pincer) Pinch Strength (Pinch Dynamometer Testing)  22  -CC      Right Hand: [place] (Cresencio) Pinch Strength (Pinch Dynamometer Testing)  18  -CC      Left Hand: Tip (Pincer) Pinch Strength (Pinch Dynamometer Testing)  22  -CC      Left Hand: [place] (Cresencio) Pinch Strength (Pinch Dynamometer Testing)  21  -CC      Recorded by [CC] Flory Land OTR      Row Name 07/02/19 1153             Vision Assessment/Intervention    Visual Impairment/Limitations  corrective lenses full time  -CC      Recorded by [CC] Flory Land OTR      Row Name 07/02/19 1153 07/02/19 1149          Pain Scale: Numbers Pre/Post-Treatment    Pain Scale: Numbers, Pretreatment  0/10 - no pain  -CC  0/10 - no pain  -LH     Pain Scale: Numbers, Post-Treatment  0/10 - no pain  -CC  0/10 - no pain  -LH     Recorded by [CC] Flory Land OTR [LH] Pepper Gregg, PT     Row Name 07/02/19 1153             Static Sitting Balance    Level of [place] (Unsupported Sitting, Static Balance)  independent  -CC      Recorded by [CC] Flory Land OTR      Row Name 07/02/19 1149             Standing Balance Activity    Comment (Standing, Balance Training)  retrieved  cup off floor SBA rwx w reacher  -      Recorded by [] Pepper Gregg, PT      Row Name 07/02/19 1149             Dynamic Balance Activity    Comment (Dynamic Balance Training)  ambulated over red foam mat 10ft SBA rwx  -      Recorded by [] Pepper Gregg, PT      Row Name 07/02/19 1149             Ankle Foot Orthosis Management    Type (Ankle/Foot Orthosis)  left;AFO (ankle foot orthosis);posterior leaf spring  -      Therapeutic Indications (Ankle Foot Orthosis)  compensation for lost function  -      Orthosis Training (Ankle Foot Orthosis)  patient;all orthosis skills  -      Sensory Assessment (Ankle Foot Orthosis)  delivered from [facility] is orthotist and pt had card. pt  instructed to call [facility] if has any brace/skin issues in future.pt receptive to recommendations.   -      Recorded by [] Pepper Gregg, PT      Row Name 07/02/19 1149             Lower Extremity Standing Therapeutic Exercise    Performed, Standing Lower Extremity (Therapeutic Exercise)  hip flexion/extension;hip abduction/adduction;hip/knee flexion/extension;mini-squats;heel/toe raises;knee flexion/extension  -      Exercise Type, Standing Lower Extremity (Therapeutic Exercise)  AROM (active range of motion)  -      Sets/Reps Detail, Standing Lower Extremity (Therapeutic Exercise)  1/10  -      Comment, Standing Lower Extremity (Therapeutic Exercise)  BUE supported at counter top  -      Recorded by [] Pepper Gregg, PT      Row Name 07/02/19 1149             Lower Extremity Seated Therapeutic Exercise    Performed, Seated Lower Extremity (Therapeutic Exercise)  hip abduction/adduction;knee flexion/extension  -      Device, Seated Lower Extremity (Therapeutic Exercise)  elastic bands/tubing BTB  -      Exercise Type, Seated Lower Extremity (Therapeutic Exercise)  resistive exercise  -      Sets/Reps Detail, Seated Lower Extremity (Therapeutic Exercise)  1/10  -      Recorded by [] Pepper Gregg, PT       Row Name 07/02/19 1153 07/02/19 1149          Positioning and Restraints    Pre-Treatment Position  sitting in chair/recliner  -  sitting in chair/recliner  -     Post Treatment Position  bathroom  -CC  chair  -     In Chair  --  sitting;call light within reach;encouraged to call for assist  -     Bathroom  -- standing at sink grooming  -  --     Recorded by [CC] Flory Land, OTR [] Pepper Gregg, PT       User Key  (r) = Recorded By, (t) = Taken By, (c) = Cosigned By    Initials Name Effective Dates     Flory Land, OTR 06/08/18 -     LH Pepper Gregg, PT 04/03/18 -           PT Recommendation and Plan  Planned Therapy Interventions (PT Eval): balance training, bed mobility training, gait training, home exercise program, ROM (range of motion), stair training, strengthening, stretching, transfer training            Time Calculation:   PT Charges     Row Name 07/02/19 1318 07/02/19 1205          Time Calculation    Start Time  1300  -  1000  -     Stop Time  1315  -  1030  -     Time Calculation (min)  15 min  -  30 min  -     PT Received On  --  07/02/19  -       User Key  (r) = Recorded By, (t) = Taken By, (c) = Cosigned By    Initials Name Provider Type     Pepper Gregg, PT Physical Therapist          Therapy Charges for Today     Code Description Service Date Service Provider Modifiers Qty    81195118499 HC PT THER PROC EA 15 MIN 7/2/2019 Pepper Gregg, PT GP 2    96756731600 HC PT CARE PLAN EACH 15 MIN 7/2/2019 Pepper Gregg, PT GP 1               PT Discharge Summary  Reason for Discharge: All goals achieved  Outcomes Achieved: Able to achieve all goals within established timeline  Discharge Destination: Home with outpatient services, Home with assist    Pepper Gregg, PT  7/2/2019

## 2019-07-02 NOTE — PROGRESS NOTES
Case Management  Inpatient Rehabilitation Team Conference    Conference Date/Time: 7/2/2019 9:25:47 AM    Team Conference Attendees:  Dr. Romeo Quintana, Pharmacist  Cristela Coats, DUCW  Pepper Gregg, PT  Flory Land, OT  Gail Ball, CTRS  [facility], LUKE Clifton, RN  Chaplain Greg    Demographics            Age: 53Y            Gender: Male    Admission Date: 6/14/2019 12:34:00 PM  Rehabilitation Diagnosis:  Spinal surgery  Past Medical History: HTN; sleep apnea - non compliant with CPAP; left knee  pain; vasectomy; back sx x2, lumbar fusion with hx lumbar herniation, spinal  stenosis      Plan of Care  Anticipated Discharge Date/Estimated Length of Stay: ELOS: DC 7/3  Anticipated Discharge Destination: Community discharge with assistance  Discharge Plan : Family conference held 6/27/2019.  eferral made to [facility] at  [place] for outpatient physical therapy. Pt will have 24 hour assistance  from his daughter and mother.  Medical Necessity Expected Level Rationale: Estimated level of assist after  discharge: MOD Indep  Intensity and Duration: an average of 3 hours/5 days per week  Medical Supervision and 24 Hour Rehab Nursing: x  Physical Therapy: x  PT Intensity/Duration: 90 minutes/day, 5 days/week for 21 days  Occupational Therapy: x  OT Intensity/Duration: 90 minutes/day, 5 days/week for 21 days  Social Work: x  Therapeutic Recreation: x  Updated (if changes indicated)    Anticipated Discharge Date/Estimated Length of Stay:   ELOS: DC 7/2    Based on the patient's medical and functional status, their prognosis and  expected level of functional improvement is: Estimated level of assist after  discharge: MOD Indep      Interdisciplinary Problem/Goals/Status    All Rehab Problems:  Body Systems    [RN] Integumentary(Active)  Current Status(06/29/2019): Risk of incisional/wound infection, JV drain removed  Weekly Goal(07/06/2019): No infection  Discharge Goal: No infection. Incision  healing        Mobility    [PT] Bed/Chair/Wheelchair(Active)  Current Status(07/01/2019): SBA  Weekly Goal(07/03/2019): Mod I  Discharge Goal: Mod I    [PT] Bed Mobility(Active)  Current Status(07/01/2019): Mod I log roll  Weekly Goal(07/03/2019): MOd I  Discharge Goal: Mod I    [PT] Walk(Active)  Current Status(07/01/2019): 350ft SBA rwx, L foot drop, AFO  Weekly Goal(07/03/2019): Up in room with rwx with Mod I  Discharge Goal: 200ft Mod I rwx, AFO    [OT] Toilet Transfers(Active)  Current Status(07/01/2019): Mod I  Weekly Goal(07/03/2019): Mod I  Discharge Goal: Mod I    [OT] Tub/Shower Transfers(Active)  Current Status(07/01/2019): SBA/Mod I  Weekly Goal(07/02/2019): Mod I  Discharge Goal: Mod I    [PT] Stairs(Active)  Current Status(07/01/2019): 15 with 2 HR SBA  Weekly Goal(07/03/2019): PT  Discharge Goal: 6 with 2 HR CGA/SBA        Pain    [RN] Pain Management(Active)  Current Status(06/29/2019): Pain related to back surgery  Weekly Goal(07/06/2019): will rate pain on a ten scale. Will be able to  tolerated therapy  Discharge Goal: Will keep pain at or under 4 on a 10 scale        Psychosocial    [RN] Coping/Adjustment(Active)  Current Status(06/29/2019): Depressed  Weekly Goal(07/06/2019): will begin to develop coping skills  Discharge Goal: Adequate coping with life changes with ongoing support.        Safety    [RN] Potential for Injury(Active)  Current Status(06/29/2019): Risk of falls  Weekly Goal(07/06/2019): 100% use of call light  Discharge Goal: No falls. Pt aware of fall/safety in the home setting.        Self Care    [OT] Bathing(Active)  Current Status(07/01/2019): SBA  Weekly Goal(07/03/2019): Mod I  Discharge Goal: Mod I    [OT] Dressing (Lower)(Active)  Current Status(07/01/2019): SBA  Weekly Goal(07/02/2019): Mod I  Discharge Goal: Mod I    [OT] Grooming(Active)  Current Status(07/01/2019): Mod I  Weekly Goal(07/01/2019): Mod I  Discharge Goal: Mod I    [OT] Toileting(Active)  Current  Status(07/01/2019): SBA/Mod I  Weekly Goal(07/02/2019): Mod I  Discharge Goal: Mod I    [OT] Dressing (Upper)(Active)  Current Status(07/01/2019): Set-up  Weekly Goal(07/03/2019): Mod I  Discharge Goal: Mod I        Sphincter Control    [RN] Bladder Management(Active)  Current Status(06/29/2019): Fully continent  Weekly Goal(07/06/2019): fully continent  Discharge Goal: Fully continent    [RN] Bowel Management(Active)  Current Status(06/29/2019): Risk of constipation. Continent  Weekly Goal(07/06/2019): continent 100% with BM q1-3 days  Discharge Goal: continent 100%        Comments: 6/18: does better when his daughter is present; labored breathing with  PT yesterday - startle reflex per MD, very anxious and depressed; flat,  sarcastic; tearful at times; still with JV drain; MD to order BNE and anti-dep;   consulted to see but patient not interested;    6/25: pain episodes remain a barrier; still too much drainage from JV drain to  discontinue; family conf planned for Thursday;    7/2: doing well;    Signed by: [facility] RN    Physician CoSigned By: Romeo Francisco 07/02/2019 09:33:42

## 2019-07-02 NOTE — TELEPHONE ENCOUNTER
Appt with AYDEE on 8/2 @ 10:45am.  Thoracic/lumbar xrays orders in for same day.  Can you order PT?--see below.

## 2019-07-02 NOTE — PROGRESS NOTES
SECTION GG      Self Care Performance Discharge:   Oral Hygiene: Patient completed the activities by him/herself with no  assistance from a helper.   Toileting Hygiene: : Patient completed the activities by him/herself with no  assistance from a helper.   Shower/Bathe Self: Patient completed the activities by him/herself with no  assistance from a helper.   Upper Body Dressing: Patient completed the activities by him/herself with no  assistance from a helper.   Lower Body Dressing: Patient completed the activities by him/herself with no  assistance from a helper.   Putting On/Taking Off Footwear: Patient completed the activities by him/herself  with no assistance from a helper.    Mobility Toilet Transfer Discharge: Patient completed the activities by  him/herself with no assistance from a helper.    Signed by: JAMIE Steele/ALISSON

## 2019-07-02 NOTE — DISCHARGE SUMMARY
[facility]-rehabilitation unit  Date of admission June 14, 2019  Date of discharge July 2, 2019    Chief Complaint:      History of lumbar spinal stenosis L3-4 and L4-5/disc herniation left L5-L6 with bilateral lower extremity weakness and numbness and altered gait -lumbar polyradiculopathy  s/p June 5 T11-S2 posterior fusion with L3-4 lumbar decompression and left L5/L6 decompression.  Previous history of cauda equina syndrome and fusion L1-3  Hypertension   Anemia          History of Present Illness  53 year old male with a history of lumbar spinal stenosis L3-4 and L4-5/disc herniation left L5-L6 with bilateral lower extremity weakness and numbness and altered gait.  He is now s/p June 5 T11-S2 posterior fusion with L3-4 lumbar decompression and left L5/L6 decompression.  He has a previous history of cauda equina syndrome and fusion L1-3 several years ago.  Prior to surgery he had noted increased symptoms in the lower extremities with the right lower extremity numb from the knee down and the left lower extremity numb from the shin down.  He has chronic weakness in the right lower extremity but started in March developing weakness in the left lower extremity with a left foot drop.  He describes a steppage gait.  His right foot would invert and he would not be able to dorsiflex the left foot.  Does not have any bracing on the lower extremities.  Was not using any device for mobility prior to admission.  Denied any bowel or bladder changes.     Postoperatively, he is to wear a TLSO brace when sitting up and out of bed.  He complains of back pain.  Notes difficulty with his mobility.  Slow with his ambulation.    He reported voiding okay.     No upper extremity symptoms.  With physical therapy earlier in the week, bed mobility sit to supine moderate assist to, transfers min 2.  Gait 10 feet min 2 RW.  With Occupational Therapy, LBD dependent.   Tachycardia - up to 131 in PT session.    On June 13,  transfers improved contact-guard and ambulated 35 feet contact-guard rolling walker.     He still has a drain in place-high output-to  be removed when output less than 100 cc per 12 hours..  Anemia postoperatively.  Hypertension with blood pressure in the 160/98 range.  Pain management-on Flexeril 10 mg 3 times a day as needed, scheduled hydrocodone 15 mg every 4 hours..  Flomax for BPH/retention     He complains of back pain.  Does not describe any leg pain.  Still with weakness with left ankle dorsiflexion.  Ambulated about 35 feet with physical therapy this afternoon.  Fair posture.  Patient discussed with neurosurgery and no restrictions on his sitting time.  Reviewed basic postop precautions with no bending or twisting, no lifting greater than 5 pounds with the patient.        Given his functional impairments and comorbidities now admitted for acute inpatient rehabilitation.      Hospital course:  History of lumbar spinal stenosis L3-4 and L4-5/disc herniation left L5-L6 with bilateral lower extremity weakness and numbness and altered gait  s/p June 5 T11-S2 posterior fusion with L3-4 lumbar decompression and left L5/L6 decompression.  Flavia 15-neurologic exam unchanged.  Complains of increased back pain radiating down the left leg.  Discussed adding gabapentin titrating up over the next 2 days to 300 mg 3 times daily.  June 16-show some slight improvement with left ankle eversion and right ankle dorsiflexion.  He has just trace ankle dorsiflexion on the left side.     Large output from lumbar drain-to discontinue when output less than 100 cc over 12 hours -parameters updated to less than 50 cc per  24-hour.   Flavia 15-drain output last evening was around 230 cc per nursing  June 16-output 60 cc overnight but then 175 cc over 3 hours this morning-still blood tinged serous fluid.  Border gauze dressing placed over the superior aspect of his incision to monitor the amount of drainage.  The incision line itself  without active drainage seen and appears unremarkable.  June 19-Per neurosurgery yesterday,will need to remain in until output less than 50 mL's in 24 hours  June 20 -   Superior aspect of incision with scant linear drainage on island dressing, with some areas of superficial delayed healing. He has a scab along mid-incision below that. Lower aspect of incision healing. Continue to monitor .  June 27-An area at the upper half of his incision shows increased width of yellow slough-neurosurgery service to assess this afternoon.  He continues with a drain in place but the bulb is no longer place to suction.  Neurosurgery sent drainage for Beta 2 transferrin and decide about drain removal pending results.  July 1-drain came out this past weekend        Previous history of cauda equina syndrome and fusion L1-3     -Flomax     GI-prn bowel meds     Hypertension-lisinopril/metoprolol  Sinus tachycardia - on afternoons of June 25 and 26 - hydrochlorothiazide discontinued and placed on metoprolol     Anemia-hemoglobin level appears inaccurate at 13.2 on June 12.,  And been in the 8 range prior.  Recheck a.m. of Flavia 15-hemoglobin 11.8     Hyponatremia-  Flavia 15-on June 7 sodium 129-started on sodium chloride tablets 2 g 3 times daily.  Will decrease to 1 g 3 times daily on Flavia 15 and re-check BMP  June 16-sodium improved to 136.  Discontinue sodium chloride tablets and follow labs     Postoperative pain-hydrocodone 15 mg every 4 hours scheduled..  He was placed on a scheduled dose on June 13.  Flexeril 10 mg 3 times daily as needed which he is been taking routinely.  Flavia 15-add gabapentin 100 mg 3 times daily for 1 day then 20 mg 3 times daily for 1 day, then increase to 300 mg 3 times daily.  Patient demonstrates a startle reflex, which may interact with pain sensitivity  June 20 - Decrease Norco to 10 mg one tab q 4 hours scheduled. Pain 4/10  Flavia 21-possibly decrease Norco to 7.5 mg tab every 4 scheduled over the  weekend-will defer to covering physician.  July 1 - taking Norco 5 mg four times a day. Discussed taper off over time.      DVT prophylaxis-utilize SCDs     The patient is a 53-year-old male with impairments with his mobility, transfers, gait, functional mobility, and performance of activities of daily living.  Comorbidities include hypertension, anemia, pain management, postoperative drain still in place.  Given his functional impairments and comorbidities, admit for acute inpatient rehabilitation.  This would be a comprehensive interdisciplinary program with physical therapy 1.5 hours and occupational therapy 1.5 hours 5 days a week to address functional mobility and ADL training with adaptive equipment and proper back mechanics, transfer training, strengthening, balance, progressive ambulation including stairs, and assessment for orthotics for ankle dorsiflexion weakness.  He describes a steppage gait with left foot drop premorbidly.  Redilatation nursing for carryover and monitoring of his incision, drain output, pain management, bowel and bladder, and skin.  Ongoing physician follow-up for pain management, hypertension, anemia, monitoring his drain output.  Weekly team conferences.  Goal be for the patient to achieve a level of supervision with his mobility and self-care including stairs for return home.        June 16-strength appears mildly improved in the distal lower extremities.  In physical therapy,  bed mobility supervision.  Transfers minimum assist.  Gait minimum assist from the bed out into the hallway and back in.  Continue rehabilitation efforts.     TEAM CONF - June 18 - BED SBA.  TRANSFERS MIN ASSIST. GAIT 35-40  FEET MIN ASSIST RW, LEFT FOOT DROP.  TOILET TRANSFERS MIN ASSIST. SHOWER TRANSFERS MIN ASSIST.  CONTINUES WITH JV DRAIN DUE TO OUTPUT. SEEN BY NEUROSURGERY YESTERDAY AND HAD FOLLOW UP L-SPINE X-RAYS.  WILL HYPERVENTILATE WITH PAIN, ANXIETY. DID NOT WISH TO SPEAK WITH . WILL ASK  PSYCHOLOGY FOR INPUT AND DISCUSS OPTION OF SSRI WITH PATIENT.  WALLKED TO BATHROOM WITH OT, MIN ASSIST FOR TOILETING. KNOWS HOW TO USE ADAPTIVE EQUIPMENT. UBD SET UP. LBD MOD ASSIST.  BATHING MOD ASSIST. PAIN - ON SCHEDULED NORCO 15 MG Q 4 HOURS, GABAPENTIN 300 MG TID, FLEXERIL 10 MG TID PRN  REC THERAPY TO BE INVOLVED.  ELOS- 2  WEEKS     June 21-gait improved to 80 feet contact-guard assist rolling walker.  Transfers contact-guard.  Better activity tolerance.  June 27 - transfers SBA. Gait 250 feet SBA.     TEAM CONF - July 2 - MODIFIED INDEPENDENT WITH BATHING AND DRESSING AND TRANSFERS AND GAIT >350 FEET WITH ROLLING WALKER. FITTED WITH LEFT CARBON FIBER AFO. DID 12 STAIRS SBA.  CAN DON AFO AND BACK BRACE INDEPENDENTLY.  CONTINENT BOWEL AND BLADDER.  ELOS - HOME TODAY.  OUTPATIENT PT AT [facility].  FOLLOW UP WITH NEUROSURGERY.       July 2-discharge home  ----------------------------------------------------------------      Physical Exam near the time of discharge  MENTAL STATUS -  AWAKE / ALERT  HEENT-     LUNGS -normal respiration  HEART-S1-S2  ABD -nondistended     Back incision-dressed  EXT - NO EDEMA OR CYANOSIS  NEURO -.  Improved strength of bilateral knee extension and right ADF. Left ADF 2-/5.  Continues weak with bilateral EHL         Wound care-back incision  July 2, 2019  CWOCN follow up on back incision. The slough is thinning. There is pink tissue at the edges of the unapproximated areas of the incision. Instructed patient's daughter on wound care. She verbalized understanding. Recommend daily if patient showering daily as the dressing will come loose anyway, or every other day.        07/02/19 1336   Wound 06/05/19 1741 Other (See comments) back incision   Date first assessed/Time first assessed: 06/05/19 1741   Side: Other (See comments)  Location: back  Type: incision   Dressing Appearance intact   Base moist;pink;slough;yellow   Periwound intact;dry   Periwound Temperature warm    Periwound Skin Turgor soft   Edges irregular   Drainage Characteristics/Odor serous   Drainage Amount scant   Care, Wound cleansed with;sterile normal saline   Dressing Care, Wound dressing changed;border dressing;hydrofiber;non-adherent;silver impregnated   Periwound Care, Wound dry periwound area maintained               Results Review:                        Results from last 7 days   Lab Units 07/01/19  0454 06/27/19  0652 06/26/19  1454   WBC 10*3/mm3 8.59 8.53 9.00   HEMOGLOBIN g/dL 10.4* 12.6* 12.9*   HEMATOCRIT % 32.3* 39.7 39.9   PLATELETS 10*3/mm3 241 318 370             Results from last 7 days   Lab Units 07/01/19  0454 06/27/19  0652 06/26/19  1454   SODIUM mmol/L 134* 138 134*   POTASSIUM mmol/L 4.1 3.8 4.0   CHLORIDE mmol/L 98 99 95*   CO2 mmol/L 25.8 27.3 25.6   BUN mg/dL 10 12 15   CREATININE mg/dL 0.84 0.71* 0.84   CALCIUM mg/dL 8.6 9.0 9.3   GLUCOSE mg/dL 104* 99 94          Name Relationship Specialty Phone Fax Address Order              Yudy Luan APRN  PCP - General Family Medicine [phone] [address] [facility] [address]     Next Steps: Follow up on 7/12/2019      Instructions[facility] july 12th [facility]       [facility]    [phone] [address] \[facility]\""   [facility] [address]     Next Steps: Follow up      Instructions[facility].      Corbin Bailey MD  Surgeon Neurosurgery  Radiology [phone] [address] [address]     Next Steps: Follow up on 8/2/2019      Instructions: August 2nd 1045 am            Discharge Medications      New Medications      Instructions Start Date   docusate sodium 100 MG capsule   100 mg, Oral, 2 Times Daily      gabapentin 300 MG capsule  Commonly known as:  NEURONTIN   300 mg, Oral, 3 Times Daily      HYDROcodone-acetaminophen 5-325 MG per tablet  Commonly known as:  NORCO   1 tablet, Oral, Every 6  Hours PRN      lisinopril 10 MG tablet  Commonly known as:  PRINIVIL,ZESTRIL   10 mg, Oral, Every 24 Hours Scheduled   Start Date:  7/3/2019     metoprolol succinate XL 25 MG 24 hr tablet  Commonly known as:  TOPROL-XL   25 mg, Oral, Every 24 Hours Scheduled   Start Date:  7/3/2019     polyethylene glycol packet  Commonly known as:  MIRALAX   17 g, Oral, Daily PRN      sennosides-docusate sodium 8.6-50 MG tablet  Commonly known as:  SENOKOT-S   2 tablets, Oral, 2 Times Daily PRN         Continue These Medications      Instructions Start Date   cyclobenzaprine 10 MG tablet  Commonly known as:  FLEXERIL   10 mg, Oral, 3 Times Daily PRN      MULTIVITAMIN ADULT PO   Oral, Daily         Stop These Medications    fish oil 1000 MG capsule capsule     ibuprofen 800 MG tablet  Commonly known as:  ADVIL,MOTRIN     lisinopril-hydrochlorothiazide 10-12.5 MG per tablet  Commonly known as:  PRINZIDE,ZESTORETIC          Prescription written for Norco 5 mg every 6 hours as needed pain dispense #35 patient to taper off, Flexeril 10 mg dispense #30, 1 p.o. 3 times daily as needed muscle spasm, and gabapentin 300 mg p.o. 3 times daily dispense #90 with 1 refill.  Patient to follow-up with neurosurgery for refills on pain medication/muscle relaxers.    No driving while on opioid/pain medication/muscle relaxer.    OpticNeuravi Ag (silver) dressing to incision - change daily.  Given supplies for approximately 16 days per nursing    Refills of pain meds/ muscle relaxers per Neurosurgery    >30 on discharge

## 2019-07-02 NOTE — PLAN OF CARE
Problem: Skin Injury Risk (Adult)  Goal: Skin Health and Integrity  Outcome: Outcome(s) achieved Date Met: 07/02/19 07/02/19 1310   Skin Injury Risk (Adult)   Skin Health and Integrity achieves outcome       Problem: Fall Risk (Adult)  Goal: Absence of Fall  Outcome: Outcome(s) achieved Date Met: 07/02/19 07/02/19 1310   Fall Risk (Adult)   Absence of Fall achieves outcome       Problem: Patient Care Overview  Goal: Plan of Care Review  Outcome: Outcome(s) achieved Date Met: 07/02/19 07/02/19 1310   Patient Care Overview   IRF Plan of Care Review discharge pending   Progress, Functional Goals preparing for discharge   Coping/Psychosocial   Plan of Care Reviewed With patient;family   OTHER   Outcome Summary Patient preparing for discharge. Dressing instructions, follow up appointments, and medication administration to be discussed prior to leaving. Pain medication given, independentin room and family at bedside.       Problem: Mobility, Physical Impaired (Adult)  Goal: Enhanced Mobility Skills  Outcome: Outcome(s) achieved Date Met: 07/02/19 07/02/19 1310   Mobility, Physical Impaired (Adult)   Enhanced Mobility Skills achieves outcome     Goal: Enhanced Functional Ability  Outcome: Outcome(s) achieved Date Met: 07/02/19 07/02/19 1310   Mobility, Physical Impaired (Adult)   Enhanced Functional Ability achieves outcome       Problem: Pain, Chronic (Adult)  Goal: Acceptable Pain/Comfort Level and Functional Ability   07/02/19 1310   Pain, Chronic (Adult)   Acceptable Pain/Comfort Level and Functional Ability other (see comments)  (patient still being treated for pain.)

## 2019-07-02 NOTE — PLAN OF CARE
Problem: Skin Injury Risk (Adult)  Goal: Skin Health and Integrity  Outcome: Ongoing (interventions implemented as appropriate)      Problem: Fall Risk (Adult)  Goal: Absence of Fall  Outcome: Ongoing (interventions implemented as appropriate)      Problem: Patient Care Overview  Goal: Plan of Care Review  Outcome: Ongoing (interventions implemented as appropriate)   07/02/19 0219   Patient Care Overview   IRF Plan of Care Review progress ongoing, continue   Progress, Functional Goals demonstrating adequate progress   Coping/Psychosocial   Plan of Care Reviewed With patient   OTHER   Outcome Summary pt rested well this shift. back pain at 2. requests pain med q6 hours. pt anticipates d/c later today. pt modified independent in room.        Problem: Pain, Chronic (Adult)  Goal: Acceptable Pain/Comfort Level and Functional Ability  Outcome: Ongoing (interventions implemented as appropriate)

## 2019-07-02 NOTE — PROGRESS NOTES
Inpatient Rehabilitation Functional Measures Assessment    Functional Measures  JOSE Eating:  [facility] Grooming: [facility] Bathing:  [facility] Upper Body Dressing:  [facility] Lower Body Dressing:  [facility] Toileting:  [facility] Bladder Management  Level of Assistance:  [place]  Frequency/Number of Accidents this Shift:  [facility] Bowel Management  Level of Assistance: [place]  Frequency/Number of Accidents this Shift: [facility] Bed/Chair/Wheelchair Transfer:  [facility] Toilet Transfer:  [facility] Tub/Shower Transfer:  [place]    Previously Documented Mode of Locomotion at Discharge: Field  JOSE Expected Mode of Locomotion at Discharge: [facility] Walk/Wheelchair:  [facility] Stairs:  [facility] Comprehension:  Auditory comprehension is the usual mode. Comprehension  Score = 7, Independent.  Patient comprehends complex/abstract information in  their primary language.  Patient is completely independent for auditory  comprehension.  There are no activity limitations.  JOSE Expression:  Vocal expression is the usual mode. Expression Score = 7,  Independent.  Patient expresses complex/abstract information in their primary  language.  Patient is completely independent for vocal expression.  There are no  activity limitations.  JOSE Social Interaction:  Social Interaction Score = 7, Independent. Patient is  completely independent for social interaction.  There are no activity  limitations.  JOSE Problem Solving:  Problem Solving Score = 7, Independent.  Patient makes  appropriate decisions in order to solve complex problems.  Patient is completely  independent for problem solving.  There are no activity limitations.  JOSE Memory:  Memory Score = 7, Independent.  Patient is completely independent  for memory.  There are no activity limitations.    Therapy Mode Minutes  Occupational Therapy: Branch  Physical Therapy: Branch  Speech Language Pathology:  Branch    Signed by: Leisa Clifton RN

## 2019-07-02 NOTE — PROGRESS NOTES
Inpatient Rehabilitation Functional Measures Assessment and Plan of Care    Plan of Care  Updated Problems/Interventions  Field    Functional Measures  JOSE Eating:  Eating Score = 7. Patient is completely independent for eating.  There are no activity limitations.  JOSE Grooming: Grooming Score = 6.  Patient is modified independent for grooming,  requiring:  JOSE Bathing:  Patient bathed in shower. Bathing Score = 6.  Patient is modified  independent for bathing, requiring: Long handled sponge.  JOSE Upper Body Dressing:  Upper Body Dressing Score = 6 Patient is modified  independent for upper body dressing, requiring:  JOSE Lower Body Dressing:  Lower Body Dressing  Score = 6. Patient is modified  independent for lower body dressing, requiring: Reacher. Sock aid. Elastic  laces. Long handled shoe horn. Orthosis.  JOSE Toileting:  Toileting Score = 6.  Patient is modified independent for  toileting, requiring:    [facility] Bladder Management  Level of Assistance:  [place]  Frequency/Number of Accidents this Shift:  [facility] Bowel Management  Level of Assistance: [place]  Frequency/Number of Accidents this Shift: Branch    [facility] Bed/Chair/Wheelchair Transfer:  [facility] Toilet Transfer:  Toilet Transfer Score = 6.  Patient is modified  independent for transferring to and from the toilet/commode, requiring: Bedside  commode. Walker.  JOSE Tub/Shower Transfer:  Shower Transfer Score = 6.  Patient is modified  independent for transferring to and from the shower, requiring: Walker. Shower  chair    Previously Documented Mode of Locomotion at Discharge: Field  [facility] Expected Mode of Locomotion at Discharge: [place]  [facility] Walk/Wheelchair:  Branch  [facility] Stairs:  Branch    JOSE Comprehension:  Branch  JOSE Expression:  [place]anch  [facility] Social Interaction:  [place]  [facility] Problem Solving:  [place]  [facility] Memory:  [place]anch    Therapy Mode Minutes  Occupational Therapy: Individual: 30 minutes.  Physical Therapy: Branch  Speech Language Pathology:   Branch    Signed by: Flory Land, OTR/L

## 2019-07-02 NOTE — DISCHARGE INSTRUCTIONS
No driving while on opioid/pain medication/muscle relaxer.    HydroLogex Ag (silver) dressing to incision - change daily.     Refills of pain meds/ muscle relaxers per Neurosurgery

## 2019-07-02 NOTE — TELEPHONE ENCOUNTER
Okay, I misunderstood, I thought they were wanting home health.  I will put in outpatient PT orders

## 2019-07-02 NOTE — NURSING NOTE
CWOCN follow up on back incision. The slough is thinning. There is pink tissue at the edges of the unapproximated areas of the incision. Instructed patient's daughter on wound care. She verbalized understanding. Recommend daily if patient showering daily as the dressing will come loose anyway, or every other day.      07/02/19 1336   Wound 06/05/19 1741 Other (See comments) back incision   Date first assessed/Time first assessed: 06/05/19 1741   Side: Other (See comments)  Location: back  Type: incision   Dressing Appearance intact   Base moist;pink;slough;yellow   Periwound intact;dry   Periwound Temperature warm   Periwound Skin Turgor soft   Edges irregular   Drainage Characteristics/Odor serous   Drainage Amount scant   Care, Wound cleansed with;sterile normal saline   Dressing Care, Wound dressing changed;border dressing;hydrofiber;non-adherent;silver impregnated   Periwound Care, Wound dry periwound area maintained

## 2019-07-02 NOTE — TELEPHONE ENCOUNTER
Orders were placed for Home Health.  Cristela with [facility] called in unable to  To use those orders  and she needs outpatient Physical Therapy orders.

## 2019-07-02 NOTE — PROGRESS NOTES
SECTION GG      Mobility Performance Discharge:     Roll Left and Right: Patient completed the activities by him/herself with no  assistance from a helper.   Sit to Lying: Patient completed the activities by him/herself with no  assistance from a helper.   Lying to Sitting on Side of Bed: Patient completed the activities by  him/herself with no assistance from a helper.   Sit to Stand: Patient completed the activities by him/herself with no  assistance from a helper.   Chair/Bed to Chair Transfer: Patient completed the activities by him/herself  with no assistance from a helper.   Car Transfer: [place] provides verbal cues and/or touching/steadying and/or  contact guard assistance as patient completes activity. Assistance may be  provided throughout the activity or intermittently.   Walk 10 Feet:   Patient completed the activities by him/herself with no  assistance from a helper.  Walk 50 Feet with 2 Turns:   Patient completed the activities by him/herself  with no assistance from a helper.  Walk 150 Feet:   Patient completed the activities by him/herself with no  assistance from a helper.  Walking 10 Feet on Uneven Surfaces:   [place] provides verbal cues and/or  touching/steadying and/or contact guard assistance as patient completes  activity. Assistance may be provided throughout the activity or intermittently.  1 Step Over Curb or Up/Down Stair:   [place] provides verbal cues and/or  touching/steadying and/or contact guard assistance as patient completes  activity. Assistance may be provided throughout the activity or intermittently.  4 Steps Up and Down, With/Without Rail:   [place] provides verbal cues and/or  touching/steadying and/or contact guard assistance as patient completes  activity. Assistance may be provided throughout the activity or intermittently.  12 Steps Up and Down, With/Without Rail:   [place] provides verbal cues and/or  touching/steadying and/or contact guard assistance as patient  completes  activity. Assistance may be provided throughout the activity or intermittently.  Picking up an Object:   [place] provides verbal cues and/or touching/steadying  and/or contact guard assistance as patient completes activity. Assistance may be  provided throughout the activity or intermittently. Uses Wheelchair and/or  Scooter: No    Signed by: Pepper Gregg PT

## 2019-07-02 NOTE — PROGRESS NOTES
Inpatient Rehabilitation Plan of Care Note    Plan of Care  Care Plan Reviewed - No updates at this time.    Safety    Performed Intervention(s)  Offer bathroom during hourly rounds.  call light and personal items within reach  Bed alarm and chair alarm in use      Psychosocial    Performed Intervention(s)  Encourage patient to express feelings      Sphincter Control    Performed Intervention(s)  Offer bathroom during hourly rounding.  Proper food and diet  Bowel meds as ordered      Pain    Performed Intervention(s)  Rate pain during hourly rounding.  Offer pain med and evaluate effectiveness      Body Systems    Performed Intervention(s)  Monitor drain site and wounds for infection  Keep sites clean  Monitor labs and vitals for signs of infection  Change back dressing and drain dressing daily    Signed by: Leisa Clifton RN

## 2019-07-02 NOTE — PROGRESS NOTES
Inpatient Rehabilitation Functional Measures Assessment    Functional Measures  JOSE Eating:  [facility] Grooming: [facility] Bathing:  [facility] Upper Body Dressing:  [facility] Lower Body Dressing:  [facility] Toileting:  [facility] Bladder Management  Level of Assistance:  [place]  Frequency/Number of Accidents this Shift:  [facility] Bowel Management  Level of Assistance: [place]  Frequency/Number of Accidents this Shift: [facility] Bed/Chair/Wheelchair Transfer:  [facility] Toilet Transfer:  [facility] Tub/Shower Transfer:  [place]    Previously Documented Mode of Locomotion at Discharge: Field  JOSE Expected Mode of Locomotion at Discharge: [facility] Walk/Wheelchair:  [facility] Stairs:  [facility] Comprehension:  Auditory comprehension is the usual mode. Comprehension  Score = 7, Independent.  Patient comprehends complex/abstract information in  their primary language.  Patient is completely independent for auditory  comprehension.  There are no activity limitations.  JOSE Expression:  Vocal expression is the usual mode. Expression Score = 6,  Modified Independent.  Patient expresses complex/abstract information in their  primary language, requiring: Additional time.  JOSE Social Interaction:  Social Interaction Score = 7, Independent. Patient is  completely independent for social interaction.  There are no activity  limitations.  JOSE Problem Solving:  Activity was not observed.  JOSE Memory:  Memory Score = 6, Modified [place].  Patient is modified  independent for memory, requiring: Requires additional time.    Therapy Mode Minutes  Occupational Therapy: Branch  Physical Therapy: Branch  Speech Language Pathology:  Branch    Signed by: Margaret Miranda RN

## 2019-07-02 NOTE — PROGRESS NOTES
SECTION GG    Eating Performance Discharge: Patient completed the activities by him/herself  with no assistance from a helper.    Signed by: Leisa Clifton RN

## 2019-07-02 NOTE — THERAPY DISCHARGE NOTE
Inpatient Rehabilitation - Occupational Therapy Discharge Summary  [facility]     Patient Name: Archie Oquendo  : 1965  MRN: [id number]    Today's Date: 2019                 Admit Date: 2019        OT Recommendation and Plan    Visit Dx:    ICD-10-CM ICD-9-CM   1. Impaired mobility Z74.09 799.89   2. Hyponatremia E87.1 276.1         Time Calculation- OT     Row Name 19 0900             Time Calculation- OT    OT Start Time  0900  -CC      OT Stop Time  0930  -CC      OT Time Calculation (min)  30 min  -CC      OT Non-Billable Time (min)  15 min team rounds  -CC        User Key  (r) = Recorded By, (t) = Taken By, (c) = Cosigned By    Initials Name Provider Type    Flroy Isaacs OTR Occupational Therapist            OT IRF GOALS     Row Name 19 1100 19 1200 19 1221       Bathing Goal 1 (OT-IRF)    Activity/Device (Bathing Goal 1, OT-IRF)  --  bathing skills, all  -CC  bathing skills, all  -CC    [place] Level (Bathing Goal 1, OT-IRF)  --  set-up required;contact guard assist  -CC  set-up required;contact guard assist  -CC    Time Frame (Bathing Goal 1, OT-IRF)  --  short term goal (STG);5 - 7 days  -CC  short term goal (STG);5 - 7 days  -CC    Progress/Outcomes (Bathing Goal 1, OT-IRF)  --  goal met  -CC  goal met  -CC       Bathing Goal 2 (OT-IRF)    Activity/Device (Bathing Goal 2, OT-IRF)  bathing skills, all  -CC  bathing skills, all  -CC  bathing skills, all  -CC    [place] Level (Bathing Goal 2, OT-IRF)  supervision required  -CC  supervision required  -CC  supervision required  -CC    Time Frame (Bathing Goal 2, OT-IRF)  long term goal (LTG);2 weeks  -CC  long term goal (LTG);2 weeks  -CC  long term goal (LTG);2 weeks  -CC    Progress/Outcomes (Bathing Goal 2, OT-IRF)  goal met  -CC  goal ongoing  -CC  goal ongoing  -CC       UB Dressing Goal 1 (OT-IRF)    Activity/Device (UB Dressing Goal 1, OT-IRF)  --  --  upper body dressing  -CC     [place] (UB Dress Goal 1, OT-IRF)  --  --  set-up required  -CC    Time Frame (UB Dressing Goal 1, OT-IRF)  --  --  short term goal (STG);5 - 7 days  -CC    Progress/Outcomes (UB Dressing Goal 1, OT-IRF)  --  --  goal met  -CC       UB Dressing Goal 2 (OT-IRF)    Activity/Device (UB Dressing Goal 2, OT-IRF)  upper body dressing  -CC  upper body dressing  -CC  upper body dressing  -CC    [place] (UB Dress Goal 2, OT-IRF)  conditional independence  -CC  conditional independence  -CC  conditional independence  -CC    Time Frame (UB Dressing Goal 2, OT-IRF)  long term goal (LTG);2 weeks  -CC  long term goal (LTG);2 weeks  -CC  long term goal (LTG);2 weeks  -CC    Progress/Outcomes (UB Dressing Goal 2, OT-IRF)  goal met  -CC  goal ongoing  -CC  goal ongoing  -CC       LB Dressing Goal 1 (OT-IRF)    Activity/Device (LB Dressing Goal 1, OT-IRF)  --  --  lower body dressing;reacher  -CC    [place] (LB Dressing Goal 1, OT-IRF)  --  --  contact guard assist;standby assist  -CC    Time Frame (LB Dressing Goal 1, OT-IRF)  --  --  short term goal (STG);5 - 7 days  -CC    Progress/Outcomes (LB Dressing Goal 1, OT-IRF)  --  --  goal met  -CC       LB Dressing Goal 2 (OT-IRF)    Activity/Device (LB Dressing Goal 2, OT-IRF)  lower body dressing  -CC  lower body dressing  -CC  lower body dressing  -CC    [place] (LB Dressing Goal 2, OT-IRF)  supervision required  -CC  supervision required  -CC  supervision required  -CC    Time Frame (LB Dressing Goal 2, OT-IRF)  long term goal (LTG);2 weeks  -CC  long term goal (LTG);2 weeks  -CC  long term goal (LTG);2 weeks  -CC    Progress/Outcomes (LB Dressing Goal 2, OT-IRF)  goal met  -CC  goal ongoing  -CC  goal ongoing  -CC       Grooming Goal 1 (OT-IRF)    Activity/Device (Grooming Goal 1, OT-IRF)  --  --  grooming skills, all  -CC    [place] (Grooming Goal 1, OT-IRF)  --  --  supervision required  -CC    Time Frame (Grooming Goal 1, OT-IRF)  --  --  short term goal  (STG);5 - 7 days  -CC    Progress/Outcomes (Grooming Goal 1, OT-IRF)  --  --  goal met  -CC       Grooming Goal 2 (OT-IRF)    Activity/Device (Grooming Goal 2, OT-IRF)  --  grooming skills, all  -CC  grooming skills, all  -CC    [place] (Grooming Goal 2, OT-IRF)  --  conditional independence  -CC  conditional independence  -CC    Time Frame (Grooming Goal 2, OT-IRF)  --  long term goal (LTG);2 weeks  -CC  long term goal (LTG);2 weeks  -CC    Progress/Outcomes (Grooming Goal 2, OT-IRF)  --  goal met  -CC  goal ongoing  -CC       Toileting Goal 1 (OT-IRF)    Activity/Device (Toileting Goal 1, OT-IRF)  --  --  toileting skills, all  -CC    [place] Level (Toileting Goal 1, OT-IRF)  --  --  standby assist  -CC    Time Frame (Toileting Goal 1, OT-IRF)  --  --  short term goal (STG);5 - 7 days  -CC    Progress/Outcomes (Toileting Goal 1, OT-IRF)  --  --  goal met  -CC       Toileting Goal 2 (OT-IRF)    Activity/Device (Toileting Goal 2, OT-IRF)  toileting skills, all  -CC  toileting skills, all  -CC  toileting skills, all  -CC    [place] Level (Toileting Goal 2, OT-IRF)  conditional independence  -CC  conditional independence  -CC  conditional independence  -CC    Time Frame (Toileting Goal 2, OT-IRF)  long term goal (LTG);2 weeks  -CC  long term goal (LTG);2 weeks  -CC  long term goal (LTG);2 weeks  -CC    Progress/Outcomes (Toileting Goal 2, OT-IRF)  goal met  -CC  goal ongoing  -CC  goal ongoing  -CC       Caregiver Training Goal 1 (OT-IRF)    Caregiver Training Goal 1 (OT-IRF)  Pt I with HEP and home safety  -CC  Pt I with HEP and home safety  -CC  Pt I with HEP and home safety  -CC    Time Frame (Caregiver Training Goal 1, OT-IRF)  long term goal (LTG);by discharge  -CC  long term goal (LTG);by discharge  -CC  long term goal (LTG);by discharge  -CC    Progress/Outcomes (Caregiver Training Goal 1, OT-IRF)  goal met  -CC  goal ongoing  -CC  goal ongoing  -CC      User Key  (r) = Recorded By, (t) = Taken  By, (c) = Cosigned By    Initials Name Provider Type    CC Flory Land OTR Occupational Therapist              Therapy Charges for Today     Code Description Service Date Service Provider Modifiers Qty    54247085660 HC OT SELF CARE/MGMT/TRAIN EA 15 MIN 7/1/2019 Flory Land OTR GO 4    30842343964 HC OT THER PROC EA 15 MIN 7/1/2019 Flory Land OTR GO 3    67395571325 HC OT SELF CARE/MGMT/TRAIN EA 15 MIN 7/2/2019 Flory Land OTR GO 2    45645328506 HC OT CARE PLAN EA 15 MIN 7/2/2019 Flory Land OTR GO 1          OT Discharge Summary  Discharge Destination: Home with assist(outpt PT)      JAMIE Steele  7/2/2019

## 2019-07-02 NOTE — TELEPHONE ENCOUNTER
Cristela with home health needs to make an PO appt for this pt in 1 month. He will be discharged from [facility] rehab today.  Does pt need any new imaging?    She also calls to set up home health with this pt to go to [facility] rehab in [place].    She would like to have the pt outpt PT for 2-3 times a week.    [phone] Cristela    [phone] with appt.

## 2019-07-08 ENCOUNTER — TELEPHONE (OUTPATIENT)
Dept: NEUROSURGERY | Facility: CLINIC | Age: 54
End: 2019-07-08

## 2019-07-08 NOTE — TELEPHONE ENCOUNTER
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  He has the ability to work from home initially.  He currently takes the pain medication q8 hrs,  How to wean off?

## 2019-07-08 NOTE — TELEPHONE ENCOUNTER
Pt needs a letter for [employer] that says pt can go back to work full time.  Dr Bialey told him As long as he is able to move from sitting to standing.       Pt says he was told he could drive as soon as he gets off the narcotics.. Pt wants to know how to get off his medication.     Archie [phone]

## 2019-07-08 NOTE — TELEPHONE ENCOUNTER
What is his job? He had a HUGE surgery. He should wean his pain medication by extending his time between doses until he is off. I will need to ask  if he approved patient back to work at this point. Ask him if he has ability to begin with short days first.

## 2019-07-08 NOTE — TELEPHONE ENCOUNTER
Continue to spread at the time intervals.  I suggest he go to 10 hours today and 12 hours tomorrow then try just taking it once a day.    I will talk to Dr. Bailey tomorrow about his return to work status.  Remind him to wear his brace when he is up.

## 2019-07-09 NOTE — TELEPHONE ENCOUNTER
Spoke to pt--he will use weaning formula,wear his brace and wait to hear from us about returning to work.

## 2019-07-09 NOTE — TELEPHONE ENCOUNTER
Per Dr. Bailey- Wear brace. Can work from home and appt in 2 weeks with APRN. .     Spoke to Mr. Oquendo and advised. Fax note to [phone]

## 2019-07-18 ENCOUNTER — TELEPHONE (OUTPATIENT)
Dept: NEUROSURGERY | Facility: CLINIC | Age: 54
End: 2019-07-18

## 2019-07-18 NOTE — TELEPHONE ENCOUNTER
Pt has pulled something doing exercises that he is supposed to do and is in quite a bit a pain. Is taking 10 hydrocodone -5 mg every 8 hours a day and it is not helping. Any use of his core hurts a lot. Pain is mostly on left side in lower back at stomach level. It is at the level of right after surgery. Pt number is [phone]. Has appt this Monday but cant last that long.

## 2019-07-18 NOTE — TELEPHONE ENCOUNTER
What type of pain is it?  Is he having any new weakness numbness or tingling.  What kind of exercises was he doing?

## 2019-07-19 RX ORDER — HYDROCODONE BITARTRATE AND ACETAMINOPHEN 5; 325 MG/1; MG/1
TABLET ORAL
Qty: 20 TABLET | Refills: 0 | Status: SHIPPED | OUTPATIENT
Start: 2019-07-19 | End: 2019-07-22 | Stop reason: SDUPTHER

## 2019-07-19 NOTE — TELEPHONE ENCOUNTER
Please change the appointment line notes.  He has thoracic and lumbar x-rays that were ordered on July 2 by Nidia.  The orders are in.  I will refill his pain medication.

## 2019-07-19 NOTE — TELEPHONE ENCOUNTER
Sounds like a muscle spasm. Use heat or ice, hold on any therapy today and weekend. Use muscle relaxant. If he doesn't have one- I can call it in. Wear brace as directed. NO bending or twisting. Be sure he remembers to get xrays for appt on Monday.

## 2019-07-19 NOTE — TELEPHONE ENCOUNTER
Patient states he is using Flexeril. He is out of pain meds and wants to know what to use for pain. Also, he was unaware of needing xrays prior to appt Monday. His appt line states no new imaging. Need order for xray. I advised him to go to xray 1 hour prior to appt. He states that he is using the brace.

## 2019-07-19 NOTE — TELEPHONE ENCOUNTER
Horizontal free weights, then he went to [facility] for eval where he had to resist push and pull with legs. If he tries to use core muscles pain spikes. Constant ache with stabbing pain getting up, sitting down, reaching, sneezing, coughing, basically anything.     Weakness numbness in both legs from knee down which he says is permanent.

## 2019-07-22 ENCOUNTER — HOSPITAL ENCOUNTER (OUTPATIENT)
Dept: GENERAL RADIOLOGY | Facility: HOSPITAL | Age: 54
Discharge: HOME | End: 2019-07-22
Admitting: NURSE PRACTITIONER

## 2019-07-22 ENCOUNTER — OFFICE VISIT (OUTPATIENT)
Dept: NEUROSURGERY | Facility: CLINIC | Age: 54
End: 2019-07-22

## 2019-07-22 ENCOUNTER — HOSPITAL ENCOUNTER (OUTPATIENT)
Dept: GENERAL RADIOLOGY | Facility: HOSPITAL | Age: 54
Discharge: HOME | End: 2019-07-22

## 2019-07-22 VITALS
DIASTOLIC BLOOD PRESSURE: 72 MMHG | WEIGHT: 206 LBS | BODY MASS INDEX: 26.44 KG/M2 | HEIGHT: 74 IN | HEART RATE: 92 BPM | SYSTOLIC BLOOD PRESSURE: 106 MMHG | RESPIRATION RATE: 16 BRPM

## 2019-07-22 DIAGNOSIS — M54.16 LUMBAR POLYRADICULOPATHY: ICD-10-CM

## 2019-07-22 DIAGNOSIS — Z98.890 POST-OPERATIVE STATE: ICD-10-CM

## 2019-07-22 DIAGNOSIS — M54.5 CHRONIC LOW BACK PAIN, UNSPECIFIED BACK PAIN LATERALITY, WITH SCIATICA PRESENCE UNSPECIFIED: ICD-10-CM

## 2019-07-22 DIAGNOSIS — M54.16 LUMBAR RADICULOPATHY: ICD-10-CM

## 2019-07-22 DIAGNOSIS — Z98.1 S/P FUSION OF THORACIC SPINE: ICD-10-CM

## 2019-07-22 DIAGNOSIS — G89.29 CHRONIC LOW BACK PAIN, UNSPECIFIED BACK PAIN LATERALITY, WITH SCIATICA PRESENCE UNSPECIFIED: ICD-10-CM

## 2019-07-22 DIAGNOSIS — M51.26 LUMBAR DISC HERNIATION: ICD-10-CM

## 2019-07-22 DIAGNOSIS — Z98.890 POST-OPERATIVE STATE: Primary | ICD-10-CM

## 2019-07-22 DIAGNOSIS — Z98.1 S/P LUMBAR FUSION: ICD-10-CM

## 2019-07-22 PROCEDURE — 72072 X-RAY EXAM THORAC SPINE 3VWS: CPT

## 2019-07-22 PROCEDURE — 72100 X-RAY EXAM L-S SPINE 2/3 VWS: CPT

## 2019-07-22 PROCEDURE — 99024 POSTOP FOLLOW-UP VISIT: CPT | Performed by: NURSE PRACTITIONER

## 2019-07-22 RX ORDER — HYDROCODONE BITARTRATE AND ACETAMINOPHEN 5; 325 MG/1; MG/1
1 TABLET ORAL EVERY 6 HOURS PRN
Qty: 30 TABLET | Refills: 0 | Status: SHIPPED | OUTPATIENT
Start: 2019-07-22 | End: 2019-08-27

## 2019-07-22 NOTE — PROGRESS NOTES
"Subjective   Patient ID: Archie Oquendo is a 53 y.o. male is here today for follow-up of T/L fusion 6/5.  Pt is unaccompanied.    History of Present Illness    He returns to the office today for first postop visit status post multilevel thoracic/lumbar/sacral decompression and fusion on on June 5 with Dr Bailey.  He has had postoperative x-rays.  Following a lengthy hospitalization he was discharged to acute rehab where he spent a few weeks.  He was ultimately discharged home with home health nursing and physical therapy on July 2.  He is now doing outpatient physical therapy and feels that he is getting stronger with regards to balance and gait instability and right lower extremity weakness.  He continues to ambulate with a walker.    He was evaluated by the wound nurse during his hospitalization secondary to some areas in the incision site that had opened.  He is now getting daily dressing changes by his mother and daughter using Silver bandages.  He denies any pain at the incision site.  He does have significant ongoing low back pain in his back.  He is requesting a refill on hydrocodone.  He continues to take the gabapentin and muscle relaxers.  He reports chronic numbness and tingling in both legs that is unchanged postop below his knees from prior surgery.  He denies any leg pain.  He denies any bowel or bladder problems.  He has been compliant wearing the LSO brace at all times as directed.    He presents unaccompanied.      /72 (BP Location: Left arm, Patient Position: Sitting, Cuff Size: Large Adult)   Pulse 92   Resp 16   Ht 188 cm (74\")   Wt 93.4 kg (206 lb)   BMI 26.45 kg/m²     The following portions of the patient's history were reviewed and updated as appropriate: allergies, current medications, past family history, past medical history, past social history, past surgical history and problem list.    Review of Systems   Genitourinary: Negative for difficulty urinating and enuresis. "   Musculoskeletal: Positive for back pain.   Neurological: Positive for weakness (bilateral legs) and numbness (bilateral from the knees).   Psychiatric/Behavioral: Positive for sleep disturbance.       Objective   Physical Exam   Constitutional: He is oriented to person, place, and time. Vital signs are normal. He appears well-developed and well-nourished. He is cooperative.  Non-toxic appearance. He does not have a sickly appearance. He does not appear ill.   Pleasant appearing middle-aged male   HENT:   Head: Normocephalic and atraumatic.   Eyes: EOM are normal.   Corrective lenses   Neck: Neck supple.   Pulmonary/Chest: Effort normal and breath sounds normal.   Musculoskeletal: Normal range of motion. He exhibits tenderness (Very tender bilateral lumbar and thoracic paraspinal musculature). He exhibits no deformity.        Lumbar back: He exhibits tenderness, bony tenderness and pain.   Strength equal and intact bilateral lower extremities with the exception of right quad 4+/5   Wearing a left AFO brace   wearing a well fitting LSO brace  Deferred lumbar range of motion exam   Neurological: He is alert and oriented to person, place, and time. He displays no tremor. Gait abnormal. Coordination normal. GCS eye subscore is 4. GCS verbal subscore is 5. GCS motor subscore is 6.   Gait is slow and purposeful, antalgic, using a walker  Sensation intact throughout to soft touch  Negative straight leg raise   Skin: Skin is warm and dry.   Very long thoracic and lumbar incision site is healing well.  It is covered in an island bandage with an underlying silver dressing directly over the incision site.  There are few areas with granulation tissue mostly it is well approximated, normal surrounding skin, nontender without any evidence of drainage or infection   Psychiatric:   Flat, blunted, depressed affect   Vitals reviewed.    Neurologic Exam     Mental Status   Oriented to person, place, and time.     Cranial Nerves      CN III, IV, VI   Extraocular motions are normal.       Assessment/Plan   Independent Review of Radiographic Studies:    Postoperative lumbar and thoracic x-ray shows stable findings with intact surgical hardware from the thoracic down into the sacral spine with sacral screws.  No evidence of lucency or loosening the hardware.  No new abnormalities identified.    Medical Decision Making:    He returns to the office today for ongoing follow-up 6 weeks status post multilevel thoracic, lumbar and sacral decompression and fusion with Dr. Bailey.  Exam as noted above, no red flags.  Postoperative x-ray shows stable findings with intact surgical hardware no evidence of new abnormalities.  He continues to complain of significant low back discomfort.  He is requesting a refill on hydrocodone.  He states that he still taking 1 to 2 tablets every 8 hours.  I will give him 1 refill but going forward all additional medications will need to come from either the patient's primary care provider or from pain management.  He will be referred to pain management following today's office visit.  He is to try to wean off the hydrocodone as tolerated.  No more than 1 tablet every 8 hours.  He is aware that this is a last refill he will received with regards to the narcotics from our office.  He expressed understanding.    He reports some worsening muscular discomfort after physical therapy and states that he would like to take a break from PT.  I encouraged him not to do so.  I explained that becoming sedentary will worsen his overall progress and potentially will impede his improvement going forward.  He will continue physical therapy as directed.  I encouraged daily walking.  The incision site is healing well with the use of silver dressings.  He is getting daily dressing changes.  His mother or daughter are to call our office with any concerns regarding the incision site.  He is compliant wearing the brace.  He will need to remain  in the brace until 3 months postop.  We will have him return to the office in 5 weeks to see Dr. Bailey with lumbar and thoracic flexion and extension x-rays.  He appears very depressed today with flat affect and very minimal conversation.  He is at times tearful.  I encouraged him to stay positive as he is doing very well overall.  He may benefit from talking to his primary care provider regarding antidepressants.    Plan: Hydrocodone as directed, return to office in 5 weeks to see Dr. Bailey with lumbar flexion and extension x-rays, remain in brace & referral to pain management      Archie was seen today for t/l fusion 6/5.    Diagnoses and all orders for this visit:    Post-operative state  -     Ambulatory Referral to Pain Management  -     XR Spine Lumbar Complete With Flex & Ext; Future  -     XR Spine Thoracic 2 View; Future    Lumbar radiculopathy    Lumbar polyradiculopathy    Chronic low back pain, unspecified back pain laterality, with sciatica presence unspecified  -     XR Spine Lumbar Complete With Flex & Ext; Future  -     XR Spine Thoracic 2 View; Future    S/P lumbar fusion  -     XR Spine Lumbar Complete With Flex & Ext; Future  -     XR Spine Thoracic 2 View; Future    S/P fusion of thoracic spine  -     XR Spine Lumbar Complete With Flex & Ext; Future  -     XR Spine Thoracic 2 View; Future    Other orders  -     HYDROcodone-acetaminophen (NORCO) 5-325 MG per tablet; Take 1 tablet by mouth Every 6 (Six) Hours As Needed for Moderate Pain . 1-2 PO q 6 hours PRN      Return in about 4 weeks (around 8/19/2019).

## 2019-07-23 NOTE — PROGRESS NOTES
PPS CMG Coordinator  Inpatient Rehabilitation Discharge    Mode of Locomotion: Walking.    Discharge Against Medical Advice:  No.  Discharge Information  Patient Discharged Alive:  Yes  Discharge Destination/Living Setting: Home.  At discharge, the patient was discharged to live (with) (02)  Family / Relatives    Diagnosis for Interruption/Death: ICD    Impairment Group: 08.9 Other Orthopedic    Comorbidities: ICD    Complications: ICD      JOSE Bladder Accidents: 0  - Accidents.  Patient used medications/device this  shift.  6/30/2019 2:40:00 PM  Bladder Score = 6. Patient has not had an accident, but uses a  device/medication.  JOSE Bowel Accident: 0  - Accidents.  Bowel Score = 6. Patient has no accidents, but uses a device/medications.      QUALITY INDICATORS  Section J Health Conditions: Fall(s) Since Admission:  No    Section M. Skin Conditions Discharge:  Unhealed Pressure Ulcer(s) at Stage 1 or  Higher:  No    . Current Number of Unhealed Pressure Ulcers  Branch    Section N. Medication:  Medication Intervention: N/A - There were no potential clinically significant  medication issues identified since admission or patient is not taking any  medications.    Signed by: Kaiser Church RN

## 2019-07-29 ENCOUNTER — TELEPHONE (OUTPATIENT)
Dept: NEUROSURGERY | Facility: CLINIC | Age: 54
End: 2019-07-29

## 2019-08-09 ENCOUNTER — TELEPHONE (OUTPATIENT)
Dept: NEUROSURGERY | Facility: CLINIC | Age: 54
End: 2019-08-09

## 2019-08-16 ENCOUNTER — TELEPHONE (OUTPATIENT)
Dept: NEUROSURGERY | Facility: CLINIC | Age: 54
End: 2019-08-16

## 2019-08-16 NOTE — TELEPHONE ENCOUNTER
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 He states he is wearing his brace. He has his next appt on 8/27.     [phone]

## 2019-08-16 NOTE — TELEPHONE ENCOUNTER
We can give him or fax a note stating that he is fine to return to work.  Surgery was in early June.  He will need to remain in the brace until his follow-up office visit later this month.

## 2019-08-27 ENCOUNTER — HOSPITAL ENCOUNTER (OUTPATIENT)
Dept: GENERAL RADIOLOGY | Facility: HOSPITAL | Age: 54
Discharge: HOME | End: 2019-08-27

## 2019-08-27 ENCOUNTER — HOSPITAL ENCOUNTER (OUTPATIENT)
Dept: GENERAL RADIOLOGY | Facility: HOSPITAL | Age: 54
Discharge: HOME | End: 2019-08-27
Admitting: NURSE PRACTITIONER

## 2019-08-27 ENCOUNTER — TELEPHONE (OUTPATIENT)
Dept: NEUROSURGERY | Facility: CLINIC | Age: 54
End: 2019-08-27

## 2019-08-27 ENCOUNTER — OFFICE VISIT (OUTPATIENT)
Dept: NEUROSURGERY | Facility: CLINIC | Age: 54
End: 2019-08-27

## 2019-08-27 VITALS
SYSTOLIC BLOOD PRESSURE: 140 MMHG | DIASTOLIC BLOOD PRESSURE: 88 MMHG | HEART RATE: 104 BPM | HEIGHT: 74 IN | WEIGHT: 204 LBS | RESPIRATION RATE: 20 BRPM | BODY MASS INDEX: 26.18 KG/M2

## 2019-08-27 DIAGNOSIS — M54.5 CHRONIC LOW BACK PAIN, UNSPECIFIED BACK PAIN LATERALITY, WITH SCIATICA PRESENCE UNSPECIFIED: ICD-10-CM

## 2019-08-27 DIAGNOSIS — Z98.1 S/P FUSION OF THORACIC SPINE: ICD-10-CM

## 2019-08-27 DIAGNOSIS — G89.29 CHRONIC LOW BACK PAIN, UNSPECIFIED BACK PAIN LATERALITY, WITH SCIATICA PRESENCE UNSPECIFIED: ICD-10-CM

## 2019-08-27 DIAGNOSIS — Z98.890 POST-OPERATIVE STATE: ICD-10-CM

## 2019-08-27 DIAGNOSIS — Z09 POSTOP CHECK: Primary | ICD-10-CM

## 2019-08-27 DIAGNOSIS — Z98.1 S/P LUMBAR FUSION: ICD-10-CM

## 2019-08-27 PROCEDURE — 99024 POSTOP FOLLOW-UP VISIT: CPT | Performed by: NURSE PRACTITIONER

## 2019-08-27 PROCEDURE — 72072 X-RAY EXAM THORAC SPINE 3VWS: CPT

## 2019-08-27 PROCEDURE — 72114 X-RAY EXAM L-S SPINE BENDING: CPT

## 2019-08-27 NOTE — PROGRESS NOTES
Subjective   Patient ID: Archie Oquendo is a 54 y.o. male is here today for follow-up of PO3 T-L fusion 6/3.  Pt iis unaccompanied.    History of Present Illness  Patient presents for follow-up of lumbar stenosis, disc herniation, gait disorder.  He is status post L3-6 lumbar decompression/discectomy and T11-S2 decompression and fusion on June 5, 2019.  He has returned to work.  He is using standing desk at times. He continues in brace when up. He reports compliance with BGS. He has left leg AFO. He has achiness in back. No leg pain. He is in PT. No smoking. Still numbness and tingling from knees down. Slow improvements. Lots of fatigue. Incision is fully healed. He uses a cane.  He continues on gabapentin.  He is off hydrocodone.    The following portions of the patient's history were reviewed and updated as appropriate: allergies, current medications and problem list.    Review of Systems   Genitourinary: Negative for difficulty urinating and enuresis.   Musculoskeletal: Positive for back pain.   Neurological: Positive for weakness (bilateral legs) and numbness (bilateral legs from knees down).   Psychiatric/Behavioral: Positive for sleep disturbance.       Objective   Physical Exam   Constitutional: He appears well-developed and well-nourished.   Pulmonary/Chest: Effort normal.   Musculoskeletal:   Presents in an LSO brace.   Neurological: He is alert.   Reflex Scores:       Patellar reflexes are Right patellar reflex grade: trace. and 1+ on the left side.       Achilles reflexes are 1+ on the right side and Left achilles reflex grade: CORDELIA due to AFO..  Skin: Skin is warm and dry.   Extensive thoracolumbar incisions well-healed   Psychiatric: He has a normal mood and affect. His behavior is normal. Judgment normal.   Vitals reviewed.    Neurologic Exam     Mental Status   Attention: normal. Concentration: normal.   Level of consciousness: alert  Knowledge: good.     Motor Exam   Left leg tone:  "decreased    Strength   Right iliopsoas: 5/5  Left iliopsoas: 5/5  Right quadriceps: 5/5  Left quadriceps: 5/5  Right hamstrin/5  Left hamstrin/5  Right anterior tibial: 5/5  Left anterior tibial: 1/5  Right gastroc: 5/5  Left gastroc: 4/5    Gait, Coordination, and Reflexes     Gait  Gait: wide-based (Uses straight cane.  Some instability upon arising.)    Reflexes   Right patellar reflex grade: trace.  Left patellar: 1+  Right achilles: 1+  Left achilles reflex grade: CORDELIA due to AFO.  Right ankle clonus: absent  Left ankle clonus present: CORDELIA due to AFO.  Able to toe walk bilaterally.  Refused to attempt heel walking.  States \"I cannot do that.\"       Assessment/Plan   Independent Review of Radiographic Studies:    Thoracic and lumbar x-rays 2019 reveal postoperative changes from T11-S2.  Instrumentation with no evidence of fracture or haloing.  No abnormal motion with flexion-extension.    Medical Decision Making:    Patient presents for postop visit nearly 3 months after extensive thoracolumbar decompression and fusion.  He feels that physical therapy is going okay.  He states they told him that he is doing better.  He states the main focus is core work but they are wanting to do more with him.  We have no records.  I will request them.  He continues in his brace and bone growth stimulator.  On exam, he seems overall stable.  He still has some instability with his gait especially with position transition.  His incisions are well-healed.  He can begin weaning his brace at this time.  I told him to take over 2 weeks to do this by slowly extending his time each week.  He should continue bone growth stimulator for 6 months.  I have advised that it would be beneficial for him to see Dr. Francisco as an outpatient since he followed him as an inpatient.  Hopefully, Dr. Francisco can evaluate with the therapy is doing and give some direction on helping his gait.  He should remain in his AFO.  I recommend " that he continue to avoid lifting pushing pulling more than 20 pounds.  He should avoid repetitive bending and twisting.  We will plan to see him back in 3 months with lumbar and thoracic x-rays AP/lateral.  We will call the meantime with questions or concerns.  Also recommend that he work on weaning his gabapentin slowly by dropping 1 dose every 5 to 7 days until off.    Plan: Return to office 3 months with thoracic and lumbar x-rays.  Wean brace.  Continue PT.  Refer to Dr. Francisco.    Archie was seen today for po3 t-l fusion 6/3.    Diagnoses and all orders for this visit:    Postop check  -     XR spine thoracic 3 vw; Future  -     XR Spine Lumbar 2 or 3 View; Future  -     Ambulatory Referral to Physical Medicine Rehab      Return in about 3 months (around 11/27/2019) for Dr. Mix, with imaging.

## 2019-08-28 ENCOUNTER — TELEPHONE (OUTPATIENT)
Dept: NEUROSURGERY | Facility: CLINIC | Age: 54
End: 2019-08-28

## 2019-08-28 NOTE — TELEPHONE ENCOUNTER
----- Message from VERONICA Cruz sent at 8/27/2019  5:02 PM EDT -----  Regarding: PT records  Please request PT records from [facility].

## 2019-11-14 ENCOUNTER — TELEPHONE (OUTPATIENT)
Dept: NEUROSURGERY | Facility: CLINIC | Age: 54
End: 2019-11-14

## 2019-11-14 NOTE — TELEPHONE ENCOUNTER
I would advise him to take OTC anti- Inflammatories, like Aleve or ibuprofen.  At this point, he is close enough to the 6 month darryl to take these medications intermittently. These should help with the muscle pain.

## 2019-11-14 NOTE — TELEPHONE ENCOUNTER
"I gave him the message and adisved him to take the anti-inflammatories as you said. He stated that he is already taking ibuprofen and stated \"yea thanks for nothing\" and hung up on me.   "

## 2019-11-14 NOTE — TELEPHONE ENCOUNTER
Patient called and requested tos peak with you. I advised him that you were seeing patients and were unable tot alk right now      He reports that he hurt his back last week on 11/07/19 while sleeping. He thinks he twisted his back while sleeping. He is having trouble sleeping. I asked him if he was having leg pain, N/T and weakness and he replied with that it is all permanent.     He denies bladder and bowel incontinence.     Patient had a L3-6 lumbar decompression/discectomy and T11-S2 decompression and fusion on June 5, 2019 by Dr Bailey.     He is wanting to know if there is any medicaion he can take that will help.    He is coming in on 11/26/19 with L&T-spine xrays.

## 2019-11-20 NOTE — PROGRESS NOTES
Subjective   Patient ID: Archie Oquendo is a 54 y.o. male is here today for follow-up with a new Thoracic and Lumbar XR. Mr. Oquendo is a previous patient of Dr. Bailey. He had a T11, T12, L1, L2, L3, L4, L5, L6, S1 and S2 fusion done on 6/5/2019.   Patient called the office 11/14/2019 stating that he hurt his back the week prior.  Today his symptoms are back pain.    History of Present Illness     This patient returns today.  He had a T11-S2 decompression and fusion in June of this year.  He was last seen in August at that time was making some progress.  Currently he still has a lot of pain in his back and some weakness in his left leg.  He has no other new symptoms at all.    The following portions of the patient's history were reviewed and updated as appropriate: allergies, current medications, past family history, past medical history, past social history, past surgical history and problem list.    Review of Systems   Respiratory: Negative for chest tightness and shortness of breath.    Cardiovascular: Negative for chest pain.   Musculoskeletal: Positive for back pain and gait problem.   All other systems reviewed and are negative.      Objective   Physical Exam   Constitutional: He is oriented to person, place, and time. He appears well-developed and well-nourished.   Neurological: He is oriented to person, place, and time.     Neurologic Exam     Mental Status   Oriented to person, place, and time.       Assessment/Plan   Independent Review of Radiographic Studies:      I reviewed his plain films which were done today.  This shows instrumentation from T11 all the way down to S2 with sacral screws.  There appears to be good bone formation posterior laterally what appears to be a solid fusion.    Medical Decision Making:      I told the patient about the imaging.  I told him that from my point of view I do not think there is much else to do at this point.  Told him to call me if the pain gets any worse we  can always see him back in any time    Archie was seen today for back pain.    Diagnoses and all orders for this visit:    S/P fusion of thoracic spine      Return if symptoms worsen or fail to improve.

## 2019-11-26 ENCOUNTER — HOSPITAL ENCOUNTER (OUTPATIENT)
Dept: GENERAL RADIOLOGY | Facility: HOSPITAL | Age: 54
Discharge: HOME | End: 2019-11-26

## 2019-11-26 ENCOUNTER — HOSPITAL ENCOUNTER (OUTPATIENT)
Dept: GENERAL RADIOLOGY | Facility: HOSPITAL | Age: 54
Discharge: HOME | End: 2019-11-26
Admitting: NURSE PRACTITIONER

## 2019-11-26 ENCOUNTER — OFFICE VISIT (OUTPATIENT)
Dept: NEUROSURGERY | Facility: CLINIC | Age: 54
End: 2019-11-26

## 2019-11-26 VITALS — DIASTOLIC BLOOD PRESSURE: 86 MMHG | SYSTOLIC BLOOD PRESSURE: 127 MMHG | HEART RATE: 97 BPM

## 2019-11-26 DIAGNOSIS — Z98.1 S/P FUSION OF THORACIC SPINE: Primary | ICD-10-CM

## 2019-11-26 DIAGNOSIS — Z09 POSTOP CHECK: ICD-10-CM

## 2019-11-26 PROCEDURE — 99213 OFFICE O/P EST LOW 20 MIN: CPT | Performed by: NEUROLOGICAL SURGERY

## 2019-11-26 PROCEDURE — 72072 X-RAY EXAM THORAC SPINE 3VWS: CPT

## 2019-11-26 PROCEDURE — 72100 X-RAY EXAM L-S SPINE 2/3 VWS: CPT

## 2021-03-24 ENCOUNTER — BULK ORDERING (OUTPATIENT)
Dept: CASE MANAGEMENT | Facility: OTHER | Age: 56
End: 2021-03-24

## 2021-03-24 DIAGNOSIS — Z23 IMMUNIZATION DUE: ICD-10-CM

## 2022-03-10 ENCOUNTER — TELEPHONE (OUTPATIENT)
Dept: NEUROSURGERY | Facility: CLINIC | Age: 57
End: 2022-03-10

## 2022-03-10 NOTE — TELEPHONE ENCOUNTER
Dr Aparicio from [facility] wants to speak with Dr Mix about this pt  [phone].

## 2022-03-11 ENCOUNTER — HOSPITAL ENCOUNTER (OUTPATIENT)
Facility: HOSPITAL | Age: 57
Setting detail: OBSERVATION
Discharge: HOME | End: 2022-03-13
Attending: INTERNAL MEDICINE | Admitting: HOSPITALIST

## 2022-03-11 DIAGNOSIS — Z09 FOLLOW UP: ICD-10-CM

## 2022-03-11 PROBLEM — T14.8XXA DRAINAGE FROM WOUND: Status: ACTIVE | Noted: 2022-03-11

## 2022-03-11 PROBLEM — L24.A9 DRAINAGE FROM WOUND: Status: ACTIVE | Noted: 2022-03-11

## 2022-03-11 LAB
ALBUMIN SERPL-MCNC: 3.6 G/DL (ref 3.5–5.2)
ALBUMIN/GLOB SERPL: 1 G/DL
ALP SERPL-CCNC: 71 U/L (ref 39–117)
ALT SERPL W P-5'-P-CCNC: 22 U/L (ref 1–41)
ANION GAP SERPL CALCULATED.3IONS-SCNC: 9 MMOL/L (ref 5–15)
AST SERPL-CCNC: 17 U/L (ref 1–40)
BASOPHILS # BLD AUTO: 0.04 10*3/MM3 (ref 0–0.2)
BASOPHILS NFR BLD AUTO: 0.5 % (ref 0–1.5)
BILIRUB SERPL-MCNC: 0.3 MG/DL (ref 0–1.2)
BUN SERPL-MCNC: 10 MG/DL (ref 6–20)
BUN/CREAT SERPL: 12.3 (ref 7–25)
CALCIUM SPEC-SCNC: 8.8 MG/DL (ref 8.6–10.5)
CHLORIDE SERPL-SCNC: 107 MMOL/L (ref 98–107)
CO2 SERPL-SCNC: 26 MMOL/L (ref 22–29)
CREAT SERPL-MCNC: 0.81 MG/DL (ref 0.76–1.27)
D-LACTATE SERPL-SCNC: 1.9 MMOL/L (ref 0.5–2)
DEPRECATED RDW RBC AUTO: 42.2 FL (ref 37–54)
EGFRCR SERPLBLD CKD-EPI 2021: 102.2 ML/MIN/1.73
EOSINOPHIL # BLD AUTO: 0.2 10*3/MM3 (ref 0–0.4)
EOSINOPHIL NFR BLD AUTO: 2.7 % (ref 0.3–6.2)
ERYTHROCYTE [DISTWIDTH] IN BLOOD BY AUTOMATED COUNT: 13 % (ref 12.3–15.4)
GLOBULIN UR ELPH-MCNC: 3.5 GM/DL
GLUCOSE SERPL-MCNC: 101 MG/DL (ref 65–99)
HCT VFR BLD AUTO: 35.1 % (ref 37.5–51)
HGB BLD-MCNC: 11.7 G/DL (ref 13–17.7)
IMM GRANULOCYTES # BLD AUTO: 0.02 10*3/MM3 (ref 0–0.05)
IMM GRANULOCYTES NFR BLD AUTO: 0.3 % (ref 0–0.5)
LYMPHOCYTES # BLD AUTO: 1.5 10*3/MM3 (ref 0.7–3.1)
LYMPHOCYTES NFR BLD AUTO: 20.3 % (ref 19.6–45.3)
MCH RBC QN AUTO: 29.7 PG (ref 26.6–33)
MCHC RBC AUTO-ENTMCNC: 33.3 G/DL (ref 31.5–35.7)
MCV RBC AUTO: 89.1 FL (ref 79–97)
MONOCYTES # BLD AUTO: 0.63 10*3/MM3 (ref 0.1–0.9)
MONOCYTES NFR BLD AUTO: 8.5 % (ref 5–12)
NEUTROPHILS NFR BLD AUTO: 5 10*3/MM3 (ref 1.7–7)
NEUTROPHILS NFR BLD AUTO: 67.7 % (ref 42.7–76)
NRBC BLD AUTO-RTO: 0 /100 WBC (ref 0–0.2)
PLATELET # BLD AUTO: 249 10*3/MM3 (ref 140–450)
PMV BLD AUTO: 9.9 FL (ref 6–12)
POTASSIUM SERPL-SCNC: 4.1 MMOL/L (ref 3.5–5.2)
PROCALCITONIN SERPL-MCNC: 0.03 NG/ML (ref 0–0.25)
PROT SERPL-MCNC: 7.1 G/DL (ref 6–8.5)
RBC # BLD AUTO: 3.94 10*6/MM3 (ref 4.14–5.8)
SODIUM SERPL-SCNC: 142 MMOL/L (ref 136–145)
WBC NRBC COR # BLD: 7.39 10*3/MM3 (ref 3.4–10.8)

## 2022-03-11 PROCEDURE — 85025 COMPLETE CBC W/AUTO DIFF WBC: CPT | Performed by: NURSE PRACTITIONER

## 2022-03-11 PROCEDURE — U0004 COV-19 TEST NON-CDC HGH THRU: HCPCS | Performed by: NURSE PRACTITIONER

## 2022-03-11 PROCEDURE — G0378 HOSPITAL OBSERVATION PER HR: HCPCS

## 2022-03-11 PROCEDURE — 84145 PROCALCITONIN (PCT): CPT | Performed by: NURSE PRACTITIONER

## 2022-03-11 PROCEDURE — C9803 HOPD COVID-19 SPEC COLLECT: HCPCS

## 2022-03-11 PROCEDURE — 80053 COMPREHEN METABOLIC PANEL: CPT | Performed by: NURSE PRACTITIONER

## 2022-03-11 PROCEDURE — 83605 ASSAY OF LACTIC ACID: CPT | Performed by: NURSE PRACTITIONER

## 2022-03-11 RX ORDER — METOPROLOL SUCCINATE 25 MG/1
25 TABLET, EXTENDED RELEASE ORAL
Status: DISCONTINUED | OUTPATIENT
Start: 2022-03-12 | End: 2022-03-13 | Stop reason: HOSPADM

## 2022-03-11 RX ORDER — ACETAMINOPHEN 325 MG/1
650 TABLET ORAL EVERY 4 HOURS PRN
Status: DISCONTINUED | OUTPATIENT
Start: 2022-03-11 | End: 2022-03-13 | Stop reason: HOSPADM

## 2022-03-11 RX ORDER — NITROGLYCERIN 0.4 MG/1
0.4 TABLET SUBLINGUAL
Status: DISCONTINUED | OUTPATIENT
Start: 2022-03-11 | End: 2022-03-13 | Stop reason: HOSPADM

## 2022-03-11 RX ORDER — CYCLOBENZAPRINE HCL 10 MG
10 TABLET ORAL 3 TIMES DAILY PRN
Status: DISCONTINUED | OUTPATIENT
Start: 2022-03-11 | End: 2022-03-13 | Stop reason: HOSPADM

## 2022-03-11 RX ORDER — SODIUM CHLORIDE 0.9 % (FLUSH) 0.9 %
10 SYRINGE (ML) INJECTION AS NEEDED
Status: DISCONTINUED | OUTPATIENT
Start: 2022-03-11 | End: 2022-03-13 | Stop reason: HOSPADM

## 2022-03-11 RX ORDER — ONDANSETRON 2 MG/ML
4 INJECTION INTRAMUSCULAR; INTRAVENOUS EVERY 6 HOURS PRN
Status: DISCONTINUED | OUTPATIENT
Start: 2022-03-11 | End: 2022-03-13 | Stop reason: HOSPADM

## 2022-03-11 RX ORDER — MULTIPLE VITAMINS W/ MINERALS TAB 9MG-400MCG
1 TAB ORAL DAILY
Status: DISCONTINUED | OUTPATIENT
Start: 2022-03-12 | End: 2022-03-13 | Stop reason: HOSPADM

## 2022-03-11 RX ORDER — LISINOPRIL 20 MG/1
20 TABLET ORAL
Status: DISCONTINUED | OUTPATIENT
Start: 2022-03-12 | End: 2022-03-13 | Stop reason: HOSPADM

## 2022-03-11 RX ORDER — ALUMINA, MAGNESIA, AND SIMETHICONE 2400; 2400; 240 MG/30ML; MG/30ML; MG/30ML
15 SUSPENSION ORAL EVERY 6 HOURS PRN
Status: DISCONTINUED | OUTPATIENT
Start: 2022-03-11 | End: 2022-03-13 | Stop reason: HOSPADM

## 2022-03-11 RX ORDER — ONDANSETRON 4 MG/1
4 TABLET, FILM COATED ORAL EVERY 6 HOURS PRN
Status: DISCONTINUED | OUTPATIENT
Start: 2022-03-11 | End: 2022-03-13 | Stop reason: HOSPADM

## 2022-03-11 NOTE — PLAN OF CARE
Problem: Adult Inpatient Plan of Care  Goal: Plan of Care Review  Outcome: Ongoing, Progressing  Goal: Patient-Specific Goal (Individualized)  Outcome: Ongoing, Progressing  Goal: Absence of Hospital-Acquired Illness or Injury  Outcome: Ongoing, Progressing  Intervention: Identify and Manage Fall Risk  Recent Flowsheet Documentation  Taken 3/11/2022 1700 by Krysta Vanegas RN  Safety Promotion/Fall Prevention: safety round/check completed  Intervention: Prevent and Manage VTE (Venous Thromboembolism) Risk  Recent Flowsheet Documentation  Taken 3/11/2022 1700 by Krysta Vanegas RN  VTE Prevention/Management:   bilateral   dorsiflexion/plantar flexion performed  Range of Motion: active ROM (range of motion) encouraged  Intervention: Prevent Infection  Recent Flowsheet Documentation  Taken 3/11/2022 1700 by Krysta Vanegas RN  Infection Prevention:   single patient room provided   visitors restricted/screened  Goal: Optimal Comfort and Wellbeing  Outcome: Ongoing, Progressing  Intervention: Provide Person-Centered Care  Recent Flowsheet Documentation  Taken 3/11/2022 1700 by Krysta Vanegas RN  Trust Relationship/Rapport:   care explained   choices provided  Goal: Readiness for Transition of Care  Outcome: Ongoing, Progressing  Intervention: Mutually Develop Transition Plan  Recent Flowsheet Documentation  Taken 3/11/2022 1707 by Krysta Vanegas RN  Transportation Anticipated: family or friend will provide  Patient/Family Anticipated Services at Transition: none  Patient/Family Anticipates Transition to: home  Taken 3/11/2022 1705 by Krysta Vanegas RN  Equipment Currently Used at Home:   cane, straight   walker, rolling   Goal Outcome Evaluation:

## 2022-03-12 ENCOUNTER — APPOINTMENT (OUTPATIENT)
Dept: OTHER | Facility: HOSPITAL | Age: 57
End: 2022-03-12

## 2022-03-12 LAB
ANION GAP SERPL CALCULATED.3IONS-SCNC: 12 MMOL/L (ref 5–15)
BUN SERPL-MCNC: 8 MG/DL (ref 6–20)
BUN/CREAT SERPL: 9.8 (ref 7–25)
CALCIUM SPEC-SCNC: 8.8 MG/DL (ref 8.6–10.5)
CHLORIDE SERPL-SCNC: 104 MMOL/L (ref 98–107)
CO2 SERPL-SCNC: 25 MMOL/L (ref 22–29)
CREAT SERPL-MCNC: 0.82 MG/DL (ref 0.76–1.27)
CRP SERPL-MCNC: 2.95 MG/DL (ref 0–0.5)
DEPRECATED RDW RBC AUTO: 42.3 FL (ref 37–54)
EGFRCR SERPLBLD CKD-EPI 2021: 101.8 ML/MIN/1.73
ERYTHROCYTE [DISTWIDTH] IN BLOOD BY AUTOMATED COUNT: 13.1 % (ref 12.3–15.4)
ERYTHROCYTE [SEDIMENTATION RATE] IN BLOOD: 49 MM/HR (ref 0–20)
GLUCOSE SERPL-MCNC: 115 MG/DL (ref 65–99)
HCT VFR BLD AUTO: 38.3 % (ref 37.5–51)
HGB BLD-MCNC: 12.6 G/DL (ref 13–17.7)
MCH RBC QN AUTO: 29.3 PG (ref 26.6–33)
MCHC RBC AUTO-ENTMCNC: 32.9 G/DL (ref 31.5–35.7)
MCV RBC AUTO: 89.1 FL (ref 79–97)
PLATELET # BLD AUTO: 275 10*3/MM3 (ref 140–450)
PMV BLD AUTO: 10 FL (ref 6–12)
POTASSIUM SERPL-SCNC: 4.1 MMOL/L (ref 3.5–5.2)
RBC # BLD AUTO: 4.3 10*6/MM3 (ref 4.14–5.8)
SARS-COV-2 ORF1AB RESP QL NAA+PROBE: NOT DETECTED
SODIUM SERPL-SCNC: 141 MMOL/L (ref 136–145)
WBC NRBC COR # BLD: 8.6 10*3/MM3 (ref 3.4–10.8)

## 2022-03-12 PROCEDURE — 93010 ELECTROCARDIOGRAM REPORT: CPT | Performed by: INTERNAL MEDICINE

## 2022-03-12 PROCEDURE — 99204 OFFICE O/P NEW MOD 45 MIN: CPT | Performed by: INTERNAL MEDICINE

## 2022-03-12 PROCEDURE — 80048 BASIC METABOLIC PNL TOTAL CA: CPT | Performed by: NURSE PRACTITIONER

## 2022-03-12 PROCEDURE — 86140 C-REACTIVE PROTEIN: CPT | Performed by: INTERNAL MEDICINE

## 2022-03-12 PROCEDURE — 99213 OFFICE O/P EST LOW 20 MIN: CPT | Performed by: NEUROLOGICAL SURGERY

## 2022-03-12 PROCEDURE — 85027 COMPLETE CBC AUTOMATED: CPT | Performed by: NURSE PRACTITIONER

## 2022-03-12 PROCEDURE — 93005 ELECTROCARDIOGRAM TRACING: CPT | Performed by: HOSPITALIST

## 2022-03-12 PROCEDURE — 85652 RBC SED RATE AUTOMATED: CPT | Performed by: NEUROLOGICAL SURGERY

## 2022-03-12 PROCEDURE — G0378 HOSPITAL OBSERVATION PER HR: HCPCS

## 2022-03-12 PROCEDURE — 25010000002 CEFAZOLIN IN DEXTROSE 2-4 GM/100ML-% SOLUTION: Performed by: INTERNAL MEDICINE

## 2022-03-12 RX ORDER — CEFAZOLIN SODIUM 2 G/100ML
2 INJECTION, SOLUTION INTRAVENOUS EVERY 8 HOURS
Status: DISCONTINUED | OUTPATIENT
Start: 2022-03-12 | End: 2022-03-13 | Stop reason: HOSPADM

## 2022-03-12 RX ADMIN — CEFAZOLIN SODIUM 2 G: 2 INJECTION, SOLUTION INTRAVENOUS at 13:38

## 2022-03-12 RX ADMIN — METOPROLOL SUCCINATE 25 MG: 25 TABLET, EXTENDED RELEASE ORAL at 09:48

## 2022-03-12 RX ADMIN — LISINOPRIL 20 MG: 20 TABLET ORAL at 09:48

## 2022-03-12 RX ADMIN — CEFAZOLIN SODIUM 2 G: 2 INJECTION, SOLUTION INTRAVENOUS at 20:09

## 2022-03-12 RX ADMIN — MULTIPLE VITAMINS W/ MINERALS TAB 1 TABLET: TAB at 09:48

## 2022-03-12 NOTE — PROGRESS NOTES
"    DAILY PROGRESS NOTE  [facility]    Patient Identification:  Name: Archie Oquendo  Age: 56 y.o.  Sex: male  :  1965  MRN: [id number]         Primary Care Physician: Yudy Luna APRN    Subjective:  Interval History: Chronically back draining wound.  Previous surgeon was Dr. Bailey.  No nausea no vomiting no fever no chills no altered mentation    Objective: Sitting up finishing up lunch.  Very casual.  Nontoxic in appearance.  Pretty good historian.  No family at bedside    Scheduled Meds:ceFAZolin, 2 g, Intravenous, Q8H  lisinopril, 20 mg, Oral, Q24H  metoprolol succinate XL, 25 mg, Oral, Q24H  multivitamin with minerals, 1 tablet, Oral, Daily      Continuous Infusions:     Vital signs in last 24 hours:  Temp:  [97.6 °F (36.4 °C)-98.7 °F (37.1 °C)] 98.1 °F (36.7 °C)  Heart Rate:  [106-117] 106  Resp:  [18] 18  BP: (113-143)/(69-94) 113/80    Intake/Output:  No intake or output data in the 24 hours ending 22 1315    Exam:  /80 (BP Location: Right arm, Patient Position: Lying)   Pulse 106   Temp 98.1 °F (36.7 °C) (Oral)   Resp 18   Ht 188 cm (74.02\")   Wt 95.4 kg (210 lb 5.1 oz)   SpO2 94%   BMI 26.99 kg/m²     General Appearance:    Alert, cooperative, no distress, AAx3                         Throat:   Oral mucosa pink and moist                           Neck:   No JVD                         Lungs:    Clear to auscultation bilaterally, respirations unlabored                 Chest Wall:    No tenderness or deformity                          Heart:    Regular rate and rhythm, S1 and S2 normal                  Abdomen:     Soft, nontender, bowel sounds active                  Extremities: Moving all with baseline strength, no cyanosis or edema                        Pulses:   Pulses palpable in all extremities                            Skin:   Skin is warm and dry,  no rashes or palpable lesions                  Neurologic:   CNII-XII intact, patient able to " independently sit up in bed and stand without issue.  Back wound with no surrounding cellulitis or fluctuation.  Back is bandaged over draining site    Data Review:  Labs in chart were reviewed.    Assessment:  Active Hospital Problems    Diagnosis  POA   • **Wound drainage [L24.A9]  Yes   • S/P fusion of thoracic spine [Z98.1]  Not Applicable   • Benign essential HTN [I10]  Yes   • S/P lumbar fusion [Z98.1]  Not Applicable   • Lumbar radiculopathy [M54.16]  Yes      Resolved Hospital Problems   No resolved problems to display.       Plan:    IV cefazolin per ID for superficial MSSA infection though no signs of cellulitis around draining wound.    Hopeful for transition to p.o. antibiotics soon and patient be ready for discharge at that time    No plans for surgical intervention per [facility]      MAEGAN tomorrow pending ID input -Hospital status per CCP whether that is observation versus inpatient    Hunter Payton MD  3/12/2022  13:15 EST\

## 2022-03-12 NOTE — H&P
Patient Name:  Archie Oquendo  YOB: 1965  MRN:  [id number]  Admit Date:  3/11/2022  Patient Care Team:  Yudy Luna APRN as PCP - General (Family Medicine)  Corbin Bailey MD as Surgeon (Neurosurgery)      Subjective   History Present Illness     Chief complaint: Bulge and drainage from wound site    History of Present Illness   Mr. Oquendo is a 56 y.o. former smoker with a history of benign essential hypertension, spinal stenosis status post fusion of lumbar spine and thoracic spine, and lumbar radiculopathy that has transferred here from [facility] due to a bulge in his lower back as well as drainage.  Patient reports that he had several back surgeries with the last surgery being in June 2019 by Dr. Bailey.  He states for the past year he has noticed bilateral lower extremity weakness so in January he decided to increase his exercise regimen.  He noticed a bulge in his left lower back that slowly migrated to his lower mid spine area.  Today he got into the shower and noticed drainage from his lower lumbar incision site.  He went to the ER in which imaging was performed that showed a superficial fluid collection isolated to the subcutaneous fat at the L3 L5 extending into the flank that may reflect postoperative hematoma/seroma.  An abscess could not be excluded.  Patient is followed by Dr. Melissa of neurosurgery and was transferred to here for further evaluation by neurosurgery.    Review of Systems   Constitutional: Negative for chills and fever.   HENT: Negative for congestion and rhinorrhea.    Eyes: Negative for photophobia and visual disturbance.   Respiratory: Negative for cough and shortness of breath.    Cardiovascular: Negative for chest pain and palpitations.   Gastrointestinal: Negative for constipation, diarrhea, nausea and vomiting.   Endocrine: Negative for cold intolerance and heat intolerance.   Genitourinary: Negative for difficulty urinating and dysuria.    Musculoskeletal: Positive for back pain ( chronic) and gait problem. Negative for joint swelling.   Skin: Positive for wound ( drainage from wound site). Negative for rash.   Neurological: Negative for dizziness, light-headedness and headaches.   Psychiatric/Behavioral: Negative for sleep disturbance and suicidal ideas.        Personal History     Past Medical History:   Diagnosis Date   • Hip pain    • Hypertension    • Low back pain    • Lumbar disc herniation    • Pain in left knee    • Sleep apnea     DOES NOT WEAR CPAP   • Spinal stenosis    • Spinal stenosis of lumbar region      Past Surgical History:   Procedure Laterality Date   • BACK SURGERY     • LUMBAR DISCECTOMY     • LUMBAR DISCECTOMY FUSION INSTRUMENTATION Left 2019    Procedure: LUMBAR THREE LUMBAR FOUR  AND LUMBAR FIVE LUMBAR SIX DECOMPRESSION. THORACIC ELEVEN TO SACREL TWO POSTERIOR LATERAL ARTHRODESIS(FUSION) WITH SPINAL INSTRUMENTATION. REMOVAL OF LUMBAR INSTRUMENTATION.;  Surgeon: Corbin Bailey MD;  Location: [facility];  Service: Neurosurgery   • LUMBAR FUSION     • VASECTOMY       Family History   Problem Relation Age of Onset   • Diabetes Mother    • Hypertension Father    • Gout Father    • Sleep apnea Father    • Malig Hyperthermia Neg Hx      Social History     Tobacco Use   • Smoking status: Former Smoker     Packs/day: 0.50     Years: 10.00     Pack years: 5.00     Types: Cigarettes     Quit date: 2019     Years since quittin.7   • Smokeless tobacco: Never Used   Substance Use Topics   • Alcohol use: Yes     Alcohol/week: 5.0 standard drinks     Types: 5 Cans of beer per week   • Drug use: No     No current facility-administered medications on file prior to encounter.     Current Outpatient Medications on File Prior to Encounter   Medication Sig Dispense Refill   • cyclobenzaprine (FLEXERIL) 10 MG tablet Take 1 tablet by mouth 3 (Three) Times a Day As Needed for Muscle Spasms. 30 tablet 0   • lisinopril  (PRINIVIL,ZESTRIL) 10 MG tablet Take 1 tablet by mouth Daily. (Patient taking differently: Take 20 mg by mouth Daily.) 30 tablet 1   • metoprolol succinate XL (TOPROL-XL) 25 MG 24 hr tablet Take 1 tablet by mouth Daily. 30 tablet 1   • Multiple Vitamins-Minerals (MULTIVITAMIN ADULT PO) Take  by mouth Daily.       No Known Allergies    Objective    Objective     Vital Signs  Temp:  [98.6 °F (37 °C)] 98.6 °F (37 °C)  Heart Rate:  [117] 117  Resp:  [18] 18  BP: (143)/(94) 143/94  SpO2:  [97 %] 97 %  on   ;   Device (Oxygen Therapy): room air  Body mass index is 26.99 kg/m².    Physical Exam  Vitals and nursing note reviewed.   Constitutional:       General: He is not in acute distress.     Appearance: He is well-developed. He is not toxic-appearing.   HENT:      Head: Normocephalic and atraumatic.   Eyes:      General: No scleral icterus.        Right eye: No discharge.         Left eye: No discharge.      Conjunctiva/sclera: Conjunctivae normal.   Neck:      Vascular: No JVD.   Cardiovascular:      Rate and Rhythm: Regular rhythm. Tachycardia present.      Heart sounds: Normal heart sounds. No murmur heard.    No friction rub. No gallop.   Pulmonary:      Effort: Pulmonary effort is normal. No respiratory distress.      Breath sounds: Normal breath sounds. No wheezing or rales.   Abdominal:      General: Bowel sounds are normal. There is no distension.      Palpations: Abdomen is soft.      Tenderness: There is no abdominal tenderness. There is no guarding.   Musculoskeletal:         General: No tenderness or deformity. Normal range of motion.      Cervical back: Normal range of motion and neck supple.   Skin:     General: Skin is warm and dry.      Capillary Refill: Capillary refill takes less than 2 seconds.      Comments: Midline incision noted.  4 x 4 noted with dried drainage in his mid lower back incisional region   Neurological:      Mental Status: He is alert and oriented to person, place, and time.    Psychiatric:         Behavior: Behavior normal.       Results Review:  I reviewed the patient's new clinical results.    Lab Results (last 24 hours)     Procedure Component Value Units Date/Time    COMPREHENSIVE METABOLIC PANEL [[id number]]  (Abnormal) Collected: 03/11/22 0533     Updated: 03/11/22 1655    CBC AND DIFFERENTIAL [[id number]]  (Abnormal) Collected: 03/11/22 0533     Updated: 03/11/22 1655    AUTODIFF [[id number]] Collected: 03/11/22 0533     Updated: 03/11/22 1655    CBC Auto Differential [[id number]]  (Abnormal) Collected: 03/11/22 1915    Specimen: Blood Updated: 03/11/22 1945     WBC 7.39 10*3/mm3      RBC 3.94 10*6/mm3      Hemoglobin 11.7 g/dL      Hematocrit 35.1 %      MCV 89.1 fL      MCH 29.7 pg      MCHC 33.3 g/dL      RDW 13.0 %      RDW-SD 42.2 fl      MPV 9.9 fL      Platelets 249 10*3/mm3      Neutrophil % 67.7 %      Lymphocyte % 20.3 %      Monocyte % 8.5 %      Eosinophil % 2.7 %      Basophil % 0.5 %      Immature Grans % 0.3 %      Neutrophils, Absolute 5.00 10*3/mm3      Lymphocytes, Absolute 1.50 10*3/mm3      Monocytes, Absolute 0.63 10*3/mm3      Eosinophils, Absolute 0.20 10*3/mm3      Basophils, Absolute 0.04 10*3/mm3      Immature Grans, Absolute 0.02 10*3/mm3      nRBC 0.0 /100 WBC     Comprehensive Metabolic Panel [[id number]]  (Abnormal) Collected: 03/11/22 1915    Specimen: Blood Updated: 03/11/22 2010     Glucose 101 mg/dL      BUN 10 mg/dL      Creatinine 0.81 mg/dL      Sodium 142 mmol/L      Potassium 4.1 mmol/L      Chloride 107 mmol/L      CO2 26.0 mmol/L      Calcium 8.8 mg/dL      Total Protein 7.1 g/dL      Albumin 3.60 g/dL      ALT (SGPT) 22 U/L      AST (SGOT) 17 U/L      Alkaline Phosphatase 71 U/L      Total Bilirubin 0.3 mg/dL      Globulin 3.5 gm/dL      A/G Ratio 1.0 g/dL      BUN/Creatinine Ratio 12.3     Anion Gap 9.0 mmol/L      eGFR 102.2 mL/min/1.73      Comment: National Kidney Foundation and American Society of Nephrology (ASN) Task Force recommended  "calculation based on the Chronic Kidney Disease Epidemiology Collaboration (CKD-EPI) equation refit without adjustment for race.       Narrative:      GFR Normal >60  Chronic Kidney Disease <60  Kidney Failure <15      Lactic Acid, Plasma [[id number]]  (Normal) Collected: 03/11/22 1915    Specimen: Blood Updated: 03/11/22 2004     Lactate 1.9 mmol/L     Procalcitonin [[id number]]  (Normal) Collected: 03/11/22 1915    Specimen: Blood Updated: 03/11/22 2014     Procalcitonin 0.03 ng/mL     Narrative:      As a Marker for Sepsis (Non-Neonates):    1. <0.5 ng/mL represents a low risk of severe sepsis and/or septic shock.  2. >2 ng/mL represents a high risk of severe sepsis and/or septic shock.    As a Marker for Lower Respiratory Tract Infections that require antibiotic therapy:    PCT on Admission    Antibiotic Therapy       6-12 Hrs later    >0.5                Strongly Recommended  >0.25 - <0.5        Recommended   0.1 - 0.25          Discouraged              Remeasure/reassess PCT  <0.1                Strongly Discouraged     Remeasure/reassess PCT    As 28 day mortality risk marker: \"Change in Procalcitonin Result\" (>80% or <=80%) if Day 0 (or Day 1) and Day 4 values are available. Refer to http://www.[email or web address]-pct-calculator.com    Change in PCT <=80%  A decrease of PCT levels below or equal to 80% defines a positive change in PCT test result representing a higher risk for 28-day all-cause mortality of patients diagnosed with severe sepsis for septic shock.    Change in PCT >80%  A decrease of PCT levels of more than 80% defines a negative change in PCT result representing a lower risk for 28-day all-cause mortality of patients diagnosed with severe sepsis or septic shock.             Imaging Results (Last 24 Hours)     ** No results found for the last 24 hours. **              No orders to display        Assessment/Plan     Active Hospital Problems    Diagnosis  POA   • **Drainage from wound [L24.A9]  Yes   • S/P " fusion of thoracic spine [Z98.1]  Not Applicable   • Benign essential HTN [I10]  Yes   • S/P lumbar fusion [Z98.1]  Not Applicable   • Lumbar radiculopathy [M54.16]  Yes      Resolved Hospital Problems   No resolved problems to display.       Mr. Oquendo is a 56 y.o. former smoker with a history of lumbar radiculopathy, spinal stenosis status post lumbar fusion and thoracic spine fusion, HTN who presents with drainage to lower lumbar incisional site.    Wound drainage with hx of spinal stenosis and lumbar radiculopathy  -Status post T11 through S2 decompression and fusion in June 2019 by Dr. Bailey now followed by Dr. Mix. CT shows superficial fluid collection at the L3 L5 extending into the flank area.  May reflect postoperative hematoma/seroma.  -Consult neurosurgery  -Supportive care with analgesics  -Patient has received IV vancomycin at outside facility.  Will hold on any further antibiotics per neurosurgery request until evaluated by neurosurgery.  Patient has remained afebrile.  -Stat labs and repeat in a.m.  -Continue as needed Flexeril    Benign essential hypertension  -Stable, resume lisinopril and metoprolol succinate      I discussed the patient's findings and my recommendations with patient and Dr. García.    VTE Prophylaxis - SCDs.  Code Status - Full code.       VERONICA Barajas  [facility]  03/11/22  20:34 EST

## 2022-03-12 NOTE — CONSULTS
Referring Provider: Dr Mix    Reason for Consultation: Wound infection    History of present illness:  Archie Oquendo is a 56 y.o. who I am asked to evaluate and give opinion for Wound infection. History is obtained from the patient and review of the old medical records which I summarize/synthesize as follows: He had a back surgery in 2019 and says the post-op course was uneventful. Back in January 2022 he started exercising more and then noticed an area of swelling concerning for fluid collection at the lower back. Eventually this opened and started draining. No assc fever. He went to the ER and was admitted to [facility]. I spoke to their micro lab and he had some fluid culture from a swab that is growing MSSA. He also had a CT that reportedly showed a superficial fluid collection. He was transferred here on vancomycin for neurosurgery evaluation. At this time, there are no plans/need for surgical intervention per my review of the NSGY note.     Past Medical History:   Diagnosis Date   • Hip pain    • Hypertension    • Low back pain    • Lumbar disc herniation    • Pain in left knee    • Sleep apnea     DOES NOT WEAR CPAP   • Spinal stenosis    • Spinal stenosis of lumbar region        Past Surgical History:   Procedure Laterality Date   • BACK SURGERY     • LUMBAR DISCECTOMY     • LUMBAR DISCECTOMY FUSION INSTRUMENTATION Left 6/5/2019    Procedure: LUMBAR THREE LUMBAR FOUR  AND LUMBAR FIVE LUMBAR SIX DECOMPRESSION. THORACIC ELEVEN TO SACREL TWO POSTERIOR LATERAL ARTHRODESIS(FUSION) WITH SPINAL INSTRUMENTATION. REMOVAL OF LUMBAR INSTRUMENTATION.;  Surgeon: Corbin Bailey MD;  Location: [facility];  Service: Neurosurgery   • LUMBAR FUSION     • VASECTOMY         Social History:  Lives alone in [place], [place]  No illicits  Employed    Family History:  No 1st degree relatives w/ L spine infections    Antibiotic allergies and intolerances:  None    Medications:    Current Facility-Administered  Medications:   •  acetaminophen (TYLENOL) tablet 650 mg, 650 mg, Oral, Q4H PRN, Александр Moss APRN  •  aluminum-magnesium hydroxide-simethicone (MAALOX MAX) 400-400-40 MG/5ML suspension 15 mL, 15 mL, Oral, Q6H PRN, Александр Moss APRN  •  cyclobenzaprine (FLEXERIL) tablet 10 mg, 10 mg, Oral, TID PRN, Александр Moss APRN  •  lisinopril (PRINIVIL,ZESTRIL) tablet 20 mg, 20 mg, Oral, Q24H, Александр Moss APRN, 20 mg at 03/12/22 0948  •  metoprolol succinate XL (TOPROL-XL) 24 hr tablet 25 mg, 25 mg, Oral, Q24H, Александр Moss APRN, 25 mg at 03/12/22 0948  •  multivitamin with minerals 1 tablet, 1 tablet, Oral, Daily, Александр Moss APRN, 1 tablet at 03/12/22 0948  •  nitroglycerin (NITROSTAT) SL tablet 0.4 mg, 0.4 mg, Sublingual, Q5 Min PRN, Александр Moss APRN  •  ondansetron (ZOFRAN) tablet 4 mg, 4 mg, Oral, Q6H PRN **OR** ondansetron (ZOFRAN) injection 4 mg, 4 mg, Intravenous, Q6H PRN, Александр Moss APRN  •  sodium chloride 0.9 % flush 10 mL, 10 mL, Intravenous, PRN, Александр Moss APRN    Review of Systems  All systems were reviewed and are negative unless otherwise stated above in the HPI    Objective   Vital Signs   Temp:  [97.6 °F (36.4 °C)-98.7 °F (37.1 °C)] 98.1 °F (36.7 °C)  Heart Rate:  [106-117] 106  Resp:  [18] 18  BP: (113-143)/(69-94) 113/80    Physical Exam:   General: awake, alert, very nice  Head: atraumatic  Eyes: no scleral icterus  ENT: wearing mask  Neck: Supple  Cardiovascular: tachycardic, no murmur  Respiratory: Lungs are clear to auscultation bilaterally, no rales or wheezing; normal work of breathing on ambient air  GI: Abdomen is soft, nontender, nondistended  :  no Rae catheter  Musculoskeletal: lower back wound bandaged; drainage on bandage  Skin: No rashes  Neurological: Alert and oriented x 3  Psychiatric: Normal mood and affect   Vasc: no cyanosis    Labs:     Lab Results   Component Value Date    WBC 8.60 03/12/2022    HGB 12.6  (L) 03/12/2022    HCT 38.3 03/12/2022    MCV 89.1 03/12/2022     03/12/2022       Lab Results   Component Value Date    GLUCOSE 115 (H) 03/12/2022    BUN 8 03/12/2022    CREATININE 0.82 03/12/2022    EGFRIFNONA 95 07/01/2019    BCR 9.8 03/12/2022    CO2 25.0 03/12/2022    CALCIUM 8.8 03/12/2022    ALBUMIN 3.60 03/11/2022    AST 17 03/11/2022    ALT 22 03/11/2022     No results found for: CRP  No results found for: HGBA1C  No results found for: VANCOPEAK, VANCOTROUGH, VANCORANDOM      Microbiology:  3/9 OSH WCx: MSSA (susc oxacillin, clinda, doxy, Bactrim)  3/11 COVID: negative    Radiology (personally reviewed report):  None here  OSH CT scan with superficial fluid collection at L3-L5 could be hematoma, seroma, or abscess    Assessment/Plan   1. Superficial MSSA infection at prior lumbar spine surgical site  2. History of lumbar surgery    Start cefazolin 2 g IV q8h and monitor response. OSH CT is being uploaded and will follow-up that result. If no surgical intervention is needed, then I think he can transition to oral antibiotics at the time of discharge. I will check a CRP.     ID will follow.
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
## 2022-03-12 NOTE — PLAN OF CARE
Goal Outcome Evaluation:      VSS. Sinus tachycardia (100-110s) noted. Patient oriented x4. Wound to lower back dressing changed twice this shift due to large serous drainage. Periwound appears pink. Patient denies any pain. He ambulates with cane and SBA. Patient slept well in between care.            Problem: Adult Inpatient Plan of Care  Goal: Absence of Hospital-Acquired Illness or Injury  Intervention: Identify and Manage Fall Risk  Recent Flowsheet Documentation  Taken 3/12/2022 0445 by Faviola Campbell RN  Safety Promotion/Fall Prevention: safety round/check completed  Taken 3/12/2022 0218 by Faviola Campbell RN  Safety Promotion/Fall Prevention: safety round/check completed  Taken 3/12/2022 0011 by Faviola Campbell RN  Safety Promotion/Fall Prevention: safety round/check completed  Taken 3/11/2022 2200 by Faviola Campbell RN  Safety Promotion/Fall Prevention: safety round/check completed  Taken 3/11/2022 2000 by Faviola Campbell RN  Safety Promotion/Fall Prevention:   activity supervised   assistive device/personal items within reach   fall prevention program maintained   nonskid shoes/slippers when out of bed   safety round/check completed  Intervention: Prevent Skin Injury  Recent Flowsheet Documentation  Taken 3/12/2022 0445 by Faviola Campbell RN  Body Position: position changed independently  Taken 3/12/2022 0218 by Faviola Campbell RN  Body Position: position changed independently  Taken 3/12/2022 0011 by Faviola Campbell RN  Body Position: position changed independently  Taken 3/11/2022 2200 by Faviola Campbell RN  Body Position: position changed independently  Taken 3/11/2022 2000 by Faviola Campbell RN  Body Position: position changed independently  Intervention: Prevent and Manage VTE (Venous Thromboembolism) Risk  Recent Flowsheet Documentation  Taken 3/12/2022 0445 by Faviola Campbell RN  Activity Management:   activity adjusted per tolerance   ambulated to bathroom  Taken 3/11/2022 2000 by  Quirimit, Faviola, RN  Activity Management: activity adjusted per tolerance  VTE Prevention/Management: (placed)   bilateral   sequential compression devices on  Goal: Optimal Comfort and Wellbeing  Intervention: Monitor Pain and Promote Comfort  Recent Flowsheet Documentation  Taken 3/11/2022 2000 by Faviola Campbell RN  Pain Management Interventions: pain management plan reviewed with patient/caregiver  Intervention: Provide Person-Centered Care  Recent Flowsheet Documentation  Taken 3/11/2022 2000 by Faviola Campbell RN  Trust Relationship/Rapport:   care explained   emotional support provided   empathic listening provided   questions answered   questions encouraged     Problem: Fall Injury Risk  Goal: Absence of Fall and Fall-Related Injury  Intervention: Identify and Manage Contributors  Recent Flowsheet Documentation  Taken 3/11/2022 2000 by Faviola Campbell RN  Medication Review/Management: medications reviewed  Intervention: Promote Injury-Free Environment  Recent Flowsheet Documentation  Taken 3/12/2022 0445 by Faviola Campbell RN  Safety Promotion/Fall Prevention: safety round/check completed  Taken 3/12/2022 0218 by Faviola Campbell RN  Safety Promotion/Fall Prevention: safety round/check completed  Taken 3/12/2022 0011 by Faviola Campbell RN  Safety Promotion/Fall Prevention: safety round/check completed  Taken 3/11/2022 2200 by Faviola Campbell RN  Safety Promotion/Fall Prevention: safety round/check completed  Taken 3/11/2022 2000 by Faviola Campbell RN  Safety Promotion/Fall Prevention:   activity supervised   assistive device/personal items within reach   fall prevention program maintained   nonskid shoes/slippers when out of bed   safety round/check completed     Problem: Pain Acute  Goal: Acceptable Pain Control and Functional Ability  Intervention: Prevent or Manage Pain  Recent Flowsheet Documentation  Taken 3/11/2022 2000 by Faviola Campbell RN  Medication Review/Management: medications  reviewed  Intervention: Develop Pain Management Plan  Recent Flowsheet Documentation  Taken 3/11/2022 2000 by Faviola Campbell RN  Pain Management Interventions: pain management plan reviewed with patient/caregiver

## 2022-03-13 VITALS
HEART RATE: 93 BPM | SYSTOLIC BLOOD PRESSURE: 104 MMHG | DIASTOLIC BLOOD PRESSURE: 87 MMHG | WEIGHT: 210.32 LBS | RESPIRATION RATE: 18 BRPM | TEMPERATURE: 98.1 F | HEIGHT: 74 IN | OXYGEN SATURATION: 94 % | BODY MASS INDEX: 26.99 KG/M2

## 2022-03-13 PROBLEM — T81.49XA WOUND INFECTION AFTER SURGERY: Status: ACTIVE | Noted: 2022-03-13

## 2022-03-13 LAB
ANION GAP SERPL CALCULATED.3IONS-SCNC: 9 MMOL/L (ref 5–15)
BUN SERPL-MCNC: 9 MG/DL (ref 6–20)
BUN/CREAT SERPL: 9.8 (ref 7–25)
CALCIUM SPEC-SCNC: 9 MG/DL (ref 8.6–10.5)
CHLORIDE SERPL-SCNC: 106 MMOL/L (ref 98–107)
CO2 SERPL-SCNC: 26 MMOL/L (ref 22–29)
CREAT SERPL-MCNC: 0.92 MG/DL (ref 0.76–1.27)
DEPRECATED RDW RBC AUTO: 43.8 FL (ref 37–54)
EGFRCR SERPLBLD CKD-EPI 2021: 96.4 ML/MIN/1.73
ERYTHROCYTE [DISTWIDTH] IN BLOOD BY AUTOMATED COUNT: 13.2 % (ref 12.3–15.4)
GLUCOSE SERPL-MCNC: 121 MG/DL (ref 65–99)
HCT VFR BLD AUTO: 38.8 % (ref 37.5–51)
HGB BLD-MCNC: 12.5 G/DL (ref 13–17.7)
MCH RBC QN AUTO: 29.1 PG (ref 26.6–33)
MCHC RBC AUTO-ENTMCNC: 32.2 G/DL (ref 31.5–35.7)
MCV RBC AUTO: 90.2 FL (ref 79–97)
PLATELET # BLD AUTO: 257 10*3/MM3 (ref 140–450)
PMV BLD AUTO: 9.9 FL (ref 6–12)
POTASSIUM SERPL-SCNC: 4.4 MMOL/L (ref 3.5–5.2)
QT INTERVAL: 347 MS
RBC # BLD AUTO: 4.3 10*6/MM3 (ref 4.14–5.8)
SODIUM SERPL-SCNC: 141 MMOL/L (ref 136–145)
WBC NRBC COR # BLD: 7.72 10*3/MM3 (ref 3.4–10.8)

## 2022-03-13 PROCEDURE — 96365 THER/PROPH/DIAG IV INF INIT: CPT

## 2022-03-13 PROCEDURE — 85027 COMPLETE CBC AUTOMATED: CPT | Performed by: INTERNAL MEDICINE

## 2022-03-13 PROCEDURE — 80048 BASIC METABOLIC PNL TOTAL CA: CPT | Performed by: INTERNAL MEDICINE

## 2022-03-13 PROCEDURE — 99213 OFFICE O/P EST LOW 20 MIN: CPT | Performed by: NEUROLOGICAL SURGERY

## 2022-03-13 PROCEDURE — G0378 HOSPITAL OBSERVATION PER HR: HCPCS

## 2022-03-13 PROCEDURE — 96366 THER/PROPH/DIAG IV INF ADDON: CPT

## 2022-03-13 PROCEDURE — 99214 OFFICE O/P EST MOD 30 MIN: CPT | Performed by: INTERNAL MEDICINE

## 2022-03-13 PROCEDURE — 25010000002 CEFAZOLIN IN DEXTROSE 2-4 GM/100ML-% SOLUTION: Performed by: INTERNAL MEDICINE

## 2022-03-13 RX ORDER — CEFADROXIL 500 MG/5ML
POWDER, FOR SUSPENSION ORAL
Start: 2022-03-13 | End: 2023-02-10 | Stop reason: SDUPTHER

## 2022-03-13 RX ADMIN — MULTIPLE VITAMINS W/ MINERALS TAB 1 TABLET: TAB at 09:21

## 2022-03-13 RX ADMIN — METOPROLOL SUCCINATE 25 MG: 25 TABLET, EXTENDED RELEASE ORAL at 09:21

## 2022-03-13 RX ADMIN — LISINOPRIL 20 MG: 20 TABLET ORAL at 09:21

## 2022-03-13 RX ADMIN — CEFAZOLIN SODIUM 2 G: 2 INJECTION, SOLUTION INTRAVENOUS at 04:52

## 2022-03-13 RX ADMIN — CEFAZOLIN SODIUM 2 G: 2 INJECTION, SOLUTION INTRAVENOUS at 12:04

## 2022-03-13 NOTE — PLAN OF CARE
Goal Outcome Evaluation:      Patient remains tachycardic (100-120s). EKG obtained. Patient up ad jonh. NO other significant events overnight. Patient slept well in between care.            Problem: Adult Inpatient Plan of Care  Goal: Plan of Care Review  Outcome: Ongoing, Progressing  Goal: Patient-Specific Goal (Individualized)  Outcome: Ongoing, Progressing  Goal: Absence of Hospital-Acquired Illness or Injury  Outcome: Ongoing, Progressing  Intervention: Identify and Manage Fall Risk  Recent Flowsheet Documentation  Taken 3/13/2022 0450 by Faviola Campbell RN  Safety Promotion/Fall Prevention: safety round/check completed  Taken 3/13/2022 0300 by Faviola Campbell RN  Safety Promotion/Fall Prevention: safety round/check completed  Taken 3/13/2022 0030 by Faviola Campbell RN  Safety Promotion/Fall Prevention: safety round/check completed  Taken 3/12/2022 2232 by Faviola Campbell RN  Safety Promotion/Fall Prevention: safety round/check completed  Taken 3/12/2022 1939 by Faviola Campbell RN  Safety Promotion/Fall Prevention:   assistive device/personal items within reach   fall prevention program maintained   nonskid shoes/slippers when out of bed   safety round/check completed  Intervention: Prevent Skin Injury  Recent Flowsheet Documentation  Taken 3/13/2022 0450 by Faviola Campbell RN  Body Position: position changed independently  Taken 3/13/2022 0300 by Faviola Campbell RN  Body Position: position changed independently  Taken 3/13/2022 0030 by Faviola Campbell RN  Body Position: position changed independently  Taken 3/12/2022 2232 by Faviola Campbell RN  Body Position: position changed independently  Taken 3/12/2022 1939 by Faviola Campbell RN  Body Position: position changed independently  Intervention: Prevent and Manage VTE (Venous Thromboembolism) Risk  Recent Flowsheet Documentation  Taken 3/13/2022 0450 by Faviola Campbell RN  Activity Management: ambulated to bathroom  Taken 3/12/2022 1939 by Adrian  LUKE Salmeron  Activity Management: activity adjusted per tolerance  VTE Prevention/Management:   bilateral   sequential compression devices on  Goal: Optimal Comfort and Wellbeing  Outcome: Ongoing, Progressing  Goal: Readiness for Transition of Care  Outcome: Ongoing, Progressing     Problem: Fall Injury Risk  Goal: Absence of Fall and Fall-Related Injury  Outcome: Ongoing, Progressing  Intervention: Promote Injury-Free Environment  Recent Flowsheet Documentation  Taken 3/13/2022 0450 by Faviola Campbell RN  Safety Promotion/Fall Prevention: safety round/check completed  Taken 3/13/2022 0300 by Faviola Campbell RN  Safety Promotion/Fall Prevention: safety round/check completed  Taken 3/13/2022 0030 by Faviola Campbell RN  Safety Promotion/Fall Prevention: safety round/check completed  Taken 3/12/2022 2232 by Faviola Campbell RN  Safety Promotion/Fall Prevention: safety round/check completed  Taken 3/12/2022 1939 by Faviola Campbell RN  Safety Promotion/Fall Prevention:   assistive device/personal items within reach   fall prevention program maintained   nonskid shoes/slippers when out of bed   safety round/check completed     Problem: Pain Acute  Goal: Acceptable Pain Control and Functional Ability  Outcome: Ongoing, Progressing

## 2022-03-13 NOTE — PROGRESS NOTES
LOS: 2 days   Patient Care Team:  Yudy Luna APRN as PCP - General (Family Medicine)  Corbin Bailey MD as Surgeon (Neurosurgery)    Chief Complaint: Draining incision    Subjective     This patient says he feels pretty good overall.  He has not had a lot of drainage.    Interval History:     History taken from: patient chart    Objective      He has good movement of both lower extremities and his incision looks fine.  There is no redness or evidence of other infection.    Vital Signs  Temp:  [97.8 °F (36.6 °C)-99 °F (37.2 °C)] 99 °F (37.2 °C)  Heart Rate:  [105-129] 107  Resp:  [18] 18  BP: (106-123)/(72-84) 123/77       Results Review:     I reviewed the patient's new clinical results.  His sed rate and ESR are mildly elevated.  His white count is normal.  I did review the infectious disease note and Dr. López feels that he can go home today with oral antibiotics.      Assessment/Plan       Wound drainage    Lumbar radiculopathy    S/P lumbar fusion    Benign essential HTN    S/P fusion of thoracic spine    Wound infection after surgery      Told the patient I would agree with Dr. López.  We will see him back in the office in about a week and see how he is doing.  I told him to clean the incision 2 or 3 times a day with peroxide and keep it covered with a sterile gauze.  I would also agree that if it does not close up on its own we may need to consider more imaging to rule out a deeper infection.      Nj Mix MD  03/13/22  12:22 EDT

## 2022-03-13 NOTE — DISCHARGE SUMMARY
[facility]    Date of Discharge:  3/13/2022    PCP: Yudy Luna APRN    Discharge Diagnosis:   Active Hospital Problems    Diagnosis  POA   • **Wound drainage [L24.A9]  Yes   • Wound infection after surgery [T81.49XA]  Yes   • S/P fusion of thoracic spine [Z98.1]  Not Applicable   • Benign essential HTN [I10]  Yes   • S/P lumbar fusion [Z98.1]  Not Applicable   • Lumbar radiculopathy [M54.16]  Yes      Resolved Hospital Problems   No resolved problems to display.          Consults     Date and Time Order Name Status Description    3/12/2022 11:17 AM Inpatient Infectious Diseases Consult Completed         Hospital Course  56 y.o. male initially admitted for wound drainage from a surgical site previously performed by .  Patient was seen in consultation by neurosurgical team here Dr. Mix.  No further intervention was advised from their perspective.  ID saw the patient as he was having a draining lesion that has been going on for some time from previous surgical site.  Cultures have grown methicillin sensitive staph aureus.  Patient has no aspects of toxicity leukocytosis or concerns for systemic infection.  You can not appreciate any aspects of surrounding cellulitis.  ID saw in consultation and their input was greatly appreciated.  Recommendations are to treat this medically at this juncture.  He was maintained on cefazolin IV for about 24 hours and he will switch to a cefadroxil 1 g twice daily for 10 days per the recommendations.  Patient is quite ambulatory and gets around with use of a cane.  It does not seem as if he needs any additional assistance or further physical therapy evaluations.  At this juncture he is deemed medically stable to be discharged home with further outpatient follow-up.  All questions answered to the best my ability.  Case discussed with Dr. López.      Condition on Discharge: Improved.     Temp:  [97.8 °F (36.6  °C)-99 °F (37.2 °C)] 98.1 °F (36.7 °C)  Heart Rate:  [] 93  Resp:  [18] 18  BP: (104-123)/(72-87) 104/87  Body mass index is 26.99 kg/m².    Physical Exam  HENT:      Head: Normocephalic.      Nose: Nose normal.      Mouth/Throat:      Mouth: Mucous membranes are moist.      Pharynx: Oropharynx is clear.   Eyes:      General: No scleral icterus.     Conjunctiva/sclera: Conjunctivae normal.   Cardiovascular:      Rate and Rhythm: Normal rate and regular rhythm.   Pulmonary:      Effort: Pulmonary effort is normal.      Breath sounds: Normal breath sounds.   Abdominal:      General: Bowel sounds are normal. There is no distension.      Palpations: Abdomen is soft.      Tenderness: There is no abdominal tenderness.   Skin:     General: Skin is warm and dry.      Comments: Small draining lesion from lumbar spine.  No surrounding cellulitis induration or fluctuation   Neurological:      Mental Status: He is alert and oriented to person, place, and time.   Psychiatric:         Mood and Affect: Mood normal.         Behavior: Behavior normal.         Thought Content: Thought content normal.         Judgment: Judgment normal.       Disposition: Home or Self Care       Discharge Medications      New Medications      Instructions Start Date   cefadroxil 500 MG/5ML suspension  Commonly known as: DURICEF   1g po bid [id number]         Changes to Medications      Instructions Start Date   lisinopril 10 MG tablet  Commonly known as: PRINIVIL,ZESTRIL  What changed: how much to take   10 mg, Oral, Every 24 Hours Scheduled         Continue These Medications      Instructions Start Date   cyclobenzaprine 10 MG tablet  Commonly known as: FLEXERIL   10 mg, Oral, 3 Times Daily PRN      metoprolol succinate XL 25 MG 24 hr tablet  Commonly known as: TOPROL-XL   25 mg, Oral, Every 24 Hours Scheduled      multivitamin with minerals tablet tablet   Oral, Daily              Additional Instructions for the Follow-ups that You Need to  Schedule     Discharge Follow-up with PCP   As directed       Currently Documented PCP:    Yudy Luna APRN    PCP Phone Number:    [phone]     Follow Up Details: PCP as needed.  Neurosurgery and ID per the recommendations            Follow-up Information     Yudy Luna APRN .    Specialty: Family Medicine  Why: PCP as needed.  Neurosurgery and ID per the recommendations  Contact information:  [address] [facility] [address]  [phone]                           Hunter Payton MD  03/13/22  14:25 EDT    Discharge time spent greater than 30 minutes.

## 2022-03-13 NOTE — PROGRESS NOTES
"    DAILY PROGRESS NOTE  [facility]    Patient Identification:  Name: Archie Oquendo  Age: 56 y.o.  Sex: male  :  1965  MRN: [id number]         Primary Care Physician: Yudy Luna APRN    Subjective:  Interval History: No new issues.  No fever or chills.  No confusion nausea and or vomiting.  Pain is stable    Objective: Resting supine without issue.  Conversational and pleasant.  Nontoxic in appearance.  No family at bedside    Scheduled Meds:ceFAZolin, 2 g, Intravenous, Q8H  lisinopril, 20 mg, Oral, Q24H  metoprolol succinate XL, 25 mg, Oral, Q24H  multivitamin with minerals, 1 tablet, Oral, Daily      Continuous Infusions:     Vital signs in last 24 hours:  Temp:  [97.8 °F (36.6 °C)-99 °F (37.2 °C)] 99 °F (37.2 °C)  Heart Rate:  [105-129] 107  Resp:  [18] 18  BP: (106-123)/(72-84) 123/77    Intake/Output:  No intake or output data in the 24 hours ending 22 0844    Exam:  /77 (BP Location: Right arm, Patient Position: Lying)   Pulse 107   Temp 99 °F (37.2 °C) (Oral)   Resp 18   Ht 188 cm (74.02\")   Wt 95.4 kg (210 lb 5.1 oz)   SpO2 94%   BMI 26.99 kg/m²     General Appearance:    Alert, cooperative, no distress, AAOx3                         Throat:   Oral mucosa pink and moist                           Neck:   No JVD                         Lungs:    Clear to auscultation bilaterally, respirations unlabored                         Heart:    Regular rate and rhythm, S1 and S2 normal                  Abdomen:     Soft, nontender, bowel sounds active, no surrounding cellulitis from drainage site                 Extremities: Moving all, no cyanosis or edema                          Data Review:  Labs in chart were reviewed.    Assessment:  Active Hospital Problems    Diagnosis  POA   • **Wound drainage [L24.A9]  Yes   • S/P fusion of thoracic spine [Z98.1]  Not Applicable   • Benign essential HTN [I10]  Yes   • S/P lumbar fusion [Z98.1]  Not Applicable   • Lumbar " radiculopathy [M54.16]  Yes      Resolved Hospital Problems   No resolved problems to display.       Plan:    IV cefazolin per ID for superficial MSSA infection though no signs of cellulitis around draining wound   -CRP and ESR elevated    -Await further recommendations regarding IV necessity versus transition to p.o. antibiotics per ID   -No plans for surgical intervention per [facility] today pending ID -discussed with RN    Hunter Payton MD  3/13/2022  08:44 EDT

## 2022-03-13 NOTE — PROGRESS NOTES
LOS: 2 days     Chief Complaint:  Follow-up MSSA infection    Interval History:  He feels about the same. No fever. Tolerating IV cefazolin. Erythema is improved. Eager for discharge. D/W RN and Dr Payton.     ROS: no CP or SOA    Vital Signs  Temp:  [97.8 °F (36.6 °C)-99 °F (37.2 °C)] 99 °F (37.2 °C)  Heart Rate:  [105-129] 107  Resp:  [18] 18  BP: (106-123)/(72-84) 123/77    Physical Exam:   General: awake, alert, very nice  Eyes: no scleral icterus  Cardiovascular: tachycardic, no murmur  Respiratory:  normal work of breathing on ambient air  GI: Abdomen is soft, nontender, nondistended  :  no Rae catheter  Musculoskeletal: lower back wound bandaged; small open wound with scant drainage; no surrounding erythema  Skin: No rashes  Neurological: Alert and oriented x 3  Psychiatric: Normal mood and affect     Antibiotics:  •  ceFAZolin in dextrose (ANCEF) IVPB solution 2 g, 2 g, Intravenous, Q8H    LABS:  CRP reviewed today  Lab Results   Component Value Date    WBC 8.60 03/12/2022    HGB 12.6 (L) 03/12/2022    HCT 38.3 03/12/2022    MCV 89.1 03/12/2022     03/12/2022     Lab Results   Component Value Date    GLUCOSE 115 (H) 03/12/2022    BUN 8 03/12/2022    CREATININE 0.82 03/12/2022    EGFRIFNONA 95 07/01/2019    BCR 9.8 03/12/2022    CO2 25.0 03/12/2022    CALCIUM 8.8 03/12/2022    ALBUMIN 3.60 03/11/2022    AST 17 03/11/2022    ALT 22 03/11/2022    CRP 2.95 (H) 03/12/2022     Microbiology:  3/9 OSH WCx: MSSA (susc oxacillin, clinda, doxy, Bactrim)  3/11 COVID: negative    Assessment/Plan   1. Superficial MSSA infection at prior lumbar spine surgical site  2. History of lumbar surgery     Good clinical response to cefazolin 2 g IV q8h. Continue this while in the hospital and when ready for DC (probably today) then change to cefadroxil 1 g PO BID x 10 days. D/W Dr Payton.     Obviously if he has any worsening while on antibiotics or recurrence after antibiotics, then I would recommend repeat imaging for  development of a deeper source that could need procedural intervention.     Thank you for allowing me to be involved in the care of this patient. Infectious diseases will sign off at this time with antibiotics plan in place, but please call me at [phone] if any further ID questions or new ID concerns.

## 2022-03-14 ENCOUNTER — TELEPHONE (OUTPATIENT)
Dept: NEUROSURGERY | Facility: CLINIC | Age: 57
End: 2022-03-14

## 2022-03-14 NOTE — TELEPHONE ENCOUNTER
Caller: Archie Oquendo    Relationship to patient: Self    Best call back number: [phone], OR DYLANDEEJAYEMORY    Chief complaint: HOSPITAL FOLLOW UP     Type of visit: HOSPITAL FOLLOW UP     Requested date: BY 3/21/2022 PER  DISCHARGE SUMMARY    If rescheduling, when is the original appointment:      Additional notes:PER ER NOTE     Follow-Ups     1 Follow up with Yudy Luna APRN (Family Medicine); PCP as needed.  Neurosurgery and ID per the recommendations  2 Schedule an appointment with Nj Mix MD (Neurosurgery) in 1 week (3/20/2022)

## 2022-03-14 NOTE — CASE MANAGEMENT/SOCIAL WORK
Case Management Discharge Note      Final Note: home         Selected Continued Care - Discharged on 3/13/2022 Admission date: 3/11/2022 - Discharge disposition: Home or Self Care    Destination    No services have been selected for the patient.              Durable Medical Equipment    No services have been selected for the patient.              Dialysis/Infusion    No services have been selected for the patient.              Home Medical Care    No services have been selected for the patient.              Therapy    No services have been selected for the patient.              Community Resources    No services have been selected for the patient.              Community & DME    No services have been selected for the patient.                  Transportation Services  Private: Car    Final Discharge Disposition Code: 01 - home or self-care

## 2022-03-21 NOTE — TELEPHONE ENCOUNTER
S/W patient and asked if he can come in to be seen by Dr. Mix tomorrow at 12:30 pm, patient agreed

## 2022-03-21 NOTE — TELEPHONE ENCOUNTER
PATIENT CALLED BACK TO SCHEDULE THIS APPOINTMENT, PATIENT IS NOW PAST SCHEDULING TIMEFRAME ORIGINALLY OUTLINED FOR FOLLOW UP WITH DR. STUART. PLEASE SCHEDULE AND CONTACT THE PATIENT ONCE COMPLETED.     THANK YOU VERY MUCH. INCREASING PRIORITY TO HIGH PRIORITY DUE TO THE TIME THAT HAS PAST SINCE HIS LAST PHONE CALL TO SCHEDULE.     PATIENT C/B #: [phone]    THANK YOU VERY MUCH.

## 2022-03-21 NOTE — PROGRESS NOTES
"Subjective   Patient ID: Archie Oquendo is a 56 y.o. male is here today for follow-up for wound check. Patient had a lumbar laminectomy at L3, L4, L5 and L6 with a subsequent fusion from T11-S2 in June 2019 with Dr. Bailey.    Today patient states that he has constant pain in his low back. Patient is also having issues with gait/balance. Patient also has N/T in B/ L legs. Incision looks healthy no redness, no swelling, small amount of drainage.     Patient, Provider, and MA are all wearing a mask in our office today.     History of Present Illness     This patient returns today.  He is feeling about the same as he was in the hospital.  His back pain is numbness and tingling and everything else is about the same.  Is been that way for some time.  He is not having very much drainage from his back anymore at all.    The following portions of the patient's history were reviewed and updated as appropriate: allergies, current medications, past family history, past medical history, past social history, past surgical history and problem list.    Review of Systems   Constitutional: Negative for chills and fever.   HENT: Negative for congestion.    Genitourinary: Negative for difficulty urinating and dysuria.   Musculoskeletal: Positive for back pain, gait problem and myalgias.        B/L leg   Neurological: Positive for weakness and numbness.       I have reviewed the review of systems as documented by my MA.      Objective     Vitals:    03/22/22 1229   BP: 130/81   Cuff Size: Adult   Pulse: 92   Temp: 98 °F (36.7 °C)   Weight: 95.4 kg (210 lb 5.1 oz)   Height: 188 cm (74.02\")     Body mass index is 26.99 kg/m².      Physical Exam  Neurological:      Mental Status: He is alert and oriented to person, place, and time.       Neurologic Exam     Mental Status   Oriented to person, place, and time.     I looked at his back.  There is still a small area of dehiscence in the middle of the scar tissue but it is markedly smaller " than it was in the hospital.  There is minimal yellow drainage.  It is not purulent.      Assessment/Plan   Independent Review of Radiographic Studies:      I personally reviewed the images from the following studies.    There is no new imaging to review    Medical Decision Making:      I told the patient that when he finishes antibiotics tomorrow is to start cleaning the wound twice a day with peroxide and redress it.  I will see him again in a couple of weeks.    Diagnoses and all orders for this visit:    1. Wound drainage (Primary)      Return in about 2 weeks (around 4/5/2022).

## 2022-03-22 ENCOUNTER — OFFICE VISIT (OUTPATIENT)
Dept: NEUROSURGERY | Facility: CLINIC | Age: 57
End: 2022-03-22

## 2022-03-22 VITALS
SYSTOLIC BLOOD PRESSURE: 130 MMHG | DIASTOLIC BLOOD PRESSURE: 81 MMHG | BODY MASS INDEX: 26.99 KG/M2 | HEART RATE: 92 BPM | WEIGHT: 210.32 LBS | HEIGHT: 74 IN | TEMPERATURE: 98 F

## 2022-03-22 DIAGNOSIS — L24.A9 WOUND DRAINAGE: Primary | ICD-10-CM

## 2022-03-22 PROCEDURE — 99213 OFFICE O/P EST LOW 20 MIN: CPT | Performed by: NEUROLOGICAL SURGERY

## 2022-03-22 RX ORDER — LISINOPRIL 20 MG/1
20 TABLET ORAL DAILY
COMMUNITY
Start: 2022-03-10 | End: 2023-02-10

## 2022-03-22 RX ORDER — CYCLOBENZAPRINE HCL 10 MG
10 TABLET ORAL
COMMUNITY
Start: 2022-03-22

## 2022-04-01 NOTE — PROGRESS NOTES
"Subjective   Patient ID: Archie Oquendo is a 56 y.o. male is here today for 2 week follow-up wound check. Patient had a lumbar laminectomy at L3, L4, L5 and L6 with a subsequent fusion from T11-S2 in June 2019 with Dr. Bailey.    Today Mr. Oquendo reports the hole has healed up and it is not draining for the last few days. He denies any fever or chills. He reports he is still dealing with constant low back pain. He reports numbness and tingling.     Patient, Provider, and MA are all wearing a mask in our office today.     History of Present Illness     This patient returns today.  He is doing well.  He has no drainage at all from his back.  He has been off antibiotics for a couple of weeks now.    The following portions of the patient's history were reviewed and updated as appropriate: allergies, current medications, past family history, past medical history, past social history, past surgical history and problem list.    Review of Systems   Constitutional: Negative for chills and fever.   Musculoskeletal: Positive for back pain.   Skin: Positive for wound.   Neurological: Positive for numbness.       I have reviewed the review of systems as documented by my MA.      Objective     Vitals:    04/05/22 1228   BP: 100/60   Pulse: 114   Temp: 97.8 °F (36.6 °C)   SpO2: 100%   Weight: 95.4 kg (210 lb 5.1 oz)   Height: 188 cm (74.02\")     Body mass index is 26.99 kg/m².      Physical Exam  Neurological:      Mental Status: He is alert and oriented to person, place, and time.       Neurologic Exam     Mental Status   Oriented to person, place, and time.     The area of infection looks fine now.      Assessment/Plan   Independent Review of Radiographic Studies:      I personally reviewed the images from the following studies.    There is no new imaging to review    Medical Decision Making:      I told the patient that he can gradually return to normal activities.  He is to call if any problems develop.    Diagnoses and " all orders for this visit:    1. Wound infection after surgery (Primary)      Return if symptoms worsen or fail to improve.

## 2022-04-05 ENCOUNTER — OFFICE VISIT (OUTPATIENT)
Dept: NEUROSURGERY | Facility: CLINIC | Age: 57
End: 2022-04-05

## 2022-04-05 VITALS
TEMPERATURE: 97.8 F | DIASTOLIC BLOOD PRESSURE: 60 MMHG | BODY MASS INDEX: 26.99 KG/M2 | WEIGHT: 210.32 LBS | HEIGHT: 74 IN | SYSTOLIC BLOOD PRESSURE: 100 MMHG | OXYGEN SATURATION: 100 % | HEART RATE: 114 BPM

## 2022-04-05 DIAGNOSIS — T81.49XA WOUND INFECTION AFTER SURGERY: Primary | ICD-10-CM

## 2022-04-05 PROCEDURE — 99213 OFFICE O/P EST LOW 20 MIN: CPT | Performed by: NEUROLOGICAL SURGERY

## 2023-01-19 ENCOUNTER — TELEPHONE (OUTPATIENT)
Dept: NEUROSURGERY | Facility: CLINIC | Age: 58
End: 2023-01-19
Payer: COMMERCIAL

## 2023-01-19 DIAGNOSIS — L24.A9 WOUND DRAINAGE: Primary | ICD-10-CM

## 2023-01-19 NOTE — TELEPHONE ENCOUNTER
"Patient seen in March 2022 due to a small area of dehiscence and the skin at the incision site.  There was no fever or chills and white count, lactate, procalcitonin normal.  CT scan at the time did not show any evidence of fluid collection.  The findings were felt to be related to some type of superficial infection.  He was seen by Dr. López with ID and patient was discharged on oral antibiotics and local wound care.    Upon review of recent notes, his PCP referred him to general surgeon for \"abscess\".  PCP prescribed one round of antibiotics. The general surgeon aspirated a fluid collection approximately 6 weeks ago from his left low back and cultured it with the results as below.  I do not see any further contact in the chart with regard to any treatment course.  Patient states he was not contacted with lab results or any need for treatment.  He was under the impression that the general surgeon was going to get in contact with Dr. Mix regarding the findings.  Patient states he has not reached out to their office or our office until 2 days ago when fluid collection at the \"mid back\" ruptured in his sleep. There is a small hole in the area of leakage along the incision.  Drainage has been clear or blood-tinged.  He is cleaning with peroxide and covering with bandage. No change in chronic back pain. No fever or chills. There remains a lower area of fluid collection.  He denies any redness in any of the areas.    Discussed with Dr. Velasquez.  As the current issues have been ongoing for 6 weeks he has no systemic signs of infection, recommends lab work and CT scan with follow-up with Dr. Mix next week.  Patient advised of this as well as need to call us back or come to [facility] if he develops fever, chills, or change in wound drainage as far as odor, color, amount.  He verbalized understanding of these plans/recommendations.    "

## 2023-01-19 NOTE — TELEPHONE ENCOUNTER
RESULTS in CARE EVERYWHERE:  BJ Baxter                                 :  Sex Male     t:                                                                   3         :                                        Microbiology - Bacteriology       PROCEDURE:                Culture Wound and Stain  ACCESSION:                [id number]                             [R1]   COLLECTED DATE/TIME:      2022 10:23 EST      SOURCE:                   Wound   START DATE/TIME:          2022 16:35 EST      BODY SITE:                Back   ORDERING PHYSICIAN        REILLY ONEILL MD- FREE TEXT SOURCE:                             LUI[facility]     AMENDED REPORTS   Amended Report  []   Verified Date/Time: 2022 10:35 EST   Rare Staphylococcus aureus (two colonies)   Sensitivity not performed. If organism is determined to be clinically significant, please   request sensitivity within 7 days.       -------------Stains/Preps-------------   GS  []   Verified Date/Time: 2022 17:33 EST   2+ WBC's (1-9 WBC/LPF)   No organisms seen.   Red Blood Cells seen       Performing Locations   R1:   This test was performed at:         [facility], Pathology Department, [address] [place] [address],         [facility]    , [place], [phone]  Specimen Collected: 22 10:23 Last Resulted: 22 15:31

## 2023-01-19 NOTE — TELEPHONE ENCOUNTER
Pt had a umbar laminectomy at L3, L4, L5 and L6 with a subsequent fusion from T11-S2 in June 2019 with Dr. Bailey.  He saw Dr Mix on 4/212/22 with a wound infection.  Pt saw Dr Dionisio Cleary, a surgeon in December that did a wound culture on his back due to a fluid filled lesion and told the pt he thinks it is from the hardware in his back.  Pt has developed 2 fluid filled lesions and one of them ruptured a few days ago.  He stated there is no redness, warmth , tenderness in either spot.  Please advise.

## 2023-01-19 NOTE — TELEPHONE ENCOUNTER
Patient is scheduled for CT's tomorrow.    Ally can you please find a spot for patient to see Dr. Mix next week?    Marita is working on auth

## 2023-01-20 ENCOUNTER — LAB (OUTPATIENT)
Dept: LAB | Facility: HOSPITAL | Age: 58
End: 2023-01-20
Payer: COMMERCIAL

## 2023-01-20 ENCOUNTER — HOSPITAL ENCOUNTER (OUTPATIENT)
Dept: CT IMAGING | Facility: HOSPITAL | Age: 58
Discharge: HOME | End: 2023-01-20
Payer: COMMERCIAL

## 2023-01-20 DIAGNOSIS — L24.A9 WOUND DRAINAGE: ICD-10-CM

## 2023-01-20 DIAGNOSIS — L24.A9 WOUND DRAINAGE: Primary | ICD-10-CM

## 2023-01-20 LAB
BASOPHILS # BLD AUTO: 0.04 10*3/MM3 (ref 0–0.2)
BASOPHILS NFR BLD AUTO: 0.5 % (ref 0–1.5)
CRP SERPL-MCNC: 1.17 MG/DL (ref 0–0.5)
DEPRECATED RDW RBC AUTO: 40.9 FL (ref 37–54)
EOSINOPHIL # BLD AUTO: 0.15 10*3/MM3 (ref 0–0.4)
EOSINOPHIL NFR BLD AUTO: 1.9 % (ref 0.3–6.2)
ERYTHROCYTE [DISTWIDTH] IN BLOOD BY AUTOMATED COUNT: 12.3 % (ref 12.3–15.4)
ERYTHROCYTE [SEDIMENTATION RATE] IN BLOOD: 20 MM/HR (ref 0–20)
HCT VFR BLD AUTO: 37.9 % (ref 37.5–51)
HGB BLD-MCNC: 13.1 G/DL (ref 13–17.7)
IMM GRANULOCYTES # BLD AUTO: 0.03 10*3/MM3 (ref 0–0.05)
IMM GRANULOCYTES NFR BLD AUTO: 0.4 % (ref 0–0.5)
LYMPHOCYTES # BLD AUTO: 1.58 10*3/MM3 (ref 0.7–3.1)
LYMPHOCYTES NFR BLD AUTO: 19.7 % (ref 19.6–45.3)
MCH RBC QN AUTO: 31.5 PG (ref 26.6–33)
MCHC RBC AUTO-ENTMCNC: 34.6 G/DL (ref 31.5–35.7)
MCV RBC AUTO: 91.1 FL (ref 79–97)
MONOCYTES # BLD AUTO: 0.76 10*3/MM3 (ref 0.1–0.9)
MONOCYTES NFR BLD AUTO: 9.5 % (ref 5–12)
NEUTROPHILS NFR BLD AUTO: 5.46 10*3/MM3 (ref 1.7–7)
NEUTROPHILS NFR BLD AUTO: 68 % (ref 42.7–76)
NRBC BLD AUTO-RTO: 0 /100 WBC (ref 0–0.2)
PLATELET # BLD AUTO: 232 10*3/MM3 (ref 140–450)
PMV BLD AUTO: 9.8 FL (ref 6–12)
RBC # BLD AUTO: 4.16 10*6/MM3 (ref 4.14–5.8)
WBC NRBC COR # BLD: 8.02 10*3/MM3 (ref 3.4–10.8)

## 2023-01-20 PROCEDURE — 86140 C-REACTIVE PROTEIN: CPT

## 2023-01-20 PROCEDURE — 36415 COLL VENOUS BLD VENIPUNCTURE: CPT

## 2023-01-20 PROCEDURE — 72131 CT LUMBAR SPINE W/O DYE: CPT

## 2023-01-20 PROCEDURE — 85652 RBC SED RATE AUTOMATED: CPT

## 2023-01-20 PROCEDURE — 72128 CT CHEST SPINE W/O DYE: CPT

## 2023-01-20 PROCEDURE — 85025 COMPLETE CBC W/AUTO DIFF WBC: CPT

## 2023-01-20 NOTE — TELEPHONE ENCOUNTER
31st is fine. We can review CT on Monday and if there is anything alarming, change course at that point. Did the authorization get completed?

## 2023-01-31 ENCOUNTER — OFFICE VISIT (OUTPATIENT)
Dept: NEUROSURGERY | Facility: CLINIC | Age: 58
End: 2023-01-31
Payer: COMMERCIAL

## 2023-01-31 VITALS
HEART RATE: 50 BPM | DIASTOLIC BLOOD PRESSURE: 70 MMHG | WEIGHT: 210.32 LBS | TEMPERATURE: 97 F | SYSTOLIC BLOOD PRESSURE: 110 MMHG | HEIGHT: 74 IN | OXYGEN SATURATION: 98 % | BODY MASS INDEX: 26.99 KG/M2

## 2023-01-31 DIAGNOSIS — T81.49XA WOUND INFECTION AFTER SURGERY: Primary | ICD-10-CM

## 2023-01-31 PROCEDURE — 99214 OFFICE O/P EST MOD 30 MIN: CPT | Performed by: NEUROLOGICAL SURGERY

## 2023-01-31 NOTE — PROGRESS NOTES
"Subjective   Patient ID: Archie Oquendo is a 57 y.o. male is here today for follow-up with new CTs L/T-spine done on 01.20.2023 at [facility].    Today patient states that he has low back pain that radiates in B/L legs, along with N/T in B/L  legs    Patient, Provider, and MA are all wearing a mask in our office today    History of Present Illness    This patient still has a lot of pain in his back and some radiation into his legs but he has started draining from his back again.  This particular area of drainage is higher than the previous area.    The following portions of the patient's history were reviewed and updated as appropriate: allergies, current medications, past family history, past medical history, past social history, past surgical history and problem list.    Review of Systems   Constitutional: Negative for chills and fever.   HENT: Negative for congestion.    Genitourinary: Negative for difficulty urinating and dysuria.   Musculoskeletal: Positive for back pain, gait problem and myalgias.   Neurological: Positive for weakness and numbness.       I have reviewed the review of systems as documented by my MA.      Objective     Vitals:    01/31/23 1025   BP: 110/70   Cuff Size: Adult   Pulse: 50   Temp: 97 °F (36.1 °C)   SpO2: 98%   Weight: 95.4 kg (210 lb 5.1 oz)   Height: 188 cm (74.02\")     Body mass index is 26.99 kg/m².    Tobacco Use: Medium Risk   • Smoking Tobacco Use: Former   • Smokeless Tobacco Use: Never   • Passive Exposure: Not on file          Physical Exam  Neurological:      Mental Status: He is alert and oriented to person, place, and time.       Neurologic Exam     Mental Status   Oriented to person, place, and time.           Assessment & Plan   Independent Review of Radiographic Studies:      I personally reviewed the images from the following studies.    I reviewed his CT done on 20 January.  This does show a fluid collection from L1-L5 and the subcutaneous fat.  It does not appear to " go deep.  It is a little difficult to really see because of the artifact from the instrumentation.  His CRP is only minimally elevated and his sed rate is normal as well as his white count.      Medical Decision Making:      I told the patient we will go ahead and check an MRI with and without contrast of his thoracic and his lumbar spine.  I would also like to discuss his case with one of my partners.  I think there is no question he needs to be opened up and washed out the question is whether this could just be done with a subcutaneous fluid collection or whether we need to aggressively debride him and take out some of the hardware.  He really does not want the hardware to be removed if at all possible.  I told him we will get the MRI done and schedule him for a follow-up visit next week.    Diagnoses and all orders for this visit:    1. Wound infection after surgery (Primary)  -     MRI Lumbar Spine With & Without Contrast; Future  -     MRI Thoracic Spine With & Without Contrast; Future      Return in about 1 week (around 2/7/2023).

## 2023-02-02 ENCOUNTER — HOSPITAL ENCOUNTER (OUTPATIENT)
Dept: MRI IMAGING | Facility: HOSPITAL | Age: 58
Discharge: HOME | End: 2023-02-02
Payer: COMMERCIAL

## 2023-02-02 DIAGNOSIS — T81.49XA WOUND INFECTION AFTER SURGERY: ICD-10-CM

## 2023-02-02 PROCEDURE — 82565 ASSAY OF CREATININE: CPT

## 2023-02-02 PROCEDURE — 72157 MRI CHEST SPINE W/O & W/DYE: CPT

## 2023-02-02 PROCEDURE — 72158 MRI LUMBAR SPINE W/O & W/DYE: CPT

## 2023-02-02 PROCEDURE — 0 GADOBENATE DIMEGLUMINE 529 MG/ML SOLUTION: Performed by: NEUROLOGICAL SURGERY

## 2023-02-02 PROCEDURE — A9577 INJ MULTIHANCE: HCPCS | Performed by: NEUROLOGICAL SURGERY

## 2023-02-02 RX ADMIN — GADOBENATE DIMEGLUMINE 20 ML: 529 INJECTION, SOLUTION INTRAVENOUS at 13:40

## 2023-02-04 NOTE — H&P (VIEW-ONLY)
Subjective   Patient ID: Archie Oquendo is a 57 y.o. male is here today via telephone for follow-up with a new MRI L/T-spine done on 02.02.2023 at [facility].    You have chosen to receive care through a telephone visit. Do you consent to use a telephone visit for your medical care today? Yes    We had a telephone visit today.  The patient was at home and I was in the office.  We talked for 5 minutes.    History of Present Illness     This patient continues with intermittent drainage from his back.  The current drainage has gotten a little worse.  He still does not have a lot of pain other than what he had before.    The following portions of the patient's history were reviewed and updated as appropriate: allergies, current medications, past family history, past medical history, past social history, past surgical history and problem list.    Review of Systems    I have reviewed the review of systems as documented by my MA.      Objective       Tobacco Use: Medium Risk   • Smoking Tobacco Use: Former   • Smokeless Tobacco Use: Never   • Passive Exposure: Not on file          Physical Exam  Neurological:      Mental Status: He is alert and oriented to person, place, and time.       Neurologic Exam     Mental Status   Oriented to person, place, and time.           Assessment & Plan   Independent Review of Radiographic Studies:      I personally reviewed the images from the following studies.    I reviewed his MRI.  This does show a fluid collection just posterior to the laminectomies from L1-L3 but this is fairly uniform and does not appear to be infected.  In the subcutaneous tissue there is a more complex fluid collection that is about 5 cm in diameter.  This is at the level of L4-5.    Medical Decision Making:      I told the patient that there does not appear to be any evidence of infection deep.  The fact that he has such extensive instrumentation means that there is a significant chance that the hardware is in fact  infected but the fact that there is no evidence of deep infection means that we should just try draining the more complex fluid collection first.  This will be much easier on him and less risky than trying to take out the hardware.  I am not sure he is totally fused anyway.  I told him the biggest downside of doing this is that it does not work and he starts draining again in the future in which case we will have to consider how to handle that including removing the hardware.  I explained that there is no specific right answer here but I am trying to do as little as possible to correct the problem.  We will also start him back on antibiotics.    He will need to be scheduled for a: Wound debridement and drainage    Diagnoses and all orders for this visit:    1. Wound drainage (Primary)      Return for 2-3 week post op.

## 2023-02-04 NOTE — PROGRESS NOTES
Subjective   Patient ID: Archie Oquendo is a 57 y.o. male is here today via telephone for follow-up with a new MRI L/T-spine done on 02.02.2023 at [facility].    You have chosen to receive care through a telephone visit. Do you consent to use a telephone visit for your medical care today? Yes    We had a telephone visit today.  The patient was at home and I was in the office.  We talked for 5 minutes.    History of Present Illness     This patient continues with intermittent drainage from his back.  The current drainage has gotten a little worse.  He still does not have a lot of pain other than what he had before.    The following portions of the patient's history were reviewed and updated as appropriate: allergies, current medications, past family history, past medical history, past social history, past surgical history and problem list.    Review of Systems    I have reviewed the review of systems as documented by my MA.      Objective       Tobacco Use: Medium Risk   • Smoking Tobacco Use: Former   • Smokeless Tobacco Use: Never   • Passive Exposure: Not on file          Physical Exam  Neurological:      Mental Status: He is alert and oriented to person, place, and time.       Neurologic Exam     Mental Status   Oriented to person, place, and time.           Assessment & Plan   Independent Review of Radiographic Studies:      I personally reviewed the images from the following studies.    I reviewed his MRI.  This does show a fluid collection just posterior to the laminectomies from L1-L3 but this is fairly uniform and does not appear to be infected.  In the subcutaneous tissue there is a more complex fluid collection that is about 5 cm in diameter.  This is at the level of L4-5.    Medical Decision Making:      I told the patient that there does not appear to be any evidence of infection deep.  The fact that he has such extensive instrumentation means that there is a significant chance that the hardware is in fact  infected but the fact that there is no evidence of deep infection means that we should just try draining the more complex fluid collection first.  This will be much easier on him and less risky than trying to take out the hardware.  I am not sure he is totally fused anyway.  I told him the biggest downside of doing this is that it does not work and he starts draining again in the future in which case we will have to consider how to handle that including removing the hardware.  I explained that there is no specific right answer here but I am trying to do as little as possible to correct the problem.  We will also start him back on antibiotics.    He will need to be scheduled for a: Wound debridement and drainage    Diagnoses and all orders for this visit:    1. Wound drainage (Primary)      Return for 2-3 week post op.

## 2023-02-07 ENCOUNTER — OFFICE VISIT (OUTPATIENT)
Dept: NEUROSURGERY | Facility: CLINIC | Age: 58
End: 2023-02-07
Payer: COMMERCIAL

## 2023-02-07 ENCOUNTER — PREP FOR SURGERY (OUTPATIENT)
Dept: OTHER | Facility: HOSPITAL | Age: 58
End: 2023-02-07
Payer: COMMERCIAL

## 2023-02-07 DIAGNOSIS — L24.A9 WOUND DRAINAGE: Primary | ICD-10-CM

## 2023-02-07 LAB — CREAT BLDA-MCNC: 0.9 MG/DL (ref 0.6–1.3)

## 2023-02-07 PROCEDURE — 99441 PR PHYS/QHP TELEPHONE EVALUATION 5-10 MIN: CPT | Performed by: NEUROLOGICAL SURGERY

## 2023-02-07 RX ORDER — CEFAZOLIN SODIUM 2 G/100ML
2 INJECTION, SOLUTION INTRAVENOUS ONCE
Status: CANCELLED | OUTPATIENT
Start: 2023-02-13 | End: 2023-02-07

## 2023-02-10 ENCOUNTER — PRE-ADMISSION TESTING (OUTPATIENT)
Dept: PREADMISSION TESTING | Facility: HOSPITAL | Age: 58
End: 2023-02-10
Payer: COMMERCIAL

## 2023-02-10 ENCOUNTER — TELEPHONE (OUTPATIENT)
Dept: NEUROSURGERY | Facility: CLINIC | Age: 58
End: 2023-02-10
Payer: COMMERCIAL

## 2023-02-10 VITALS
SYSTOLIC BLOOD PRESSURE: 104 MMHG | HEART RATE: 94 BPM | TEMPERATURE: 98.5 F | WEIGHT: 215.2 LBS | DIASTOLIC BLOOD PRESSURE: 72 MMHG | RESPIRATION RATE: 16 BRPM | BODY MASS INDEX: 27.62 KG/M2 | HEIGHT: 74 IN | OXYGEN SATURATION: 99 %

## 2023-02-10 DIAGNOSIS — T81.49XA WOUND INFECTION AFTER SURGERY: Primary | ICD-10-CM

## 2023-02-10 LAB
ANION GAP SERPL CALCULATED.3IONS-SCNC: 10 MMOL/L (ref 5–15)
BUN SERPL-MCNC: 10 MG/DL (ref 6–20)
BUN/CREAT SERPL: 10.4 (ref 7–25)
CALCIUM SPEC-SCNC: 9.3 MG/DL (ref 8.6–10.5)
CHLORIDE SERPL-SCNC: 99 MMOL/L (ref 98–107)
CO2 SERPL-SCNC: 30 MMOL/L (ref 22–29)
CREAT SERPL-MCNC: 0.96 MG/DL (ref 0.76–1.27)
EGFRCR SERPLBLD CKD-EPI 2021: 91.1 ML/MIN/1.73
GLUCOSE SERPL-MCNC: 85 MG/DL (ref 65–99)
POTASSIUM SERPL-SCNC: 3.3 MMOL/L (ref 3.5–5.2)
QT INTERVAL: 360 MS
SODIUM SERPL-SCNC: 139 MMOL/L (ref 136–145)

## 2023-02-10 PROCEDURE — 80048 BASIC METABOLIC PNL TOTAL CA: CPT

## 2023-02-10 PROCEDURE — 93005 ELECTROCARDIOGRAM TRACING: CPT

## 2023-02-10 PROCEDURE — 93010 ELECTROCARDIOGRAM REPORT: CPT | Performed by: INTERNAL MEDICINE

## 2023-02-10 PROCEDURE — 36415 COLL VENOUS BLD VENIPUNCTURE: CPT

## 2023-02-10 RX ORDER — CEFADROXIL 500 MG/5ML
POWDER, FOR SUSPENSION ORAL
Status: ON HOLD
Start: 2023-02-10 | End: 2023-02-13

## 2023-02-10 RX ORDER — ATORVASTATIN CALCIUM 40 MG/1
40 TABLET, FILM COATED ORAL EVERY EVENING
COMMUNITY
Start: 2023-02-08

## 2023-02-10 RX ORDER — ASPIRIN 81 MG/1
81 TABLET, COATED ORAL EVERY MORNING
COMMUNITY
Start: 2023-02-06

## 2023-02-10 RX ORDER — SACUBITRIL AND VALSARTAN 24; 26 MG/1; MG/1
1 TABLET, FILM COATED ORAL 2 TIMES DAILY
COMMUNITY
Start: 2022-08-11 | End: 2023-08-11

## 2023-02-10 RX ORDER — BUMETANIDE 2 MG/1
2 TABLET ORAL EVERY MORNING
COMMUNITY
Start: 2022-12-27

## 2023-02-10 NOTE — DISCHARGE INSTRUCTIONS
Take the following medications the morning of surgery:    METOPROLOL    If you are on prescription narcotic pain medication to control your pain you may also take that medication the morning of surgery.    General Instructions:  Do not eat solid food after midnight the night before surgery.  You may drink clear liquids day of surgery but must stop at least one hour before your hospital arrival time.  It is beneficial for you to have a clear drink that contains carbohydrates the day of surgery.  We suggest a 12 to 20 ounce bottle of Gatorade or Powerade for non-diabetic patients     Clear liquids are liquids you can see through.  Nothing red in color.     Plain water                               Sports drinks  Sodas                                   Gelatin (Jell-O)  Fruit juices without pulp such as white grape juice and apple juice  Popsicles that contain no fruit or yogurt  Tea or coffee (no cream or milk added)  Gatorade / Powerade  G2 / Powerade Zero    Patients who avoid smoking, chewing tobacco and alcohol for 4 weeks prior to surgery have a reduced risk of post-operative complications.  Quit smoking as many days before surgery as you can.  Do not smoke, use chewing tobacco or drink alcohol the day of surgery.   Bring any papers given to you in the doctor’s office.  Wear clean comfortable clothes.  Do not wear contact lenses, false eyelashes or make-up.  Bring a case for your glasses.   Remove all piercings.  Leave jewelry and any other valuables at home.  The Pre-Admission Testing nurse will instruct you to bring medications if unable to obtain an accurate list in Pre-Admission Testing.    REPORT TO SURGERY ON 2- AT 1130 AM        Preventing a Surgical Site Infection:  For 2 to 3 days before surgery, avoid shaving with a razor because the razor can irritate skin and make it easier to develop an infection.    Any areas of open skin can increase the risk of a post-operative wound infection by allowing  bacteria to enter and travel throughout the body.  Notify your surgeon if you have any skin wounds / rashes even if it is not near the expected surgical site.  The area will need assessed to determine if surgery should be delayed until it is healed.  The night prior to surgery shower using a fresh bar of anti-bacterial soap (such as Dial) and clean washcloth.  Sleep in a clean bed with clean clothing.  Do not allow pets to sleep with you.  Shower on the morning of surgery using a fresh bar of anti-bacterial soap (such as Dial) and clean washcloth.  Dry with a clean towel and dress in clean clothing.  Ask your surgeon if you will be receiving antibiotics prior to surgery.  Make sure you, your family, and all healthcare providers clean their hands with soap and water or an alcohol based hand  before caring for you or your wound.    Day of surgery:  Your arrival time is approximately two hours before your scheduled surgery time.  Upon arrival, a Pre-op nurse and Anesthesiologist will review your health history, obtain vital signs, and answer questions you may have.  The only belongings needed at this time will be a list of your home medications and if applicable your C-PAP/BI-PAP machine.  A Pre-op nurse will start an IV and you may receive medication in preparation for surgery, including something to help you relax.     Please be aware that surgery does come with discomfort.  We want to make every effort to control your discomfort so please discuss any uncontrolled symptoms with your nurse.   Your doctor will most likely have prescribed pain medications.      If you are going home after surgery you will receive individualized written care instructions before being discharged.  A responsible adult must drive you to and from the hospital on the day of your surgery and stay with you for 24 hours.  Discharge prescriptions can be filled by the hospital pharmacy during regular pharmacy hours.  If you are having  surgery late in the day/evening your prescription may be e-prescribed to your pharmacy.  Please verify your pharmacy hours or chose a 24 hour pharmacy to avoid not having access to your prescription because your pharmacy has closed for the day.        CHLORHEXIDINE CLOTH INSTRUCTIONS  The morning of surgery follow these instructions using the Chlorhexidine cloths you've been given.  These steps reduce bacteria on the body.  Do not use the cloths near your eyes, ears mouth, genitalia or on open wounds.  Throw the cloths away after use but do not try to flush them down a toilet.      Open and remove one cloth at a time from the package.    Leave the cloth unfolded and begin the bathing.  Massage the skin with the cloths using gentle pressure to remove bacteria.  Do not scrub harshly.   Follow the steps below with one 2% CHG cloth per area (6 total cloths).  One cloth for neck, shoulders and chest.  One cloth for both arms, hands, fingers and underarms (do underarms last).  One cloth for the abdomen followed by groin.  One cloth for right leg and foot including between the toes.  One cloth for left leg and foot including between the toes.  The last cloth is to be used for the back of the neck, back and buttocks.    Allow the CHG to air dry 3 minutes on the skin which will give it time to work and decrease the chance of irritation.  The skin may feel sticky until it is dry.  Do not rinse with water or any other liquid or you will lose the beneficial effects of the CHG.  If mild skin irritation occurs, do rinse the skin to remove the CHG.  Report this to the nurse at time of admission.  Do not apply lotions, creams, ointments, deodorants or perfumes after using the clothes. Dress in clean clothes before coming to the hospital.     If you have any questions please call Pre-Admission Testing at [phone].  Deductibles and co-payments are collected on the day of service. Please be prepared to pay the required co-pay,  deductible or deposit on the day of service as defined by your plan.    Call your surgeon immediately if you experience any of the following symptoms:  Sore Throat  Shortness of Breath or difficulty breathing  Cough  Chills  Body soreness or muscle pain  Headache  Fever  New loss of taste or smell  Do not arrive for your surgery ill.  Your procedure will need to be rescheduled to another time.  You will need to call your physician before the day of surgery to avoid any unnecessary exposure to hospital staff as well as other patients.

## 2023-02-13 ENCOUNTER — HOSPITAL ENCOUNTER (OUTPATIENT)
Facility: HOSPITAL | Age: 58
Discharge: HOME | End: 2023-02-15
Attending: NEUROLOGICAL SURGERY | Admitting: NEUROLOGICAL SURGERY
Payer: COMMERCIAL

## 2023-02-13 ENCOUNTER — ANESTHESIA EVENT (OUTPATIENT)
Dept: PERIOP | Facility: HOSPITAL | Age: 58
End: 2023-02-13
Payer: COMMERCIAL

## 2023-02-13 ENCOUNTER — ANESTHESIA (OUTPATIENT)
Dept: PERIOP | Facility: HOSPITAL | Age: 58
End: 2023-02-13
Payer: COMMERCIAL

## 2023-02-13 DIAGNOSIS — L24.A9 WOUND DRAINAGE: Primary | ICD-10-CM

## 2023-02-13 LAB — QT INTERVAL: 364 MS

## 2023-02-13 PROCEDURE — 87070 CULTURE OTHR SPECIMN AEROBIC: CPT | Performed by: NEUROLOGICAL SURGERY

## 2023-02-13 PROCEDURE — 10060 I&D ABSCESS SIMPLE/SINGLE: CPT | Performed by: NEUROLOGICAL SURGERY

## 2023-02-13 PROCEDURE — 87116 MYCOBACTERIA CULTURE: CPT | Performed by: NEUROLOGICAL SURGERY

## 2023-02-13 PROCEDURE — 87102 FUNGUS ISOLATION CULTURE: CPT | Performed by: NEUROLOGICAL SURGERY

## 2023-02-13 PROCEDURE — 87205 SMEAR GRAM STAIN: CPT | Performed by: NEUROLOGICAL SURGERY

## 2023-02-13 PROCEDURE — 93010 ELECTROCARDIOGRAM REPORT: CPT | Performed by: INTERNAL MEDICINE

## 2023-02-13 PROCEDURE — 93005 ELECTROCARDIOGRAM TRACING: CPT | Performed by: ANESTHESIOLOGY

## 2023-02-13 PROCEDURE — 87206 SMEAR FLUORESCENT/ACID STAI: CPT | Performed by: NEUROLOGICAL SURGERY

## 2023-02-13 PROCEDURE — 25010000002 VANCOMYCIN 1 G RECONSTITUTED SOLUTION 1 EACH VIAL: Performed by: NEUROLOGICAL SURGERY

## 2023-02-13 PROCEDURE — 25010000002 DEXAMETHASONE SODIUM PHOSPHATE 20 MG/5ML SOLUTION: Performed by: NURSE ANESTHETIST, CERTIFIED REGISTERED

## 2023-02-13 PROCEDURE — 25010000002 FENTANYL CITRATE (PF) 50 MCG/ML SOLUTION: Performed by: ANESTHESIOLOGY

## 2023-02-13 PROCEDURE — 87075 CULTR BACTERIA EXCEPT BLOOD: CPT | Performed by: NEUROLOGICAL SURGERY

## 2023-02-13 PROCEDURE — 25010000002 MIDAZOLAM PER 1 MG: Performed by: ANESTHESIOLOGY

## 2023-02-13 PROCEDURE — 25010000002 ONDANSETRON PER 1 MG: Performed by: ANESTHESIOLOGY

## 2023-02-13 PROCEDURE — 25010000002 FENTANYL CITRATE (PF) 50 MCG/ML SOLUTION: Performed by: NURSE ANESTHETIST, CERTIFIED REGISTERED

## 2023-02-13 PROCEDURE — 25010000002 MAGNESIUM SULFATE PER 500 MG OF MAGNESIUM: Performed by: ANESTHESIOLOGY

## 2023-02-13 PROCEDURE — 87015 SPECIMEN INFECT AGNT CONCNTJ: CPT | Performed by: NEUROLOGICAL SURGERY

## 2023-02-13 PROCEDURE — 25010000002 VANCOMYCIN 10 G RECONSTITUTED SOLUTION: Performed by: NEUROLOGICAL SURGERY

## 2023-02-13 PROCEDURE — 87176 TISSUE HOMOGENIZATION CULTR: CPT | Performed by: NEUROLOGICAL SURGERY

## 2023-02-13 PROCEDURE — 25010000002 CEFAZOLIN IN DEXTROSE 2-4 GM/100ML-% SOLUTION: Performed by: NEUROLOGICAL SURGERY

## 2023-02-13 PROCEDURE — 25010000002 SODIUM CHLORIDE 0.9 % WITH KCL 20 MEQ 20-0.9 MEQ/L-% SOLUTION: Performed by: NEUROLOGICAL SURGERY

## 2023-02-13 PROCEDURE — 25010000002 PROPOFOL 10 MG/ML EMULSION: Performed by: NURSE ANESTHETIST, CERTIFIED REGISTERED

## 2023-02-13 DEVICE — GRFT MATRISTEM MICROMATRIX PARTIC 1000MG: Type: IMPLANTABLE DEVICE | Site: BACK | Status: FUNCTIONAL

## 2023-02-13 RX ORDER — MIDAZOLAM HYDROCHLORIDE 1 MG/ML
1 INJECTION INTRAMUSCULAR; INTRAVENOUS
Status: DISCONTINUED | OUTPATIENT
Start: 2023-02-13 | End: 2023-02-13 | Stop reason: SDUPTHER

## 2023-02-13 RX ORDER — MAGNESIUM SULFATE HEPTAHYDRATE 500 MG/ML
INJECTION, SOLUTION INTRAMUSCULAR; INTRAVENOUS AS NEEDED
Status: DISCONTINUED | OUTPATIENT
Start: 2023-02-13 | End: 2023-02-13 | Stop reason: SURG

## 2023-02-13 RX ORDER — SODIUM CHLORIDE 0.9 % (FLUSH) 0.9 %
3 SYRINGE (ML) INJECTION EVERY 12 HOURS SCHEDULED
Status: DISCONTINUED | OUTPATIENT
Start: 2023-02-13 | End: 2023-02-15 | Stop reason: HOSPADM

## 2023-02-13 RX ORDER — SODIUM CHLORIDE 0.9 % (FLUSH) 0.9 %
3 SYRINGE (ML) INJECTION EVERY 12 HOURS SCHEDULED
Status: DISCONTINUED | OUTPATIENT
Start: 2023-02-13 | End: 2023-02-13 | Stop reason: HOSPADM

## 2023-02-13 RX ORDER — PROMETHAZINE HYDROCHLORIDE 25 MG/1
25 TABLET ORAL ONCE AS NEEDED
Status: DISCONTINUED | OUTPATIENT
Start: 2023-02-13 | End: 2023-02-13 | Stop reason: HOSPADM

## 2023-02-13 RX ORDER — MORPHINE SULFATE 2 MG/ML
2 INJECTION, SOLUTION INTRAMUSCULAR; INTRAVENOUS EVERY 4 HOURS PRN
Status: DISCONTINUED | OUTPATIENT
Start: 2023-02-13 | End: 2023-02-14

## 2023-02-13 RX ORDER — PROPOFOL 10 MG/ML
VIAL (ML) INTRAVENOUS AS NEEDED
Status: DISCONTINUED | OUTPATIENT
Start: 2023-02-13 | End: 2023-02-13 | Stop reason: SURG

## 2023-02-13 RX ORDER — ONDANSETRON 4 MG/1
4 TABLET, FILM COATED ORAL EVERY 6 HOURS PRN
Status: DISCONTINUED | OUTPATIENT
Start: 2023-02-13 | End: 2023-02-15 | Stop reason: HOSPADM

## 2023-02-13 RX ORDER — FENTANYL CITRATE 50 UG/ML
INJECTION, SOLUTION INTRAMUSCULAR; INTRAVENOUS AS NEEDED
Status: DISCONTINUED | OUTPATIENT
Start: 2023-02-13 | End: 2023-02-13 | Stop reason: SURG

## 2023-02-13 RX ORDER — MIDAZOLAM HYDROCHLORIDE 1 MG/ML
1 INJECTION INTRAMUSCULAR; INTRAVENOUS
Status: DISCONTINUED | OUTPATIENT
Start: 2023-02-13 | End: 2023-02-13 | Stop reason: HOSPADM

## 2023-02-13 RX ORDER — ROCURONIUM BROMIDE 10 MG/ML
INJECTION, SOLUTION INTRAVENOUS AS NEEDED
Status: DISCONTINUED | OUTPATIENT
Start: 2023-02-13 | End: 2023-02-13 | Stop reason: SURG

## 2023-02-13 RX ORDER — NALOXONE HCL 0.4 MG/ML
0.4 VIAL (ML) INJECTION
Status: DISCONTINUED | OUTPATIENT
Start: 2023-02-13 | End: 2023-02-15 | Stop reason: HOSPADM

## 2023-02-13 RX ORDER — IBUPROFEN 800 MG/1
800 TABLET ORAL
COMMUNITY
Start: 2022-03-10

## 2023-02-13 RX ORDER — ONDANSETRON 2 MG/ML
INJECTION INTRAMUSCULAR; INTRAVENOUS AS NEEDED
Status: DISCONTINUED | OUTPATIENT
Start: 2023-02-13 | End: 2023-02-13 | Stop reason: SURG

## 2023-02-13 RX ORDER — PROMETHAZINE HYDROCHLORIDE 25 MG/1
25 SUPPOSITORY RECTAL ONCE AS NEEDED
Status: DISCONTINUED | OUTPATIENT
Start: 2023-02-13 | End: 2023-02-13 | Stop reason: HOSPADM

## 2023-02-13 RX ORDER — HYDROCODONE BITARTRATE AND ACETAMINOPHEN 5; 325 MG/1; MG/1
1 TABLET ORAL EVERY 4 HOURS PRN
Status: DISCONTINUED | OUTPATIENT
Start: 2023-02-13 | End: 2023-02-15 | Stop reason: HOSPADM

## 2023-02-13 RX ORDER — SODIUM CHLORIDE 0.9 % (FLUSH) 0.9 %
3-10 SYRINGE (ML) INJECTION AS NEEDED
Status: DISCONTINUED | OUTPATIENT
Start: 2023-02-13 | End: 2023-02-13 | Stop reason: HOSPADM

## 2023-02-13 RX ORDER — KETAMINE HCL IN NACL, ISO-OSM 100MG/10ML
SYRINGE (ML) INJECTION AS NEEDED
Status: DISCONTINUED | OUTPATIENT
Start: 2023-02-13 | End: 2023-02-13 | Stop reason: SURG

## 2023-02-13 RX ORDER — SODIUM CHLORIDE, SODIUM LACTATE, POTASSIUM CHLORIDE, CALCIUM CHLORIDE 600; 310; 30; 20 MG/100ML; MG/100ML; MG/100ML; MG/100ML
9 INJECTION, SOLUTION INTRAVENOUS CONTINUOUS
Status: DISCONTINUED | OUTPATIENT
Start: 2023-02-13 | End: 2023-02-13

## 2023-02-13 RX ORDER — CYCLOBENZAPRINE HCL 10 MG
10 TABLET ORAL 3 TIMES DAILY PRN
Status: DISCONTINUED | OUTPATIENT
Start: 2023-02-13 | End: 2023-02-15 | Stop reason: HOSPADM

## 2023-02-13 RX ORDER — CEFAZOLIN SODIUM 2 G/100ML
2 INJECTION, SOLUTION INTRAVENOUS ONCE
Status: COMPLETED | OUTPATIENT
Start: 2023-02-13 | End: 2023-02-13

## 2023-02-13 RX ORDER — BUMETANIDE 2 MG/1
2 TABLET ORAL EVERY MORNING
Status: DISCONTINUED | OUTPATIENT
Start: 2023-02-14 | End: 2023-02-15 | Stop reason: HOSPADM

## 2023-02-13 RX ORDER — FAMOTIDINE 10 MG/ML
20 INJECTION, SOLUTION INTRAVENOUS ONCE
Status: DISCONTINUED | OUTPATIENT
Start: 2023-02-13 | End: 2023-02-13 | Stop reason: SDUPTHER

## 2023-02-13 RX ORDER — HYDROCODONE BITARTRATE AND ACETAMINOPHEN 5; 325 MG/1; MG/1
1 TABLET ORAL ONCE AS NEEDED
Status: DISCONTINUED | OUTPATIENT
Start: 2023-02-13 | End: 2023-02-13 | Stop reason: HOSPADM

## 2023-02-13 RX ORDER — LIDOCAINE HYDROCHLORIDE 20 MG/ML
INJECTION, SOLUTION INFILTRATION; PERINEURAL AS NEEDED
Status: DISCONTINUED | OUTPATIENT
Start: 2023-02-13 | End: 2023-02-13 | Stop reason: SURG

## 2023-02-13 RX ORDER — LIDOCAINE HYDROCHLORIDE 10 MG/ML
0.5 INJECTION, SOLUTION EPIDURAL; INFILTRATION; INTRACAUDAL; PERINEURAL ONCE AS NEEDED
Status: DISCONTINUED | OUTPATIENT
Start: 2023-02-13 | End: 2023-02-13 | Stop reason: HOSPADM

## 2023-02-13 RX ORDER — GLYCOPYRROLATE 0.2 MG/ML
INJECTION INTRAMUSCULAR; INTRAVENOUS AS NEEDED
Status: DISCONTINUED | OUTPATIENT
Start: 2023-02-13 | End: 2023-02-13 | Stop reason: SURG

## 2023-02-13 RX ORDER — LIDOCAINE HYDROCHLORIDE 10 MG/ML
0.5 INJECTION, SOLUTION EPIDURAL; INFILTRATION; INTRACAUDAL; PERINEURAL ONCE AS NEEDED
Status: DISCONTINUED | OUTPATIENT
Start: 2023-02-13 | End: 2023-02-13 | Stop reason: SDUPTHER

## 2023-02-13 RX ORDER — SODIUM CHLORIDE 0.9 % (FLUSH) 0.9 %
10 SYRINGE (ML) INJECTION AS NEEDED
Status: DISCONTINUED | OUTPATIENT
Start: 2023-02-13 | End: 2023-02-15 | Stop reason: HOSPADM

## 2023-02-13 RX ORDER — VANCOMYCIN HYDROCHLORIDE 1 G/200ML
1000 INJECTION, SOLUTION INTRAVENOUS EVERY 12 HOURS
Status: DISCONTINUED | OUTPATIENT
Start: 2023-02-13 | End: 2023-02-13 | Stop reason: SDUPTHER

## 2023-02-13 RX ORDER — FENTANYL CITRATE 50 UG/ML
50 INJECTION, SOLUTION INTRAMUSCULAR; INTRAVENOUS
Status: DISCONTINUED | OUTPATIENT
Start: 2023-02-13 | End: 2023-02-13 | Stop reason: HOSPADM

## 2023-02-13 RX ORDER — ATORVASTATIN CALCIUM 20 MG/1
40 TABLET, FILM COATED ORAL EVERY EVENING
Status: DISCONTINUED | OUTPATIENT
Start: 2023-02-13 | End: 2023-02-15 | Stop reason: HOSPADM

## 2023-02-13 RX ORDER — HYDROMORPHONE HYDROCHLORIDE 1 MG/ML
0.25 INJECTION, SOLUTION INTRAMUSCULAR; INTRAVENOUS; SUBCUTANEOUS
Status: DISCONTINUED | OUTPATIENT
Start: 2023-02-13 | End: 2023-02-13 | Stop reason: HOSPADM

## 2023-02-13 RX ORDER — IBUPROFEN 400 MG/1
800 TABLET ORAL EVERY 6 HOURS PRN
Status: DISCONTINUED | OUTPATIENT
Start: 2023-02-13 | End: 2023-02-15 | Stop reason: HOSPADM

## 2023-02-13 RX ORDER — FAMOTIDINE 10 MG/ML
20 INJECTION, SOLUTION INTRAVENOUS ONCE
Status: COMPLETED | OUTPATIENT
Start: 2023-02-13 | End: 2023-02-13

## 2023-02-13 RX ORDER — DEXAMETHASONE SODIUM PHOSPHATE 4 MG/ML
INJECTION, SOLUTION INTRA-ARTICULAR; INTRALESIONAL; INTRAMUSCULAR; INTRAVENOUS; SOFT TISSUE AS NEEDED
Status: DISCONTINUED | OUTPATIENT
Start: 2023-02-13 | End: 2023-02-13 | Stop reason: SURG

## 2023-02-13 RX ORDER — ONDANSETRON 2 MG/ML
4 INJECTION INTRAMUSCULAR; INTRAVENOUS EVERY 6 HOURS PRN
Status: DISCONTINUED | OUTPATIENT
Start: 2023-02-13 | End: 2023-02-15 | Stop reason: HOSPADM

## 2023-02-13 RX ORDER — SODIUM CHLORIDE AND POTASSIUM CHLORIDE 150; 900 MG/100ML; MG/100ML
20 INJECTION, SOLUTION INTRAVENOUS CONTINUOUS
Status: DISCONTINUED | OUTPATIENT
Start: 2023-02-13 | End: 2023-02-15 | Stop reason: HOSPADM

## 2023-02-13 RX ORDER — DEXMEDETOMIDINE HYDROCHLORIDE 100 UG/ML
INJECTION, SOLUTION INTRAVENOUS AS NEEDED
Status: DISCONTINUED | OUTPATIENT
Start: 2023-02-13 | End: 2023-02-13 | Stop reason: SURG

## 2023-02-13 RX ORDER — SODIUM CHLORIDE, SODIUM LACTATE, POTASSIUM CHLORIDE, CALCIUM CHLORIDE 600; 310; 30; 20 MG/100ML; MG/100ML; MG/100ML; MG/100ML
9 INJECTION, SOLUTION INTRAVENOUS CONTINUOUS
Status: DISCONTINUED | OUTPATIENT
Start: 2023-02-13 | End: 2023-02-13 | Stop reason: SDUPTHER

## 2023-02-13 RX ORDER — METOPROLOL SUCCINATE 25 MG/1
25 TABLET, EXTENDED RELEASE ORAL DAILY
Status: DISCONTINUED | OUTPATIENT
Start: 2023-02-13 | End: 2023-02-15 | Stop reason: HOSPADM

## 2023-02-13 RX ORDER — DIPHENHYDRAMINE HYDROCHLORIDE 50 MG/ML
12.5 INJECTION INTRAMUSCULAR; INTRAVENOUS
Status: DISCONTINUED | OUTPATIENT
Start: 2023-02-13 | End: 2023-02-13 | Stop reason: HOSPADM

## 2023-02-13 RX ORDER — OXYCODONE AND ACETAMINOPHEN 7.5; 325 MG/1; MG/1
1 TABLET ORAL ONCE AS NEEDED
Status: DISCONTINUED | OUTPATIENT
Start: 2023-02-13 | End: 2023-02-13 | Stop reason: HOSPADM

## 2023-02-13 RX ORDER — NALOXONE HCL 0.4 MG/ML
0.4 VIAL (ML) INJECTION AS NEEDED
Status: DISCONTINUED | OUTPATIENT
Start: 2023-02-13 | End: 2023-02-13 | Stop reason: HOSPADM

## 2023-02-13 RX ORDER — ENALAPRILAT 2.5 MG/2ML
1.25 INJECTION INTRAVENOUS ONCE AS NEEDED
Status: DISCONTINUED | OUTPATIENT
Start: 2023-02-13 | End: 2023-02-13 | Stop reason: HOSPADM

## 2023-02-13 RX ORDER — VANCOMYCIN HYDROCHLORIDE 1 G/200ML
1000 INJECTION, SOLUTION INTRAVENOUS EVERY 12 HOURS
Status: DISCONTINUED | OUTPATIENT
Start: 2023-02-14 | End: 2023-02-15

## 2023-02-13 RX ORDER — ONDANSETRON 2 MG/ML
4 INJECTION INTRAMUSCULAR; INTRAVENOUS ONCE AS NEEDED
Status: DISCONTINUED | OUTPATIENT
Start: 2023-02-13 | End: 2023-02-13 | Stop reason: HOSPADM

## 2023-02-13 RX ORDER — FENTANYL CITRATE 50 UG/ML
50 INJECTION, SOLUTION INTRAMUSCULAR; INTRAVENOUS
Status: DISCONTINUED | OUTPATIENT
Start: 2023-02-13 | End: 2023-02-13 | Stop reason: SDUPTHER

## 2023-02-13 RX ORDER — ASPIRIN 81 MG/1
81 TABLET ORAL EVERY MORNING
Status: DISCONTINUED | OUTPATIENT
Start: 2023-02-14 | End: 2023-02-15 | Stop reason: HOSPADM

## 2023-02-13 RX ORDER — LABETALOL HYDROCHLORIDE 5 MG/ML
5 INJECTION, SOLUTION INTRAVENOUS
Status: DISCONTINUED | OUTPATIENT
Start: 2023-02-13 | End: 2023-02-13 | Stop reason: HOSPADM

## 2023-02-13 RX ORDER — SODIUM CHLORIDE 9 MG/ML
40 INJECTION, SOLUTION INTRAVENOUS AS NEEDED
Status: DISCONTINUED | OUTPATIENT
Start: 2023-02-13 | End: 2023-02-15 | Stop reason: HOSPADM

## 2023-02-13 RX ADMIN — Medication 10 MG: at 15:23

## 2023-02-13 RX ADMIN — MIDAZOLAM 1 MG: 1 INJECTION INTRAMUSCULAR; INTRAVENOUS at 14:14

## 2023-02-13 RX ADMIN — SODIUM CHLORIDE, POTASSIUM CHLORIDE, SODIUM LACTATE AND CALCIUM CHLORIDE 9 ML/HR: 600; 310; 30; 20 INJECTION, SOLUTION INTRAVENOUS at 13:43

## 2023-02-13 RX ADMIN — MAGNESIUM SULFATE HEPTAHYDRATE 2 G: 500 INJECTION, SOLUTION INTRAMUSCULAR; INTRAVENOUS at 15:15

## 2023-02-13 RX ADMIN — ONDANSETRON 4 MG: 2 INJECTION INTRAMUSCULAR; INTRAVENOUS at 15:05

## 2023-02-13 RX ADMIN — PROPOFOL 200 MG: 10 INJECTION, EMULSION INTRAVENOUS at 14:49

## 2023-02-13 RX ADMIN — DEXAMETHASONE SODIUM PHOSPHATE 6 MG: 4 INJECTION, SOLUTION INTRAMUSCULAR; INTRAVENOUS at 14:49

## 2023-02-13 RX ADMIN — FENTANYL CITRATE 50 MCG: 50 INJECTION, SOLUTION INTRAMUSCULAR; INTRAVENOUS at 14:49

## 2023-02-13 RX ADMIN — POTASSIUM CHLORIDE AND SODIUM CHLORIDE 100 ML/HR: 900; 150 INJECTION, SOLUTION INTRAVENOUS at 18:05

## 2023-02-13 RX ADMIN — CEFAZOLIN SODIUM 2 G: 2 INJECTION, SOLUTION INTRAVENOUS at 14:39

## 2023-02-13 RX ADMIN — SACUBITRIL AND VALSARTAN 1 TABLET: 24; 26 TABLET, FILM COATED ORAL at 21:23

## 2023-02-13 RX ADMIN — SUGAMMADEX 200 MG: 100 INJECTION, SOLUTION INTRAVENOUS at 15:16

## 2023-02-13 RX ADMIN — DEXMEDETOMIDINE 30 MCG: 100 INJECTION, SOLUTION, CONCENTRATE INTRAVENOUS at 15:05

## 2023-02-13 RX ADMIN — Medication 3 ML: at 21:23

## 2023-02-13 RX ADMIN — LIDOCAINE HYDROCHLORIDE 100 MG: 20 INJECTION, SOLUTION INFILTRATION; PERINEURAL at 14:49

## 2023-02-13 RX ADMIN — Medication 40 MG: at 15:04

## 2023-02-13 RX ADMIN — GLYCOPYRROLATE 0.1 MG: 1 INJECTION INTRAMUSCULAR; INTRAVENOUS at 14:49

## 2023-02-13 RX ADMIN — VANCOMYCIN HYDROCHLORIDE 2000 MG: 10 INJECTION, POWDER, LYOPHILIZED, FOR SOLUTION INTRAVENOUS at 18:05

## 2023-02-13 RX ADMIN — HYDROCODONE BITARTRATE AND ACETAMINOPHEN 1 TABLET: 5; 325 TABLET ORAL at 18:08

## 2023-02-13 RX ADMIN — ATORVASTATIN CALCIUM 40 MG: 20 TABLET, FILM COATED ORAL at 18:05

## 2023-02-13 RX ADMIN — FENTANYL CITRATE 50 MCG: 50 INJECTION, SOLUTION INTRAMUSCULAR; INTRAVENOUS at 14:14

## 2023-02-13 RX ADMIN — ROCURONIUM BROMIDE 50 MG: 50 INJECTION INTRAVENOUS at 14:49

## 2023-02-13 RX ADMIN — FAMOTIDINE 20 MG: 10 INJECTION INTRAVENOUS at 13:46

## 2023-02-13 NOTE — ANESTHESIA POSTPROCEDURE EVALUATION
"Patient: Archie Oquendo    Procedure Summary     Date: 02/13/23 Room / Location: [facility] OR  / [facility] MAIN OR    Anesthesia Start: 1445 Anesthesia Stop: 1554    Procedure: Wound debridement and drainage Diagnosis:       Wound drainage      (Wound drainage [L24.A9])    Surgeons: Nj Mix MD Provider: David Swan MD    Anesthesia Type: general ASA Status: 3          Anesthesia Type: general    Vitals  Vitals Value Taken Time   /95 02/13/23 1631   Temp     Pulse 92 02/13/23 1646   Resp 16 02/13/23 1605   SpO2 97 % 02/13/23 1646   Vitals shown include unvalidated device data.        Post Anesthesia Care and Evaluation    Patient location during evaluation: PACU  Patient participation: complete - patient participated  Level of consciousness: awake and alert  Pain management: adequate    Airway patency: patent  Anesthetic complications: No anesthetic complications    Cardiovascular status: acceptable  Respiratory status: acceptable  Hydration status: acceptable    Comments: /95   Pulse 91   Temp 37.1 °C (98.7 °F) (Oral)   Resp 16   Ht 188 cm (74\")   Wt 96.4 kg (212 lb 8.4 oz)   SpO2 97%   BMI 27.29 kg/m²       "

## 2023-02-13 NOTE — ANESTHESIA PREPROCEDURE EVALUATION
Anesthesia Evaluation     Patient summary reviewed and Nursing notes reviewed                Airway   Mallampati: II  TM distance: >3 FB  Neck ROM: full  No difficulty expected  Dental - normal exam     Pulmonary - normal exam   (+) a smoker Former, sleep apnea,   Cardiovascular     ECG reviewed  Patient on routine beta blocker and Beta blocker given within 24 hours of surgery  Rhythm: irregular  Rate: normal    (+) hypertension, CAD, dysrhythmias PVC, CHF ,       Neuro/Psych  (+) numbness,    GI/Hepatic/Renal/Endo - negative ROS     Musculoskeletal (-) negative ROS    Abdominal    Substance History   (+) alcohol use,      OB/GYN negative ob/gyn ROS         Other                      Anesthesia Plan    ASA 3     general     (I have reviewed the patient's history with the patient and the chart, including all pertinent laboratory results and imaging. I have explained the risks of anesthesia including but not limited to dental damage, corneal abrasion, nerve injury, MI, stroke, and death. Questions asked and answered. Anesthetic plan discussed with patient and team as indicated. Patient expressed understanding of the above.  )  intravenous induction     Anesthetic plan, risks, benefits, and alternatives have been provided, discussed and informed consent has been obtained with: patient.        CODE STATUS:

## 2023-02-13 NOTE — ANESTHESIA PROCEDURE NOTES
Airway  Urgency: elective    Date/Time: 2/13/2023 2:52 PM  Airway not difficult    General Information and Staff    Patient location during procedure: OR  Anesthesiologist: Dionisio Celis MD  CRNA/CAA: Veena Frances CRNA    Indications and Patient Condition  Indications for airway management: airway protection    Preoxygenated: yes  Mask difficulty assessment: 2 - vent by mask + OA or adjuvant +/- NMBA    Final Airway Details  Final airway type: endotracheal airway      Successful airway: ETT  Cuffed: yes   Successful intubation technique: direct laryngoscopy  Endotracheal tube insertion site: oral  Blade: Elvi  Blade size: 4  ETT size (mm): 7.5  Cormack-Lehane Classification: grade IIa - partial view of glottis  Placement verified by: chest auscultation and capnometry   Cuff volume (mL): 8  Measured from: teeth  ETT/EBT  to teeth (cm): 22  Number of attempts at approach: 1  Assessment: lips, teeth, and gum same as pre-op and atraumatic intubation

## 2023-02-13 NOTE — NURSING NOTE
Verbal handoff report given to Ana Lilia IRWIN . at 1403 for  Shift change.  R.N. informed Ana Lilia  pt.is getting EKG  and results need to be shown to Dr. Celis. Dr. Mix at bedside at present.

## 2023-02-13 NOTE — PLAN OF CARE
Goal Outcome Evaluation:  Plan of Care Reviewed With: patient        Progress: improving  Outcome Evaluation: A&OX4, DSG CDI, IVF, IV ABX, PAIN CONTROLLED C PO MEDS, CIWA 0 THIS SHIFT, REG DIET, DTV, ADEQUATE PO INTAKE, VSS, NVI

## 2023-02-13 NOTE — BRIEF OP NOTE
INCISION AND DRAINAGE TRUNK  Progress Note    Archie Oquendo  2/13/2023    Pre-op Diagnosis:   Wound drainage [L24.A9]       Post-Op Diagnosis Codes:     * Wound drainage [L24.A9]    Procedure/CPT® Codes:        Procedure(s):  Wound debridement and drainage        Surgeon(s):  Nj Mix MD    Anesthesia: General    Staff:   Circulator: Josef Vogt RN  Scrub Person: Lou Waldrop  Assistant: Luzmaria Bernal CSA  Assistant: Luzmarai Bernal CSA      Estimated Blood Loss: 15 mL    Urine Voided: 0 mL    Specimens:                Specimens     ID Source Type Tests Collected By Collected At Frozen?    1 Back, Lower Tissue · ANAEROBIC CULTURE  · FUNGAL CULTURE  · TISSUE / BONE CULTURE  · AFB CULTURE  · ANAEROBIC CULTURE 10 DAY INCUBATION   Nj Mix MD 2/13/23 1511     Description: back wound    This specimen was not marked as sent.    2 Back, Lower Wound · ANAEROBIC CULTURE  · FUNGAL CULTURE  · WOUND CULTURE  · ANAEROBIC CULTURE 10 DAY INCUBATION   Nj Mix MD 2/13/23 1522     Description: back wound    This specimen was not marked as sent.                Drains: * No LDAs found *    Findings: pus        Complications: none      Nj Mix MD     Date: 2/13/2023  Time: 15:29 EST

## 2023-02-13 NOTE — PROGRESS NOTES
"[facility] Clinical Pharmacy Services: Vancomycin Pharmacokinetic Initial Consult Note    Archie Oquendo is a 57 y.o. male who is on day 1 of pharmacy to dose vancomycin.    Indication: Skin and Soft Tissue  Consulting Provider: Dr. Mix  Planned Duration of Therapy: 5 days  Loading Dose Ordered or Given: 2000 mg on 2/13 at 1805  MRSA PCR performed: no  Culture/Source: 2/13 wound cx - pending  Target: -600 mg/L.hr   Other Antimicrobials: none    Vitals/Labs  Ht: 188 cm (74\"); Wt: 96.4 kg (212 lb 8.4 oz)  Temp Readings from Last 1 Encounters:   02/13/23 98.5 °F (36.9 °C) (Oral)    Estimated Creatinine Clearance: 113 mL/min (by C-G formula based on SCr of 0.96 mg/dL).        Results from last 7 days   Lab Units 02/10/23  1457   CREATININE mg/dL 0.96     Assessment/Plan:    Vancomycin Dose:   1000 mg IV every  12  hours  Predictive AUC level for the dose ordered is 466 mg/L.hr, which is within the target of 400-600 mg/L.hr  Vanc Trough has been ordered for 2/14 at 1700     Pharmacy will follow patient's kidney function and will adjust doses and obtain levels as necessary. Thank you for involving pharmacy in this patient's care. Please contact pharmacy with any questions or concerns.                           Jadyn Bajwa, [facility]  Clinical Pharmacist    "

## 2023-02-13 NOTE — OP NOTE
Preoperative diagnosis: Wound infection    Postoperative diagnosis: Same    Procedure performed: Incision and drainage of subcutaneous abscess leading to a fistulous connection to the hardware cross-link at the level of approximately L1    Surgeon: Nj Mix M.D.    Asst.: Luzmaria Bernal CFA who was instrumental in helping with hemostasis, visualization of neural structures and retraction of neural structures.  Her skilled assistance was necessary for the success of this case    Estimated blood loss, crystalloid, colloid, blood: Please see anesthesia record    Material to lab:   A swab culture was sent for culture and sensitivity for aerobic anaerobic and fungus and the excised abscess cavity was sent for tissue culture for the same.    Drains: None    Complications: None    Indications for the procedure: This patient had surgery in June 2019 with Dr. Bailey.  Subsequent to that he had numerous problems with infections and recently had some drainage through the middle portion of his incision.  It was becoming more more purulent and imaging with MRI showed a subcutaneous fluid collection but no evidence of infection of the hardware or the bone.  There was a small fluid collection inferiorly adjacent to the dural sac at the level of the decompression about L5 but that did not appear to be infected.  The patient was strongly desirous of not having the hardware removed and so we elected to do a subcutaneous removal of the abscess cavity and see where it led.    Operative summary:  The patient was brought into the operating room and placed under general endotracheal anesthesia using intravenous and inhalational agents.  The patient was then positioned on the operating table in the prone position.  All pressure points were padded including peripheral points of entrapment.  The back was prepped with ChloraPrep and then draped with Ioban, towels, half sheets and a lap sheet.  The entire incision was left exposed through  the draping but I initially excise just the area of incisional opening with the drainage.  Prior to doing this I did do a swab culture.  Subsequent to that I was able to excise the subcutaneous abscess to an area superiorly that it was not communicating with anymore however it did lead to a separation in the midline fascia through which I could see the cross connector which I believe is at about the level of L1.  I proceeded to irrigate the cavity out with vancomycin laced solution in the amount of 1 L and then I packed the cavity with vancomycin powder.  I also laid collagen powder in the subcutaneous tissue and then closed it primarily.  There was minimal blood loss and the incision was stapled closed and dressed.  We will keep the patient overnight and have infectious disease physician see him.  He was taken to the recovery room in stable condition.  There were no apparent complications and the sponge, instrument and needle counts were correct at the end of the procedure.

## 2023-02-14 ENCOUNTER — APPOINTMENT (OUTPATIENT)
Dept: GENERAL RADIOLOGY | Facility: HOSPITAL | Age: 58
End: 2023-02-14
Payer: COMMERCIAL

## 2023-02-14 LAB
BASOPHILS # BLD AUTO: 0.02 10*3/MM3 (ref 0–0.2)
BASOPHILS NFR BLD AUTO: 0.2 % (ref 0–1.5)
CRP SERPL-MCNC: 2.05 MG/DL (ref 0–0.5)
DEPRECATED RDW RBC AUTO: 44 FL (ref 37–54)
EOSINOPHIL # BLD AUTO: 0 10*3/MM3 (ref 0–0.4)
EOSINOPHIL NFR BLD AUTO: 0 % (ref 0.3–6.2)
ERYTHROCYTE [DISTWIDTH] IN BLOOD BY AUTOMATED COUNT: 13 % (ref 12.3–15.4)
ERYTHROCYTE [SEDIMENTATION RATE] IN BLOOD: 28 MM/HR (ref 0–20)
HCT VFR BLD AUTO: 34.6 % (ref 37.5–51)
HGB BLD-MCNC: 11.7 G/DL (ref 13–17.7)
IMM GRANULOCYTES # BLD AUTO: 0.04 10*3/MM3 (ref 0–0.05)
IMM GRANULOCYTES NFR BLD AUTO: 0.5 % (ref 0–0.5)
LYMPHOCYTES # BLD AUTO: 0.77 10*3/MM3 (ref 0.7–3.1)
LYMPHOCYTES NFR BLD AUTO: 9.3 % (ref 19.6–45.3)
MCH RBC QN AUTO: 31.3 PG (ref 26.6–33)
MCHC RBC AUTO-ENTMCNC: 33.8 G/DL (ref 31.5–35.7)
MCV RBC AUTO: 92.5 FL (ref 79–97)
MONOCYTES # BLD AUTO: 0.48 10*3/MM3 (ref 0.1–0.9)
MONOCYTES NFR BLD AUTO: 5.8 % (ref 5–12)
NEUTROPHILS NFR BLD AUTO: 6.97 10*3/MM3 (ref 1.7–7)
NEUTROPHILS NFR BLD AUTO: 84.2 % (ref 42.7–76)
NRBC BLD AUTO-RTO: 0 /100 WBC (ref 0–0.2)
PLATELET # BLD AUTO: 175 10*3/MM3 (ref 140–450)
PMV BLD AUTO: 9.6 FL (ref 6–12)
RBC # BLD AUTO: 3.74 10*6/MM3 (ref 4.14–5.8)
WBC NRBC COR # BLD: 8.28 10*3/MM3 (ref 3.4–10.8)

## 2023-02-14 PROCEDURE — 85652 RBC SED RATE AUTOMATED: CPT | Performed by: STUDENT IN AN ORGANIZED HEALTH CARE EDUCATION/TRAINING PROGRAM

## 2023-02-14 PROCEDURE — 86140 C-REACTIVE PROTEIN: CPT | Performed by: STUDENT IN AN ORGANIZED HEALTH CARE EDUCATION/TRAINING PROGRAM

## 2023-02-14 PROCEDURE — 99222 1ST HOSP IP/OBS MODERATE 55: CPT | Performed by: STUDENT IN AN ORGANIZED HEALTH CARE EDUCATION/TRAINING PROGRAM

## 2023-02-14 PROCEDURE — 99024 POSTOP FOLLOW-UP VISIT: CPT | Performed by: NURSE PRACTITIONER

## 2023-02-14 PROCEDURE — 25010000002 VANCOMYCIN PER 500 MG: Performed by: NEUROLOGICAL SURGERY

## 2023-02-14 PROCEDURE — 72100 X-RAY EXAM L-S SPINE 2/3 VWS: CPT

## 2023-02-14 PROCEDURE — 85025 COMPLETE CBC W/AUTO DIFF WBC: CPT | Performed by: NEUROLOGICAL SURGERY

## 2023-02-14 RX ORDER — BISACODYL 10 MG
10 SUPPOSITORY, RECTAL RECTAL DAILY PRN
Status: DISCONTINUED | OUTPATIENT
Start: 2023-02-14 | End: 2023-02-14

## 2023-02-14 RX ORDER — POLYETHYLENE GLYCOL 3350 17 G/17G
17 POWDER, FOR SOLUTION ORAL DAILY
Status: DISCONTINUED | OUTPATIENT
Start: 2023-02-14 | End: 2023-02-15 | Stop reason: HOSPADM

## 2023-02-14 RX ORDER — DOCUSATE SODIUM 100 MG/1
200 CAPSULE, LIQUID FILLED ORAL 2 TIMES DAILY
Status: DISCONTINUED | OUTPATIENT
Start: 2023-02-14 | End: 2023-02-15 | Stop reason: HOSPADM

## 2023-02-14 RX ADMIN — ATORVASTATIN CALCIUM 40 MG: 20 TABLET, FILM COATED ORAL at 16:59

## 2023-02-14 RX ADMIN — ASPIRIN 81 MG: 81 TABLET, COATED ORAL at 05:36

## 2023-02-14 RX ADMIN — HYDROCODONE BITARTRATE AND ACETAMINOPHEN 1 TABLET: 5; 325 TABLET ORAL at 13:52

## 2023-02-14 RX ADMIN — HYDROCODONE BITARTRATE AND ACETAMINOPHEN 1 TABLET: 5; 325 TABLET ORAL at 09:38

## 2023-02-14 RX ADMIN — Medication 3 ML: at 20:45

## 2023-02-14 RX ADMIN — BUMETANIDE 2 MG: 2 TABLET ORAL at 08:40

## 2023-02-14 RX ADMIN — Medication 3 ML: at 08:45

## 2023-02-14 RX ADMIN — HYDROCODONE BITARTRATE AND ACETAMINOPHEN 1 TABLET: 5; 325 TABLET ORAL at 05:36

## 2023-02-14 RX ADMIN — METOPROLOL SUCCINATE 25 MG: 25 TABLET, EXTENDED RELEASE ORAL at 08:40

## 2023-02-14 RX ADMIN — DOCUSATE SODIUM 200 MG: 100 CAPSULE, LIQUID FILLED ORAL at 12:41

## 2023-02-14 RX ADMIN — HYDROCODONE BITARTRATE AND ACETAMINOPHEN 1 TABLET: 5; 325 TABLET ORAL at 20:44

## 2023-02-14 RX ADMIN — DOCUSATE SODIUM 200 MG: 100 CAPSULE, LIQUID FILLED ORAL at 20:44

## 2023-02-14 RX ADMIN — SACUBITRIL AND VALSARTAN 1 TABLET: 24; 26 TABLET, FILM COATED ORAL at 20:44

## 2023-02-14 RX ADMIN — VANCOMYCIN HYDROCHLORIDE 1000 MG: 1 INJECTION, SOLUTION INTRAVENOUS at 18:16

## 2023-02-14 RX ADMIN — SACUBITRIL AND VALSARTAN 1 TABLET: 24; 26 TABLET, FILM COATED ORAL at 08:41

## 2023-02-14 RX ADMIN — VANCOMYCIN HYDROCHLORIDE 1000 MG: 1 INJECTION, SOLUTION INTRAVENOUS at 05:37

## 2023-02-14 NOTE — CONSULTS
Referring Provider: Nj Mix MD  Reason for Consultation: Chronic back infection      Subjective   History of present illness: Patient is a 57-year-old male with a past medical history of back surgery on 2019 and ID has been consulted for chronic back infection.    Patient had originally noted concerns for infections with areas of swelling in January 2022 with these areas opening and draining.  At that time he was seen at [facility] and cultures had grown MSSA.  He was seen at [facility] for evaluation by neurosurgery and ID was consulted at that time.  Infection was felt to be quite superficial and he was treated with a p.o. course of cefadroxil for skin and soft tissue infection at that time.    Presents now after MRI of the spine showed fluid collection posterior to his leg pneumonectomy sites from L1-L3.  There was also noted subcutaneous fluid collection of 5 cm in diameter at the level of L4-L5.  He was admitted and underwent wound debridement and drainage on 2/13.    Today patient reports he is feeling well and looking forward to going home.  Denies any fevers or chills.  Denies any change in his pain.  States he is tolerating his antibiotics well so far.    Past Medical History:   Diagnosis Date   • Abnormal gait    • Abscess of back    • CHF (congestive heart failure) ([facility]) 06/2022   • Coronary artery disease     ONE VESSEL   • Foot drop, left    • Hypertension    • Low back pain    • Lumbar disc herniation    • Numbness in both legs     MOSTLY IN KNEES DOWN   • Pain in left knee    • Sleep apnea     DOES NOT WEAR CPAP   • Spinal stenosis    • Spinal stenosis of lumbar region        Past Surgical History:   Procedure Laterality Date   • CARDIAC CATHETERIZATION  06/2022    STATES NO CAD   • COLONOSCOPY  2013   • INCISION AND DRAINAGE TRUNK N/A 2/13/2023    Procedure: Wound debridement and drainage;  Surgeon: Nj Mix MD;  Location: [facility];  Service: Neurosurgery;  Laterality: N/A;   •  LUMBAR DISCECTOMY  2000   • LUMBAR DISCECTOMY FUSION INSTRUMENTATION Left 06/05/2019    Procedure: LUMBAR THREE LUMBAR FOUR  AND LUMBAR FIVE LUMBAR SIX DECOMPRESSION. THORACIC ELEVEN TO SACREL TWO POSTERIOR LATERAL ARTHRODESIS(FUSION) WITH SPINAL INSTRUMENTATION. REMOVAL OF LUMBAR INSTRUMENTATION.;  Surgeon: Corbin Bailey MD;  Location: [facility]  Service: Neurosurgery   • LUMBAR FUSION  2008   • VASECTOMY         family history includes Diabetes in his mother; Gout in his father; Hypertension in his father; Sleep apnea in his father.     reports that he quit smoking about 3 years ago. His smoking use included cigarettes. He has a 5.00 pack-year smoking history. He has never used smokeless tobacco. He reports current alcohol use of about 35.0 standard drinks per week. He reports that he does not use drugs.     No Known Allergies    Medication:  Antibiotics:  Anti-Infectives (From admission, onward)    Ordered     Dose/Rate Route Frequency Start Stop    02/13/23 1838  vancomycin (VANCOCIN) 1000 mg/200 mL dextrose 5% IVPB        Ordering Provider: Nj Mix MD    1,000 mg  over 60 Minutes Intravenous Every 12 Hours 02/14/23 0600 02/19/23 0559    02/13/23 1659  vancomycin 2000 mg/500 mL 0.9% NS IVPB ([facility])        Ordering Provider: Nj Mix MD    20 mg/kg × 96.4 kg  over 120 Minutes Intravenous Once 02/13/23 1745 02/14/23 0735    02/13/23 1652  Pharmacy to dose vancomycin        Ordering Provider: Nj Mix MD     Does not apply Daily PRN 02/13/23 1652 02/18/23 1651    02/13/23 1148  ceFAZolin in dextrose (ANCEF) IVPB solution 2 g        Ordering Provider: Nj Mix MD    2 g  over 30 Minutes Intravenous Once 02/13/23 1150 02/13/23 1737        Objective     Physical Exam:   Vital Signs   Temp:  [97.4 °F (36.3 °C)-98.7 °F (37.1 °C)] 97.5 °F (36.4 °C)  Heart Rate:  [] 84  Resp:  [15-16] 16  BP: ()/(62-95) 116/75    GENERAL: Awake and alert, in no acute distress.   HEENT:  Oropharynx is clear. Hearing is grossly normal.   EYES: PERRL. No conjunctival injection. No lid lag.    HEART: Regular rate and regular rhythm.  LUNGS: Normal work of breathing  GI: Soft, nontender, nondistended.  SKIN: Warm and dry without cutaneous eruptions   PSYCHIATRIC: Appropriate mood, affect, insight, and judgment.     Results Review:   I reviewed the patient's new clinical results.  I reviewed the patient's new imaging results and agree with the interpretation.    Lab Results   Component Value Date    WBC 8.28 02/14/2023    HGB 11.7 (L) 02/14/2023    HCT 34.6 (L) 02/14/2023    MCV 92.5 02/14/2023     02/14/2023       No results found for: ORTIZ MAYFIELD VANCORANDOM    Lab Results   Component Value Date    GLUCOSE 85 02/10/2023    BUN 10 02/10/2023    CREATININE 0.96 02/10/2023    EGFRIFNONA 95 07/01/2019    BCR 10.4 02/10/2023    CO2 30.0 (H) 02/10/2023    CALCIUM 9.3 02/10/2023    ALBUMIN 3.60 03/11/2022    AST 17 03/11/2022    ALT 22 03/11/2022         Estimated Creatinine Clearance: 113 mL/min (by C-G formula based on SCr of 0.96 mg/dL).      Microbiology:  2/13 OR cultures no growth to date    Radiology:  2/2 MRI of the lumbar and thoracic spine report reviewed with uniform fluid collection from the inferior aspect of L1 to the mid body of L3 concerning for seroma versus hematoma.  More complex fluid collection identified in the subcutaneous tissues laterally to the left with multiseptated and peripherally enhancing component.  Measuring 5 cm in diameter and extends from L4-L5-S2.    Assessment   #Spinal fluid collection concerning for infection  #History of lumbar surgery  #Spinal hardware in place     Findings on imaging, operative findings and history of infection do seem concerning for a chronic infection involving patient's spinal hardware.  Agree with continuing IV vancomycin goal -600 monitoring levels.  He has a history of MSSA and will continue this while awaiting  cultures to mature.  Plan to repeat CRP and ESR for new baseline    Thank you for this consult.  We will continue to follow along and tailor antibiotics as the patient's clinical course evolves.

## 2023-02-14 NOTE — DISCHARGE PLACEMENT REQUEST
"Bj Oquendo (57 y.o. Male)     Date of Birth   1965    Social Security Number       Address   [address] [address]    Home Phone   [phone]    MRN   [id number]       Yarsani   Patient Refused    Marital Status                               Admission Date   2/13/23    Admission Type   Elective    Admitting Provider   Nj Mix MD    Attending Provider   Nj Mix MD    Department, Room/Bed   [address], P883/1       Discharge Date       Discharge Disposition       Discharge Destination                               Attending Provider: Nj Mix MD    Allergies: No Known Allergies    Isolation: None   Infection: None   Code Status: CPR    Ht: 188 cm (74\")   Wt: 96.4 kg (212 lb 8.4 oz)    Admission Cmt: None   Principal Problem: Wound drainage [L24.A9]                 Active Insurance as of 2/13/2023     Primary Coverage     Payor Plan Insurance Group Employer/Plan Group    [employer]HEM BLUE CROSS ANTHEM BLUE CROSS BLUE SHIELD PPO [id number]     Payor Plan Address Payor Plan Phone Number Payor Plan Fax Number Effective Dates    [address] [phone]  3/1/2022 - None Entered    [facility] [id number]       Subscriber Name Subscriber Birth Date Member ID       BJ OQUENDO 1965 [id number]                 Emergency Contacts      (Rel.) Home Phone Work Phone Mobile Phone    Patricia Oquendo (Daughter) -- -- [phone]    IBIS OQUENDO (Daughter) -- -- [phone]              "

## 2023-02-14 NOTE — PLAN OF CARE
Goal Outcome Evaluation:           Progress: improving  Outcome Evaluation: 58 y/o s/POD 1 L2-L5 wound debridement and drainage. AOx4. Pt up ad jonh. Voiding function intact via urinal. Neuro checks WNL, c/o baseline numbness/tingling. Weak dorsiflexion noted in LLE. Bordered dsg c/d/i. Pain managed via PO pills. PIV running Nacl w/ K 20meq at 20cc/hr and intermittent abx. CIWA unremarkable. Needs met. VSS. RA. Educated pt on falls precautions. Plan to d/c home once cleared by ID. Will CTM.

## 2023-02-14 NOTE — CASE MANAGEMENT/SOCIAL WORK
Discharge Planning Assessment  [facility]     Patient Name: Archie Oquendo  MRN: [id number]  Today's Date: 2/14/2023    Admit Date: 2/13/2023    Plan: Home with [facility] for IV antibiotics, referrals pending   Discharge Needs Assessment     Row Name 02/14/23 1206       Living Environment    People in Home alone    Current Living Arrangements home    Primary Care Provided by self    Provides Primary Care For no one    Family Caregiver if Needed child(evette), adult    Quality of Family Relationships helpful;involved;supportive       Resource/Environmental Concerns    Resource/Environmental Concerns home accessibility    Home Accessibility Concerns stairs to enter home    Transportation Concerns none       Transition Planning    Patient/Family Anticipates Transition to home with help/services    Patient/Family Anticipated Services at Transition home health care    Transportation Anticipated family or friend will provide       Discharge Needs Assessment    Readmission Within the Last 30 Days no previous admission in last 30 days    Equipment Currently Used at Home walker, rolling;cane, straight    Concerns to be Addressed no discharge needs identified;denies needs/concerns at this time    Anticipated Changes Related to Illness none    Equipment Needed After Discharge none    Provided Post Acute Provider List? N/A    Provided Post Acute Provider Quality & Resource List? N/A               Discharge Plan     Row Name 02/14/23 1207       Plan    Plan Home with [facility] for IV antibiotics, referrals pending    Patient/Family in Agreement with Plan yes    Plan Comments CCP met with patient at bedside, introduced self and explained role. Patient confirmed information on his face sheet is accurate. Patient states that he lives at home alone. Patient confirmed his PCP is Zoe Desai. Patient denies a history of HH/SNF. Patient states that he uses a straight cane and has it in his hospital room and a  rolling walker at home. Patient states there are 8 steps to enter his home with handrails on both sides. Patient denies any steps inside the home. Patient is enrolled in the [facility] M[facility] program. CCP discussed patient possibly going home with IV antibiotics and the need for HH and an infusion company for the IV medicine. Patient verbalized his understanding and is agreeable to both services if needed. CCP made referral to [facility] this date via EPIC and spoke to Kathryn with [facility] regarding referral. Patient states his daughter Patricia can transport him home at discharge. CCP to follow for discharge needs.              Continued Care and Services - Admitted Since 2/13/2023     Home Medical Care     Service Provider Request Status Selected Services Address Phone Fax Patient Preferred     [facility] Pending - Request Sent N/A [address] [address] [phone] --                 Demographic Summary     Row Name 02/14/23 1153       General Information    Admission Type other (see comments)  Outpatient    Arrived From PACU/recovery room    Reason for Consult discharge planning    Preferred Language English               Functional Status     Row Name 02/14/23 1206       Functional Status    Usual Activity Tolerance moderate    Current Activity Tolerance moderate       Functional Status, IADL    Medications assistive equipment    Meal Preparation assistive equipment    Housekeeping assistive equipment    Laundry assistive equipment    Shopping assistive equipment       Mental Status    General Appearance WDL WDL       Mental Status Summary    Recent Changes in Mental Status/Cognitive Functioning no changes       Employment/    Employment Status employed full-time               Psychosocial    No documentation.                Abuse/Neglect    No documentation.                Legal    No documentation.                Substance Abuse    No documentation.                Patient Forms     No documentation.

## 2023-02-14 NOTE — PLAN OF CARE
Problem: Adult Inpatient Plan of Care  Goal: Plan of Care Review  Outcome: Ongoing, Progressing  Goal: Patient-Specific Goal (Individualized)  Outcome: Ongoing, Progressing  Goal: Absence of Hospital-Acquired Illness or Injury  Outcome: Ongoing, Progressing  Intervention: Identify and Manage Fall Risk  Recent Flowsheet Documentation  Taken 2/14/2023 0400 by Remy Fuentes RN  Safety Promotion/Fall Prevention:   activity supervised   assistive device/personal items within reach   safety round/check completed  Taken 2/14/2023 0200 by Remy Fuentes RN  Safety Promotion/Fall Prevention:   activity supervised   assistive device/personal items within reach   safety round/check completed  Taken 2/14/2023 0000 by Remy Fuentes RN  Safety Promotion/Fall Prevention:   activity supervised   assistive device/personal items within reach   safety round/check completed  Taken 2/13/2023 2210 by Remy Fuentes RN  Safety Promotion/Fall Prevention:   activity supervised   assistive device/personal items within reach   safety round/check completed  Taken 2/13/2023 2050 by Remy Fuentes RN  Safety Promotion/Fall Prevention:   activity supervised   assistive device/personal items within reach   safety round/check completed  Intervention: Prevent Skin Injury  Recent Flowsheet Documentation  Taken 2/13/2023 2050 by Remy Fuentes RN  Skin Protection: adhesive use limited  Intervention: Prevent and Manage VTE (Venous Thromboembolism) Risk  Recent Flowsheet Documentation  Taken 2/14/2023 0400 by Remy Fuentes RN  VTE Prevention/Management:   bilateral   sequential compression devices on  Taken 2/14/2023 0000 by Remy Fuentes RN  VTE Prevention/Management:   bilateral   sequential compression devices on  Taken 2/13/2023 2050 by Remy Fuentes RN  VTE Prevention/Management:   bilateral   sequential compression devices on  Range of Motion: active ROM (range of motion) encouraged  Intervention: Prevent  Infection  Recent Flowsheet Documentation  Taken 2/14/2023 0400 by Remy Fuentes RN  Infection Prevention: rest/sleep promoted  Taken 2/14/2023 0200 by Remy Fuentes RN  Infection Prevention: rest/sleep promoted  Taken 2/14/2023 0000 by Remy Fuentes RN  Infection Prevention: rest/sleep promoted  Taken 2/13/2023 2210 by Remy Fuentes RN  Infection Prevention: rest/sleep promoted  Taken 2/13/2023 2050 by Remy Fuentes RN  Infection Prevention: rest/sleep promoted  Goal: Optimal Comfort and Wellbeing  Outcome: Ongoing, Progressing  Intervention: Monitor Pain and Promote Comfort  Recent Flowsheet Documentation  Taken 2/13/2023 2050 by Remy Fuentes RN  Pain Management Interventions: (available when called) see MAR  Intervention: Provide Person-Centered Care  Recent Flowsheet Documentation  Taken 2/13/2023 2050 by Remy Fuentes RN  Trust Relationship/Rapport:   care explained   choices provided   empathic listening provided   questions answered  Goal: Readiness for Transition of Care  Outcome: Ongoing, Progressing     Problem: Heart Failure Comorbidity  Goal: Maintenance of Heart Failure Symptom Control  Outcome: Ongoing, Progressing  Intervention: Maintain Heart Failure-Management  Recent Flowsheet Documentation  Taken 2/14/2023 0400 by Remy Fuentes RN  Medication Review/Management: medications reviewed  Taken 2/14/2023 0200 by Remy Fuentes RN  Medication Review/Management: medications reviewed  Taken 2/14/2023 0000 by Remy Fuentes RN  Medication Review/Management: medications reviewed  Taken 2/13/2023 2210 by Remy Fuentes RN  Medication Review/Management: medications reviewed  Taken 2/13/2023 2050 by Remy Fuentes RN  Medication Review/Management: medications reviewed     Problem: Hypertension Comorbidity  Goal: Blood Pressure in Desired Range  Outcome: Ongoing, Progressing  Intervention: Maintain Blood Pressure Management  Recent Flowsheet Documentation  Taken  2/14/2023 0400 by Remy Fuentes RN  Medication Review/Management: medications reviewed  Taken 2/14/2023 0200 by Remy Fuentes RN  Medication Review/Management: medications reviewed  Taken 2/14/2023 0000 by Remy Fuentes RN  Medication Review/Management: medications reviewed  Taken 2/13/2023 2210 by Rmey Fuentes RN  Medication Review/Management: medications reviewed  Taken 2/13/2023 2050 by Remy Fuentes RN  Medication Review/Management: medications reviewed     Problem: Obstructive Sleep Apnea Risk or Actual Comorbidity Management  Goal: Unobstructed Breathing During Sleep  Outcome: Ongoing, Progressing     Problem: Bleeding (Surgery Nonspecified)  Goal: Absence of Bleeding  Outcome: Ongoing, Progressing     Problem: Bowel Motility Impaired (Surgery Nonspecified)  Goal: Effective Bowel Elimination  Outcome: Ongoing, Progressing     Problem: Fluid and Electrolyte Imbalance (Surgery Nonspecified)  Goal: Fluid and Electrolyte Balance  Outcome: Ongoing, Progressing     Problem: Glycemic Control Impaired (Surgery Nonspecified)  Goal: Blood Glucose Level Within Targeted Range  Outcome: Ongoing, Progressing     Problem: Infection (Surgery Nonspecified)  Goal: Absence of Infection Signs and Symptoms  Outcome: Ongoing, Progressing     Problem: Ongoing Anesthesia Effects (Surgery Nonspecified)  Goal: Anesthesia/Sedation Recovery  Outcome: Ongoing, Progressing  Intervention: Optimize Anesthesia Recovery  Recent Flowsheet Documentation  Taken 2/14/2023 0400 by Remy Fuentes RN  Safety Promotion/Fall Prevention:   activity supervised   assistive device/personal items within reach   safety round/check completed  Taken 2/14/2023 0200 by Remy Fuentes RN  Safety Promotion/Fall Prevention:   activity supervised   assistive device/personal items within reach   safety round/check completed  Taken 2/14/2023 0000 by Remy Fuentes RN  Safety Promotion/Fall Prevention:   activity supervised   assistive  device/personal items within reach   safety round/check completed  Taken 2/13/2023 2210 by Remy Fuentes RN  Safety Promotion/Fall Prevention:   activity supervised   assistive device/personal items within reach   safety round/check completed  Taken 2/13/2023 2123 by Remy Fuentes RN  Administration (IS): self-administered  Taken 2/13/2023 2050 by Remy Fuentes RN  Patient Tolerance (IS): good  Safety Promotion/Fall Prevention:   activity supervised   assistive device/personal items within reach   safety round/check completed  Administration (IS): self-administered  Level Incentive Spirometer (mL): 32278  Incentive Spirometer Predicted Level (mL): 2000  Number of Repetitions (IS): 5     Problem: Pain (Surgery Nonspecified)  Goal: Acceptable Pain Control  Outcome: Ongoing, Progressing  Intervention: Prevent or Manage Pain  Recent Flowsheet Documentation  Taken 2/13/2023 2050 by Remy Fuentes RN  Pain Management Interventions: (available when called) see MAR  Diversional Activities: television     Problem: Postoperative Nausea and Vomiting (Surgery Nonspecified)  Goal: Nausea and Vomiting Relief  Outcome: Ongoing, Progressing     Problem: Postoperative Urinary Retention (Surgery Nonspecified)  Goal: Effective Urinary Elimination  Outcome: Ongoing, Progressing     Problem: Respiratory Compromise (Surgery Nonspecified)  Goal: Effective Oxygenation and Ventilation  Outcome: Ongoing, Progressing  Intervention: Optimize Oxygenation and Ventilation  Recent Flowsheet Documentation  Taken 2/13/2023 2050 by Remy Fuentes RN  Airway/Ventilation Management: airway patency maintained   Goal Outcome Evaluation:

## 2023-02-14 NOTE — PROGRESS NOTES
NEUROSURGERY POST OP NOTE     LOS: 0 days   Patient Care Team:  Zoe Desai MD as PCP - General (Family Medicine)  Corbin Bailey MD as Surgeon (Neurosurgery)      Interval History: Pain well controlled. Denies leg pain or weakness. Denies feeling feverish or chills. Denies breathing difficulty. Walking ok, voiding without difficulty.     History taken from: patient chart    Objective        Vital Signs  Temp:  [97.4 °F (36.3 °C)-98.7 °F (37.1 °C)] 97.5 °F (36.4 °C)  Heart Rate:  [] 84  Resp:  [15-16] 16  BP: ()/(62-95) 116/75     Physical Exam:     AA&O x 3. Non-toxic appearing.   Lumbar incision flat, soft.  Slight dried drainage on dressing.   No calf swelling or tenderness to bilateral palpation.       Results Review:     I reviewed the patient's new clinical results.     .  Results from last 7 days   Lab Units 02/14/23  0445   WBC 10*3/mm3 8.28   HEMOGLOBIN g/dL 11.7*   HEMATOCRIT % 34.6*   PLATELETS 10*3/mm3 175     Tissue culture 2/13/2023 - no growth to date  Fungal culture 2/13/2023 - pending    5 VIEWS OF THE LUMBAR SPINE 2/14/2023    Intact posterior shiela and screw fusion hardware T10-S1.  Alignment normal.  No evidence of acute fracture.      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  Received care at an outlying facility for MSSA + superficial fluid collection. Care transferred to [facility] March 2022; ID managed infection at that time.  History of prior multilevel lumbar fusion surgeries most recent surgery was performed in June 2019.    ID consult appreciated; repeat ESR, CRP pending; will continue to monitor cultures and monitor IV vancomycin.  Change dressing in am.   CBC in am  Mobilize with PT    VERONICA Alaniz  02/14/23  09:48 EST

## 2023-02-15 ENCOUNTER — HOME HEALTH ADMISSION (OUTPATIENT)
Dept: HOME HEALTH SERVICES | Facility: HOME HEALTHCARE | Age: 58
End: 2023-02-15
Payer: COMMERCIAL

## 2023-02-15 VITALS
TEMPERATURE: 98.5 F | OXYGEN SATURATION: 99 % | WEIGHT: 212.52 LBS | BODY MASS INDEX: 27.27 KG/M2 | HEIGHT: 74 IN | DIASTOLIC BLOOD PRESSURE: 72 MMHG | SYSTOLIC BLOOD PRESSURE: 109 MMHG | HEART RATE: 92 BPM | RESPIRATION RATE: 17 BRPM

## 2023-02-15 LAB
BASOPHILS # BLD AUTO: 0.05 10*3/MM3 (ref 0–0.2)
BASOPHILS NFR BLD AUTO: 0.6 % (ref 0–1.5)
DEPRECATED RDW RBC AUTO: 43.1 FL (ref 37–54)
EOSINOPHIL # BLD AUTO: 0.15 10*3/MM3 (ref 0–0.4)
EOSINOPHIL NFR BLD AUTO: 1.7 % (ref 0.3–6.2)
ERYTHROCYTE [DISTWIDTH] IN BLOOD BY AUTOMATED COUNT: 12.9 % (ref 12.3–15.4)
HCT VFR BLD AUTO: 32.8 % (ref 37.5–51)
HGB BLD-MCNC: 11 G/DL (ref 13–17.7)
IMM GRANULOCYTES # BLD AUTO: 0.03 10*3/MM3 (ref 0–0.05)
IMM GRANULOCYTES NFR BLD AUTO: 0.3 % (ref 0–0.5)
LYMPHOCYTES # BLD AUTO: 1.9 10*3/MM3 (ref 0.7–3.1)
LYMPHOCYTES NFR BLD AUTO: 22.1 % (ref 19.6–45.3)
MCH RBC QN AUTO: 30.8 PG (ref 26.6–33)
MCHC RBC AUTO-ENTMCNC: 33.5 G/DL (ref 31.5–35.7)
MCV RBC AUTO: 91.9 FL (ref 79–97)
MONOCYTES # BLD AUTO: 0.65 10*3/MM3 (ref 0.1–0.9)
MONOCYTES NFR BLD AUTO: 7.6 % (ref 5–12)
NEUTROPHILS NFR BLD AUTO: 5.8 10*3/MM3 (ref 1.7–7)
NEUTROPHILS NFR BLD AUTO: 67.7 % (ref 42.7–76)
NRBC BLD AUTO-RTO: 0 /100 WBC (ref 0–0.2)
PLATELET # BLD AUTO: 162 10*3/MM3 (ref 140–450)
PMV BLD AUTO: 10.1 FL (ref 6–12)
RBC # BLD AUTO: 3.57 10*6/MM3 (ref 4.14–5.8)
VANCOMYCIN TROUGH SERPL-MCNC: 11.4 MCG/ML (ref 5–20)
WBC NRBC COR # BLD: 8.58 10*3/MM3 (ref 3.4–10.8)

## 2023-02-15 PROCEDURE — 25010000002 VANCOMYCIN PER 500 MG: Performed by: NEUROLOGICAL SURGERY

## 2023-02-15 PROCEDURE — 80202 ASSAY OF VANCOMYCIN: CPT | Performed by: NEUROLOGICAL SURGERY

## 2023-02-15 PROCEDURE — 85025 COMPLETE CBC W/AUTO DIFF WBC: CPT | Performed by: NURSE PRACTITIONER

## 2023-02-15 PROCEDURE — 99232 SBSQ HOSP IP/OBS MODERATE 35: CPT | Performed by: STUDENT IN AN ORGANIZED HEALTH CARE EDUCATION/TRAINING PROGRAM

## 2023-02-15 RX ORDER — HYDROCODONE BITARTRATE AND ACETAMINOPHEN 5; 325 MG/1; MG/1
1 TABLET ORAL EVERY 6 HOURS PRN
Qty: 35 TABLET | Refills: 0 | Status: SHIPPED | OUTPATIENT
Start: 2023-02-15 | End: 2023-03-04

## 2023-02-15 RX ORDER — PSEUDOEPHEDRINE HCL 30 MG
100 TABLET ORAL 2 TIMES DAILY
Qty: 30 CAPSULE | Refills: 0 | Status: SHIPPED | OUTPATIENT
Start: 2023-02-15

## 2023-02-15 RX ADMIN — HYDROCODONE BITARTRATE AND ACETAMINOPHEN 1 TABLET: 5; 325 TABLET ORAL at 06:10

## 2023-02-15 RX ADMIN — ASPIRIN 81 MG: 81 TABLET, COATED ORAL at 06:10

## 2023-02-15 RX ADMIN — VANCOMYCIN HYDROCHLORIDE 1000 MG: 1 INJECTION, SOLUTION INTRAVENOUS at 08:57

## 2023-02-15 RX ADMIN — BUMETANIDE 2 MG: 2 TABLET ORAL at 06:10

## 2023-02-15 RX ADMIN — METOPROLOL SUCCINATE 25 MG: 25 TABLET, EXTENDED RELEASE ORAL at 08:58

## 2023-02-15 RX ADMIN — HYDROCODONE BITARTRATE AND ACETAMINOPHEN 1 TABLET: 5; 325 TABLET ORAL at 11:34

## 2023-02-15 RX ADMIN — HYDROCODONE BITARTRATE AND ACETAMINOPHEN 1 TABLET: 5; 325 TABLET ORAL at 16:19

## 2023-02-15 RX ADMIN — SACUBITRIL AND VALSARTAN 1 TABLET: 24; 26 TABLET, FILM COATED ORAL at 08:58

## 2023-02-15 RX ADMIN — Medication 3 ML: at 11:34

## 2023-02-15 RX ADMIN — DOCUSATE SODIUM 200 MG: 100 CAPSULE, LIQUID FILLED ORAL at 08:57

## 2023-02-15 NOTE — PLAN OF CARE
Problem: Adult Inpatient Plan of Care  Goal: Plan of Care Review  Outcome: Ongoing, Progressing  Goal: Patient-Specific Goal (Individualized)  Outcome: Ongoing, Progressing  Goal: Absence of Hospital-Acquired Illness or Injury  Outcome: Ongoing, Progressing  Intervention: Identify and Manage Fall Risk  Recent Flowsheet Documentation  Taken 2/15/2023 0007 by Remy Fuentes RN  Safety Promotion/Fall Prevention:   activity supervised   assistive device/personal items within reach   safety round/check completed  Taken 2/14/2023 2200 by Remy Fuentes RN  Safety Promotion/Fall Prevention:   activity supervised   assistive device/personal items within reach   safety round/check completed  Taken 2/14/2023 1929 by Remy Fuentes RN  Safety Promotion/Fall Prevention:   activity supervised   assistive device/personal items within reach   safety round/check completed  Intervention: Prevent Skin Injury  Recent Flowsheet Documentation  Taken 2/14/2023 1929 by Remy Fuentes RN  Skin Protection: adhesive use limited  Intervention: Prevent and Manage VTE (Venous Thromboembolism) Risk  Recent Flowsheet Documentation  Taken 2/15/2023 0007 by Remy Fuentes RN  VTE Prevention/Management:   bilateral   sequential compression devices on  Taken 2/14/2023 1929 by Remy Fuentes RN  VTE Prevention/Management:   bilateral   sequential compression devices on  Range of Motion: active ROM (range of motion) encouraged  Intervention: Prevent Infection  Recent Flowsheet Documentation  Taken 2/15/2023 0007 by Remy Fuentes RN  Infection Prevention: rest/sleep promoted  Taken 2/14/2023 2200 by Remy Fuentes RN  Infection Prevention: rest/sleep promoted  Taken 2/14/2023 1929 by Remy Fuentes RN  Infection Prevention: rest/sleep promoted  Goal: Optimal Comfort and Wellbeing  Outcome: Ongoing, Progressing  Intervention: Monitor Pain and Promote Comfort  Recent Flowsheet Documentation  Taken 2/14/2023 1929 by Gina  Remy RN  Pain Management Interventions: quiet environment facilitated  Intervention: Provide Person-Centered Care  Recent Flowsheet Documentation  Taken 2/14/2023 1929 by Remy Fuentes RN  Trust Relationship/Rapport:   care explained   choices provided   questions encouraged   questions answered  Goal: Readiness for Transition of Care  Outcome: Ongoing, Progressing     Problem: Heart Failure Comorbidity  Goal: Maintenance of Heart Failure Symptom Control  Outcome: Ongoing, Progressing  Intervention: Maintain Heart Failure-Management  Recent Flowsheet Documentation  Taken 2/15/2023 0007 by Remy Fuentes RN  Medication Review/Management: medications reviewed  Taken 2/14/2023 2200 by Remy Fuentes RN  Medication Review/Management: medications reviewed  Taken 2/14/2023 1929 by Remy Fuentes RN  Medication Review/Management: medications reviewed     Problem: Hypertension Comorbidity  Goal: Blood Pressure in Desired Range  Outcome: Ongoing, Progressing  Intervention: Maintain Blood Pressure Management  Recent Flowsheet Documentation  Taken 2/15/2023 0007 by Remy Fuentes RN  Medication Review/Management: medications reviewed  Taken 2/14/2023 2200 by Remy Fuentes, RN  Medication Review/Management: medications reviewed  Taken 2/14/2023 1929 by Remy Fuentes RN  Medication Review/Management: medications reviewed     Problem: Obstructive Sleep Apnea Risk or Actual Comorbidity Management  Goal: Unobstructed Breathing During Sleep  Outcome: Ongoing, Progressing     Problem: Bleeding (Surgery Nonspecified)  Goal: Absence of Bleeding  Outcome: Ongoing, Progressing     Problem: Bowel Motility Impaired (Surgery Nonspecified)  Goal: Effective Bowel Elimination  Outcome: Ongoing, Progressing     Problem: Fluid and Electrolyte Imbalance (Surgery Nonspecified)  Goal: Fluid and Electrolyte Balance  Outcome: Ongoing, Progressing     Problem: Glycemic Control Impaired (Surgery Nonspecified)  Goal: Blood  Glucose Level Within Targeted Range  Outcome: Ongoing, Progressing     Problem: Infection (Surgery Nonspecified)  Goal: Absence of Infection Signs and Symptoms  Outcome: Ongoing, Progressing     Problem: Ongoing Anesthesia Effects (Surgery Nonspecified)  Goal: Anesthesia/Sedation Recovery  Outcome: Ongoing, Progressing  Intervention: Optimize Anesthesia Recovery  Recent Flowsheet Documentation  Taken 2/15/2023 0007 by Remy Fuentes RN  Safety Promotion/Fall Prevention:   activity supervised   assistive device/personal items within reach   safety round/check completed  Taken 2/14/2023 2200 by Remy Fuentes RN  Safety Promotion/Fall Prevention:   activity supervised   assistive device/personal items within reach   safety round/check completed  Taken 2/14/2023 2131 by Remy Fuentes RN  Administration (IS): self-administered  Taken 2/14/2023 1929 by Remy Fuentes RN  Safety Promotion/Fall Prevention:   activity supervised   assistive device/personal items within reach   safety round/check completed  Administration (IS): self-administered     Problem: Pain (Surgery Nonspecified)  Goal: Acceptable Pain Control  Outcome: Ongoing, Progressing  Intervention: Prevent or Manage Pain  Recent Flowsheet Documentation  Taken 2/14/2023 1929 by Remy Fuentes RN  Pain Management Interventions: quiet environment facilitated  Diversional Activities: television     Problem: Postoperative Nausea and Vomiting (Surgery Nonspecified)  Goal: Nausea and Vomiting Relief  Outcome: Ongoing, Progressing     Problem: Postoperative Urinary Retention (Surgery Nonspecified)  Goal: Effective Urinary Elimination  Outcome: Ongoing, Progressing     Problem: Respiratory Compromise (Surgery Nonspecified)  Goal: Effective Oxygenation and Ventilation  Outcome: Ongoing, Progressing  Intervention: Optimize Oxygenation and Ventilation  Recent Flowsheet Documentation  Taken 2/14/2023 1929 by Remy Fuentes RN  Airway/Ventilation  Management: airway patency maintained     Problem: Fall Injury Risk  Goal: Absence of Fall and Fall-Related Injury  Outcome: Ongoing, Progressing  Intervention: Identify and Manage Contributors  Recent Flowsheet Documentation  Taken 2/15/2023 0007 by Remy Fuentes, RN  Medication Review/Management: medications reviewed  Taken 2/14/2023 2200 by Remy Fuentes, RN  Medication Review/Management: medications reviewed  Taken 2/14/2023 1929 by Remy Fuentes, RN  Medication Review/Management: medications reviewed  Intervention: Promote Injury-Free Environment  Recent Flowsheet Documentation  Taken 2/15/2023 0007 by Remy Fuentes, RN  Safety Promotion/Fall Prevention:   activity supervised   assistive device/personal items within reach   safety round/check completed  Taken 2/14/2023 2200 by Remy Fuentes, RN  Safety Promotion/Fall Prevention:   activity supervised   assistive device/personal items within reach   safety round/check completed  Taken 2/14/2023 1929 by Remy Fuentes, RN  Safety Promotion/Fall Prevention:   activity supervised   assistive device/personal items within reach   safety round/check completed   Goal Outcome Evaluation:

## 2023-02-15 NOTE — DISCHARGE SUMMARY
Archie Oquendo  1965    Patient Care Team:  Zoe Desai MD as PCP - General (Family Medicine)  Corbin Bailey MD as Surgeon (Neurosurgery)    Date of Admit: 2/13/2023    Date of Discharge:  2/15/2023    Discharge Diagnosis:  Wound drainage      Procedures Performed  Procedure(s):  Wound debridement and drainage       Complications: None    Consultants:   Consults     Date and Time Order Name Status Description    2/13/2023  4:53 PM Inpatient Infectious Diseases Consult Completed           Condition on Discharge: stable    Discharge disposition: home    Pertinent Test Results: microbiology: wound culture: still pending.     Brief HPI: This is a 57-year-old male with a history of T11-S2 fusion surgery with Dr. Bialey in June 2019.  Subsequently he had developed some wound drainage that had been addressed by infectious disease while an inpatient last March.  This was determined to be a relatively superficial infection and infectious disease was consulted.  He was treated with oral antibiotics and localized wound care.  He had recurrent purulent drainage and MRI imaging revealed a subcutaneous fluid collection and no evidence of infection to the hardware or the bone.  The MRI showed a small fluid collection inferiorly adjacent to the dural sac at the level of the decompression at or about L5.  It did not appear to be infected.  The patient saw Dr. Mix and decision was made to proceed with surgery to remove the abscess cavity alone and obtain appropriate cultures and determine from there whether further surgery would be needed.  Please see admission H&P for further details.    Hospital Course: The patient is now postop day 2.  Yesterday he was doing quite well.  He was getting proper management of his pain symptoms with oral hydrocodone.  He is ambulating without difficulty.  He is voiding without difficulty.  He was evaluated by infectious disease service.  White blood cell count normal.  Hemoglobin and  hematocrit normal.  Infectious disease ordered a ESR and CRP for trending purposes.  Sed rate was 28, CRP was 2.95.  Dr. Castano with infectious disease did not recommend any antibiotic therapy while the cultures are still pending.  The plan was to wait for culture results to come back and determine need for antibiotics at that time.  The patient is afebrile.  His vital signs been stable.  He is nontoxic-appearing.  The lumbar incision is well approximated.  Staples are intact.  There is no redness, swelling, or drainage from the lumbar incision.  The patient is ambulating well.  He was evaluated by physical therapy and felt to be independent with ambulation and therefore no further physical therapy was recommended.  Patient has no swelling or tenderness bilaterally to calf palpation. The patient feels ready for discharge home today.  Both the patient and Dr. Mix as well as Dr. Castano are in agreement with the plan above.      Discharge Physical Exam:    Temp:  [97.7 °F (36.5 °C)-98.5 °F (36.9 °C)] 98.5 °F (36.9 °C)  Heart Rate:  [] 92  Resp:  [16-17] 17  BP: ()/(63-72) 109/72    Current labs:  Lab Results (last 24 hours)     Procedure Component Value Units Date/Time    CBC & Differential [[id number]]  (Abnormal) Collected: 02/15/23 0632    Specimen: Blood Updated: 02/15/23 0801    Narrative:      The following orders were created for panel order CBC & Differential.  Procedure                               Abnormality         Status                     ---------                               -----------         ------                     CBC Auto Differential[[id number]]        Abnormal            Final result                 Please view results for these tests on the individual orders.    CBC Auto Differential [[id number]]  (Abnormal) Collected: 02/15/23 0632    Specimen: Blood Updated: 02/15/23 0801     WBC 8.58 10*3/mm3      RBC 3.57 10*6/mm3      Hemoglobin 11.0 g/dL      Hematocrit 32.8 %      MCV  91.9 fL      MCH 30.8 pg      MCHC 33.5 g/dL      RDW 12.9 %      RDW-SD 43.1 fl      MPV 10.1 fL      Platelets 162 10*3/mm3      Neutrophil % 67.7 %      Lymphocyte % 22.1 %      Monocyte % 7.6 %      Eosinophil % 1.7 %      Basophil % 0.6 %      Immature Grans % 0.3 %      Neutrophils, Absolute 5.80 10*3/mm3      Lymphocytes, Absolute 1.90 10*3/mm3      Monocytes, Absolute 0.65 10*3/mm3      Eosinophils, Absolute 0.15 10*3/mm3      Basophils, Absolute 0.05 10*3/mm3      Immature Grans, Absolute 0.03 10*3/mm3      nRBC 0.0 /100 WBC     Vancomycin, Trough [[id number]]  (Normal) Collected: 02/15/23 0632    Specimen: Blood Updated: 02/15/23 0815     Vancomycin Trough 11.40 mcg/mL     Narrative:      Therapeutic Ranges for Vancomycin    Vancomycin Random   5.0-40.0 mcg/mL  Vancomycin Trough   5.0-20.0 mcg/mL  Vancomycin Peak     20.0-40.0 mcg/mL          Discharge Medications  Nikhil has been reviewed and narcotic consent is on file in the patient's chart.     Your medication list      START taking these medications      Instructions Last Dose Given Next Dose Due   docusate sodium 100 MG capsule      Take 1 capsule by mouth 2 (Two) Times a Day.       HYDROcodone-acetaminophen 5-325 MG per tablet  Commonly known as: NORCO      Take 1 tablet by mouth Every 6 (Six) Hours As Needed for Moderate Pain for up to 5 days.          CHANGE how you take these medications      Instructions Last Dose Given Next Dose Due   metoprolol succinate XL 25 MG 24 hr tablet  Commonly known as: TOPROL-XL  What changed: when to take this      Take 1 tablet by mouth Daily.          CONTINUE taking these medications      Instructions Last Dose Given Next Dose Due   Aspirin Low Dose 81 MG EC tablet  Generic drug: aspirin      Take 81 mg by mouth Every Morning.       atorvastatin 40 MG tablet  Commonly known as: LIPITOR      Take 40 mg by mouth Every Evening.       bumetanide 2 MG tablet  Commonly known as: BUMEX      Take 2 mg by mouth Every  Morning.       cyclobenzaprine 10 MG tablet  Commonly known as: FLEXERIL      Take 10 mg by mouth.       Entresto 24-26 MG tablet  Generic drug: sacubitril-valsartan      Take 1 tablet by mouth 2 (Two) Times a Day.       ibuprofen 800 MG tablet  Commonly known as: ADVIL,MOTRIN      800 mg.       multivitamin with minerals tablet tablet      Take 1 tablet by mouth Daily.             Where to Get Your Medications      These medications were sent to [facility]  [place] [id number]    Hours: 7:00 AM-6:00 PM Mon-Fri, 8:00 AM-4:30 PM Sat-Sun (Closed 12-12:30PM) Phone: [phone]   · docusate sodium 100 MG capsule  · HYDROcodone-acetaminophen 5-325 MG per tablet         Discharge Diet:     Diet Order   Procedures   • Diet: Regular/House Diet; Texture: Regular Texture (IDDSI 7); Fluid Consistency: Thin (IDDSI 0)       Activity at Discharge:   Activity Instructions     Other Activity Instructions      Activity Instructions: Please refer to the instruction sheet from Dr. Mix's office that is attached in the after visit summary for postoperative instructions.          Call for: questions or concerns    Follow-up Appointments  Future Appointments   Date Time Provider Department Center   3/2/2023  9:00 AM Nj Mix MD [facility]   3/2/2023 10:50 AM Rolly Castano DO [facility]      Contact information for follow-up providers     Zoe Desai MD .    Specialty: Family Medicine  Contact information:  [address]  [facility] [address] [phone]                   Contact information for after-discharge care     Dialysis/Infusion     [facility] .    Service: Infusion and IV Therapy  Contact information:  [address]  [facility] [address] [phone]                 Home Medical Care     [facility] .    Service: Home Health Services  Contact information:  [address] [facility] [address]  [place]  "[place] [id number] [phone]                           Additional Instructions for the Follow-ups that You Need to Schedule     Call MD With Problems / Concerns   As directed      Instructions: Call Dr. Mix for incisional redness, swelling, drainage, temperature greater than 100.5 degrees, questions or concerns.    Order Comments: Instructions: Call Dr. Mix for incisional redness, swelling, drainage, temperature greater than 100.5 degrees, questions or concerns.          Discharge Follow-up with Specialty: Infectious Disease for outpatient appointment as previously arranged.   As directed      Specialty: Infectious Disease for outpatient appointment as previously arranged.    Follow Up Details: contact Dr. Castano at [facility] if you do not have an appointment.         Discharge Follow-up with Specified Provider: Neurosurgery  - Dr. Mix; 2 Weeks   As directed      To: Neurosurgery  - Dr. Mix    Follow Up: 2 Weeks    Follow Up Details: Keep postoperative appointment with Dr. Mix as previously arranged.               Test Results Pending at Discharge  Pending Labs     Order Current Status    Anaerobic Culture 10 Day Incubation - Tissue, Back, Lower In process    Anaerobic Culture 10 Day Incubation - Wound, Back, Lower In process    Fungus Culture - Tissue, Back, Lower In process    Fungus Culture - Wound, Back, Lower In process    AFB Culture - Tissue, Back, Lower Preliminary result    Tissue / Bone Culture - Tissue, Back, Lower Preliminary result    Wound Culture - Wound, Back, Lower Preliminary result         None    I discussed the discharge instructions with patient    Pepper Mcclaindor, APRN  02/15/23  14:53 EST      \"Dictated utilizing Dragon dictation\".    "

## 2023-02-15 NOTE — PROGRESS NOTES
"[facility] Clinical Pharmacy Services: Vancomycin Monitoring Note    Archie Oquendo is a 57 y.o. male who is on day 3/5 of pharmacy to dose vancomycin for SSTI.    Previous Vancomycin Dose: 1000 mg every 12 hours   Updated Cultures and Sensitivities: In progress   Results from last 7 days   Lab Units 02/15/23  0632   VANCOMYCIN TR mcg/mL 11.40     Vitals/Labs  Ht: 188 cm (74\"); Wt: 96.4 kg (212 lb 8.4 oz)   Temp Readings from Last 1 Encounters:   02/15/23 97.8 °F (36.6 °C) (Oral)     Estimated Creatinine Clearance: 113 mL/min (by C-G formula based on SCr of 0.96 mg/dL).     Results from last 7 days   Lab Units 02/15/23  0632 02/14/23  0445 02/10/23  1457   CREATININE mg/dL  --   --  0.96   WBC 10*3/mm3 8.58 8.28  --      Assessment/Plan  Trough collected at steady state returned at 11.4 mg/L (~12 hr level) with a therapeutic corresponding AUC of 429 mg/L*hr.     Current Vancomycin Dose: Continue 1000 mg IV every  12  hours.  Next Level Date and Time: TBD once the duration of therapy is settled.   We will continue to monitor patient changes and renal function     Thank you for involving pharmacy in this patient's care. Please contact pharmacy with any questions or concerns.       Cheyenne Gowers, [facility]  Clinical Pharmacist    "

## 2023-02-15 NOTE — PROGRESS NOTES
LOS: 0 days     Chief Complaint: Chronic back infection    Interval History: Patient reports he is doing well this morning.  Denies any fevers or chills.  Cultures remain negative.  Tolerating antibiotics well.    Vital Signs  Temp:  [97.7 °F (36.5 °C)-98.5 °F (36.9 °C)] 97.8 °F (36.6 °C)  Heart Rate:  [] 79  Resp:  [16-17] 16  BP: ()/(63-71) 105/71    Physical Exam:  General: In no acute distress  HEENT: Oropharynx clear, moist mucous membranes  Cardiovascular: RRR  Respiratory: Normal work of breathing  GI: Soft, NT/ND  Skin: Surgical site clean and intact with scant drainage on surgical bandage.  Extremities: No edema, cyanosis  Access: Peripheral IV.    Antibiotics:  Anti-Infectives (From admission, onward)    Ordered     Dose/Rate Route Frequency Start Stop    02/13/23 1838  vancomycin (VANCOCIN) 1000 mg/200 mL dextrose 5% IVPB        Ordering Provider: Nj Mix MD    1,000 mg  over 60 Minutes Intravenous Every 12 Hours 02/14/23 0600 02/19/23 0659    02/13/23 1659  vancomycin 2000 mg/500 mL 0.9% NS IVPB ([facility])        Ordering Provider: Nj Mix MD    20 mg/kg × 96.4 kg  over 120 Minutes Intravenous Once 02/13/23 1745 02/14/23 0735    02/13/23 1652  Pharmacy to dose vancomycin        Ordering Provider: Nj Mix MD     Does not apply Daily PRN 02/13/23 1652 02/18/23 1651    02/13/23 1148  ceFAZolin in dextrose (ANCEF) IVPB solution 2 g        Ordering Provider: Nj Mix MD    2 g  over 30 Minutes Intravenous Once 02/13/23 1150 02/13/23 1737           Results Review:     I reviewed the patient's new clinical results.    Lab Results   Component Value Date    WBC 8.58 02/15/2023    HGB 11.0 (L) 02/15/2023    HCT 32.8 (L) 02/15/2023    MCV 91.9 02/15/2023     02/15/2023     Lab Results   Component Value Date    GLUCOSE 85 02/10/2023    BUN 10 02/10/2023    CREATININE 0.96 02/10/2023    EGFRIFNONA 95 07/01/2019    BCR 10.4 02/10/2023    CO2 30.0 (H) 02/10/2023     CALCIUM 9.3 02/10/2023    ALBUMIN 3.60 03/11/2022    AST 17 03/11/2022    ALT 22 03/11/2022       Microbiology:  2/13 OR cultures no growth to date    Assessment    #Spinal fluid collection concerning for infection  #History of lumbar surgery  #Spinal hardware in place    Case discussed at bedside with Dr. Mix and will plan for discharge of the patient today off antibiotics.  Plan to follow-up in clinic for culture results and discussion of antibiotic therapy.  Cultures remain negative to date but anaerobic cultures are pending as well which may take some time for results.    Thank you for allowing me to be involved in the care of this patient. Infectious diseases will sign off at this time with antibiotics plan in place, but please call me at [phone] if any further ID questions or new ID concerns.

## 2023-02-15 NOTE — PROGRESS NOTES
Continued Stay Note  [facility]     Patient Name: Archie Oquendo  MRN: [id number]  Today's Date: 2/15/2023    Admit Date: 2/13/2023    Plan: Home with [facility] and  Infusion   Discharge Plan     Row Name 02/15/23 0945       Plan    Plan Home with [facility] and  Infusion    Plan Comments [facility]/I accepted referral. CCP will follow to determine home infusion cost.               Discharge Codes    No documentation.               Expected Discharge Date and Time     Expected Discharge Date Expected Discharge Time    Feb 15, 2023             Sharon Lorenz RN

## 2023-02-15 NOTE — PLAN OF CARE
Goal Outcome Evaluation:      Pt sitting EOB in no distress when PT entered room. Pt reports he has been ambulating in the room without difficulty and states his strength and balance are at baseline. Pt reports no acute care PT needs at this time. Acute care PT will sign off.

## 2023-02-15 NOTE — PLAN OF CARE
Goal Outcome Evaluation:      Patient given discharge instructions. IV removed intact and dressing changed per APRN instructions. Patient to discharge home via private transportation.

## 2023-02-16 LAB
BACTERIA SPEC AEROBE CULT: NORMAL
BACTERIA SPEC AEROBE CULT: NORMAL
GRAM STN SPEC: NORMAL

## 2023-02-21 ENCOUNTER — TELEPHONE (OUTPATIENT)
Dept: NEUROSURGERY | Facility: CLINIC | Age: 58
End: 2023-02-21
Payer: COMMERCIAL

## 2023-02-21 NOTE — TELEPHONE ENCOUNTER
How are you feeling?     Are you having any pain? Where? No pain, just a constant ache    Rate pain from 1-10 -     Are you taking the pain RX? Every 8 hours    Do you think it's helping? Somewhat    Was your dressing clean and dry? A little bit of blood    Is you incision red, swollen or bleeding? No leaking, just dry around the edges. Advised to put some triple antibiotic ointment on it    Are you having any trouble with nausea or constipation? None    Were your discharge instructions easy to understand? Instructions were more for a laminectomy    Any other questions?    Confirm post op appt - yes

## 2023-02-23 LAB
BACTERIA SPEC ANAEROBE CULT: NORMAL
BACTERIA SPEC ANAEROBE CULT: NORMAL

## 2023-02-24 NOTE — TELEPHONE ENCOUNTER
Called and s/w Winifred(nurse @ Infectious Disease) she states that patient does not require IV antibiotics while at home and he will f/u with Dr. Maria Del Rosario Castano(infectious Disease doctor) on 03/02/2023, @ that time Dr. Castano will give him his results from his lab work.     Called patient and informed him of above, patient expressed understanding

## 2023-03-01 NOTE — PROGRESS NOTES
Subjective   Patient ID: Archie Oquendo is a 57 y.o. male is here today for 2 week PO follow-up. Patient had a Wound debridement and drainage on 02/13/2023    Today patient incision looks healthty no drainage a little swelling on left side of incision pt states theres numbness and tinging in legs     Patient, Provider, and MA are all wearing a mask in our office today    History of Present Illness     This patient is doing okay.  He does not have a lot of pain.  His incision looks okay.  There is some potential fluid collection over the left iliac crest which she says he has had before.    The following portions of the patient's history were reviewed and updated as appropriate: allergies, current medications, past family history, past medical history, past social history, past surgical history and problem list.    Review of Systems   Musculoskeletal: Positive for back pain and myalgias.   Neurological: Positive for numbness.       I reviewed the review of systems listed by the patient and discussed by my MA    Objective     There were no vitals filed for this visit.  There is no height or weight on file to calculate BMI.    Tobacco Use: Medium Risk   • Smoking Tobacco Use: Former   • Smokeless Tobacco Use: Never   • Passive Exposure: Not on file          Physical Exam  Neurological:      Mental Status: He is alert and oriented to person, place, and time.       Neurologic Exam     Mental Status   Oriented to person, place, and time.     His incision looks okay for the most part.  There is no evidence of overt infection.  We will remove staples from the top and the bottom but leave the staples in the middle in place for the moment.      Assessment & Plan   Independent Review of Radiographic Studies:      I personally reviewed the images from the following studies.    There is no new imaging to review    Medical Decision Making:      I told the patient that we will see him back in a week.  He sees the infectious  disease doctor today.  His cultures have all been negative and so I doubt he will put him on anything although he might be better off on chronic suppressive antibiotics versus having only instrumentation removed.  We will see him back in a week to remove the rest of the staples.    Diagnoses and all orders for this visit:    1. Follow-up examination following surgery (Primary)      Return in about 1 week (around 3/9/2023).         Answers for HPI/ROS submitted by the patient on 3/1/2023  What is the primary reason for your visit?: Other  Please describe your symptoms.: surgery follow up  Have you had these symptoms before?: Yes  How long have you been having these symptoms?: Greater than 2 weeks

## 2023-03-02 ENCOUNTER — LAB (OUTPATIENT)
Dept: LAB | Facility: HOSPITAL | Age: 58
End: 2023-03-02
Payer: COMMERCIAL

## 2023-03-02 ENCOUNTER — OFFICE VISIT (OUTPATIENT)
Dept: NEUROSURGERY | Facility: CLINIC | Age: 58
End: 2023-03-02
Payer: COMMERCIAL

## 2023-03-02 ENCOUNTER — OFFICE VISIT (OUTPATIENT)
Dept: INFECTIOUS DISEASES | Facility: CLINIC | Age: 58
End: 2023-03-02
Payer: COMMERCIAL

## 2023-03-02 VITALS
TEMPERATURE: 97.9 F | HEART RATE: 53 BPM | SYSTOLIC BLOOD PRESSURE: 137 MMHG | BODY MASS INDEX: 27.3 KG/M2 | DIASTOLIC BLOOD PRESSURE: 78 MMHG | WEIGHT: 212.6 LBS | RESPIRATION RATE: 16 BRPM

## 2023-03-02 DIAGNOSIS — T81.89XD LUMBAR SURGICAL WOUND FLUID COLLECTION, SUBSEQUENT ENCOUNTER: Primary | ICD-10-CM

## 2023-03-02 DIAGNOSIS — T81.89XD LUMBAR SURGICAL WOUND FLUID COLLECTION, SUBSEQUENT ENCOUNTER: ICD-10-CM

## 2023-03-02 DIAGNOSIS — Z09 FOLLOW-UP EXAMINATION FOLLOWING SURGERY: Primary | ICD-10-CM

## 2023-03-02 LAB
BASOPHILS # BLD AUTO: 0.06 10*3/MM3 (ref 0–0.2)
BASOPHILS NFR BLD AUTO: 0.6 % (ref 0–1.5)
CRP SERPL-MCNC: 0.68 MG/DL (ref 0–0.5)
DEPRECATED RDW RBC AUTO: 43.8 FL (ref 37–54)
EOSINOPHIL # BLD AUTO: 0.26 10*3/MM3 (ref 0–0.4)
EOSINOPHIL NFR BLD AUTO: 2.7 % (ref 0.3–6.2)
ERYTHROCYTE [DISTWIDTH] IN BLOOD BY AUTOMATED COUNT: 13.2 % (ref 12.3–15.4)
ERYTHROCYTE [SEDIMENTATION RATE] IN BLOOD: 52 MM/HR (ref 0–20)
HCT VFR BLD AUTO: 39.4 % (ref 37.5–51)
HGB BLD-MCNC: 13.5 G/DL (ref 13–17.7)
IMM GRANULOCYTES # BLD AUTO: 0.04 10*3/MM3 (ref 0–0.05)
IMM GRANULOCYTES NFR BLD AUTO: 0.4 % (ref 0–0.5)
LYMPHOCYTES # BLD AUTO: 2.06 10*3/MM3 (ref 0.7–3.1)
LYMPHOCYTES NFR BLD AUTO: 21.4 % (ref 19.6–45.3)
MCH RBC QN AUTO: 31.3 PG (ref 26.6–33)
MCHC RBC AUTO-ENTMCNC: 34.3 G/DL (ref 31.5–35.7)
MCV RBC AUTO: 91.2 FL (ref 79–97)
MONOCYTES # BLD AUTO: 0.74 10*3/MM3 (ref 0.1–0.9)
MONOCYTES NFR BLD AUTO: 7.7 % (ref 5–12)
NEUTROPHILS NFR BLD AUTO: 6.48 10*3/MM3 (ref 1.7–7)
NEUTROPHILS NFR BLD AUTO: 67.2 % (ref 42.7–76)
NRBC BLD AUTO-RTO: 0 /100 WBC (ref 0–0.2)
PLATELET # BLD AUTO: 269 10*3/MM3 (ref 140–450)
PMV BLD AUTO: 9.5 FL (ref 6–12)
RBC # BLD AUTO: 4.32 10*6/MM3 (ref 4.14–5.8)
WBC NRBC COR # BLD: 9.64 10*3/MM3 (ref 3.4–10.8)

## 2023-03-02 PROCEDURE — 86140 C-REACTIVE PROTEIN: CPT

## 2023-03-02 PROCEDURE — 99214 OFFICE O/P EST MOD 30 MIN: CPT | Performed by: STUDENT IN AN ORGANIZED HEALTH CARE EDUCATION/TRAINING PROGRAM

## 2023-03-02 PROCEDURE — 85025 COMPLETE CBC W/AUTO DIFF WBC: CPT

## 2023-03-02 PROCEDURE — 85652 RBC SED RATE AUTOMATED: CPT

## 2023-03-02 PROCEDURE — 36415 COLL VENOUS BLD VENIPUNCTURE: CPT

## 2023-03-02 PROCEDURE — 99212 OFFICE O/P EST SF 10 MIN: CPT | Performed by: NEUROLOGICAL SURGERY

## 2023-03-02 RX ORDER — CEPHALEXIN 500 MG/1
500 CAPSULE ORAL EVERY 8 HOURS
Qty: 90 CAPSULE | Refills: 0 | Status: SHIPPED | OUTPATIENT
Start: 2023-03-02 | End: 2023-04-01

## 2023-03-02 NOTE — PROGRESS NOTES
"Chief Complaint  Follow-up    Subjective        Archie Oquendo presents to [facility]  History of Present Illness    Patient is a 57-year-old male with history of prior back surgery done in 2019 with a uneventful postoperative course until 2022. He had been seen by [facility] providers who had opened a fluid collection in his lower back.  He later had a CT that showed a superficial fluid collection and there was a reported swab culture that grew MSSA.  After this he was transferred to [facility] where he was seen by infectious disease and neurosurgery.  It was felt to be more superficial collection and he was placed on cefadroxil to complete out a 10-day course and did well after this.    And presented back to the hospital almost a year later with MRI of the spine that showed fluid collection at the site of the decompressive laminectomies extending from the inferior of L1 to the mid body of L3 that was nonspecific.  Thought more to reflect seroma or hematoma but could not rule out infection.  He underwent wound debridement by neurosurgery on 2/13 with cultures ultimately remaining negative.  He was on antibiotics in the hospital but these were discontinued at discharge and is here for follow-up.    Patient reports that he has a fluid collection area collecting around the left iliac crest stretching it for 2 Medela spine.  He has not had any fevers or chills.  States he has not seen any drainage from the wound itself.  Has had a little redness but overall no other major changes.  Had some kwabena removed in Dr. Morocho's office today.    Objective   Vital Signs:  /78   Pulse 53   Temp 97.9 °F (36.6 °C)   Resp 16   Wt 96.4 kg (212 lb 9.6 oz)   BMI 27.30 kg/m²   Estimated body mass index is 27.3 kg/m² as calculated from the following:    Height as of 2/13/23: 188 cm (74\").    Weight as of this encounter: 96.4 kg (212 lb 9.6 oz).           Physical Exam  Constitutional: "       General: He is not in acute distress.     Appearance: Normal appearance. He is normal weight. He is not ill-appearing.   HENT:      Head: Normocephalic and atraumatic.      Nose: Nose normal. No rhinorrhea.      Mouth/Throat:      Mouth: Mucous membranes are moist.      Pharynx: No oropharyngeal exudate.   Eyes:      General: No scleral icterus.     Extraocular Movements: Extraocular movements intact.      Pupils: Pupils are equal, round, and reactive to light.   Cardiovascular:      Rate and Rhythm: Normal rate and regular rhythm.      Pulses: Normal pulses.      Heart sounds: Normal heart sounds. No murmur heard.  Pulmonary:      Effort: Pulmonary effort is normal. No respiratory distress.      Breath sounds: Normal breath sounds.   Abdominal:      General: Abdomen is flat.      Palpations: Abdomen is soft.   Musculoskeletal:         General: No swelling or tenderness. Normal range of motion.      Cervical back: Normal range of motion and neck supple.      Right lower leg: No edema.      Left lower leg: No edema.   Skin:     General: Skin is warm and dry.      Comments: Thoracic and lumbar surgical site with no obvious drainage.  Staples in place.  Swollen area around the left iliac crest that is mobile and appears fluid-containing.   Neurological:      General: No focal deficit present.      Mental Status: He is alert and oriented to person, place, and time.   Psychiatric:         Mood and Affect: Mood normal.         Behavior: Behavior normal.        Result Review :  The following data was reviewed by: Rolly Castano DO on 03/02/2023:  Common labs    Common Labs 2/10/23 2/14/23 2/15/23   Glucose 85     BUN 10     Creatinine 0.96     Sodium 139     Potassium 3.3 (A)     Chloride 99     Calcium 9.3     WBC  8.28 8.58   Hemoglobin  11.7 (A) 11.0 (A)   Hematocrit  34.6 (A) 32.8 (A)   Platelets  175 162   (A) Abnormal value            Data reviewed: Operative notes, inpatient consult notes,  microbiology             Assessment and Plan   Diagnoses and all orders for this visit:    1. Lumbar surgical wound fluid collection, subsequent encounter (Primary)  -     Sedimentation Rate; Future  -     C-reactive Protein; Future  -     CBC & Differential; Future    Other orders  -     cephalexin (KEFLEX) 500 MG capsule; Take 1 capsule by mouth Every 8 (Eight) Hours for 30 days.  Dispense: 90 capsule; Refill: 0    We discussed the options today that would include continue to monitor his symptoms off antibiotics versus starting empiric therapy today with our best guess suppressive regimen.    Patient was more inclined to start antibiotic therapy even though cultures have been negative.  I do not think this is unreasonable given his concerning area around the iliac crest today.  Unfortunately cultures have been negative and the only prior cultures were over a year ago with MSSA.  Given this I think a trial of Keflex 500 mg 3 times daily would be reasonable to start empirically.  We can monitor for his response and will obtain new baseline CRP and ESR today.       I spent 26 minutes caring for Archie on this date of service. This time includes time spent by me in the following activities:preparing for the visit, reviewing tests, obtaining and/or reviewing a separately obtained history, performing a medically appropriate examination and/or evaluation , counseling and educating the patient/family/caregiver, ordering medications, tests, or procedures, documenting information in the medical record, independently interpreting results and communicating that information with the patient/family/caregiver and care coordination  Follow Up   No follow-ups on file.  Patient was given instructions and counseling regarding his condition or for health maintenance advice. Please see specific information pulled into the AVS if appropriate.

## 2023-03-09 ENCOUNTER — OFFICE VISIT (OUTPATIENT)
Dept: NEUROSURGERY | Facility: CLINIC | Age: 58
End: 2023-03-09
Payer: COMMERCIAL

## 2023-03-09 DIAGNOSIS — T81.49XA WOUND INFECTION AFTER SURGERY: Primary | ICD-10-CM

## 2023-03-09 PROCEDURE — 99213 OFFICE O/P EST LOW 20 MIN: CPT | Performed by: NEUROLOGICAL SURGERY

## 2023-03-09 NOTE — PROGRESS NOTES
Subjective   Patient ID: Archie Oquendo is a 57 y.o. male is here today for 1 week PO follow-up wound check. Patient had a Wound debridement and drainage on 02/13/2023    Today the incision site looks healthy, no redness, no drainage, no swelling    Patient, Provided, and MA are all wearing a mask in our office today    History of Present Illness    This patient is feeling pretty good overall.  He has his normal back pain.    The following portions of the patient's history were reviewed and updated as appropriate: allergies, current medications, past family history, past medical history, past social history, past surgical history and problem list.    Review of Systems    I reviewed the review of systems listed by the patient and discussed by my MA    Objective     There were no vitals filed for this visit.  There is no height or weight on file to calculate BMI.    Tobacco Use: Medium Risk   • Smoking Tobacco Use: Former   • Smokeless Tobacco Use: Never   • Passive Exposure: Not on file          Physical Exam  Neurological:      Mental Status: He is alert and oriented to person, place, and time.       Neurologic Exam     Mental Status   Oriented to person, place, and time.     We removed the remainder of his staples.  His incision looks fine.  There is absolutely no evidence of infection.  He does have a fluid collection over the left sacroiliac region but it is not red tender or inflamed.      Assessment & Plan   Independent Review of Radiographic Studies:      I personally reviewed the images from the following studies.    There is no new imaging to review    Medical Decision Making:      I told the patient and his daughter that this is a clearly very nonaggressive bacteria.  I would not recommend doing anything further at this point.  We will see him back in about 4 weeks.    Diagnoses and all orders for this visit:    1. Wound infection after surgery (Primary)      Return in about 4 weeks (around  4/6/2023).         Answers for HPI/ROS submitted by the patient on 3/6/2023  What is the primary reason for your visit?: Other  Please describe your symptoms.: Surgery follow up  Have you had these symptoms before?: Yes  How long have you been having these symptoms?: Greater than 2 weeks

## 2023-03-13 LAB
FUNGUS WND CULT: NORMAL
FUNGUS WND CULT: NORMAL

## 2023-03-23 NOTE — PROGRESS NOTES
"Subjective   Patient ID: Archie Oquendo is a 57 y.o. male is here today for follow-up, 1 MO PO     History of Present Illness     This patient returns today.  He has begun to have a little bit of purulent drainage from the very bottom of the incision.  Recently cultured by infectious disease and is negative for growth.    The following portions of the patient's history were reviewed and updated as appropriate: allergies, current medications, past family history, past medical history, past social history, past surgical history and problem list.    Review of Systems   Constitutional: Negative for activity change, appetite change, chills, fatigue, fever and unexpected weight change.   HENT: Negative.    Eyes: Negative for photophobia, pain, redness and visual disturbance.   Respiratory: Negative for cough, chest tightness, shortness of breath and wheezing.    Cardiovascular: Negative for chest pain, palpitations and leg swelling.   Gastrointestinal: Negative.    Endocrine: Negative for cold intolerance and heat intolerance.   Genitourinary: Negative.    Musculoskeletal: Positive for back pain and gait problem. Negative for neck pain and neck stiffness.   Skin: Positive for wound. Negative for color change and rash.   Allergic/Immunologic: Negative for environmental allergies, food allergies and immunocompromised state.   Neurological: Positive for numbness. Negative for dizziness, tremors, weakness, light-headedness (legs) and headaches.   Hematological: Does not bruise/bleed easily.   Psychiatric/Behavioral: Positive for sleep disturbance. Negative for agitation, behavioral problems, confusion and decreased concentration. The patient is not nervous/anxious and is not hyperactive.        I reviewed the review of systems listed by the patient and discussed by my MA    Objective     Vitals:    04/11/23 1106   BP: 126/82   Pulse: 79   SpO2: 97%   Weight: 94.8 kg (209 lb)   Height: 188 cm (74\")     Body mass index is " 26.83 kg/m².    Tobacco Use: Medium Risk   • Smoking Tobacco Use: Former   • Smokeless Tobacco Use: Never   • Passive Exposure: Not on file          Physical Exam  Neurological:      Mental Status: He is alert and oriented to person, place, and time.       Neurologic Exam     Mental Status   Oriented to person, place, and time.           Assessment & Plan   Independent Review of Radiographic Studies:      I personally reviewed the images from the following studies.    There is no new imaging to review    Medical Decision Making:      I told the patient that we will check him again in about 6 weeks.  I will try to talk to infectious disease next week.  His ID doctor is out of town this week.  I am tempted to just leave this alone at this point as he does not appear to be terribly toxic.  He really does not want the instrumentation out.    Diagnoses and all orders for this visit:    1. Wound drainage (Primary)      Return in about 6 weeks (around 5/23/2023).

## 2023-03-27 LAB
MYCOBACTERIUM SPEC CULT: NORMAL
NIGHT BLUE STAIN TISS: NORMAL

## 2023-04-05 ENCOUNTER — LAB (OUTPATIENT)
Dept: LAB | Facility: HOSPITAL | Age: 58
End: 2023-04-05
Payer: COMMERCIAL

## 2023-04-05 ENCOUNTER — OFFICE VISIT (OUTPATIENT)
Dept: INFECTIOUS DISEASES | Facility: CLINIC | Age: 58
End: 2023-04-05
Payer: COMMERCIAL

## 2023-04-05 VITALS
RESPIRATION RATE: 20 BRPM | HEART RATE: 52 BPM | WEIGHT: 217.8 LBS | SYSTOLIC BLOOD PRESSURE: 118 MMHG | DIASTOLIC BLOOD PRESSURE: 76 MMHG | TEMPERATURE: 96.6 F | BODY MASS INDEX: 27.96 KG/M2

## 2023-04-05 DIAGNOSIS — T81.89XD LUMBAR SURGICAL WOUND FLUID COLLECTION, SUBSEQUENT ENCOUNTER: Primary | ICD-10-CM

## 2023-04-05 DIAGNOSIS — T81.89XD LUMBAR SURGICAL WOUND FLUID COLLECTION, SUBSEQUENT ENCOUNTER: ICD-10-CM

## 2023-04-05 LAB
CRP SERPL-MCNC: 0.37 MG/DL (ref 0–0.5)
ERYTHROCYTE [SEDIMENTATION RATE] IN BLOOD: 17 MM/HR (ref 0–20)

## 2023-04-05 PROCEDURE — 86140 C-REACTIVE PROTEIN: CPT

## 2023-04-05 PROCEDURE — 99213 OFFICE O/P EST LOW 20 MIN: CPT | Performed by: STUDENT IN AN ORGANIZED HEALTH CARE EDUCATION/TRAINING PROGRAM

## 2023-04-05 PROCEDURE — 36415 COLL VENOUS BLD VENIPUNCTURE: CPT

## 2023-04-05 PROCEDURE — 85652 RBC SED RATE AUTOMATED: CPT

## 2023-04-05 PROCEDURE — 87205 SMEAR GRAM STAIN: CPT

## 2023-04-05 PROCEDURE — 87070 CULTURE OTHR SPECIMN AEROBIC: CPT

## 2023-04-05 NOTE — PROGRESS NOTES
"Chief Complaint  Follow-up    Subjective        Archie Oquendo presents to [facility]  History of Present Illness    Patient is a 57-year-old male with history of prior back surgery done in 2019 with a uneventful postoperative course until 2022. He had been seen by [facility] providers who had opened a fluid collection in his lower back.  He later had a CT that showed a superficial fluid collection and there was a reported swab culture that grew MSSA.  After this he was transferred to [facility] where he was seen by infectious disease and neurosurgery.  It was felt to be more superficial collection and he was placed on cefadroxil to complete out a 10-day course and did well after this.    And presented back to the hospital almost a year later with MRI of the spine that showed fluid collection at the site of the decompressive laminectomies extending from the inferior of L1 to the mid body of L3 that was nonspecific.  Thought more to reflect seroma or hematoma but could not rule out infection.  He underwent wound debridement by neurosurgery on 2/13 with cultures ultimately remaining negative.  He was on antibiotics in the hospital but these were discontinued at discharge and followed up in clinic where he was placed on Keflex which she finished 2 days prior to this visit.    Reports he has had 2 small areas of his surgical site opened up with some scant drainage that is greenish in color on his bandage.  Denies any fevers or chills.  States the site of the left iliac crest has stayed about the same.  Denies any difficulties with taking the antibiotics.    Objective   Vital Signs:  /76   Pulse 52   Temp 96.6 °F (35.9 °C)   Resp 20   Wt 98.8 kg (217 lb 12.8 oz)   BMI 27.96 kg/m²   Estimated body mass index is 27.96 kg/m² as calculated from the following:    Height as of 2/13/23: 188 cm (74\").    Weight as of this encounter: 98.8 kg (217 lb 12.8 oz).           Physical " Exam  Constitutional:       General: He is not in acute distress.     Appearance: Normal appearance. He is normal weight. He is not ill-appearing.   HENT:      Head: Normocephalic and atraumatic.      Nose: Nose normal. No rhinorrhea.      Mouth/Throat:      Mouth: Mucous membranes are moist.      Pharynx: No oropharyngeal exudate.   Eyes:      General: No scleral icterus.     Extraocular Movements: Extraocular movements intact.      Pupils: Pupils are equal, round, and reactive to light.   Cardiovascular:      Rate and Rhythm: Normal rate and regular rhythm.      Pulses: Normal pulses.      Heart sounds: Normal heart sounds. No murmur heard.  Pulmonary:      Effort: Pulmonary effort is normal. No respiratory distress.      Breath sounds: Normal breath sounds.   Abdominal:      General: Abdomen is flat.      Palpations: Abdomen is soft.   Musculoskeletal:         General: No swelling or tenderness. Normal range of motion.      Cervical back: Normal range of motion and neck supple.      Right lower leg: No edema.      Left lower leg: No edema.   Skin:     General: Skin is warm and dry.      Comments: Area of swelling of the left iliac crest.  Lumbar surgical wound with 2 areas of opening.  Lower with greenish drainage on the bandage.   Neurological:      General: No focal deficit present.      Mental Status: He is alert and oriented to person, place, and time.   Psychiatric:         Mood and Affect: Mood normal.         Behavior: Behavior normal.        Result Review :  The following data was reviewed by: Rolly Castano DO on 03/02/2023:  Common labs    Common Labs 2/14/23 2/15/23 3/2/23   WBC 8.28 8.58 9.64   Hemoglobin 11.7 (A) 11.0 (A) 13.5   Hematocrit 34.6 (A) 32.8 (A) 39.4   Platelets 175 162 269   (A) Abnormal value            Data reviewed: Operative notes, inpatient consult notes, microbiology             Assessment and Plan   Diagnoses and all orders for this visit:    1. Lumbar surgical wound fluid  collection, subsequent encounter (Primary)  -     Sedimentation Rate; Future  -     C-reactive Protein; Future  -     Wound Culture - Wound, Back; Future    I was able to obtain a very small amount of drainage from the lower opening in his spinal wound.  Plan to send this for culture and maintain off antibiotics for now while following this up.  We will plan to see if a new organism grows that can be targeted.  Also could consider starting empiric coverage again for MSSA versus MRSA given these to be the likely culprits.  We will obtain repeat ESR and CRP today.  He will continue to follow with neurosurgery.       I spent 22 minutes caring for Archie on this date of service. This time includes time spent by me in the following activities:preparing for the visit, reviewing tests, obtaining and/or reviewing a separately obtained history, performing a medically appropriate examination and/or evaluation , counseling and educating the patient/family/caregiver, ordering medications, tests, or procedures, documenting information in the medical record, independently interpreting results and communicating that information with the patient/family/caregiver and care coordination  Follow Up   No follow-ups on file.  Patient was given instructions and counseling regarding his condition or for health maintenance advice. Please see specific information pulled into the AVS if appropriate.

## 2023-04-08 LAB
BACTERIA SPEC AEROBE CULT: NORMAL
GRAM STN SPEC: NORMAL
GRAM STN SPEC: NORMAL

## 2023-04-11 ENCOUNTER — OFFICE VISIT (OUTPATIENT)
Dept: NEUROSURGERY | Facility: CLINIC | Age: 58
End: 2023-04-11
Payer: COMMERCIAL

## 2023-04-11 VITALS
BODY MASS INDEX: 26.82 KG/M2 | SYSTOLIC BLOOD PRESSURE: 126 MMHG | HEIGHT: 74 IN | OXYGEN SATURATION: 97 % | WEIGHT: 209 LBS | HEART RATE: 79 BPM | DIASTOLIC BLOOD PRESSURE: 82 MMHG

## 2023-04-11 DIAGNOSIS — L24.A9 WOUND DRAINAGE: Primary | ICD-10-CM

## 2023-04-11 PROCEDURE — 99024 POSTOP FOLLOW-UP VISIT: CPT | Performed by: NEUROLOGICAL SURGERY

## 2023-05-05 ENCOUNTER — LAB (OUTPATIENT)
Dept: LAB | Facility: HOSPITAL | Age: 58
End: 2023-05-05
Payer: COMMERCIAL

## 2023-05-05 ENCOUNTER — OFFICE VISIT (OUTPATIENT)
Dept: INFECTIOUS DISEASES | Facility: CLINIC | Age: 58
End: 2023-05-05
Payer: COMMERCIAL

## 2023-05-05 VITALS
SYSTOLIC BLOOD PRESSURE: 104 MMHG | WEIGHT: 216.2 LBS | BODY MASS INDEX: 27.76 KG/M2 | DIASTOLIC BLOOD PRESSURE: 75 MMHG | TEMPERATURE: 96.4 F | HEART RATE: 96 BPM | RESPIRATION RATE: 20 BRPM

## 2023-05-05 DIAGNOSIS — L24.A9 WOUND DRAINAGE: ICD-10-CM

## 2023-05-05 DIAGNOSIS — Z98.1 S/P FUSION OF THORACIC SPINE: ICD-10-CM

## 2023-05-05 DIAGNOSIS — T81.89XD LUMBAR SURGICAL WOUND FLUID COLLECTION, SUBSEQUENT ENCOUNTER: Primary | ICD-10-CM

## 2023-05-05 DIAGNOSIS — T81.89XD LUMBAR SURGICAL WOUND FLUID COLLECTION, SUBSEQUENT ENCOUNTER: ICD-10-CM

## 2023-05-05 LAB
CRP SERPL-MCNC: 0.88 MG/DL (ref 0–0.5)
ERYTHROCYTE [SEDIMENTATION RATE] IN BLOOD: 36 MM/HR (ref 0–20)

## 2023-05-05 PROCEDURE — 87070 CULTURE OTHR SPECIMN AEROBIC: CPT

## 2023-05-05 PROCEDURE — 87205 SMEAR GRAM STAIN: CPT

## 2023-05-05 PROCEDURE — 36415 COLL VENOUS BLD VENIPUNCTURE: CPT

## 2023-05-05 PROCEDURE — 85652 RBC SED RATE AUTOMATED: CPT

## 2023-05-05 PROCEDURE — 86140 C-REACTIVE PROTEIN: CPT

## 2023-05-05 NOTE — PROGRESS NOTES
"Chief Complaint  Follow-up    Subjective        Archie Oquendo presents to [facility]  History of Present Illness    Patient is a 57-year-old male with history of prior back surgery done in 2019 with a uneventful postoperative course until 2022. He had been seen by [facility] providers who had opened a fluid collection in his lower back.  He later had a CT that showed a superficial fluid collection and there was a reported swab culture that grew MSSA.  After this he was transferred to [facility] where he was seen by infectious disease and neurosurgery.  It was felt to be more superficial collection and he was placed on cefadroxil to complete out a 10-day course and did well after this.    And presented back to the hospital almost a year later with MRI of the spine that showed fluid collection at the site of the decompressive laminectomies extending from the inferior of L1 to the mid body of L3 that was nonspecific.  Thought more to reflect seroma or hematoma but could not rule out infection.  He underwent wound debridement by neurosurgery on 2/13 with cultures ultimately remaining negative.  He was on antibiotics in the hospital but these were discontinued at discharge and followed up in clinic where he was placed on Keflex she saw little improvement with.    Since her last visit he has been off antibiotics and his inflammatory markers have normalized.  He reports he continues to have drainage from 2 sites in his back.  The area on his left hip has maybe gone down slightly in size.  He denies any fevers or chills.  Denies any recent antibiotics.  He has been following with neurosurgery.    Objective   Vital Signs:  /75   Pulse 96   Temp 96.4 °F (35.8 °C)   Resp 20   Wt 98.1 kg (216 lb 3.2 oz)   BMI 27.76 kg/m²   Estimated body mass index is 27.76 kg/m² as calculated from the following:    Height as of 4/11/23: 188 cm (74\").    Weight as of this encounter: 98.1 kg " (216 lb 3.2 oz).           Physical Exam  Constitutional:       General: He is not in acute distress.     Appearance: Normal appearance. He is normal weight. He is not ill-appearing.   HENT:      Head: Normocephalic and atraumatic.      Nose: Nose normal. No rhinorrhea.      Mouth/Throat:      Mouth: Mucous membranes are moist.      Pharynx: No oropharyngeal exudate.   Eyes:      General: No scleral icterus.     Extraocular Movements: Extraocular movements intact.      Pupils: Pupils are equal, round, and reactive to light.   Cardiovascular:      Rate and Rhythm: Normal rate and regular rhythm.      Pulses: Normal pulses.   Pulmonary:      Effort: Pulmonary effort is normal. No respiratory distress.      Breath sounds: Normal breath sounds.   Abdominal:      General: Abdomen is flat.      Palpations: Abdomen is soft.   Musculoskeletal:         General: No swelling or tenderness. Normal range of motion.      Cervical back: Normal range of motion and neck supple.      Right lower leg: No edema.      Left lower leg: No edema.   Skin:     General: Skin is warm and dry.      Comments: 2 small pinpoint areas on his lower back wound with serous drainage.  No surrounding erythema.   Neurological:      General: No focal deficit present.      Mental Status: He is alert and oriented to person, place, and time.   Psychiatric:         Mood and Affect: Mood normal.         Behavior: Behavior normal.        Result Review :  The following data was reviewed by: Rolly Castano DO on 03/02/2023:  Common labs        2/14/2023    04:45 2/15/2023    06:32 3/2/2023    10:24   Common Labs   WBC 8.28   8.58   9.64     Hemoglobin 11.7   11.0   13.5     Hematocrit 34.6   32.8   39.4     Platelets 175   162   269       Data reviewed: Operative notes, inpatient consult notes, microbiology             Assessment and Plan   Diagnoses and all orders for this visit:    1. Lumbar surgical wound fluid collection, subsequent encounter  (Primary)  -     C-reactive Protein; Future  -     Sedimentation Rate; Future    2. S/P fusion of thoracic spine  -     C-reactive Protein; Future  -     Sedimentation Rate; Future    3. Wound drainage  -     C-reactive Protein; Future  -     Sedimentation Rate; Future    Unfortunately this is a difficult case.  He continues to have drainage from his back wound however the exam seems very unconcerning for infection and he has remained remarkably stable.  His inflammatory markers a month ago had normalized and he has been off antibiotics.  Given his stability I think it reasonable to continue him on his current course without antibiotics.  I did swab his wound again today to see if we can isolate any organisms.  If an organism was identified I think it reasonable to try him on an oral antibiotic course to see if the drainage changes.  At this point with normal inflammatory markers it seems unlikely that he has any smoldering infection.       I spent 27 minutes caring for Archie on this date of service. This time includes time spent by me in the following activities:preparing for the visit, reviewing tests, obtaining and/or reviewing a separately obtained history, performing a medically appropriate examination and/or evaluation , counseling and educating the patient/family/caregiver, ordering medications, tests, or procedures, documenting information in the medical record, independently interpreting results and communicating that information with the patient/family/caregiver and care coordination  Follow Up   No follow-ups on file.  Patient was given instructions and counseling regarding his condition or for health maintenance advice. Please see specific information pulled into the AVS if appropriate.

## 2023-05-08 LAB
BACTERIA SPEC AEROBE CULT: NORMAL
GRAM STN SPEC: NORMAL

## 2023-05-15 NOTE — PROGRESS NOTES
"Subjective   Patient ID: Archie Oquendo is a 57 y.o. male is here today for 6 week follow-up.    Today patient states that he has drainage from surgical site    History of Present Illness     This patient continues with on and off drainage from the surgical site.  Cultures of the drainage done earlier this month are still no growth.    The following portions of the patient's history were reviewed and updated as appropriate: allergies, current medications, past family history, past medical history, past social history, past surgical history and problem list.    Review of Systems   Constitutional: Negative for chills and fever.   HENT: Negative for congestion.    Genitourinary: Negative for difficulty urinating and dysuria.   Musculoskeletal: Positive for back pain and gait problem.   Neurological: Positive for weakness. Negative for numbness.       I reviewed the review of systems listed by the patient and discussed by my MA    Objective     Vitals:    05/16/23 1245   BP: 110/70   Cuff Size: Adult   Pulse: 55   Temp: 96.4 °F (35.8 °C)   SpO2: 99%   Weight: 98.1 kg (216 lb 3.2 oz)   Height: 188 cm (74\")     Body mass index is 27.76 kg/m².    Tobacco Use: Medium Risk   • Smoking Tobacco Use: Former   • Smokeless Tobacco Use: Never   • Passive Exposure: Not on file          Physical Exam  Neurological:      Mental Status: He is alert and oriented to person, place, and time.       Neurologic Exam     Mental Status   Oriented to person, place, and time.           Assessment & Plan   Independent Review of Radiographic Studies:      I personally reviewed the images from the following studies.    There is no new imaging to review    Medical Decision Making:      I told the patient that from my point of view we will just continue to follow this.  He really does not want to have the instrumentation out and I am not sure he would tolerate having it out.  He is doing well otherwise and so we will just leave it alone for now. "  We will see him back in about 3 months.    Diagnoses and all orders for this visit:    1. Wound infection after surgery (Primary)      Return in about 3 months (around 8/16/2023).

## 2023-05-16 ENCOUNTER — OFFICE VISIT (OUTPATIENT)
Dept: NEUROSURGERY | Facility: CLINIC | Age: 58
End: 2023-05-16
Payer: COMMERCIAL

## 2023-05-16 VITALS
HEART RATE: 55 BPM | BODY MASS INDEX: 27.75 KG/M2 | OXYGEN SATURATION: 99 % | WEIGHT: 216.2 LBS | HEIGHT: 74 IN | TEMPERATURE: 96.4 F | SYSTOLIC BLOOD PRESSURE: 110 MMHG | DIASTOLIC BLOOD PRESSURE: 70 MMHG

## 2023-05-16 DIAGNOSIS — T81.49XA WOUND INFECTION AFTER SURGERY: Primary | ICD-10-CM

## 2023-05-16 PROCEDURE — 99213 OFFICE O/P EST LOW 20 MIN: CPT | Performed by: NEUROLOGICAL SURGERY

## 2023-08-04 ENCOUNTER — OFFICE VISIT (OUTPATIENT)
Dept: INFECTIOUS DISEASES | Facility: CLINIC | Age: 58
End: 2023-08-04
Payer: COMMERCIAL

## 2023-08-04 VITALS
SYSTOLIC BLOOD PRESSURE: 116 MMHG | BODY MASS INDEX: 27.71 KG/M2 | RESPIRATION RATE: 20 BRPM | WEIGHT: 215.8 LBS | TEMPERATURE: 97.3 F | HEART RATE: 52 BPM | DIASTOLIC BLOOD PRESSURE: 72 MMHG

## 2023-08-04 DIAGNOSIS — Z98.1 S/P FUSION OF THORACIC SPINE: ICD-10-CM

## 2023-08-04 DIAGNOSIS — T81.89XD LUMBAR SURGICAL WOUND FLUID COLLECTION, SUBSEQUENT ENCOUNTER: Primary | ICD-10-CM

## 2023-08-04 DIAGNOSIS — L24.A9 WOUND DRAINAGE: ICD-10-CM

## 2023-08-04 RX ORDER — CEFADROXIL 500 MG/1
500 CAPSULE ORAL 2 TIMES DAILY
Qty: 60 CAPSULE | Refills: 2 | Status: SHIPPED | OUTPATIENT
Start: 2023-08-04 | End: 2023-11-02

## 2023-08-21 NOTE — PROGRESS NOTES
Subjective   Patient ID: Archie Oquendo is a 58 y.o. male is here today for 3 month follow-up.    Today patient states that he has had a rupture recently and has been put back on antibiotics. Patient states that he has constant low back pain    History of Present Illness    This patient returns today.  He continues to have a fluid collection in the left lower part of his back.  He had a recent rupture through his incision but is dressing it and has been placed back on antibiotics.  He is feeling better now.    The following portions of the patient's history were reviewed and updated as appropriate: allergies, current medications, past family history, past medical history, past social history, past surgical history, and problem list.    Review of Systems   Constitutional:  Negative for chills and fever.   HENT:  Negative for congestion.    Genitourinary:  Negative for difficulty urinating and dysuria.   Musculoskeletal:  Positive for back pain, gait problem and myalgias.   Neurological:  Positive for weakness. Negative for numbness.     I reviewed the review of systems listed by the patient and discussed by my MA    Objective     There were no vitals filed for this visit.  There is no height or weight on file to calculate BMI.    Tobacco Use: Medium Risk    Smoking Tobacco Use: Former    Smokeless Tobacco Use: Never    Passive Exposure: Not on file          Physical Exam  Neurological:      Mental Status: He is alert and oriented to person, place, and time.     Neurologic Exam     Mental Status   Oriented to person, place, and time.         Assessment & Plan   Independent Review of Radiographic Studies:      I personally reviewed the images from the following studies.    There is no new imaging to review    Medical Decision Making:      I told the patient that from my point of view the only other thing I have to offer is to take the instrumentation out.  It is so extensive that I would not be comfortable putting  it back in and so I think before I were to do that I would get him into see more complex spine surgeon.  He is still not terribly interested in that surgery.  His other option would be to stay on suppressive antibiotics for the rest of his life.  He will plan to do that unless something else happens and if so he will call me.    Diagnoses and all orders for this visit:    1. S/P fusion of thoracic spine (Primary)      Return if symptoms worsen or fail to improve.

## 2023-08-22 ENCOUNTER — OFFICE VISIT (OUTPATIENT)
Dept: NEUROSURGERY | Facility: CLINIC | Age: 58
End: 2023-08-22
Payer: COMMERCIAL

## 2023-08-22 DIAGNOSIS — Z98.1 S/P FUSION OF THORACIC SPINE: Primary | ICD-10-CM

## 2023-08-22 PROCEDURE — 99213 OFFICE O/P EST LOW 20 MIN: CPT | Performed by: NEUROLOGICAL SURGERY

## 2023-11-08 ENCOUNTER — LAB (OUTPATIENT)
Dept: LAB | Facility: HOSPITAL | Age: 58
End: 2023-11-08
Payer: COMMERCIAL

## 2023-11-08 ENCOUNTER — OFFICE VISIT (OUTPATIENT)
Dept: INFECTIOUS DISEASES | Facility: CLINIC | Age: 58
End: 2023-11-08
Payer: COMMERCIAL

## 2023-11-08 VITALS
HEART RATE: 52 BPM | DIASTOLIC BLOOD PRESSURE: 71 MMHG | SYSTOLIC BLOOD PRESSURE: 118 MMHG | TEMPERATURE: 97.1 F | WEIGHT: 215.8 LBS | BODY MASS INDEX: 27.71 KG/M2 | RESPIRATION RATE: 20 BRPM

## 2023-11-08 DIAGNOSIS — L24.A9 WOUND DRAINAGE: ICD-10-CM

## 2023-11-08 DIAGNOSIS — Z98.1 S/P FUSION OF THORACIC SPINE: ICD-10-CM

## 2023-11-08 DIAGNOSIS — T81.89XD LUMBAR SURGICAL WOUND FLUID COLLECTION, SUBSEQUENT ENCOUNTER: ICD-10-CM

## 2023-11-08 DIAGNOSIS — T81.89XD LUMBAR SURGICAL WOUND FLUID COLLECTION, SUBSEQUENT ENCOUNTER: Primary | ICD-10-CM

## 2023-11-08 LAB — CRP SERPL-MCNC: <0.3 MG/DL (ref 0–0.5)

## 2023-11-08 PROCEDURE — 86140 C-REACTIVE PROTEIN: CPT

## 2023-11-08 PROCEDURE — 36415 COLL VENOUS BLD VENIPUNCTURE: CPT

## 2023-11-08 RX ORDER — CEFADROXIL 500 MG/1
500 CAPSULE ORAL 2 TIMES DAILY
COMMUNITY
End: 2023-11-08 | Stop reason: SDUPTHER

## 2023-11-08 RX ORDER — CEFADROXIL 500 MG/1
500 CAPSULE ORAL 2 TIMES DAILY
Qty: 60 CAPSULE | Refills: 2 | Status: SHIPPED | OUTPATIENT
Start: 2023-11-08 | End: 2024-02-06

## 2023-11-08 NOTE — PROGRESS NOTES
"Chief Complaint  Follow-up    Subjective        Archie Oquendo presents to [facility]  History of Present Illness    Patient is a 57-year-old male with history of prior back surgery done in 2019 with a uneventful postoperative course until 2022. He had been seen by [facility] providers who had opened a fluid collection in his lower back.  He later had a CT that showed a superficial fluid collection and there was a reported swab culture that grew MSSA.  After this he was transferred to [facility] where he was seen by infectious disease and neurosurgery.  It was felt to be more superficial collection and he was placed on cefadroxil to complete out a 10-day course and did well after this.    And presented back to the hospital almost a year later with MRI of the spine that showed fluid collection at the site of the decompressive laminectomies extending from the inferior of L1 to the mid body of L3 that was nonspecific.  Thought more to reflect seroma or hematoma but could not rule out infection.  He underwent wound debridement by neurosurgery on 2/13 with cultures ultimately remaining negative.  He was on antibiotics in the hospital but these were discontinued at discharge and followed up in clinic where he was placed on Keflex he saw little improvement with this.    Patient now is on suppressive cefadroxil 500 mg twice daily and reports that he is had excellent resolution in any drainage or fluid collection.  States he has not had any drainage since October.  States he no longer wears a dressing and has no pain at the site.  Not noticed any redness or irritation.    Objective   Vital Signs:  /71   Pulse 52   Temp 97.1 °F (36.2 °C)   Resp 20   Wt 97.9 kg (215 lb 12.8 oz)   BMI 27.71 kg/m²   Estimated body mass index is 27.71 kg/m² as calculated from the following:    Height as of 5/16/23: 188 cm (74\").    Weight as of this encounter: 97.9 kg (215 lb 12.8 oz).   "         Physical Exam  Constitutional:       General: He is not in acute distress.     Appearance: Normal appearance. He is normal weight. He is not ill-appearing.   HENT:      Head: Normocephalic and atraumatic.      Nose: Nose normal. No rhinorrhea.      Mouth/Throat:      Mouth: Mucous membranes are moist.      Pharynx: No oropharyngeal exudate.   Eyes:      General: No scleral icterus.     Extraocular Movements: Extraocular movements intact.      Pupils: Pupils are equal, round, and reactive to light.   Cardiovascular:      Rate and Rhythm: Normal rate and regular rhythm.      Pulses: Normal pulses.   Pulmonary:      Effort: Pulmonary effort is normal. No respiratory distress.      Breath sounds: Normal breath sounds.   Abdominal:      General: Abdomen is flat.      Palpations: Abdomen is soft.   Musculoskeletal:         General: No swelling or tenderness. Normal range of motion.      Cervical back: Normal range of motion and neck supple.      Right lower leg: No edema.      Left lower leg: No edema.   Skin:     General: Skin is warm and dry.      Comments: Surgical site with no erythema or drainage   Neurological:      General: No focal deficit present.      Mental Status: He is alert and oriented to person, place, and time.   Psychiatric:         Mood and Affect: Mood normal.         Behavior: Behavior normal.        Result Review :  The following data was reviewed by: Rolly Castano DO on 03/02/2023:  Common labs          2/14/2023    04:45 2/15/2023    06:32 3/2/2023    10:24   Common Labs   WBC 8.28  8.58  9.64    Hemoglobin 11.7  11.0  13.5    Hematocrit 34.6  32.8  39.4    Platelets 175  162  269      Data reviewed: Operative notes, inpatient consult notes, microbiology             Assessment and Plan   Diagnoses and all orders for this visit:    1. Lumbar surgical wound fluid collection, subsequent encounter (Primary)  -     Sedimentation Rate; Future  -     C-reactive Protein; Future    2. S/P  fusion of thoracic spine  -     Sedimentation Rate; Future  -     C-reactive Protein; Future    3. Wound drainage  -     Sedimentation Rate; Future  -     C-reactive Protein; Future    Other orders  -     cefadroxil (DURICEF) 500 MG capsule; Take 1 capsule by mouth 2 (Two) Times a Day for 90 days.  Dispense: 60 capsule; Refill: 2      Repeat ESR and CRP today.  Plan to continue cefadroxil 500 mg twice daily for 3 months and follow-up at that time to discuss possible decrease of his dose at that time.         I spent 29 minutes caring for Archie on this date of service. This time includes time spent by me in the following activities:preparing for the visit, reviewing tests, obtaining and/or reviewing a separately obtained history, performing a medically appropriate examination and/or evaluation , counseling and educating the patient/family/caregiver, ordering medications, tests, or procedures, documenting information in the medical record, independently interpreting results and communicating that information with the patient/family/caregiver and care coordination  Follow Up   No follow-ups on file.  Patient was given instructions and counseling regarding his condition or for health maintenance advice. Please see specific information pulled into the AVS if appropriate.

## 2024-01-03 ENCOUNTER — TELEPHONE (OUTPATIENT)
Dept: INFECTIOUS DISEASES | Facility: CLINIC | Age: 59
End: 2024-01-03
Payer: COMMERCIAL

## 2024-01-03 NOTE — TELEPHONE ENCOUNTER
Voice message left for patient that he still has an overdue lab that Dr. Castano had ordered that needs to be obtained at the [facility] [place] [facility]. I left our office number for patient to call for any questions.

## 2024-03-07 ENCOUNTER — OFFICE VISIT (OUTPATIENT)
Dept: INFECTIOUS DISEASES | Facility: CLINIC | Age: 59
End: 2024-03-07
Payer: COMMERCIAL

## 2024-03-07 VITALS
RESPIRATION RATE: 14 BRPM | TEMPERATURE: 97.1 F | DIASTOLIC BLOOD PRESSURE: 72 MMHG | HEART RATE: 55 BPM | BODY MASS INDEX: 28.43 KG/M2 | WEIGHT: 221.4 LBS | SYSTOLIC BLOOD PRESSURE: 118 MMHG

## 2024-03-07 DIAGNOSIS — Z98.1 S/P FUSION OF THORACIC SPINE: Primary | ICD-10-CM

## 2024-03-07 DIAGNOSIS — T84.7XXD HARDWARE COMPLICATING WOUND INFECTION, SUBSEQUENT ENCOUNTER: ICD-10-CM

## 2024-03-07 RX ORDER — CEFADROXIL 500 MG/1
500 CAPSULE ORAL 2 TIMES DAILY
Qty: 180 CAPSULE | Refills: 3 | Status: SHIPPED | OUTPATIENT
Start: 2024-03-07 | End: 2025-03-02

## 2024-03-07 NOTE — PROGRESS NOTES
"Chief Complaint  Follow-up    Subjective        Archie Oquendo presents to [facility]  History of Present Illness    Patient is a 57-year-old male with history of prior back surgery done in 2019 with a uneventful postoperative course until 2022. He had been seen by [facility] providers who had opened a fluid collection in his lower back.  He later had a CT that showed a superficial fluid collection and there was a reported swab culture that grew MSSA.  After this he was transferred to [facility] where he was seen by infectious disease and neurosurgery.  It was felt to be more superficial collection and he was placed on cefadroxil to complete out a 10-day course and did well after this.    And presented back to the hospital almost a year later with MRI of the spine that showed fluid collection at the site of the decompressive laminectomies extending from the inferior of L1 to the mid body of L3 that was nonspecific.  Thought more to reflect seroma or hematoma but could not rule out infection.  He underwent wound debridement by neurosurgery on 2/13 with cultures ultimately remaining negative.  He was on antibiotics in the hospital but these were discontinued at discharge and followed up in clinic where he was placed on Keflex.    Patient now is on suppressive cefadroxil 500 mg twice daily and presents for follow-up today.  Denies any drainage from his prior wound.  Denies any swelling or residual signs of infection.  States he has been doing well with the antibiotic and takes it twice a day.  Only problem with the antibiotic is the taste of the capsule.  Denies any fevers or chills.    Objective   Vital Signs:  /72   Pulse 55   Temp 97.1 °F (36.2 °C)   Resp 14   Wt 100 kg (221 lb 6.4 oz)   BMI 28.43 kg/m²   Estimated body mass index is 28.43 kg/m² as calculated from the following:    Height as of 5/16/23: 188 cm (74\").    Weight as of this encounter: 100 kg (221 lb " 6.4 oz).           Physical Exam  Constitutional:       General: He is not in acute distress.     Appearance: Normal appearance. He is normal weight. He is not ill-appearing.   HENT:      Head: Normocephalic and atraumatic.      Nose: Nose normal. No rhinorrhea.      Mouth/Throat:      Mouth: Mucous membranes are moist.      Pharynx: No oropharyngeal exudate.   Eyes:      General: No scleral icterus.     Extraocular Movements: Extraocular movements intact.      Pupils: Pupils are equal, round, and reactive to light.   Cardiovascular:      Rate and Rhythm: Normal rate and regular rhythm.      Pulses: Normal pulses.   Pulmonary:      Effort: Pulmonary effort is normal. No respiratory distress.      Breath sounds: Normal breath sounds.   Abdominal:      General: Abdomen is flat.      Palpations: Abdomen is soft.   Musculoskeletal:         General: No swelling or tenderness. Normal range of motion.      Cervical back: Normal range of motion and neck supple.      Right lower leg: No edema.      Left lower leg: No edema.   Skin:     General: Skin is warm and dry.      Comments: Surgical site with no erythema or drainage   Neurological:      General: No focal deficit present.      Mental Status: He is alert and oriented to person, place, and time.   Psychiatric:         Mood and Affect: Mood normal.         Behavior: Behavior normal.        Result Review :  The following data was reviewed by: Rolly Castano DO on 03/02/2023:      Data reviewed: Operative notes, inpatient consult notes, microbiology             Assessment and Plan   Diagnoses and all orders for this visit:    1. S/P fusion of thoracic spine (Primary)    2. Hardware complicating wound infection, subsequent encounter    Other orders  -     cefadroxil (DURICEF) 500 MG capsule; Take 1 capsule by mouth 2 (Two) Times a Day for 360 days.  Dispense: 180 capsule; Refill: 3    Most recent CRP showed good response and patient no longer is having draining or  swelling at the prior surgical site.  He has had good response to suppressive cefadroxil 500 mg twice daily and will plan to continue this.  At this point we will follow-up yearly and continue this chronically.       I spent 25 minutes caring for Archie on this date of service. This time includes time spent by me in the following activities:preparing for the visit, reviewing tests, obtaining and/or reviewing a separately obtained history, performing a medically appropriate examination and/or evaluation , counseling and educating the patient/family/caregiver, ordering medications, tests, or procedures, documenting information in the medical record, independently interpreting results and communicating that information with the patient/family/caregiver and care coordination  Follow Up   No follow-ups on file.  Patient was given instructions and counseling regarding his condition or for health maintenance advice. Please see specific information pulled into the AVS if appropriate.

## 2024-11-20 NOTE — TELEPHONE ENCOUNTER
Fidgeting with hands Please enter an order for post op lumbar xray AP/LAT standing in brace for a huge lumbar decompression and fusion. Post op appt with Sofiya on June 20 at 11:00. Thanks!   No gross deficits but patient endorses generally inconsistent memory. On further questioning, she denies misplacing objects, forgetting important dates or events, or other general memory issues. She emphasizes generally having anxiety that she will forget important questions or information when talking with health care providers. No gross deficits

## 2024-11-26 NOTE — PROGRESS NOTES
Chart reviewed. Therapy notes reviewed.  53 year old male s/p June 5 T11-S2 posterior fusion with L3-4 lumbar decompression and left L5/L6 decompression.  TLSO brace when sitting up and out of bed.  Transfers min 2.  Gait 3 feet min 2 RW  LBD dependent.  Tachycardia - up to 131 in PT this morning.  Discussed with Rehab Admissions this morning and pre-certification process initiated.        Refill Advair  Orders:    fluticasone-salmeterol (Advair HFA) 115-21 MCG/ACT inhaler; Inhale 2 puffs 2 (two) times a day Rinse mouth after use

## 2025-01-01 ENCOUNTER — APPOINTMENT (OUTPATIENT)
Dept: CT IMAGING | Facility: HOSPITAL | Age: 60
DRG: 871 | End: 2025-01-01
Payer: COMMERCIAL

## 2025-01-01 ENCOUNTER — APPOINTMENT (OUTPATIENT)
Dept: GENERAL RADIOLOGY | Facility: HOSPITAL | Age: 60
DRG: 871 | End: 2025-01-01
Payer: COMMERCIAL

## 2025-01-01 ENCOUNTER — APPOINTMENT (OUTPATIENT)
Dept: ULTRASOUND IMAGING | Facility: HOSPITAL | Age: 60
DRG: 871 | End: 2025-01-01
Payer: COMMERCIAL

## 2025-01-01 ENCOUNTER — APPOINTMENT (OUTPATIENT)
Dept: CARDIOLOGY | Facility: HOSPITAL | Age: 60
DRG: 871 | End: 2025-01-01
Payer: COMMERCIAL

## 2025-01-01 ENCOUNTER — HOSPITAL ENCOUNTER (INPATIENT)
Facility: HOSPITAL | Age: 60
LOS: 1 days | DRG: 951 | End: 2025-04-27
Attending: INTERNAL MEDICINE | Admitting: INTERNAL MEDICINE
Payer: COMMERCIAL

## 2025-01-01 ENCOUNTER — HOSPITAL ENCOUNTER (INPATIENT)
Facility: HOSPITAL | Age: 60
LOS: 9 days | DRG: 871 | End: 2025-04-26
Attending: EMERGENCY MEDICINE | Admitting: HOSPITALIST
Payer: COMMERCIAL

## 2025-01-01 VITALS
RESPIRATION RATE: 16 BRPM | BODY MASS INDEX: 25.21 KG/M2 | DIASTOLIC BLOOD PRESSURE: 55 MMHG | HEART RATE: 96 BPM | HEIGHT: 74 IN | TEMPERATURE: 97.2 F | WEIGHT: 196.43 LBS | SYSTOLIC BLOOD PRESSURE: 87 MMHG | OXYGEN SATURATION: 89 %

## 2025-01-01 VITALS — HEART RATE: 119 BPM | TEMPERATURE: 94.6 F | RESPIRATION RATE: 14 BRPM | OXYGEN SATURATION: 73 %

## 2025-01-01 DIAGNOSIS — N17.9 AKI (ACUTE KIDNEY INJURY): ICD-10-CM

## 2025-01-01 DIAGNOSIS — R79.89 ELEVATED LACTIC ACID LEVEL: ICD-10-CM

## 2025-01-01 DIAGNOSIS — R74.8 ELEVATED LIVER ENZYMES: ICD-10-CM

## 2025-01-01 DIAGNOSIS — R79.89 ELEVATED PROCALCITONIN: ICD-10-CM

## 2025-01-01 DIAGNOSIS — R79.89 ELEVATED TROPONIN: ICD-10-CM

## 2025-01-01 DIAGNOSIS — D69.6 THROMBOCYTOPENIA: ICD-10-CM

## 2025-01-01 DIAGNOSIS — N39.0 ACUTE UTI: ICD-10-CM

## 2025-01-01 DIAGNOSIS — R17 ELEVATED BILIRUBIN: ICD-10-CM

## 2025-01-01 DIAGNOSIS — K70.11 ASCITES DUE TO ALCOHOLIC HEPATITIS: ICD-10-CM

## 2025-01-01 DIAGNOSIS — D64.9 ANEMIA, UNSPECIFIED TYPE: ICD-10-CM

## 2025-01-01 DIAGNOSIS — R14.0 ABDOMINAL DISTENSION: ICD-10-CM

## 2025-01-01 DIAGNOSIS — R74.8 ELEVATED LIPASE: ICD-10-CM

## 2025-01-01 DIAGNOSIS — A41.9 SEPSIS, DUE TO UNSPECIFIED ORGANISM, UNSPECIFIED WHETHER ACUTE ORGAN DYSFUNCTION PRESENT: Primary | ICD-10-CM

## 2025-01-01 LAB
25(OH)D3 SERPL-MCNC: 21.7 NG/ML (ref 30–100)
ADV 40+41 DNA STL QL NAA+NON-PROBE: NOT DETECTED
ALBUMIN FLD-MCNC: 0.4 G/DL
ALBUMIN FLD-MCNC: 0.5 G/DL
ALBUMIN SERPL-MCNC: 2.5 G/DL (ref 3.5–5.2)
ALBUMIN SERPL-MCNC: 2.6 G/DL (ref 3.5–5.2)
ALBUMIN SERPL-MCNC: 2.6 G/DL (ref 3.5–5.2)
ALBUMIN SERPL-MCNC: 2.7 G/DL (ref 3.5–5.2)
ALBUMIN SERPL-MCNC: 2.7 G/DL (ref 3.5–5.2)
ALBUMIN SERPL-MCNC: 2.8 G/DL (ref 3.5–5.2)
ALBUMIN SERPL-MCNC: 2.8 G/DL (ref 3.5–5.2)
ALBUMIN SERPL-MCNC: 2.9 G/DL (ref 3.5–5.2)
ALBUMIN SERPL-MCNC: 3 G/DL (ref 3.5–5.2)
ALBUMIN SERPL-MCNC: 3.2 G/DL (ref 3.5–5.2)
ALBUMIN SERPL-MCNC: 3.2 G/DL (ref 3.5–5.2)
ALBUMIN/GLOB SERPL: 0.8 G/DL
ALBUMIN/GLOB SERPL: 0.9 G/DL
ALBUMIN/GLOB SERPL: 0.9 G/DL
ALBUMIN/GLOB SERPL: 1 G/DL
ALBUMIN/GLOB SERPL: 1.1 G/DL
ALP SERPL-CCNC: 136 U/L (ref 39–117)
ALP SERPL-CCNC: 146 U/L (ref 39–117)
ALP SERPL-CCNC: 147 U/L (ref 39–117)
ALP SERPL-CCNC: 148 U/L (ref 39–117)
ALP SERPL-CCNC: 155 U/L (ref 39–117)
ALP SERPL-CCNC: 159 U/L (ref 39–117)
ALP SERPL-CCNC: 160 U/L (ref 39–117)
ALP SERPL-CCNC: 164 U/L (ref 39–117)
ALP SERPL-CCNC: 165 U/L (ref 39–117)
ALP SERPL-CCNC: 167 U/L (ref 39–117)
ALP SERPL-CCNC: 171 U/L (ref 39–117)
ALT SERPL W P-5'-P-CCNC: 107 U/L (ref 1–41)
ALT SERPL W P-5'-P-CCNC: 129 U/L (ref 1–41)
ALT SERPL W P-5'-P-CCNC: 139 U/L (ref 1–41)
ALT SERPL W P-5'-P-CCNC: 159 U/L (ref 1–41)
ALT SERPL W P-5'-P-CCNC: 170 U/L (ref 1–41)
ALT SERPL W P-5'-P-CCNC: 180 U/L (ref 1–41)
ALT SERPL W P-5'-P-CCNC: 196 U/L (ref 1–41)
ALT SERPL W P-5'-P-CCNC: 197 U/L (ref 1–41)
ALT SERPL W P-5'-P-CCNC: 207 U/L (ref 1–41)
ALT SERPL W P-5'-P-CCNC: 242 U/L (ref 1–41)
ALT SERPL W P-5'-P-CCNC: 293 U/L (ref 1–41)
AMMONIA BLD-SCNC: 29 UMOL/L (ref 16–60)
AMMONIA BLD-SCNC: 30 UMOL/L (ref 16–60)
AMMONIA BLD-SCNC: 37 UMOL/L (ref 16–60)
AMPHET+METHAMPHET UR QL: NEGATIVE
AMPHETAMINES UR QL: NEGATIVE
ANION GAP SERPL CALCULATED.3IONS-SCNC: 11.9 MMOL/L (ref 5–15)
ANION GAP SERPL CALCULATED.3IONS-SCNC: 13 MMOL/L (ref 5–15)
ANION GAP SERPL CALCULATED.3IONS-SCNC: 13 MMOL/L (ref 5–15)
ANION GAP SERPL CALCULATED.3IONS-SCNC: 13.7 MMOL/L (ref 5–15)
ANION GAP SERPL CALCULATED.3IONS-SCNC: 14 MMOL/L (ref 5–15)
ANION GAP SERPL CALCULATED.3IONS-SCNC: 14 MMOL/L (ref 5–15)
ANION GAP SERPL CALCULATED.3IONS-SCNC: 16 MMOL/L (ref 5–15)
ANION GAP SERPL CALCULATED.3IONS-SCNC: 16.8 MMOL/L (ref 5–15)
ANION GAP SERPL CALCULATED.3IONS-SCNC: 16.8 MMOL/L (ref 5–15)
ANION GAP SERPL CALCULATED.3IONS-SCNC: 17.2 MMOL/L (ref 5–15)
ANION GAP SERPL CALCULATED.3IONS-SCNC: 20.4 MMOL/L (ref 5–15)
ANION GAP SERPL CALCULATED.3IONS-SCNC: 23.4 MMOL/L (ref 5–15)
ANION GAP SERPL CALCULATED.3IONS-SCNC: 9.3 MMOL/L (ref 5–15)
ANISOCYTOSIS BLD QL: ABNORMAL
AORTIC DIMENSIONLESS INDEX: 0.59 (DI)
APPEARANCE FLD: CLEAR
APTT PPP: 34.5 SECONDS (ref 22.7–35.4)
APTT PPP: 37.1 SECONDS (ref 22.7–35.4)
APTT PPP: 40 SECONDS (ref 22.7–35.4)
APTT PPP: 40.3 SECONDS (ref 22.7–35.4)
APTT PPP: 40.4 SECONDS (ref 22.7–35.4)
APTT PPP: 41.6 SECONDS (ref 22.7–35.4)
APTT PPP: 41.9 SECONDS (ref 22.7–35.4)
ARTERIAL PATENCY WRIST A: POSITIVE
ASCENDING AORTA: 3 CM
AST SERPL-CCNC: 210 U/L (ref 1–40)
AST SERPL-CCNC: 213 U/L (ref 1–40)
AST SERPL-CCNC: 256 U/L (ref 1–40)
AST SERPL-CCNC: 300 U/L (ref 1–40)
AST SERPL-CCNC: 368 U/L (ref 1–40)
AST SERPL-CCNC: 397 U/L (ref 1–40)
AST SERPL-CCNC: 420 U/L (ref 1–40)
AST SERPL-CCNC: 426 U/L (ref 1–40)
AST SERPL-CCNC: 434 U/L (ref 1–40)
AST SERPL-CCNC: 557 U/L (ref 1–40)
AST SERPL-CCNC: 663 U/L (ref 1–40)
ASTRO TYP 1-8 RNA STL QL NAA+NON-PROBE: NOT DETECTED
ATMOSPHERIC PRESS: 746.9 MMHG
ATMOSPHERIC PRESS: 747.3 MMHG
AV MEAN PRESS GRAD SYS DOP V1V2: 4.4 MMHG
AV VMAX SYS DOP: 147.6 CM/SEC
B-OH-BUTYR SERPL-SCNC: 1.53 MMOL/L (ref 0.02–0.27)
BACTERIA FLD CULT: NORMAL
BACTERIA SPEC AEROBE CULT: NORMAL
BACTERIA SPEC AEROBE CULT: NORMAL
BACTERIA UR QL AUTO: ABNORMAL /HPF
BARBITURATES UR QL SCN: NEGATIVE
BASE EXCESS BLDA CALC-SCNC: 0.6 MMOL/L (ref 0–2)
BASE EXCESS BLDV CALC-SCNC: -0.5 MMOL/L (ref -2–2)
BASOPHILS # BLD AUTO: 0 10*3/MM3 (ref 0–0.2)
BASOPHILS # BLD AUTO: 0.02 10*3/MM3 (ref 0–0.2)
BASOPHILS # BLD AUTO: 0.02 10*3/MM3 (ref 0–0.2)
BASOPHILS # BLD AUTO: 0.06 10*3/MM3 (ref 0–0.2)
BASOPHILS # BLD AUTO: 0.06 10*3/MM3 (ref 0–0.2)
BASOPHILS # BLD AUTO: 0.08 10*3/MM3 (ref 0–0.2)
BASOPHILS # BLD MANUAL: 0 10*3/MM3 (ref 0–0.2)
BASOPHILS # BLD MANUAL: 0 10*3/MM3 (ref 0–0.2)
BASOPHILS NFR BLD AUTO: 0 % (ref 0–1.5)
BASOPHILS NFR BLD AUTO: 0.1 % (ref 0–1.5)
BASOPHILS NFR BLD AUTO: 0.2 % (ref 0–1.5)
BASOPHILS NFR BLD AUTO: 0.4 % (ref 0–1.5)
BASOPHILS NFR BLD AUTO: 0.7 % (ref 0–1.5)
BASOPHILS NFR BLD AUTO: 0.7 % (ref 0–1.5)
BASOPHILS NFR BLD MANUAL: 0 % (ref 0–1.5)
BASOPHILS NFR BLD MANUAL: 0 % (ref 0–1.5)
BDY SITE: ABNORMAL
BENZODIAZ UR QL SCN: NEGATIVE
BH CV ECHO MEAS - ACS: 2.39 CM
BH CV ECHO MEAS - AO MAX PG: 8.7 MMHG
BH CV ECHO MEAS - AO ROOT DIAM: 3.4 CM
BH CV ECHO MEAS - AO V2 VTI: 25.3 CM
BH CV ECHO MEAS - AVA(I,D): 2.9 CM2
BH CV ECHO MEAS - EDV(CUBED): 203.6 ML
BH CV ECHO MEAS - EDV(MOD-SP2): 303 ML
BH CV ECHO MEAS - EDV(MOD-SP4): 247 ML
BH CV ECHO MEAS - EF(MOD-SP2): 29.7 %
BH CV ECHO MEAS - EF(MOD-SP4): 28.3 %
BH CV ECHO MEAS - ESV(CUBED): 119.6 ML
BH CV ECHO MEAS - ESV(MOD-SP2): 213 ML
BH CV ECHO MEAS - ESV(MOD-SP4): 177 ML
BH CV ECHO MEAS - FS: 16.3 %
BH CV ECHO MEAS - IVS/LVPW: 1.15 CM
BH CV ECHO MEAS - IVSD: 1.13 CM
BH CV ECHO MEAS - LAT PEAK E' VEL: 16.2 CM/SEC
BH CV ECHO MEAS - LV DIASTOLIC VOL/BSA (35-75): 110 CM2
BH CV ECHO MEAS - LV MASS(C)D: 256.7 GRAMS
BH CV ECHO MEAS - LV MAX PG: 3.2 MMHG
BH CV ECHO MEAS - LV MEAN PG: 1.63 MMHG
BH CV ECHO MEAS - LV SYSTOLIC VOL/BSA (12-30): 78.8 CM2
BH CV ECHO MEAS - LV V1 MAX: 89.6 CM/SEC
BH CV ECHO MEAS - LV V1 VTI: 15 CM
BH CV ECHO MEAS - LVIDD: 5.9 CM
BH CV ECHO MEAS - LVIDS: 4.9 CM
BH CV ECHO MEAS - LVOT AREA: 4.9 CM2
BH CV ECHO MEAS - LVOT DIAM: 2.49 CM
BH CV ECHO MEAS - LVPWD: 0.98 CM
BH CV ECHO MEAS - MED PEAK E' VEL: 9.8 CM/SEC
BH CV ECHO MEAS - MR MAX PG: 71.4 MMHG
BH CV ECHO MEAS - MR MAX VEL: 422.5 CM/SEC
BH CV ECHO MEAS - MV DEC SLOPE: 817.1 CM/SEC2
BH CV ECHO MEAS - MV DEC TIME: 0.09 SEC
BH CV ECHO MEAS - MV E MAX VEL: 73.4 CM/SEC
BH CV ECHO MEAS - PA ACC TIME: 0.08 SEC
BH CV ECHO MEAS - PULM DIAS VEL: 49.3 CM/SEC
BH CV ECHO MEAS - PULM S/D: 1.03
BH CV ECHO MEAS - PULM SYS VEL: 50.5 CM/SEC
BH CV ECHO MEAS - RAP SYSTOLE: 3 MMHG
BH CV ECHO MEAS - RV MAX PG: 2.44 MMHG
BH CV ECHO MEAS - RV V1 MAX: 78.1 CM/SEC
BH CV ECHO MEAS - RV V1 VTI: 15 CM
BH CV ECHO MEAS - RVSP: 17 MMHG
BH CV ECHO MEAS - SV(LVOT): 72.9 ML
BH CV ECHO MEAS - SV(MOD-SP2): 90 ML
BH CV ECHO MEAS - SV(MOD-SP4): 70 ML
BH CV ECHO MEAS - SVI(LVOT): 32.4 ML/M2
BH CV ECHO MEAS - SVI(MOD-SP2): 40.1 ML/M2
BH CV ECHO MEAS - SVI(MOD-SP4): 31.2 ML/M2
BH CV ECHO MEAS - TAPSE (>1.6): 1.7 CM
BH CV ECHO MEAS - TR MAX PG: 14.4 MMHG
BH CV ECHO MEAS - TR MAX VEL: 189.9 CM/SEC
BH CV ECHO MEASUREMENTS AVERAGE E/E' RATIO: 5.65
BH CV XLRA - TDI S': 15.2 CM/SEC
BILIRUB CONJ SERPL-MCNC: 6.5 MG/DL (ref 0–0.3)
BILIRUB SERPL-MCNC: 7.5 MG/DL (ref 0–1.2)
BILIRUB SERPL-MCNC: 7.6 MG/DL (ref 0–1.2)
BILIRUB SERPL-MCNC: 7.6 MG/DL (ref 0–1.2)
BILIRUB SERPL-MCNC: 7.9 MG/DL (ref 0–1.2)
BILIRUB SERPL-MCNC: 7.9 MG/DL (ref 0–1.2)
BILIRUB SERPL-MCNC: 8.2 MG/DL (ref 0–1.2)
BILIRUB SERPL-MCNC: 8.3 MG/DL (ref 0–1.2)
BILIRUB SERPL-MCNC: 8.3 MG/DL (ref 0–1.2)
BILIRUB SERPL-MCNC: 8.7 MG/DL (ref 0–1.2)
BILIRUB SERPL-MCNC: 8.9 MG/DL (ref 0–1.2)
BILIRUB SERPL-MCNC: 9.1 MG/DL (ref 0–1.2)
BILIRUB UR QL STRIP: ABNORMAL
BUN SERPL-MCNC: 48 MG/DL (ref 8–23)
BUN SERPL-MCNC: 49 MG/DL (ref 8–23)
BUN SERPL-MCNC: 49 MG/DL (ref 8–23)
BUN SERPL-MCNC: 50 MG/DL (ref 8–23)
BUN SERPL-MCNC: 53 MG/DL (ref 8–23)
BUN SERPL-MCNC: 56 MG/DL (ref 8–23)
BUN SERPL-MCNC: 60 MG/DL (ref 8–23)
BUN SERPL-MCNC: 61 MG/DL (ref 8–23)
BUN SERPL-MCNC: 61 MG/DL (ref 8–23)
BUN SERPL-MCNC: 62 MG/DL (ref 8–23)
BUN SERPL-MCNC: 63 MG/DL (ref 8–23)
BUN/CREAT SERPL: 10 (ref 7–25)
BUN/CREAT SERPL: 10.6 (ref 7–25)
BUN/CREAT SERPL: 12.6 (ref 7–25)
BUN/CREAT SERPL: 13.7 (ref 7–25)
BUN/CREAT SERPL: 14.7 (ref 7–25)
BUN/CREAT SERPL: 14.9 (ref 7–25)
BUN/CREAT SERPL: 15.5 (ref 7–25)
BUN/CREAT SERPL: 15.9 (ref 7–25)
BUN/CREAT SERPL: 16 (ref 7–25)
BUN/CREAT SERPL: 17.8 (ref 7–25)
BUN/CREAT SERPL: 21.6 (ref 7–25)
BUN/CREAT SERPL: 28.7 (ref 7–25)
BUN/CREAT SERPL: 8.9 (ref 7–25)
BUPRENORPHINE SERPL-MCNC: NEGATIVE NG/ML
C CAYETANENSIS DNA STL QL NAA+NON-PROBE: NOT DETECTED
C COLI+JEJ+UPSA DNA STL QL NAA+NON-PROBE: NOT DETECTED
C DIFF GDH + TOXINS A+B STL QL IA.RAPID: NEGATIVE
C DIFF TOX GENS STL QL NAA+PROBE: POSITIVE
CA-I SERPL ISE-MCNC: 0.94 MMOL/L (ref 1.15–1.35)
CALCIUM SPEC-SCNC: 7 MG/DL (ref 8.6–10.5)
CALCIUM SPEC-SCNC: 7.3 MG/DL (ref 8.6–10.5)
CALCIUM SPEC-SCNC: 7.4 MG/DL (ref 8.6–10.5)
CALCIUM SPEC-SCNC: 7.5 MG/DL (ref 8.6–10.5)
CALCIUM SPEC-SCNC: 7.6 MG/DL (ref 8.6–10.5)
CALCIUM SPEC-SCNC: 7.6 MG/DL (ref 8.6–10.5)
CALCIUM SPEC-SCNC: 7.8 MG/DL (ref 8.6–10.5)
CALCIUM SPEC-SCNC: 7.9 MG/DL (ref 8.6–10.5)
CALCIUM SPEC-SCNC: 8 MG/DL (ref 8.6–10.5)
CALCIUM SPEC-SCNC: 8 MG/DL (ref 8.6–10.5)
CALCIUM SPEC-SCNC: 8.1 MG/DL (ref 8.6–10.5)
CANNABINOIDS SERPL QL: NEGATIVE
CHLORIDE SERPL-SCNC: 101 MMOL/L (ref 98–107)
CHLORIDE SERPL-SCNC: 101 MMOL/L (ref 98–107)
CHLORIDE SERPL-SCNC: 104 MMOL/L (ref 98–107)
CHLORIDE SERPL-SCNC: 106 MMOL/L (ref 98–107)
CHLORIDE SERPL-SCNC: 82 MMOL/L (ref 98–107)
CHLORIDE SERPL-SCNC: 92 MMOL/L (ref 98–107)
CHLORIDE SERPL-SCNC: 92 MMOL/L (ref 98–107)
CHLORIDE SERPL-SCNC: 93 MMOL/L (ref 98–107)
CHLORIDE SERPL-SCNC: 94 MMOL/L (ref 98–107)
CHLORIDE SERPL-SCNC: 95 MMOL/L (ref 98–107)
CHLORIDE SERPL-SCNC: 96 MMOL/L (ref 98–107)
CHLORIDE SERPL-SCNC: 96 MMOL/L (ref 98–107)
CHLORIDE SERPL-SCNC: 98 MMOL/L (ref 98–107)
CHLORIDE UR-SCNC: 23 MMOL/L
CK SERPL-CCNC: 249 U/L (ref 20–200)
CLARITY UR: ABNORMAL
CO2 SERPL-SCNC: 22.2 MMOL/L (ref 22–29)
CO2 SERPL-SCNC: 22.6 MMOL/L (ref 22–29)
CO2 SERPL-SCNC: 23.6 MMOL/L (ref 22–29)
CO2 SERPL-SCNC: 26.2 MMOL/L (ref 22–29)
CO2 SERPL-SCNC: 27.1 MMOL/L (ref 22–29)
CO2 SERPL-SCNC: 27.8 MMOL/L (ref 22–29)
CO2 SERPL-SCNC: 28 MMOL/L (ref 22–29)
CO2 SERPL-SCNC: 28.3 MMOL/L (ref 22–29)
CO2 SERPL-SCNC: 28.7 MMOL/L (ref 22–29)
CO2 SERPL-SCNC: 29 MMOL/L (ref 22–29)
CO2 SERPL-SCNC: 31 MMOL/L (ref 22–29)
COCAINE UR QL: NEGATIVE
COLOR FLD: YELLOW
COLOR UR: ABNORMAL
CREAT SERPL-MCNC: 2.09 MG/DL (ref 0.76–1.27)
CREAT SERPL-MCNC: 2.31 MG/DL (ref 0.76–1.27)
CREAT SERPL-MCNC: 2.76 MG/DL (ref 0.76–1.27)
CREAT SERPL-MCNC: 3.07 MG/DL (ref 0.76–1.27)
CREAT SERPL-MCNC: 3.1 MG/DL (ref 0.76–1.27)
CREAT SERPL-MCNC: 3.34 MG/DL (ref 0.76–1.27)
CREAT SERPL-MCNC: 3.4 MG/DL (ref 0.76–1.27)
CREAT SERPL-MCNC: 3.65 MG/DL (ref 0.76–1.27)
CREAT SERPL-MCNC: 3.75 MG/DL (ref 0.76–1.27)
CREAT SERPL-MCNC: 4.86 MG/DL (ref 0.76–1.27)
CREAT SERPL-MCNC: 5.87 MG/DL (ref 0.76–1.27)
CREAT SERPL-MCNC: 6.11 MG/DL (ref 0.76–1.27)
CREAT SERPL-MCNC: 7.11 MG/DL (ref 0.76–1.27)
CREAT UR-MCNC: 208.8 MG/DL
CRYPTOSP DNA STL QL NAA+NON-PROBE: NOT DETECTED
D-LACTATE SERPL-SCNC: 2 MMOL/L (ref 0.5–2)
D-LACTATE SERPL-SCNC: 2.1 MMOL/L (ref 0.5–2)
D-LACTATE SERPL-SCNC: 2.7 MMOL/L (ref 0.5–2)
D-LACTATE SERPL-SCNC: 5.3 MMOL/L (ref 0.5–2)
DEPRECATED RDW RBC AUTO: 50.3 FL (ref 37–54)
DEPRECATED RDW RBC AUTO: 51.7 FL (ref 37–54)
DEPRECATED RDW RBC AUTO: 53.9 FL (ref 37–54)
DEPRECATED RDW RBC AUTO: 54 FL (ref 37–54)
DEPRECATED RDW RBC AUTO: 54.5 FL (ref 37–54)
DEPRECATED RDW RBC AUTO: 54.8 FL (ref 37–54)
DEPRECATED RDW RBC AUTO: 55.4 FL (ref 37–54)
DEPRECATED RDW RBC AUTO: 55.7 FL (ref 37–54)
DEPRECATED RDW RBC AUTO: 56.7 FL (ref 37–54)
DEPRECATED RDW RBC AUTO: 62.6 FL (ref 37–54)
DEVICE COMMENT: ABNORMAL
DEVICE COMMENT: ABNORMAL
E HISTOLYT DNA STL QL NAA+NON-PROBE: NOT DETECTED
EAEC PAA PLAS AGGR+AATA ST NAA+NON-PRB: NOT DETECTED
EC STX1+STX2 GENES STL QL NAA+NON-PROBE: NOT DETECTED
EGFRCR SERPLBLD CKD-EPI 2021: 10.2 ML/MIN/1.73
EGFRCR SERPLBLD CKD-EPI 2021: 12.8 ML/MIN/1.73
EGFRCR SERPLBLD CKD-EPI 2021: 17.5 ML/MIN/1.73
EGFRCR SERPLBLD CKD-EPI 2021: 18.1 ML/MIN/1.73
EGFRCR SERPLBLD CKD-EPI 2021: 19.7 ML/MIN/1.73
EGFRCR SERPLBLD CKD-EPI 2021: 20.1 ML/MIN/1.73
EGFRCR SERPLBLD CKD-EPI 2021: 22 ML/MIN/1.73
EGFRCR SERPLBLD CKD-EPI 2021: 22.3 ML/MIN/1.73
EGFRCR SERPLBLD CKD-EPI 2021: 25.3 ML/MIN/1.73
EGFRCR SERPLBLD CKD-EPI 2021: 31.4 ML/MIN/1.73
EGFRCR SERPLBLD CKD-EPI 2021: 35.4 ML/MIN/1.73
EGFRCR SERPLBLD CKD-EPI 2021: 8.1 ML/MIN/1.73
EGFRCR SERPLBLD CKD-EPI 2021: 9.8 ML/MIN/1.73
EOSINOPHIL # BLD AUTO: 0 10*3/MM3 (ref 0–0.4)
EOSINOPHIL # BLD AUTO: 0 10*3/MM3 (ref 0–0.4)
EOSINOPHIL # BLD AUTO: 0.08 10*3/MM3 (ref 0–0.4)
EOSINOPHIL # BLD AUTO: 0.15 10*3/MM3 (ref 0–0.4)
EOSINOPHIL # BLD AUTO: 0.16 10*3/MM3 (ref 0–0.4)
EOSINOPHIL # BLD AUTO: 0.17 10*3/MM3 (ref 0–0.4)
EOSINOPHIL # BLD MANUAL: 0 10*3/MM3 (ref 0–0.4)
EOSINOPHIL # BLD MANUAL: 0.09 10*3/MM3 (ref 0–0.4)
EOSINOPHIL # BLD MANUAL: 0.09 10*3/MM3 (ref 0–0.4)
EOSINOPHIL # BLD MANUAL: 0.26 10*3/MM3 (ref 0–0.4)
EOSINOPHIL NFR BLD AUTO: 0 % (ref 0.3–6.2)
EOSINOPHIL NFR BLD AUTO: 0 % (ref 0.3–6.2)
EOSINOPHIL NFR BLD AUTO: 0.9 % (ref 0.3–6.2)
EOSINOPHIL NFR BLD AUTO: 1.1 % (ref 0.3–6.2)
EOSINOPHIL NFR BLD AUTO: 1.5 % (ref 0.3–6.2)
EOSINOPHIL NFR BLD AUTO: 1.9 % (ref 0.3–6.2)
EOSINOPHIL NFR BLD MANUAL: 0 % (ref 0.3–6.2)
EOSINOPHIL NFR BLD MANUAL: 1 % (ref 0.3–6.2)
EOSINOPHIL NFR BLD MANUAL: 1 % (ref 0.3–6.2)
EOSINOPHIL NFR BLD MANUAL: 3 % (ref 0.3–6.2)
EPEC EAE GENE STL QL NAA+NON-PROBE: DETECTED
ERYTHROCYTE [DISTWIDTH] IN BLOOD BY AUTOMATED COUNT: 14.5 % (ref 12.3–15.4)
ERYTHROCYTE [DISTWIDTH] IN BLOOD BY AUTOMATED COUNT: 14.6 % (ref 12.3–15.4)
ERYTHROCYTE [DISTWIDTH] IN BLOOD BY AUTOMATED COUNT: 15.1 % (ref 12.3–15.4)
ERYTHROCYTE [DISTWIDTH] IN BLOOD BY AUTOMATED COUNT: 15.2 % (ref 12.3–15.4)
ERYTHROCYTE [DISTWIDTH] IN BLOOD BY AUTOMATED COUNT: 15.2 % (ref 12.3–15.4)
ERYTHROCYTE [DISTWIDTH] IN BLOOD BY AUTOMATED COUNT: 15.3 % (ref 12.3–15.4)
ERYTHROCYTE [DISTWIDTH] IN BLOOD BY AUTOMATED COUNT: 15.4 % (ref 12.3–15.4)
ERYTHROCYTE [DISTWIDTH] IN BLOOD BY AUTOMATED COUNT: 15.6 % (ref 12.3–15.4)
ERYTHROCYTE [DISTWIDTH] IN BLOOD BY AUTOMATED COUNT: 15.6 % (ref 12.3–15.4)
ERYTHROCYTE [DISTWIDTH] IN BLOOD BY AUTOMATED COUNT: 16.1 % (ref 12.3–15.4)
ETEC LTA+ST1A+ST1B TOX ST NAA+NON-PROBE: NOT DETECTED
ETHANOL BLD-MCNC: <10 MG/DL (ref 0–10)
ETHANOL UR QL: <0.01 %
FENTANYL UR-MCNC: NEGATIVE NG/ML
FERRITIN SERPL-MCNC: 3650 NG/ML (ref 30–400)
FIBRINOGEN PPP-MCNC: 216 MG/DL (ref 219–464)
FIBRINOGEN PPP-MCNC: 231 MG/DL (ref 219–464)
FLUAV SUBTYP SPEC NAA+PROBE: NOT DETECTED
FLUBV RNA ISLT QL NAA+PROBE: NOT DETECTED
FOLATE SERPL-MCNC: 12.5 NG/ML (ref 4.78–24.2)
FSP PPP LA-ACNC: ABNORMAL
FUNGUS WND CULT: NORMAL
G LAMBLIA DNA STL QL NAA+NON-PROBE: NOT DETECTED
GAS FLOW AIRWAY: 15 LPM
GAS FLOW AIRWAY: 2 LPM
GEN 5 1HR TROPONIN T REFLEX: 822 NG/L
GIANT PLATELETS: ABNORMAL
GLOBULIN UR ELPH-MCNC: 2.7 GM/DL
GLOBULIN UR ELPH-MCNC: 2.8 GM/DL
GLOBULIN UR ELPH-MCNC: 3 GM/DL
GLOBULIN UR ELPH-MCNC: 3.1 GM/DL
GLOBULIN UR ELPH-MCNC: 3.1 GM/DL
GLOBULIN UR ELPH-MCNC: 3.2 GM/DL
GLOBULIN UR ELPH-MCNC: 3.3 GM/DL
GLOBULIN UR ELPH-MCNC: 3.4 GM/DL
GLOBULIN UR ELPH-MCNC: 3.6 GM/DL
GLUCOSE BLDC GLUCOMTR-MCNC: 106 MG/DL (ref 70–130)
GLUCOSE BLDC GLUCOMTR-MCNC: 108 MG/DL (ref 70–130)
GLUCOSE BLDC GLUCOMTR-MCNC: 108 MG/DL (ref 70–130)
GLUCOSE BLDC GLUCOMTR-MCNC: 110 MG/DL (ref 70–130)
GLUCOSE BLDC GLUCOMTR-MCNC: 111 MG/DL (ref 70–130)
GLUCOSE BLDC GLUCOMTR-MCNC: 114 MG/DL (ref 70–130)
GLUCOSE BLDC GLUCOMTR-MCNC: 115 MG/DL (ref 70–130)
GLUCOSE BLDC GLUCOMTR-MCNC: 119 MG/DL (ref 70–130)
GLUCOSE BLDC GLUCOMTR-MCNC: 120 MG/DL (ref 70–130)
GLUCOSE BLDC GLUCOMTR-MCNC: 122 MG/DL (ref 70–130)
GLUCOSE BLDC GLUCOMTR-MCNC: 125 MG/DL (ref 70–130)
GLUCOSE BLDC GLUCOMTR-MCNC: 128 MG/DL (ref 70–130)
GLUCOSE BLDC GLUCOMTR-MCNC: 128 MG/DL (ref 70–130)
GLUCOSE BLDC GLUCOMTR-MCNC: 130 MG/DL (ref 70–130)
GLUCOSE BLDC GLUCOMTR-MCNC: 130 MG/DL (ref 70–130)
GLUCOSE BLDC GLUCOMTR-MCNC: 131 MG/DL (ref 70–130)
GLUCOSE BLDC GLUCOMTR-MCNC: 133 MG/DL (ref 70–130)
GLUCOSE BLDC GLUCOMTR-MCNC: 136 MG/DL (ref 70–130)
GLUCOSE BLDC GLUCOMTR-MCNC: 138 MG/DL (ref 70–130)
GLUCOSE BLDC GLUCOMTR-MCNC: 139 MG/DL (ref 70–130)
GLUCOSE BLDC GLUCOMTR-MCNC: 139 MG/DL (ref 70–130)
GLUCOSE BLDC GLUCOMTR-MCNC: 142 MG/DL (ref 70–130)
GLUCOSE BLDC GLUCOMTR-MCNC: 142 MG/DL (ref 70–130)
GLUCOSE BLDC GLUCOMTR-MCNC: 143 MG/DL (ref 70–130)
GLUCOSE BLDC GLUCOMTR-MCNC: 144 MG/DL (ref 70–130)
GLUCOSE BLDC GLUCOMTR-MCNC: 149 MG/DL (ref 70–130)
GLUCOSE BLDC GLUCOMTR-MCNC: 153 MG/DL (ref 70–130)
GLUCOSE BLDC GLUCOMTR-MCNC: 155 MG/DL (ref 70–130)
GLUCOSE BLDC GLUCOMTR-MCNC: 161 MG/DL (ref 70–130)
GLUCOSE BLDC GLUCOMTR-MCNC: 168 MG/DL (ref 70–130)
GLUCOSE BLDC GLUCOMTR-MCNC: 168 MG/DL (ref 70–130)
GLUCOSE BLDC GLUCOMTR-MCNC: 170 MG/DL (ref 70–130)
GLUCOSE BLDC GLUCOMTR-MCNC: 173 MG/DL (ref 70–130)
GLUCOSE BLDC GLUCOMTR-MCNC: 175 MG/DL (ref 70–130)
GLUCOSE BLDC GLUCOMTR-MCNC: 177 MG/DL (ref 70–130)
GLUCOSE BLDC GLUCOMTR-MCNC: 179 MG/DL (ref 70–130)
GLUCOSE SERPL-MCNC: 108 MG/DL (ref 65–99)
GLUCOSE SERPL-MCNC: 111 MG/DL (ref 65–99)
GLUCOSE SERPL-MCNC: 116 MG/DL (ref 65–99)
GLUCOSE SERPL-MCNC: 124 MG/DL (ref 65–99)
GLUCOSE SERPL-MCNC: 131 MG/DL (ref 65–99)
GLUCOSE SERPL-MCNC: 137 MG/DL (ref 65–99)
GLUCOSE SERPL-MCNC: 137 MG/DL (ref 65–99)
GLUCOSE SERPL-MCNC: 140 MG/DL (ref 65–99)
GLUCOSE SERPL-MCNC: 142 MG/DL (ref 65–99)
GLUCOSE SERPL-MCNC: 144 MG/DL (ref 65–99)
GLUCOSE SERPL-MCNC: 153 MG/DL (ref 65–99)
GLUCOSE SERPL-MCNC: 155 MG/DL (ref 65–99)
GLUCOSE SERPL-MCNC: 158 MG/DL (ref 65–99)
GLUCOSE UR STRIP-MCNC: NEGATIVE MG/DL
GRAM STN SPEC: NORMAL
HAPTOGLOB SERPL-MCNC: <10 MG/DL (ref 30–200)
HCO3 BLDA-SCNC: 24.8 MMOL/L (ref 22–28)
HCO3 BLDV-SCNC: 21.7 MMOL/L (ref 22–28)
HCT VFR BLD AUTO: 19.2 % (ref 37.5–51)
HCT VFR BLD AUTO: 21.3 % (ref 37.5–51)
HCT VFR BLD AUTO: 21.8 % (ref 37.5–51)
HCT VFR BLD AUTO: 22.8 % (ref 37.5–51)
HCT VFR BLD AUTO: 23.1 % (ref 37.5–51)
HCT VFR BLD AUTO: 23.5 % (ref 37.5–51)
HCT VFR BLD AUTO: 24 % (ref 37.5–51)
HCT VFR BLD AUTO: 24.5 % (ref 37.5–51)
HCT VFR BLD AUTO: 24.9 % (ref 37.5–51)
HCT VFR BLD AUTO: 30.7 % (ref 37.5–51)
HEMODILUTION: NO
HGB BLD-MCNC: 10.8 G/DL (ref 13–17.7)
HGB BLD-MCNC: 7.1 G/DL (ref 13–17.7)
HGB BLD-MCNC: 7.1 G/DL (ref 13–17.7)
HGB BLD-MCNC: 8 G/DL (ref 13–17.7)
HGB BLD-MCNC: 8.2 G/DL (ref 13–17.7)
HGB BLD-MCNC: 8.5 G/DL (ref 13–17.7)
HGB BLD-MCNC: 8.5 G/DL (ref 13–17.7)
HGB BLD-MCNC: 8.8 G/DL (ref 13–17.7)
HGB BLD-MCNC: 9.1 G/DL (ref 13–17.7)
HGB BLD-MCNC: 9.2 G/DL (ref 13–17.7)
HGB RETIC QN AUTO: 40.1 PG (ref 29.8–36.1)
HGB UR QL STRIP.AUTO: ABNORMAL
HYALINE CASTS UR QL AUTO: ABNORMAL /LPF
IMM GRANULOCYTES # BLD AUTO: 0.07 10*3/MM3 (ref 0–0.05)
IMM GRANULOCYTES # BLD AUTO: 0.14 10*3/MM3 (ref 0–0.05)
IMM GRANULOCYTES # BLD AUTO: 0.18 10*3/MM3 (ref 0–0.05)
IMM GRANULOCYTES # BLD AUTO: 0.24 10*3/MM3 (ref 0–0.05)
IMM GRANULOCYTES # BLD AUTO: 0.25 10*3/MM3 (ref 0–0.05)
IMM GRANULOCYTES # BLD AUTO: 0.3 10*3/MM3 (ref 0–0.05)
IMM GRANULOCYTES NFR BLD AUTO: 0.8 % (ref 0–0.5)
IMM GRANULOCYTES NFR BLD AUTO: 1.5 % (ref 0–0.5)
IMM GRANULOCYTES NFR BLD AUTO: 1.6 % (ref 0–0.5)
IMM GRANULOCYTES NFR BLD AUTO: 1.7 % (ref 0–0.5)
IMM GRANULOCYTES NFR BLD AUTO: 1.8 % (ref 0–0.5)
IMM GRANULOCYTES NFR BLD AUTO: 2.1 % (ref 0–0.5)
IMM RETICS NFR: 30.4 % (ref 3–15.8)
INR PPP: 1.61 (ref 0.9–1.1)
INR PPP: 1.64 (ref 0.9–1.1)
INR PPP: 1.67 (ref 0.9–1.1)
INR PPP: 1.72 (ref 0.9–1.1)
INR PPP: 1.72 (ref 0.9–1.1)
INR PPP: 1.73 (ref 0.9–1.1)
INR PPP: 1.79 (ref 0.9–1.1)
INR PPP: 1.8 (ref 0.9–1.1)
IRON 24H UR-MRATE: 34 MCG/DL (ref 59–158)
IRON SATN MFR SERPL: 30 % (ref 20–50)
KETONES UR QL STRIP: NEGATIVE
LDH SERPL-CCNC: 617 U/L (ref 135–225)
LDH SERPL-CCNC: 693 U/L (ref 135–225)
LDH SERPL-CCNC: 733 U/L (ref 135–225)
LEFT ATRIUM VOLUME INDEX: 14.4 ML/M2
LEUKOCYTE ESTERASE UR QL STRIP.AUTO: ABNORMAL
LIPASE SERPL-CCNC: 449 U/L (ref 13–60)
LV EF BIPLANE MOD: 30 %
LYMPHOCYTES # BLD AUTO: 1.03 10*3/MM3 (ref 0.7–3.1)
LYMPHOCYTES # BLD AUTO: 1.19 10*3/MM3 (ref 0.7–3.1)
LYMPHOCYTES # BLD AUTO: 1.27 10*3/MM3 (ref 0.7–3.1)
LYMPHOCYTES # BLD AUTO: 1.73 10*3/MM3 (ref 0.7–3.1)
LYMPHOCYTES # BLD AUTO: 2 10*3/MM3 (ref 0.7–3.1)
LYMPHOCYTES # BLD AUTO: 2.05 10*3/MM3 (ref 0.7–3.1)
LYMPHOCYTES # BLD MANUAL: 0.84 10*3/MM3 (ref 0.7–3.1)
LYMPHOCYTES # BLD MANUAL: 0.88 10*3/MM3 (ref 0.7–3.1)
LYMPHOCYTES # BLD MANUAL: 1.04 10*3/MM3 (ref 0.7–3.1)
LYMPHOCYTES # BLD MANUAL: 1.65 10*3/MM3 (ref 0.7–3.1)
LYMPHOCYTES NFR BLD AUTO: 11.9 % (ref 19.6–45.3)
LYMPHOCYTES NFR BLD AUTO: 14.5 % (ref 19.6–45.3)
LYMPHOCYTES NFR BLD AUTO: 17.2 % (ref 19.6–45.3)
LYMPHOCYTES NFR BLD AUTO: 20.9 % (ref 19.6–45.3)
LYMPHOCYTES NFR BLD AUTO: 6.6 % (ref 19.6–45.3)
LYMPHOCYTES NFR BLD AUTO: 9.8 % (ref 19.6–45.3)
LYMPHOCYTES NFR BLD MANUAL: 10 % (ref 5–12)
LYMPHOCYTES NFR BLD MANUAL: 11 % (ref 5–12)
LYMPHOCYTES NFR BLD MANUAL: 4 % (ref 5–12)
LYMPHOCYTES NFR BLD MANUAL: 7 % (ref 5–12)
LYMPHOCYTES NFR FLD MANUAL: 15 %
MACROCYTES BLD QL SMEAR: ABNORMAL
MAGNESIUM SERPL-MCNC: 1.3 MG/DL (ref 1.6–2.4)
MAGNESIUM SERPL-MCNC: 1.5 MG/DL (ref 1.6–2.4)
MAGNESIUM SERPL-MCNC: 1.7 MG/DL (ref 1.6–2.4)
MAGNESIUM SERPL-MCNC: 1.8 MG/DL (ref 1.6–2.4)
MAGNESIUM SERPL-MCNC: 2.4 MG/DL (ref 1.6–2.4)
MCH RBC QN AUTO: 33.2 PG (ref 26.6–33)
MCH RBC QN AUTO: 33.7 PG (ref 26.6–33)
MCH RBC QN AUTO: 35.9 PG (ref 26.6–33)
MCH RBC QN AUTO: 36.6 PG (ref 26.6–33)
MCH RBC QN AUTO: 37 PG (ref 26.6–33)
MCH RBC QN AUTO: 37.1 PG (ref 26.6–33)
MCH RBC QN AUTO: 37.3 PG (ref 26.6–33)
MCH RBC QN AUTO: 37.3 PG (ref 26.6–33)
MCH RBC QN AUTO: 37.4 PG (ref 26.6–33)
MCH RBC QN AUTO: 38 PG (ref 26.6–33)
MCHC RBC AUTO-ENTMCNC: 33.3 G/DL (ref 31.5–35.7)
MCHC RBC AUTO-ENTMCNC: 34.1 G/DL (ref 31.5–35.7)
MCHC RBC AUTO-ENTMCNC: 35.2 G/DL (ref 31.5–35.7)
MCHC RBC AUTO-ENTMCNC: 36.7 G/DL (ref 31.5–35.7)
MCHC RBC AUTO-ENTMCNC: 36.8 G/DL (ref 31.5–35.7)
MCHC RBC AUTO-ENTMCNC: 36.9 G/DL (ref 31.5–35.7)
MCHC RBC AUTO-ENTMCNC: 37 G/DL (ref 31.5–35.7)
MCHC RBC AUTO-ENTMCNC: 37.1 G/DL (ref 31.5–35.7)
MCHC RBC AUTO-ENTMCNC: 37.3 G/DL (ref 31.5–35.7)
MCHC RBC AUTO-ENTMCNC: 37.4 G/DL (ref 31.5–35.7)
MCV RBC AUTO: 100 FL (ref 79–97)
MCV RBC AUTO: 101.3 FL (ref 79–97)
MCV RBC AUTO: 102.9 FL (ref 79–97)
MCV RBC AUTO: 106.2 FL (ref 79–97)
MCV RBC AUTO: 97.8 FL (ref 79–97)
MCV RBC AUTO: 98.7 FL (ref 79–97)
MCV RBC AUTO: 99 FL (ref 79–97)
MCV RBC AUTO: 99.1 FL (ref 79–97)
MCV RBC AUTO: 99.5 FL (ref 79–97)
MCV RBC AUTO: 99.6 FL (ref 79–97)
METHADONE UR QL SCN: NEGATIVE
METHOD: NORMAL
MICROCYTES BLD QL: ABNORMAL
MODALITY: ABNORMAL
MODALITY: ABNORMAL
MONOCYTES # BLD AUTO: 0.46 10*3/MM3 (ref 0.1–0.9)
MONOCYTES # BLD AUTO: 0.65 10*3/MM3 (ref 0.1–0.9)
MONOCYTES # BLD AUTO: 1.25 10*3/MM3 (ref 0.1–0.9)
MONOCYTES # BLD AUTO: 1.27 10*3/MM3 (ref 0.1–0.9)
MONOCYTES # BLD AUTO: 1.42 10*3/MM3 (ref 0.1–0.9)
MONOCYTES # BLD AUTO: 1.64 10*3/MM3 (ref 0.1–0.9)
MONOCYTES # BLD: 0.35 10*3/MM3 (ref 0.1–0.9)
MONOCYTES # BLD: 0.66 10*3/MM3 (ref 0.1–0.9)
MONOCYTES # BLD: 0.87 10*3/MM3 (ref 0.1–0.9)
MONOCYTES # BLD: 1.03 10*3/MM3 (ref 0.1–0.9)
MONOCYTES NFR BLD AUTO: 11.6 % (ref 5–12)
MONOCYTES NFR BLD AUTO: 12.2 % (ref 5–12)
MONOCYTES NFR BLD AUTO: 15.1 % (ref 5–12)
MONOCYTES NFR BLD AUTO: 3.8 % (ref 5–12)
MONOCYTES NFR BLD AUTO: 6.6 % (ref 5–12)
MONOCYTES NFR BLD AUTO: 7.5 % (ref 5–12)
MONOCYTES NFR FLD: 7 %
MONOS+MACROS NFR FLD: 46 %
NEUTROPHILS # BLD AUTO: 5.89 10*3/MM3 (ref 1.7–7)
NEUTROPHILS # BLD AUTO: 7.4 10*3/MM3 (ref 1.7–7)
NEUTROPHILS # BLD AUTO: 7.54 10*3/MM3 (ref 1.7–7)
NEUTROPHILS # BLD AUTO: 7.67 10*3/MM3 (ref 1.7–7)
NEUTROPHILS NFR BLD AUTO: 10.03 10*3/MM3 (ref 1.7–7)
NEUTROPHILS NFR BLD AUTO: 10.32 10*3/MM3 (ref 1.7–7)
NEUTROPHILS NFR BLD AUTO: 16.47 10*3/MM3 (ref 1.7–7)
NEUTROPHILS NFR BLD AUTO: 4.95 10*3/MM3 (ref 1.7–7)
NEUTROPHILS NFR BLD AUTO: 59.7 % (ref 42.7–76)
NEUTROPHILS NFR BLD AUTO: 6.84 10*3/MM3 (ref 1.7–7)
NEUTROPHILS NFR BLD AUTO: 66.3 % (ref 42.7–76)
NEUTROPHILS NFR BLD AUTO: 7.69 10*3/MM3 (ref 1.7–7)
NEUTROPHILS NFR BLD AUTO: 70.6 % (ref 42.7–76)
NEUTROPHILS NFR BLD AUTO: 78.9 % (ref 42.7–76)
NEUTROPHILS NFR BLD AUTO: 84.7 % (ref 42.7–76)
NEUTROPHILS NFR BLD AUTO: 85.1 % (ref 42.7–76)
NEUTROPHILS NFR BLD MANUAL: 68 % (ref 42.7–76)
NEUTROPHILS NFR BLD MANUAL: 79 % (ref 42.7–76)
NEUTROPHILS NFR BLD MANUAL: 81 % (ref 42.7–76)
NEUTROPHILS NFR BLD MANUAL: 86 % (ref 42.7–76)
NEUTROPHILS NFR FLD MANUAL: 32 %
NITRITE UR QL STRIP: POSITIVE
NOROVIRUS GI+II RNA STL QL NAA+NON-PROBE: NOT DETECTED
NRBC BLD AUTO-RTO: 0.2 /100 WBC (ref 0–0.2)
NT-PROBNP SERPL-MCNC: ABNORMAL PG/ML (ref 0–900)
NUC CELL # FLD: 87 /MM3
OPIATES UR QL: NEGATIVE
OSMOLALITY SERPL: 295 MOSM/KG (ref 280–301)
OSMOLALITY UR: 300 MOSM/KG
OXYCODONE UR QL SCN: NEGATIVE
P SHIGELLOIDES DNA STL QL NAA+NON-PROBE: NOT DETECTED
PCO2 BLDA: 37.2 MM HG (ref 35–45)
PCO2 BLDV: 27 MM HG (ref 41–51)
PCP UR QL SCN: NEGATIVE
PH BLDA: 7.43 PH UNITS (ref 7.35–7.45)
PH BLDV: 7.51 PH UNITS (ref 7.31–7.41)
PH UR STRIP.AUTO: <=5 [PH] (ref 5–8)
PHOSPHATE SERPL-MCNC: 1.4 MG/DL (ref 2.5–4.5)
PHOSPHATE SERPL-MCNC: 2.5 MG/DL (ref 2.5–4.5)
PHOSPHATE SERPL-MCNC: 2.6 MG/DL (ref 2.5–4.5)
PHOSPHATE SERPL-MCNC: 2.6 MG/DL (ref 2.5–4.5)
PHOSPHATE SERPL-MCNC: 2.8 MG/DL (ref 2.5–4.5)
PHOSPHATE SERPL-MCNC: 2.9 MG/DL (ref 2.5–4.5)
PHOSPHATE SERPL-MCNC: 3 MG/DL (ref 2.5–4.5)
PHOSPHATE SERPL-MCNC: 3.1 MG/DL (ref 2.5–4.5)
PHOSPHATE SERPL-MCNC: 3.6 MG/DL (ref 2.5–4.5)
PHOSPHATE SERPL-MCNC: 3.6 MG/DL (ref 2.5–4.5)
PHOSPHATE SERPL-MCNC: 5.2 MG/DL (ref 2.5–4.5)
PLAT MORPH BLD: NORMAL
PLATELET # BLD AUTO: 34 10*3/MM3 (ref 140–450)
PLATELET # BLD AUTO: 35 10*3/MM3 (ref 140–450)
PLATELET # BLD AUTO: 41 10*3/MM3 (ref 140–450)
PLATELET # BLD AUTO: 42 10*3/MM3 (ref 140–450)
PLATELET # BLD AUTO: 42 10*3/MM3 (ref 140–450)
PLATELET # BLD AUTO: 44 10*3/MM3 (ref 140–450)
PLATELET # BLD AUTO: 47 10*3/MM3 (ref 140–450)
PLATELET # BLD AUTO: 48 10*3/MM3 (ref 140–450)
PLATELET # BLD AUTO: 53 10*3/MM3 (ref 140–450)
PLATELET # BLD AUTO: 53 10*3/MM3 (ref 140–450)
PLATELET # BLD AUTO: 54 10*3/MM3 (ref 140–450)
PLATELET # BLD AUTO: 67 10*3/MM3 (ref 140–450)
PLATELET # BLD AUTO: 71 10*3/MM3 (ref 140–450)
PLATELET # BLD AUTO: 83 10*3/MM3 (ref 140–450)
PLATELETS.RETICULATED NFR BLD AUTO: 12.8 % (ref 0.9–6.5)
PLATELETS.RETICULATED NFR BLD AUTO: 17 % (ref 0.9–6.5)
PLATELETS.RETICULATED NFR BLD AUTO: 20.8 % (ref 0.9–6.5)
PLATELETS.RETICULATED NFR BLD AUTO: 20.9 % (ref 0.9–6.5)
PMV BLD AUTO: 10.8 FL (ref 6–12)
PMV BLD AUTO: 11.7 FL (ref 6–12)
PMV BLD AUTO: 11.9 FL (ref 6–12)
PMV BLD AUTO: 12 FL (ref 6–12)
PMV BLD AUTO: 12.3 FL (ref 6–12)
PMV BLD AUTO: 12.6 FL (ref 6–12)
PMV BLD AUTO: 13.1 FL (ref 6–12)
PMV BLD AUTO: 13.6 FL (ref 6–12)
PO2 BLDA: 114.3 MM HG (ref 80–100)
PO2 BLDV: 43.4 MM HG (ref 35–45)
POIKILOCYTOSIS BLD QL SMEAR: ABNORMAL
POIKILOCYTOSIS BLD QL SMEAR: ABNORMAL
POLYCHROMASIA BLD QL SMEAR: ABNORMAL
POTASSIUM SERPL-SCNC: 2.8 MMOL/L (ref 3.5–5.2)
POTASSIUM SERPL-SCNC: 2.9 MMOL/L (ref 3.5–5.2)
POTASSIUM SERPL-SCNC: 3 MMOL/L (ref 3.5–5.2)
POTASSIUM SERPL-SCNC: 3.2 MMOL/L (ref 3.5–5.2)
POTASSIUM SERPL-SCNC: 3.4 MMOL/L (ref 3.5–5.2)
POTASSIUM SERPL-SCNC: 3.5 MMOL/L (ref 3.5–5.2)
POTASSIUM SERPL-SCNC: 3.6 MMOL/L (ref 3.5–5.2)
POTASSIUM SERPL-SCNC: 3.7 MMOL/L (ref 3.5–5.2)
POTASSIUM SERPL-SCNC: 4 MMOL/L (ref 3.5–5.2)
POTASSIUM SERPL-SCNC: 4.1 MMOL/L (ref 3.5–5.2)
POTASSIUM SERPL-SCNC: 4.1 MMOL/L (ref 3.5–5.2)
POTASSIUM SERPL-SCNC: 4.2 MMOL/L (ref 3.5–5.2)
POTASSIUM SERPL-SCNC: 4.3 MMOL/L (ref 3.5–5.2)
POTASSIUM SERPL-SCNC: 4.4 MMOL/L (ref 3.5–5.2)
POTASSIUM SERPL-SCNC: 4.7 MMOL/L (ref 3.5–5.2)
POTASSIUM UR-SCNC: 62.9 MMOL/L
PROCALCITONIN SERPL-MCNC: 3.93 NG/ML (ref 0–0.25)
PROCALCITONIN SERPL-MCNC: 7.65 NG/ML (ref 0–0.25)
PROT ?TM UR-MCNC: 587 MG/DL
PROT FLD-MCNC: <1 G/DL
PROT SERPL-MCNC: 5.5 G/DL (ref 6–8.5)
PROT SERPL-MCNC: 5.5 G/DL (ref 6–8.5)
PROT SERPL-MCNC: 5.6 G/DL (ref 6–8.5)
PROT SERPL-MCNC: 5.6 G/DL (ref 6–8.5)
PROT SERPL-MCNC: 5.7 G/DL (ref 6–8.5)
PROT SERPL-MCNC: 5.8 G/DL (ref 6–8.5)
PROT SERPL-MCNC: 5.9 G/DL (ref 6–8.5)
PROT SERPL-MCNC: 6 G/DL (ref 6–8.5)
PROT SERPL-MCNC: 6 G/DL (ref 6–8.5)
PROT SERPL-MCNC: 6.5 G/DL (ref 6–8.5)
PROT SERPL-MCNC: 6.8 G/DL (ref 6–8.5)
PROT UR QL STRIP: ABNORMAL
PROT/CREAT UR: 2811.3 MG/G CREA (ref 0–200)
PROTHROMBIN TIME: 19.2 SECONDS (ref 11.7–14.2)
PROTHROMBIN TIME: 19.5 SECONDS (ref 11.7–14.2)
PROTHROMBIN TIME: 19.7 SECONDS (ref 11.7–14.2)
PROTHROMBIN TIME: 20.2 SECONDS (ref 11.7–14.2)
PROTHROMBIN TIME: 20.3 SECONDS (ref 11.7–14.2)
PROTHROMBIN TIME: 20.3 SECONDS (ref 11.7–14.2)
PROTHROMBIN TIME: 20.9 SECONDS (ref 11.7–14.2)
PROTHROMBIN TIME: 21 SECONDS (ref 11.7–14.2)
PTH-INTACT SERPL-MCNC: 351 PG/ML (ref 15–65)
QT INTERVAL: 331 MS
QT INTERVAL: 401 MS
QT INTERVAL: 413 MS
QT INTERVAL: 415 MS
QTC INTERVAL: 420 MS
QTC INTERVAL: 449 MS
QTC INTERVAL: 504 MS
QTC INTERVAL: 519 MS
RBC # BLD AUTO: 1.94 10*6/MM3 (ref 4.14–5.8)
RBC # BLD AUTO: 2.14 10*6/MM3 (ref 4.14–5.8)
RBC # BLD AUTO: 2.23 10*6/MM3 (ref 4.14–5.8)
RBC # BLD AUTO: 2.28 10*6/MM3 (ref 4.14–5.8)
RBC # BLD AUTO: 2.3 10*6/MM3 (ref 4.14–5.8)
RBC # BLD AUTO: 2.36 10*6/MM3 (ref 4.14–5.8)
RBC # BLD AUTO: 2.42 10*6/MM3 (ref 4.14–5.8)
RBC # BLD AUTO: 2.43 10*6/MM3 (ref 4.14–5.8)
RBC # BLD AUTO: 2.45 10*6/MM3 (ref 4.14–5.8)
RBC # BLD AUTO: 2.89 10*6/MM3 (ref 4.14–5.8)
RBC # FLD AUTO: 431 /MM3
RBC # UR STRIP: ABNORMAL /HPF
RBC MORPH BLD: NORMAL
REF LAB TEST METHOD: ABNORMAL
REF LAB TEST METHOD: NORMAL
RETICS # AUTO: 0.07 10*6/MM3 (ref 0.02–0.13)
RETICS/RBC NFR AUTO: 3.07 % (ref 0.7–1.9)
RVA RNA STL QL NAA+NON-PROBE: NOT DETECTED
S ENT+BONG DNA STL QL NAA+NON-PROBE: NOT DETECTED
SAO2 % BLDCOA: 98.6 % (ref 92–98.5)
SAO2 % BLDCOV: 84.7 % (ref 45–75)
SAPO I+II+IV+V RNA STL QL NAA+NON-PROBE: NOT DETECTED
SARS-COV-2 RNA RESP QL NAA+PROBE: NOT DETECTED
SET MECH RESP RATE: 20
SET MECH RESP RATE: 20
SHIGELLA SP+EIEC IPAH ST NAA+NON-PROBE: NOT DETECTED
SINUS: 3.2 CM
SODIUM SERPL-SCNC: 129 MMOL/L (ref 136–145)
SODIUM SERPL-SCNC: 131 MMOL/L (ref 136–145)
SODIUM SERPL-SCNC: 135 MMOL/L (ref 136–145)
SODIUM SERPL-SCNC: 138 MMOL/L (ref 136–145)
SODIUM SERPL-SCNC: 138 MMOL/L (ref 136–145)
SODIUM SERPL-SCNC: 140 MMOL/L (ref 136–145)
SODIUM SERPL-SCNC: 141 MMOL/L (ref 136–145)
SODIUM SERPL-SCNC: 142 MMOL/L (ref 136–145)
SODIUM SERPL-SCNC: 142 MMOL/L (ref 136–145)
SODIUM SERPL-SCNC: 146 MMOL/L (ref 136–145)
SODIUM SERPL-SCNC: 148 MMOL/L (ref 136–145)
SODIUM UR-SCNC: 35 MMOL/L
SP GR UR STRIP: 1.02 (ref 1–1.03)
SQUAMOUS #/AREA URNS HPF: ABNORMAL /HPF
STJ: 2.9 CM
TARGETS BLD QL SMEAR: ABNORMAL
TARGETS BLD QL SMEAR: ABNORMAL
TIBC SERPL-MCNC: 113 MCG/DL (ref 298–536)
TRANSFERRIN SERPL-MCNC: 76 MG/DL (ref 200–360)
TRICYCLICS UR QL SCN: NEGATIVE
TROPONIN T % DELTA: -19
TROPONIN T NUMERIC DELTA: -191 NG/L
TROPONIN T SERPL HS-MCNC: 1013 NG/L
URATE SERPL-MCNC: 13.6 MG/DL (ref 3.4–7)
URATE SERPL-MCNC: 14.9 MG/DL (ref 3.4–7)
URATE SERPL-MCNC: 15 MG/DL (ref 3.4–7)
UROBILINOGEN UR QL STRIP: ABNORMAL
V CHOL+PARA+VUL DNA STL QL NAA+NON-PROBE: NOT DETECTED
V CHOLERAE DNA STL QL NAA+NON-PROBE: NOT DETECTED
VARIANT LYMPHS NFR BLD MANUAL: 1 % (ref 0–5)
VARIANT LYMPHS NFR BLD MANUAL: 1 % (ref 0–5)
VARIANT LYMPHS NFR BLD MANUAL: 10 % (ref 19.6–45.3)
VARIANT LYMPHS NFR BLD MANUAL: 10 % (ref 19.6–45.3)
VARIANT LYMPHS NFR BLD MANUAL: 18 % (ref 19.6–45.3)
VARIANT LYMPHS NFR BLD MANUAL: 9 % (ref 19.6–45.3)
VIT B12 BLD-MCNC: >2000 PG/ML (ref 211–946)
WBC # UR STRIP: ABNORMAL /HPF
WBC MORPH BLD: NORMAL
WBC NRBC COR # BLD AUTO: 11.6 10*3/MM3 (ref 3.4–10.8)
WBC NRBC COR # BLD AUTO: 12.17 10*3/MM3 (ref 3.4–10.8)
WBC NRBC COR # BLD AUTO: 14.18 10*3/MM3 (ref 3.4–10.8)
WBC NRBC COR # BLD AUTO: 19.33 10*3/MM3 (ref 3.4–10.8)
WBC NRBC COR # BLD AUTO: 8.29 10*3/MM3 (ref 3.4–10.8)
WBC NRBC COR # BLD AUTO: 8.66 10*3/MM3 (ref 3.4–10.8)
WBC NRBC COR # BLD AUTO: 8.67 10*3/MM3 (ref 3.4–10.8)
WBC NRBC COR # BLD AUTO: 8.77 10*3/MM3 (ref 3.4–10.8)
WBC NRBC COR # BLD AUTO: 9.37 10*3/MM3 (ref 3.4–10.8)
WBC NRBC COR # BLD AUTO: 9.47 10*3/MM3 (ref 3.4–10.8)
Y ENTEROCOL DNA STL QL NAA+NON-PROBE: NOT DETECTED

## 2025-01-01 PROCEDURE — 25010000002 LORAZEPAM PER 2 MG

## 2025-01-01 PROCEDURE — 25010000002 FUROSEMIDE PER 20 MG: Performed by: INTERNAL MEDICINE

## 2025-01-01 PROCEDURE — 84466 ASSAY OF TRANSFERRIN: CPT | Performed by: INTERNAL MEDICINE

## 2025-01-01 PROCEDURE — 83615 LACTATE (LD) (LDH) ENZYME: CPT | Performed by: INTERNAL MEDICINE

## 2025-01-01 PROCEDURE — 25010000002 POTASSIUM CHLORIDE 10 MEQ/100ML SOLUTION: Performed by: INTERNAL MEDICINE

## 2025-01-01 PROCEDURE — 93010 ELECTROCARDIOGRAM REPORT: CPT | Performed by: INTERNAL MEDICINE

## 2025-01-01 PROCEDURE — 99232 SBSQ HOSP IP/OBS MODERATE 35: CPT | Performed by: INTERNAL MEDICINE

## 2025-01-01 PROCEDURE — 85025 COMPLETE CBC W/AUTO DIFF WBC: CPT | Performed by: HOSPITALIST

## 2025-01-01 PROCEDURE — 25010000002 VASOPRESSIN 20 UNIT/ML SOLUTION: Performed by: INTERNAL MEDICINE

## 2025-01-01 PROCEDURE — 36415 COLL VENOUS BLD VENIPUNCTURE: CPT | Performed by: EMERGENCY MEDICINE

## 2025-01-01 PROCEDURE — 63710000001 INSULIN REGULAR HUMAN PER 5 UNITS

## 2025-01-01 PROCEDURE — 82746 ASSAY OF FOLIC ACID SERUM: CPT | Performed by: INTERNAL MEDICINE

## 2025-01-01 PROCEDURE — 25010000002 THIAMINE PER 100 MG: Performed by: HOSPITALIST

## 2025-01-01 PROCEDURE — 99232 SBSQ HOSP IP/OBS MODERATE 35: CPT | Performed by: STUDENT IN AN ORGANIZED HEALTH CARE EDUCATION/TRAINING PROGRAM

## 2025-01-01 PROCEDURE — 85055 RETICULATED PLATELET ASSAY: CPT | Performed by: INTERNAL MEDICINE

## 2025-01-01 PROCEDURE — 25010000002 MORPHINE PER 10 MG: Performed by: INTERNAL MEDICINE

## 2025-01-01 PROCEDURE — 25010000002 LORAZEPAM PER 2 MG: Performed by: HOSPITALIST

## 2025-01-01 PROCEDURE — 93005 ELECTROCARDIOGRAM TRACING: CPT | Performed by: EMERGENCY MEDICINE

## 2025-01-01 PROCEDURE — 85610 PROTHROMBIN TIME: CPT | Performed by: INTERNAL MEDICINE

## 2025-01-01 PROCEDURE — 85007 BL SMEAR W/DIFF WBC COUNT: CPT | Performed by: EMERGENCY MEDICINE

## 2025-01-01 PROCEDURE — 25010000002 THIAMINE PER 100 MG

## 2025-01-01 PROCEDURE — 82948 REAGENT STRIP/BLOOD GLUCOSE: CPT

## 2025-01-01 PROCEDURE — 83735 ASSAY OF MAGNESIUM: CPT | Performed by: HOSPITALIST

## 2025-01-01 PROCEDURE — 25510000001 PERFLUTREN 6.52 MG/ML SUSPENSION 2 ML VIAL: Performed by: HOSPITALIST

## 2025-01-01 PROCEDURE — 25010000002 FOLIC ACID 5 MG/ML SOLUTION 10 ML VIAL

## 2025-01-01 PROCEDURE — 87205 SMEAR GRAM STAIN: CPT | Performed by: INTERNAL MEDICINE

## 2025-01-01 PROCEDURE — 84157 ASSAY OF PROTEIN OTHER: CPT | Performed by: INTERNAL MEDICINE

## 2025-01-01 PROCEDURE — 83735 ASSAY OF MAGNESIUM: CPT | Performed by: EMERGENCY MEDICINE

## 2025-01-01 PROCEDURE — 82330 ASSAY OF CALCIUM: CPT

## 2025-01-01 PROCEDURE — 84132 ASSAY OF SERUM POTASSIUM: CPT | Performed by: HOSPITALIST

## 2025-01-01 PROCEDURE — 25010000002 LORAZEPAM PER 2 MG: Performed by: INTERNAL MEDICINE

## 2025-01-01 PROCEDURE — 80053 COMPREHEN METABOLIC PANEL: CPT | Performed by: HOSPITALIST

## 2025-01-01 PROCEDURE — 25010000002 CEFTRIAXONE PER 250 MG: Performed by: INTERNAL MEDICINE

## 2025-01-01 PROCEDURE — 82728 ASSAY OF FERRITIN: CPT | Performed by: INTERNAL MEDICINE

## 2025-01-01 PROCEDURE — 99233 SBSQ HOSP IP/OBS HIGH 50: CPT | Performed by: INTERNAL MEDICINE

## 2025-01-01 PROCEDURE — 25010000002 METHYLPREDNISOLONE PER 125 MG: Performed by: INTERNAL MEDICINE

## 2025-01-01 PROCEDURE — 25510000002 DIATRIZOATE MEGLUMINE & SODIUM PER 1 ML: Performed by: INTERNAL MEDICINE

## 2025-01-01 PROCEDURE — 84550 ASSAY OF BLOOD/URIC ACID: CPT | Performed by: HOSPITALIST

## 2025-01-01 PROCEDURE — 74018 RADEX ABDOMEN 1 VIEW: CPT

## 2025-01-01 PROCEDURE — 74176 CT ABD & PELVIS W/O CONTRAST: CPT

## 2025-01-01 PROCEDURE — 80053 COMPREHEN METABOLIC PANEL: CPT | Performed by: EMERGENCY MEDICINE

## 2025-01-01 PROCEDURE — 83935 ASSAY OF URINE OSMOLALITY: CPT | Performed by: HOSPITALIST

## 2025-01-01 PROCEDURE — 99291 CRITICAL CARE FIRST HOUR: CPT

## 2025-01-01 PROCEDURE — 25010000002 POTASSIUM CHLORIDE PER 2 MEQ: Performed by: INTERNAL MEDICINE

## 2025-01-01 PROCEDURE — 25010000002 ONDANSETRON PER 1 MG: Performed by: EMERGENCY MEDICINE

## 2025-01-01 PROCEDURE — 83970 ASSAY OF PARATHORMONE: CPT

## 2025-01-01 PROCEDURE — 99235 HOSP IP/OBS SAME DATE MOD 70: CPT | Performed by: INTERNAL MEDICINE

## 2025-01-01 PROCEDURE — 84100 ASSAY OF PHOSPHORUS: CPT | Performed by: HOSPITALIST

## 2025-01-01 PROCEDURE — 82248 BILIRUBIN DIRECT: CPT | Performed by: INTERNAL MEDICINE

## 2025-01-01 PROCEDURE — 82607 VITAMIN B-12: CPT | Performed by: INTERNAL MEDICINE

## 2025-01-01 PROCEDURE — 25010000002 VANCOMYCIN 10 G RECONSTITUTED SOLUTION: Performed by: EMERGENCY MEDICINE

## 2025-01-01 PROCEDURE — 83930 ASSAY OF BLOOD OSMOLALITY: CPT | Performed by: HOSPITALIST

## 2025-01-01 PROCEDURE — 87070 CULTURE OTHR SPECIMN AEROBIC: CPT | Performed by: INTERNAL MEDICINE

## 2025-01-01 PROCEDURE — 71045 X-RAY EXAM CHEST 1 VIEW: CPT

## 2025-01-01 PROCEDURE — 25010000002 ALBUMIN HUMAN 25% PER 50 ML: Performed by: INTERNAL MEDICINE

## 2025-01-01 PROCEDURE — 85730 THROMBOPLASTIN TIME PARTIAL: CPT | Performed by: EMERGENCY MEDICINE

## 2025-01-01 PROCEDURE — 25010000002 CEFTRIAXONE PER 250 MG: Performed by: HOSPITALIST

## 2025-01-01 PROCEDURE — 84133 ASSAY OF URINE POTASSIUM: CPT | Performed by: HOSPITALIST

## 2025-01-01 PROCEDURE — 94761 N-INVAS EAR/PLS OXIMETRY MLT: CPT

## 2025-01-01 PROCEDURE — 83605 ASSAY OF LACTIC ACID: CPT | Performed by: EMERGENCY MEDICINE

## 2025-01-01 PROCEDURE — 99223 1ST HOSP IP/OBS HIGH 75: CPT | Performed by: STUDENT IN AN ORGANIZED HEALTH CARE EDUCATION/TRAINING PROGRAM

## 2025-01-01 PROCEDURE — 85610 PROTHROMBIN TIME: CPT | Performed by: EMERGENCY MEDICINE

## 2025-01-01 PROCEDURE — 89051 BODY FLUID CELL COUNT: CPT | Performed by: INTERNAL MEDICINE

## 2025-01-01 PROCEDURE — 25010000002 MAGNESIUM SULFATE 2 GM/50ML SOLUTION: Performed by: INTERNAL MEDICINE

## 2025-01-01 PROCEDURE — 82570 ASSAY OF URINE CREATININE: CPT | Performed by: HOSPITALIST

## 2025-01-01 PROCEDURE — 25810000003 SODIUM CHLORIDE 0.9 % SOLUTION: Performed by: INTERNAL MEDICINE

## 2025-01-01 PROCEDURE — 87449 NOS EACH ORGANISM AG IA: CPT | Performed by: EMERGENCY MEDICINE

## 2025-01-01 PROCEDURE — 84145 PROCALCITONIN (PCT): CPT | Performed by: EMERGENCY MEDICINE

## 2025-01-01 PROCEDURE — 25010000002 THIAMINE PER 100 MG: Performed by: EMERGENCY MEDICINE

## 2025-01-01 PROCEDURE — 82140 ASSAY OF AMMONIA: CPT | Performed by: INTERNAL MEDICINE

## 2025-01-01 PROCEDURE — 83010 ASSAY OF HAPTOGLOBIN QUANT: CPT | Performed by: INTERNAL MEDICINE

## 2025-01-01 PROCEDURE — 82803 BLOOD GASES ANY COMBINATION: CPT

## 2025-01-01 PROCEDURE — 25010000002 POTASSIUM CHLORIDE PER 2 MEQ: Performed by: HOSPITALIST

## 2025-01-01 PROCEDURE — 85730 THROMBOPLASTIN TIME PARTIAL: CPT | Performed by: INTERNAL MEDICINE

## 2025-01-01 PROCEDURE — 87493 C DIFF AMPLIFIED PROBE: CPT | Performed by: EMERGENCY MEDICINE

## 2025-01-01 PROCEDURE — 82077 ASSAY SPEC XCP UR&BREATH IA: CPT | Performed by: EMERGENCY MEDICINE

## 2025-01-01 PROCEDURE — 87015 SPECIMEN INFECT AGNT CONCNTJ: CPT | Performed by: INTERNAL MEDICINE

## 2025-01-01 PROCEDURE — 84550 ASSAY OF BLOOD/URIC ACID: CPT | Performed by: INTERNAL MEDICINE

## 2025-01-01 PROCEDURE — 94799 UNLISTED PULMONARY SVC/PX: CPT

## 2025-01-01 PROCEDURE — 0W9G3ZZ DRAINAGE OF PERITONEAL CAVITY, PERCUTANEOUS APPROACH: ICD-10-PCS | Performed by: INTERNAL MEDICINE

## 2025-01-01 PROCEDURE — 85384 FIBRINOGEN ACTIVITY: CPT | Performed by: INTERNAL MEDICINE

## 2025-01-01 PROCEDURE — 25010000003 DEXTROSE 5 % SOLUTION: Performed by: INTERNAL MEDICINE

## 2025-01-01 PROCEDURE — 85007 BL SMEAR W/DIFF WBC COUNT: CPT | Performed by: HOSPITALIST

## 2025-01-01 PROCEDURE — 93005 ELECTROCARDIOGRAM TRACING: CPT | Performed by: HOSPITALIST

## 2025-01-01 PROCEDURE — 83880 ASSAY OF NATRIURETIC PEPTIDE: CPT | Performed by: EMERGENCY MEDICINE

## 2025-01-01 PROCEDURE — 36600 WITHDRAWAL OF ARTERIAL BLOOD: CPT | Performed by: INTERNAL MEDICINE

## 2025-01-01 PROCEDURE — 51702 INSERT TEMP BLADDER CATH: CPT

## 2025-01-01 PROCEDURE — 99222 1ST HOSP IP/OBS MODERATE 55: CPT | Performed by: INTERNAL MEDICINE

## 2025-01-01 PROCEDURE — 84484 ASSAY OF TROPONIN QUANT: CPT | Performed by: EMERGENCY MEDICINE

## 2025-01-01 PROCEDURE — 25010000002 LIDOCAINE 1 % SOLUTION: Performed by: RADIOLOGY

## 2025-01-01 PROCEDURE — 02HV33Z INSERTION OF INFUSION DEVICE INTO SUPERIOR VENA CAVA, PERCUTANEOUS APPROACH: ICD-10-PCS | Performed by: INTERNAL MEDICINE

## 2025-01-01 PROCEDURE — 25010000002 ONDANSETRON PER 1 MG

## 2025-01-01 PROCEDURE — 25010000002 MAGNESIUM SULFATE IN D5W 1G/100ML (PREMIX) 1-5 GM/100ML-% SOLUTION: Performed by: INTERNAL MEDICINE

## 2025-01-01 PROCEDURE — 25010000002 CEFTRIAXONE PER 250 MG: Performed by: STUDENT IN AN ORGANIZED HEALTH CARE EDUCATION/TRAINING PROGRAM

## 2025-01-01 PROCEDURE — 25010000002 POTASSIUM CHLORIDE 10 MEQ/100ML SOLUTION: Performed by: HOSPITALIST

## 2025-01-01 PROCEDURE — 25010000002 MAGNESIUM SULFATE 2 GM/50ML SOLUTION: Performed by: HOSPITALIST

## 2025-01-01 PROCEDURE — 85610 PROTHROMBIN TIME: CPT

## 2025-01-01 PROCEDURE — 86022 PLATELET ANTIBODIES: CPT | Performed by: INTERNAL MEDICINE

## 2025-01-01 PROCEDURE — P9047 ALBUMIN (HUMAN), 25%, 50ML: HCPCS

## 2025-01-01 PROCEDURE — 25810000003 SODIUM CHLORIDE 0.9 % SOLUTION: Performed by: EMERGENCY MEDICINE

## 2025-01-01 PROCEDURE — 25010000002 FENTANYL CITRATE (PF) 50 MCG/ML SOLUTION

## 2025-01-01 PROCEDURE — 25010000002 GLYCOPYRROLATE 0.2 MG/ML SOLUTION: Performed by: INTERNAL MEDICINE

## 2025-01-01 PROCEDURE — 85007 BL SMEAR W/DIFF WBC COUNT: CPT | Performed by: INTERNAL MEDICINE

## 2025-01-01 PROCEDURE — 82140 ASSAY OF AMMONIA: CPT | Performed by: HOSPITALIST

## 2025-01-01 PROCEDURE — 87636 SARSCOV2 & INF A&B AMP PRB: CPT | Performed by: EMERGENCY MEDICINE

## 2025-01-01 PROCEDURE — 25010000002 FOLIC ACID 5 MG/ML SOLUTION 10 ML VIAL: Performed by: EMERGENCY MEDICINE

## 2025-01-01 PROCEDURE — 82803 BLOOD GASES ANY COMBINATION: CPT | Performed by: INTERNAL MEDICINE

## 2025-01-01 PROCEDURE — 93306 TTE W/DOPPLER COMPLETE: CPT

## 2025-01-01 PROCEDURE — 85025 COMPLETE CBC W/AUTO DIFF WBC: CPT | Performed by: EMERGENCY MEDICINE

## 2025-01-01 PROCEDURE — 81001 URINALYSIS AUTO W/SCOPE: CPT | Performed by: EMERGENCY MEDICINE

## 2025-01-01 PROCEDURE — 84156 ASSAY OF PROTEIN URINE: CPT | Performed by: HOSPITALIST

## 2025-01-01 PROCEDURE — 87040 BLOOD CULTURE FOR BACTERIA: CPT | Performed by: EMERGENCY MEDICINE

## 2025-01-01 PROCEDURE — 84300 ASSAY OF URINE SODIUM: CPT | Performed by: HOSPITALIST

## 2025-01-01 PROCEDURE — 25010000002 CEFEPIME PER 500 MG: Performed by: EMERGENCY MEDICINE

## 2025-01-01 PROCEDURE — 25810000003 LACTATED RINGERS PER 1000 ML: Performed by: EMERGENCY MEDICINE

## 2025-01-01 PROCEDURE — 93005 ELECTROCARDIOGRAM TRACING: CPT | Performed by: INTERNAL MEDICINE

## 2025-01-01 PROCEDURE — 80307 DRUG TEST PRSMV CHEM ANLYZR: CPT | Performed by: EMERGENCY MEDICINE

## 2025-01-01 PROCEDURE — 85362 FIBRIN DEGRADATION PRODUCTS: CPT | Performed by: INTERNAL MEDICINE

## 2025-01-01 PROCEDURE — 93306 TTE W/DOPPLER COMPLETE: CPT | Performed by: INTERNAL MEDICINE

## 2025-01-01 PROCEDURE — P9047 ALBUMIN (HUMAN), 25%, 50ML: HCPCS | Performed by: INTERNAL MEDICINE

## 2025-01-01 PROCEDURE — 82550 ASSAY OF CK (CPK): CPT | Performed by: INTERNAL MEDICINE

## 2025-01-01 PROCEDURE — 0W9G3ZX DRAINAGE OF PERITONEAL CAVITY, PERCUTANEOUS APPROACH, DIAGNOSTIC: ICD-10-PCS | Performed by: RADIOLOGY

## 2025-01-01 PROCEDURE — 85025 COMPLETE CBC W/AUTO DIFF WBC: CPT | Performed by: INTERNAL MEDICINE

## 2025-01-01 PROCEDURE — 87102 FUNGUS ISOLATION CULTURE: CPT | Performed by: INTERNAL MEDICINE

## 2025-01-01 PROCEDURE — 82306 VITAMIN D 25 HYDROXY: CPT

## 2025-01-01 PROCEDURE — 94760 N-INVAS EAR/PLS OXIMETRY 1: CPT

## 2025-01-01 PROCEDURE — 85046 RETICYTE/HGB CONCENTRATE: CPT | Performed by: INTERNAL MEDICINE

## 2025-01-01 PROCEDURE — 82010 KETONE BODYS QUAN: CPT

## 2025-01-01 PROCEDURE — 99231 SBSQ HOSP IP/OBS SF/LOW 25: CPT | Performed by: INTERNAL MEDICINE

## 2025-01-01 PROCEDURE — 83690 ASSAY OF LIPASE: CPT | Performed by: EMERGENCY MEDICINE

## 2025-01-01 PROCEDURE — 82042 OTHER SOURCE ALBUMIN QUAN EA: CPT | Performed by: INTERNAL MEDICINE

## 2025-01-01 PROCEDURE — 82436 ASSAY OF URINE CHLORIDE: CPT | Performed by: HOSPITALIST

## 2025-01-01 PROCEDURE — 83540 ASSAY OF IRON: CPT | Performed by: INTERNAL MEDICINE

## 2025-01-01 PROCEDURE — 25010000002 ALBUMIN HUMAN 25% PER 50 ML

## 2025-01-01 PROCEDURE — 76942 ECHO GUIDE FOR BIOPSY: CPT

## 2025-01-01 PROCEDURE — 84145 PROCALCITONIN (PCT): CPT | Performed by: INTERNAL MEDICINE

## 2025-01-01 PROCEDURE — 87507 IADNA-DNA/RNA PROBE TQ 12-25: CPT | Performed by: EMERGENCY MEDICINE

## 2025-01-01 RX ORDER — HALOPERIDOL 2 MG/ML
1 SOLUTION ORAL EVERY 4 HOURS PRN
Status: DISCONTINUED | OUTPATIENT
Start: 2025-01-01 | End: 2025-01-01

## 2025-01-01 RX ORDER — HYDROMORPHONE HYDROCHLORIDE 1 MG/ML
0.5 INJECTION, SOLUTION INTRAMUSCULAR; INTRAVENOUS; SUBCUTANEOUS
Refills: 0 | Status: DISCONTINUED | OUTPATIENT
Start: 2025-01-01 | End: 2025-01-01 | Stop reason: HOSPADM

## 2025-01-01 RX ORDER — HYDROMORPHONE HYDROCHLORIDE 2 MG/ML
1.5 INJECTION, SOLUTION INTRAMUSCULAR; INTRAVENOUS; SUBCUTANEOUS
Status: DISCONTINUED | OUTPATIENT
Start: 2025-01-01 | End: 2025-01-01 | Stop reason: HOSPADM

## 2025-01-01 RX ORDER — POTASSIUM CHLORIDE 7.45 MG/ML
10 INJECTION INTRAVENOUS
Status: COMPLETED | OUTPATIENT
Start: 2025-01-01 | End: 2025-01-01

## 2025-01-01 RX ORDER — LORAZEPAM 2 MG/ML
2 INJECTION INTRAMUSCULAR
Status: CANCELLED | OUTPATIENT
Start: 2025-01-01 | End: 2025-05-01

## 2025-01-01 RX ORDER — MORPHINE SULFATE 20 MG/ML
10 SOLUTION ORAL
Refills: 0 | Status: DISCONTINUED | OUTPATIENT
Start: 2025-01-01 | End: 2025-01-01 | Stop reason: HOSPADM

## 2025-01-01 RX ORDER — SODIUM CHLORIDE 0.9 % (FLUSH) 0.9 %
10 SYRINGE (ML) INJECTION EVERY 12 HOURS SCHEDULED
Status: CANCELLED | OUTPATIENT
Start: 2025-01-01

## 2025-01-01 RX ORDER — LORAZEPAM 2 MG/ML
0.5 INJECTION INTRAMUSCULAR
Status: CANCELLED | OUTPATIENT
Start: 2025-01-01 | End: 2025-05-01

## 2025-01-01 RX ORDER — SODIUM CHLORIDE 9 MG/ML
100 INJECTION, SOLUTION INTRAVENOUS CONTINUOUS
Status: ACTIVE | OUTPATIENT
Start: 2025-01-01 | End: 2025-01-01

## 2025-01-01 RX ORDER — NOREPINEPHRINE BITARTRATE 0.03 MG/ML
.02-.3 INJECTION, SOLUTION INTRAVENOUS
Status: DISCONTINUED | OUTPATIENT
Start: 2025-01-01 | End: 2025-01-01

## 2025-01-01 RX ORDER — MORPHINE SULFATE 10 MG/ML
6 INJECTION, SOLUTION INTRAMUSCULAR; INTRAVENOUS
Status: DISCONTINUED | OUTPATIENT
Start: 2025-01-01 | End: 2025-01-01 | Stop reason: HOSPADM

## 2025-01-01 RX ORDER — DIPHENOXYLATE HYDROCHLORIDE AND ATROPINE SULFATE 2.5; .025 MG/1; MG/1
1 TABLET ORAL
Status: DISCONTINUED | OUTPATIENT
Start: 2025-01-01 | End: 2025-01-01

## 2025-01-01 RX ORDER — THIAMINE HYDROCHLORIDE 100 MG/ML
200 INJECTION, SOLUTION INTRAMUSCULAR; INTRAVENOUS ONCE
Status: COMPLETED | OUTPATIENT
Start: 2025-01-01 | End: 2025-01-01

## 2025-01-01 RX ORDER — DIPHENHYDRAMINE HCL 25 MG
25 CAPSULE ORAL EVERY 6 HOURS PRN
Status: CANCELLED | OUTPATIENT
Start: 2025-01-01

## 2025-01-01 RX ORDER — LORAZEPAM 1 MG/1
1 TABLET ORAL
Status: ACTIVE | OUTPATIENT
Start: 2025-01-01 | End: 2025-01-01

## 2025-01-01 RX ORDER — VANCOMYCIN HYDROCHLORIDE 125 MG/1
125 CAPSULE ORAL EVERY 6 HOURS SCHEDULED
Status: DISCONTINUED | OUTPATIENT
Start: 2025-01-01 | End: 2025-01-01

## 2025-01-01 RX ORDER — MORPHINE SULFATE 20 MG/ML
5 SOLUTION ORAL
Refills: 0 | Status: DISCONTINUED | OUTPATIENT
Start: 2025-01-01 | End: 2025-01-01 | Stop reason: HOSPADM

## 2025-01-01 RX ORDER — LORAZEPAM 2 MG/ML
2 INJECTION INTRAMUSCULAR EVERY 6 HOURS
Status: DISPENSED | OUTPATIENT
Start: 2025-01-01 | End: 2025-01-01

## 2025-01-01 RX ORDER — SODIUM CHLORIDE 0.9 % (FLUSH) 0.9 %
10 SYRINGE (ML) INJECTION EVERY 12 HOURS SCHEDULED
Status: DISCONTINUED | OUTPATIENT
Start: 2025-01-01 | End: 2025-01-01 | Stop reason: HOSPADM

## 2025-01-01 RX ORDER — GLYCOPYRROLATE 0.2 MG/ML
0.2 INJECTION INTRAMUSCULAR; INTRAVENOUS
Status: CANCELLED | OUTPATIENT
Start: 2025-01-01

## 2025-01-01 RX ORDER — SODIUM CHLORIDE 0.9 % (FLUSH) 0.9 %
10 SYRINGE (ML) INJECTION AS NEEDED
Status: DISCONTINUED | OUTPATIENT
Start: 2025-01-01 | End: 2025-01-01 | Stop reason: HOSPADM

## 2025-01-01 RX ORDER — MORPHINE SULFATE 2 MG/ML
2 INJECTION, SOLUTION INTRAMUSCULAR; INTRAVENOUS
Status: DISCONTINUED | OUTPATIENT
Start: 2025-01-01 | End: 2025-01-01 | Stop reason: HOSPADM

## 2025-01-01 RX ORDER — ALBUMIN (HUMAN) 12.5 G/50ML
100 SOLUTION INTRAVENOUS ONCE
Status: DISCONTINUED | OUTPATIENT
Start: 2025-01-01 | End: 2025-01-01

## 2025-01-01 RX ORDER — HALOPERIDOL 2 MG/ML
1 SOLUTION ORAL EVERY 4 HOURS PRN
Status: CANCELLED | OUTPATIENT
Start: 2025-01-01

## 2025-01-01 RX ORDER — ALBUMIN (HUMAN) 12.5 G/50ML
62.5 SOLUTION INTRAVENOUS ONCE
Status: DISCONTINUED | OUTPATIENT
Start: 2025-01-01 | End: 2025-01-01

## 2025-01-01 RX ORDER — DEXTROSE MONOHYDRATE 25 G/50ML
25 INJECTION, SOLUTION INTRAVENOUS
Status: CANCELLED | OUTPATIENT
Start: 2025-01-01

## 2025-01-01 RX ORDER — MAGNESIUM SULFATE HEPTAHYDRATE 40 MG/ML
2 INJECTION, SOLUTION INTRAVENOUS ONCE
Status: COMPLETED | OUTPATIENT
Start: 2025-01-01 | End: 2025-01-01

## 2025-01-01 RX ORDER — GLYCOPYRROLATE 0.2 MG/ML
0.2 INJECTION INTRAMUSCULAR; INTRAVENOUS
Status: DISCONTINUED | OUTPATIENT
Start: 2025-01-01 | End: 2025-01-01

## 2025-01-01 RX ORDER — LORAZEPAM 2 MG/ML
2 CONCENTRATE ORAL
Status: DISCONTINUED | OUTPATIENT
Start: 2025-01-01 | End: 2025-01-01

## 2025-01-01 RX ORDER — VANCOMYCIN 2 GRAM/500 ML IN 0.9 % SODIUM CHLORIDE INTRAVENOUS
20 ONCE
Status: COMPLETED | OUTPATIENT
Start: 2025-01-01 | End: 2025-01-01

## 2025-01-01 RX ORDER — DIPHENHYDRAMINE HYDROCHLORIDE 50 MG/ML
25 INJECTION, SOLUTION INTRAMUSCULAR; INTRAVENOUS EVERY 6 HOURS PRN
Status: CANCELLED | OUTPATIENT
Start: 2025-01-01

## 2025-01-01 RX ORDER — LORAZEPAM 2 MG/ML
2 CONCENTRATE ORAL
Status: DISCONTINUED | OUTPATIENT
Start: 2025-01-01 | End: 2025-01-01 | Stop reason: HOSPADM

## 2025-01-01 RX ORDER — SODIUM CHLORIDE 9 MG/ML
40 INJECTION, SOLUTION INTRAVENOUS AS NEEDED
Status: DISCONTINUED | OUTPATIENT
Start: 2025-01-01 | End: 2025-01-01 | Stop reason: HOSPADM

## 2025-01-01 RX ORDER — LISINOPRIL 20 MG/1
20 TABLET ORAL DAILY
COMMUNITY
Start: 2025-01-01

## 2025-01-01 RX ORDER — MORPHINE SULFATE 2 MG/ML
2 INJECTION, SOLUTION INTRAMUSCULAR; INTRAVENOUS
Status: CANCELLED | OUTPATIENT
Start: 2025-01-01 | End: 2025-05-01

## 2025-01-01 RX ORDER — LORAZEPAM 2 MG/ML
2 INJECTION INTRAMUSCULAR
Status: DISCONTINUED | OUTPATIENT
Start: 2025-01-01 | End: 2025-01-01 | Stop reason: HOSPADM

## 2025-01-01 RX ORDER — LORAZEPAM 2 MG/ML
1 CONCENTRATE ORAL
Status: CANCELLED | OUTPATIENT
Start: 2025-01-01 | End: 2025-05-01

## 2025-01-01 RX ORDER — LORAZEPAM 2 MG/ML
1 INJECTION INTRAMUSCULAR
Status: DISCONTINUED | OUTPATIENT
Start: 2025-01-01 | End: 2025-01-01

## 2025-01-01 RX ORDER — GLYCOPYRROLATE 0.2 MG/ML
0.2 INJECTION INTRAMUSCULAR; INTRAVENOUS
Status: DISCONTINUED | OUTPATIENT
Start: 2025-01-01 | End: 2025-01-01 | Stop reason: HOSPADM

## 2025-01-01 RX ORDER — MORPHINE SULFATE 20 MG/ML
20 SOLUTION ORAL
Status: DISCONTINUED | OUTPATIENT
Start: 2025-01-01 | End: 2025-01-01

## 2025-01-01 RX ORDER — BUMETANIDE 2 MG/1
2 TABLET ORAL 2 TIMES DAILY
COMMUNITY
Start: 2025-01-01 | End: 2025-09-28

## 2025-01-01 RX ORDER — ALBUMIN (HUMAN) 12.5 G/50ML
12.5 SOLUTION INTRAVENOUS ONCE
OUTPATIENT
Start: 2025-01-01 | End: 2025-01-01

## 2025-01-01 RX ORDER — POTASSIUM CHLORIDE 1.5 G/1.58G
40 POWDER, FOR SOLUTION ORAL EVERY 4 HOURS
Status: COMPLETED | OUTPATIENT
Start: 2025-01-01 | End: 2025-01-01

## 2025-01-01 RX ORDER — LORAZEPAM 2 MG/ML
0.5 INJECTION INTRAMUSCULAR
Status: DISCONTINUED | OUTPATIENT
Start: 2025-01-01 | End: 2025-01-01 | Stop reason: HOSPADM

## 2025-01-01 RX ORDER — POTASSIUM CHLORIDE 29.8 MG/ML
20 INJECTION INTRAVENOUS ONCE
Status: COMPLETED | OUTPATIENT
Start: 2025-01-01 | End: 2025-01-01

## 2025-01-01 RX ORDER — GLYCOPYRROLATE 0.2 MG/ML
0.4 INJECTION INTRAMUSCULAR; INTRAVENOUS
Status: DISCONTINUED | OUTPATIENT
Start: 2025-01-01 | End: 2025-01-01

## 2025-01-01 RX ORDER — ONDANSETRON 2 MG/ML
4 INJECTION INTRAMUSCULAR; INTRAVENOUS EVERY 6 HOURS PRN
Status: CANCELLED | OUTPATIENT
Start: 2025-01-01

## 2025-01-01 RX ORDER — LACTULOSE 10 G/15ML
20 SOLUTION ORAL 3 TIMES DAILY
Status: DISCONTINUED | OUTPATIENT
Start: 2025-01-01 | End: 2025-01-01

## 2025-01-01 RX ORDER — LORAZEPAM 2 MG/ML
1 INJECTION INTRAMUSCULAR
Status: CANCELLED | OUTPATIENT
Start: 2025-01-01 | End: 2025-05-01

## 2025-01-01 RX ORDER — MORPHINE SULFATE 20 MG/ML
20 SOLUTION ORAL
Refills: 0 | Status: DISCONTINUED | OUTPATIENT
Start: 2025-01-01 | End: 2025-01-01 | Stop reason: HOSPADM

## 2025-01-01 RX ORDER — CYCLOBENZAPRINE HCL 10 MG
10 TABLET ORAL 2 TIMES DAILY PRN
COMMUNITY
Start: 2025-01-01

## 2025-01-01 RX ORDER — OXYCODONE HYDROCHLORIDE 5 MG/1
5 TABLET ORAL EVERY 6 HOURS PRN
Refills: 0 | Status: DISCONTINUED | OUTPATIENT
Start: 2025-01-01 | End: 2025-01-01 | Stop reason: HOSPADM

## 2025-01-01 RX ORDER — FUROSEMIDE 10 MG/ML
60 INJECTION INTRAMUSCULAR; INTRAVENOUS ONCE
Status: COMPLETED | OUTPATIENT
Start: 2025-01-01 | End: 2025-01-01

## 2025-01-01 RX ORDER — ALBUMIN (HUMAN) 12.5 G/50ML
25 SOLUTION INTRAVENOUS ONCE
Status: COMPLETED | OUTPATIENT
Start: 2025-01-01 | End: 2025-01-01

## 2025-01-01 RX ORDER — VANCOMYCIN HYDROCHLORIDE 125 MG/1
125 CAPSULE ORAL EVERY 12 HOURS
Status: DISCONTINUED | OUTPATIENT
Start: 2025-04-29 | End: 2025-01-01

## 2025-01-01 RX ORDER — GLYCOPYRROLATE 0.2 MG/ML
0.4 INJECTION INTRAMUSCULAR; INTRAVENOUS
Status: CANCELLED | OUTPATIENT
Start: 2025-01-01

## 2025-01-01 RX ORDER — KETOROLAC TROMETHAMINE 15 MG/ML
15 INJECTION, SOLUTION INTRAMUSCULAR; INTRAVENOUS EVERY 6 HOURS PRN
Status: DISCONTINUED | OUTPATIENT
Start: 2025-01-01 | End: 2025-01-01 | Stop reason: HOSPADM

## 2025-01-01 RX ORDER — MAGNESIUM SULFATE 1 G/100ML
1 INJECTION INTRAVENOUS
Status: COMPLETED | OUTPATIENT
Start: 2025-01-01 | End: 2025-01-01

## 2025-01-01 RX ORDER — DEXTROSE MONOHYDRATE 50 MG/ML
75 INJECTION, SOLUTION INTRAVENOUS CONTINUOUS
Status: ACTIVE | OUTPATIENT
Start: 2025-01-01 | End: 2025-01-01

## 2025-01-01 RX ORDER — LORAZEPAM 2 MG/ML
2 INJECTION INTRAMUSCULAR
Status: DISCONTINUED | OUTPATIENT
Start: 2025-01-01 | End: 2025-01-01

## 2025-01-01 RX ORDER — MORPHINE SULFATE 20 MG/ML
10 SOLUTION ORAL
Refills: 0 | Status: CANCELLED | OUTPATIENT
Start: 2025-01-01 | End: 2025-05-03

## 2025-01-01 RX ORDER — SODIUM CHLORIDE 9 MG/ML
1000 INJECTION, SOLUTION INTRAVENOUS ONCE
Status: COMPLETED | OUTPATIENT
Start: 2025-01-01 | End: 2025-01-01

## 2025-01-01 RX ORDER — HALOPERIDOL 5 MG/ML
1 INJECTION INTRAMUSCULAR EVERY 4 HOURS PRN
Status: DISCONTINUED | OUTPATIENT
Start: 2025-01-01 | End: 2025-01-01

## 2025-01-01 RX ORDER — SODIUM CHLORIDE 9 MG/ML
40 INJECTION, SOLUTION INTRAVENOUS AS NEEDED
Status: DISCONTINUED | OUTPATIENT
Start: 2025-01-01 | End: 2025-01-01

## 2025-01-01 RX ORDER — AMOXICILLIN 250 MG
2 CAPSULE ORAL 2 TIMES DAILY
Status: DISCONTINUED | OUTPATIENT
Start: 2025-01-01 | End: 2025-01-01

## 2025-01-01 RX ORDER — ALBUMIN (HUMAN) 12.5 G/50ML
87.5 SOLUTION INTRAVENOUS ONCE
Status: DISCONTINUED | OUTPATIENT
Start: 2025-01-01 | End: 2025-01-01

## 2025-01-01 RX ORDER — DIPHENOXYLATE HYDROCHLORIDE AND ATROPINE SULFATE 2.5; .025 MG/1; MG/1
1 TABLET ORAL
Status: CANCELLED | OUTPATIENT
Start: 2025-01-01

## 2025-01-01 RX ORDER — THIAMINE HYDROCHLORIDE 100 MG/ML
200 INJECTION, SOLUTION INTRAMUSCULAR; INTRAVENOUS EVERY 8 HOURS SCHEDULED
Status: COMPLETED | OUTPATIENT
Start: 2025-01-01 | End: 2025-01-01

## 2025-01-01 RX ORDER — DIPHENHYDRAMINE HCL 25 MG
25 CAPSULE ORAL EVERY 6 HOURS PRN
Status: DISCONTINUED | OUTPATIENT
Start: 2025-01-01 | End: 2025-01-01

## 2025-01-01 RX ORDER — DIATRIZOATE MEGLUMINE AND DIATRIZOATE SODIUM 660; 100 MG/ML; MG/ML
30 SOLUTION ORAL; RECTAL ONCE
Status: COMPLETED | OUTPATIENT
Start: 2025-01-01 | End: 2025-01-01

## 2025-01-01 RX ORDER — FOLIC ACID 1 MG/1
1 TABLET ORAL DAILY
Status: DISCONTINUED | OUTPATIENT
Start: 2025-01-01 | End: 2025-01-01

## 2025-01-01 RX ORDER — DIPHENOXYLATE HYDROCHLORIDE AND ATROPINE SULFATE 2.5; .025 MG/1; MG/1
1 TABLET ORAL
Status: DISCONTINUED | OUTPATIENT
Start: 2025-01-01 | End: 2025-01-01 | Stop reason: HOSPADM

## 2025-01-01 RX ORDER — POTASSIUM CHLORIDE 29.8 MG/ML
20 INJECTION INTRAVENOUS
Status: COMPLETED | OUTPATIENT
Start: 2025-01-01 | End: 2025-01-01

## 2025-01-01 RX ORDER — LORAZEPAM 2 MG/ML
1 INJECTION INTRAMUSCULAR
Status: DISCONTINUED | OUTPATIENT
Start: 2025-01-01 | End: 2025-01-01 | Stop reason: HOSPADM

## 2025-01-01 RX ORDER — LORAZEPAM 2 MG/ML
1 INJECTION INTRAMUSCULAR
Status: DISPENSED | OUTPATIENT
Start: 2025-01-01 | End: 2025-01-01

## 2025-01-01 RX ORDER — LACTULOSE 10 G/15ML
10 SOLUTION ORAL DAILY
Status: DISCONTINUED | OUTPATIENT
Start: 2025-01-01 | End: 2025-01-01

## 2025-01-01 RX ORDER — HALOPERIDOL 1 MG/1
1 TABLET ORAL EVERY 4 HOURS PRN
Status: CANCELLED | OUTPATIENT
Start: 2025-01-01

## 2025-01-01 RX ORDER — DIPHENHYDRAMINE HYDROCHLORIDE 50 MG/ML
25 INJECTION, SOLUTION INTRAMUSCULAR; INTRAVENOUS EVERY 6 HOURS PRN
Status: DISCONTINUED | OUTPATIENT
Start: 2025-01-01 | End: 2025-01-01 | Stop reason: HOSPADM

## 2025-01-01 RX ORDER — GLYCOPYRROLATE 0.2 MG/ML
0.4 INJECTION INTRAMUSCULAR; INTRAVENOUS
Status: DISCONTINUED | OUTPATIENT
Start: 2025-01-01 | End: 2025-01-01 | Stop reason: HOSPADM

## 2025-01-01 RX ORDER — LORAZEPAM 2 MG/ML
2 CONCENTRATE ORAL
Status: CANCELLED | OUTPATIENT
Start: 2025-01-01 | End: 2025-05-01

## 2025-01-01 RX ORDER — MORPHINE SULFATE 10 MG/ML
6 INJECTION, SOLUTION INTRAMUSCULAR; INTRAVENOUS
Status: CANCELLED | OUTPATIENT
Start: 2025-01-01 | End: 2025-05-03

## 2025-01-01 RX ORDER — LORAZEPAM 2 MG/ML
1 CONCENTRATE ORAL
Status: DISCONTINUED | OUTPATIENT
Start: 2025-01-01 | End: 2025-01-01

## 2025-01-01 RX ORDER — FENTANYL/ROPIVACAINE/NS/PF 2-625MCG/1
15 PLASTIC BAG, INJECTION (ML) EPIDURAL ONCE
Status: COMPLETED | OUTPATIENT
Start: 2025-01-01 | End: 2025-01-01

## 2025-01-01 RX ORDER — ALBUMIN (HUMAN) 12.5 G/50ML
50 SOLUTION INTRAVENOUS ONCE
Status: DISCONTINUED | OUTPATIENT
Start: 2025-01-01 | End: 2025-01-01

## 2025-01-01 RX ORDER — HYDROMORPHONE HYDROCHLORIDE 1 MG/ML
0.5 INJECTION, SOLUTION INTRAMUSCULAR; INTRAVENOUS; SUBCUTANEOUS
Status: DISCONTINUED | OUTPATIENT
Start: 2025-01-01 | End: 2025-01-01 | Stop reason: HOSPADM

## 2025-01-01 RX ORDER — DEXTROSE MONOHYDRATE 25 G/50ML
25 INJECTION, SOLUTION INTRAVENOUS
Status: DISCONTINUED | OUTPATIENT
Start: 2025-01-01 | End: 2025-01-01

## 2025-01-01 RX ORDER — ACETAMINOPHEN 325 MG/1
650 TABLET ORAL EVERY 4 HOURS PRN
Status: DISCONTINUED | OUTPATIENT
Start: 2025-01-01 | End: 2025-01-01 | Stop reason: HOSPADM

## 2025-01-01 RX ORDER — ACETAMINOPHEN 325 MG/1
650 TABLET ORAL EVERY 4 HOURS PRN
Status: DISCONTINUED | OUTPATIENT
Start: 2025-01-01 | End: 2025-01-01

## 2025-01-01 RX ORDER — LORAZEPAM 1 MG/1
2 TABLET ORAL
Status: ACTIVE | OUTPATIENT
Start: 2025-01-01 | End: 2025-01-01

## 2025-01-01 RX ORDER — ALBUMIN (HUMAN) 12.5 G/50ML
112.5 SOLUTION INTRAVENOUS ONCE
Status: DISCONTINUED | OUTPATIENT
Start: 2025-01-01 | End: 2025-01-01

## 2025-01-01 RX ORDER — MORPHINE SULFATE 20 MG/ML
20 SOLUTION ORAL
Refills: 0 | Status: CANCELLED | OUTPATIENT
Start: 2025-01-01 | End: 2025-05-03

## 2025-01-01 RX ORDER — ACETAMINOPHEN 325 MG/1
650 TABLET ORAL EVERY 4 HOURS PRN
Status: CANCELLED | OUTPATIENT
Start: 2025-01-01

## 2025-01-01 RX ORDER — MORPHINE SULFATE 20 MG/ML
5 SOLUTION ORAL
Refills: 0 | Status: CANCELLED | OUTPATIENT
Start: 2025-01-01 | End: 2025-05-03

## 2025-01-01 RX ORDER — SODIUM CHLORIDE 0.9 % (FLUSH) 0.9 %
10 SYRINGE (ML) INJECTION AS NEEDED
Status: CANCELLED | OUTPATIENT
Start: 2025-01-01

## 2025-01-01 RX ORDER — LORAZEPAM 2 MG/ML
2 INJECTION INTRAMUSCULAR
Status: ACTIVE | OUTPATIENT
Start: 2025-01-01 | End: 2025-01-01

## 2025-01-01 RX ORDER — SODIUM CHLORIDE, SODIUM LACTATE, POTASSIUM CHLORIDE, CALCIUM CHLORIDE 600; 310; 30; 20 MG/100ML; MG/100ML; MG/100ML; MG/100ML
125 INJECTION, SOLUTION INTRAVENOUS CONTINUOUS
Status: ACTIVE | OUTPATIENT
Start: 2025-01-01 | End: 2025-01-01

## 2025-01-01 RX ORDER — BISACODYL 5 MG/1
5 TABLET, DELAYED RELEASE ORAL DAILY PRN
Status: DISCONTINUED | OUTPATIENT
Start: 2025-01-01 | End: 2025-01-01

## 2025-01-01 RX ORDER — LORAZEPAM 2 MG/ML
1 CONCENTRATE ORAL
Status: DISCONTINUED | OUTPATIENT
Start: 2025-01-01 | End: 2025-01-01 | Stop reason: HOSPADM

## 2025-01-01 RX ORDER — HYDROMORPHONE HYDROCHLORIDE 2 MG/ML
1.5 INJECTION, SOLUTION INTRAMUSCULAR; INTRAVENOUS; SUBCUTANEOUS
Refills: 0 | Status: CANCELLED | OUTPATIENT
Start: 2025-01-01 | End: 2025-05-03

## 2025-01-01 RX ORDER — HALOPERIDOL 2 MG/ML
1 SOLUTION ORAL EVERY 4 HOURS PRN
Status: DISCONTINUED | OUTPATIENT
Start: 2025-01-01 | End: 2025-01-01 | Stop reason: HOSPADM

## 2025-01-01 RX ORDER — HALOPERIDOL 1 MG/1
1 TABLET ORAL EVERY 4 HOURS PRN
Status: DISCONTINUED | OUTPATIENT
Start: 2025-01-01 | End: 2025-01-01

## 2025-01-01 RX ORDER — LORAZEPAM 2 MG/ML
1 INJECTION INTRAMUSCULAR EVERY 6 HOURS
Status: COMPLETED | OUTPATIENT
Start: 2025-01-01 | End: 2025-01-01

## 2025-01-01 RX ORDER — BISACODYL 10 MG
10 SUPPOSITORY, RECTAL RECTAL DAILY PRN
Status: DISCONTINUED | OUTPATIENT
Start: 2025-01-01 | End: 2025-01-01

## 2025-01-01 RX ORDER — MIDODRINE HYDROCHLORIDE 5 MG/1
10 TABLET ORAL EVERY 8 HOURS
Status: DISCONTINUED | OUTPATIENT
Start: 2025-01-01 | End: 2025-01-01

## 2025-01-01 RX ORDER — CEPHALEXIN 500 MG/1
500 CAPSULE ORAL EVERY 12 HOURS SCHEDULED
Status: DISCONTINUED | OUTPATIENT
Start: 2025-01-01 | End: 2025-01-01

## 2025-01-01 RX ORDER — LORAZEPAM 2 MG/ML
0.5 INJECTION INTRAMUSCULAR
Status: DISCONTINUED | OUTPATIENT
Start: 2025-01-01 | End: 2025-01-01

## 2025-01-01 RX ORDER — OXYCODONE HYDROCHLORIDE 5 MG/1
5 TABLET ORAL EVERY 6 HOURS PRN
Refills: 0 | Status: CANCELLED | OUTPATIENT
Start: 2025-01-01 | End: 2025-04-28

## 2025-01-01 RX ORDER — POTASSIUM CHLORIDE 1500 MG/1
20 TABLET, EXTENDED RELEASE ORAL 2 TIMES DAILY
COMMUNITY
Start: 2025-01-01 | End: 2026-04-01

## 2025-01-01 RX ORDER — KETOROLAC TROMETHAMINE 15 MG/ML
15 INJECTION, SOLUTION INTRAMUSCULAR; INTRAVENOUS EVERY 6 HOURS PRN
Status: CANCELLED | OUTPATIENT
Start: 2025-01-01 | End: 2025-05-01

## 2025-01-01 RX ORDER — ACETAMINOPHEN 650 MG/1
650 SUPPOSITORY RECTAL EVERY 4 HOURS PRN
Status: CANCELLED | OUTPATIENT
Start: 2025-01-01

## 2025-01-01 RX ORDER — MORPHINE SULFATE 2 MG/ML
2 INJECTION, SOLUTION INTRAMUSCULAR; INTRAVENOUS
Status: CANCELLED | OUTPATIENT
Start: 2025-01-01 | End: 2025-05-03

## 2025-01-01 RX ORDER — NICOTINE POLACRILEX 4 MG
15 LOZENGE BUCCAL
Status: CANCELLED | OUTPATIENT
Start: 2025-01-01

## 2025-01-01 RX ORDER — ACETAMINOPHEN 160 MG/5ML
650 SOLUTION ORAL EVERY 4 HOURS PRN
Status: CANCELLED | OUTPATIENT
Start: 2025-01-01

## 2025-01-01 RX ORDER — METHYLPREDNISOLONE SODIUM SUCCINATE 125 MG/2ML
125 INJECTION, POWDER, LYOPHILIZED, FOR SOLUTION INTRAMUSCULAR; INTRAVENOUS EVERY 12 HOURS
Status: DISCONTINUED | OUTPATIENT
Start: 2025-01-01 | End: 2025-01-01

## 2025-01-01 RX ORDER — ONDANSETRON 2 MG/ML
4 INJECTION INTRAMUSCULAR; INTRAVENOUS EVERY 6 HOURS PRN
Status: DISCONTINUED | OUTPATIENT
Start: 2025-01-01 | End: 2025-01-01 | Stop reason: HOSPADM

## 2025-01-01 RX ORDER — LORAZEPAM 2 MG/ML
0.5 CONCENTRATE ORAL
Status: DISCONTINUED | OUTPATIENT
Start: 2025-01-01 | End: 2025-01-01

## 2025-01-01 RX ORDER — ACETAMINOPHEN 160 MG/5ML
650 SOLUTION ORAL EVERY 4 HOURS PRN
Status: DISCONTINUED | OUTPATIENT
Start: 2025-01-01 | End: 2025-01-01 | Stop reason: HOSPADM

## 2025-01-01 RX ORDER — ACETAMINOPHEN 650 MG/1
650 SUPPOSITORY RECTAL EVERY 4 HOURS PRN
Status: DISCONTINUED | OUTPATIENT
Start: 2025-01-01 | End: 2025-01-01 | Stop reason: HOSPADM

## 2025-01-01 RX ORDER — SODIUM CHLORIDE 9 MG/ML
40 INJECTION, SOLUTION INTRAVENOUS AS NEEDED
Status: CANCELLED | OUTPATIENT
Start: 2025-01-01

## 2025-01-01 RX ORDER — MORPHINE SULFATE 20 MG/ML
10 SOLUTION ORAL
Status: DISCONTINUED | OUTPATIENT
Start: 2025-01-01 | End: 2025-01-01

## 2025-01-01 RX ORDER — IBUPROFEN 600 MG/1
1 TABLET ORAL
Status: CANCELLED | OUTPATIENT
Start: 2025-01-01

## 2025-01-01 RX ORDER — HYDROMORPHONE HYDROCHLORIDE 1 MG/ML
0.5 INJECTION, SOLUTION INTRAMUSCULAR; INTRAVENOUS; SUBCUTANEOUS
Refills: 0 | Status: CANCELLED | OUTPATIENT
Start: 2025-01-01 | End: 2025-05-03

## 2025-01-01 RX ORDER — FENTANYL CITRATE 50 UG/ML
25 INJECTION, SOLUTION INTRAMUSCULAR; INTRAVENOUS
Refills: 0 | Status: DISPENSED | OUTPATIENT
Start: 2025-01-01 | End: 2025-01-01

## 2025-01-01 RX ORDER — KETOROLAC TROMETHAMINE 15 MG/ML
15 INJECTION, SOLUTION INTRAMUSCULAR; INTRAVENOUS EVERY 6 HOURS PRN
Status: DISCONTINUED | OUTPATIENT
Start: 2025-01-01 | End: 2025-01-01

## 2025-01-01 RX ORDER — DEXTROSE MONOHYDRATE 50 MG/ML
75 INJECTION, SOLUTION INTRAVENOUS CONTINUOUS
Status: DISCONTINUED | OUTPATIENT
Start: 2025-01-01 | End: 2025-01-01

## 2025-01-01 RX ORDER — POLYETHYLENE GLYCOL 3350 17 G/17G
17 POWDER, FOR SOLUTION ORAL DAILY PRN
Status: DISCONTINUED | OUTPATIENT
Start: 2025-01-01 | End: 2025-01-01

## 2025-01-01 RX ORDER — MORPHINE SULFATE 2 MG/ML
4 INJECTION, SOLUTION INTRAMUSCULAR; INTRAVENOUS
Status: DISCONTINUED | OUTPATIENT
Start: 2025-01-01 | End: 2025-01-01 | Stop reason: HOSPADM

## 2025-01-01 RX ORDER — LIDOCAINE HYDROCHLORIDE 10 MG/ML
10 INJECTION, SOLUTION INFILTRATION; PERINEURAL ONCE
Status: COMPLETED | OUTPATIENT
Start: 2025-01-01 | End: 2025-01-01

## 2025-01-01 RX ORDER — SODIUM CHLORIDE 0.9 % (FLUSH) 0.9 %
20 SYRINGE (ML) INJECTION AS NEEDED
Status: DISCONTINUED | OUTPATIENT
Start: 2025-01-01 | End: 2025-01-01 | Stop reason: HOSPADM

## 2025-01-01 RX ORDER — ONDANSETRON 2 MG/ML
8 INJECTION INTRAMUSCULAR; INTRAVENOUS ONCE
Status: COMPLETED | OUTPATIENT
Start: 2025-01-01 | End: 2025-01-01

## 2025-01-01 RX ORDER — MORPHINE SULFATE 4 MG/ML
4 INJECTION, SOLUTION INTRAMUSCULAR; INTRAVENOUS
Status: CANCELLED | OUTPATIENT
Start: 2025-01-01 | End: 2025-05-03

## 2025-01-01 RX ORDER — LORAZEPAM 1 MG/1
2 TABLET ORAL
Status: DISCONTINUED | OUTPATIENT
Start: 2025-01-01 | End: 2025-01-01

## 2025-01-01 RX ORDER — ALBUMIN (HUMAN) 12.5 G/50ML
75 SOLUTION INTRAVENOUS ONCE
Status: DISCONTINUED | OUTPATIENT
Start: 2025-01-01 | End: 2025-01-01

## 2025-01-01 RX ORDER — ACETAMINOPHEN 160 MG/5ML
650 SOLUTION ORAL EVERY 4 HOURS PRN
Status: DISCONTINUED | OUTPATIENT
Start: 2025-01-01 | End: 2025-01-01

## 2025-01-01 RX ORDER — VANCOMYCIN HYDROCHLORIDE 125 MG/1
125 CAPSULE ORAL EVERY OTHER DAY
Status: DISCONTINUED | OUTPATIENT
Start: 2025-05-13 | End: 2025-01-01

## 2025-01-01 RX ORDER — LORAZEPAM 2 MG/ML
0.5 CONCENTRATE ORAL
Status: CANCELLED | OUTPATIENT
Start: 2025-01-01 | End: 2025-05-01

## 2025-01-01 RX ORDER — LORAZEPAM 2 MG/ML
0.5 CONCENTRATE ORAL
Status: DISCONTINUED | OUTPATIENT
Start: 2025-01-01 | End: 2025-01-01 | Stop reason: HOSPADM

## 2025-01-01 RX ORDER — THIAMINE HYDROCHLORIDE 100 MG/ML
200 INJECTION, SOLUTION INTRAMUSCULAR; INTRAVENOUS EVERY 8 HOURS SCHEDULED
Status: DISCONTINUED | OUTPATIENT
Start: 2025-01-01 | End: 2025-01-01

## 2025-01-01 RX ORDER — POTASSIUM CHLORIDE 29.8 MG/ML
20 INJECTION INTRAVENOUS ONCE
Status: DISCONTINUED | OUTPATIENT
Start: 2025-01-01 | End: 2025-01-01

## 2025-01-01 RX ORDER — IBUPROFEN 600 MG/1
1 TABLET ORAL
Status: DISCONTINUED | OUTPATIENT
Start: 2025-01-01 | End: 2025-01-01 | Stop reason: HOSPADM

## 2025-01-01 RX ORDER — HALOPERIDOL 5 MG/ML
1 INJECTION INTRAMUSCULAR EVERY 4 HOURS PRN
Status: CANCELLED | OUTPATIENT
Start: 2025-01-01

## 2025-01-01 RX ORDER — LORAZEPAM 1 MG/1
1 TABLET ORAL
Status: DISCONTINUED | OUTPATIENT
Start: 2025-01-01 | End: 2025-01-01

## 2025-01-01 RX ORDER — NITROGLYCERIN 0.4 MG/1
0.4 TABLET SUBLINGUAL
Status: DISCONTINUED | OUTPATIENT
Start: 2025-01-01 | End: 2025-01-01

## 2025-01-01 RX ORDER — MORPHINE SULFATE 20 MG/ML
5 SOLUTION ORAL
Status: DISCONTINUED | OUTPATIENT
Start: 2025-01-01 | End: 2025-01-01

## 2025-01-01 RX ORDER — VANCOMYCIN HYDROCHLORIDE 125 MG/1
125 CAPSULE ORAL EVERY 24 HOURS
Status: DISCONTINUED | OUTPATIENT
Start: 2025-05-06 | End: 2025-01-01

## 2025-01-01 RX ORDER — IBUPROFEN 600 MG/1
1 TABLET ORAL
Status: DISCONTINUED | OUTPATIENT
Start: 2025-01-01 | End: 2025-01-01

## 2025-01-01 RX ORDER — HALOPERIDOL 1 MG/1
1 TABLET ORAL EVERY 4 HOURS PRN
Status: DISCONTINUED | OUTPATIENT
Start: 2025-01-01 | End: 2025-01-01 | Stop reason: HOSPADM

## 2025-01-01 RX ORDER — DIPHENHYDRAMINE HCL 25 MG
25 CAPSULE ORAL EVERY 6 HOURS PRN
Status: DISCONTINUED | OUTPATIENT
Start: 2025-01-01 | End: 2025-01-01 | Stop reason: HOSPADM

## 2025-01-01 RX ORDER — ALBUMIN (HUMAN) 12.5 G/50ML
37.5 SOLUTION INTRAVENOUS ONCE
Status: DISCONTINUED | OUTPATIENT
Start: 2025-01-01 | End: 2025-01-01

## 2025-01-01 RX ORDER — NICOTINE POLACRILEX 4 MG
15 LOZENGE BUCCAL
Status: DISCONTINUED | OUTPATIENT
Start: 2025-01-01 | End: 2025-01-01

## 2025-01-01 RX ORDER — MORPHINE SULFATE 4 MG/ML
4 INJECTION, SOLUTION INTRAMUSCULAR; INTRAVENOUS
Status: DISCONTINUED | OUTPATIENT
Start: 2025-01-01 | End: 2025-01-01 | Stop reason: HOSPADM

## 2025-01-01 RX ORDER — MORPHINE SULFATE 2 MG/ML
2 INJECTION, SOLUTION INTRAMUSCULAR; INTRAVENOUS
Status: DISCONTINUED | OUTPATIENT
Start: 2025-01-01 | End: 2025-01-01

## 2025-01-01 RX ORDER — SODIUM CHLORIDE 0.9 % (FLUSH) 0.9 %
20 SYRINGE (ML) INJECTION AS NEEDED
Status: CANCELLED | OUTPATIENT
Start: 2025-01-01

## 2025-01-01 RX ORDER — NICOTINE POLACRILEX 4 MG
15 LOZENGE BUCCAL
Status: DISCONTINUED | OUTPATIENT
Start: 2025-01-01 | End: 2025-01-01 | Stop reason: HOSPADM

## 2025-01-01 RX ORDER — DEXTROSE MONOHYDRATE 25 G/50ML
25 INJECTION, SOLUTION INTRAVENOUS
Status: DISCONTINUED | OUTPATIENT
Start: 2025-01-01 | End: 2025-01-01 | Stop reason: HOSPADM

## 2025-01-01 RX ORDER — SODIUM CHLORIDE 0.9 % (FLUSH) 0.9 %
20 SYRINGE (ML) INJECTION AS NEEDED
Status: DISCONTINUED | OUTPATIENT
Start: 2025-01-01 | End: 2025-01-01

## 2025-01-01 RX ORDER — OXYCODONE HYDROCHLORIDE 5 MG/1
5 TABLET ORAL EVERY 6 HOURS PRN
Status: DISCONTINUED | OUTPATIENT
Start: 2025-01-01 | End: 2025-01-01

## 2025-01-01 RX ORDER — HALOPERIDOL 5 MG/ML
1 INJECTION INTRAMUSCULAR EVERY 4 HOURS PRN
Status: DISCONTINUED | OUTPATIENT
Start: 2025-01-01 | End: 2025-01-01 | Stop reason: HOSPADM

## 2025-01-01 RX ADMIN — MIDODRINE HYDROCHLORIDE 10 MG: 5 TABLET ORAL at 11:59

## 2025-01-01 RX ADMIN — POTASSIUM CHLORIDE 20 MEQ: 400 INJECTION, SOLUTION INTRAVENOUS at 10:30

## 2025-01-01 RX ADMIN — INSULIN HUMAN 2 UNITS: 100 INJECTION, SOLUTION PARENTERAL at 11:26

## 2025-01-01 RX ADMIN — LORAZEPAM 1 MG: 2 INJECTION INTRAMUSCULAR; INTRAVENOUS at 17:54

## 2025-01-01 RX ADMIN — POTASSIUM CHLORIDE 10 MEQ: 7.46 INJECTION, SOLUTION INTRAVENOUS at 13:37

## 2025-01-01 RX ADMIN — MIDODRINE HYDROCHLORIDE 10 MG: 5 TABLET ORAL at 03:04

## 2025-01-01 RX ADMIN — VANCOMYCIN HYDROCHLORIDE 125 MG: KIT at 18:23

## 2025-01-01 RX ADMIN — VASOPRESSIN 0.03 UNITS/MIN: 20 INJECTION, SOLUTION INTRAVENOUS at 09:26

## 2025-01-01 RX ADMIN — SODIUM CHLORIDE 1000 ML: 9 INJECTION, SOLUTION INTRAVENOUS at 20:53

## 2025-01-01 RX ADMIN — POTASSIUM CHLORIDE 20 MEQ: 400 INJECTION, SOLUTION INTRAVENOUS at 09:28

## 2025-01-01 RX ADMIN — VANCOMYCIN HYDROCHLORIDE 125 MG: KIT at 05:15

## 2025-01-01 RX ADMIN — LORAZEPAM 1 MG: 2 INJECTION INTRAMUSCULAR; INTRAVENOUS at 21:15

## 2025-01-01 RX ADMIN — Medication 10 ML: at 21:53

## 2025-01-01 RX ADMIN — MAGNESIUM SULFATE IN DEXTROSE 1 G: 10 INJECTION, SOLUTION INTRAVENOUS at 02:55

## 2025-01-01 RX ADMIN — DIATRIZOATE MEGLUMINE AND DIATRIZOATE SODIUM 30 ML: 600; 100 SOLUTION ORAL; RECTAL at 15:33

## 2025-01-01 RX ADMIN — MUPIROCIN 1 APPLICATION: 20 OINTMENT TOPICAL at 08:41

## 2025-01-01 RX ADMIN — NOREPINEPHRINE BITARTRATE 0.28 MCG/KG/MIN: 0.03 INJECTION, SOLUTION INTRAVENOUS at 20:41

## 2025-01-01 RX ADMIN — LORAZEPAM 2 MG: 2 INJECTION INTRAMUSCULAR; INTRAVENOUS at 14:03

## 2025-01-01 RX ADMIN — MUPIROCIN 1 APPLICATION: 20 OINTMENT TOPICAL at 20:21

## 2025-01-01 RX ADMIN — VASOPRESSIN 0.03 UNITS/MIN: 20 INJECTION, SOLUTION INTRAVENOUS at 11:59

## 2025-01-01 RX ADMIN — MUPIROCIN 1 APPLICATION: 20 OINTMENT TOPICAL at 09:05

## 2025-01-01 RX ADMIN — METHYLPREDNISOLONE SODIUM SUCCINATE 125 MG: 125 INJECTION INTRAMUSCULAR; INTRAVENOUS at 05:46

## 2025-01-01 RX ADMIN — NOREPINEPHRINE BITARTRATE 0.2 MCG/KG/MIN: 0.03 INJECTION, SOLUTION INTRAVENOUS at 01:56

## 2025-01-01 RX ADMIN — Medication 10 ML: at 08:10

## 2025-01-01 RX ADMIN — FOLIC ACID 1 MG: 5 INJECTION, SOLUTION INTRAMUSCULAR; INTRAVENOUS; SUBCUTANEOUS at 22:46

## 2025-01-01 RX ADMIN — MIDODRINE HYDROCHLORIDE 10 MG: 5 TABLET ORAL at 18:36

## 2025-01-01 RX ADMIN — POTASSIUM CHLORIDE 20 MEQ: 29.8 INJECTION, SOLUTION INTRAVENOUS at 21:30

## 2025-01-01 RX ADMIN — Medication 10 ML: at 11:06

## 2025-01-01 RX ADMIN — Medication 10 ML: at 20:06

## 2025-01-01 RX ADMIN — SODIUM CHLORIDE 100 ML/HR: 9 INJECTION, SOLUTION INTRAVENOUS at 11:52

## 2025-01-01 RX ADMIN — LORAZEPAM 1 MG: 2 INJECTION INTRAMUSCULAR; INTRAVENOUS at 19:57

## 2025-01-01 RX ADMIN — Medication 10 ML: at 21:06

## 2025-01-01 RX ADMIN — MAGNESIUM SULFATE HEPTAHYDRATE 2 G: 40 INJECTION, SOLUTION INTRAVENOUS at 09:16

## 2025-01-01 RX ADMIN — MIDODRINE HYDROCHLORIDE 10 MG: 5 TABLET ORAL at 18:23

## 2025-01-01 RX ADMIN — INSULIN HUMAN 2 UNITS: 100 INJECTION, SOLUTION PARENTERAL at 05:46

## 2025-01-01 RX ADMIN — NOREPINEPHRINE BITARTRATE 0.06 MCG/KG/MIN: 0.03 INJECTION, SOLUTION INTRAVENOUS at 14:20

## 2025-01-01 RX ADMIN — FOLIC ACID 1 MG: 5 INJECTION, SOLUTION INTRAMUSCULAR; INTRAVENOUS; SUBCUTANEOUS at 08:40

## 2025-01-01 RX ADMIN — FUROSEMIDE 60 MG: 10 INJECTION, SOLUTION INTRAMUSCULAR; INTRAVENOUS at 09:15

## 2025-01-01 RX ADMIN — LORAZEPAM 2 MG: 2 INJECTION INTRAMUSCULAR; INTRAVENOUS at 17:09

## 2025-01-01 RX ADMIN — THIAMINE HYDROCHLORIDE 200 MG: 100 INJECTION, SOLUTION INTRAMUSCULAR; INTRAVENOUS at 21:44

## 2025-01-01 RX ADMIN — LORAZEPAM 1 MG: 2 INJECTION INTRAMUSCULAR; INTRAVENOUS at 22:56

## 2025-01-01 RX ADMIN — THIAMINE HYDROCHLORIDE 200 MG: 100 INJECTION, SOLUTION INTRAMUSCULAR; INTRAVENOUS at 21:24

## 2025-01-01 RX ADMIN — Medication 10 ML: at 09:42

## 2025-01-01 RX ADMIN — Medication 10 ML: at 20:22

## 2025-01-01 RX ADMIN — ANORECTAL OINTMENT 1 APPLICATION: 15.7; .44; 24; 20.6 OINTMENT TOPICAL at 21:15

## 2025-01-01 RX ADMIN — CEFTRIAXONE 2000 MG: 2 INJECTION, POWDER, FOR SOLUTION INTRAMUSCULAR; INTRAVENOUS at 14:13

## 2025-01-01 RX ADMIN — LACTULOSE 20 G: 10 SOLUTION ORAL at 16:10

## 2025-01-01 RX ADMIN — PERFLUTREN 2 ML: 6.52 INJECTION, SUSPENSION INTRAVENOUS at 09:00

## 2025-01-01 RX ADMIN — MORPHINE SULFATE 4 MG: 4 INJECTION, SOLUTION INTRAMUSCULAR; INTRAVENOUS at 22:46

## 2025-01-01 RX ADMIN — MAGNESIUM SULFATE IN WATER FOR 2 G: 40 INJECTION INTRAVENOUS at 06:08

## 2025-01-01 RX ADMIN — Medication 10 ML: at 09:05

## 2025-01-01 RX ADMIN — FENTANYL CITRATE 25 MCG: 50 INJECTION, SOLUTION INTRAMUSCULAR; INTRAVENOUS at 12:00

## 2025-01-01 RX ADMIN — NOREPINEPHRINE BITARTRATE 0.24 MCG/KG/MIN: 0.03 INJECTION, SOLUTION INTRAVENOUS at 08:34

## 2025-01-01 RX ADMIN — MIDODRINE HYDROCHLORIDE 10 MG: 5 TABLET ORAL at 01:17

## 2025-01-01 RX ADMIN — VANCOMYCIN HYDROCHLORIDE 125 MG: KIT at 14:50

## 2025-01-01 RX ADMIN — Medication 10 ML: at 21:15

## 2025-01-01 RX ADMIN — VANCOMYCIN HYDROCHLORIDE 125 MG: KIT at 00:15

## 2025-01-01 RX ADMIN — LORAZEPAM 2 MG: 2 INJECTION INTRAMUSCULAR; INTRAVENOUS at 22:47

## 2025-01-01 RX ADMIN — VASOPRESSIN 0.03 UNITS/MIN: 20 INJECTION, SOLUTION INTRAVENOUS at 09:46

## 2025-01-01 RX ADMIN — OXYCODONE HYDROCHLORIDE 5 MG: 5 TABLET ORAL at 18:04

## 2025-01-01 RX ADMIN — RIFAXIMIN 550 MG: 550 TABLET ORAL at 09:41

## 2025-01-01 RX ADMIN — FENTANYL CITRATE 25 MCG: 50 INJECTION, SOLUTION INTRAMUSCULAR; INTRAVENOUS at 09:33

## 2025-01-01 RX ADMIN — THIAMINE HYDROCHLORIDE 200 MG: 100 INJECTION, SOLUTION INTRAMUSCULAR; INTRAVENOUS at 13:37

## 2025-01-01 RX ADMIN — RIFAXIMIN 550 MG: 550 TABLET ORAL at 08:09

## 2025-01-01 RX ADMIN — MIDODRINE HYDROCHLORIDE 10 MG: 5 TABLET ORAL at 01:48

## 2025-01-01 RX ADMIN — FOLIC ACID 1 MG: 5 INJECTION, SOLUTION INTRAMUSCULAR; INTRAVENOUS; SUBCUTANEOUS at 09:05

## 2025-01-01 RX ADMIN — ANORECTAL OINTMENT 1 APPLICATION: 15.7; .44; 24; 20.6 OINTMENT TOPICAL at 09:16

## 2025-01-01 RX ADMIN — Medication 10 ML: at 00:43

## 2025-01-01 RX ADMIN — RIFAXIMIN 550 MG: 550 TABLET ORAL at 20:06

## 2025-01-01 RX ADMIN — CEFTRIAXONE 2000 MG: 2 INJECTION, POWDER, FOR SOLUTION INTRAMUSCULAR; INTRAVENOUS at 12:53

## 2025-01-01 RX ADMIN — VANCOMYCIN HYDROCHLORIDE 125 MG: KIT at 00:18

## 2025-01-01 RX ADMIN — ANORECTAL OINTMENT 1 APPLICATION: 15.7; .44; 24; 20.6 OINTMENT TOPICAL at 09:08

## 2025-01-01 RX ADMIN — OXYCODONE HYDROCHLORIDE 5 MG: 5 TABLET ORAL at 14:19

## 2025-01-01 RX ADMIN — SODIUM CHLORIDE 3120 ML: 9 INJECTION, SOLUTION INTRAVENOUS at 21:20

## 2025-01-01 RX ADMIN — Medication 10 ML: at 20:11

## 2025-01-01 RX ADMIN — CEFTRIAXONE 2000 MG: 2 INJECTION, POWDER, FOR SOLUTION INTRAMUSCULAR; INTRAVENOUS at 14:50

## 2025-01-01 RX ADMIN — Medication 10 ML: at 08:50

## 2025-01-01 RX ADMIN — Medication 10 ML: at 09:07

## 2025-01-01 RX ADMIN — VANCOMYCIN HYDROCHLORIDE 125 MG: KIT at 00:37

## 2025-01-01 RX ADMIN — LACTULOSE 20 G: 10 SOLUTION ORAL at 20:13

## 2025-01-01 RX ADMIN — Medication 10 ML: at 09:08

## 2025-01-01 RX ADMIN — MUPIROCIN 1 APPLICATION: 20 OINTMENT TOPICAL at 20:13

## 2025-01-01 RX ADMIN — THIAMINE HYDROCHLORIDE 200 MG: 100 INJECTION, SOLUTION INTRAMUSCULAR; INTRAVENOUS at 21:13

## 2025-01-01 RX ADMIN — ANORECTAL OINTMENT 1 APPLICATION: 15.7; .44; 24; 20.6 OINTMENT TOPICAL at 08:10

## 2025-01-01 RX ADMIN — LORAZEPAM 2 MG: 2 INJECTION INTRAMUSCULAR; INTRAVENOUS at 00:42

## 2025-01-01 RX ADMIN — NOREPINEPHRINE BITARTRATE 0.08 MCG/KG/MIN: 0.03 INJECTION, SOLUTION INTRAVENOUS at 03:44

## 2025-01-01 RX ADMIN — NOREPINEPHRINE BITARTRATE 0.18 MCG/KG/MIN: 0.03 INJECTION, SOLUTION INTRAVENOUS at 10:52

## 2025-01-01 RX ADMIN — NOREPINEPHRINE BITARTRATE 0.2 MCG/KG/MIN: 0.03 INJECTION, SOLUTION INTRAVENOUS at 02:48

## 2025-01-01 RX ADMIN — VANCOMYCIN HYDROCHLORIDE 125 MG: KIT at 18:36

## 2025-01-01 RX ADMIN — Medication 10 ML: at 09:17

## 2025-01-01 RX ADMIN — OXYCODONE HYDROCHLORIDE 5 MG: 5 TABLET ORAL at 09:05

## 2025-01-01 RX ADMIN — MIDODRINE HYDROCHLORIDE 10 MG: 5 TABLET ORAL at 18:04

## 2025-01-01 RX ADMIN — NOREPINEPHRINE BITARTRATE 0.28 MCG/KG/MIN: 0.03 INJECTION, SOLUTION INTRAVENOUS at 14:03

## 2025-01-01 RX ADMIN — THIAMINE HYDROCHLORIDE 200 MG: 100 INJECTION, SOLUTION INTRAMUSCULAR; INTRAVENOUS at 14:13

## 2025-01-01 RX ADMIN — Medication 10 ML: at 09:28

## 2025-01-01 RX ADMIN — MIDODRINE HYDROCHLORIDE 10 MG: 5 TABLET ORAL at 01:22

## 2025-01-01 RX ADMIN — SODIUM CHLORIDE 1000 ML: 9 INJECTION, SOLUTION INTRAVENOUS at 15:50

## 2025-01-01 RX ADMIN — INSULIN HUMAN 2 UNITS: 100 INJECTION, SOLUTION PARENTERAL at 06:48

## 2025-01-01 RX ADMIN — MORPHINE SULFATE 2 MG: 2 INJECTION, SOLUTION INTRAMUSCULAR; INTRAVENOUS at 14:06

## 2025-01-01 RX ADMIN — MAGNESIUM SULFATE IN DEXTROSE 1 G: 10 INJECTION, SOLUTION INTRAVENOUS at 05:07

## 2025-01-01 RX ADMIN — MIDODRINE HYDROCHLORIDE 10 MG: 5 TABLET ORAL at 17:56

## 2025-01-01 RX ADMIN — MIDODRINE HYDROCHLORIDE 10 MG: 5 TABLET ORAL at 03:14

## 2025-01-01 RX ADMIN — LORAZEPAM 2 MG: 2 INJECTION INTRAMUSCULAR; INTRAVENOUS at 11:09

## 2025-01-01 RX ADMIN — VASOPRESSIN 0.03 UNITS/MIN: 20 INJECTION, SOLUTION INTRAVENOUS at 10:05

## 2025-01-01 RX ADMIN — VASOPRESSIN 0.03 UNITS/MIN: 20 INJECTION, SOLUTION INTRAVENOUS at 21:53

## 2025-01-01 RX ADMIN — VANCOMYCIN HYDROCHLORIDE 125 MG: KIT at 11:26

## 2025-01-01 RX ADMIN — FENTANYL CITRATE 25 MCG: 50 INJECTION, SOLUTION INTRAMUSCULAR; INTRAVENOUS at 03:18

## 2025-01-01 RX ADMIN — MIDODRINE HYDROCHLORIDE 10 MG: 5 TABLET ORAL at 17:52

## 2025-01-01 RX ADMIN — POTASSIUM CHLORIDE 20 MEQ: 29.8 INJECTION, SOLUTION INTRAVENOUS at 12:06

## 2025-01-01 RX ADMIN — THIAMINE HYDROCHLORIDE 200 MG: 100 INJECTION, SOLUTION INTRAMUSCULAR; INTRAVENOUS at 06:00

## 2025-01-01 RX ADMIN — MIDODRINE HYDROCHLORIDE 10 MG: 5 TABLET ORAL at 09:29

## 2025-01-01 RX ADMIN — INSULIN HUMAN 2 UNITS: 100 INJECTION, SOLUTION PARENTERAL at 18:57

## 2025-01-01 RX ADMIN — VASOPRESSIN 0.03 UNITS/MIN: 20 INJECTION, SOLUTION INTRAVENOUS at 22:34

## 2025-01-01 RX ADMIN — LORAZEPAM 1 MG: 2 INJECTION INTRAMUSCULAR; INTRAVENOUS at 05:19

## 2025-01-01 RX ADMIN — THIAMINE HYDROCHLORIDE 200 MG: 100 INJECTION, SOLUTION INTRAMUSCULAR; INTRAVENOUS at 05:18

## 2025-01-01 RX ADMIN — INSULIN HUMAN 2 UNITS: 100 INJECTION, SOLUTION PARENTERAL at 09:08

## 2025-01-01 RX ADMIN — CEPHALEXIN 500 MG: 500 CAPSULE ORAL at 09:41

## 2025-01-01 RX ADMIN — VANCOMYCIN HYDROCHLORIDE 125 MG: KIT at 05:36

## 2025-01-01 RX ADMIN — OXYCODONE HYDROCHLORIDE 5 MG: 5 TABLET ORAL at 18:23

## 2025-01-01 RX ADMIN — FENTANYL CITRATE 25 MCG: 50 INJECTION, SOLUTION INTRAMUSCULAR; INTRAVENOUS at 17:55

## 2025-01-01 RX ADMIN — MUPIROCIN 1 APPLICATION: 20 OINTMENT TOPICAL at 09:30

## 2025-01-01 RX ADMIN — OXYCODONE HYDROCHLORIDE 5 MG: 5 TABLET ORAL at 20:24

## 2025-01-01 RX ADMIN — RIFAXIMIN 550 MG: 550 TABLET ORAL at 14:50

## 2025-01-01 RX ADMIN — VANCOMYCIN HYDROCHLORIDE 125 MG: KIT at 05:30

## 2025-01-01 RX ADMIN — LORAZEPAM 1 MG: 2 INJECTION INTRAMUSCULAR; INTRAVENOUS at 11:47

## 2025-01-01 RX ADMIN — LORAZEPAM 2 MG: 2 INJECTION INTRAMUSCULAR; INTRAVENOUS at 04:27

## 2025-01-01 RX ADMIN — FOLIC ACID 1 MG: 1 TABLET ORAL at 09:04

## 2025-01-01 RX ADMIN — VANCOMYCIN HYDROCHLORIDE 125 MG: KIT at 05:33

## 2025-01-01 RX ADMIN — VANCOMYCIN HYDROCHLORIDE 125 MG: KIT at 12:54

## 2025-01-01 RX ADMIN — MIDODRINE HYDROCHLORIDE 10 MG: 5 TABLET ORAL at 09:41

## 2025-01-01 RX ADMIN — ONDANSETRON 8 MG: 2 INJECTION, SOLUTION INTRAMUSCULAR; INTRAVENOUS at 21:23

## 2025-01-01 RX ADMIN — FOLIC ACID 1 MG: 5 INJECTION, SOLUTION INTRAMUSCULAR; INTRAVENOUS; SUBCUTANEOUS at 09:06

## 2025-01-01 RX ADMIN — VANCOMYCIN HYDROCHLORIDE 125 MG: KIT at 14:18

## 2025-01-01 RX ADMIN — Medication 100 MG: at 09:15

## 2025-01-01 RX ADMIN — Medication 10 ML: at 20:14

## 2025-01-01 RX ADMIN — INSULIN HUMAN 2 UNITS: 100 INJECTION, SOLUTION PARENTERAL at 11:54

## 2025-01-01 RX ADMIN — INSULIN HUMAN 2 UNITS: 100 INJECTION, SOLUTION PARENTERAL at 12:13

## 2025-01-01 RX ADMIN — INSULIN HUMAN 2 UNITS: 100 INJECTION, SOLUTION PARENTERAL at 14:51

## 2025-01-01 RX ADMIN — LORAZEPAM 2 MG: 2 INJECTION INTRAMUSCULAR; INTRAVENOUS at 12:22

## 2025-01-01 RX ADMIN — LORAZEPAM 1 MG: 2 INJECTION INTRAMUSCULAR; INTRAVENOUS at 05:18

## 2025-01-01 RX ADMIN — LORAZEPAM 2 MG: 2 INJECTION INTRAMUSCULAR; INTRAVENOUS at 06:00

## 2025-01-01 RX ADMIN — NOREPINEPHRINE BITARTRATE 0.22 MCG/KG/MIN: 0.03 INJECTION, SOLUTION INTRAVENOUS at 08:49

## 2025-01-01 RX ADMIN — CEPHALEXIN 500 MG: 500 CAPSULE ORAL at 14:18

## 2025-01-01 RX ADMIN — OXYCODONE HYDROCHLORIDE 5 MG: 5 TABLET ORAL at 12:07

## 2025-01-01 RX ADMIN — MIDODRINE HYDROCHLORIDE 10 MG: 5 TABLET ORAL at 01:56

## 2025-01-01 RX ADMIN — VANCOMYCIN HYDROCHLORIDE 125 MG: KIT at 18:16

## 2025-01-01 RX ADMIN — ANORECTAL OINTMENT 1 APPLICATION: 15.7; .44; 24; 20.6 OINTMENT TOPICAL at 21:05

## 2025-01-01 RX ADMIN — VANCOMYCIN HYDROCHLORIDE 125 MG: KIT at 01:16

## 2025-01-01 RX ADMIN — RIFAXIMIN 550 MG: 550 TABLET ORAL at 09:04

## 2025-01-01 RX ADMIN — LORAZEPAM 1 MG: 2 INJECTION INTRAMUSCULAR; INTRAVENOUS at 02:10

## 2025-01-01 RX ADMIN — LACTULOSE 20 G: 10 SOLUTION ORAL at 17:55

## 2025-01-01 RX ADMIN — FENTANYL CITRATE 25 MCG: 50 INJECTION, SOLUTION INTRAMUSCULAR; INTRAVENOUS at 14:25

## 2025-01-01 RX ADMIN — POTASSIUM CHLORIDE 10 MEQ: 7.46 INJECTION, SOLUTION INTRAVENOUS at 12:00

## 2025-01-01 RX ADMIN — NOREPINEPHRINE BITARTRATE 0.16 MCG/KG/MIN: 0.03 INJECTION, SOLUTION INTRAVENOUS at 15:27

## 2025-01-01 RX ADMIN — LACTULOSE 20 G: 10 SOLUTION ORAL at 09:29

## 2025-01-01 RX ADMIN — NOREPINEPHRINE BITARTRATE 0.18 MCG/KG/MIN: 0.03 INJECTION, SOLUTION INTRAVENOUS at 18:32

## 2025-01-01 RX ADMIN — FOLIC ACID 1 MG: 1 TABLET ORAL at 08:09

## 2025-01-01 RX ADMIN — ANORECTAL OINTMENT 1 APPLICATION: 15.7; .44; 24; 20.6 OINTMENT TOPICAL at 21:53

## 2025-01-01 RX ADMIN — FUROSEMIDE 60 MG: 10 INJECTION, SOLUTION INTRAMUSCULAR; INTRAVENOUS at 08:59

## 2025-01-01 RX ADMIN — LORAZEPAM 2 MG: 2 INJECTION INTRAMUSCULAR; INTRAVENOUS at 21:11

## 2025-01-01 RX ADMIN — THIAMINE HYDROCHLORIDE 200 MG: 100 INJECTION, SOLUTION INTRAMUSCULAR; INTRAVENOUS at 05:33

## 2025-01-01 RX ADMIN — THIAMINE HYDROCHLORIDE 200 MG: 100 INJECTION, SOLUTION INTRAMUSCULAR; INTRAVENOUS at 14:51

## 2025-01-01 RX ADMIN — ANORECTAL OINTMENT 1 APPLICATION: 15.7; .44; 24; 20.6 OINTMENT TOPICAL at 20:10

## 2025-01-01 RX ADMIN — MUPIROCIN 1 APPLICATION: 20 OINTMENT TOPICAL at 20:59

## 2025-01-01 RX ADMIN — POTASSIUM CHLORIDE 20 MEQ: 400 INJECTION, SOLUTION INTRAVENOUS at 05:19

## 2025-01-01 RX ADMIN — ALBUMIN (HUMAN) 25 G: 0.25 INJECTION, SOLUTION INTRAVENOUS at 15:49

## 2025-01-01 RX ADMIN — CEPHALEXIN 500 MG: 500 CAPSULE ORAL at 21:54

## 2025-01-01 RX ADMIN — THIAMINE HYDROCHLORIDE 200 MG: 100 INJECTION, SOLUTION INTRAMUSCULAR; INTRAVENOUS at 21:00

## 2025-01-01 RX ADMIN — ALBUMIN (HUMAN) 25 G: 0.25 INJECTION, SOLUTION INTRAVENOUS at 01:35

## 2025-01-01 RX ADMIN — THIAMINE HYDROCHLORIDE 200 MG: 100 INJECTION, SOLUTION INTRAMUSCULAR; INTRAVENOUS at 05:32

## 2025-01-01 RX ADMIN — FOLIC ACID 1 MG: 1 TABLET ORAL at 09:41

## 2025-01-01 RX ADMIN — MORPHINE SULFATE 4 MG: 4 INJECTION, SOLUTION INTRAMUSCULAR; INTRAVENOUS at 00:42

## 2025-01-01 RX ADMIN — GLYCOPYRROLATE 0.4 MG: 0.2 INJECTION INTRAMUSCULAR; INTRAVENOUS at 04:26

## 2025-01-01 RX ADMIN — VANCOMYCIN HYDROCHLORIDE 125 MG: KIT at 00:56

## 2025-01-01 RX ADMIN — ANORECTAL OINTMENT 1 APPLICATION: 15.7; .44; 24; 20.6 OINTMENT TOPICAL at 09:41

## 2025-01-01 RX ADMIN — POTASSIUM CHLORIDE 40 MEQ: 1.5 POWDER, FOR SOLUTION ORAL at 21:13

## 2025-01-01 RX ADMIN — VANCOMYCIN HYDROCHLORIDE 125 MG: KIT at 17:51

## 2025-01-01 RX ADMIN — LORAZEPAM 2 MG: 2 INJECTION INTRAMUSCULAR; INTRAVENOUS at 12:53

## 2025-01-01 RX ADMIN — VANCOMYCIN HYDROCHLORIDE 2000 MG: 10 INJECTION, POWDER, LYOPHILIZED, FOR SOLUTION INTRAVENOUS at 22:57

## 2025-01-01 RX ADMIN — MUPIROCIN 1 APPLICATION: 20 OINTMENT TOPICAL at 21:13

## 2025-01-01 RX ADMIN — Medication 10 ML: at 20:10

## 2025-01-01 RX ADMIN — POTASSIUM CHLORIDE 40 MEQ: 1.5 POWDER, FOR SOLUTION ORAL at 18:04

## 2025-01-01 RX ADMIN — Medication 10 ML: at 21:13

## 2025-01-01 RX ADMIN — CEFTRIAXONE 2000 MG: 2 INJECTION, POWDER, FOR SOLUTION INTRAMUSCULAR; INTRAVENOUS at 12:06

## 2025-01-01 RX ADMIN — METHYLPREDNISOLONE SODIUM SUCCINATE 125 MG: 125 INJECTION INTRAMUSCULAR; INTRAVENOUS at 17:51

## 2025-01-01 RX ADMIN — VASOPRESSIN 0.03 UNITS/MIN: 20 INJECTION, SOLUTION INTRAVENOUS at 22:53

## 2025-01-01 RX ADMIN — MAGNESIUM SULFATE IN DEXTROSE 1 G: 10 INJECTION, SOLUTION INTRAVENOUS at 04:03

## 2025-01-01 RX ADMIN — VASOPRESSIN 0.03 UNITS/MIN: 20 INJECTION, SOLUTION INTRAVENOUS at 09:51

## 2025-01-01 RX ADMIN — LORAZEPAM 2 MG: 2 INJECTION INTRAMUSCULAR; INTRAVENOUS at 15:15

## 2025-01-01 RX ADMIN — DEXTROSE MONOHYDRATE 75 ML/HR: 50 INJECTION, SOLUTION INTRAVENOUS at 16:10

## 2025-01-01 RX ADMIN — RIFAXIMIN 550 MG: 550 TABLET ORAL at 21:52

## 2025-01-01 RX ADMIN — Medication 10 ML: at 21:16

## 2025-01-01 RX ADMIN — RIFAXIMIN 550 MG: 550 TABLET ORAL at 20:10

## 2025-01-01 RX ADMIN — NOREPINEPHRINE BITARTRATE 0.02 MCG/KG/MIN: 0.03 INJECTION, SOLUTION INTRAVENOUS at 01:26

## 2025-01-01 RX ADMIN — LORAZEPAM 1 MG: 2 INJECTION INTRAMUSCULAR; INTRAVENOUS at 10:30

## 2025-01-01 RX ADMIN — ONDANSETRON 4 MG: 2 INJECTION, SOLUTION INTRAMUSCULAR; INTRAVENOUS at 09:33

## 2025-01-01 RX ADMIN — CEPHALEXIN 500 MG: 500 CAPSULE ORAL at 20:10

## 2025-01-01 RX ADMIN — MIDODRINE HYDROCHLORIDE 10 MG: 5 TABLET ORAL at 18:16

## 2025-01-01 RX ADMIN — VANCOMYCIN HYDROCHLORIDE 125 MG: KIT at 05:32

## 2025-01-01 RX ADMIN — POTASSIUM CHLORIDE 10 MEQ: 7.46 INJECTION, SOLUTION INTRAVENOUS at 06:08

## 2025-01-01 RX ADMIN — RIFAXIMIN 550 MG: 550 TABLET ORAL at 21:05

## 2025-01-01 RX ADMIN — NOREPINEPHRINE BITARTRATE 0.28 MCG/KG/MIN: 0.03 INJECTION, SOLUTION INTRAVENOUS at 15:50

## 2025-01-01 RX ADMIN — VANCOMYCIN HYDROCHLORIDE 125 MG: KIT at 18:04

## 2025-01-01 RX ADMIN — CEPHALEXIN 500 MG: 500 CAPSULE ORAL at 09:04

## 2025-01-01 RX ADMIN — Medication 100 MG: at 09:41

## 2025-01-01 RX ADMIN — DEXTROSE MONOHYDRATE 75 ML/HR: 50 INJECTION, SOLUTION INTRAVENOUS at 09:05

## 2025-01-01 RX ADMIN — POTASSIUM CHLORIDE 20 MEQ: 29.8 INJECTION, SOLUTION INTRAVENOUS at 20:41

## 2025-01-01 RX ADMIN — FENTANYL CITRATE 25 MCG: 50 INJECTION, SOLUTION INTRAMUSCULAR; INTRAVENOUS at 11:04

## 2025-01-01 RX ADMIN — POTASSIUM CHLORIDE 20 MEQ: 400 INJECTION, SOLUTION INTRAVENOUS at 05:32

## 2025-01-01 RX ADMIN — Medication 100 MG: at 09:04

## 2025-01-01 RX ADMIN — CEFTRIAXONE 2000 MG: 2 INJECTION, POWDER, FOR SOLUTION INTRAMUSCULAR; INTRAVENOUS at 12:09

## 2025-01-01 RX ADMIN — VANCOMYCIN HYDROCHLORIDE 125 MG: KIT at 14:13

## 2025-01-01 RX ADMIN — LORAZEPAM 1 MG: 2 INJECTION INTRAMUSCULAR; INTRAVENOUS at 03:21

## 2025-01-01 RX ADMIN — LORAZEPAM 1 MG: 2 INJECTION INTRAMUSCULAR; INTRAVENOUS at 17:55

## 2025-01-01 RX ADMIN — LACTULOSE 20 G: 10 SOLUTION ORAL at 21:13

## 2025-01-01 RX ADMIN — MUPIROCIN 1 APPLICATION: 20 OINTMENT TOPICAL at 09:06

## 2025-01-01 RX ADMIN — LORAZEPAM 1 MG: 2 INJECTION INTRAMUSCULAR; INTRAVENOUS at 09:25

## 2025-01-01 RX ADMIN — VANCOMYCIN HYDROCHLORIDE 125 MG: KIT at 23:32

## 2025-01-01 RX ADMIN — THIAMINE HYDROCHLORIDE 200 MG: 100 INJECTION, SOLUTION INTRAMUSCULAR; INTRAVENOUS at 05:19

## 2025-01-01 RX ADMIN — Medication 10 ML: at 08:41

## 2025-01-01 RX ADMIN — THIAMINE HYDROCHLORIDE 200 MG: 100 INJECTION, SOLUTION INTRAMUSCULAR; INTRAVENOUS at 14:35

## 2025-01-01 RX ADMIN — LORAZEPAM 1 MG: 2 INJECTION INTRAMUSCULAR; INTRAVENOUS at 11:22

## 2025-01-01 RX ADMIN — VANCOMYCIN HYDROCHLORIDE 125 MG: KIT at 05:46

## 2025-01-01 RX ADMIN — LORAZEPAM 1 MG: 2 INJECTION INTRAMUSCULAR; INTRAVENOUS at 17:03

## 2025-01-01 RX ADMIN — LORAZEPAM 1 MG: 2 INJECTION INTRAMUSCULAR; INTRAVENOUS at 23:50

## 2025-01-01 RX ADMIN — DEXTROSE MONOHYDRATE 75 ML/HR: 50 INJECTION, SOLUTION INTRAVENOUS at 19:40

## 2025-01-01 RX ADMIN — THIAMINE HYDROCHLORIDE 200 MG: 100 INJECTION, SOLUTION INTRAMUSCULAR; INTRAVENOUS at 20:44

## 2025-01-01 RX ADMIN — Medication 10 ML: at 21:00

## 2025-01-01 RX ADMIN — POTASSIUM CHLORIDE 10 MEQ: 7.46 INJECTION, SOLUTION INTRAVENOUS at 07:40

## 2025-01-01 RX ADMIN — THIAMINE HYDROCHLORIDE 200 MG: 100 INJECTION, SOLUTION INTRAMUSCULAR; INTRAVENOUS at 14:10

## 2025-01-01 RX ADMIN — MORPHINE SULFATE 4 MG: 4 INJECTION, SOLUTION INTRAMUSCULAR; INTRAVENOUS at 04:26

## 2025-01-01 RX ADMIN — SODIUM CHLORIDE, POTASSIUM CHLORIDE, SODIUM LACTATE AND CALCIUM CHLORIDE 125 ML/HR: 600; 310; 30; 20 INJECTION, SOLUTION INTRAVENOUS at 22:59

## 2025-01-01 RX ADMIN — POTASSIUM CHLORIDE 20 MEQ: 29.8 INJECTION, SOLUTION INTRAVENOUS at 11:22

## 2025-01-01 RX ADMIN — MAGNESIUM SULFATE IN WATER FOR 2 G: 40 INJECTION INTRAVENOUS at 09:05

## 2025-01-01 RX ADMIN — LACTULOSE 20 G: 10 SOLUTION ORAL at 09:07

## 2025-01-01 RX ADMIN — LIDOCAINE HYDROCHLORIDE 6 ML: 10 INJECTION, SOLUTION INFILTRATION; PERINEURAL at 16:34

## 2025-01-01 RX ADMIN — Medication 100 MG: at 08:09

## 2025-01-01 RX ADMIN — CEFTRIAXONE 2000 MG: 2 INJECTION, POWDER, FOR SOLUTION INTRAMUSCULAR; INTRAVENOUS at 12:01

## 2025-01-01 RX ADMIN — INSULIN HUMAN 2 UNITS: 100 INJECTION, SOLUTION PARENTERAL at 00:37

## 2025-01-01 RX ADMIN — Medication 10 ML: at 09:30

## 2025-01-01 RX ADMIN — POTASSIUM PHOSPHATE, MONOBASIC AND POTASSIUM PHOSPHATE, DIBASIC 15 MMOL: 224; 236 INJECTION, SOLUTION, CONCENTRATE INTRAVENOUS at 09:06

## 2025-01-01 RX ADMIN — MIDODRINE HYDROCHLORIDE 10 MG: 5 TABLET ORAL at 09:17

## 2025-01-01 RX ADMIN — CEFEPIME 2000 MG: 2 INJECTION, POWDER, FOR SOLUTION INTRAVENOUS at 22:58

## 2025-01-01 RX ADMIN — ANORECTAL OINTMENT 1 APPLICATION: 15.7; .44; 24; 20.6 OINTMENT TOPICAL at 09:05

## 2025-01-01 RX ADMIN — VANCOMYCIN HYDROCHLORIDE 125 MG: KIT at 11:55

## 2025-01-01 RX ADMIN — MIDODRINE HYDROCHLORIDE 10 MG: 5 TABLET ORAL at 11:24

## 2025-01-01 RX ADMIN — METHYLPREDNISOLONE SODIUM SUCCINATE 125 MG: 125 INJECTION INTRAMUSCULAR; INTRAVENOUS at 05:36

## 2025-01-01 RX ADMIN — ANORECTAL OINTMENT 1 APPLICATION: 15.7; .44; 24; 20.6 OINTMENT TOPICAL at 11:31

## 2025-01-01 RX ADMIN — FENTANYL CITRATE 25 MCG: 50 INJECTION, SOLUTION INTRAMUSCULAR; INTRAVENOUS at 21:08

## 2025-01-01 RX ADMIN — VASOPRESSIN 0.03 UNITS/MIN: 20 INJECTION, SOLUTION INTRAVENOUS at 21:08

## 2025-01-01 RX ADMIN — MORPHINE SULFATE 2 MG: 2 INJECTION, SOLUTION INTRAMUSCULAR; INTRAVENOUS at 15:31

## 2025-01-01 RX ADMIN — ANORECTAL OINTMENT 1 APPLICATION: 15.7; .44; 24; 20.6 OINTMENT TOPICAL at 20:06

## 2025-01-01 RX ADMIN — MORPHINE SULFATE 4 MG: 4 INJECTION, SOLUTION INTRAMUSCULAR; INTRAVENOUS at 21:16

## 2025-01-01 RX ADMIN — FOLIC ACID 1 MG: 5 INJECTION, SOLUTION INTRAMUSCULAR; INTRAVENOUS; SUBCUTANEOUS at 09:28

## 2025-01-01 RX ADMIN — FOLIC ACID 1 MG: 5 INJECTION, SOLUTION INTRAMUSCULAR; INTRAVENOUS; SUBCUTANEOUS at 09:45

## 2025-01-01 RX ADMIN — METHYLPREDNISOLONE SODIUM SUCCINATE 125 MG: 125 INJECTION INTRAMUSCULAR; INTRAVENOUS at 18:15

## 2025-04-17 PROBLEM — A41.9 SEPSIS: Status: ACTIVE | Noted: 2025-01-01

## 2025-04-17 NOTE — ED PROVIDER NOTES
EMERGENCY DEPARTMENT ENCOUNTER  Room Number:  17/17  Date of encounter:  4/17/2025  PCP: Zoe Desai MD  Patient Care Team:  Zoe Desai MD as PCP - General (Family Medicine)  Corbin Bailey MD as Surgeon (Neurosurgery)     HPI:  Context: Archie Oquendo is a 59 y.o. male who presents to the ED c/o chief complaint of weakness.  History supplied by patient by EMS.  EMS reports history of alcohol abuse, history of CHF, reports: Now for generalized weakness, patient was unable to get out of the chair.  Patient was satting in the 80s on room air, increased into the mid 90s on 3-1/2 L.  Blood pressure initially 71/44, EMS attempted to obtain IV access but was unsuccessful, blood pressure increased to 90 systolic.  Patient reports that he has been having nausea vomiting as well as diarrhea for last 2 days, reports only 1 episode of emesis today, emesis nonbloody nonbilious, reports numerous episodes of diarrhea, unable to quantify, no blood or mucus.  Patient denies any abdominal pain but does report abdominal distention.  Patient also has little swelling of his lower extremities, is on a diuretic, reports he might have missed doses of his diuretic recently.  Patient reports he has chronic shortness of breath as well as chest pain that has been constant since 2022 when he had a ICD placed.  Patient denies any cough, no fevers, no urinary symptoms.    MEDICAL HISTORY REVIEW  Reviewed in EPIC    PAST MEDICAL HISTORY  Active Ambulatory Problems     Diagnosis Date Noted    Hip pain 03/26/2019    Low back pain 03/26/2019    Gait disorder 03/26/2019    Pain in left knee 03/26/2019    Hypertension 03/26/2019    Lumbar radiculopathy 03/26/2019    Spinal stenosis, L3-4 04/23/2019    Lumbar disc herniation -left L4-5 04/23/2019    S/P lumbar fusion 04/23/2019    Benign essential HTN 06/11/2019    Urinary retention 06/11/2019    Hypokalemia 06/11/2019    Lumbar polyradiculopathy 06/15/2019    S/P fusion of thoracic  spine 2019    Wound drainage 2022    Wound infection after surgery 2022     Resolved Ambulatory Problems     Diagnosis Date Noted    No Resolved Ambulatory Problems     Past Medical History:   Diagnosis Date    Abnormal gait     Abscess of back     CHF (congestive heart failure) 2022    Coronary artery disease     Foot drop, left     Numbness in both legs     Sleep apnea     Spinal stenosis     Spinal stenosis of lumbar region        PAST SURGICAL HISTORY  Past Surgical History:   Procedure Laterality Date    BACK SURGERY      CARDIAC CATHETERIZATION  2022    STATES NO CAD    COLONOSCOPY      INCISION AND DRAINAGE TRUNK N/A 2023    Procedure: Wound debridement and drainage;  Surgeon: Nj Mix MD;  Location: [facility];  Service: Neurosurgery;  Laterality: N/A;    LUMBAR DISCECTOMY      LUMBAR DISCECTOMY FUSION INSTRUMENTATION Left 2019    Procedure: LUMBAR THREE LUMBAR FOUR  AND LUMBAR FIVE LUMBAR SIX DECOMPRESSION. THORACIC ELEVEN TO SACREL TWO POSTERIOR LATERAL ARTHRODESIS(FUSION) WITH SPINAL INSTRUMENTATION. REMOVAL OF LUMBAR INSTRUMENTATION.;  Surgeon: Corbin Bailey MD;  Location: [facility];  Service: Neurosurgery    LUMBAR FUSION      VASECTOMY         FAMILY HISTORY  Family History   Problem Relation Age of Onset    Diabetes Mother     Hypertension Father     Gout Father     Sleep apnea Father     Malig Hyperthermia Neg Hx        SOCIAL HISTORY  Social History     Socioeconomic History    Marital status:     Number of children: 2   Tobacco Use    Smoking status: Former     Current packs/day: 0.00     Average packs/day: 0.5 packs/day for 10.0 years (5.0 ttl pk-yrs)     Types: Cigarettes     Start date: 2009     Quit date: 2019     Years since quittin.8    Smokeless tobacco: Never   Vaping Use    Vaping status: Never Used   Substance and Sexual Activity    Alcohol use: Yes     Alcohol/week: 5.0 standard drinks of alcohol      Types: 5 Cans of beer per week    Drug use: No    Sexual activity: Not Currently     Partners: Female     Birth control/protection: Vasectomy       ALLERGIES  Patient has no known allergies.    The patient's allergies have been reviewed    REVIEW OF SYSTEMS  All systems reviewed and negative except for those discussed in HPI.     PHYSICAL EXAM  I have reviewed the triage vital signs and nursing notes.  ED Triage Vitals [04/17/25 1957]   Temp Heart Rate Resp BP SpO2   97.5 °F (36.4 °C) 95 16 (!) 71/44 (!) 80 %      Temp src Heart Rate Source Patient Position BP Location FiO2 (%)   Tympanic Monitor Lying Right arm --       General: No acute distress.  HENT: NCAT, PERRL, Nares patent.  Eyes: no scleral icterus.  Neck: trachea midline, no ROM limitations.  CV: regular rhythm, regular rate.  Respiratory: normal effort, CTAB.  Abdomen: soft, moderately distended, positive fluid wave, nontender palpation, no rebound tenderness, no guarding or rigidity.  Musculoskeletal: no deformity.  Neuro: Neuro: Alert and oriented, no facial droop, speech clear, no dysarthria or aphasia, moves all extremities well, sensation intact light touch all extremities, no focal deficits  Skin: warm, dry.  Jaundiced    LAB RESULTS  Recent Results (from the past 24 hours)   ECG 12 Lead Dyspnea    Collection Time: 04/17/25  8:08 PM   Result Value Ref Range    QT Interval 415 ms    QTC Interval 519 ms   Comprehensive Metabolic Panel    Collection Time: 04/17/25  8:11 PM    Specimen: Blood   Result Value Ref Range    Glucose 137 (H) 65 - 99 mg/dL    BUN 63 (H) 8 - 23 mg/dL    Creatinine 7.11 (H) 0.76 - 1.27 mg/dL    Sodium 129 (L) 136 - 145 mmol/L    Potassium 3.7 3.5 - 5.2 mmol/L    Chloride 82 (L) 98 - 107 mmol/L    CO2 23.6 22.0 - 29.0 mmol/L    Calcium 7.9 (L) 8.6 - 10.5 mg/dL    Total Protein 6.8 6.0 - 8.5 g/dL    Albumin 3.2 (L) 3.5 - 5.2 g/dL    ALT (SGPT) 293 (H) 1 - 41 U/L    AST (SGOT) 663 (H) 1 - 40 U/L    Alkaline Phosphatase 167 (H) 39  - 117 U/L    Total Bilirubin 8.3 (H) 0.0 - 1.2 mg/dL    Globulin 3.6 gm/dL    A/G Ratio 0.9 g/dL    BUN/Creatinine Ratio 8.9 7.0 - 25.0    Anion Gap 23.4 (H) 5.0 - 15.0 mmol/L    eGFR 8.1 (L) >60.0 mL/min/1.73   High Sensitivity Troponin T    Collection Time: 04/17/25  8:11 PM    Specimen: Blood   Result Value Ref Range    HS Troponin T 1,013 (C) <22 ng/L   BNP    Collection Time: 04/17/25  8:11 PM    Specimen: Blood   Result Value Ref Range    proBNP >70,000.0 (H) 0.0 - 900.0 pg/mL   Magnesium    Collection Time: 04/17/25  8:11 PM    Specimen: Blood   Result Value Ref Range    Magnesium 1.7 1.6 - 2.4 mg/dL   Procalcitonin    Collection Time: 04/17/25  8:11 PM    Specimen: Blood   Result Value Ref Range    Procalcitonin 7.65 (H) 0.00 - 0.25 ng/mL   Lipase    Collection Time: 04/17/25  8:11 PM    Specimen: Blood   Result Value Ref Range    Lipase 449 (H) 13 - 60 U/L   Protime-INR    Collection Time: 04/17/25  8:11 PM    Specimen: Blood   Result Value Ref Range    Protime 19.5 (H) 11.7 - 14.2 Seconds    INR 1.64 (H) 0.90 - 1.10   aPTT    Collection Time: 04/17/25  8:11 PM    Specimen: Blood   Result Value Ref Range    PTT 34.5 22.7 - 35.4 seconds   CBC Auto Differential    Collection Time: 04/17/25  8:11 PM    Specimen: Blood   Result Value Ref Range    WBC 8.77 3.40 - 10.80 10*3/mm3    RBC 2.89 (L) 4.14 - 5.80 10*6/mm3    Hemoglobin 10.8 (L) 13.0 - 17.7 g/dL    Hematocrit 30.7 (L) 37.5 - 51.0 %    .2 (H) 79.0 - 97.0 fL    MCH 37.4 (H) 26.6 - 33.0 pg    MCHC 35.2 31.5 - 35.7 g/dL    RDW 16.1 (H) 12.3 - 15.4 %    RDW-SD 62.6 (H) 37.0 - 54.0 fl    MPV 11.7 6.0 - 12.0 fL    Platelets 83 (L) 140 - 450 10*3/mm3   Ethanol    Collection Time: 04/17/25  8:11 PM    Specimen: Blood   Result Value Ref Range    Ethanol <10 0 - 10 mg/dL    Ethanol % <0.010 %   Manual Differential    Collection Time: 04/17/25  8:11 PM    Specimen: Blood   Result Value Ref Range    Neutrophil % 86.0 (H) 42.7 - 76.0 %    Lymphocyte % 10.0 (L)  19.6 - 45.3 %    Monocyte % 4.0 (L) 5.0 - 12.0 %    Eosinophil % 0.0 (L) 0.3 - 6.2 %    Basophil % 0.0 0.0 - 1.5 %    Neutrophils Absolute 7.54 (H) 1.70 - 7.00 10*3/mm3    Lymphocytes Absolute 0.88 0.70 - 3.10 10*3/mm3    Monocytes Absolute 0.35 0.10 - 0.90 10*3/mm3    Eosinophils Absolute 0.00 0.00 - 0.40 10*3/mm3    Basophils Absolute 0.00 0.00 - 0.20 10*3/mm3    Anisocytosis Mod/2+ None Seen    Macrocytes Slight/1+ None Seen    Microcytes Slight/1+ None Seen    Poikilocytes Slight/1+ None Seen    Polychromasia Slight/1+ None Seen    Target Cells Slight/1+ None Seen    WBC Morphology Normal Normal    Platelet Morphology Normal Normal   COVID-19 and FLU A/B PCR, 1 HR TAT - Swab, Nasopharynx    Collection Time: 04/17/25  8:14 PM    Specimen: Nasopharynx; Swab   Result Value Ref Range    COVID19 Not Detected Not Detected - Ref. Range    Influenza A PCR Not Detected Not Detected    Influenza B PCR Not Detected Not Detected   POC Glucose Once    Collection Time: 04/17/25  8:18 PM    Specimen: Blood   Result Value Ref Range    Glucose 139 (H) 70 - 130 mg/dL   Lactic Acid, Plasma    Collection Time: 04/17/25  8:30 PM    Specimen: Blood   Result Value Ref Range    Lactate 5.3 (C) 0.5 - 2.0 mmol/L   Urinalysis With Microscopic If Indicated (No Culture) - Indwelling Urethral Catheter    Collection Time: 04/17/25  8:48 PM    Specimen: Indwelling Urethral Catheter; Urine   Result Value Ref Range    Color, UA Dark Yellow (A) Yellow, Straw    Appearance, UA Turbid (A) Clear    pH, UA <=5.0 5.0 - 8.0    Specific Gravity, UA 1.023 1.005 - 1.030    Glucose, UA Negative Negative    Ketones, UA Negative Negative    Bilirubin, UA Small (1+) (A) Negative    Blood, UA Moderate (2+) (A) Negative    Protein,  mg/dL (2+) (A) Negative    Leuk Esterase, UA Small (1+) (A) Negative    Nitrite, UA Positive (A) Negative    Urobilinogen, UA 1.0 E.U./dL 0.2 - 1.0 E.U./dL   Urine Drug Screen - Indwelling Urethral Catheter    Collection Time:  04/17/25  8:48 PM    Specimen: Indwelling Urethral Catheter; Urine   Result Value Ref Range    THC, Screen, Urine Negative Negative    Phencyclidine (PCP), Urine Negative Negative    Cocaine Screen, Urine Negative Negative    Methamphetamine, Ur Negative Negative    Opiate Screen Negative Negative    Amphetamine Screen, Urine Negative Negative    Benzodiazepine Screen, Urine Negative Negative    Tricyclic Antidepressants Screen Negative Negative    Methadone Screen, Urine Negative Negative    Barbiturates Screen, Urine Negative Negative    Oxycodone Screen, Urine Negative Negative    Buprenorphine, Screen, Urine Negative Negative   Fentanyl, Urine - Indwelling Urethral Catheter    Collection Time: 04/17/25  8:48 PM    Specimen: Indwelling Urethral Catheter; Urine   Result Value Ref Range    Fentanyl, Urine Negative Negative   Urinalysis, Microscopic Only - Indwelling Urethral Catheter    Collection Time: 04/17/25  8:48 PM    Specimen: Indwelling Urethral Catheter; Urine   Result Value Ref Range    RBC, UA Too Numerous to Count (A) None Seen, 0-2 /HPF    WBC, UA 6-10 (A) None Seen, 0-2 /HPF    Bacteria, UA None Seen None Seen /HPF    Squamous Epithelial Cells, UA 3-6 (A) None Seen, 0-2 /HPF    Hyaline Casts, UA 31-50 None Seen /LPF    Methodology Automated Microscopy    High Sensitivity Troponin T 1Hr    Collection Time: 04/17/25  9:48 PM    Specimen: Blood   Result Value Ref Range    HS Troponin T 822 (C) <22 ng/L    Troponin T Numeric Delta -191 ng/L    Troponin T % Delta -19 Abnormal if >/= 20%   Uric Acid    Collection Time: 04/17/25  9:48 PM    Specimen: Blood   Result Value Ref Range    Uric Acid 13.6 (H) 3.4 - 7.0 mg/dL       I ordered the above labs and reviewed the results.    RADIOLOGY  CT Abdomen Pelvis Without Contrast  Result Date: 4/17/2025  CT OF THE ABDOMEN AND PELVIS WITHOUT CONTRAST  HISTORY: Abdominal distention and abnormal elevated liver function tests.  COMPARISON: None available.  TECHNIQUE:  Axial CT imaging was obtained through the abdomen and pelvis. No IV contrast was administered.  FINDINGS: The patient is noted to have small bilateral pleural effusions. There is dependent atelectasis. Area of groundglass attenuation is noted within the right middle lobe, measuring 1.1 cm. There are coronary artery calcifications. The liver is markedly steatotic. It is enlarged, measuring up to 20.1 cm. There is high density material within the gallbladder, which may reflect stones or sludge. The spleen and adrenal glands are normal. There is some inflammatory stranding and trace fluid surrounding the pancreas. Acute pancreatitis is not excluded. Organized peripancreatic collection is not seen. The stomach and duodenum appear normal. There is no hydronephrosis on either side. There are aortoiliac calcifications. A Rae catheter is present within the urinary bladder. Prostate gland is within normal limits. There is no bowel obstruction. There is colonic diverticulosis. There is no evidence of appendicitis. There is a thick walled appearance to the ascending and transverse colon. Some of the appearance may be related to portal colopathy, although correlation with any evidence of colitis is recommended. The patient is noted to have moderate ascites. There are changes of extensive posterior thoracolumbar spinal fusion. There is some body wall edema.       1. Marked hepatic steatosis. Patient is noted to have moderate ascites. 2. There is some inflammatory stranding surrounding the pancreas, as well as some trace fluid. Appearance may reflect acute pancreatitis. There is no pancreatic ductal dilatation, and no organized peripancreatic collections are seen. 3. High density material within the gallbladder may reflect stones or sludge. 4. Thick walled appearance to the ascending and transverse colon appearance may be related to portal colopathy, although correlation with any evidence of colitis is recommended. 5.  Indeterminate ground glass pulmonary nodule measuring 11 mm. For a ground glass nodule >=6 mm, recommend CT at 6-12 months to confirm persistence, then CT every 2 years until 5 years. If solid component(s) or growth develops, consider resection. Radiation dose reduction techniques were utilized, including automated exposure control and exposure modulation based on body size.   This report was finalized on 4/17/2025 10:40 PM by Dr. Adrianna Fleming M.D on Workstation: [id number]      XR Chest 1 View  Result Date: 4/17/2025  XR CHEST 1 VW-  HISTORY: Male who is 59 years-old, short of breath  TECHNIQUE: Frontal view of the chest  COMPARISON: None available  FINDINGS: The heart size is borderline. Left-sided pacemaker, cardiac leads are seen. Pulmonary vasculature is unremarkable. Small left basilar atelectasis/infiltrate/effusion, follow-up recommended. No pneumothorax. No acute osseous process.      As described.  This report was finalized on 4/17/2025 8:19 PM by Dr. David Smith M.D on Workstation: [id number]        I ordered the above noted radiological studies. I reviewed the images and results. I agree with the radiologist interpretation.    PROCEDURES  Procedures    MEDICATIONS GIVEN IN ER  Medications   vancomycin IVPB 2000 mg in 0.9% Sodium Chloride 500 mL (2,000 mg Intravenous New Bag 4/17/25 8846)   cefepime 2000 mg IVPB in 100 mL NS (MBP) (2,000 mg Intravenous New Bag 4/17/25 4457)   lactated ringers infusion (125 mL/hr Intravenous New Bag 4/17/25 6470)   dextrose (GLUTOSE) oral gel 15 g (has no administration in time range)   dextrose (D50W) (25 g/50 mL) IV injection 25 g (has no administration in time range)   glucagon (GLUCAGEN) injection 1 mg (has no administration in time range)   insulin regular (humuLIN R,novoLIN R) injection 2-7 Units (has no administration in time range)   nitroglycerin (NITROSTAT) SL tablet 0.4 mg (has no administration in time range)   sodium chloride 0.9 % flush 10 mL (has  no administration in time range)   sodium chloride 0.9 % flush 10 mL (has no administration in time range)   sodium chloride 0.9 % infusion 40 mL (has no administration in time range)   mupirocin (BACTROBAN) 2 % nasal ointment 1 Application (has no administration in time range)   sennosides-docusate (PERICOLACE) 8.6-50 MG per tablet 2 tablet (has no administration in time range)     And   polyethylene glycol (MIRALAX) packet 17 g (has no administration in time range)     And   bisacodyl (DULCOLAX) EC tablet 5 mg (has no administration in time range)     And   bisacodyl (DULCOLAX) suppository 10 mg (has no administration in time range)   ondansetron (ZOFRAN) injection 4 mg (has no administration in time range)   Magnesium Standard Dose Replacement - Follow Nurse / BPA Driven Protocol (has no administration in time range)   thiamine (B-1) injection 200 mg (has no administration in time range)     Followed by   thiamine (VITAMIN B-1) tablet 100 mg (has no administration in time range)   folic acid 1 mg in sodium chloride 0.9 % 50 mL IVPB (has no administration in time range)   LORazepam (ATIVAN) injection 2 mg (has no administration in time range)     Followed by   LORazepam (ATIVAN) injection 1 mg (has no administration in time range)   LORazepam (ATIVAN) tablet 1 mg (has no administration in time range)     Or   LORazepam (ATIVAN) injection 1 mg (has no administration in time range)     Or   LORazepam (ATIVAN) tablet 2 mg (has no administration in time range)     Or   LORazepam (ATIVAN) injection 2 mg (has no administration in time range)     Or   LORazepam (ATIVAN) injection 2 mg (has no administration in time range)     Or   LORazepam (ATIVAN) injection 2 mg (has no administration in time range)   fentaNYL citrate (PF) (SUBLIMAZE) injection 25 mcg (has no administration in time range)   sodium chloride 0.9 % infusion 1,000 mL (0 mL Intravenous Stopped 4/17/25 2119)   folic acid 1 mg in sodium chloride 0.9 % 50 mL  IVPB (1 mg Intravenous New Bag 4/17/25 2246)   thiamine (B-1) injection 200 mg (200 mg Intravenous Given 4/17/25 2124)   ondansetron (ZOFRAN) injection 8 mg (8 mg Intravenous Given 4/17/25 2123)   sodium chloride 0.9 % bolus 3,120 mL (0 mL Intravenous Stopped 4/17/25 2259)       PROGRESS, DATA ANALYSIS, CONSULTS, AND MEDICAL DECISION MAKING  A complete history and physical exam have been performed.  All available laboratory and imaging results have been reviewed by myself prior to disposition.    MDM    After the initial H&P, I discussed pertinent information from history and physical exam with patient/family.  Discussed differential diagnosis.  Discussed plan for ED evaluation/workup/treatment.  All questions answered.  Patient/family is agreeable with plan.  ED Course as of 04/17/25 8594   Thu Apr 17, 2025 1958 My differential diagnosis for generalized weakness includes but is not limited to:  Neuromuscular weakness   CVA  Hemorrhagic stroke  Multiple sclerosis  Amyotrophic Lateral Sclerosis (ALS) (UMN & LMN)  Spinal and bulbar muscular atrophy (Harvey's syndrome)  Spinal cord disease: Infection (Epidural abscess)  Infarction/ischemia  Trauma (Spinal Cord Syndromes)  Inflammation (Transverse Myelitis)  Degenerative (Spinal muscular atrophy)  Tumor  Peripheral nerve disease: Guillain-Bellevue syndrome  Toxins ([place])  Tick paralysis  Diabetic peripheral neuropathy  NMJ disease: Myasthenia gravis crisis  Botulism  Organophosphate toxicity  Lambert-Eaton myasthenic syndrome  Rhabdomyolysis  Dermatomyositis  Polymyositis  Alcoholic myopathy  Non-neuromuscular weakness   ACS  Arrhythmia/Syncope  Severe infection/Sepsis  Hypoglycemia  Periodic paralysis (electrolyte disturbance, K, Mg, Ca)   Hypokalemic periodic paralysis  Thyrotoxic periodic paralysis  Respiratory failure  Symptomatic Anemia  Severe dehydration  Hypothyroidism  Polypharmacy  Malignancy           [JG]   2001 Review of prior external notes (non-ED)  -and- Review of prior external test results outside of this encounter:   I reviewed patient's primary care office visit note from beginning of the month, patient presented for annual exam.  I reviewed patient's BMP from 2/10/2023, potassium 3.3, CO2 30, otherwise unremarkable. [JG]   2016 Patient hypotensive, giving IV fluids. [JG]   2016 Reviewed chest x-ray in PACS, no pulmonary infiltrates per my read. [JG]   2016 EKG independently viewed and contemporaneously interpreted by ED physician. Time: 2008.  Rate 93.  Interpretation: Normal sinus rhythm, normal axis, inferior and anterior Q waves, no acute ST changes,  occasional PVC, no contiguous PVCs, PVCs multifocal.   [JG]   2027 Patient reports he has been unable to urinate since yesterday, bladder scan showing greater than 500 mL, placing Rae catheter for urinary retention. [JG]   2034 Patient maintaining sat of 94% on room air. [JG]   2102 Creatinine 7.11, BUN 63.  Last kidney function from a year ago was normal.  Given additional IV fluids, consulting nephrology. [JG]   2103 High-sensitivity troponin over thousand, likely secondary to severe kidney dysfunction as EKG shows no sign of ischemia.  Obtain repeat troponin to evaluate trend.  Consulting cardiology. [JG]   2104 Lactic acid 5.3, [JG]   2104  this could be secondary to liver dysfunction but procalcitonin significantly elevated at 7.65.  Giving empiric broad-spectrum antibiotics, using Vanco and cefepime. [JG]   2105 AST greater than 600, ALT greater than 293, bilirubin is insignificantly elevated at 8.3.  Obtaining CT imaging of abdomen pelvis, patient will not tolerate IV contrast with severe renal dysfunction present. [JG]   2106 MELDNa/MELD-Na Score for Liver Cirrhosis - MDCalc  Calculated on Apr 17 2025 9:06 PM  35 points -> MELD-Na Score  65 - 66% -> Estimated 90-Day Mortality [JG]   2125 Phone call with Dr. Campuzano, nephrology.  Discussed the patient, relevant history, exam, diagnostics, ED  findings/progress, and concerns.  He agrees with ED workup and treatment to present, request after fluid bolus for patient to be placed on LR at 125 an hour, they agree to consult.    [JG]   2126 Nitrite positive urinary tract infection, patient already on appropriate broad-spectrum antibiotics. [JG]   2128 Patient has poor prognosis, consulting palliative care for goals of care discussion. [JG]   2137 Phone call with AMADEO with pulmonology.  Discussed the patient, relevant history, exam, diagnostics, ED findings/progress, and concerns. They agree to admit the patient to ICU under Dr. Weston. Care assumed by the admitting physician at this time.     [JG]   2144 Phone call with Dr. Raymundo, cardiology.  Discussed the patient, relevant history, exam, diagnostics, ED findings/progress, and concerns.  They agree to consult.    [JG]      ED Course User Index  [JG] Tawanda Bates MD       AS OF 23:16 EDT VITALS:    BP - 117/90  HR - 98  TEMP - 97.5 °F (36.4 °C) (Tympanic)  O2 SATS - 94%    DIAGNOSIS  Final diagnoses:   Sepsis, due to unspecified organism, unspecified whether acute organ dysfunction present   Elevated lactic acid level   Elevated procalcitonin   Acute UTI   KEATON (acute kidney injury)   Elevated troponin   Elevated liver enzymes   Elevated bilirubin   Abdominal distension   Ascites due to alcoholic hepatitis   Elevated lipase   Anemia, unspecified type   Thrombocytopenia     Critical care:  Total critical care time of 56 minutes is exclusive of any other billable procedures and includes time spent with direct patient care and observation, retrospective chart review, management of acute condition, and consultation with other physicians.      DISPOSITION  ADMISSION    Discussed treatment plan and reason for admission with pt/family and admitting physician.  Pt/family voiced understanding of the plan for admission for further testing/treatment as needed.        Tawanda Bates MD  04/17/25 8300

## 2025-04-18 NOTE — PROGRESS NOTES
MD Attestation of AMADEO Note    I, Antony Weston MD, have reviewed the history and plan as obtained by VERONICA Barrett. I have independently examined the patient and I personally performed >50% of the management in this split shared patient. My physical exam confirms her documented findings and I agree with the plan as listed, with the following additions/modifications:    Brief Summary Statement  59 y.o. male with history of congestive heart failure, alcohol abuse, and hypertension who presented to the hospital with weakness.  Patient complaining of nausea, vomiting, diarrhea for the past 2 days duration.  Also with worsening lower extremity swelling and abdominal distention.  Does have a history of heart failure for which she has not taken his diuretics in many days.  Also with significant alcohol abuse history of over 6 drinks a day, states he has not drank in over 6 days.  Marked laboratory abnormalities including acute renal failure, transaminitis, lactic acidosis, troponin elevation and marked BNP elevation, positive UA.    Attending Physical Exam  Temp:  [97.5 °F (36.4 °C)] 97.5 °F (36.4 °C)  Heart Rate:  [95-98] 95  Resp:  [16] 16  BP: ()/(44-58) 143/58  General: Alert, nontoxic, NAD, weak appearing  HEENT: NC/AT, EOMI, MMM  Neck: Supple, trachea midline  Cardiac: RRR, no murmur, gallops, rubs  Pulmonary: Clear to auscultation bilaterally, no adventitious breath sounds, normal respiratory effort  GI: Marked distention  Extremities: Warm, well perfused, 2+ LE edema  Skin: no visible rash  Neuro: CN II - XII grossly intact  Psychiatry: Normal mood and affect    Remainder of detailed HPI is as noted above and has been reviewed by me for completeness.  Assessment/Plan    Acute renal failure  Suspected decompensated cirrhosis  Non-ST elevation myocardial infarction  Urinary tract infection  Urinary retention  Hyponatremia  Lactic acidosis  Anion gap metabolic acidosis  Transaminitis  Markedly elevated  BNP  History of alcohol abuse  Hypertension  Lumbar disc herniation, spinal stenosis, lumbar radiculopathy, status post lumbar fusion  Status post thoracic fusion    Patient presented to the hospital with weakness and ultimately found to have acute renal failure, suspected decompensated cirrhosis, lactic acidosis, positive UA.      Patient with significant alcohol abuse history, states that last drink was over 2 days ago, will need CIWA protocol for concerns for withdrawal.    Significant abdominal distention, hepatomegaly on CT abdomen pelvis with official read pending, transaminitis, elevated INR, thrombocytopenia all consistent with suspected decompensated cirrhosis.  Will have GI evaluate in the morning.  Already on antibiotics, will cover SBP.  GI PCR pending.    Acute renal failure, suspect HRS versus urinary retention.  Rae catheter placed.  Renal labs ordered.  Nephrology consulted.  Blood pressures improved after fluid resuscitation, suspect intravascularly dry while overall fluid overloaded.    History of heart failure with recent noncompliance with diuretics.  Significant fluid overload on evaluation.  BNP markedly elevated.  Troponin downtrending.  No chest pain or signs of ACS.  Echocardiogram ordered.  Cardiology consult pending.    Concern for infection with markedly elevated procalcitonin, likely urinary source versus SBP.  On empiric vancomycin and cefepime.    Patient is in critical condition with multiorgan failure including cardiac, hepatic, renal.  Prognosis is guarded.  Palliative has been consulted.    Critical care time: 45 minutes    See above section for further detailed assessment and plan developed with APRN which I have reviewed.      Electronically signed by Antony Weston MD, 4/17/2025, 21:40 EDT.     Addendum  =========================  C. difficile antigen and toxin negative, PCR positive indicating carrier status without active infection.  Also EPEC positive.  Supportive care,  does not indicate p.o. vancomycin treatment at this time.

## 2025-04-18 NOTE — ED NOTES
"Pt arrived via [facility] from home c/o generalized weakness x2 days w/ N/V & all over back pain. Pt also reports diarrhea that started today. Pt states @ baseline he drinks approx. x6 shots of vodka a day but has not had any in approx. x2 days. Per ems pt had a fifth of vodka sitting next to him upon their arrival to the residence. Pt also reports not taking his CHF medication the last several days but restarted it today due to \"swelling up.\" Pt A&O x4 during triage exam.   "

## 2025-04-18 NOTE — PROCEDURES
[facility]    Procedure note    Type of procedure: Left lower quadrant abdominal paracentesis    Indication: Ascites causing respiratory compromise    Diagnosis: Tense ascites causing respiratory compromise    Consent: Obtained from his daughter    Technique: Under ultrasound visualization, the left lower quadrant of the abdomen was imaged and adequate volume of ascitic fluid was identified.  There were no intestinal loops close to the peritoneal surface.  Large amount of fluid was identified.  The area was prepped and draped in the usual sterile fashion.  The intersection of the horizontal line through the umbilicus and a vertical line through the anterior axillary wall was chosen as the puncture site.  Ultrasound imaging verified no interposition of spleen or intestinal loops.  Lidocaine was infiltrated locally for anesthesia  5 [place] trocar catheter was inserted and 3 L of ascitic fluid was successfully removed with glass vacuum container bottles.    This concluded the procedure.    Complications: None.    Blood loss: Minimal.    Electronically signed by Marcos Rodas MD, 04/18/25, 2:11 PM EDT.

## 2025-04-18 NOTE — PLAN OF CARE
Goal Outcome Evaluation:  Plan of Care Reviewed With: patient        Progress: declining  Outcome Evaluation: AOX4, CIWA, PERRLA, Severe Bilateral Lower edema present. Pt C/O pain but refuses pain med. Liquid BM this shift. Pt almost max on levo to mantain map above 65. Mag & Potassium replaced per nephro orders.

## 2025-04-18 NOTE — CONSULTS
Date of Consultation: 25    Referral Provider: Dr. Rodas.    Reason for Consultation: Elevated troponin, CHF exacerbation.    Encounter Provider: Darrion Arroyo MD    Group of Service: [facility]     Patient Name: Archie Oquendo    :1965    Chief complaint: Weakness.    History of Present Illness:      The patient had just received Ativan before I saw him.  He was lethargic, and I could not obtain an accurate history from him.  Therefore, all information is reviewed from the chart.    This is a very pleasant 59 year-old male who typically follows with Dr. Cho at [facility] cardiology.  He has a history of coronary artery disease, and has a known  of the RCA by cath in 2022.  He also has a history of alcohol use, and has been known to drink at least 6 shots of vodka a day.  He also has a history of a cardiomyopathy and chronic CHF with varying ejection fractions in the past.  His ejection fraction has been as low as 15 to 20% in the past as well.  Also has chronic kidney disease, as well as cirrhosis.    He presented to the emergency department complaining of weakness.  He had been having significant nausea, vomiting, and diarrhea.  He was found to have acute kidney injury with chronic kidney disease, as well as suspected decompensated cirrhosis.  He appeared to be significantly volume overloaded, and there has been a question of compliance with his medications at home.  His troponin was also elevated at 1013 and then 822.  However, this was in the setting of a creatinine of over 6.  Again, he was not awake enough to answer questions about chest discomfort.  He did test positive for C. difficile and enteropathogenic E. coli in his stool samples.      Stress Test 19  Diaphragmatic attenuation artifact is present.  Left ventricular ejection fraction is normal (Calculated EF = 56%).  Myocardial perfusion imaging indicates a normal myocardial  perfusion study with no evidence of ischemia.  Impressions are consistent with a low risk study.       Past Medical History:   Diagnosis Date    Abnormal gait     Abscess of back     CHF (congestive heart failure) 06/2022    Coronary artery disease     ONE VESSEL    Foot drop, left     Hypertension     Low back pain     Lumbar disc herniation     Numbness in both legs     MOSTLY IN KNEES DOWN    Pain in left knee     Sleep apnea     DOES NOT WEAR CPAP    Spinal stenosis     Spinal stenosis of lumbar region          Past Surgical History:   Procedure Laterality Date    BACK SURGERY      BEDSIDE PARACENTESIS  4/18/2025    CARDIAC CATHETERIZATION  06/2022    STATES NO CAD    COLONOSCOPY  2013    INCISION AND DRAINAGE TRUNK N/A 02/13/2023    Procedure: Wound debridement and drainage;  Surgeon: Nj Mix MD;  Location: [facility] OR;  Service: Neurosurgery;  Laterality: N/A;    INSERT CENTRAL LINE AT BEDSIDE  4/18/2025    LUMBAR DISCECTOMY  2000    LUMBAR DISCECTOMY FUSION INSTRUMENTATION Left 06/05/2019    Procedure: LUMBAR THREE LUMBAR FOUR  AND LUMBAR FIVE LUMBAR SIX DECOMPRESSION. THORACIC ELEVEN TO SACREL TWO POSTERIOR LATERAL ARTHRODESIS(FUSION) WITH SPINAL INSTRUMENTATION. REMOVAL OF LUMBAR INSTRUMENTATION.;  Surgeon: Corbin Bailey MD;  Location: [facility] OR;  Service: Neurosurgery    LUMBAR FUSION  2008    VASECTOMY           No Known Allergies      No current facility-administered medications on file prior to encounter.     Current Outpatient Medications on File Prior to Encounter   Medication Sig Dispense Refill    Aspirin Low Dose 81 MG EC tablet Take 1 tablet by mouth Every Morning.      atorvastatin (LIPITOR) 40 MG tablet Take 1 tablet by mouth Every Evening.      bismuth subsalicylate (PEPTO BISMOL) 262 MG/15ML suspension Take 15 mL by mouth Every 6 (Six) Hours As Needed for Indigestion.      bumetanide (BUMEX) 2 MG tablet Take 1 tablet by mouth 2 (Two) Times a Day.      lisinopril  (PRINIVIL,ZESTRIL) 20 MG tablet Take 1 tablet by mouth Daily.      metoprolol succinate XL (TOPROL-XL) 25 MG 24 hr tablet Take 1 tablet by mouth Daily. (Patient taking differently: Take 1 tablet by mouth Daily.) 30 tablet 1    Multiple Vitamins-Minerals (MULTIVITAMIN ADULT PO) Take 1 tablet by mouth Daily.      potassium chloride ER (K-TAB) 20 MEQ tablet controlled-release ER tablet Take 1 tablet by mouth 2 (Two) Times a Day.      cyclobenzaprine (FLEXERIL) 10 MG tablet Take 1 tablet by mouth 2 (Two) Times a Day As Needed.      ibuprofen (ADVIL,MOTRIN) 800 MG tablet 1 tablet.      Sacubitril-Valsartan (ENTRESTO PO) Take  by mouth 2 (Two) Times a Day.      [DISCONTINUED] bumetanide (BUMEX) 2 MG tablet Take 1 tablet by mouth Every Morning.      [DISCONTINUED] cyclobenzaprine (FLEXERIL) 10 MG tablet Take 1 tablet by mouth.           Social History     Socioeconomic History    Marital status:     Number of children: 2   Tobacco Use    Smoking status: Former     Current packs/day: 0.00     Average packs/day: 0.5 packs/day for 10.0 years (5.0 ttl pk-yrs)     Types: Cigarettes     Start date: 2009     Quit date: 2019     Years since quittin.8    Smokeless tobacco: Never   Vaping Use    Vaping status: Never Used   Substance and Sexual Activity    Alcohol use: Yes     Alcohol/week: 5.0 standard drinks of alcohol     Types: 5 Cans of beer per week    Drug use: No    Sexual activity: Not Currently     Partners: Female     Birth control/protection: Vasectomy         Family History   Problem Relation Age of Onset    Diabetes Mother     Hypertension Father     Gout Father     Sleep apnea Father     Malig Hyperthermia Neg Hx        REVIEW OF SYSTEMS:   The patient is lethargic and unable to answer a review of systems.     Objective:     Vitals:    25 1330 25 1345 25 1400 25 1415   BP: (!) 144/105 113/68 121/54 119/66   Pulse: 97 98 97 96   Resp:       Temp:       TempSrc:       SpO2:  "100% 100% 99% 100%   Weight:       Height:         Body mass index is 27.73 kg/m².  Flowsheet Rows      Flowsheet Row First Filed Value   Admission Height 188 cm (74\") Documented at 04/17/2025 1957   Admission Weight 104 kg (230 lb) Documented at 04/17/2025 2110             General:    No acute distress, lethargic.  Ill-appearing.                   Head:    Normocephalic, atraumatic.   Eyes:          Conjunctivae and sclerae normal.   Throat:   No oral lesions, no thrush, oral mucosa moist.    Neck:   Supple, trachea midline.   Lungs:     Clear to auscultation bilaterally     Heart:    Regular rhythm and normal rate.  II/VI SM throughout.   Abdomen:     Soft, non-tender, distended, positive bowel sounds.    Extremities:   3+ edema of the lower extremities.   Pulses:   Pulses palpable and equal bilaterally.    Skin:   No bleeding or rash.   Neuro:   Non-focal.  Moves all extremities well.    Psychiatric:   Lethargic.         Lab Review:                Results from last 7 days   Lab Units 04/18/25  1413   SODIUM mmol/L 131*   POTASSIUM mmol/L 4.1   CHLORIDE mmol/L 92*   CO2 mmol/L 22.2   BUN mg/dL 62*   CREATININE mg/dL 5.87*   GLUCOSE mg/dL 142*   CALCIUM mg/dL 7.0*     Results from last 7 days   Lab Units 04/17/25 2148 04/17/25 2011   HSTROP T ng/L 822* 1,013*     Results from last 7 days   Lab Units 04/18/25 0337   WBC 10*3/mm3 8.67   HEMOGLOBIN g/dL 9.2*   HEMATOCRIT % 24.9*   PLATELETS 10*3/mm3 71*     Results from last 7 days   Lab Units 04/18/25 0337 04/17/25 2011   INR  1.79* 1.64*   APTT seconds  --  34.5         Results from last 7 days   Lab Units 04/18/25 0337   MAGNESIUM mg/dL 1.7           EKG (reviewed by me personally):            Assessment:   1.  Nausea, vomiting, and diarrhea  2.  Enteropathogenic E. coli positive stool sample and C. difficile toxin positive  3.  Anasarca, multifactorial  4.  Severe acute kidney injury with chronic kidney disease  5.  Decompensated cirrhosis of the liver  6.  " Moderate ascites by CT  7.  Acute on chronic systolic CHF  8.  Elevated troponin, likely type II NSTEMI secondary to #1, #2, #4, #5, and #9  9.  Coronary artery disease,  of the RCA by cath in 2022  10.  Status post ICD placement  11.  Urinary tract infection with retention, now with Rae catheter  12.  Significant transaminitis  13.  Hypoalbuminemia, contributing to volume overload  14.  Anemia, likely multifactorial  15.  Heavy daily alcohol use  16.  Thrombocytopenia  17.  Suspected medication noncompliance  18.  Metabolic encephalopathy    Plan:       As noted above, the patient is fairly ill with multiple comorbidities.  He has severe acute kidney injury, likely related to volume depletion and GI loss.  He also appears to be volume overloaded peripherally with anasarca.  I strongly suspect this is more from decompensated cirrhosis, acute kidney injury, and hypoalbuminemia.  There may be a component of congestive heart failure as well.  Nephrology is following, although there is no indication for hemodialysis yet.  The GI service planning for a paracentesis as well.  Obviously the combination of volume depletion and anasarca makes this a very tricky situation.    The troponin elevation is likely a type II NSTEMI secondary to multiple factors, but mainly hypotension, severe acute kidney injury, and underlying coronary artery disease.  He is thrombocytopenic, and would be at high risk for bleeding with decompensated cirrhosis.  Additionally, his INR is already 1.79, indicating a coagulopathy.  His Entresto, Toprol-XL, and Lipitor are all being held (including the Lipitor for significant transaminitis).  His blood pressure will not tolerate any guideline directed medical therapy.  An echocardiogram has been ordered, and is pending.  He also does have a defibrillator in place.    Constellation of findings is extremely concerning.  His prognosis is guarded at best.    Thank you very much for this consult.    Neto  MD Cruz

## 2025-04-18 NOTE — H&P
"Group: [facility]         HISTORY AND PHYSICAL    Patient Identification:  Archie Oquendo  59 y.o.  male  1965  [id number]              CC: Weakness    History of Present Illness:  Patient is a 59-year-old male with past medical history of HFrEF s/p ICD, hypertension and alcohol abuse who presents to the ED with complaints of weakness over the last several days.  He reports noncompliance with medications over the last several days but began again today because he was having worsening lower extremity edema.  Says he has reflux and has been \"spitting up\"but denies nausea and vomiting although he admits to this to other providers.  His labs showed acute renal failure, transaminitis, lactic acidosis, elevated troponin and BNP as well as suspected UTI.  He denies a diagnosis of cirrhosis or liver disease.  Reports drinking 6 drinks nightly for the last 1 year due to chronic pain and does have withdrawal symptoms when he quits.  Denies alcohol withdrawal related seizures.    CONSTITUTIONAL:  Denies fevers +chills  EYE:  No new vision changes  EAR:  No change in hearing  CARDIAC:  No chest pain  PULMONARY:  No productive cough or shortness of breath  GI:  No diarrhea, hematemesis or hematochezia,  RENAL:  No dysuria or urinary frequency  MUSCULOSKELETAL:  No musculoskeletal complaints  ENDOCRINE:  No heat or cold intolerance  INTEGUMENTARY: No skin rashes  NEUROLOGICAL:  No dizziness or confusion.  No seizure activity  PSYCHIATRIC:  No new anxiety or depression  12 system review of systems performed and all else negative      Past Medical History:  Past Medical History:   Diagnosis Date    Abnormal gait     Abscess of back     CHF (congestive heart failure) 06/2022    Coronary artery disease     ONE VESSEL    Foot drop, left     Hypertension     Low back pain     Lumbar disc herniation     Numbness in both legs     MOSTLY IN KNEES DOWN    Pain in left knee     Sleep apnea     DOES NOT WEAR CPAP    " "Spinal stenosis     Spinal stenosis of lumbar region        Past Surgical History:  Past Surgical History:   Procedure Laterality Date    BACK SURGERY      CARDIAC CATHETERIZATION  2022    STATES NO CAD    COLONOSCOPY  2013    INCISION AND DRAINAGE TRUNK N/A 2023    Procedure: Wound debridement and drainage;  Surgeon: Nj Mix MD;  Location: [facility];  Service: Neurosurgery;  Laterality: N/A;    LUMBAR DISCECTOMY  2000    LUMBAR DISCECTOMY FUSION INSTRUMENTATION Left 2019    Procedure: LUMBAR THREE LUMBAR FOUR  AND LUMBAR FIVE LUMBAR SIX DECOMPRESSION. THORACIC ELEVEN TO SACREL TWO POSTERIOR LATERAL ARTHRODESIS(FUSION) WITH SPINAL INSTRUMENTATION. REMOVAL OF LUMBAR INSTRUMENTATION.;  Surgeon: Corbin Bailey MD;  Location: [facility];  Service: Neurosurgery    LUMBAR FUSION      VASECTOMY          Home Meds:  (Not in a hospital admission)      Allergies:  No Known Allergies    Social History:   Social History     Socioeconomic History    Marital status:     Number of children: 2   Tobacco Use    Smoking status: Former     Current packs/day: 0.00     Average packs/day: 0.5 packs/day for 10.0 years (5.0 ttl pk-yrs)     Types: Cigarettes     Start date: 2009     Quit date: 2019     Years since quittin.8    Smokeless tobacco: Never   Vaping Use    Vaping status: Never Used   Substance and Sexual Activity    Alcohol use: Yes     Alcohol/week: 5.0 standard drinks of alcohol     Types: 5 Cans of beer per week    Drug use: No    Sexual activity: Not Currently     Partners: Female     Birth control/protection: Vasectomy       Family History:  Family History   Problem Relation Age of Onset    Diabetes Mother     Hypertension Father     Gout Father     Sleep apnea Father     Malig Hyperthermia Neg Hx        Physical Exam:  /90   Pulse 98   Temp 97.5 °F (36.4 °C) (Tympanic)   Resp 18   Ht 188 cm (74\")   Wt 104 kg (230 lb)   SpO2 94%   BMI 29.53 kg/m²  Body " mass index is 29.53 kg/m². 94% 104 kg (230 lb)    Constitutional: chronically ill appearing male awake in no acute distress  Head: - NCAT  Eyes: No pallor, Anicteric conjunctiva  ENMT: no oral thrush. Dry MM.   NECK: Trachea midline, No thyromegaly, no palpable cervical LNpathy  Heart: RRR, no murmur. No pedal edema   Lungs: ELA +, No wheezes/ crackles heard    Abdomen: Soft. + tenderness to palpation  Extremities: Pitting BLE edema  Neuro: Conscious, answers appropriately, no gross focal neuro deficits  Psych: Mood and affect appropriate    PPE recommended per [facility] infectious disease Isolation protocol for the current clinical scenario(as mentioned below) was followed.       LABS:  COVID19   Date Value Ref Range Status   04/17/2025 Not Detected Not Detected - Ref. Range Final       Lab Results   Component Value Date    CALCIUM 7.9 (L) 04/17/2025     Results from last 7 days   Lab Units 04/17/25 2011   MAGNESIUM mg/dL 1.7   SODIUM mmol/L 129*   POTASSIUM mmol/L 3.7   CHLORIDE mmol/L 82*   CO2 mmol/L 23.6   BUN mg/dL 63*   CREATININE mg/dL 7.11*   GLUCOSE mg/dL 137*   CALCIUM mg/dL 7.9*   WBC 10*3/mm3 8.77   HEMOGLOBIN g/dL 10.8*   PLATELETS 10*3/mm3 83*   ALT (SGPT) U/L 293*   AST (SGOT) U/L 663*   PROBNP pg/mL >70,000.0*   PROCALCITONIN ng/mL 7.65*     Lab Results   Component Value Date    TROPONINT 822 (C) 04/17/2025     Results from last 7 days   Lab Units 04/17/25  2148 04/17/25 2011   HSTROP T ng/L 822* 1,013*         Results from last 7 days   Lab Units 04/17/25  2030 04/17/25 2011   PROCALCITONIN ng/mL  --  7.65*   LACTATE mmol/L 5.3*  --          Results from last 7 days   Lab Units 04/17/25 2014   INFLUENZA B PCR  Not Detected     Results from last 7 days   Lab Units 04/17/25 2011   INR  1.64*         Lab Results   Component Value Date    TSH 2.460 06/26/2019     Estimated Creatinine Clearance: 14 mL/min (A) (by C-G formula based on SCr of 7.11 mg/dL (H)).  Results from last 7 days   Lab  Units 04/17/25 2048   NITRITE UA  Positive*   WBC UA /HPF 6-10*   BACTERIA UA /HPF None Seen   SQUAM EPITHEL UA /HPF 3-6*     Microbiology Results (last 10 days)       Procedure Component Value - Date/Time    COVID-19 and FLU A/B PCR, 1 HR TAT - Swab, Nasopharynx [[id number]]  (Normal) Collected: 04/17/25 2014    Lab Status: Final result Specimen: Swab from Nasopharynx Updated: 04/17/25 2041     COVID19 Not Detected     Influenza A PCR Not Detected     Influenza B PCR Not Detected    Narrative:      Fact sheet for providers: https://www.fda.gov/media/104776/download    Fact sheet for patients: https://www.fda.gov/media/267441/download    Test performed by PCR.               Imaging: I personally visualized the images of scans/x-rays performed within last 3 days.      Assessment:  Acute renal failure  Suspected decompensated cirrhosis  Multiorgan failure  NSTEMI  UTI with retention s/p catheter  Anion gap metabolic acidosis  Lactic acidosis  Transaminitis  Alcohol abuse  HFrEF s/p ICD  Hypertension    Recommendations:  -Admit to ICU  -Nephrology consulted and to manage acute renal failure with no plans for immediate dialysis as electrolytes are stable, suspect this is HRS  -No diagnosis of cirrhosis but MELD-Na 35. GI consulted  -CT abdomen pending  -Poor prognosis, palliative care has been consulted  -History of heart failure s/p ICD and with recent noncompliance to medications  - Severely elevated troponins which are now downtrending.  No chest pain or acute EKG changes.  Cardiology consulted  -Echo ordered  - UA with nitrites and leukocyte esterase but without bacteria.  Concern for infection with Pro-Nino of 7.  He has been started on empiric antibiotics with cefepime and vanc  -Blood cultures pending.  Will de-escalate as able  - CIWA protocol with thiamine and folic acid  - Will continue to trend labs including lactic, BMP and troponin  - Glucose checks  - Routine ICU care    SCDs for VTE prophylaxis  Protonix  for GI prophylaxis  NPO  Full code    Patient was placed in face mask upon entering room and kept mask on throughout our encounter. I wore full protective equipment throughout this patient encounter including a face mask, gown and gloves. Hand hygiene was performed before donning protective equipment and after removal when leaving the room.    Vianey Jones, APRN  4/17/2025  22:44 EDT      Much of this encounter note is an electronic transcription/translation of spoken language to printed text using Dragon Software.Denominational

## 2025-04-18 NOTE — PROGRESS NOTES
Called stat to bedside.  Patient's condition is much worse.  He is hypotensive and hypoxic.  Patient's respirations have become quite shallow.  He is now hypoxic, requiring 100% nonrebreather due to constant oxygen desaturations on nasal cannula.  He is still confused.  He will wake up to sternal rub and become aggravated and then he falls back asleep.  The patient is also requiring additional IV pressors.  We will have to place a feeding tube in place to start midodrine.  He needs a central venous line for IV pressors and critical care management.  He may need intubation if his hypoxemia worsens.  He may have hepatorenal syndrome.  I explained all of this to his daughter Aspen over the phone.  She is his decision-maker for healthcare.  At this point after we discussed goals of care she would like full medical treatment including mechanical ventilation and hemodialysis but no CPR.    Total critical care time 40 minutes

## 2025-04-18 NOTE — PROGRESS NOTES
"[facility] Clinical Pharmacy Services: Cefepime Consult     Archie Oquendo has a pharmacy consult to dose Cefepime per Vianey Jones NP's request.     Indication: Empiric     Relevant clinical data and objective history reviewed:  59 y.o. male 188 cm (74\") 98.3 kg (216 lb 11.4 oz)  Estimated Creatinine Clearance: 15.2 mL/min (A) (by C-G formula based on SCr of 7.11 mg/dL (H)).    Past Medical History:   Diagnosis Date    Abnormal gait     Abscess of back     CHF (congestive heart failure) 06/2022    Coronary artery disease     ONE VESSEL    Foot drop, left     Hypertension     Low back pain     Lumbar disc herniation     Numbness in both legs     MOSTLY IN KNEES DOWN    Pain in left knee     Sleep apnea     DOES NOT WEAR CPAP    Spinal stenosis     Spinal stenosis of lumbar region      has no known allergies.    Assessment/Plan    Will start patient on Cefepime 2GM IV Q24H, which has been adjusted for any patient specific factors.     Pharmacy will continue to follow.     Morgan BahD   Clinical Pharmacist   "

## 2025-04-18 NOTE — PROGRESS NOTES
Full nephrology consult to follow in AM.  D/W Dr Bates and chart reviewed.  Though waste products quite high, BUN/Cr 63/7, there is no acute indication for HD with K 3.7 and bicarb 23 (though note AG 23).  Rae been placed and checking CT abd.  Hypotensive initially, BP has improved with IVF bolus.  CXR shows no significant central congestion

## 2025-04-18 NOTE — CONSULTS
Purpose of the visit was to evaluate for: goals of care/advanced care planning and support for patient/family. Spoke with MD and RN as well as family and discussed palliative care, goals of care, care options, and resuscitation status.        Assessment:  Patient is palliative care appropriate for inpatient care given acute renal failure, suspected decompensated cirrhosis, multiorgan failure, NSTEMI, CHF, and ETOH abuse. Patient is ill-appearing with multiple comorbid conditions. PPS 10%      Cultural and spiritual needs have been assessed.      Recommendations/Plan: No changes to goals of care at this time.       Other Comments: I spoke with patient's daughter, Patricia, over the phone. We discussed goals of care and CODE status. Patricia plans to discuss with her sister, pir Sandraor to making any changes. Patient is . Sandra and Patricia are his only two children. Patricia lives in [place], [place] and Sandra lives in [place], [place]. All questions answered satisfactorily. Palliative care will continue to follow.

## 2025-04-18 NOTE — CONSULTS
[facility] Consult Note      Patient Name: Archie Oquendo  : 1965  MRN: [id number]  Primary Care Physician:  Zoe Desai MD  Referring Physician: No ref. provider found  Date of admission: 2025    Subjective     Reason for Consult: KEATON on likely CKD    HPI:   Archie Oquendo is a 59 y.o. male with possible CKD (SCr 2.0 in 2024), HTN, CAD, HFrEF, and alcohol abuse, admitted yesterday for increasing weakness, BLE edema, and N/V/D.  Found to have KEATON (BUN 63, SCr 7.11), elevated LFTs (AST//293), and lactate 5.3.  BP low (SBP 60-70s), requiring Levophed; also received total of 4 L of IVF. CXR suggested small L basilar atelectasis/infiltrate/effusion.  CT abdomen/pelvis showed moderate ascites, possible acute pancreatitis, cholelithiasis, colitis and a 12 mm pulm nodule.  UA positive for 2+ protein, TNTC RBC and moderate pyuria.  Stool positive for C. difficile and enteropathogenic E. coli.    Patient is obtunded and so unable to answer any medical questions.  ROS was obtained by review of ER records and from RN at bedside.    Hx of noncompliance, has not been taking home medicine for a while  + N/V/D, 1 episode of D last night  + Chest pain prior to admission  No reported SOA  Reportedly was taking ibuprofen at home    Review of Systems:   As noted above    Personal History     Past Medical History:   Diagnosis Date    Abnormal gait     Abscess of back     CHF (congestive heart failure) 2022    Coronary artery disease     ONE VESSEL    Foot drop, left     Hypertension     Low back pain     Lumbar disc herniation     Numbness in both legs     MOSTLY IN KNEES DOWN    Pain in left knee     Sleep apnea     DOES NOT WEAR CPAP    Spinal stenosis     Spinal stenosis of lumbar region        Past Surgical History:   Procedure Laterality Date    BACK SURGERY      BEDSIDE PARACENTESIS  2025    CARDIAC CATHETERIZATION  2022    STATES NO CAD    COLONOSCOPY       INCISION AND DRAINAGE TRUNK N/A 02/13/2023    Procedure: Wound debridement and drainage;  Surgeon: Nj Mix MD;  Location: [facility] OR;  Service: Neurosurgery;  Laterality: N/A;    LUMBAR DISCECTOMY  2000    LUMBAR DISCECTOMY FUSION INSTRUMENTATION Left 06/05/2019    Procedure: LUMBAR THREE LUMBAR FOUR  AND LUMBAR FIVE LUMBAR SIX DECOMPRESSION. THORACIC ELEVEN TO SACREL TWO POSTERIOR LATERAL ARTHRODESIS(FUSION) WITH SPINAL INSTRUMENTATION. REMOVAL OF LUMBAR INSTRUMENTATION.;  Surgeon: Corbin Bailey MD;  Location: [facility] OR;  Service: Neurosurgery    LUMBAR FUSION  2008    VASECTOMY         Family History: family history includes Diabetes in his mother; Gout in his father; Hypertension in his father; Sleep apnea in his father.    Social History:  reports that he quit smoking about 5 years ago. His smoking use included cigarettes. He started smoking about 15 years ago. He has a 5 pack-year smoking history. He has never used smokeless tobacco. He reports current alcohol use of about 5.0 standard drinks of alcohol per week. He reports that he does not use drugs.    Home Medications:  Prior to Admission medications    Medication Sig Start Date End Date Taking? Authorizing Provider   Aspirin Low Dose 81 MG EC tablet Take 1 tablet by mouth Every Morning. 2/6/23  Yes Nixon Singh MD   atorvastatin (LIPITOR) 40 MG tablet Take 1 tablet by mouth Every Evening. 2/8/23  Yes ProviderNixon MD   bismuth subsalicylate (PEPTO BISMOL) 262 MG/15ML suspension Take 15 mL by mouth Every 6 (Six) Hours As Needed for Indigestion.   Yes ProviderNixon MD   bumetanide (BUMEX) 2 MG tablet Take 1 tablet by mouth 2 (Two) Times a Day. 4/1/25 9/28/25 Yes Nixon Singh MD   lisinopril (PRINIVIL,ZESTRIL) 20 MG tablet Take 1 tablet by mouth Daily. 4/1/25  Yes Nixon Singh MD   metoprolol succinate XL (TOPROL-XL) 25 MG 24 hr tablet Take 1 tablet by mouth Daily.  Patient taking  differently: Take 1 tablet by mouth Daily. 7/3/19  Yes Romeo Francisco MD   Multiple Vitamins-Minerals (MULTIVITAMIN ADULT PO) Take 1 tablet by mouth Daily.   Yes Nixon Singh MD   potassium chloride ER (K-TAB) 20 MEQ tablet controlled-release ER tablet Take 1 tablet by mouth 2 (Two) Times a Day. 4/1/25 4/1/26 Yes Nixon Singh MD   cyclobenzaprine (FLEXERIL) 10 MG tablet Take 1 tablet by mouth 2 (Two) Times a Day As Needed. 4/1/25   Nixon Singh MD   ibuprofen (ADVIL,MOTRIN) 800 MG tablet 1 tablet. 3/10/22   Nixon Singh MD   Sacubitril-Valsartan (ENTRESTO PO) Take  by mouth 2 (Two) Times a Day.    Nixon Singh MD   bumetanide (BUMEX) 2 MG tablet Take 1 tablet by mouth Every Morning. 12/27/22 4/18/25  Nixon Singh MD   cyclobenzaprine (FLEXERIL) 10 MG tablet Take 1 tablet by mouth. 3/22/22 4/18/25  Nixon Singh MD       Allergies:  No Known Allergies    Objective     Vitals:   Temp:  [97.5 °F (36.4 °C)-98.3 °F (36.8 °C)] 98.3 °F (36.8 °C)  Heart Rate:  [] 96  Resp:  [16-18] 18  BP: ()/() 119/66  Flow (L/min) (Oxygen Therapy):  [2-15] 15    Intake/Output Summary (Last 24 hours) at 4/18/2025 1431  Last data filed at 4/18/2025 0541  Gross per 24 hour   Intake 5359.54 ml   Output 600 ml   Net 4759.54 ml       Physical Exam:   Constitutional: Sleeping, chronically ill, no acute distress, jaundiced  HEENT: Sclerae icteric, no conjunctival injection  Neck: Supple, no JVD  Respiratory: Rhonchi/coarse anteriorly, nonlabored on 2 L/min  Cardiovascular: RR, tachycardic, no murmur or rub  Gastrointestinal: BS +, soft, no grimacing but obtunded, +distended  : Rae catheter in place  Musculoskeletal: 2+ pitting BLE edema, no clubbing or cyanosis  Psychiatric: Unable to assess  Neurologic: Unable to assess  Skin: Warm and dry       Scheduled Meds:     albumin human, 37.5 g, Intravenous, Once   Or  albumin human, 50 g, Intravenous, Once    Or  albumin human, 62.5 g, Intravenous, Once   Or  albumin human, 75 g, Intravenous, Once   Or  albumin human, 87.5 g, Intravenous, Once   Or  albumin human, 100 g, Intravenous, Once   Or  albumin human, 112.5 g, Intravenous, Once  cefTRIAXone, 2,000 mg, Intravenous, Q24H  folic acid 1 mg in sodium chloride 0.9 % 50 mL IVPB, 1 mg, Intravenous, Daily  insulin regular, 2-7 Units, Subcutaneous, Q6H  LORazepam, 2 mg, Intravenous, Q6H   Followed by  LORazepam, 1 mg, Intravenous, Q6H  midodrine, 10 mg, Oral, Q8H  mupirocin, 1 Application, Each Nare, BID  senna-docusate sodium, 2 tablet, Oral, BID  sodium chloride, 10 mL, Intravenous, Q12H  thiamine (B-1) IV, 200 mg, Intravenous, Q8H   Followed by  [START ON 4/23/2025] thiamine, 100 mg, Oral, Daily      IV Meds:   norepinephrine, 0.02-0.3 mcg/kg/min, Last Rate: 0.28 mcg/kg/min (04/18/25 1403)        Results Reviewed:   I have personally reviewed the results from the time of this admission to 4/18/2025 14:31 EDT     Lab Results   Component Value Date    GLUCOSE 124 (H) 04/18/2025    CALCIUM 7.3 (L) 04/18/2025     (L) 04/18/2025    K 3.4 (L) 04/18/2025    CO2 22.6 04/18/2025    CL 92 (L) 04/18/2025    BUN 61 (H) 04/18/2025    CREATININE 6.11 (H) 04/18/2025    EGFRIFNONA 95 07/01/2019    BCR 10.0 04/18/2025    ANIONGAP 20.4 (H) 04/18/2025      Lab Results   Component Value Date    MG 1.7 04/18/2025    PHOS 2.6 04/18/2025    ALBUMIN 3.2 (L) 04/18/2025           Assessment / Plan       Sepsis      ASSESSMENT:  KEATON on possible CKD (baseline SCr not clear, but had SCr 2.0 in 9/2024), nonoliguric, improving and multifactorial:  volume loss from N/V/D, hypotension, and UTI/sepsis.  Has peripheral edema; low K, magnesium, and Phos.  + AG metabolic acidosis on arrival, along with elevated beta hydroxybutyrate and elevated lactate, though more recent pH (venous) 7.51.  PCR 2.8 g/g  Hyponatremia, improving, from alcoholism, cirrhosis, N/V/D and malnutrition.  Hypokalemia,  secondary to poor oral intake and low hypomagnesemia, both replaced  Hypocalcemia, likely in association with poor nutrition; has appropriate elevation in PTH   Mixed AGMA d/t lactate acidosis and respiratory alkalosis  Sepsis, SBP 70s on admission, now improved on levo, also received empiric vancomycin and cefepime  N/V/D/possible colitis, stool sample positive for enteropathogenic E. coli and C. difficile, managed by primary team  Transaminitis, in association with alcohol abuse/cirrhosis, CT also suggested possible cholelithiasis or sludge.  Followed by primary team  Suspected decompensated cirrhosis, CT abdomen/pelvis showed hepatic steatosis with moderate ascites.  Awaiting GI evaluation  HFrEF s/p ICD placement, echo pending, awaiting cardiology evaluation  Macrocytic anemia, hemoglobin trending down, closely monitor, transfuse per primary team  Thrombocytopenia, in association with #13  Alcohol abuse, received thiamine, folic acid, and Ativan  UTI with retention, now with Rae catheter  Non-STEMI, awaiting cardiology evaluation      PLAN:  No indication (yet) for HD with improving renal function  Hold off additional IVF given tense ascites (paracentesis anticipated) and already improvement in hemodynamics  Recheck BMP at 1300  Replace magnesium and potassium    Thank you for involving us in the care of Archie Oquendo.  Please feel free to call with any questions.    Jc Lemus MD  04/18/25  14:31 EDT    [facility]  [phone]      Please note that portions of this note were completed with a voice recognition program.

## 2025-04-18 NOTE — CASE MANAGEMENT/SOCIAL WORK
Discharge Planning Assessment  [facility]     Patient Name: Archie Oquendo  MRN: [id number]  Today's Date: 4/18/2025    Admit Date: 4/17/2025    Plan: Home, family to transport   Discharge Needs Assessment       Row Name 04/18/25 0928       Living Environment    People in Home alone    Current Living Arrangements home    Primary Care Provided by self    Provides Primary Care For no one    Family Caregiver if Needed child(evette), adult    Able to Return to Prior Arrangements yes       Resource/Environmental Concerns    Resource/Environmental Concerns none       Transition Planning    Patient/Family Anticipates Transition to home with family    Patient/Family Anticipated Services at Transition none    Transportation Anticipated family or friend will provide       Discharge Needs Assessment    Equipment Currently Used at Home walker, rolling    Concerns to be Addressed discharge planning                   Discharge Plan       Row Name 04/18/25 0929       Plan    Plan Home, family to transport    Patient/Family in Agreement with Plan yes    Plan Comments CCP spoke with patient's daughter Ally over the phone; explained role, verified facesheet, and discussed dc plan. Patient uses a walker at home. He lives alone in a 2 level home with 9-12 steps to enter. Daughter reports he has been having trouble ambulating in his home. He has no history of HH or SNF. Daughter reports patient has Starr and she will get her partner Kedar to bring in the insurance card. Patient may benefit from PT evaluation when able to assess for level of care needs. SHIV, CSW                       Demographic Summary       Row Name 04/18/25 0928       General Information    Admission Type inpatient    Arrived From home    Referral Source admission list    Reason for Consult discharge planning    Preferred Language English       Contact Information    Permission Granted to Share Info With family/designee                   Functional Status        Row Name 04/18/25 0928       Functional Status    Usual Activity Tolerance moderate    Current Activity Tolerance moderate       Functional Status, IADL    Medications assistive equipment    Meal Preparation assistive equipment    Housekeeping assistive equipment    Laundry assistive equipment    Shopping assistive equipment       Mental Status    General Appearance WDL WDL                   Psychosocial    No documentation.                  Abuse/Neglect    No documentation.                  Legal    No documentation.                  Substance Abuse    No documentation.                  Patient Forms    No documentation.                     MURRAY Cee

## 2025-04-18 NOTE — CONSULTS
[facility]  Initial Inpatient Consult Note    Referring Provider: nadeen    Reason for Consultation: Decompensated cirrhosis    Subjective     History of present illness:    59 y.o. male with a history of alcohol abuse, volume of alcohol daily unknown, admitted with weakness and fatigue.  Over the last 24 hours he has become nonresponsive he is in the ICU.  Palliative care consult has been called.  He is in acute renal failure and nephrology will see the patient.  According to notes he has been drinking possibly 6 drinks for the last year.    Past Medical History:  Past Medical History:   Diagnosis Date    Abnormal gait     Abscess of back     CHF (congestive heart failure) 06/2022    Coronary artery disease     ONE VESSEL    Foot drop, left     Hypertension     Low back pain     Lumbar disc herniation     Numbness in both legs     MOSTLY IN KNEES DOWN    Pain in left knee     Sleep apnea     DOES NOT WEAR CPAP    Spinal stenosis     Spinal stenosis of lumbar region      Past Surgical History:  Past Surgical History:   Procedure Laterality Date    BACK SURGERY      CARDIAC CATHETERIZATION  06/2022    STATES NO CAD    COLONOSCOPY  2013    INCISION AND DRAINAGE TRUNK N/A 02/13/2023    Procedure: Wound debridement and drainage;  Surgeon: Nj Mix MD;  Location: [facility];  Service: Neurosurgery;  Laterality: N/A;    LUMBAR DISCECTOMY  2000    LUMBAR DISCECTOMY FUSION INSTRUMENTATION Left 06/05/2019    Procedure: LUMBAR THREE LUMBAR FOUR  AND LUMBAR FIVE LUMBAR SIX DECOMPRESSION. THORACIC ELEVEN TO SACREL TWO POSTERIOR LATERAL ARTHRODESIS(FUSION) WITH SPINAL INSTRUMENTATION. REMOVAL OF LUMBAR INSTRUMENTATION.;  Surgeon: Corbin Bailey MD;  Location: [facility];  Service: Neurosurgery    LUMBAR FUSION  2008    VASECTOMY        Social History:   Social History     Tobacco Use    Smoking status: Former     Current packs/day: 0.00     Average packs/day: 0.5 packs/day for 10.0  years (5.0 ttl pk-yrs)     Types: Cigarettes     Start date: 2009     Quit date: 2019     Years since quittin.8    Smokeless tobacco: Never   Substance Use Topics    Alcohol use: Yes     Alcohol/week: 5.0 standard drinks of alcohol     Types: 5 Cans of beer per week      Family History:  Family History   Problem Relation Age of Onset    Diabetes Mother     Hypertension Father     Gout Father     Sleep apnea Father     Malig Hyperthermia Neg Hx        Home Meds:  Medications Prior to Admission   Medication Sig Dispense Refill Last Dose/Taking    Aspirin Low Dose 81 MG EC tablet Take 1 tablet by mouth Every Morning.   2025    atorvastatin (LIPITOR) 40 MG tablet Take 1 tablet by mouth Every Evening.   2025 Morning    bismuth subsalicylate (PEPTO BISMOL) 262 MG/15ML suspension Take 15 mL by mouth Every 6 (Six) Hours As Needed for Indigestion.   2025 Morning    bumetanide (BUMEX) 2 MG tablet Take 1 tablet by mouth 2 (Two) Times a Day.   2025 Morning    lisinopril (PRINIVIL,ZESTRIL) 20 MG tablet Take 1 tablet by mouth Daily.   2025 Morning    metoprolol succinate XL (TOPROL-XL) 25 MG 24 hr tablet Take 1 tablet by mouth Daily. (Patient taking differently: Take 1 tablet by mouth Daily.) 30 tablet 1 2025 Morning    Multiple Vitamins-Minerals (MULTIVITAMIN ADULT PO) Take 1 tablet by mouth Daily.   Taking    potassium chloride ER (K-TAB) 20 MEQ tablet controlled-release ER tablet Take 1 tablet by mouth 2 (Two) Times a Day.   2025 Morning    cyclobenzaprine (FLEXERIL) 10 MG tablet Take 1 tablet by mouth 2 (Two) Times a Day As Needed.       ibuprofen (ADVIL,MOTRIN) 800 MG tablet 1 tablet.       Sacubitril-Valsartan (ENTRESTO PO) Take  by mouth 2 (Two) Times a Day.        Current Meds:   cefTRIAXone, 2,000 mg, Intravenous, Q24H  folic acid 1 mg in sodium chloride 0.9 % 50 mL IVPB, 1 mg, Intravenous, Daily  insulin regular, 2-7 Units, Subcutaneous, Q6H  LORazepam, 2 mg, Intravenous,  Q6H   Followed by  LORazepam, 1 mg, Intravenous, Q6H  midodrine, 10 mg, Oral, Q8H  mupirocin, 1 Application, Each Nare, BID  senna-docusate sodium, 2 tablet, Oral, BID  sodium chloride, 10 mL, Intravenous, Q12H  thiamine (B-1) IV, 200 mg, Intravenous, Q8H   Followed by  [START ON 4/23/2025] thiamine, 100 mg, Oral, Daily      Allergies:  No Known Allergies  Review of Systems  Review of systems could not be obtained due to   patient confusion.     Objective     Vital Signs  Temp:  [97.5 °F (36.4 °C)-98 °F (36.7 °C)] 97.8 °F (36.6 °C)  Heart Rate:  [] 100  Resp:  [16-18] 18  BP: ()/() 105/49  Physical Exam:      Head:    Normocephalic, without obvious abnormality, atraumatic   Eyes:          Conjunctivae and sclerae normal, no icterus   Throat:   No thrush, oral mucosa moist   Neck:   Supple, no adenopathy   Lungs:     Clear to auscultation bilaterally    Heart:    Regular rhythm and normal rate    Chest Wall:    No abnormalities observed   Abdomen:     Soft, nondistended, nontender; normal bowel sounds   Extremities:   No edema, no redness   Skin:   No bruising or rash       Results Review:   I reviewed the patient's new clinical results.    Results from last 7 days   Lab Units 04/18/25 0337 04/17/25 2011   WBC 10*3/mm3 8.67 8.77   HEMOGLOBIN g/dL 9.2* 10.8*   HEMATOCRIT % 24.9* 30.7*   PLATELETS 10*3/mm3 71* 83*     Results from last 7 days   Lab Units 04/18/25  0337 04/17/25 2011   SODIUM mmol/L 135* 129*   POTASSIUM mmol/L 3.4* 3.7   CHLORIDE mmol/L 92* 82*   CO2 mmol/L 22.6 23.6   BUN mg/dL 61* 63*   CREATININE mg/dL 6.11* 7.11*   CALCIUM mg/dL 7.3* 7.9*   BILIRUBIN mg/dL 7.6* 8.3*   ALK PHOS U/L 146* 167*   ALT (SGPT) U/L 242* 293*   AST (SGOT) U/L 557* 663*   GLUCOSE mg/dL 124* 137*     Results from last 7 days   Lab Units 04/18/25  0337 04/17/25 2011   INR  1.79* 1.64*     Lab Results   Lab Value Date/Time    LIPASE 449 (H) 04/17/2025 2011       Radiology:  CT Abdomen Pelvis Without  Contrast   Final Result       1. Marked hepatic steatosis. Patient is noted to have moderate ascites.   2. There is some inflammatory stranding surrounding the pancreas, as   well as some trace fluid. Appearance may reflect acute pancreatitis.   There is no pancreatic ductal dilatation, and no organized   peripancreatic collections are seen.   3. High density material within the gallbladder may reflect stones or   sludge.   4. Thick walled appearance to the ascending and transverse colon   appearance may be related to portal colopathy, although correlation with   any evidence of colitis is recommended.   5. Indeterminate ground glass pulmonary nodule measuring 11 mm. For a   ground glass nodule >=6 mm, recommend CT at 6-12 months to confirm   persistence, then CT every 2 years until 5 years. If solid component(s)   or growth develops, consider resection.    Radiation dose reduction techniques were utilized, including automated   exposure control and exposure modulation based on body size.           This report was finalized on 4/17/2025 10:40 PM by Dr. Adrianna Fleming M.D on Workstation: [id number]          XR Chest 1 View   Final Result   As described.       This report was finalized on 4/17/2025 8:19 PM by Dr. David Smith M.D on Workstation: [id number]          XR Chest 1 View    (Results Pending)       Assessment & Plan   Active Hospital Problems    Diagnosis     **Sepsis        Assessment:  Decompensated cirrhosis  Alcohol abuse  Acute pancreatitis by imaging and by number  Moderate ascites on imaging  Confusion  Thrombocytopenia  Sepsis  Septic shock  Non-STEMI    Plan:  Patient is critically ill requiring pressors  IV fluid resuscitation per pulmonary team  Agree with Dobbhoff tube placement give midodrine  No indication for octreotide  No evidence of GI bleeding  We will try to obtain ascites for testing  Nephrology consultation  No indication for EGD at this time  Initiate lactulose once Dobbhoff  tube is placed  Broad Spectrum IV antibiotic      I discussed the patient's findings and my recommendations with patient and nursing staff.    Brandon Genao MD

## 2025-04-18 NOTE — PROGRESS NOTES
"[facility] Clinical Pharmacy Services: Vancomycin Pharmacokinetic Initial Consult Note    Archie Oquendo is a 59 y.o. male who is on day 1 of pharmacy to dose vancomycin.    Indication: Empiric  Consulting Provider: Vianey Jones NP  Planned Duration of Therapy: 7 days   Loading Dose Ordered or Given: 2000 mg on 04/17 at 2258  MRSA PCR performed: No;   Culture/Source: Blood culture pending   Target: -600 mg/L.hr     Other Antimicrobials: Cefepime 2gm IV Q24H     Vitals/Labs  Ht: 188 cm (74\"); Wt: 98.3 kg (216 lb 11.4 oz)  Temp Readings from Last 1 Encounters:   04/18/25 98 °F (36.7 °C) (Oral)    Estimated Creatinine Clearance: 15.2 mL/min (A) (by C-G formula based on SCr of 7.11 mg/dL (H)).       Results from last 7 days   Lab Units 04/17/25 2011   CREATININE mg/dL 7.11*   WBC 10*3/mm3 8.77     Assessment/Plan:     At Vancomycin loading dose of 2000mg IV x 1 given on 04/17 at 2258  Vanc Random has been ordered for 04/18 at 1100  AM Clinical to follow with additional dosing regimen      Pharmacy will follow patient's kidney function and will adjust doses and obtain levels as necessary. Thank you for involving pharmacy in this patient's care. Please contact pharmacy with any questions or concerns.                           Caleb Corey PharmD   Clinical Pharmacist   "

## 2025-04-18 NOTE — PROCEDURES
Ultrasound Guided Central Venous Catheter Insertion Procedure Note      Indications:  vascular access    Procedure Details   Informed consent was obtained for the procedure, including sedation.  Risks of lung perforation, hemorrhage, arrhythmia, and adverse drug reaction were discussed.     Maximum sterile technique was used including usual patient drapes, antiseptics and physician sterile garments.    Under sterile conditions the skin above the on the right internal jugular vein was prepped with chlorhexidine and covered with a sterile drape. A sterile ultrasound probe was used to localize the target vein. It was clearly visualized. Local anesthesia was applied to the skin and subcutaneous tissues with lidocaine 1%. An 18-gauge needle was then inserted into the vein under ultrasound guidance. A guide wire was then threaded into the vein. A central venous catheter was then inserted into the vessel over the guide wire. The catheter was sutured into place and dressed following sterile protocol with Biopatch placed. All 4 ports were flushed and deemed patent.    Findings:  There were no changes to vital signs. All catheter ports were flushed with saline. Patient did tolerate procedure well.    Recommendations:  CXR ordered to verify placement.  Pending at the time of this dictation      Marcos Rodas MD  4/18/2025

## 2025-04-18 NOTE — PROGRESS NOTES
"Dr. JL Rodas    [facility] INTENSIVE CARE        Patient ID:  Name:  Archie Oquendo  MRN:  [id number]  1965  59 y.o.  male            CC/Reason for visit: Acute renal injury, non-ST elevation MI, urinary retention, alcohol abuse, HFrEF, ICD in place    Interval hx: Patient is confused and somnolent, cannot give me a reasonable history  Admitted last night with multiple medical problems.  Notes of APRN and physicians reviewed from last night  Being treated for sepsis  Positive for enteropathogenic E. coli    ROS: Confused    I reviewed old medical records.  Past medical history, social history and family history: Unchanged from admission H&P.      Vitals:  Vitals:    04/18/25 0900 04/18/25 0902 04/18/25 0915 04/18/25 0930   BP: 104/50 108/50 109/63 120/43   Pulse: 94 91 93 96   Resp:       Temp:       TempSrc:       SpO2: 96% 95% 96% 98%   Weight:  98 kg (216 lb)     Height:  188 cm (74\")             Body mass index is 27.73 kg/m².    Intake/Output Summary (Last 24 hours) at 4/18/2025 1015  Last data filed at 4/18/2025 0541  Gross per 24 hour   Intake 5359.54 ml   Output 600 ml   Net 4759.54 ml       Exam:  GEN:  No distress but looks much older than stated age, appears chronically ill  Somnolent and confused  LUNGS: Clear breath sounds bilat, no use of accessory muscles  CV:  Irregularly irregular rhythm, without murmur, there is leg edema  ABD:  Quite distended but no rebound.  There is ascitic wave present      Scheduled meds:  cefepime, 2,000 mg, Intravenous, Q24H  folic acid 1 mg in sodium chloride 0.9 % 50 mL IVPB, 1 mg, Intravenous, Daily  insulin regular, 2-7 Units, Subcutaneous, Q6H  LORazepam, 2 mg, Intravenous, Q6H   Followed by  LORazepam, 1 mg, Intravenous, Q6H  midodrine, 10 mg, Oral, Q8H  mupirocin, 1 Application, Each Nare, BID  senna-docusate sodium, 2 tablet, Oral, BID  sodium chloride, 10 mL, Intravenous, Q12H  thiamine (B-1) IV, 200 mg, Intravenous, Q8H   Followed " by  [START ON 4/23/2025] thiamine, 100 mg, Oral, Daily      IV meds:                      norepinephrine, 0.02-0.3 mcg/kg/min, Last Rate: 0.24 mcg/kg/min (04/18/25 0834)  Pharmacy to dose vancomycin,   Pharmacy To Dose:,         Data Review:   I reviewed the patient's medications and new clinical results.          Lab Results   Component Value Date    CALCIUM 7.3 (L) 04/18/2025    PHOS 2.6 04/18/2025    MG 1.7 04/18/2025    MG 1.7 04/17/2025     Results from last 7 days   Lab Units 04/18/25  0337 04/17/25 2011   SODIUM mmol/L 135* 129*   POTASSIUM mmol/L 3.4* 3.7   CHLORIDE mmol/L 92* 82*   CO2 mmol/L 22.6 23.6   BUN mg/dL 61* 63*   CREATININE mg/dL 6.11* 7.11*   CALCIUM mg/dL 7.3* 7.9*   BILIRUBIN mg/dL 7.6* 8.3*   ALK PHOS U/L 146* 167*   ALT (SGPT) U/L 242* 293*   AST (SGOT) U/L 557* 663*   GLUCOSE mg/dL 124* 137*   WBC 10*3/mm3 8.67 8.77   HEMOGLOBIN g/dL 9.2* 10.8*   PLATELETS 10*3/mm3 71* 83*   INR  1.79* 1.64*   PROBNP pg/mL  --  >70,000.0*   PROCALCITONIN ng/mL  --  7.65*         Results from last 7 days   Lab Units 04/17/25  2148 04/17/25 2011   HSTROP T ng/L 822* 1,013*         Microbiology Results (last 10 days)       Procedure Component Value - Date/Time    Gastrointestinal Panel, PCR - Stool, Per Rectum [[id number]]  (Abnormal) Collected: 04/18/25 0031    Lab Status: Final result Specimen: Stool from Per Rectum Updated: 04/18/25 0216     Campylobacter Not Detected     Plesiomonas shigelloides Not Detected     Salmonella Not Detected     Vibrio Not Detected     Vibrio cholerae Not Detected     Yersinia enterocolitica Not Detected     Enteroaggregative E. coli (EAEC) Not Detected     Enteropathogenic E. coli (EPEC) Detected     Enterotoxigenic E. coli (ETEC) lt/st Not Detected     Shiga-like toxin-producing E. coli (STEC) stx1/stx2 Not Detected     Shigella/Enteroinvasive E. coli (EIEC) Not Detected     Cryptosporidium Not Detected     Cyclospora cayetanensis Not Detected     Entamoeba histolytica Not  Detected     Giardia lamblia Not Detected     Adenovirus F40/41 Not Detected     Astrovirus Not Detected     Norovirus GI/GII Not Detected     Rotavirus A Not Detected     Sapovirus (I, II, IV or V) Not Detected    Clostridioides difficile Toxin - Stool, Per Rectum [[id number]]  (Abnormal) Collected: 04/18/25 0031    Lab Status: Final result Specimen: Stool from Per Rectum Updated: 04/18/25 0139    Narrative:      The following orders were created for panel order Clostridioides difficile Toxin - Stool, Per Rectum.  Procedure                               Abnormality         Status                     ---------                               -----------         ------                     Clostridioides difficile...[[id number]]  Abnormal            Final result                 Please view results for these tests on the individual orders.    Clostridioides difficile Toxin, PCR - Stool, Per Rectum [[id number]]  (Abnormal) Collected: 04/18/25 0031    Lab Status: Final result Specimen: Stool from Per Rectum Updated: 04/18/25 0139     Toxigenic C. difficile by PCR Positive    Narrative:      DNA from a toxigenic strain of C.difficile has been detected. Antigen testing for the presence of free C.difficile toxin is currently in progress, to help determine the clinical significance of this PCR result.     Clostridioides difficile toxin Ag, Reflex - Stool, Per Rectum [[id number]]  (Normal) Collected: 04/18/25 0031    Lab Status: Final result Specimen: Stool from Per Rectum Updated: 04/18/25 0214     C.diff Toxin Ag Negative    Narrative:      DNA from a toxigenic strain of C.difficile was detected, although the free toxin itself was not detected. These findings are consistent with C.difficile colonization and may not reflect actual C.difficile infection. Clinical correlation needed.    COVID-19 and FLU A/B PCR, 1 HR TAT - Swab, Nasopharynx [[id number]]  (Normal) Collected: 04/17/25 2014    Lab Status: Final result Specimen: Swab  from Nasopharynx Updated: 04/17/25 2041     COVID19 Not Detected     Influenza A PCR Not Detected     Influenza B PCR Not Detected    Narrative:      Fact sheet for providers: https://www.fda.gov/media/775123/download    Fact sheet for patients: https://www.fda.gov/media/054964/download    Test performed by PCR.                 ASSESSMENT:   Acute metabolic encephalopathy  Sepsis present on admission due to E. coli enteritis  Septic shock present on admission causing encephalopathy and acute kidney injury  Non-STEMI  Hyponatremia  Hypokalemia  Acute kidney injury  Acute liver injury  Anemia  Thrombocytopenia  Alcohol abuse  Acute HFrEF      PLAN:  Patient is critically ill.  Requiring Levophed  We will give additional IV fluids.  If he does not tolerate IV fluids, may need intubation with mechanical ventilation.  Echocardiogram shows low ejection fraction, cardiology consulted.  Continue IV pressors for now  Gastroenterology and nephrology have been consulted.  Patient has E. coli enteritis and acute kidney injury with sepsis.  May have hepatorenal syndrome.  Insert feeding tube and start midodrine as well, but will defer to GI whether they want octreotide.  No indication for hemodialysis at this time but may end up requiring hemodialysis depending on urine output and chemistries.  Has urethral bladder Rae catheter in place, continue to monitor urinary output hourly.  Patient was started on cefepime and vancomycin empirically for sepsis.  Can stop vancomycin.  De-escalate antibiotics tomorrow depending on additional labs and microbiology results  Received folic acid and thiamine    Total critical care time 35 minutes    I reviewed the chart and other providers notes and reviewed labs.  Copied text in this note has been reviewed and is accurate as of today      Marcos Rodas MD  4/18/2025

## 2025-04-19 NOTE — PROGRESS NOTES
"      [facility]         Dr John Sayied   LOS: 2 days   Patient Care Team:  Zoe Desai MD as PCP - General (Family Medicine)  Corbin Bailey MD as Surgeon (Neurosurgery)    Chief Complaint: Sepsis with septic shock E. coli enteritis with C. difficile positive other issues as listed below    Interval History: Events noted chart reviewed.  Patient currently on Levophed and heated high flow.  Confused poorly arousable attempts to open eyes.    REVIEW OF SYSTEMS:   Unable to get with patient's current condition    Ventilator/Non-Invasive Ventilation Settings (From admission, onward)      None        Heated high flow 60 L with 85% FiO2      Vital Signs  Temp:  [97.6 °F (36.4 °C)-98.6 °F (37 °C)] 97.6 °F (36.4 °C)  Heart Rate:  [] 100  Resp:  [20-26] 20  BP: ()/() 88/64    Intake/Output Summary (Last 24 hours) at 4/19/2025 1016  Last data filed at 4/19/2025 0745  Gross per 24 hour   Intake 2742.5 ml   Output 7625 ml   Net -4882.5 ml     Flowsheet Rows      Flowsheet Row First Filed Value   Admission Height 188 cm (74\") Documented at 04/17/2025 1957   Admission Weight 104 kg (230 lb) Documented at 04/17/2025 2110            Physical Exam:  Patient is examined using the personal protective equipment as per guidelines from infection control for this particular patient as enacted.  Hand hygiene was performed before and after patient interaction.   General Appearance:  Acutely ill and poorly arousable.  Not following commands for me.  ENT normocephalic atraumatic  Neck midline trachea, no thyromegaly   Lungs:   Diminished breath sounds bilaterally    Heart:    Regular rhythm and normal rate, normal S1 and S2, no            murmur, no gallop, no rub, no click   Chest Wall:    No abnormalities observed   Abdomen:   Soft mild diffuse tenderness no rebound or guarding   Extremities:   Moves all extremities well, trace to 1+ edema, no cyanosis, no             redness  CNS confused  Skin no " rashes no nodules  Musculoskeletal no cyanosis no clubbing normal range of motion     Results Review:        Results from last 7 days   Lab Units 04/19/25  0523 04/18/25  1413 04/18/25 0337   SODIUM mmol/L 138 131* 135*   POTASSIUM mmol/L 2.8* 4.1 3.4*   CHLORIDE mmol/L 95* 92* 92*   CO2 mmol/L 26.2 22.2 22.6   BUN mg/dL 61* 62* 61*   CREATININE mg/dL 4.86* 5.87* 6.11*   GLUCOSE mg/dL 131* 142* 124*   CALCIUM mg/dL 7.4* 7.0* 7.3*     Results from last 7 days   Lab Units 04/17/25 2148 04/17/25 2011   HSTROP T ng/L 822* 1,013*     Results from last 7 days   Lab Units 04/19/25  0913 04/18/25 0337 04/17/25 2011   WBC 10*3/mm3 8.66 8.67 8.77   HEMOGLOBIN g/dL 9.1* 9.2* 10.8*   HEMATOCRIT % 24.5* 24.9* 30.7*   PLATELETS 10*3/mm3 47* 71* 83*     Results from last 7 days   Lab Units 04/18/25 0337 04/17/25 2011   INR  1.79* 1.64*   APTT seconds  --  34.5         Results from last 7 days   Lab Units 04/19/25 0523   MAGNESIUM mg/dL 1.8         Results from last 7 days   Lab Units 04/18/25  1303   PH, ARTERIAL pH units 7.431   PO2 ART mm Hg 114.3*   PCO2, ARTERIAL mm Hg 37.2   HCO3 ART mmol/L 24.8       I reviewed the patient's new clinical results.  I personally viewed and interpreted the patient's chest x-ray.        Medication Review:   cefTRIAXone, 2,000 mg, Intravenous, Q24H  folic acid 1 mg in sodium chloride 0.9 % 50 mL IVPB, 1 mg, Intravenous, Daily  insulin regular, 2-7 Units, Subcutaneous, Q6H  LORazepam, 1 mg, Intravenous, Q6H  midodrine, 10 mg, Oral, Q8H  mupirocin, 1 Application, Each Nare, BID  senna-docusate sodium, 2 tablet, Oral, BID  sodium chloride, 10 mL, Intravenous, Q12H  thiamine (B-1) IV, 200 mg, Intravenous, Q8H   Followed by  [START ON 4/23/2025] thiamine, 100 mg, Oral, Daily        norepinephrine, 0.02-0.3 mcg/kg/min, Last Rate: 0.18 mcg/kg/min (04/19/25 0653)        ASSESSMENT:   Acute metabolic encephalopathy  Sepsis present on admission due to E. coli enteritis  Acute hypoxemic respiratory  failure on heated high flow  Septic shock present on admission causing encephalopathy and acute  Kidney injury  Decompensated cirrhosis  Acute pancreatitis by imaging  Non-STEMI  Hyponatremia  Hypokalemia  Acute kidney injury  Acute liver injury  Anemia  Thrombocytopenia  Alcohol abuse  Acute HFrEF    PLAN:  Events noted chart reviewed  Source of infection E. coli enteritis with C. difficile positive  Patient currently on Rocephin to cover for E. coli enteritis.  I will add p.o. vancomycin for C. difficile colitis and consult infectious diseases  Creatinine improving.  Further fluid electrolyte management per nephrology  Wean off pressors maintain MAP greater than 65  Lactulose per Dobbhoff tube.  If okay with GI will place Dobbhoff tube  Low platelets continue to decline likely from sepsis and cirrhosis.  Continue to monitor.  No signs of active bleeding  Thiamine IV to continue  Patient critically ill  ICU core measures    Critical care time 35 minutes      Alvaro Cornejo MD  04/19/25  10:16 EDT

## 2025-04-19 NOTE — NURSING NOTE
Patient transferred to CCU, family updated of room number. Patient lethargic, but will follow commands. Difficult to understand, but alert to self, place, and year. Oral care completed. Attempted to suction oropharyngeal, but unable to get any secretions back. Patient kept clenching down when suctioning. Patient SR/ST with freq PVCs. Levophed titrated to keep MAP >65. Endorsed care to CCU.

## 2025-04-19 NOTE — PROGRESS NOTES
[facility]  Inpatient Progress Note    Reason for Followup:  Decompensated cirrhosis    Subjective     Interval History:   Remains lethargic  3L removed via bedside paracentesis yesterday    Current Facility-Administered Medications:     sennosides-docusate (PERICOLACE) 8.6-50 MG per tablet 2 tablet, 2 tablet, Oral, BID **AND** polyethylene glycol (MIRALAX) packet 17 g, 17 g, Oral, Daily PRN **AND** bisacodyl (DULCOLAX) EC tablet 5 mg, 5 mg, Oral, Daily PRN **AND** bisacodyl (DULCOLAX) suppository 10 mg, 10 mg, Rectal, Daily PRN, Vianey Jones APRN    cefTRIAXone (ROCEPHIN) 2,000 mg in sodium chloride 0.9 % 100 mL MBP, 2,000 mg, Intravenous, Q24H, Marcos Rodas MD, Last Rate: 200 mL/hr at 25 1209, 2,000 mg at 25 1209    dextrose (D50W) (25 g/50 mL) IV injection 25 g, 25 g, Intravenous, Q15 Min PRN, Vianey Jones APRN    dextrose (GLUTOSE) oral gel 15 g, 15 g, Oral, Q15 Min PRN, Vianey Jones APRN    fentaNYL citrate (PF) (SUBLIMAZE) injection 25 mcg, 25 mcg, Intravenous, Q2H PRN, Vianey Jones, APRN    folic acid 1 mg in sodium chloride 0.9 % 50 mL IVPB, 1 mg, Intravenous, Daily, Vianey Jones APRN, Last Rate: 100 mL/hr at 25 0840, 1 mg at 25 0840    glucagon (GLUCAGEN) injection 1 mg, 1 mg, Intramuscular, Q15 Min PRN, Vianey Jones APRN    insulin regular (humuLIN R,novoLIN R) injection 2-7 Units, 2-7 Units, Subcutaneous, Q6H, Vianey Jones APRN, 2 Units at 25 1213    [] LORazepam (ATIVAN) injection 2 mg, 2 mg, Intravenous, Q6H, 2 mg at 25 1709 **FOLLOWED BY** LORazepam (ATIVAN) injection 1 mg, 1 mg, Intravenous, Q6H, Vianey Jones R, APRN, 1 mg at 25 0518    LORazepam (ATIVAN) tablet 1 mg, 1 mg, Oral, Q1H PRN **OR** LORazepam (ATIVAN) injection 1 mg, 1 mg, Intravenous, Q1H PRN **OR** LORazepam (ATIVAN) tablet 2 mg, 2 mg, Oral, Q1H PRN **OR** LORazepam (ATIVAN) injection 2 mg, 2 mg, Intravenous, Q1H PRN, 2 mg at  04/18/25 1403 **OR** LORazepam (ATIVAN) injection 2 mg, 2 mg, Intravenous, Q15 Min PRN, 2 mg at 04/18/25 1515 **OR** LORazepam (ATIVAN) injection 2 mg, 2 mg, Intramuscular, Q15 Min PRN, Vianey Jones APRN    Magnesium Standard Dose Replacement - Follow Nurse / BPA Driven Protocol, , Not Applicable, PRN, Vianey Jones APRN    midodrine (PROAMATINE) tablet 10 mg, 10 mg, Oral, Q8H, Marcos Rodas MD    mupirocin (BACTROBAN) 2 % nasal ointment 1 Application, 1 Application, Each Nare, BID, Vianey Jones APRN, 1 Application at 04/18/25 2059    nitroglycerin (NITROSTAT) SL tablet 0.4 mg, 0.4 mg, Sublingual, Q5 Min PRN, Vianey Jones APRN    norepinephrine (LEVOPHED) 8 mg in 250 mL NS infusion (premix), 0.02-0.3 mcg/kg/min, Intravenous, Titrated, Vianey Jones APRN, Last Rate: 33.2 mL/hr at 04/19/25 0653, 0.18 mcg/kg/min at 04/19/25 0653    ondansetron (ZOFRAN) injection 4 mg, 4 mg, Intravenous, Q6H PRN, Vianey Jones APRN    sodium chloride 0.9 % flush 10 mL, 10 mL, Intravenous, Q12H, Vianey Jones APRN, 10 mL at 04/18/25 2100    sodium chloride 0.9 % flush 10 mL, 10 mL, Intravenous, PRN, Vianey Jones APRN    sodium chloride 0.9 % infusion 40 mL, 40 mL, Intravenous, PRN, Vianey Jones APRN    thiamine (B-1) injection 200 mg, 200 mg, Intravenous, Q8H, 200 mg at 04/19/25 0518 **FOLLOWED BY** [START ON 4/23/2025] thiamine (VITAMIN B-1) tablet 100 mg, 100 mg, Oral, Daily, Dresner, Vianey R, APRN    Objective     Vital Signs  Temp:  [97.9 °F (36.6 °C)-98.6 °F (37 °C)] 98.6 °F (37 °C)  Heart Rate:  [] 88  Resp:  [23-26] 23  BP: ()/() 92/50  Body mass index is 27.46 kg/m².    Intake/Output Summary (Last 24 hours) at 4/19/2025 0657  Last data filed at 4/19/2025 0556  Gross per 24 hour   Intake 2742.5 ml   Output 6975 ml   Net -4232.5 ml     I/O this shift:  In: 465.5 [I.V.:465.5]  Out: 4475 [Urine:4475]     Physical Exam:   General: patient intubated, no distress   Abdomen:  distended with +ve fluid wave     Results Review:     I reviewed the patient's new clinical results.    Results from last 7 days   Lab Units 04/18/25  0337 04/17/25 2011   WBC 10*3/mm3 8.67 8.77   HEMOGLOBIN g/dL 9.2* 10.8*   HEMATOCRIT % 24.9* 30.7*   PLATELETS 10*3/mm3 71* 83*     Results from last 7 days   Lab Units 04/18/25  1413 04/18/25  0337 04/17/25 2011   SODIUM mmol/L 131* 135* 129*   POTASSIUM mmol/L 4.1 3.4* 3.7   CHLORIDE mmol/L 92* 92* 82*   CO2 mmol/L 22.2 22.6 23.6   BUN mg/dL 62* 61* 63*   CREATININE mg/dL 5.87* 6.11* 7.11*   CALCIUM mg/dL 7.0* 7.3* 7.9*   BILIRUBIN mg/dL  --  7.6* 8.3*   ALK PHOS U/L  --  146* 167*   ALT (SGPT) U/L  --  242* 293*   AST (SGOT) U/L  --  557* 663*   GLUCOSE mg/dL 142* 124* 137*     Results from last 7 days   Lab Units 04/18/25  0337 04/17/25 2011   INR  1.79* 1.64*     Lab Results   Lab Value Date/Time    LIPASE 449 (H) 04/17/2025 2011       Radiology:  [unfilled]      Assessment & Plan   Assessment:    Decompensated cirrhosis   Alcohol abuse   Sepsis   NSTEMI   KEATON    All problems new to me today    Plan:   Paracentesis yesterday, no fluid sent for studies.  Continue empiric abx  Continue midodrine for KEATON.  On levophed, renal following  Lactulose via [facility]  Palliative care consult ongoing          Brandon Parker M.D.  [facility], [place], operated by [facility]  [address] [place] [address]  Office: [phone]

## 2025-04-19 NOTE — PROGRESS NOTES
LOS: 2 days   Patient Care Team:  Zoe Desai MD as PCP - General (Family Medicine)  Corbin Bailey MD as Surgeon (Neurosurgery)    Chief Complaint:  Following for cardiomyopathy    Interval History:   No new complaints today, still intermittently encephalopathic    Objective   Vital Signs  Temp:  [97.6 °F (36.4 °C)-98.6 °F (37 °C)] 97.6 °F (36.4 °C)  Heart Rate:  [] 100  Resp:  [20-26] 20  BP: ()/() 88/64    Intake/Output Summary (Last 24 hours) at 4/19/2025 0957  Last data filed at 4/19/2025 0745  Gross per 24 hour   Intake 2742.5 ml   Output 7625 ml   Net -4882.5 ml     , Lethargic but arouses to voice  Comfortable NAD  PERRL, conjunctivae clear  Neck supple, no JVD or thyromegaly appreciated  S1/S2 RRR, no m/r/g  Lungs CTA B, normal effort  Abdomen S/NT/ND (+) BS, no HSM appreciated  Extremities warm, no clubbing, cyanosis, 1+ BLE edema   No visible or palpable skin lesions  Lethargic but arouses to voice.    Results Review:      Results from last 7 days   Lab Units 04/19/25  0523 04/18/25  1413 04/18/25 0337   SODIUM mmol/L 138 131* 135*   POTASSIUM mmol/L 2.8* 4.1 3.4*   CHLORIDE mmol/L 95* 92* 92*   CO2 mmol/L 26.2 22.2 22.6   BUN mg/dL 61* 62* 61*   CREATININE mg/dL 4.86* 5.87* 6.11*   GLUCOSE mg/dL 131* 142* 124*   CALCIUM mg/dL 7.4* 7.0* 7.3*     Results from last 7 days   Lab Units 04/17/25 2148 04/17/25 2011   HSTROP T ng/L 822* 1,013*     Results from last 7 days   Lab Units 04/19/25  0913 04/18/25 0337 04/17/25 2011   WBC 10*3/mm3 8.66 8.67 8.77   HEMOGLOBIN g/dL 9.1* 9.2* 10.8*   HEMATOCRIT % 24.5* 24.9* 30.7*   PLATELETS 10*3/mm3 47* 71* 83*     Results from last 7 days   Lab Units 04/18/25  0337 04/17/25 2011   INR  1.79* 1.64*   APTT seconds  --  34.5         Results from last 7 days   Lab Units 04/19/25  0523   MAGNESIUM mg/dL 1.8           I reviewed the patient's new clinical results.  I personally viewed and interpreted the patient's EKG/Telemetry data         Medication Review:   cefTRIAXone, 2,000 mg, Intravenous, Q24H  folic acid 1 mg in sodium chloride 0.9 % 50 mL IVPB, 1 mg, Intravenous, Daily  insulin regular, 2-7 Units, Subcutaneous, Q6H  LORazepam, 1 mg, Intravenous, Q6H  midodrine, 10 mg, Oral, Q8H  mupirocin, 1 Application, Each Nare, BID  senna-docusate sodium, 2 tablet, Oral, BID  sodium chloride, 10 mL, Intravenous, Q12H  thiamine (B-1) IV, 200 mg, Intravenous, Q8H   Followed by  [START ON 4/23/2025] thiamine, 100 mg, Oral, Daily        norepinephrine, 0.02-0.3 mcg/kg/min, Last Rate: 0.18 mcg/kg/min (04/19/25 0653)        Assessment & Plan       Sepsis    Nausea, vomiting and diarrhea  EHEC positive stool with C. difficile positivity  Anasarca, multifactorial  Severe KEATON on CKD  Decompensated hepatic cirrhosis  Moderate ascites on CT  Acute on chronic systolic CHF, secondary to ischemic cardiomyopathy with  of the RCA, maximally vascularized on cath 2022  Would consider metoprolol succinate if BP stabilizes, currently requiring midodrine and was previously on norepinephrine  have to hold for now  Nephrology managing diuretics, will not tolerate other GDMT due to his comorbidities and KEATON  Status post ICD placement due to persistent ischemic cardiomyopathy follows with [facility] cardiology  UTI with urinary retention with Rae catheter insertion  Transaminase elevation, likely related to cirrhosis and decompensation  Hypoalbuminemia  Heavy alcohol use, daily  Thrombocytopenia secondary to cirrhosis multifactorial causes per above  Encephalopathy, suspected metabolic    Unfortunately, not much to offer from the cardiac standpoint.  Echo April 18, 2025 completed showing Ongoing ischemic cardiomyopathy with an EF of 30% with an akinesis/aneurysmal inferior base from his known RCA disease    Carmelo To MD  04/19/25  09:57 EDT      Part of this note may be an electronic transcription/translation of spoken language to printed text using the Dragon Dictation  System.

## 2025-04-19 NOTE — PROGRESS NOTES
[facility] Progress Note      Patient Name: Archie Oquendo  : 1965  MRN: [id number]  Primary Care Physician:  Zoe Desai MD  Date of admission: 2025    Subjective     Interval History:   Follow-up acute kidney injury on chronic kidney disease    The patient is confused on high flow oxygen.  Unable to give me any meaningful information.    Review of Systems:   Not obtained    Objective     Vitals:   Temp:  [97.6 °F (36.4 °C)-98.6 °F (37 °C)] 97.6 °F (36.4 °C)  Heart Rate:  [] 100  Resp:  [20-26] 20  BP: ()/() 88/64  Flow (L/min) (Oxygen Therapy):  [2-60] 60    Intake/Output Summary (Last 24 hours) at 2025 0938  Last data filed at 2025 0745  Gross per 24 hour   Intake 2742.5 ml   Output 7625 ml   Net -4882.5 ml       Physical Exam:    General Appearance: Obtunded, chronically ill, obese, no acute distress  Skin: warm and dry  HEENT: Oral mucosa is dry, on high flow oxygen via nasal cannula  Neck: No JVD  Lungs: Bilateral rhonchi, breathing effort not labored  Heart: RRR, normal S1 and S2, no rub  Abdomen: soft, nontender, distended with the presence of  : Rae catheter anchored in  Extremities: 4+ lower extremity edema  Neuro: Unable to assess    Scheduled Meds:     cefTRIAXone, 2,000 mg, Intravenous, Q24H  folic acid 1 mg in sodium chloride 0.9 % 50 mL IVPB, 1 mg, Intravenous, Daily  insulin regular, 2-7 Units, Subcutaneous, Q6H  LORazepam, 1 mg, Intravenous, Q6H  midodrine, 10 mg, Oral, Q8H  mupirocin, 1 Application, Each Nare, BID  senna-docusate sodium, 2 tablet, Oral, BID  sodium chloride, 10 mL, Intravenous, Q12H  thiamine (B-1) IV, 200 mg, Intravenous, Q8H   Followed by  [START ON 2025] thiamine, 100 mg, Oral, Daily      IV Meds:   norepinephrine, 0.02-0.3 mcg/kg/min, Last Rate: 0.18 mcg/kg/min (25 0653)        Results Reviewed:   I have personally reviewed the results from the time of this admission to 2025 09:38  EDT     Results from last 7 days   Lab Units 04/19/25  0523 04/18/25  1413 04/18/25  0337 04/17/25  2011   SODIUM mmol/L 138 131* 135* 129*   POTASSIUM mmol/L 2.8* 4.1 3.4* 3.7   CHLORIDE mmol/L 95* 92* 92* 82*   CO2 mmol/L 26.2 22.2 22.6 23.6   BUN mg/dL 61* 62* 61* 63*   CREATININE mg/dL 4.86* 5.87* 6.11* 7.11*   CALCIUM mg/dL 7.4* 7.0* 7.3* 7.9*   BILIRUBIN mg/dL 7.9*  --  7.6* 8.3*   ALK PHOS U/L 136*  --  146* 167*   ALT (SGPT) U/L 197*  --  242* 293*   AST (SGOT) U/L 420*  --  557* 663*   GLUCOSE mg/dL 131* 142* 124* 137*       Estimated Creatinine Clearance: 21.9 mL/min (A) (by C-G formula based on SCr of 4.86 mg/dL (H)).    Results from last 7 days   Lab Units 04/19/25 0523 04/18/25 1413 04/18/25 0337 04/17/25 2011   MAGNESIUM mg/dL 1.8  --  1.7 1.7   PHOSPHORUS mg/dL 2.9 2.8 2.6  --        Results from last 7 days   Lab Units 04/17/25 2148   URIC ACID mg/dL 13.6*       Results from last 7 days   Lab Units 04/18/25 0337 04/17/25 2011   WBC 10*3/mm3 8.67 8.77   HEMOGLOBIN g/dL 9.2* 10.8*   PLATELETS 10*3/mm3 71* 83*       Results from last 7 days   Lab Units 04/18/25 0337 04/17/25 2011   INR  1.79* 1.64*       Assessment / Plan     ASSESSMENT:  KEATON on possible CKD (baseline SCr not clear, but had SCr 2.0 in 9/2024), nonoliguric, improving and multifactorial:  volume loss from N/V/D, hypotension, and UTI/sepsis.  Has peripheral edema; per kalemia potassium 2.8 magnesium improved up to 1.8 and phosphorus improved up to 2.9. PCR 2.8 g/g, creatinine is down to 4.86.  metabolic acidosis on arrival, along with elevated beta hydroxybutyrate and elevated lactate, most recent blood gases pH 7.43, pCO2 37.2..    Hyponatremia, improving, from alcoholism, cirrhosis, N/V/D and malnutrition.  Potassium today is 2.8 magnesium up to 1.8.  Hypocalcemia, likely in association with poor nutrition; has appropriate elevation in PTH   Mixed AGMA d/t lactate acidosis and respiratory alkalosis  Sepsis, SBP 70s on admission,  now improved on levo, also received empiric vancomycin and cefepime  N/V/D/possible colitis, stool sample positive for enteropathogenic E. coli and C. difficile, managed by primary team  Transaminitis, in association with alcohol abuse/cirrhosis, CT also suggested possible cholelithiasis or sludge.  Followed by primary team  Suspected decompensated cirrhosis, CT abdomen/pelvis showed hepatic steatosis with moderate ascites.  Had bedside paracentesis with 3 L removed  HFrEF s/p ICD placement, echo pending, awaiting cardiology evaluation  Macrocytic anemia, hemoglobin trending down, closely monitor, transfuse per primary team  Thrombocytopenia, in association liver disease, platelets 71,000.  Alcohol abuse, received thiamine, folic acid, and Ativan  UTI with retention, now with Rae catheter  Non-STEMI, awaiting cardiology evaluation  Severe hyperuricemia associated with decreased effective intravascular volume    PLAN:  Continue the same treatment  Replete potassium  No indication for dialysis  Surveillance labs    I reviewed the chart and other providers notes, reviewed labs.  I discussed the case with the patient's nurse.  Copied text in this note has been reviewed and is accurate as of 04/19/25.         Thank you for involving us in the care of Archie Oquendo.  Please feel free to call with any questions.    Jose Mathis MD  04/19/25  09:38 EDT    [facility]  [phone]    Please note that portions of this note were completed with a voice recognition program.

## 2025-04-19 NOTE — CONSULTS
"Referring Provider: Tawanda Bates MD  Reason for Consultation:     E. coli enteritis with C. difficile     Chief Complaint   Patient presents with    Weakness - Generalized         Subjective   History of present illness: Patient is a 59-year-old male with past medical history of chronic back hardware infection on suppressive cefadroxil, alcohol abuse, cirrhosis, CHF, and CAD who presented with weakness and lower extremity edema.  ID consulted for \"E. coli enteritis with C.  Difficile\".    Since presentation patient has been afebrile with no leukocytosis.  Procalcitonin elevated in the setting of acute renal failure with creatinine peaking at 6.11.  Procalcitonin downtrending.  Requiring vasopressor today and currently on ceftriaxone and oral vancomycin.  C. difficile testing positive for PCR and negative by antigen.  GI pathogen panel positive EPEC.    Today patient is difficult to arouse and no further history able to be obtained.  Currently on high flow nasal cannula 60% FiO2.  CT abdomen pelvis showed moderate ascites with hepatic steatosis.  Pancreatitis also noted.    Past Medical History:   Diagnosis Date    Abnormal gait     Abscess of back     CHF (congestive heart failure) 06/2022    Coronary artery disease     ONE VESSEL    Foot drop, left     Hypertension     Low back pain     Lumbar disc herniation     Numbness in both legs     MOSTLY IN KNEES DOWN    Pain in left knee     Sleep apnea     DOES NOT WEAR CPAP    Spinal stenosis     Spinal stenosis of lumbar region        Past Surgical History:   Procedure Laterality Date    BACK SURGERY      BEDSIDE PARACENTESIS  4/18/2025    CARDIAC CATHETERIZATION  06/2022    STATES NO CAD    COLONOSCOPY  2013    INCISION AND DRAINAGE TRUNK N/A 02/13/2023    Procedure: Wound debridement and drainage;  Surgeon: Nj Mix MD;  Location: [facility];  Service: Neurosurgery;  Laterality: N/A;    INSERT CENTRAL LINE AT BEDSIDE  4/18/2025    LUMBAR DISCECTOMY  " 2000    LUMBAR DISCECTOMY FUSION INSTRUMENTATION Left 06/05/2019    Procedure: LUMBAR THREE LUMBAR FOUR  AND LUMBAR FIVE LUMBAR SIX DECOMPRESSION. THORACIC ELEVEN TO SACREL TWO POSTERIOR LATERAL ARTHRODESIS(FUSION) WITH SPINAL INSTRUMENTATION. REMOVAL OF LUMBAR INSTRUMENTATION.;  Surgeon: Corbin Bailey MD;  Location: [facility];  Service: Neurosurgery    LUMBAR FUSION  2008    VASECTOMY         family history includes Diabetes in his mother; Gout in his father; Hypertension in his father; Sleep apnea in his father.     reports that he quit smoking about 5 years ago. His smoking use included cigarettes. He started smoking about 15 years ago. He has a 5 pack-year smoking history. He has never used smokeless tobacco. He reports current alcohol use of about 5.0 standard drinks of alcohol per week. He reports that he does not use drugs.     No Known Allergies    Medication:  Antibiotics:  Anti-Infectives (From admission, onward)      Ordered     Dose/Rate Route Frequency Start Stop    04/19/25 1025  vancomycin (VANCOCIN) capsule 125 mg        Ordering Provider: Alvaro Cornejo MD    125 mg Oral Every Other Day 05/13/25 0900 05/27/25 0859    04/19/25 1025  vancomycin (VANCOCIN) capsule 125 mg        Ordering Provider: Alvaro Cornejo MD    125 mg Oral Every 24 Hours 05/06/25 0900 05/13/25 0859    04/19/25 1025  vancomycin (VANCOCIN) capsule 125 mg        Ordering Provider: Alvaro Cornejo MD    125 mg Oral Every 12 Hours 04/29/25 0900 05/06/25 0859    04/19/25 1025  vancomycin (VANCOCIN) capsule 125 mg        Ordering Provider: Alvaro Cornejo MD    125 mg Oral Every 6 Hours Scheduled 04/19/25 1200 04/29/25 1159    04/18/25 0205  cefepime 2000 mg IVPB in 100 mL NS (MBP)  Status:  Discontinued        Ordering Provider: Vianey Jones, APRN    2,000 mg  over 4 Hours Intravenous Every 24 Hours 04/18/25 2200 04/18/25 1139    04/18/25 1141  cefTRIAXone (ROCEPHIN) 2,000 mg in sodium chloride 0.9 % 100 mL MBP         Ordering Provider: Marcos Rodas MD    2,000 mg  200 mL/hr over 30 Minutes Intravenous Every 24 Hours 04/18/25 1230 04/24/25 1229    04/18/25 0118  Pharmacy to dose vancomycin  Status:  Discontinued        Ordering Provider: Vianey Jones APRN     Not Applicable Continuous PRN 04/18/25 0118 04/18/25 1139    04/18/25 0118  Pharmacy To Dose: Cefepime  Status:  Discontinued        Ordering Provider: Vianey Jones APRN     Not Applicable Continuous PRN 04/18/25 0117 04/18/25 1139 04/17/25 2105  vancomycin IVPB 2000 mg in 0.9% Sodium Chloride 500 mL        Ordering Provider: Tawanda Bates MD    20 mg/kg × 104 kg  250 mL/hr over 120 Minutes Intravenous Once 04/17/25 2121 04/18/25 0057    04/17/25 2105  cefepime 2000 mg IVPB in 100 mL NS (MBP)        Ordering Provider: Tawanda Bates MD    2,000 mg  over 30 Minutes Intravenous Once 04/17/25 2121 04/17/25 2330              Objective     Physical Exam:   Vital Signs   Temp:  [97.6 °F (36.4 °C)-98.6 °F (37 °C)] 97.6 °F (36.4 °C)  Heart Rate:  [] 100  Resp:  [20-26] 20  BP: ()/() 88/64    GENERAL: Lethargic in no acute distress  HEENT: On high flow nasal cannula  EYES: No conjunctival injection  LUNGS: Normal work of breathing.  SKIN: Warm and dry without cutaneous eruptions in exposed areas.  Neuro: Not following command    Results Review:   I reviewed the patient's new clinical results.  I reviewed the patient's new imaging results and agree with the interpretation.  I reviewed the patient's other test results and agree with the interpretation    Lab Results   Component Value Date    WBC 8.66 04/19/2025    HGB 9.1 (L) 04/19/2025    HCT 24.5 (L) 04/19/2025    .0 (H) 04/19/2025    PLT 47 (C) 04/19/2025       Lab Results   Component Value Date    VANCOTROUGH 11.40 02/15/2023       Lab Results   Component Value Date    GLUCOSE 131 (H) 04/19/2025    BUN 61 (H) 04/19/2025    CREATININE 4.86 (H) 04/19/2025    EGFRIFNONA 95  07/01/2019    BCR 12.6 04/19/2025    CO2 26.2 04/19/2025    CALCIUM 7.4 (L) 04/19/2025    ALBUMIN 2.8 (L) 04/19/2025     (H) 04/19/2025     (H) 04/19/2025         Estimated Creatinine Clearance: 21.9 mL/min (A) (by C-G formula based on SCr of 4.86 mg/dL (H)).    Isolation:   Contact Spore      Microbiology:  Microbiology Results (last 10 days)       Procedure Component Value - Date/Time    Body Fluid Culture - Body Fluid, Peritoneal Catheter [[id number]] Collected: 04/18/25 1411    Lab Status: Preliminary result Specimen: Body Fluid from Peritoneal Catheter Updated: 04/19/25 0846     Body Fluid Culture No growth     Gram Stain No WBCs or organisms seen    Gastrointestinal Panel, PCR - Stool, Per Rectum [[id number]]  (Abnormal) Collected: 04/18/25 0031    Lab Status: Final result Specimen: Stool from Per Rectum Updated: 04/18/25 0216     Campylobacter Not Detected     Plesiomonas shigelloides Not Detected     Salmonella Not Detected     Vibrio Not Detected     Vibrio cholerae Not Detected     Yersinia enterocolitica Not Detected     Enteroaggregative E. coli (EAEC) Not Detected     Enteropathogenic E. coli (EPEC) Detected     Enterotoxigenic E. coli (ETEC) lt/st Not Detected     Shiga-like toxin-producing E. coli (STEC) stx1/stx2 Not Detected     Shigella/Enteroinvasive E. coli (EIEC) Not Detected     Cryptosporidium Not Detected     Cyclospora cayetanensis Not Detected     Entamoeba histolytica Not Detected     Giardia lamblia Not Detected     Adenovirus F40/41 Not Detected     Astrovirus Not Detected     Norovirus GI/GII Not Detected     Rotavirus A Not Detected     Sapovirus (I, II, IV or V) Not Detected    Clostridioides difficile Toxin - Stool, Per Rectum [[id number]]  (Abnormal) Collected: 04/18/25 0031    Lab Status: Final result Specimen: Stool from Per Rectum Updated: 04/18/25 0139    Narrative:      The following orders were created for panel order Clostridioides difficile Toxin - Stool, Per  Rectum.  Procedure                               Abnormality         Status                     ---------                               -----------         ------                     Clostridioides difficile...[[id number]]  Abnormal            Final result                 Please view results for these tests on the individual orders.    Clostridioides difficile Toxin, PCR - Stool, Per Rectum [[id number]]  (Abnormal) Collected: 04/18/25 0031    Lab Status: Final result Specimen: Stool from Per Rectum Updated: 04/18/25 0139     Toxigenic C. difficile by PCR Positive    Narrative:      DNA from a toxigenic strain of C.difficile has been detected. Antigen testing for the presence of free C.difficile toxin is currently in progress, to help determine the clinical significance of this PCR result.     Clostridioides difficile toxin Ag, Reflex - Stool, Per Rectum [[id number]]  (Normal) Collected: 04/18/25 0031    Lab Status: Final result Specimen: Stool from Per Rectum Updated: 04/18/25 0214     C.diff Toxin Ag Negative    Narrative:      DNA from a toxigenic strain of C.difficile was detected, although the free toxin itself was not detected. These findings are consistent with C.difficile colonization and may not reflect actual C.difficile infection. Clinical correlation needed.    Blood Culture - Blood, Arm, Left [[id number]]  (Normal) Collected: 04/17/25 2148    Lab Status: Preliminary result Specimen: Blood from Arm, Left Updated: 04/18/25 2215     Blood Culture No growth at 24 hours    COVID-19 and FLU A/B PCR, 1 HR TAT - Swab, Nasopharynx [[id number]]  (Normal) Collected: 04/17/25 2014    Lab Status: Final result Specimen: Swab from Nasopharynx Updated: 04/17/25 2041     COVID19 Not Detected     Influenza A PCR Not Detected     Influenza B PCR Not Detected    Narrative:      Fact sheet for providers: https://www.fda.gov/media/139516/download    Fact sheet for patients: https://www.fda.gov/media/887261/download    Test  performed by PCR.             Radiology:  CT abdomen pelvis report reviewed with hepatic steatosis and moderate ascites.  Evidence of acute pancreatitis.    Assessment   #Enteropathogenic E. coli diarrhea  #C. difficile colonization  #Decompensated cirrhosis  #Metabolic encephalopathy  #Thrombocytopenia  #Acute pancreatitis  #Alcohol abuse  #NSTEMI  #KEATON  #Chronic spinal hardware infection with suppressive cefadroxil 500 mg twice daily    Suspect a component of patient's low blood pressures may be related to underlying liver pathology and not fully related to sepsis.  Can complete empiric 5-day course of ceftriaxone 2 g daily for coverage of EPEC isolated from the stool.  Typically this really requires supportive care but given his severity of illness we will proceed with therapy.    C. difficile testing consistent with colonization rather than acute infection however patient will be on IV ceftriaxone,  will cover empirically for a 10-day course with oral vancomycin 125 mg 4 times daily.    Upon completion of the above antibiotics ceftriaxone patient would need to continue his chronic suppressive cefadroxil 500 mg twice daily for chronic hardware infection.  Inpatient substitution of Keflex 500 mg twice daily would be acceptable.      Thank you for allowing me to be involved in the care of this patient. Infectious diseases will sign off at this time with antibiotics plan in place, but please call me at [phone] if any further ID questions or new ID concerns.    ------------------------------------------------------------------------------------------------  The above decisions regarding antimicrobials incorporates elements of engaging in complex medical decision-making associated with antimicrobial prescribing including considerations such as antimicrobial resistance patterns, emergence of new variants/strains, recent antibiotic exposure, interactions/complications from comorbidities including concurrent infections,  public health considerations to minimize development of antimicrobial resistance, and emerging and re-emerging infections.

## 2025-04-19 NOTE — PROGRESS NOTES
[facility] Clinical Pharmacy Services: C. Difficile Medication Changes       Pharmacy has been consulted to look over Archie SIMONA Oquedno's profile to check patient's medications for changes due to C. Difficile diagnosis per Dr. Cornejo's request.       Current C. Diff Regimen: Vancomycin 125mg PO Q6h    Assessment/Plan/Changes       1. Proton pump inhibitor: none    2. Antiperistalic agents or stool softeners/laxatives: Pericolace, miralax and bisacodyl - d/c'd per protocol       Thank you for allowing me to participate in your patient's care.  Please call pharmacy with any questions or concerns.     Kristina Melara, PharmD  Clinical Pharmacist

## 2025-04-20 NOTE — NURSING NOTE
Unable to place cortrak. Notified Daughter Lauren.     Notified APRN: Unable to place cortrak. Patient refusing. Notified daughter. Patient tachycardic in low 100s. No additional EKG at this time. Re-evaluate cortrak in morning. Ok for capnography monitoring.     Wound care consult placed

## 2025-04-20 NOTE — CONSULTS
.     REASON FOR CONSULTATION:     Provide an opinion on any further workup or treatment  Thrombocytopenia  Anemia                             REQUESTING PHYSICIAN: Tawanda Bates MD  RECORDS OBTAINED:  Records of the patient's history including those obtained from the referring provider were reviewed and summarized in detail.    HISTORY OF PRESENT ILLNESS:  The patient is a 59 y.o. year old male  who is here for follow-up with the above-mentioned history.    Patient is a 59-year-old male with cirrhosis of the liver, alcohol abuse, coronary artery disease, CHF, chronic back hardware on suppressive antibiotics with cefadroxil who presented to the emergency room with weakness and lower extremity edema.  GI panel positive for EPEC and currently being treated for EPEC colitis with CT scan of the abdomen which was performed in the ER showing findings of colitis.  ID has been consulted and they have him on Ceftriaxone 2 g every 24 hours ending 4/24/2025 to be followed by Keflex 500 mg twice daily.  Also noted to have a positive PCR for C. difficile and thought to be colonization.  Noted to have elevated procalcitonin.  Admitted with KEATON with creatinine peaking at 6.11 but subsequent improvement in creatinine to 3.34 and BUN 53.  He is currently on Levophed for ongoing sepsis.    Previously had normal platelet count but drop in platelets since admission with platelet count being 42,000 on 4/28/2025.    Prior to that the last CBC which is available to us was from 2 years ago on 3/2/2023 and platelet count was normal at 269,000.    Hematology has been consulted for the management of thrombocytopenia.    Patient is currently drowsy and lethargic and limited history being obtained from him and mostly obtained from reviewing the chart.      Past Medical History:   Diagnosis Date    Abnormal gait     Abscess of back     CHF (congestive heart failure) 06/2022    Coronary artery disease     ONE VESSEL    Foot drop, left      Hypertension     Low back pain     Lumbar disc herniation     Numbness in both legs     MOSTLY IN KNEES DOWN    Pain in left knee     Sleep apnea     DOES NOT WEAR CPAP    Spinal stenosis     Spinal stenosis of lumbar region      Past Surgical History:   Procedure Laterality Date    BACK SURGERY      BEDSIDE PARACENTESIS  4/18/2025    CARDIAC CATHETERIZATION  06/2022    STATES NO CAD    COLONOSCOPY  2013    INCISION AND DRAINAGE TRUNK N/A 02/13/2023    Procedure: Wound debridement and drainage;  Surgeon: Nj Mix MD;  Location: [facility] OR;  Service: Neurosurgery;  Laterality: N/A;    INSERT CENTRAL LINE AT BEDSIDE  4/18/2025    LUMBAR DISCECTOMY  2000    LUMBAR DISCECTOMY FUSION INSTRUMENTATION Left 06/05/2019    Procedure: LUMBAR THREE LUMBAR FOUR  AND LUMBAR FIVE LUMBAR SIX DECOMPRESSION. THORACIC ELEVEN TO SACREL TWO POSTERIOR LATERAL ARTHRODESIS(FUSION) WITH SPINAL INSTRUMENTATION. REMOVAL OF LUMBAR INSTRUMENTATION.;  Surgeon: Corbin Bailey MD;  Location: [facility] OR;  Service: Neurosurgery    LUMBAR FUSION  2008    VASECTOMY         MEDICATIONS    Current Facility-Administered Medications:     Calcium Replacement - Follow Nurse / BPA Driven Protocol, , Not Applicable, PRN, Jose Mathis MD    cefTRIAXone (ROCEPHIN) 2,000 mg in sodium chloride 0.9 % 100 mL MBP, 2,000 mg, Intravenous, Q24H, Last Rate: 200 mL/hr at 04/19/25 1206, 2,000 mg at 04/19/25 1206 **FOLLOWED BY** [START ON 4/24/2025] cephalexin (KEFLEX) capsule 500 mg, 500 mg, Oral, Q12H, Rolly Castano,     dextrose (D50W) (25 g/50 mL) IV injection 25 g, 25 g, Intravenous, Q15 Min PRN, Vianey Jones, APRN    dextrose (GLUTOSE) oral gel 15 g, 15 g, Oral, Q15 Min PRN, Vianey Jones, APRN    fentaNYL citrate (PF) (SUBLIMAZE) injection 25 mcg, 25 mcg, Intravenous, Q2H PRN, Vianey Jones, APRN, 25 mcg at 04/20/25 0933    folic acid 1 mg in sodium chloride 0.9 % 50 mL IVPB, 1 mg, Intravenous, Daily, Vianey Jones  R, APRN, Last Rate: 100 mL/hr at 25 0906, 1 mg at 25 0906    glucagon (GLUCAGEN) injection 1 mg, 1 mg, Intramuscular, Q15 Min PRN, Vianey Jones APRN    insulin regular (humuLIN R,novoLIN R) injection 2-7 Units, 2-7 Units, Subcutaneous, Q6H, Vianey Jones APRN, 2 Units at 25 1213    lactulose (CHRONULAC) 10 GM/15ML solution 20 g, 20 g, Oral, TID, Alvaro Cornejo MD    [] LORazepam (ATIVAN) injection 2 mg, 2 mg, Intravenous, Q6H, 2 mg at 25 1709 **FOLLOWED BY** LORazepam (ATIVAN) injection 1 mg, 1 mg, Intravenous, Q6H, Vianey Jones APRN, 1 mg at 25 0519    LORazepam (ATIVAN) tablet 1 mg, 1 mg, Oral, Q1H PRN **OR** LORazepam (ATIVAN) injection 1 mg, 1 mg, Intravenous, Q1H PRN, 1 mg at 25 0925 **OR** LORazepam (ATIVAN) tablet 2 mg, 2 mg, Oral, Q1H PRN **OR** LORazepam (ATIVAN) injection 2 mg, 2 mg, Intravenous, Q1H PRN, 2 mg at 25 2111 **OR** LORazepam (ATIVAN) injection 2 mg, 2 mg, Intravenous, Q15 Min PRN, 2 mg at 25 1515 **OR** LORazepam (ATIVAN) injection 2 mg, 2 mg, Intramuscular, Q15 Min PRN, Vianey Jones APRN    Magnesium Standard Dose Replacement - Follow Nurse / BPA Driven Protocol, , Not Applicable, PRN, Vianey Jones APRN    Magnesium Standard Dose Replacement - Follow Nurse / BPA Driven Protocol, , Not Applicable, PRN, Jose Mathis MD    midodrine (PROAMATINE) tablet 10 mg, 10 mg, Oral, Q8H, Marcos Rodas MD    mupirocin (BACTROBAN) 2 % nasal ointment 1 Application, 1 Application, Each Nare, BID, Vianey Jones, APRN, 1 Application at 25    nitroglycerin (NITROSTAT) SL tablet 0.4 mg, 0.4 mg, Sublingual, Q5 Min PRN, Vianey Jones, APRNEVAEH    norepinephrine (LEVOPHED) 8 mg in 250 mL NS infusion (premix), 0.02-0.3 mcg/kg/min, Intravenous, Titrated, Vianey Jones, VERONICA, Last Rate: 40.5 mL/hr at 25 0849, 0.22 mcg/kg/min at 25 0849    ondansetron (ZOFRAN) injection 4 mg, 4 mg, Intravenous, Q6H PRN,  Vianey Jones APRN, 4 mg at 25 0933    Pharmacy Consult, , Not Applicable, Continuous JESSICA, Alvaro Cornejo MD    Phosphorus Replacement - Follow Nurse / BPA Driven Protocol, , Not Applicable, Del LOPEZ Ramsey Nasri, MD    Potassium Replacement - Follow Nurse / BPA Driven Protocol, , Not Applicable, Del LOPEZ Ramsey Nasri, MD    sodium chloride 0.9 % flush 10 mL, 10 mL, Intravenous, Q12H, Vianey Jones APRN, 10 mL at 25 0850    sodium chloride 0.9 % flush 10 mL, 10 mL, Intravenous, PRN, Vianey Jones APRN    sodium chloride 0.9 % infusion 40 mL, 40 mL, Intravenous, PRN, Vianey Jones APRN    thiamine (B-1) injection 200 mg, 200 mg, Intravenous, Q8H, 200 mg at 25 0519 **FOLLOWED BY** [START ON 2025] thiamine (VITAMIN B-1) tablet 100 mg, 100 mg, Oral, Daily, Vianey Jones APRN    vancomycin (VANCOCIN) capsule 125 mg, 125 mg, Oral, Q6H, Alvaro Cornejo MD    ALLERGIES:   No Known Allergies    SOCIAL HISTORY:       Social History     Socioeconomic History    Marital status:     Number of children: 2   Tobacco Use    Smoking status: Former     Current packs/day: 0.00     Average packs/day: 0.5 packs/day for 10.0 years (5.0 ttl pk-yrs)     Types: Cigarettes     Start date: 2009     Quit date: 2019     Years since quittin.8    Smokeless tobacco: Never   Vaping Use    Vaping status: Never Used   Substance and Sexual Activity    Alcohol use: Yes     Alcohol/week: 5.0 standard drinks of alcohol     Types: 5 Cans of beer per week    Drug use: No    Sexual activity: Not Currently     Partners: Female     Birth control/protection: Vasectomy         FAMILY HISTORY:  Family History   Problem Relation Age of Onset    Diabetes Mother     Hypertension Father     Gout Father     Sleep apnea Father     Malig Hyperthermia Neg Hx        REVIEW OF SYSTEMS:  Review of Systems  Unable to obtain due to mental status         Vitals:    25 0630 25 0645 25  0700 04/20/25 0800   BP: (!) 92/39 (!) 104/37 110/52 96/65   BP Location:  Right arm Right arm Right arm   Patient Position:  Lying Lying Lying   Pulse: 108 101 103 107   Resp:  14  14   Temp:    97.9 °F (36.6 °C)   TempSrc:    Oral   SpO2: 92% 92% 95% 94%   Weight:       Height:              No data to display               PHYSICAL EXAM:      CONSTITUTIONAL:  Vital signs reviewed.  No distress, looks comfortable.  EYES:  Conjunctivae and lids unremarkable.  PERRLA  EARS,NOSE,MOUTH,THROAT:  Ears and nose appear unremarkable.  Lips, teeth, gums appear unremarkable.  RESPIRATORY:  Normal respiratory effort.  Lungs clear to auscultation bilaterally.  CARDIOVASCULAR:  Normal S1, S2.  No murmurs rubs or gallops.  No significant lower extremity edema.  GASTROINTESTINAL: Abdomen appears unremarkable.  Nontender.  No hepatomegaly.  No splenomegaly.  LYMPHATIC:  No cervical, supraclavicular, axillary lymphadenopathy.  MUSCULOSKELETAL:  Unremarkable gait and station.  Unremarkable digits/nails.  No cyanosis or clubbing.  SKIN:  Warm.  No rashes.  PSYCHIATRIC:  Normal judgment and insight.  Normal mood and affect.      RECENT LABS:        WBC   Date Value Ref Range Status   04/20/2025 9.47 3.40 - 10.80 10*3/mm3 Final   04/19/2025 8.66 3.40 - 10.80 10*3/mm3 Final   04/18/2025 8.67 3.40 - 10.80 10*3/mm3 Final   04/17/2025 8.77 3.40 - 10.80 10*3/mm3 Final     Hemoglobin   Date Value Ref Range Status   04/20/2025 8.8 (L) 13.0 - 17.7 g/dL Final   04/19/2025 9.1 (L) 13.0 - 17.7 g/dL Final   04/18/2025 9.2 (L) 13.0 - 17.7 g/dL Final   04/17/2025 10.8 (L) 13.0 - 17.7 g/dL Final     Platelets   Date Value Ref Range Status   04/20/2025 42 (C) 140 - 450 10*3/mm3 Final   04/19/2025 47 (C) 140 - 450 10*3/mm3 Final   04/18/2025 71 (L) 140 - 450 10*3/mm3 Final   04/17/2025 83 (L) 140 - 450 10*3/mm3 Final       Assessment & Plan   [unfilled]      Archie Oquendo     *Thrombocytopenia  Normal platelet count and 2023.  Acute drop in  platelet count likely secondary to sepsis.  4/17/2025 platelet count 83,000  4/20/2025 platelet count has dropped to 42,000.  Obtain IPF, B12, folic acid level.  Also obtain HIT antibody.  Most likely thrombocytopenia secondary to sepsis.  Hold anticoagulation at this time.  Also obtain fibrin split products, fibrinogen, PT PTT INR  Also with underlying cirrhosis of the liver and ascites noted on the CT abdomen.  ?  Acute pancreatitis on CT abdomen.  Spleen is normal in size.      *Anemia  Most likely secondary to acute ongoing infection.  Hemoglobin 8.8.  Continue to monitor  Obtain iron studies, B12, folic acid      *E. coli sepsis  On ceftriaxone per ID.  Continue antibiotics per ID recommendations    *Cirrhosis of the liver  Ascites noted on CT abdomen.  Gastroenterology consulted  Recommend supportive therapy.    *Ascites  Management per gi  Status post paracentesis, 3 L of fluid removed    *KEATON  Per nephrology note creatinine around 2 in September 2024.  Thought to be secondary to sepsis.  Improving, creatinine has decreased to 3.34.    Recommendations  Obtain IPF, fibrosis blood products, fibrinogen, PT PTT INR.  Obtain HIT antibody.  Most likely thrombocytopenia secondary to sepsis.  I do not believe that this is TTP or HUS as creatinine is steadily improving.  Continue to monitor CBC closely.  Obtain iron studies for workup of anemia.

## 2025-04-20 NOTE — NURSING NOTE
Patient defibrillator firing. EKG shows sinus tachy. Labs done. Needing magnesium and potassium replaced. Following protocl

## 2025-04-20 NOTE — PROGRESS NOTES
LOS: 3 days   Patient Care Team:  Zoe Desai MD as PCP - General (Family Medicine)  Corbin Bailey MD as Surgeon (Neurosurgery)    Chief Complaint:  Following for CHF    Interval History:   Remains encephalopathic today    Objective   Vital Signs  Temp:  [97.4 °F (36.3 °C)-98.2 °F (36.8 °C)] 97.9 °F (36.6 °C)  Heart Rate:  [100-133] 126  Resp:  [14-21] 14  BP: ()/() 93/61    Intake/Output Summary (Last 24 hours) at 4/20/2025 1013  Last data filed at 4/20/2025 1010  Gross per 24 hour   Intake 489.7 ml   Output 4600 ml   Net -4110.3 ml       Comfortable NAD  PERRL, conjunctivae clear  Neck supple, no JVD or thyromegaly appreciated  S1/S2 RRR, no m/r/g  Lungs CTA B, normal effort  Abdomen S/NT/ND (+) BS, no HSM appreciated  Extremities warm, no clubbing, cyanosis, 1+ anasarca noted  No visible or palpable skin lesions   slightly lethargic but arouses to voice.  Encephalopathic     Results Review:      Results from last 7 days   Lab Units 04/20/25  0641 04/20/25  0111 04/19/25  1800 04/19/25  0523   SODIUM mmol/L 142 141  --  138   POTASSIUM mmol/L 3.5 3.2*  3.2* 2.9* 2.8*   CHLORIDE mmol/L 98 96*  --  95*   CO2 mmol/L 28.0 27.8  --  26.2   BUN mg/dL 53* 56*  --  61*   CREATININE mg/dL 3.34* 3.75*  --  4.86*   GLUCOSE mg/dL 144* 111*  --  131*   CALCIUM mg/dL 7.5* 7.6*  --  7.4*     Results from last 7 days   Lab Units 04/17/25 2148 04/17/25 2011   HSTROP T ng/L 822* 1,013*     Results from last 7 days   Lab Units 04/20/25  0641 04/19/25  0913 04/18/25  0337   WBC 10*3/mm3 9.47 8.66 8.67   HEMOGLOBIN g/dL 8.8* 9.1* 9.2*   HEMATOCRIT % 23.5* 24.5* 24.9*   PLATELETS 10*3/mm3 42* 47* 71*     Results from last 7 days   Lab Units 04/18/25  0337 04/17/25 2011   INR  1.79* 1.64*   APTT seconds  --  34.5         Results from last 7 days   Lab Units 04/20/25  0641   MAGNESIUM mg/dL 2.4           I reviewed the patient's new clinical results.  I personally viewed and interpreted the patient's  EKG/Telemetry data        Medication Review:   cefTRIAXone, 2,000 mg, Intravenous, Q24H   Followed by  [START ON 4/24/2025] cephalexin, 500 mg, Oral, Q12H  folic acid 1 mg in sodium chloride 0.9 % 50 mL IVPB, 1 mg, Intravenous, Daily  insulin regular, 2-7 Units, Subcutaneous, Q6H  lactulose, 20 g, Oral, TID  LORazepam, 1 mg, Intravenous, Q6H  midodrine, 10 mg, Oral, Q8H  mupirocin, 1 Application, Each Nare, BID  sodium chloride, 10 mL, Intravenous, Q12H  thiamine (B-1) IV, 200 mg, Intravenous, Q8H   Followed by  [START ON 4/23/2025] thiamine, 100 mg, Oral, Daily  vancomycin, 125 mg, Oral, Q6H        norepinephrine, 0.02-0.3 mcg/kg/min, Last Rate: 0.22 mcg/kg/min (04/20/25 0849)  Pharmacy Consult,         Assessment & Plan       Sepsis    Nausea, vomiting and diarrhea  EHEC positive stool with C. difficile positivity  Anasarca, multifactorial  Severe KEATON on CKD.  Nephrology following.  He is receiving some fluids today due to elevated uric acid  Decompensated hepatic cirrhosis  Moderate ascites on CT  Acute on chronic systolic CHF, secondary to ischemic cardiomyopathy with  of the RCA, maximally vascularized on cath 2022  Would consider metoprolol succinate if BP stabilizes, currently requiring midodrine and was previously on norepinephrine  have to hold for now  Nephrology managing diuretics, will not tolerate other GDMT due to his comorbidities and KEATON  Status post ICD placement due to persistent ischemic cardiomyopathy follows with [facility] cardiology  UTI with urinary retention with Rae catheter insertion  Transaminase elevation, likely related to cirrhosis and decompensation  Hypoalbuminemia  Heavy alcohol use, daily  Thrombocytopenia secondary to cirrhosis multifactorial causes per above.  Hematology to see today.  Encephalopathy, suspected metabolic    Carmelo To MD  04/20/25  10:13 EDT      Part of this note may be an electronic transcription/translation of spoken language to printed text using the Dragon  Dictation System.

## 2025-04-20 NOTE — PLAN OF CARE
Goal Outcome Evaluation:           Progress: no change  Outcome Evaluation: ALERT X3; CONFUSED TO SITUATION. Bilateral lower extremity Edema. Ciwa protocol followed. Pain medication given per MAR. See previous note regarding Cortrak. Levo on.

## 2025-04-20 NOTE — PROGRESS NOTES
"      [facility]         Dr John Sayied   LOS: 3 days   Patient Care Team:  Zoe Desai MD as PCP - General (Family Medicine)  Corbin Bailey MD as Surgeon (Neurosurgery)    Chief Complaint: Sepsis with septic shock E. coli enteritis with C. difficile positive other issues as listed below    Interval History: Sleepy lethargic.  Levophed drip ongoing.  Opens eyes and attempts to follow simple commands.    REVIEW OF SYSTEMS:   Unable to get with patient's current condition    Ventilator/Non-Invasive Ventilation Settings (From admission, onward)      None        Heated high flow 60 L with 85% FiO2      Vital Signs  Temp:  [97.4 °F (36.3 °C)-98.2 °F (36.8 °C)] 97.9 °F (36.6 °C)  Heart Rate:  [100-133] 107  Resp:  [14-21] 14  BP: ()/() 96/65    Intake/Output Summary (Last 24 hours) at 4/20/2025 0921  Last data filed at 4/20/2025 0800  Gross per 24 hour   Intake 489.7 ml   Output 4000 ml   Net -3510.3 ml     Flowsheet Rows      Flowsheet Row First Filed Value   Admission Height 188 cm (74\") Documented at 04/17/2025 1957   Admission Weight 104 kg (230 lb) Documented at 04/17/2025 2110            Physical Exam:  Patient is examined using the personal protective equipment as per guidelines from infection control for this particular patient as enacted.  Hand hygiene was performed before and after patient interaction.   General Appearance:  Acutely ill and poorly arousable.  Not following commands for me.  ENT normocephalic atraumatic  Neck midline trachea, no thyromegaly   Lungs:   Diminished breath sounds bilaterally    Heart:    Regular rhythm and normal rate, normal S1 and S2, no            murmur, no gallop, no rub, no click   Chest Wall:    No abnormalities observed   Abdomen:   Soft mild diffuse tenderness no rebound or guarding   Extremities:   Moves all extremities well, trace to 1+ edema, no cyanosis, no             redness  CNS confused  Skin no rashes no nodules  Musculoskeletal " no cyanosis no clubbing normal range of motion     Results Review:        Results from last 7 days   Lab Units 04/20/25  0641 04/20/25  0111 04/19/25  1800 04/19/25  0523   SODIUM mmol/L 142 141  --  138   POTASSIUM mmol/L 3.5 3.2*  3.2* 2.9* 2.8*   CHLORIDE mmol/L 98 96*  --  95*   CO2 mmol/L 28.0 27.8  --  26.2   BUN mg/dL 53* 56*  --  61*   CREATININE mg/dL 3.34* 3.75*  --  4.86*   GLUCOSE mg/dL 144* 111*  --  131*   CALCIUM mg/dL 7.5* 7.6*  --  7.4*     Results from last 7 days   Lab Units 04/17/25 2148 04/17/25 2011   HSTROP T ng/L 822* 1,013*     Results from last 7 days   Lab Units 04/20/25  0641 04/19/25  0913 04/18/25  0337   WBC 10*3/mm3 9.47 8.66 8.67   HEMOGLOBIN g/dL 8.8* 9.1* 9.2*   HEMATOCRIT % 23.5* 24.5* 24.9*   PLATELETS 10*3/mm3 42* 47* 71*     Results from last 7 days   Lab Units 04/18/25  0337 04/17/25 2011   INR  1.79* 1.64*   APTT seconds  --  34.5         Results from last 7 days   Lab Units 04/20/25  0641   MAGNESIUM mg/dL 2.4         Results from last 7 days   Lab Units 04/18/25  1303   PH, ARTERIAL pH units 7.431   PO2 ART mm Hg 114.3*   PCO2, ARTERIAL mm Hg 37.2   HCO3 ART mmol/L 24.8       I reviewed the patient's new clinical results.  I personally viewed and interpreted the patient's chest x-ray.        Medication Review:   cefTRIAXone, 2,000 mg, Intravenous, Q24H   Followed by  [START ON 4/24/2025] cephalexin, 500 mg, Oral, Q12H  folic acid 1 mg in sodium chloride 0.9 % 50 mL IVPB, 1 mg, Intravenous, Daily  insulin regular, 2-7 Units, Subcutaneous, Q6H  LORazepam, 1 mg, Intravenous, Q6H  midodrine, 10 mg, Oral, Q8H  mupirocin, 1 Application, Each Nare, BID  sodium chloride, 10 mL, Intravenous, Q12H  thiamine (B-1) IV, 200 mg, Intravenous, Q8H   Followed by  [START ON 4/23/2025] thiamine, 100 mg, Oral, Daily  vancomycin, 125 mg, Oral, Q6H        norepinephrine, 0.02-0.3 mcg/kg/min, Last Rate: 0.22 mcg/kg/min (04/20/25 3140)  Pharmacy Consult,         ASSESSMENT:   Acute metabolic  encephalopathy  Sepsis present on admission due to E. coli enteritis  Acute hypoxemic respiratory failure on heated high flow  Septic shock present on admission causing encephalopathy and acute  Kidney injury  Decompensated cirrhosis  Acute pancreatitis by imaging  Non-STEMI  Hyponatremia  Hypokalemia  Acute kidney injury  Acute liver injury  Anemia  Thrombocytopenia  Alcohol abuse  Acute HFrEF    PLAN:  Mental status remains poor.  Possibly related to encephalopathy.  Have asked nursing staff to place Dobbhoff tube and start lactulose ASAP.  If unable to take oral lactulose will require lactulose enema.  Source of infection E. coli enteritis with C. difficile positive  Appreciate input from infectious diseases.  Further antibiotics per infectious diseases.  Creatinine improving.  Further fluid electrolyte management per nephrology  Wean off pressors maintain MAP greater than 65  Low platelets continue to decline likely from sepsis and cirrhosis.  Consult hematology  Thiamine IV to continue  Patient critically ill  ICU core measures    Critical care time 35 minutes      Alvaro Cornejo MD  04/20/25  09:21 EDT

## 2025-04-20 NOTE — PROGRESS NOTES
[facility] Progress Note      Patient Name: Archie Oquendo  : 1965  MRN: [id number]  Primary Care Physician:  Zoe Desai MD  Date of admission: 2025    Subjective     Interval History:   Follow-up acute kidney injury on chronic kidney disease    Patient is obtunded unable to get any information from him.    Review of Systems:   Not obtained    Objective     Vitals:   Temp:  [97.4 °F (36.3 °C)-98.2 °F (36.8 °C)] 97.9 °F (36.6 °C)  Heart Rate:  [100-133] 126  Resp:  [14-21] 14  BP: ()/() 93/61  Flow (L/min) (Oxygen Therapy):  [3-4] 3    Intake/Output Summary (Last 24 hours) at 2025 1052  Last data filed at 2025 1010  Gross per 24 hour   Intake 489.7 ml   Output 4600 ml   Net -4110.3 ml       Physical Exam:    General Appearance: Obtunded, chronically ill, obese, no acute distress  Skin: warm and dry  HEENT: Oral mucosa is dry,   Neck: No JVD  Lungs: Bilateral rhonchi, breathing effort not labored  Heart: RRR, normal S1 and S2, no rub  Abdomen: soft, nontender, distended with the presence of  : Rae catheter anchored in  Extremities: 4+ lower extremity edema  Neuro: Unable to assess    Scheduled Meds:     cefTRIAXone, 2,000 mg, Intravenous, Q24H   Followed by  [START ON 2025] cephalexin, 500 mg, Oral, Q12H  folic acid 1 mg in sodium chloride 0.9 % 50 mL IVPB, 1 mg, Intravenous, Daily  insulin regular, 2-7 Units, Subcutaneous, Q6H  lactulose, 20 g, Oral, TID  LORazepam, 1 mg, Intravenous, Q6H  midodrine, 10 mg, Oral, Q8H  mupirocin, 1 Application, Each Nare, BID  sodium chloride, 10 mL, Intravenous, Q12H  thiamine (B-1) IV, 200 mg, Intravenous, Q8H   Followed by  [START ON 2025] thiamine, 100 mg, Oral, Daily  vancomycin, 125 mg, Oral, Q6H      IV Meds:   norepinephrine, 0.02-0.3 mcg/kg/min, Last Rate: 0.22 mcg/kg/min (25 1339)  Pharmacy Consult,         Results Reviewed:   I have personally reviewed the results from the time of  this admission to 4/20/2025 10:52 EDT     Results from last 7 days   Lab Units 04/20/25  0641 04/20/25  0111 04/19/25  1800 04/19/25  0523   SODIUM mmol/L 142 141  --  138   POTASSIUM mmol/L 3.5 3.2*  3.2* 2.9* 2.8*   CHLORIDE mmol/L 98 96*  --  95*   CO2 mmol/L 28.0 27.8  --  26.2   BUN mg/dL 53* 56*  --  61*   CREATININE mg/dL 3.34* 3.75*  --  4.86*   CALCIUM mg/dL 7.5* 7.6*  --  7.4*   BILIRUBIN mg/dL 8.2* 8.9*  --  7.9*   ALK PHOS U/L 148* 159*  --  136*   ALT (SGPT) U/L 196* 207*  --  197*   AST (SGOT) U/L 426* 434*  --  420*   GLUCOSE mg/dL 144* 111*  --  131*       Estimated Creatinine Clearance: 29.6 mL/min (A) (by C-G formula based on SCr of 3.34 mg/dL (H)).    Results from last 7 days   Lab Units 04/20/25  0641 04/20/25  0111 04/19/25  0523 04/18/25  1413   MAGNESIUM mg/dL 2.4 1.5* 1.8  --    PHOSPHORUS mg/dL 2.6  --  2.9 2.8       Results from last 7 days   Lab Units 04/20/25  0641 04/17/25  2148   URIC ACID mg/dL 14.9* 13.6*       Results from last 7 days   Lab Units 04/20/25  0641 04/19/25  0913 04/18/25  0337 04/17/25 2011   WBC 10*3/mm3 9.47 8.66 8.67 8.77   HEMOGLOBIN g/dL 8.8* 9.1* 9.2* 10.8*   PLATELETS 10*3/mm3 42* 47* 71* 83*       Results from last 7 days   Lab Units 04/18/25  0337 04/17/25 2011   INR  1.79* 1.64*       Assessment / Plan     ASSESSMENT:  KEATON on possible CKD (baseline SCr not clear, but had SCr 2.0 in 9/2024), nonoliguric, improving and multifactorial:  volume loss from N/V/D, hypotension, and UTI/sepsis.  Creatinine today down to 3.34, electrolyte within acceptable range    Hyponatremia, improving, from alcoholism, cirrhosis, N/V/D and malnutrition.  Potassium today is 3.5 magnesium up to 2.4  Hypocalcemia, likely in association with poor nutrition; has appropriate elevation in PTH   Mixed AGMA d/t lactate acidosis and respiratory alkalosis  Sepsis, SBP 70s on admission, now improved on levo, also received empiric vancomycin and cefepime  Significant edema associated with  hypoalbuminemia liver disease  Transaminitis, in association with alcohol abuse/cirrhosis, CT also suggested possible cholelithiasis or sludge.  Followed by primary team  Suspected decompensated cirrhosis, CT abdomen/pelvis showed hepatic steatosis with moderate ascites.  Had bedside paracentesis with 3 L removed  HFrEF s/p ICD placement, echo pending, awaiting cardiology evaluation  Macrocytic anemia, hemoglobin trending down, closely monitor, transfuse per primary team  Thrombocytopenia, in association liver disease, platelets 71,000.  Alcohol abuse, received thiamine, folic acid, and Ativan  UTI with retention, now with Rae catheter  Non-STEMI, awaiting cardiology evaluation  Severe hyperuricemia associated with decreased effective intravascular volume uric acid up to 14.9    PLAN:  Continue the same treatment, since the uric acid is worsening I am going to give the patient 1 L of normal saline to see if that will impact  Replete potassium as needed  No indication for dialysis  Surveillance labs    I reviewed the chart and other providers notes, reviewed labs.  I discussed the case with the patient's nurse.  Copied text in this note has been reviewed and is accurate as of 04/20/25.         Thank you for involving us in the care of Archie Oquendo.  Please feel free to call with any questions.    Jose Mathis MD  04/20/25  10:52 EDT    [facility]  [phone]    Please note that portions of this note were completed with a voice recognition program.

## 2025-04-20 NOTE — PROGRESS NOTES
Gastroenterology   Inpatient Progress Note    Reason for Follow Up: Decompensated cirrhosis    Subjective     Interval History:   Patient remains confused more recently received pain medication    2025 3 L of ascites removed  Potassium 3.2  Magnesium 1.5  Alk phos 169      Bilirubin 8 .9  All these are slightly worse from yesterday    Stools positive for EPEC and C. difficile    Current Facility-Administered Medications:     Calcium Replacement - Follow Nurse / BPA Driven Protocol, , Not Applicable, PRN, Jose Mathis MD    cefTRIAXone (ROCEPHIN) 2,000 mg in sodium chloride 0.9 % 100 mL MBP, 2,000 mg, Intravenous, Q24H, Last Rate: 200 mL/hr at 25 1206, 2,000 mg at 25 1206 **FOLLOWED BY** [START ON 2025] cephalexin (KEFLEX) capsule 500 mg, 500 mg, Oral, Q12H, Rolly Castano DO    dextrose (D50W) (25 g/50 mL) IV injection 25 g, 25 g, Intravenous, Q15 Min PRN, Vianey Jones, APRN    dextrose (GLUTOSE) oral gel 15 g, 15 g, Oral, Q15 Min PRN, Vianey Jones, APRN    fentaNYL citrate (PF) (SUBLIMAZE) injection 25 mcg, 25 mcg, Intravenous, Q2H PRN, Vianey Jones, APRN, 25 mcg at 25 0318    folic acid 1 mg in sodium chloride 0.9 % 50 mL IVPB, 1 mg, Intravenous, Daily, Vianey Jones APRN, Last Rate: 100 mL/hr at 25 0906, 1 mg at 25 0906    glucagon (GLUCAGEN) injection 1 mg, 1 mg, Intramuscular, Q15 Min PRN, Vianey Jones, APRN    insulin regular (humuLIN R,novoLIN R) injection 2-7 Units, 2-7 Units, Subcutaneous, Q6H, Vianey Jones, APRN, 2 Units at 25 1213    [] LORazepam (ATIVAN) injection 2 mg, 2 mg, Intravenous, Q6H, 2 mg at 25 1709 **FOLLOWED BY** LORazepam (ATIVAN) injection 1 mg, 1 mg, Intravenous, Q6H, Vianey Jones R, APRN, 1 mg at 25 0519    LORazepam (ATIVAN) tablet 1 mg, 1 mg, Oral, Q1H PRN **OR** LORazepam (ATIVAN) injection 1 mg, 1 mg, Intravenous, Q1H PRN, 1 mg at 25 0321 **OR** LORazepam  (ATIVAN) tablet 2 mg, 2 mg, Oral, Q1H PRN **OR** LORazepam (ATIVAN) injection 2 mg, 2 mg, Intravenous, Q1H PRN, 2 mg at 04/19/25 2111 **OR** LORazepam (ATIVAN) injection 2 mg, 2 mg, Intravenous, Q15 Min PRN, 2 mg at 04/18/25 1515 **OR** LORazepam (ATIVAN) injection 2 mg, 2 mg, Intramuscular, Q15 Min PRN, Vianey Jones APRN    Magnesium Standard Dose Replacement - Follow Nurse / BPA Driven Protocol, , Not Applicable, PRN, Vianey Jones APRN    Magnesium Standard Dose Replacement - Follow Nurse / BPA Driven Protocol, , Not Applicable, PRN, Jose Mathis MD    midodrine (PROAMATINE) tablet 10 mg, 10 mg, Oral, Q8H, Marcos Rodas MD    mupirocin (BACTROBAN) 2 % nasal ointment 1 Application, 1 Application, Each Nare, BID, Vianey Jones APRN, 1 Application at 04/19/25 2021    nitroglycerin (NITROSTAT) SL tablet 0.4 mg, 0.4 mg, Sublingual, Q5 Min PRN, Vianey Jones APRN    norepinephrine (LEVOPHED) 8 mg in 250 mL NS infusion (premix), 0.02-0.3 mcg/kg/min, Intravenous, Titrated, Vianey Jones APRN, Last Rate: 36.9 mL/hr at 04/20/25 0618, 0.2 mcg/kg/min at 04/20/25 0618    ondansetron (ZOFRAN) injection 4 mg, 4 mg, Intravenous, Q6H PRN, Vianey Jones APRN    Pharmacy Consult, , Not Applicable, Continuous PRN, Alvaro Cornejo MD    Phosphorus Replacement - Follow Nurse / BPA Driven Protocol, , Not Applicable, PRN, Jose Mathis MD    Potassium Replacement - Follow Nurse / BPA Driven Protocol, , Not Applicable, PRNDel Ramsey Nasri, MD    sodium chloride 0.9 % flush 10 mL, 10 mL, Intravenous, Q12H, Vianey Jones, VERONICA, 10 mL at 04/19/25 2022    sodium chloride 0.9 % flush 10 mL, 10 mL, Intravenous, PRN, Vianey Jones, APRN    sodium chloride 0.9 % infusion 40 mL, 40 mL, Intravenous, PRN, Vianey Jones, APRN    thiamine (B-1) injection 200 mg, 200 mg, Intravenous, Q8H, 200 mg at 04/20/25 0519 **FOLLOWED BY** [START ON 4/23/2025] thiamine (VITAMIN B-1) tablet 100 mg, 100  mg, Oral, Daily, Vianey Jones APRN    vancomycin (VANCOCIN) capsule 125 mg, 125 mg, Oral, Q6H, Alvaro Cornejo MD  Review of Systems:               Review of systems could not be obtained due to  patient sedation status.    Objective     Vital Signs  Temp:  [97.4 °F (36.3 °C)-98.2 °F (36.8 °C)] 97.9 °F (36.6 °C)  Heart Rate:  [] 108  Resp:  [15-21] 15  BP: ()/() 83/52  Body mass index is 25.47 kg/m².    Intake/Output Summary (Last 24 hours) at 4/20/2025 0645  Last data filed at 4/20/2025 0422  Gross per 24 hour   Intake 489.7 ml   Output 4650 ml   Net -4160.3 ml     I/O this shift:  In: 489.7 [I.V.:489.7]  Out: 1400 [Urine:1400]                Physical Exam:              General: Confused              Eyes: no scleral icterus              Skin: warm and dry, not jaundiced              Abdomen:  mild distention                             Results Review:                I reviewed the patient's new clinical results.    Results from last 7 days   Lab Units 04/19/25  0913 04/18/25 0337 04/17/25 2011   WBC 10*3/mm3 8.66 8.67 8.77   HEMOGLOBIN g/dL 9.1* 9.2* 10.8*   HEMATOCRIT % 24.5* 24.9* 30.7*   PLATELETS 10*3/mm3 47* 71* 83*     Results from last 7 days   Lab Units 04/20/25  0111 04/19/25  1800 04/19/25  0523 04/18/25  1413 04/18/25  0337   SODIUM mmol/L 141  --  138 131* 135*   POTASSIUM mmol/L 3.2*  3.2* 2.9* 2.8* 4.1 3.4*   CHLORIDE mmol/L 96*  --  95* 92* 92*   CO2 mmol/L 27.8  --  26.2 22.2 22.6   BUN mg/dL 56*  --  61* 62* 61*   CREATININE mg/dL 3.75*  --  4.86* 5.87* 6.11*   CALCIUM mg/dL 7.6*  --  7.4* 7.0* 7.3*   BILIRUBIN mg/dL 8.9*  --  7.9*  --  7.6*   ALK PHOS U/L 159*  --  136*  --  146*   ALT (SGPT) U/L 207*  --  197*  --  242*   AST (SGOT) U/L 434*  --  420*  --  557*   GLUCOSE mg/dL 111*  --  131* 142* 124*     Results from last 7 days   Lab Units 04/18/25  0337 04/17/25 2011   INR  1.79* 1.64*     Lab Results   Lab Value Date/Time    LIPASE 449 (H) 04/17/2025 2011        Radiology:  XR Chest Post CVA Port   Final Result       As described.               This report was finalized on 4/18/2025 4:06 PM by Dr. David Smith M.D on Workstation: [id number]          XR Chest 1 View   Final Result      CT Abdomen Pelvis Without Contrast   Final Result       1. Marked hepatic steatosis. Patient is noted to have moderate ascites.   2. There is some inflammatory stranding surrounding the pancreas, as   well as some trace fluid. Appearance may reflect acute pancreatitis.   There is no pancreatic ductal dilatation, and no organized   peripancreatic collections are seen.   3. High density material within the gallbladder may reflect stones or   sludge.   4. Thick walled appearance to the ascending and transverse colon   appearance may be related to portal colopathy, although correlation with   any evidence of colitis is recommended.   5. Indeterminate ground glass pulmonary nodule measuring 11 mm. For a   ground glass nodule >=6 mm, recommend CT at 6-12 months to confirm   persistence, then CT every 2 years until 5 years. If solid component(s)   or growth develops, consider resection.    Radiation dose reduction techniques were utilized, including automated   exposure control and exposure modulation based on body size.           This report was finalized on 4/17/2025 10:40 PM by Dr. Adrianna Fleming M.D on Workstation: [id number]          XR Chest 1 View   Final Result   As described.       This report was finalized on 4/17/2025 8:19 PM by Dr. David Smith M.D on Workstation: [id number]          US Paracentesis    (Results Pending)       Assessment & Plan     Active Hospital Problems    Diagnosis     **Sepsis        Assessment:  Decompensated cirrhosis  Alcohol use  Sepsis  Acute kidney injury  None STEMI  Enteropathogenic E. Coli  C. difficile considered colonization      Plan:  Antibiotics have been given and appreciate ID input regarding enteropathogenic E. coli and the  positive C. difficile which is felt to be colonization.  Patient is getting 5-day course of ceftriaxone 2 g daily.  While likely colonization patient is being treated empirically with vancomycin  Midodrine for acute kidney injury, appreciate renal  Core track was not able to be placed, either reattempted today or lactulose will need to be given per rectum  Palliative care consultation  Continue supportive therapy  Correct electrolytes  I discussed the patients findings and my recommendations with patient and nursing staff.             Charlie Fowler M.D.  [facility] [place] [address]  Office: [phone]

## 2025-04-21 NOTE — PROGRESS NOTES
LOS: 4 days   Patient Care Team:  Zoe Desai MD as PCP - General (Family Medicine)  Corbin Bailey MD as Surgeon (Neurosurgery)    Chief Complaint: Sepsis, liver cirrhosis, thrombocytopenia and anemia    Interval History:  4/21/2025 patient is afebrile.  Not responding to verbal communication, however nursing staff reports patient starts to follow some command.  Stable Platelets 42,000, hemoglobin 8.5.      A comprehensive 14 point review of systems was performed and was negative except as mentioned.    Vital Signs:  Temp:  [97.5 °F (36.4 °C)-97.8 °F (36.6 °C)] 97.8 °F (36.6 °C)  Heart Rate:  [] 90  Resp:  [16-20] 18  BP: ()/(45-95) 99/83    Intake/Output Summary (Last 24 hours) at 4/21/2025 0951  Last data filed at 4/21/2025 0600  Gross per 24 hour   Intake --   Output 2350 ml   Net -2350 ml       Physical Exam:  GENERAL:  Well-developed, male diffusely jaundiced in no acute distress.    SKIN:  Warm, dry without rashes, purpura or petechiae.  HEENT:  Normocephalic.  Jaundice.  Pupils sluggish to light.  CHEST: Normal respiratory effort.  Lungs clear to auscultation. Good airflow.  CARDIAC:  Regular rate and rhythm. Normal S1,S2.  ABDOMEN:  Soft, no tender.  Bowel sounds normal.  EXTREMITIES:   Right leg edema more than the left leg.      Results Review:   CBC:      Lab 04/21/25  0343 04/20/25  1149 04/20/25  0641 04/19/25  0913 04/18/25  0337 04/17/25 2011   WBC 8.29  --  9.47 8.66 8.67 8.77   HEMOGLOBIN 8.5*  --  8.8* 9.1* 9.2* 10.8*   HEMATOCRIT 22.8*  --  23.5* 24.5* 24.9* 30.7*   PLATELETS 42* 44* 42* 47* 71* 83*   NEUTROS ABS 4.95  --  7.67* 5.89 6.84 7.54*   IMMATURE GRANS (ABS) 0.14*  --   --   --  0.07*  --    LYMPHS ABS 1.73  --   --   --  1.03  --    MONOS ABS 1.25*  --   --   --  0.65  --    EOS ABS 0.16  --  0.09 0.26 0.08 0.00   MCV 99.1*  --  99.6* 100.0* 102.9* 106.2*     CMP:        Lab 04/21/25  0343 04/20/25  1149 04/20/25  0641 04/20/25  0111 04/19/25  1800 04/19/25  0523  04/18/25  1413 04/18/25  0856 04/18/25  0337 04/17/25 2011   SODIUM 146*  --  142 141  --  138 131*  --  135* 129*   POTASSIUM 3.0* 3.2* 3.5 3.2*  3.2* 2.9* 2.8* 4.1  --  3.4* 3.7   CHLORIDE 101  --  98 96*  --  95* 92*  --  92* 82*   CO2 31.0*  --  28.0 27.8  --  26.2 22.2  --  22.6 23.6   ANION GAP 14.0  --  16.0* 17.2*  --  16.8* 16.8*  --  20.4* 23.4*   BUN 49*  --  53* 56*  --  61* 62*  --  61* 63*   CREATININE 2.76*  --  3.34* 3.75*  --  4.86* 5.87*  --  6.11* 7.11*   EGFR 25.3*  --  20.1* 17.5*  --  12.8* 10.2*  --  9.8* 8.1*   GLUCOSE 108*  --  144* 111*  --  131* 142*  --  124* 137*   CALCIUM 7.6*  --  7.5* 7.6*  --  7.4* 7.0*  --  7.3* 7.9*   IONIZED CALCIUM  --   --   --   --   --   --   --  0.94*  --   --    MAGNESIUM 1.7  --  2.4 1.5*  --  1.8  --   --  1.7 1.7   PHOSPHORUS 2.5  --  2.6  --   --  2.9 2.8  --  2.6  --    TOTAL PROTEIN 5.5*  --  5.6* 6.0  --  5.8*  --   --  6.5 6.8   ALBUMIN 2.7*  --  2.9* 3.0*  --  2.8* 2.8*  --  3.2* 3.2*   GLOBULIN 2.8  --  2.7 3.0  --  3.0  --   --  3.3 3.6   ALT (SGPT) 180*  --  196* 207*  --  197*  --   --  242* 293*   AST (SGOT) 397*  --  426* 434*  --  420*  --   --  557* 663*   BILIRUBIN 8.3*  --  8.2* 8.9*  --  7.9*  --   --  7.6* 8.3*   BILIRUBIN DIRECT 6.5*  --   --   --   --   --   --   --   --   --    ALK PHOS 147*  --  148* 159*  --  136*  --   --  146* 167*   LIPASE  --   --   --   --   --   --   --   --   --  449*     Lab Results   Component Value Date    [id number] >2,000 (H) 04/20/2025     Lab Results   Component Value Date    FOLATE 12.50 04/21/2025     Lab Results   Component Value Date    IRON 34 (L) 04/21/2025    LABIRON 30 04/21/2025    TRANSFERRIN 76 (L) 04/21/2025    TIBC 113 (L) 04/21/2025    FERRITIN 3,650.00 (H) 04/21/2025     LDH   Date Value Ref Range Status   04/21/2025 733 (H) 135 - 225 U/L Final     Uric Acid   Date Value Ref Range Status   04/21/2025 15.0 (H) 3.4 - 7.0 mg/dL Final      Latest Reference Range & Units 04/21/25 03:43    Haptoglobin 30 - 200 mg/dL <10 (L)      Latest Reference Range & Units 04/20/25 11:49   FDP Less Than 5 mcg/mL  Greater than or equal to 20 mcg/mL !   Fibrinogen 219 - 464 mg/dL 216 (L)   Protime 11.7 - 14.2 Seconds 21.0 (H)   INR 0.90 - 1.10  1.80 (H)   PTT 22.7 - 35.4 seconds 40.0 (H)       Results from last 7 days   Lab Units 04/17/25  2148 04/17/25 2011   HSTROP T ng/L 822* 1,013*     Results from last 7 days   Lab Units 04/20/25  1149 04/18/25  0337 04/17/25 2011   INR  1.80* 1.79* 1.64*   APTT seconds 40.0*  --  34.5         Results from last 7 days   Lab Units 04/21/25  0343   MAGNESIUM mg/dL 1.7           I reviewed the patient's new clinical results.        Assessment:     *Thrombocytopenia  Normal platelet count and 2023.  Acute drop in platelet count likely secondary to sepsis.  4/17/2025 platelet count 83,000  4/20/2025 platelet count has dropped to 42,000.  Obtain IPF, B12, folic acid level.  Also obtain HIT antibody.  Most likely thrombocytopenia secondary to sepsis.  Hold anticoagulation at this time.  Also obtain fibrin split products, fibrinogen, PT PTT INR  Also with underlying cirrhosis of the liver and ascites noted on the CT abdomen.  ?  Acute pancreatitis on CT abdomen.  Spleen is normal in size.  Elevated IPF 12.8% on 4/20/2025 with platelets 44,000.  Elevated FTP and decreased fibrinogen, elevated PT/INR PTT, fits with DIC patient.   4/21/2025 platelets 42,000.         *Anemia  Most likely secondary to acute ongoing infection.  Hemoglobin 8.8.  Continue to monitor  on 4/20/2025 Severely elevated ferritin 3650 with free iron 34, fits with inflammatory changes.  Normal folate 12.5, and supratherapeutic B12 > 2000.    4/21/2025 elevated  and suppressed haptoglobin <10.  hemolysis?  Or the suppressed haptoglobin due to abnormal liver failure?  since patient is clinically improving according to nursing staff, we will hold Steroids for now.because of his severe infection with C.  difficile and EPEC colitis.           *E. coli sepsis  On ceftriaxone per ID.  Continue antibiotics per ID recommendations.    *. C. difficile colitis.   On p.o. vancomycin.     *Cirrhosis of the liver  Ascites noted on CT abdomen.  Gastroenterology consulted  On 4/21/25 Improving but still significant elevated total bilirubin, AST ALT.  Recommend supportive therapy.     *Ascites  Management per gi  Status post paracentesis, 3 L of fluid removed     *KEATON  Per nephrology note creatinine around 2 in September 2024.  Thought to be secondary to sepsis.  Improving, creatinine has decreased to 3.34.  On 4/21/2025 creatinine 2.76 BUN 49.      Recommendations  Pending HIT antibody.  Continue treatment for sepsis from EPEC, and C. difficile colitis.  Most likely thrombocytopenia secondary to sepsis/DIC  We do not believe that this is TTP or HUS as creatinine is steadily improving.  Anemia also due to sepsis, possible hemolysis.  Hold steroids for now.    Continue to monitor CBC CMP, PT/INR closely.      patient is critically ill.  Patient is not responding to verbal communication.    I spoke with nursing staff at bedside.     this patient is complete new to me for all the problems listed above.      I reviewed the medical records including notes from my colleague Dr. Abdalla, GI service, pulmonology service and the cardiology service.    I spent more than 75 minutes today for patient care.     Wild Delgado MD PhD  04/21/25  09:51 EDT       negative...

## 2025-04-21 NOTE — NURSING NOTE
04/21/25 0846   Wound 04/17/25 2241 Bilateral midline coccyx   Placement Date/Time: 04/17/25 2241   Present on Original Admission: Yes  Side: Bilateral  Orientation: midline  Location: coccyx   Pressure Injury Stage 2   Dressing Appearance open to air   Base red   Periwound pink;redness   Edges irregular   Drainage Amount none     Reason for Visit: WOC Team consult for Bilateral buttock, combination shear and pressure. Patient is incontinent of stool, Needs assist of 2 to turn. Heels bilateral blanchable, heel silicone border dressing in place.       Treatment Plan/Recommendations: Calmoseptine ointment to bilateral buttock.       Wound Team Follow up Plan: Skin prevention protocols placed in [facility]. Patient in unit on progressa bed.

## 2025-04-21 NOTE — PROGRESS NOTES
[facility]  Inpatient Progress Note    Reason for Follow Up:  Decompensated cirrhosis     Subjective     Interval History:   Remains on levophed  3 BM yesterday  Coretrak placed yesterday - tip in stomach    Current Facility-Administered Medications:     Calcium Replacement - Follow Nurse / BPA Driven Protocol, , Not Applicable, PRN, Jose Mathis MD    cefTRIAXone (ROCEPHIN) 2,000 mg in sodium chloride 0.9 % 100 mL MBP, 2,000 mg, Intravenous, Q24H **FOLLOWED BY** [START ON 4/24/2025] cephalexin (KEFLEX) capsule 500 mg, 500 mg, Nasogastric, Q12H, Antony Weston MD    dextrose (D50W) (25 g/50 mL) IV injection 25 g, 25 g, Intravenous, Q15 Min PRN, Vianey Jones, APRN    dextrose (GLUTOSE) oral gel 15 g, 15 g, Oral, Q15 Min PRN, Vianey Jones, APRN    fentaNYL citrate (PF) (SUBLIMAZE) injection 25 mcg, 25 mcg, Intravenous, Q2H PRN, Vianey Jones, APRN, 25 mcg at 04/20/25 1755    folic acid 1 mg in sodium chloride 0.9 % 50 mL IVPB, 1 mg, Intravenous, Daily, Vianey Jones APRN, Last Rate: 100 mL/hr at 04/20/25 0945, 1 mg at 04/20/25 0945    glucagon (GLUCAGEN) injection 1 mg, 1 mg, Intramuscular, Q15 Min PRN, Vianey Jones, APRN    insulin regular (humuLIN R,novoLIN R) injection 2-7 Units, 2-7 Units, Subcutaneous, Q6H, Vianey Jones, APRN, 2 Units at 04/18/25 1213    lactulose (CHRONULAC) 10 GM/15ML solution 20 g, 20 g, Nasogastric, TID, Antony Weston MD, 20 g at 04/20/25 2013    LORazepam (ATIVAN) tablet 1 mg, 1 mg, Nasogastric, Q1H PRN **OR** LORazepam (ATIVAN) injection 1 mg, 1 mg, Intravenous, Q1H PRN, 1 mg at 04/21/25 0210 **OR** LORazepam (ATIVAN) tablet 2 mg, 2 mg, Nasogastric, Q1H PRN **OR** LORazepam (ATIVAN) injection 2 mg, 2 mg, Intravenous, Q1H PRN **OR** LORazepam (ATIVAN) injection 2 mg, 2 mg, Intravenous, Q15 Min PRN **OR** LORazepam (ATIVAN) injection 2 mg, 2 mg, Intramuscular, Q15 Min PRN, Antony Weston MD    Magnesium Standard Dose  Replacement - Follow Nurse / BPA Driven Protocol, , Not Applicable, PRN, Vianey Jones APRN    Magnesium Standard Dose Replacement - Follow Nurse / BPA Driven Protocol, , Not Applicable, PRN, Jose Mathis MD    midodrine (PROAMATINE) tablet 10 mg, 10 mg, Nasogastric, Q8H, Antony Weston MD, 10 mg at 04/21/25 0117    mupirocin (BACTROBAN) 2 % nasal ointment 1 Application, 1 Application, Each Nare, BID, Vianey Jones APRN, 1 Application at 04/20/25 2013    nitroglycerin (NITROSTAT) SL tablet 0.4 mg, 0.4 mg, Sublingual, Q5 Min PRN, Vianey Jones APRN    norepinephrine (LEVOPHED) 8 mg in 250 mL NS infusion (premix), 0.02-0.3 mcg/kg/min, Intravenous, Titrated, Vianey Jones APRN, Last Rate: 14.75 mL/hr at 04/21/25 0344, 0.08 mcg/kg/min at 04/21/25 0344    ondansetron (ZOFRAN) injection 4 mg, 4 mg, Intravenous, Q6H PRN, Vianey Jones APRN, 4 mg at 04/20/25 0933    Pharmacy Consult, , Not Applicable, Continuous PRN, Alvaro Cornejo MD    Phosphorus Replacement - Follow Nurse / BPA Driven Protocol, , Not Applicable, PRN, Jose Mathis MD    potassium chloride 20 mEq in 50 mL IVPB, 20 mEq, Intravenous, Q1H, Ellis Wilkinson MD, Last Rate: 50 mL/hr at 04/21/25 0532, 20 mEq at 04/21/25 0532    Potassium Replacement - Follow Nurse / BPA Driven Protocol, , Not Applicable, PRN, Vianey Jones APRN    sodium chloride 0.9 % flush 10 mL, 10 mL, Intravenous, Q12H, Vianey Jones APRN, 10 mL at 04/20/25 2014    sodium chloride 0.9 % flush 10 mL, 10 mL, Intravenous, PRN, Dresner, Vianey R, APRN    sodium chloride 0.9 % infusion 40 mL, 40 mL, Intravenous, PRN, Vianey Jones R, APRN    thiamine (B-1) injection 200 mg, 200 mg, Intravenous, Q8H, 200 mg at 04/21/25 0532 **FOLLOWED BY** [START ON 4/23/2025] thiamine (VITAMIN B-1) tablet 100 mg, 100 mg, Nasogastric, Daily, Antony Weston MD    vancomycin oral solution reconstituted 125 mg, 125 mg, Nasogastric, Q6H, Antony Weston MD,  125 mg at 04/21/25 0532  Review of Systems:    Negative for nausea or vomiting fevers or chills    Objective     Vital Signs  Temp:  [97.5 °F (36.4 °C)-97.9 °F (36.6 °C)] 97.5 °F (36.4 °C)  Heart Rate:  [] 126  Resp:  [14-20] 20  BP: ()/(37-95) 112/70  Body mass index is 25.47 kg/m².    Intake/Output Summary (Last 24 hours) at 4/21/2025 0627  Last data filed at 4/21/2025 0600  Gross per 24 hour   Intake 0 ml   Output 2350 ml   Net -2350 ml     I/O this shift:  In: -   Out: 650 [Urine:650]     Physical Exam:   General: patient awake, confused, slightly agitated   Eyes: Normal lids and lashes, no scleral icterus   Neck: supple, normal ROM   Skin: warm and dry, not jaundiced   Cardiovascular: Tachycardic, regular   Pulm: clear to auscultation bilaterally, regular and unlabored   Abdomen: soft, nontender, distended; normal bowel sounds   Extremities: no rash or edema       Results Review:     I reviewed the patient's new clinical results.    Results from last 7 days   Lab Units 04/21/25  0343 04/20/25  1149 04/20/25  0641 04/19/25  0913   WBC 10*3/mm3 8.29  --  9.47 8.66   HEMOGLOBIN g/dL 8.5*  --  8.8* 9.1*   HEMATOCRIT % 22.8*  --  23.5* 24.5*   PLATELETS 10*3/mm3 42* 44* 42* 47*     Results from last 7 days   Lab Units 04/21/25  0343 04/20/25  1149 04/20/25  0641 04/20/25  0111   SODIUM mmol/L 146*  --  142 141   POTASSIUM mmol/L 3.0* 3.2* 3.5 3.2*  3.2*   CHLORIDE mmol/L 101  --  98 96*   CO2 mmol/L 31.0*  --  28.0 27.8   BUN mg/dL 49*  --  53* 56*   CREATININE mg/dL 2.76*  --  3.34* 3.75*   CALCIUM mg/dL 7.6*  --  7.5* 7.6*   BILIRUBIN mg/dL 8.3*  --  8.2* 8.9*   ALK PHOS U/L 147*  --  148* 159*   ALT (SGPT) U/L 180*  --  196* 207*   AST (SGOT) U/L 397*  --  426* 434*   GLUCOSE mg/dL 108*  --  144* 111*     Results from last 7 days   Lab Units 04/20/25  1149 04/18/25  0337 04/17/25 2011   INR  1.80* 1.79* 1.64*     Lab Results   Lab Value Date/Time    LIPASE 449 (H) 04/17/2025 2011        Radiology:  XR Abdomen KUB   Final Result   1.Cortrak catheter tip probably in the distal body of the stomach.           This report was finalized on 4/20/2025 2:02 PM by Dr. Dexter Quinonez M.D on Workstation: [id number]          XR Chest Post CVA Port   Final Result       As described.               This report was finalized on 4/18/2025 4:06 PM by Dr. David Smith M.D on Workstation: [id number]          XR Chest 1 View   Final Result      CT Abdomen Pelvis Without Contrast   Final Result       1. Marked hepatic steatosis. Patient is noted to have moderate ascites.   2. There is some inflammatory stranding surrounding the pancreas, as   well as some trace fluid. Appearance may reflect acute pancreatitis.   There is no pancreatic ductal dilatation, and no organized   peripancreatic collections are seen.   3. High density material within the gallbladder may reflect stones or   sludge.   4. Thick walled appearance to the ascending and transverse colon   appearance may be related to portal colopathy, although correlation with   any evidence of colitis is recommended.   5. Indeterminate ground glass pulmonary nodule measuring 11 mm. For a   ground glass nodule >=6 mm, recommend CT at 6-12 months to confirm   persistence, then CT every 2 years until 5 years. If solid component(s)   or growth develops, consider resection.    Radiation dose reduction techniques were utilized, including automated   exposure control and exposure modulation based on body size.           This report was finalized on 4/17/2025 10:40 PM by Dr. Adrianna Fleming M.D on Workstation: [id number]          XR Chest 1 View   Final Result   As described.       This report was finalized on 4/17/2025 8:19 PM by Dr. David Smith M.D on Workstation: [id number]          US Paracentesis    (Results Pending)       Assessment & Plan     Active Hospital Problems    Diagnosis     **Sepsis      All problems are new to me  today  Assessment:  Decompensated cirrhosis w/ascites  Alcohol use  Sepsis  Acute kidney injury/CKD  CHF w/ischemic CM-h/o AICD  Enteropathogenic E. Coli  C. difficile considered colonization  Pancreatic inflammatory change on imaging      Plan:  Pt rcving 5 d course of rocephin and empiric treatment for c diff w/vanc  Renal function with some mild improvement-nephrology following  Core track placed yesterday-receiving lactulose.    Start tube feeds.    I discussed the patients findings and my recommendations with patient.            Veena Patel M.D.  [facility]  [phone]

## 2025-04-21 NOTE — PAYOR COMM NOTE
"Bj Oquendo (59 y.o. Male)                   ATTENTION; INITIAL CLINICALS AUTH PENDING  [id number]                   REPLY TO UR DEPT  [phone] OR CALL              Date of Birth   1965    Social Security Number       Address   [address] [place] [address]    Home Phone   [phone]    MRN   [id number]       [employer]   Patient Refused    Marital Status                               Admission Date   4/17/2025    Admission Type   Emergency    Admitting Provider   Antony Weston MD    Attending Provider   Antony Weston MD    Department, Room/Bed   [facility] CORON[facility], N339/1       Discharge Date       Discharge Disposition       Discharge Destination                                 Attending Provider: Antony Weston MD    Allergies: No Known Allergies    Isolation: Spore   Infection: C.difficile (04/18/25), Other (04/18/25)   Code Status: No CPR    Ht: 188 cm (74\")   Wt: 90 kg (198 lb 6.6 oz)    Admission Cmt: None   Principal Problem: Sepsis [A41.9]                   Active Insurance as of 4/17/2025       Primary Coverage       Payor Plan Insurance Group Employer/Plan Group    [employer]HE[employer] BLUE CROSS AN[employer]M BLUE CROSS BLUE SHIELD PPO [id number]       Payor Plan Address Payor Plan Phone Number Payor Plan Fax Number Effective Dates    [address] [phone]  1/1/2025 - None Entered    [facility] [id number]         Subscriber Name Subscriber Birth Date Member ID       BJ OQUENDO 1965 [id number]                     Emergency Contacts        (Rel.) Home Phone Work Phone Mobile Phone    Patricia Oquendo (Daughter) -- -- [phone]    IBIS OQUENDO (Daughter) -- -- [phone]    Kedar waite (Other) -- -- [phone]                 History & Physical        Vianey Jones, APRN at 04/17/25 2243       Attestation signed by Antony Weston MD at 04/18/25 0701      I have reviewed this documentation and " "agree.  Please see my note from same day for further details.                    Group: [facility]         HISTORY AND PHYSICAL    Patient Identification:  Archie Oquendo  59 y.o.  male  1965  [id number]              CC: Weakness    History of Present Illness:  Patient is a 59-year-old male with past medical history of HFrEF s/p ICD, hypertension and alcohol abuse who presents to the ED with complaints of weakness over the last several days.  He reports noncompliance with medications over the last several days but began again today because he was having worsening lower extremity edema.  Says he has reflux and has been \"spitting up\"but denies nausea and vomiting although he admits to this to other providers.  His labs showed acute renal failure, transaminitis, lactic acidosis, elevated troponin and BNP as well as suspected UTI.  He denies a diagnosis of cirrhosis or liver disease.  Reports drinking 6 drinks nightly for the last 1 year due to chronic pain and does have withdrawal symptoms when he quits.  Denies alcohol withdrawal related seizures.    CONSTITUTIONAL:  Denies fevers +chills  EYE:  No new vision changes  EAR:  No change in hearing  CARDIAC:  No chest pain  PULMONARY:  No productive cough or shortness of breath  GI:  No diarrhea, hematemesis or hematochezia,  RENAL:  No dysuria or urinary frequency  MUSCULOSKELETAL:  No musculoskeletal complaints  ENDOCRINE:  No heat or cold intolerance  INTEGUMENTARY: No skin rashes  NEUROLOGICAL:  No dizziness or confusion.  No seizure activity  PSYCHIATRIC:  No new anxiety or depression  12 system review of systems performed and all else negative      Past Medical History:  Past Medical History:   Diagnosis Date    Abnormal gait     Abscess of back     CHF (congestive heart failure) 06/2022    Coronary artery disease     ONE VESSEL    Foot drop, left     Hypertension     Low back pain     Lumbar disc herniation     Numbness in both legs     " MOSTLY IN KNEES DOWN    Pain in left knee     Sleep apnea     DOES NOT WEAR CPAP    Spinal stenosis     Spinal stenosis of lumbar region        Past Surgical History:  Past Surgical History:   Procedure Laterality Date    BACK SURGERY      CARDIAC CATHETERIZATION  2022    STATES NO CAD    COLONOSCOPY  2013    INCISION AND DRAINAGE TRUNK N/A 2023    Procedure: Wound debridement and drainage;  Surgeon: Nj Mix MD;  Location: [facility];  Service: Neurosurgery;  Laterality: N/A;    LUMBAR DISCECTOMY  2000    LUMBAR DISCECTOMY FUSION INSTRUMENTATION Left 2019    Procedure: LUMBAR THREE LUMBAR FOUR  AND LUMBAR FIVE LUMBAR SIX DECOMPRESSION. THORACIC ELEVEN TO SACREL TWO POSTERIOR LATERAL ARTHRODESIS(FUSION) WITH SPINAL INSTRUMENTATION. REMOVAL OF LUMBAR INSTRUMENTATION.;  Surgeon: Corbin Bailey MD;  Location: [facility];  Service: Neurosurgery    LUMBAR FUSION  2008    VASECTOMY          Home Meds:  (Not in a hospital admission)      Allergies:  No Known Allergies    Social History:   Social History     Socioeconomic History    Marital status:     Number of children: 2   Tobacco Use    Smoking status: Former     Current packs/day: 0.00     Average packs/day: 0.5 packs/day for 10.0 years (5.0 ttl pk-yrs)     Types: Cigarettes     Start date: 2009     Quit date: 2019     Years since quittin.8    Smokeless tobacco: Never   Vaping Use    Vaping status: Never Used   Substance and Sexual Activity    Alcohol use: Yes     Alcohol/week: 5.0 standard drinks of alcohol     Types: 5 Cans of beer per week    Drug use: No    Sexual activity: Not Currently     Partners: Female     Birth control/protection: Vasectomy       Family History:  Family History   Problem Relation Age of Onset    Diabetes Mother     Hypertension Father     Gout Father     Sleep apnea Father     Malig Hyperthermia Neg Hx        Physical Exam:  /90   Pulse 98   Temp 97.5 °F (36.4 °C) (Tympanic)    "Resp 18   Ht 188 cm (74\")   Wt 104 kg (230 lb)   SpO2 94%   BMI 29.53 kg/m²  Body mass index is 29.53 kg/m². 94% 104 kg (230 lb)    Constitutional: chronically ill appearing male awake in no acute distress  Head: - NCAT  Eyes: No pallor, Anicteric conjunctiva  ENMT: no oral thrush. Dry MM.   NECK: Trachea midline, No thyromegaly, no palpable cervical LNpathy  Heart: RRR, no murmur. No pedal edema   Lungs: ELA +, No wheezes/ crackles heard    Abdomen: Soft. + tenderness to palpation  Extremities: Pitting BLE edema  Neuro: Conscious, answers appropriately, no gross focal neuro deficits  Psych: Mood and affect appropriate    PPE recommended per [facility] infectious disease Isolation protocol for the current clinical scenario(as mentioned below) was followed.       LABS:  COVID19   Date Value Ref Range Status   04/17/2025 Not Detected Not Detected - Ref. Range Final       Lab Results   Component Value Date    CALCIUM 7.9 (L) 04/17/2025     Results from last 7 days   Lab Units 04/17/25 2011   MAGNESIUM mg/dL 1.7   SODIUM mmol/L 129*   POTASSIUM mmol/L 3.7   CHLORIDE mmol/L 82*   CO2 mmol/L 23.6   BUN mg/dL 63*   CREATININE mg/dL 7.11*   GLUCOSE mg/dL 137*   CALCIUM mg/dL 7.9*   WBC 10*3/mm3 8.77   HEMOGLOBIN g/dL 10.8*   PLATELETS 10*3/mm3 83*   ALT (SGPT) U/L 293*   AST (SGOT) U/L 663*   PROBNP pg/mL >70,000.0*   PROCALCITONIN ng/mL 7.65*     Lab Results   Component Value Date    TROPONINT 822 (C) 04/17/2025     Results from last 7 days   Lab Units 04/17/25  2148 04/17/25 2011   HSTROP T ng/L 822* 1,013*         Results from last 7 days   Lab Units 04/17/25 2030 04/17/25 2011   PROCALCITONIN ng/mL  --  7.65*   LACTATE mmol/L 5.3*  --          Results from last 7 days   Lab Units 04/17/25 2014   INFLUENZA B PCR  Not Detected     Results from last 7 days   Lab Units 04/17/25 2011   INR  1.64*         Lab Results   Component Value Date    TSH 2.460 06/26/2019     Estimated Creatinine Clearance: 14 mL/min " (A) (by C-G formula based on SCr of 7.11 mg/dL (H)).  Results from last 7 days   Lab Units 04/17/25 2048   NITRITE UA  Positive*   WBC UA /HPF 6-10*   BACTERIA UA /HPF None Seen   SQUAM EPITHEL UA /HPF 3-6*     Microbiology Results (last 10 days)       Procedure Component Value - Date/Time    COVID-19 and FLU A/B PCR, 1 HR TAT - Swab, Nasopharynx [[id number]]  (Normal) Collected: 04/17/25 2014    Lab Status: Final result Specimen: Swab from Nasopharynx Updated: 04/17/25 2041     COVID19 Not Detected     Influenza A PCR Not Detected     Influenza B PCR Not Detected    Narrative:      Fact sheet for providers: https://www.fda.gov/media/727270/download    Fact sheet for patients: https://www.fda.gov/media/261427/download    Test performed by PCR.               Imaging: I personally visualized the images of scans/x-rays performed within last 3 days.      Assessment:  Acute renal failure  Suspected decompensated cirrhosis  Multiorgan failure  NSTEMI  UTI with retention s/p catheter  Anion gap metabolic acidosis  Lactic acidosis  Transaminitis  Alcohol abuse  HFrEF s/p ICD  Hypertension    Recommendations:  -Admit to ICU  -Nephrology consulted and to manage acute renal failure with no plans for immediate dialysis as electrolytes are stable, suspect this is HRS  -No diagnosis of cirrhosis but MELD-Na 35. GI consulted  -CT abdomen pending  -Poor prognosis, palliative care has been consulted  -History of heart failure s/p ICD and with recent noncompliance to medications  - Severely elevated troponins which are now downtrending.  No chest pain or acute EKG changes.  Cardiology consulted  -Echo ordered  - UA with nitrites and leukocyte esterase but without bacteria.  Concern for infection with Pro-Nino of 7.  He has been started on empiric antibiotics with cefepime and vanc  -Blood cultures pending.  Will de-escalate as able  - CIWA protocol with thiamine and folic acid  - Will continue to trend labs including lactic, BMP and  "troponin  - Glucose checks  - Routine ICU care    SCDs for VTE prophylaxis  Protonix for GI prophylaxis  NPO  Full code    Patient was placed in face mask upon entering room and kept mask on throughout our encounter. I wore full protective equipment throughout this patient encounter including a face mask, gown and gloves. Hand hygiene was performed before donning protective equipment and after removal when leaving the room.    Vianey VERGARA Carlossvicente, APRN  4/17/2025  22:44 EDT      Much of this encounter note is an electronic transcription/translation of spoken language to printed text using Dragon Software.[employer]    Electronically signed by Antony Weston MD at 04/18/25 0701          Emergency Department Notes        Michelle Ibrahim RN at 04/17/25 2000          Pt arrived via [facility] from home c/o generalized weakness x2 days w/ N/V & all over back pain. Pt also reports diarrhea that started today. Pt states @ baseline he drinks approx. x6 shots of vodka a day but has not had any in approx. x2 days. Per ems pt had a fifth of vodka sitting next to him upon their arrival to the residence. Pt also reports not taking his CHF medication the last several days but restarted it today due to \"swelling up.\" Pt A&O x4 during triage exam.     Electronically signed by Michelle Ibrahim RN at 04/17/25 2006       Tawanda Bates MD at 04/17/25 1958           EMERGENCY DEPARTMENT ENCOUNTER  Room Number:  17/17  Date of encounter:  4/17/2025  PCP: Zoe Desai MD  Patient Care Team:  Zoe Desai MD as PCP - General (Family Medicine)  [facility]     HPI:  Context: Archie Oquendo is a 59 y.o. male who presents to the ED c/o chief complaint of weakness.  History supplied by patient by EMS.  EMS reports history of alcohol abuse, history of CHF, reports: Now for generalized weakness, patient was unable to get out of the chair.  Patient was satting in the 80s on room air, increased into " the mid 90s on 3-1/2 L.  Blood pressure initially 71/44, EMS attempted to obtain IV access but was unsuccessful, blood pressure increased to 90 systolic.  Patient reports that he has been having nausea vomiting as well as diarrhea for last 2 days, reports only 1 episode of emesis today, emesis nonbloody nonbilious, reports numerous episodes of diarrhea, unable to quantify, no blood or mucus.  Patient denies any abdominal pain but does report abdominal distention.  Patient also has little swelling of his lower extremities, is on a diuretic, reports he might have missed doses of his diuretic recently.  Patient reports he has chronic shortness of breath as well as chest pain that has been constant since 2022 when he had a ICD placed.  Patient denies any cough, no fevers, no urinary symptoms.        General: No acute distress.  HENT: NCAT, PERRL, Nares patent.  Eyes: no scleral icterus.  Neck: trachea midline, no ROM limitations.  CV: regular rhythm, regular rate.  Respiratory: normal effort, CTAB.  Abdomen: soft, moderately distended, positive fluid wave, nontender palpation, no rebound tenderness, no guarding or rigidity.  Musculoskeletal: no deformity.  Neuro: Neuro: Alert and oriented, no facial droop, speech clear, no dysarthria or aphasia, moves all extremities well, sensation intact light touch all extremities, no focal deficits  Skin: warm, dry.  Jaundiced    LAB RESULTS  Recent Results (from the past 24 hours)   ECG 12 Lead Dyspnea    Collection Time: 04/17/25  8:08 PM   Result Value Ref Range    QT Interval 415 ms    QTC Interval 519 ms   Comprehensive Metabolic Panel    Collection Time: 04/17/25  8:11 PM    Specimen: Blood   Result Value Ref Range    Glucose 137 (H) 65 - 99 mg/dL    BUN 63 (H) 8 - 23 mg/dL    Creatinine 7.11 (H) 0.76 - 1.27 mg/dL    Sodium 129 (L) 136 - 145 mmol/L    Potassium 3.7 3.5 - 5.2 mmol/L    Chloride 82 (L) 98 - 107 mmol/L    CO2 23.6 22.0 - 29.0 mmol/L    Calcium 7.9 (L) 8.6 - 10.5  mg/dL    Total Protein 6.8 6.0 - 8.5 g/dL    Albumin 3.2 (L) 3.5 - 5.2 g/dL    ALT (SGPT) 293 (H) 1 - 41 U/L    AST (SGOT) 663 (H) 1 - 40 U/L    Alkaline Phosphatase 167 (H) 39 - 117 U/L    Total Bilirubin 8.3 (H) 0.0 - 1.2 mg/dL    Globulin 3.6 gm/dL    A/G Ratio 0.9 g/dL    BUN/Creatinine Ratio 8.9 7.0 - 25.0    Anion Gap 23.4 (H) 5.0 - 15.0 mmol/L    eGFR 8.1 (L) >60.0 mL/min/1.73   High Sensitivity Troponin T    Collection Time: 04/17/25  8:11 PM    Specimen: Blood   Result Value Ref Range    HS Troponin T 1,013 (C) <22 ng/L   BNP    Collection Time: 04/17/25  8:11 PM    Specimen: Blood   Result Value Ref Range    proBNP >70,000.0 (H) 0.0 - 900.0 pg/mL   Magnesium    Collection Time: 04/17/25  8:11 PM    Specimen: Blood   Result Value Ref Range    Magnesium 1.7 1.6 - 2.4 mg/dL   Procalcitonin    Collection Time: 04/17/25  8:11 PM    Specimen: Blood   Result Value Ref Range    Procalcitonin 7.65 (H) 0.00 - 0.25 ng/mL   Lipase    Collection Time: 04/17/25  8:11 PM    Specimen: Blood   Result Value Ref Range    Lipase 449 (H) 13 - 60 U/L   Protime-INR    Collection Time: 04/17/25  8:11 PM    Specimen: Blood   Result Value Ref Range    Protime 19.5 (H) 11.7 - 14.2 Seconds    INR 1.64 (H) 0.90 - 1.10   aPTT    Collection Time: 04/17/25  8:11 PM    Specimen: Blood   Result Value Ref Range    PTT 34.5 22.7 - 35.4 seconds   CBC Auto Differential    Collection Time: 04/17/25  8:11 PM    Specimen: Blood   Result Value Ref Range    WBC 8.77 3.40 - 10.80 10*3/mm3    RBC 2.89 (L) 4.14 - 5.80 10*6/mm3    Hemoglobin 10.8 (L) 13.0 - 17.7 g/dL    Hematocrit 30.7 (L) 37.5 - 51.0 %    .2 (H) 79.0 - 97.0 fL    MCH 37.4 (H) 26.6 - 33.0 pg    MCHC 35.2 31.5 - 35.7 g/dL    RDW 16.1 (H) 12.3 - 15.4 %    RDW-SD 62.6 (H) 37.0 - 54.0 fl    MPV 11.7 6.0 - 12.0 fL    Platelets 83 (L) 140 - 450 10*3/mm3   Ethanol    Collection Time: 04/17/25  8:11 PM    Specimen: Blood   Result Value Ref Range    Ethanol <10 0 - 10 mg/dL    Ethanol %  <0.010 %   Manual Differential    Collection Time: 04/17/25  8:11 PM    Specimen: Blood   Result Value Ref Range    Neutrophil % 86.0 (H) 42.7 - 76.0 %    Lymphocyte % 10.0 (L) 19.6 - 45.3 %    Monocyte % 4.0 (L) 5.0 - 12.0 %    Eosinophil % 0.0 (L) 0.3 - 6.2 %    Basophil % 0.0 0.0 - 1.5 %    Neutrophils Absolute 7.54 (H) 1.70 - 7.00 10*3/mm3    Lymphocytes Absolute 0.88 0.70 - 3.10 10*3/mm3    Monocytes Absolute 0.35 0.10 - 0.90 10*3/mm3    Eosinophils Absolute 0.00 0.00 - 0.40 10*3/mm3    Basophils Absolute 0.00 0.00 - 0.20 10*3/mm3    Anisocytosis Mod/2+ None Seen    Macrocytes Slight/1+ None Seen    Microcytes Slight/1+ None Seen    Poikilocytes Slight/1+ None Seen    Polychromasia Slight/1+ None Seen    Target Cells Slight/1+ None Seen    WBC Morphology Normal Normal    Platelet Morphology Normal Normal   COVID-19 and FLU A/B PCR, 1 HR TAT - Swab, Nasopharynx    Collection Time: 04/17/25  8:14 PM    Specimen: Nasopharynx; Swab   Result Value Ref Range    COVID19 Not Detected Not Detected - Ref. Range    Influenza A PCR Not Detected Not Detected    Influenza B PCR Not Detected Not Detected   POC Glucose Once    Collection Time: 04/17/25  8:18 PM    Specimen: Blood   Result Value Ref Range    Glucose 139 (H) 70 - 130 mg/dL   Lactic Acid, Plasma    Collection Time: 04/17/25  8:30 PM    Specimen: Blood   Result Value Ref Range    Lactate 5.3 (C) 0.5 - 2.0 mmol/L   Urinalysis With Microscopic If Indicated (No Culture) - Indwelling Urethral Catheter    Collection Time: 04/17/25  8:48 PM    Specimen: Indwelling Urethral Catheter; Urine   Result Value Ref Range    Color, UA Dark Yellow (A) Yellow, Straw    Appearance, UA Turbid (A) Clear    pH, UA <=5.0 5.0 - 8.0    Specific Gravity, UA 1.023 1.005 - 1.030    Glucose, UA Negative Negative    Ketones, UA Negative Negative    Bilirubin, UA Small (1+) (A) Negative    Blood, UA Moderate (2+) (A) Negative    Protein,  mg/dL (2+) (A) Negative    Leuk Esterase, UA  Small (1+) (A) Negative    Nitrite, UA Positive (A) Negative    Urobilinogen, UA 1.0 E.U./dL 0.2 - 1.0 E.U./dL   Urine Drug Screen - Indwelling Urethral Catheter    Collection Time: 04/17/25  8:48 PM    Specimen: Indwelling Urethral Catheter; Urine   Result Value Ref Range    THC, Screen, Urine Negative Negative    Phencyclidine (PCP), Urine Negative Negative    Cocaine Screen, Urine Negative Negative    Methamphetamine, Ur Negative Negative    Opiate Screen Negative Negative    Amphetamine Screen, Urine Negative Negative    Benzodiazepine Screen, Urine Negative Negative    Tricyclic Antidepressants Screen Negative Negative    Methadone Screen, Urine Negative Negative    Barbiturates Screen, Urine Negative Negative    Oxycodone Screen, Urine Negative Negative    Buprenorphine, Screen, Urine Negative Negative   Fentanyl, Urine - Indwelling Urethral Catheter    Collection Time: 04/17/25  8:48 PM    Specimen: Indwelling Urethral Catheter; Urine   Result Value Ref Range    Fentanyl, Urine Negative Negative   Urinalysis, Microscopic Only - Indwelling Urethral Catheter    Collection Time: 04/17/25  8:48 PM    Specimen: Indwelling Urethral Catheter; Urine   Result Value Ref Range    RBC, UA Too Numerous to Count (A) None Seen, 0-2 /HPF    WBC, UA 6-10 (A) None Seen, 0-2 /HPF    Bacteria, UA None Seen None Seen /HPF    Squamous Epithelial Cells, UA 3-6 (A) None Seen, 0-2 /HPF    Hyaline Casts, UA 31-50 None Seen /LPF    Methodology Automated Microscopy    High Sensitivity Troponin T 1Hr    Collection Time: 04/17/25  9:48 PM    Specimen: Blood   Result Value Ref Range    HS Troponin T 822 (C) <22 ng/L    Troponin T Numeric Delta -191 ng/L    Troponin T % Delta -19 Abnormal if >/= 20%   Uric Acid    Collection Time: 04/17/25  9:48 PM    Specimen: Blood   Result Value Ref Range    Uric Acid 13.6 (H) 3.4 - 7.0 mg/dL       I ordered the above labs and reviewed the results.    RADIOLOGY  CT Abdomen Pelvis Without Contrast  Result  Date: 4/17/2025  CT OF THE ABDOMEN AND PELVIS WITHOUT CONTRAST  HISTORY: Abdominal distention and abnormal elevated liver function tests.  COMPARISON: None available.  TECHNIQUE: Axial CT imaging was obtained through the abdomen and pelvis. No IV contrast was administered.  FINDINGS: The patient is noted to have small bilateral pleural effusions. There is dependent atelectasis. Area of groundglass attenuation is noted within the right middle lobe, measuring 1.1 cm. There are coronary artery calcifications. The liver is markedly steatotic. It is enlarged, measuring up to 20.1 cm. There is high density material within the gallbladder, which may reflect stones or sludge. The spleen and adrenal glands are normal. There is some inflammatory stranding and trace fluid surrounding the pancreas. Acute pancreatitis is not excluded. Organized peripancreatic collection is not seen. The stomach and duodenum appear normal. There is no hydronephrosis on either side. There are aortoiliac calcifications. A Rae catheter is present within the urinary bladder. Prostate gland is within normal limits. There is no bowel obstruction. There is colonic diverticulosis. There is no evidence of appendicitis. There is a thick walled appearance to the ascending and transverse colon. Some of the appearance may be related to portal colopathy, although correlation with any evidence of colitis is recommended. The patient is noted to have moderate ascites. There are changes of extensive posterior thoracolumbar spinal fusion. There is some body wall edema.       1. Marked hepatic steatosis. Patient is noted to have moderate ascites. 2. There is some inflammatory stranding surrounding the pancreas, as well as some trace fluid. Appearance may reflect acute pancreatitis. There is no pancreatic ductal dilatation, and no organized peripancreatic collections are seen. 3. High density material within the gallbladder may reflect stones or sludge. 4. Thick  walled appearance to the ascending and transverse colon appearance may be related to portal colopathy, although correlation with any evidence of colitis is recommended. 5. Indeterminate ground glass pulmonary nodule measuring 11 mm. For a ground glass nodule >=6 mm, recommend CT at 6-12 months to confirm persistence, then CT every 2 years until 5 years. If solid component(s) or growth develops, consider resection. Radiation dose reduction techniques were utilized, including automated exposure control and exposure modulation based on body size.   This report was finalized on 4/17/2025 10:40 PM by Dr. Adrianna Fleming M.D on Workstation: [id number]      XR Chest 1 View  Result Date: 4/17/2025  XR CHEST 1 VW-  HISTORY: Male who is 59 years-old, short of breath  TECHNIQUE: Frontal view of the chest  COMPARISON: None available  FINDINGS: The heart size is borderline. Left-sided pacemaker, cardiac leads are seen. Pulmonary vasculature is unremarkable. Small left basilar atelectasis/infiltrate/effusion, follow-up recommended. No pneumothorax. No acute osseous process.      As described.  This report was finalized on 4/17/2025 8:19 PM by Dr. David Smith M.D on Workstation: [id number]        I ordered the above noted radiological studies. I reviewed the images and results. I agree with the radiologist interpretation.    PROCEDURES  Procedures    MEDICATIONS GIVEN IN ER  Medications   vancomycin IVPB 2000 mg in 0.9% Sodium Chloride 500 mL (2,000 mg Intravenous New Bag 4/17/25 8847)   cefepime 2000 mg IVPB in 100 mL NS (MBP) (2,000 mg Intravenous New Bag 4/17/25 9902)   lactated ringers infusion (125 mL/hr Intravenous New Bag 4/17/25 2974)   dextrose (GLUTOSE) oral gel 15 g (has no administration in time range)   dextrose (D50W) (25 g/50 mL) IV injection 25 g (has no administration in time range)   glucagon (GLUCAGEN) injection 1 mg (has no administration in time range)   insulin regular (humuLIN R,novoLIN R) injection  2-7 Units (has no administration in time range)   nitroglycerin (NITROSTAT) SL tablet 0.4 mg (has no administration in time range)   sodium chloride 0.9 % flush 10 mL (has no administration in time range)   sodium chloride 0.9 % flush 10 mL (has no administration in time range)   sodium chloride 0.9 % infusion 40 mL (has no administration in time range)   mupirocin (BACTROBAN) 2 % nasal ointment 1 Application (has no administration in time range)   sennosides-docusate (PERICOLACE) 8.6-50 MG per tablet 2 tablet (has no administration in time range)     And   polyethylene glycol (MIRALAX) packet 17 g (has no administration in time range)     And   bisacodyl (DULCOLAX) EC tablet 5 mg (has no administration in time range)     And   bisacodyl (DULCOLAX) suppository 10 mg (has no administration in time range)   ondansetron (ZOFRAN) injection 4 mg (has no administration in time range)   Magnesium Standard Dose Replacement - Follow Nurse / BPA Driven Protocol (has no administration in time range)   thiamine (B-1) injection 200 mg (has no administration in time range)     Followed by   thiamine (VITAMIN B-1) tablet 100 mg (has no administration in time range)   folic acid 1 mg in sodium chloride 0.9 % 50 mL IVPB (has no administration in time range)   LORazepam (ATIVAN) injection 2 mg (has no administration in time range)     Followed by   LORazepam (ATIVAN) injection 1 mg (has no administration in time range)   LORazepam (ATIVAN) tablet 1 mg (has no administration in time range)     Or   LORazepam (ATIVAN) injection 1 mg (has no administration in time range)     Or   LORazepam (ATIVAN) tablet 2 mg (has no administration in time range)     Or   LORazepam (ATIVAN) injection 2 mg (has no administration in time range)     Or   LORazepam (ATIVAN) injection 2 mg (has no administration in time range)     Or   LORazepam (ATIVAN) injection 2 mg (has no administration in time range)   fentaNYL citrate (PF) (SUBLIMAZE) injection 25  mcg (has no administration in time range)   sodium chloride 0.9 % infusion 1,000 mL (0 mL Intravenous Stopped 4/17/25 2119)   folic acid 1 mg in sodium chloride 0.9 % 50 mL IVPB (1 mg Intravenous New Bag 4/17/25 2246)   thiamine (B-1) injection 200 mg (200 mg Intravenous Given 4/17/25 2124)   ondansetron (ZOFRAN) injection 8 mg (8 mg Intravenous Given 4/17/25 2123)   sodium chloride 0.9 % bolus 3,120 mL (0 mL Intravenous Stopped 4/17/25 2259)       PROGRESS, DATA ANALYSIS, CONSULTS, AND MEDICAL DECISION MAKING  A complete history and physical exam have been performed.  All available laboratory and imaging results have been reviewed by myself prior to disposition.    MDM    After the initial H&P, I discussed pertinent information from history and physical exam with patient/family.  Discussed differential diagnosis.  Discussed plan for ED evaluation/workup/treatment.  All questions answered.  Patient/family is agreeable with plan.  ED Course as of 04/17/25 2316   Thu Apr 17, 2025 1958 My differential diagnosis for generalized weakness includes but is not limited to:  Neuromuscular weakness   CVA  Hemorrhagic stroke  Multiple sclerosis  Amyotrophic Lateral Sclerosis (ALS) (UMN & LMN)  Spinal and bulbar muscular atrophy (Harvey's syndrome)  Spinal cord disease: Infection (Epidural abscess)  Infarction/ischemia  Trauma (Spinal Cord Syndromes)  Inflammation (Transverse Myelitis)  Degenerative (Spinal muscular atrophy)  Tumor  Peripheral nerve disease: Guillain-Belfry syndrome  Toxins ([place])  Tick paralysis  Diabetic peripheral neuropathy  NMJ disease: Myasthenia gravis crisis  Botulism  Organophosphate toxicity  Lambert-Eaton myasthenic syndrome  Rhabdomyolysis  Dermatomyositis  Polymyositis  Alcoholic myopathy  Non-neuromuscular weakness   ACS  Arrhythmia/Syncope  Severe infection/Sepsis  Hypoglycemia  Periodic paralysis (electrolyte disturbance, K, Mg, Ca)   Hypokalemic periodic paralysis  Thyrotoxic periodic  paralysis  Respiratory failure  Symptomatic Anemia  Severe dehydration  Hypothyroidism  Polypharmacy  Malignancy           [JG]   2001 Review of prior external notes (non-ED) -and- Review of prior external test results outside of this encounter:   I reviewed patient's primary care office visit note from beginning of the month, patient presented for annual exam.  I reviewed patient's BMP from 2/10/2023, potassium 3.3, CO2 30, otherwise unremarkable. [JG]   2016 Patient hypotensive, giving IV fluids. [JG]   2016 Reviewed chest x-ray in PACS, no pulmonary infiltrates per my read. [JG]   2016 EKG independently viewed and contemporaneously interpreted by ED physician. Time: 2008.  Rate 93.  Interpretation: Normal sinus rhythm, normal axis, inferior and anterior Q waves, no acute ST changes,  occasional PVC, no contiguous PVCs, PVCs multifocal.   [JG]   2027 Patient reports he has been unable to urinate since yesterday, bladder scan showing greater than 500 mL, placing Rae catheter for urinary retention. [JG]   2034 Patient maintaining sat of 94% on room air. [JG]   2102 Creatinine 7.11, BUN 63.  Last kidney function from a year ago was normal.  Given additional IV fluids, consulting nephrology. [JG]   2103 High-sensitivity troponin over thousand, likely secondary to severe kidney dysfunction as EKG shows no sign of ischemia.  Obtain repeat troponin to evaluate trend.  Consulting cardiology. [JG]   2104 Lactic acid 5.3, [JG]   2104  this could be secondary to liver dysfunction but procalcitonin significantly elevated at 7.65.  Giving empiric broad-spectrum antibiotics, using Vanco and cefepime. [JG]   2105 AST greater than 600, ALT greater than 293, bilirubin is insignificantly elevated at 8.3.  Obtaining CT imaging of abdomen pelvis, patient will not tolerate IV contrast with severe renal dysfunction present. [JG]   2106 MELDNa/MELD-Na Score for Liver Cirrhosis - MDCalc  Calculated on Apr 17 2025 9:06 PM  35 points ->  MELD-Na Score  65 - 66% -> Estimated 90-Day Mortality [JG]   2125 Phone call with Dr. Campuzano, nephrology.  Discussed the patient, relevant history, exam, diagnostics, ED findings/progress, and concerns.  He agrees with ED workup and treatment to present, request after fluid bolus for patient to be placed on LR at 125 an hour, they agree to consult.    [JG]   2126 Nitrite positive urinary tract infection, patient already on appropriate broad-spectrum antibiotics. [JG]   2128 Patient has poor prognosis, consulting palliative care for goals of care discussion. [JG]   2137 Phone call with AMADEO with pulmonology.  Discussed the patient, relevant history, exam, diagnostics, ED findings/progress, and concerns. They agree to admit the patient to ICU under Dr. Weston. Care assumed by the admitting physician at this time.     [JG]   2144 Phone call with Dr. Raymundo, cardiology.  Discussed the patient, relevant history, exam, diagnostics, ED findings/progress, and concerns.  They agree to consult.    [JG]      ED Course User Index  [JG] Tawanda Bates MD       AS OF 23:16 EDT VITALS:    BP - 117/90  HR - 98  TEMP - 97.5 °F (36.4 °C) (Tympanic)  O2 SATS - 94%    DIAGNOSIS  Final diagnoses:   Sepsis, due to unspecified organism, unspecified whether acute organ dysfunction present   Elevated lactic acid level   Elevated procalcitonin   Acute UTI   KEATON (acute kidney injury)   Elevated troponin   Elevated liver enzymes   Elevated bilirubin   Abdominal distension   Ascites due to alcoholic hepatitis   Elevated lipase   Anemia, unspecified type   Thrombocytopenia     Critical care:  Total critical care time of 56 minutes is exclusive of any other billable procedures and includes time spent with direct patient care and observation, retrospective chart review, management of acute condition, and consultation with other physicians.      DISPOSITION  ADMISSION    Discussed treatment plan and reason for admission with pt/family and  "admitting physician.  Pt/family voiced understanding of the plan for admission for further testing/treatment as needed.        Tawanda Bates MD  04/17/25 2316      Electronically signed by Tawanda Bates MD at 04/17/25 2316         \    Orders (all)        Start     Ordered    05/13/25 0900  vancomycin (VANCOCIN) capsule 125 mg  Every Other Day,   Status:  Discontinued         04/19/25 1025    05/06/25 0900  vancomycin (VANCOCIN) capsule 125 mg  Every 24 Hours,   Status:  Discontinued         04/19/25 1025    04/29/25 0900  vancomycin (VANCOCIN) capsule 125 mg  Every 12 Hours,   Status:  Discontinued         04/19/25 1025    04/25/25 0000  Change Transparent Dressing & Needleless Connectors Every 7 Days  Weekly        Comments: Per [facility] Policy    04/21/25 1031    04/24/25 1230  cephalexin (KEFLEX) capsule 500 mg  Every 12 Hours Scheduled,   Status:  Discontinued        Placed in \"Followed by\" Linked Group    04/19/25 1047    04/24/25 1230  cephalexin (KEFLEX) capsule 500 mg  Every 12 Hours Scheduled        Placed in \"Followed by\" Linked Group    04/20/25 1721    04/23/25 0900  thiamine (VITAMIN B-1) tablet 100 mg  Daily,   Status:  Discontinued        Placed in \"Followed by\" Linked Group    04/17/25 2302    04/23/25 0900  thiamine (VITAMIN B-1) tablet 100 mg  Daily        Placed in \"Followed by\" Linked Group    04/20/25 1721    04/22/25 0600  Daily CHG Bath While Central Line in Place  Daily       04/21/25 1031    04/22/25 0600  Protime-INR  Daily       04/21/25 1109    04/22/25 0600  aPTT  Daily       04/21/25 1109    04/22/25 0600  Fibrinogen  Morning Draw         04/21/25 1109    04/22/25 0118  Potassium  Timed         04/21/25 1617    04/21/25 1800  POC Glucose Q6H  Every 6 Hours      Comments: Complete no more than 45 minutes prior to patient eating      04/21/25 1322    04/21/25 1800  Dietary Nutrition Supplements Other (see comment); Banatrol, given 1 packet 3X daily via cortrak to thicken stool, " "safe with cdiff  Daily With Breakfast, Lunch & Dinner       04/21/25 1322    04/21/25 1715  potassium chloride (KLOR-CON) packet 40 mEq  Every 4 Hours         04/21/25 1617    04/21/25 1632  Restraints Non-Violent or Non-Self Destructive  Calendar Day         04/21/25 1632    04/21/25 1622  oxyCODONE (ROXICODONE) immediate release tablet 5 mg  Every 6 Hours PRN         04/21/25 1623    04/21/25 1545  dextrose (D5W) 5 % infusion  Continuous         04/21/25 1451    04/21/25 1430  Potassium  Once         04/21/25 1024    04/21/25 1323  Daily Weights  Daily       04/21/25 1322    04/21/25 1322  Tube Feeding: Formula: Isosource 1.5 (Jevity 1.5); Feeding Type: Continuous; Start at: 30 mL/hr; Then Advance By: Do Not Advance; Water Flush: 30 mL; Every: 1 hour; Water Bolus: None  Diet Effective Now         04/21/25 1322    04/21/25 1322  NPO Diet NPO Type: Tube Feeding  Diet Effective Now         04/21/25 1322    04/21/25 1321  Elevate Head Of Bed 30-45 Degrees  Continuous         04/21/25 1322    04/21/25 1321  Tube Feeding Assessment and I/O  Every Shift       04/21/25 1322    04/21/25 1321  Write Hang Time on Enteral Feeding Container  Per Order Details        Comments: Ready-to Hang, Closed System Large Volume Bags or Containers May Hang For 24 Hours.  Open System - Small Volume Cans, Bags or Cartons May Hang for 8 Hours.    04/21/25 1322    04/21/25 1321  Notify Provider - Abdominal Discomfort, Pain or Distention, No Bowel Movement in 3 Days  Continuous        Comments: Open Order Report to View Parameters Requiring Provider Notification    04/21/25 1322    04/21/25 1321  Administer Tube Feeding Via Feeding Tube Already in Place  Continuous         04/21/25 1322    04/21/25 1230  cefTRIAXone (ROCEPHIN) 2,000 mg in sodium chloride 0.9 % 100 mL MBP  Every 24 Hours        Placed in \"Followed by\" Linked Group    04/20/25 1721    04/21/25 1222  POC Glucose Once  PROCEDURE ONCE        Comments: Complete no more than 45 " minutes prior to patient eating      04/21/25 1220    04/21/25 1130  sodium chloride 0.9 % flush 10 mL  Every 12 Hours Scheduled         04/21/25 1031    04/21/25 1130  sodium chloride 0.9 % flush 10 mL  Every 12 Hours Scheduled         04/21/25 1031    04/21/25 1130  sodium chloride 0.9 % flush 10 mL  Every 12 Hours Scheduled         04/21/25 1031    04/21/25 1130  sodium chloride 0.9 % flush 10 mL  Every 12 Hours Scheduled         04/21/25 1031    04/21/25 1113  Apply Map Paramaters Until Discontinued  Until Discontinued        Comments: Per Critical Care Physician keep MAP greater than 60.    04/21/25 1114    04/21/25 1030  sodium chloride 0.9 % flush 10 mL  As Needed         04/21/25 1031    04/21/25 1030  sodium chloride 0.9 % flush 20 mL  As Needed         04/21/25 1031    04/21/25 1030  sodium chloride 0.9 % infusion 40 mL  As Needed         04/21/25 1031    04/21/25 1030  Potassium  Once,   Status:  Canceled         04/21/25 0522    04/21/25 1030  Menthol-Zinc Oxide 1 Application  2 Times Daily         04/21/25 0935    04/21/25 1030  Connectors / Hubs Must Be Scrubbed 15 Seconds Using 70% Alcohol Before Access - Allow to Dry Before Accessing Line  Continuous         04/21/25 1031    04/21/25 0935  Elevate Heels Off of Bed  Until Discontinued         04/21/25 0935    04/21/25 0935  Apply Heel Protector Boot Until Discontinued  Until Discontinued         04/21/25 0935    04/21/25 0935  Turn Patient  Now Then Every 2 Hours         04/21/25 0935    04/21/25 0906  Daily Weights  Daily       04/21/25 0905    04/21/25 0906  Tube Feeding: Formula: RD to Initiate & Manage  Diet Effective Now,   Status:  Canceled         04/21/25 0905    04/21/25 0906  NPO Diet NPO Type: Tube Feeding  Diet Effective Now,   Status:  Canceled         04/21/25 0905    04/21/25 0905  Elevate Head Of Bed 30-45 Degrees  Continuous         04/21/25 0905    04/21/25 0905  Tube Feeding Assessment and I/O  Every Shift       04/21/25 09     04/21/25 0905  Write Hang Time on Enteral Feeding Container  Per Order Details        Comments: Ready-to Hang, Closed System Large Volume Bags or Containers May Hang For 24 Hours.  Open System - Small Volume Cans, Bags or Cartons May Hang for 8 Hours.    04/21/25 0905    04/21/25 0905  Notify Provider - Abdominal Discomfort, Pain or Distention, No Bowel Movement in 3 Days  Continuous        Comments: Open Order Report to View Parameters Requiring Provider Notification    04/21/25 0905    04/21/25 0905  Inpatient Consult to Nutrition Services  Once        Provider:  (Not yet assigned)    04/21/25 0905    04/21/25 0905  Administer Tube Feeding Via Feeding Tube Already in Place  Continuous         04/21/25 0905    04/21/25 0905  No Glucose Monitoring Needed at This Time  Once         04/21/25 0905    04/21/25 0746  POC Glucose Once  PROCEDURE ONCE        Comments: Complete no more than 45 minutes prior to patient eating      04/21/25 0743    04/21/25 0615  potassium chloride 20 mEq in 50 mL IVPB  Every 1 Hour         04/21/25 0522    04/21/25 0606  POC Glucose Once  PROCEDURE ONCE        Comments: Complete no more than 45 minutes prior to patient eating      04/21/25 0556    04/21/25 0600  CBC Auto Differential  PROCEDURE ONCE         04/20/25 2201 04/21/25 0600  Ferritin  Morning Draw         04/20/25 2251 04/21/25 0600  Iron Profile  Morning Draw         04/20/25 2251 04/21/25 0600  Folate  Morning Draw         04/20/25 2251 04/21/25 0600  Bilirubin, Direct  Morning Draw         04/20/25 2251 04/21/25 0600  Lactate Dehydrogenase  Morning Draw         04/20/25 2251 04/21/25 0600  Retic With IRF & RET-He  Morning Draw         04/20/25 2252    04/21/25 0600  Haptoglobin  Morning Draw         04/20/25 2253    04/21/25 0517  Potassium Replacement - Follow Nurse / BPA Driven Protocol  As Needed         04/21/25 0517    04/21/25 0449  Heparin-induced Platelet Antibody  Once         04/20/25 0944     "04/21/25 0430  Uric Acid  Morning Draw         04/20/25 1059    04/21/25 0400  Manual Differential  Once,   Status:  Canceled         04/21/25 0359    04/21/25 0018  CIWA Q12 Hours X3  Every Shift       04/20/25 1217    04/20/25 2358  POC Glucose Once  PROCEDURE ONCE        Comments: Complete no more than 45 minutes prior to patient eating      04/20/25 2334    04/20/25 2200  thiamine (B-1) injection 200 mg  Every 8 Hours Scheduled        Placed in \"Followed by\" Linked Group    04/20/25 1721 04/20/25 2100  lactulose (CHRONULAC) 10 GM/15ML solution 20 g  3 Times Daily         04/20/25 1721 04/20/25 1815  midodrine (PROAMATINE) tablet 10 mg  Every 8 Hours         04/20/25 1721    04/20/25 1815  vancomycin oral solution reconstituted 125 mg  Every 6 Hours Scheduled         04/20/25 1723    04/20/25 1730  Potassium  Timed         04/20/25 1129    04/20/25 1717  LORazepam (ATIVAN) tablet 1 mg  Every 1 Hour PRN        Placed in \"Or\" Linked Group    04/20/25 1721    04/20/25 1717  LORazepam (ATIVAN) injection 1 mg  Every 1 Hour PRN        Placed in \"Or\" Linked Group    04/20/25 1721    04/20/25 1717  LORazepam (ATIVAN) tablet 2 mg  Every 1 Hour PRN        Placed in \"Or\" Linked Group    04/20/25 1721    04/20/25 1717  LORazepam (ATIVAN) injection 2 mg  Every 1 Hour PRN        Placed in \"Or\" Linked Group    04/20/25 1721    04/20/25 1717  LORazepam (ATIVAN) injection 2 mg  Every 15 Minutes PRN        Placed in \"Or\" Linked Group    04/20/25 1721    04/20/25 1717  LORazepam (ATIVAN) injection 2 mg  Every 15 Minutes PRN        Placed in \"Or\" Linked Group    04/20/25 1721    04/20/25 1700  Potassium  STAT,   Status:  Canceled         04/20/25 1352    04/20/25 1400  Restraints Non-Violent or Non-Self Destructive  Calendar Day,   Status:  Canceled         04/20/25 1428    04/20/25 1352  Potassium  STAT,   Status:  Canceled         04/20/25 1352    04/20/25 1331  XR Chest 1 View  1 Time Imaging,   Status:  Canceled         " 04/20/25 1330    04/20/25 1329  XR Abdomen KUB  1 Time Imaging         04/20/25 1328    04/20/25 1218  CIWA Q4 Hours X3  Every 4 Hours       04/20/25 1217    04/20/25 1215  potassium chloride 10 mEq in 100 mL IVPB  Every 1 Hour         04/20/25 1129    04/20/25 1212  POC Glucose Once  PROCEDURE ONCE        Comments: Complete no more than 45 minutes prior to patient eating      04/20/25 1204    04/20/25 1145  sodium chloride 0.9 % infusion  Continuous         04/20/25 1059    04/20/25 1015  lactulose (CHRONULAC) 10 GM/15ML solution 20 g  3 Times Daily,   Status:  Discontinued         04/20/25 0923    04/20/25 0945  aPTT  Once         04/20/25 0944    04/20/25 0945  Protime-INR  Once         04/20/25 0944    04/20/25 0945  Vitamin B12  Once         04/20/25 0944    04/20/25 0944  Immature Platelet Fraction  Once         04/20/25 0944    04/20/25 0944  Fibrin Split Products  Once         04/20/25 0944    04/20/25 0944  Fibrinogen  Once         04/20/25 0944    04/20/25 0925  Hematology & Oncology Inpatient Consult  Once        Specialty:  Hematology and Oncology  Provider:  Mark Moore MD    04/20/25 0924    04/20/25 0837  Potassium  Timed,   Status:  Canceled         04/20/25 0237    04/20/25 0705  Manual Differential  Once         04/20/25 0704    04/20/25 0600  Magnesium  Morning Draw,   Status:  Canceled         04/20/25 0237    04/20/25 0516  POC Glucose Once  PROCEDURE ONCE        Comments: Complete no more than 45 minutes prior to patient eating      04/20/25 0514    04/20/25 0430  Uric Acid  Morning Draw         04/19/25 0945    04/20/25 0430  CBC & Differential  Morning Draw         04/19/25 0945    04/20/25 0430  Comprehensive Metabolic Panel  Morning Draw,   Status:  Canceled         04/19/25 0945    04/20/25 0430  Magnesium  Morning Draw,   Status:  Canceled         04/19/25 0945    04/20/25 0430  Phosphorus  Morning Draw,   Status:  Canceled         04/19/25 0945    04/20/25 0430  CBC Auto  Differential  PROCEDURE ONCE         04/19/25 2030 04/20/25 0330  magnesium sulfate in D5W 1g/100mL (PREMIX)  Every 1 Hour         04/20/25 0237    04/20/25 0330  potassium chloride 20 mEq in 50 mL IVPB  Once         04/20/25 0237    04/20/25 0206  Potassium  Timed         04/19/25 2005 04/20/25 0103  Comprehensive Metabolic Panel  STAT         04/20/25 0102    04/20/25 0102  Magnesium  STAT         04/20/25 0102    04/20/25 0101  ECG 12 Lead Rhythm Change  STAT         04/20/25 0101    04/20/25 0048  POC Glucose Once  PROCEDURE ONCE        Comments: Complete no more than 45 minutes prior to patient eating      04/20/25 0045    04/19/25 2356  POC Glucose Once  PROCEDURE ONCE        Comments: Complete no more than 45 minutes prior to patient eating      04/19/25 2354 04/19/25 2212  Wound Ostomy Eval & Treat  Once         04/19/25 2212 04/19/25 2153  Initiate & Follow Hypercapnic Monitoring Guideline for Opioid Administration via EtCO2 and / or SpO2  Continuous        Comments: Follow Hypercapnic Monitoring Guideline As Outlined in Process Instructions (Open Order Report to View Full Instructions)    04/19/25 2203 04/19/25 2153  Opioid Administration - Document EtCO2 and / or SpO2 With Each Set of Vitals & Any Change in Patient Status  Per Order Details        Comments: With Each Set of Vitals & Any Change in Patient Status    04/19/25 2203 04/19/25 2153  Opioid Administration - Notify Provider Hypercapnic Monitoring  Continuous        Comments: Open Order Report to View Parameters Requiring Provider Notification    04/19/25 2203 04/19/25 2153  Opioid Administration - Continuous Pulse Oximetry (SpO2)  Continuous         04/19/25 2203 04/19/25 2100  potassium chloride 20 mEq in 50 mL IVPB  Every 1 Hour         04/19/25 2005 04/19/25 2009  POC Glucose Once  PROCEDURE ONCE        Comments: Complete no more than 45 minutes prior to patient eating      04/19/25 2007 04/19/25 1959  Auto  "Discontinue GI Panel in 48 Hours if not Collected  ONCE GI PANEL         04/17/25 1958 04/19/25 1959  Auto Discontinue in 48 Hours if not Collected  ONCE CDIFF         04/17/25 1958 04/19/25 1716  Feeding Tube Insertion  Once        Comments: Cortrak for oral meds    04/19/25 1811    04/19/25 1643  Potassium  Timed         04/19/25 1042    04/19/25 1547  POC Glucose Once  PROCEDURE ONCE        Comments: Complete no more than 45 minutes prior to patient eating      04/19/25 1545    04/19/25 1230  cefTRIAXone (ROCEPHIN) 2,000 mg in sodium chloride 0.9 % 100 mL MBP  Every 24 Hours,   Status:  Discontinued        Placed in \"Followed by\" Linked Group    04/19/25 1047    04/19/25 1200  vancomycin (VANCOCIN) capsule 125 mg  Every 6 Hours Scheduled,   Status:  Discontinued         04/19/25 1025    04/19/25 1130  potassium chloride 20 mEq in 50 mL IVPB  Every 1 Hour         04/19/25 1042    04/19/25 1103  POC Glucose Once  PROCEDURE ONCE        Comments: Complete no more than 45 minutes prior to patient eating      04/19/25 1101    04/19/25 1053  Manual Differential  Once         04/19/25 1052    04/19/25 1026  Patient Isolation Contact Spore  Continuous        Comments: Isolation Precautions Per Clostridioides Difficile ([facility]) Infection Control Policy / Process    04/19/25 1025    04/19/25 1026  Inpatient Infectious Diseases Consult  Once        Specialty:  Infectious Diseases  Provider:  Fortino López MD    04/19/25 1025    04/19/25 1025  Pharmacy Consult  Continuous PRN         04/19/25 1025    04/19/25 0945  Phosphorus Replacement - Follow Nurse / BPA Driven Protocol  As Needed         04/19/25 0945    04/19/25 0945  Calcium Replacement - Follow Nurse / BPA Driven Protocol  As Needed         04/19/25 0945    04/19/25 0945  Potassium Replacement - Follow Nurse / BPA Driven Protocol  As Needed,   Status:  Discontinued         04/19/25 0945    04/19/25 0945  Magnesium Standard Dose Replacement - Follow " "Nurse / BPA Driven Protocol  As Needed         04/19/25 0945    04/19/25 0926  Manual Differential  Once,   Status:  Canceled         04/19/25 0925    04/19/25 0725  CBC Auto Differential  PROCEDURE ONCE         04/18/25 2201    04/19/25 0609  POC Glucose Once  PROCEDURE ONCE        Comments: Complete no more than 45 minutes prior to patient eating      04/19/25 0607    04/19/25 0600  Procalcitonin  Morning Draw         04/18/25 1029    04/19/25 0553  Manual Differential  Once,   Status:  Canceled         04/19/25 0552    04/19/25 0500  ECG 12 Lead Dyspnea  Tomorrow AM         04/18/25 1652    04/18/25 2348  POC Glucose Once  PROCEDURE ONCE        Comments: Complete no more than 45 minutes prior to patient eating      04/18/25 2346    04/18/25 2318  LORazepam (ATIVAN) injection 1 mg  Every 6 Hours        Placed in \"Followed by\" Linked Group    04/17/25 2302    04/18/25 2200  cefepime 2000 mg IVPB in 100 mL NS (MBP)  Every 24 Hours,   Status:  Discontinued         04/18/25 0205    04/18/25 1804  POC Glucose Once  PROCEDURE ONCE        Comments: Complete no more than 45 minutes prior to patient eating      04/18/25 1801    04/18/25 1630  sodium chloride 0.9 % bolus 1,000 mL  Once         04/18/25 1535    04/18/25 1538  XR Chest Post CVA Port  1 Time Imaging         04/18/25 1533    04/18/25 1532  XR Chest 1 View  1 Time Imaging,   Status:  Canceled         04/18/25 1533    04/18/25 1530  albumin human 25 % IV SOLN 25 g  Once         04/18/25 1434    04/18/25 1500  albumin human 25 % IV SOLN 37.5 g  Once,   Status:  Discontinued        Placed in \"Or\" Linked Group    04/18/25 1408    04/18/25 1500  albumin human 25 % IV SOLN 50 g  Once,   Status:  Discontinued        Placed in \"Or\" Linked Group    04/18/25 1408    04/18/25 1500  albumin human 25 % IV SOLN 62.5 g  Once,   Status:  Discontinued        Placed in \"Or\" Linked Group    04/18/25 1408    04/18/25 1500  albumin human 25 % IV SOLN 75 g  Once,   Status:  " "Discontinued        Placed in \"Or\" Linked Group    04/18/25 1408    04/18/25 1500  albumin human 25 % IV SOLN 87.5 g  Once,   Status:  Discontinued        Placed in \"Or\" Linked Group    04/18/25 1408    04/18/25 1500  albumin human 25 % IV SOLN 100 g  Once,   Status:  Discontinued        Placed in \"Or\" Linked Group    04/18/25 1408    04/18/25 1500  albumin human 25 % IV SOLN 112.5 g  Once,   Status:  Discontinued        Placed in \"Or\" Linked Group    04/18/25 1408    04/18/25 1408  Diagnostic, Therapeutic Bedside Paracentesis Without  Radiology  Once        Comments: We removed approximately 3 L of yellow cloudy fluid    04/18/25 1408    04/18/25 1408  Obtain Informed Consent  Once         04/18/25 1408    04/18/25 1408  Paracentesis Tray to Bedside  Once         04/18/25 1408    04/18/25 1408  Notify Provider - Paracentesis Labs  Continuous,   Status:  Canceled        Comments: Open Order Report to View Parameters Requiring Provider Notification    04/18/25 1408    04/18/25 1408  Notify Provider - If Leaking From Paracentesis Site  Continuous        Comments: Open Order Report to View Parameters Requiring Provider Notification    04/18/25 1408    04/18/25 1408  Albumin, Fluid - Body Fluid, Peritoneum  STAT         04/18/25 1408    04/18/25 1408  Body Fluid Culture - Body Fluid, Peritoneal Catheter  STAT         04/18/25 1408    04/18/25 1408  Fungus Culture - Peritoneal Fluid, Peritoneum  STAT         04/18/25 1408    04/18/25 1320  US Paracentesis  1 Time Imaging         04/18/25 1320    04/18/25 1309  Code Status and Medical Interventions: No CPR (Do Not Attempt to Resuscitate); Limited Support; No cardioversion  Continuous         04/18/25 1308    04/18/25 1300  Renal Function Panel  Timed         04/18/25 1235    04/18/25 1245  Feeding Tube Insertion  Once         04/18/25 1244    04/18/25 1244  XR Chest 1 View  1 Time Imaging         04/18/25 1243    04/18/25 1244  Blood Gas, Arterial -  STAT         04/18/25 " 1243    04/18/25 1243  Beta Hydroxybutyrate Quantitative  Once         04/18/25 1242    04/18/25 1230  cefTRIAXone (ROCEPHIN) 2,000 mg in sodium chloride 0.9 % 100 mL MBP  Every 24 Hours,   Status:  Discontinued         04/18/25 1141    04/18/25 1155  POC Glucose Once  PROCEDURE ONCE        Comments: Complete no more than 45 minutes prior to patient eating      04/18/25 1153    04/18/25 1100  Vancomycin, Random  Timed,   Status:  Canceled         04/18/25 0229    04/18/25 1058  Blood Gas, Venous -  PROCEDURE ONCE         04/18/25 1054    04/18/25 1047  STAT Lactic Acid, Reflex  PROCEDURE ONCE         04/18/25 0824    04/18/25 1000  perflutren (DEFINITY) 8.476 mg in Sodium Chloride (PF) 0.9 % 10 mL injection  Once in Imaging         04/18/25 0900    04/18/25 0900  folic acid 1 mg in sodium chloride 0.9 % 50 mL IVPB  Daily         04/17/25 2302    04/18/25 0823  PTH, Intact  Once         04/18/25 0822    04/18/25 0823  Vitamin D,25-Hydroxy  Once         04/18/25 0822    04/18/25 0822  Blood Gas, Venous -  Once         04/18/25 0822    04/18/25 0822  Calcium, Ionized  Once         04/18/25 0822    04/18/25 0717  STAT Lactic Acid, Reflex  PROCEDURE ONCE         04/18/25 0459    04/18/25 0600  Phosphorus  Daily       04/17/25 2250    04/18/25 0600  Magnesium  Daily       04/17/25 2250    04/18/25 0600  CBC & Differential  Daily       04/17/25 2250    04/18/25 0600  Comprehensive Metabolic Panel  Daily       04/17/25 2250    04/18/25 0600  CBC Auto Differential  PROCEDURE ONCE         04/17/25 2250    04/18/25 0600  Protime-INR  Morning Draw         04/17/25 2303    04/18/25 0600  potassium chloride 10 mEq in 100 mL IVPB  Every 1 Hour         04/18/25 0508    04/18/25 0559  POC Glucose Once  PROCEDURE ONCE        Comments: Complete no more than 45 minutes prior to patient eating      04/18/25 0557    04/18/25 0545  potassium chloride 20 mEq in 50 mL IVPB  Once,   Status:  Discontinued         04/18/25 0450    04/18/25  0545  magnesium sulfate 2g/50 mL (PREMIX) infusion  Once         04/18/25 0450    04/18/25 0356  STAT Lactic Acid, Reflex  PROCEDURE ONCE         04/17/25 2059 04/18/25 0355  Manual Differential  Once,   Status:  Canceled         04/18/25 0354    04/18/25 0215  albumin human 25 % IV SOLN 25 g  Once         04/18/25 0117    04/18/25 0215  norepinephrine (LEVOPHED) 8 mg in 250 mL NS infusion (premix)  Titrated         04/18/25 0117    04/18/25 0215  midodrine (PROAMATINE) tablet 10 mg  Every 8 Hours,   Status:  Discontinued         04/18/25 0117    04/18/25 0140  Clostridioides difficile toxin Ag, Reflex - Stool, Per Rectum  Once         04/18/25 0139    04/18/25 0118  Pharmacy to dose vancomycin  Continuous PRN,   Status:  Discontinued         04/18/25 0118    04/18/25 0117  Pharmacy To Dose: Cefepime  Continuous PRN,   Status:  Discontinued         04/18/25 0118    04/18/25 0117  Inpatient Case Management  Consult  Once        Provider:  (Not yet assigned)    04/18/25 0117    04/18/25 0047  POC Glucose Once  PROCEDURE ONCE        Comments: Complete no more than 45 minutes prior to patient eating      04/18/25 0044    04/18/25 0013  Chloride, Urine, Random - Indwelling Urethral Catheter  Once         04/17/25 2243 04/18/25 0013  Sodium, Urine, Random - Indwelling Urethral Catheter  Once         04/17/25 2243 04/18/25 0013  Protein / Creatinine Ratio, Urine - Indwelling Urethral Catheter  Once         04/17/25 2243 04/18/25 0013  Osmolality, Urine - Indwelling Urethral Catheter  Once         04/17/25 2243 04/18/25 0013  Potassium, Urine, Random - Indwelling Urethral Catheter  Once         04/17/25 2243 04/18/25 0000  POC Glucose Q6H  Every 6 Hours      Comments: Complete no more than 45 minutes prior to patient eating      04/17/25 2259 04/18/25 0000  insulin regular (humuLIN R,novoLIN R) injection 2-7 Units  Every 6 Hours Scheduled         04/17/25 2259 04/18/25 0000  Vital  "Signs Every Hour and Per Hospital Policy Based on Patient Condition  Every Hour       04/17/25 2301 04/18/25 0000  Intake & Output  Every Hour       04/17/25 2301 04/17/25 2318  thiamine (B-1) injection 200 mg  Every 8 Hours Scheduled,   Status:  Discontinued        Placed in \"Followed by\" Linked Group    04/17/25 2302 04/17/25 2318  LORazepam (ATIVAN) injection 2 mg  Every 6 Hours        Placed in \"Followed by\" Linked Group    04/17/25 2302 04/17/25 2317  sodium chloride 0.9 % flush 10 mL  Every 12 Hours Scheduled         04/17/25 2301 04/17/25 2317  mupirocin (BACTROBAN) 2 % nasal ointment 1 Application  2 Times Daily         04/17/25 2301 04/17/25 2317  sennosides-docusate (PERICOLACE) 8.6-50 MG per tablet 2 tablet  2 Times Daily,   Status:  Discontinued        Placed in \"And\" Linked Group    04/17/25 2301 04/17/25 2303  fentaNYL citrate (PF) (SUBLIMAZE) injection 25 mcg  Every 2 Hours PRN         04/17/25 2303 04/17/25 2302  LORazepam (ATIVAN) tablet 1 mg  Every 1 Hour PRN,   Status:  Discontinued        Placed in \"Or\" Linked Group    04/17/25 2302 04/17/25 2302  LORazepam (ATIVAN) injection 1 mg  Every 1 Hour PRN,   Status:  Discontinued        Placed in \"Or\" Linked Group    04/17/25 2302 04/17/25 2302  LORazepam (ATIVAN) tablet 2 mg  Every 1 Hour PRN,   Status:  Discontinued        Placed in \"Or\" Linked Group    04/17/25 2302 04/17/25 2302  LORazepam (ATIVAN) injection 2 mg  Every 1 Hour PRN,   Status:  Discontinued        Placed in \"Or\" Linked Group    04/17/25 2302 04/17/25 2302  LORazepam (ATIVAN) injection 2 mg  Every 15 Minutes PRN,   Status:  Discontinued        Placed in \"Or\" Linked Group    04/17/25 2302 04/17/25 2302  LORazepam (ATIVAN) injection 2 mg  Every 15 Minutes PRN,   Status:  Discontinued        Placed in \"Or\" Linked Group    04/17/25 2302 04/17/25 2302  Daily Weights  Daily       04/17/25 2301 04/17/25 2302  Daily CHG Bath While in Critical Care  " Daily      Comments: Use for admission bath and length of critical care stay.    04/17/25 2301 04/17/25 2302  Initiate Alcohol Withdrawal Protocol  Once         04/17/25 2302 04/17/25 2302  Obtain Baseline Clinical [place] Withdrawal Assessment - Ar (CIWA-Ar), Sedation Scale & Vital Signs  Once        Comments: Follow CIWA Scoring Reference Guide    04/17/25 2302 04/17/25 2302  Clinical [place] Withdrawal Assessment (CIWA-Ar)  Per Hospital Policy/Protocol         04/17/25 2302 04/17/25 2302  If CIWA-Ar Score Less Than 8 For 3 Consecutive Assessments, Monitor Every 4 Hours & Discontinue Assessment When CIWA-Ar Less Than 8 for 24 Hours  Per Hospital Policy/Protocol        Comments: Contact Provider to Restart Protocol if Any Symptoms Reappear    04/17/25 2302 04/17/25 2302  Seizure Precautions  Continuous         04/17/25 2302 04/17/25 2302  Notify Provider - Withdrawal  Continuous        Comments: Open Order Report to View Parameters Requiring Provider Notification    04/17/25 2302 04/17/25 2302  Notify Provider of Abnormal Lab Results  Continuous        Comments: Open Order Report to View Parameters Requiring Provider Notification    04/17/25 2302 04/17/25 2302  Notify Provider - Vitals  Continuous        Comments: Open Order Report to View Parameters Requiring Provider Notification    04/17/25 2302 04/17/25 2302  Safety Precautions  Continuous         04/17/25 2302 04/17/25 2301  Magnesium Standard Dose Replacement - Follow Nurse / BPA Driven Protocol  As Needed         04/17/25 2302 04/17/25 2301  ondansetron (ZOFRAN) injection 4 mg  Every 6 Hours PRN         04/17/25 2301 04/17/25 2301  Continuous Cardiac Monitoring  Continuous        Comments: Follow Standing Orders As Outlined in Process Instructions (Open Order Report to View Full Instructions)    04/17/25 2301 04/17/25 2301  Maintain IV Access  Continuous,   Status:  Canceled         04/17/25 2301 04/17/25 2301   "Telemetry - Place Orders & Notify Provider of Results When Patient Experiences Acute Chest Pain, Dysrhythmia or Respiratory Distress  Continuous        Comments: Open Order Report to View Parameters Requiring Provider Notification    04/17/25 2301 04/17/25 2301  Continuous Pulse Oximetry  Continuous,   Status:  Canceled         04/17/25 2301 04/17/25 2301  Height & Weight  Once         04/17/25 2301 04/17/25 2301  Oral Care - Patient Not on NPPV & Not Intubated  Every Shift       04/17/25 2301 04/17/25 2301  Target Arousal Level RASS 0 to -2  Continuous         04/17/25 2301 04/17/25 2301  Use Mobility Guidelines for Advancement of Activity  Continuous         04/17/25 2301 04/17/25 2301  Insert Peripheral IV  Once         04/17/25 2301 04/17/25 2301  Saline Lock & Maintain IV Access  Continuous         04/17/25 2301 04/17/25 2301  Code Status and Medical Interventions: CPR (Attempt to Resuscitate); Full Support  Continuous,   Status:  Canceled         04/17/25 2301 04/17/25 2301  Place Sequential Compression Device  Once         04/17/25 2301 04/17/25 2301  Maintain Sequential Compression Device  Continuous         04/17/25 2301 04/17/25 2301  NPO Diet NPO Type: Strict NPO  Diet Effective Now,   Status:  Canceled         04/17/25 2301 04/17/25 2300  nitroglycerin (NITROSTAT) SL tablet 0.4 mg  Every 5 Minutes PRN         04/17/25 2301 04/17/25 2300  sodium chloride 0.9 % flush 10 mL  As Needed         04/17/25 2301 04/17/25 2300  sodium chloride 0.9 % infusion 40 mL  As Needed         04/17/25 2301 04/17/25 2300  polyethylene glycol (MIRALAX) packet 17 g  Daily PRN,   Status:  Discontinued        Placed in \"And\" Linked Group    04/17/25 2301 04/17/25 2300  bisacodyl (DULCOLAX) EC tablet 5 mg  Daily PRN,   Status:  Discontinued        Placed in \"And\" Linked Group    04/17/25 2301 04/17/25 2300  bisacodyl (DULCOLAX) suppository 10 mg  Daily PRN,   Status:  " "Discontinued        Placed in \"And\" Linked Group    04/17/25 2301    04/17/25 2259  dextrose (GLUTOSE) oral gel 15 g  Every 15 Minutes PRN         04/17/25 2259    04/17/25 2259  dextrose (D50W) (25 g/50 mL) IV injection 25 g  Every 15 Minutes PRN         04/17/25 2259    04/17/25 2259  glucagon (GLUCAGEN) injection 1 mg  Every 15 Minutes PRN         04/17/25 2259    04/17/25 2244  Uric Acid  Once         04/17/25 2243    04/17/25 2244  Osmolality, Serum  Once         04/17/25 2243    04/17/25 2242  Inpatient Gastroenterology Consult  Once        Specialty:  Gastroenterology  Provider:  Brandon Genao MD    04/17/25 2241 04/17/25 2240  Ammonia  Once         04/17/25 2239 04/17/25 2147  Blood Culture - Blood,  Once         04/17/25 2018 04/17/25 2145  Adult Transthoracic Echo Complete W/ Cont if Necessary Per Protocol  Once         04/17/25 2144 04/17/25 2142  lactated ringers infusion  Continuous         04/17/25 2126 04/17/25 2139  Inpatient Admission  Once         04/17/25 2139 04/17/25 2129  Inpatient Palliative Care Team Consult  Once        Provider:  (Not yet assigned)    04/17/25 2128 04/17/25 2127  Pulmonology (on-call MD unless specified)  Once        Specialty:  Pulmonary Disease  Provider:  Antony Weston MD    04/17/25 2126 04/17/25 2121  vancomycin IVPB 2000 mg in 0.9% Sodium Chloride 500 mL  Once         04/17/25 2105 04/17/25 2121  cefepime 2000 mg IVPB in 100 mL NS (MBP)  Once         04/17/25 2105 04/17/25 2117  sodium chloride 0.9 % bolus 3,120 mL  Once         04/17/25 2101 04/17/25 2111  High Sensitivity Troponin T 1Hr  PROCEDURE ONCE         04/17/25 2058 04/17/25 2106  CT Abdomen Pelvis Without Contrast  1 Time Imaging         04/17/25 2106 04/17/25 2104  Cardiology (on-call MD unless specified)  Once        Specialty:  Cardiology  Provider:  Reba Raymundo MD    04/17/25 2103 04/17/25 2104  Urinalysis, Microscopic Only - Indwelling Urethral " "Catheter  Once         04/17/25 2103 04/17/25 2103  Nephrology (on -call MD unless specified)  Once        Specialty:  Nephrology  Provider:  Dwayne Campuzano MD    04/17/25 2102 04/17/25 2056  Fentanyl, Urine - Indwelling Urethral Catheter  Once         04/17/25 2055 04/17/25 2047  ondansetron (ZOFRAN) injection 8 mg  Once         04/17/25 2031 04/17/25 2033  sodium chloride 0.9 % infusion 1,000 mL  Once         04/17/25 2018 04/17/25 2033  folic acid 1 mg in sodium chloride 0.9 % 50 mL IVPB  Once         04/17/25 2018 04/17/25 2033  thiamine (B-1) injection 200 mg  Once         04/17/25 2018 04/17/25 2032  Please obtain pulse ox on room air, if patient is hypoxic placed on nasal cannula and titrate oxygen to maintain O2 sat greater than 92%, inform MD of result  [facility] Nursing Order (Specify)  Once        Comments: Please obtain pulse ox on room air, if patient is hypoxic placed on nasal cannula and titrate oxygen to maintain O2 sat greater than 92%, inform MD of result    04/17/25 2032 04/17/25 2028  Insert Indwelling Urinary Catheter  Once        Comments: Follow Protocol As Outlined in Process Instructions (Open Order Report to View Full Instructions)   Placed in \"And\" Linked Group    04/17/25 2027 04/17/25 2028  Assess Need for Indwelling Urinary Catheter - Follow Removal Protocol  Continuous        Comments: Follow Protocol As Outlined in Process Instructions (Open Order Report to View Full Instructions)   Placed in \"And\" Linked Group    04/17/25 2027 04/17/25 2028  Urinary Catheter Care  Every Shift      Placed in \"And\" Linked Group    04/17/25 2027 04/17/25 2026  Manual Differential  Once         04/17/25 2025 04/17/25 2022  POC Glucose Once  PROCEDURE ONCE        Comments: Complete no more than 45 minutes prior to patient eating      04/17/25 2018 04/17/25 2019  Blood Culture - Blood, Arm, Left  Once         04/17/25 2018 04/17/25 2019  Lactic Acid, Plasma  " Once         04/17/25 2018    04/17/25 2017  ECG 12 Lead Dyspnea  Once         04/18/25 0942    04/17/25 1959  Gastrointestinal Panel, PCR - Stool, Per Rectum  Once         04/17/25 1958 04/17/25 1959  Clostridioides difficile Toxin - Stool, Per Rectum  Once         04/17/25 1958 04/17/25 1959  Patient Isolation Contact Spore  Continuous,   Status:  Canceled        Comments: Isolation Precautions Per Clostridium Difficile (CDI) Infection Control Policy / Process    04/17/25 1958 04/17/25 1959  COVID-19 and FLU A/B PCR, 1 HR TAT - Swab, Nasopharynx  Once         04/17/25 1958 04/17/25 1959  POC Glucose STAT  STAT        Comments: Complete no more than 45 minutes prior to patient eating      04/17/25 1958 04/17/25 1959  Clostridioides difficile Toxin, PCR - Stool, Per Rectum  PROCEDURE ONCE         04/17/25 1958 04/17/25 1959  Ethanol  Once         04/17/25 1958 04/17/25 1959  Urine Drug Screen - Indwelling Urethral Catheter  Once         04/17/25 1958 04/17/25 1958  CBC & Differential  STAT         04/17/25 1958 04/17/25 1958  Comprehensive Metabolic Panel  STAT         04/17/25 1958 04/17/25 1958  High Sensitivity Troponin T  STAT         04/17/25 1958 04/17/25 1958  BNP  STAT         04/17/25 1958 04/17/25 1958  Magnesium  STAT         04/17/25 1958 04/17/25 1958  Procalcitonin  STAT         04/17/25 1958 04/17/25 1958  ECG 12 Lead Dyspnea  Once         04/17/25 1958 04/17/25 1958  XR Chest 1 View  1 Time Imaging         04/17/25 1958 04/17/25 1958  Cardiac Monitoring  Continuous,   Status:  Canceled         04/17/25 1958 04/17/25 1958  Continuous Pulse Oximetry  Continuous,   Status:  Canceled         04/17/25 1958 04/17/25 1958  Urinalysis With Microscopic If Indicated (No Culture) - Indwelling Urethral Catheter  Once         04/17/25 1958 04/17/25 1958  Lipase  Once         04/17/25 1958 04/17/25 1958  Protime-INR  Once         04/17/25 1958 04/17/25  1958  aPTT  Once         04/17/25 1958 04/17/25 1958  Magnesium  Once,   Status:  Canceled         04/17/25 1958 04/17/25 1958  CBC Auto Differential  PROCEDURE ONCE         04/17/25 1958 04/17/25 0000  Telemetry Scan  Once         04/17/25 0000 04/17/25 0000  Telemetry Scan  Once         04/17/25 0000 04/17/25 0000  Telemetry Scan  Once         04/17/25 0000 04/17/25 0000  Telemetry Scan  Once         04/17/25 0000 04/17/25 0000  Telemetry Scan  Once         04/17/25 0000 04/17/25 0000  Telemetry Scan  Once         04/17/25 0000 04/17/25 0000  Telemetry Scan  Once         04/17/25 0000 04/17/25 0000  Telemetry Scan  Once         04/17/25 0000 04/01/25 0000  lisinopril (PRINIVIL,ZESTRIL) 20 MG tablet  Daily         04/18/25 0000    04/01/25 0000  potassium chloride ER (K-TAB) 20 MEQ tablet controlled-release ER tablet  2 Times Daily         04/18/25 0000    04/01/25 0000  bumetanide (BUMEX) 2 MG tablet  2 Times Daily         04/18/25 0000    04/01/25 0000  cyclobenzaprine (FLEXERIL) 10 MG tablet  2 Times Daily PRN         04/18/25 0000    Unscheduled  Follow Hypoglycemia Standing Orders For Blood Glucose <70 & Notify Provider of Treatment  As Needed      Comments: Follow Hypoglycemia Orders As Outlined in Process Instructions (Open Order Report to View Full Instructions)  Notify Provider Any Time Hypoglycemia Treatment is Administered    04/17/25 2259    Unscheduled  Obtain Pre & Post Sedation Scores With Every Sedative Dose - Hold For POSS Greater Than 2 or RASS of -3 or Less  As Needed       04/17/25 2302    Unscheduled  Apply Pressure Dressing to Paracentesis Site if Issues With Leaking  As Needed       04/18/25 1408    Unscheduled  Oxygen Therapy- Heated High Flow Nasal Cannula; Titrate 30-60 LPM Per SpO2; 90 - 95%  Continuous PRN       04/18/25 2325    Unscheduled  Alex & Document Feeding Tube Depth (in cm)  As Needed       04/21/25 0905    Unscheduled  Verify Feeding Tube  Placement Upon Insertion & As Needed  As Needed       04/21/25 0905    Unscheduled  Flush Feeding Tube With 30-50mL Water As Needed  As Needed       04/21/25 0905    Unscheduled  Wound Care  As Needed       04/21/25 0935    Unscheduled  Change Dressing to IV Site As Needed When Damp, Loose or Soiled  As Needed       04/21/25 1031    Unscheduled  Change Needleless Connectors  As Needed      Comments: Change Needleless Connectors When:  - Administration Set Changed  - Dressing Changed  - Removed For Any Reason  - Residual Blood or Debris Within Connector  - Prior to Drawing Blood Cultures  - Contamination of Connector  - After Administration of Blood or Blood Components    04/21/25 1031    Unscheduled  Alex & Document Feeding Tube Depth (in cm)  As Needed       04/21/25 1322    Unscheduled  Verify Feeding Tube Placement Upon Insertion & As Needed  As Needed       04/21/25 1322    Unscheduled  Flush Feeding Tube With 30-50mL Water As Needed  As Needed       04/21/25 1322    --  bismuth subsalicylate (PEPTO BISMOL) 262 MG/15ML suspension  Every 6 Hours PRN         04/18/25 0101    Signed and Held  Obtain Informed Consent  Once         Signed and Held    Signed and Held  Body Fluid Cell Count With Differential - Body Fluid, Peritoneum  STAT         Signed and Held    Signed and Held  Albumin, Fluid - Body Fluid, Peritoneum  STAT         Signed and Held    Signed and Held  Protein, Body Fluid - Body Fluid, Peritoneum  STAT         Signed and Held    Signed and Held  Body Fluid Culture - Body Fluid, Peritoneum  STAT         Signed and Held    Signed and Held  Anaerobic Culture - Body Fluid, Peritoneum  STAT         Signed and Held    Signed and Held  albumin human 25 % IV SOLN 12.5 g  Once         Signed and Held                     Operative/Procedure Notes (all)        Marcos Rodas MD at 04/18/25 1407  Version 1 of 1       Procedure Orders    1. Diagnostic, Therapeutic Bedside Paracentesis Without  Radiology  [[id number]] ordered by Marcos Rodas MD at 04/18/25 1408               Procedures    1. BEDSIDE PARACENTESIS [TLH193 (Custom)]                 [facility]    Procedure note    Type of procedure: Left lower quadrant abdominal paracentesis    Indication: Ascites causing respiratory compromise    Diagnosis: Tense ascites causing respiratory compromise    Consent: Obtained from his daughter    Technique: Under ultrasound visualization, the left lower quadrant of the abdomen was imaged and adequate volume of ascitic fluid was identified.  There were no intestinal loops close to the peritoneal surface.  Large amount of fluid was identified.  The area was prepped and draped in the usual sterile fashion.  The intersection of the horizontal line through the umbilicus and a vertical line through the anterior axillary wall was chosen as the puncture site.  Ultrasound imaging verified no interposition of spleen or intestinal loops.  Lidocaine was infiltrated locally for anesthesia  5 [place] trocar catheter was inserted and 3 L of ascitic fluid was successfully removed with glass vacuum container bottles.    This concluded the procedure.    Complications: None.    Blood loss: Minimal.    Electronically signed by Marcos Rodas MD, 04/18/25, 2:11 PM EDT.      Electronically signed by Marcos Rodas MD at 04/18/25 1412       Marcos Rodas MD at 04/18/25 1529  Version 1 of 1       Procedures    1. INSERT CENTRAL LINE AT BEDSIDE [PRO85 (Custom)]                 Ultrasound Guided Central Venous Catheter Insertion Procedure Note      Indications:  vascular access    Procedure Details   Informed consent was obtained for the procedure, including sedation.  Risks of lung perforation, hemorrhage, arrhythmia, and adverse drug reaction were discussed.     Maximum sterile technique was used including usual patient drapes, antiseptics and physician sterile garments.    Under sterile conditions the skin  above the on the right internal jugular vein was prepped with chlorhexidine and covered with a sterile drape. A sterile ultrasound probe was used to localize the target vein. It was clearly visualized. Local anesthesia was applied to the skin and subcutaneous tissues with lidocaine 1%. An 18-gauge needle was then inserted into the vein under ultrasound guidance. A guide wire was then threaded into the vein. A central venous catheter was then inserted into the vessel over the guide wire. The catheter was sutured into place and dressed following sterile protocol with Biopatch placed. All 4 ports were flushed and deemed patent.    Findings:  There were no changes to vital signs. All catheter ports were flushed with saline. Patient did tolerate procedure well.    Recommendations:  CXR ordered to verify placement.  Pending at the time of this dictation      Marcos Rodas MD  4/18/2025    Electronically signed by Marcos Rodas MD at 04/18/25 1533          Physician Progress Notes (all)               Alvaro Cornejo MD at 04/21/25 0838                [facility]         Dr Alvaro Cornejo   LOS: 4 days   Patient Care Team:  Zoe Desai MD as PCP - General (Family Medicine)  Corbin Bailey MD as Surgeon (Neurosurgery)    Chief Complaint: Sepsis with septic shock E. coli enteritis with C. difficile positive other issues as listed below    Interval History: Remains sleepy lethargic.  Dobbhoff tube has been placed.  Currently on Levophed drip ongoing.  REVIEW OF SYSTEMS:   Unable to get with patient's current condition    Ventilator/Non-Invasive Ventilation Settings (From admission, onward)      None        Heated high flow 60 L with 85% FiO2      Vital Signs  Temp:  [97.5 °F (36.4 °C)-97.8 °F (36.6 °C)] 97.8 °F (36.6 °C)  Heart Rate:  [] 120  Resp:  [16-20] 18  BP: ()/(45-95) 128/70    Intake/Output Summary (Last 24 hours) at 4/21/2025 0838  Last data filed at 4/21/2025 0600  Gross per 24  "hour   Intake --   Output 2350 ml   Net -2350 ml     Flowsheet Rows      Flowsheet Row First Filed Value   Admission Height 188 cm (74\") Documented at 04/17/2025 1957   Admission Weight 104 kg (230 lb) Documented at 04/17/2025 2110            Physical Exam:  Patient is examined using the personal protective equipment as per guidelines from infection control for this particular patient as enacted.  Hand hygiene was performed before and after patient interaction.   General Appearance:  Acutely ill and poorly arousable.  Not following commands for me.  ENT normocephalic atraumatic  Neck midline trachea, no thyromegaly   Lungs:   Diminished breath sounds bilaterally    Heart:    Regular rhythm and normal rate, normal S1 and S2, no            murmur, no gallop, no rub, no click   Chest Wall:    No abnormalities observed   Abdomen:   Soft mild diffuse tenderness no rebound or guarding   Extremities:   Moves all extremities well, trace to 1+ edema, no cyanosis, no             redness  CNS confused  Skin no rashes no nodules  Musculoskeletal no cyanosis no clubbing normal range of motion     Results Review:        Results from last 7 days   Lab Units 04/21/25 0343 04/20/25  1149 04/20/25  0641 04/20/25  0111   SODIUM mmol/L 146*  --  142 141   POTASSIUM mmol/L 3.0* 3.2* 3.5 3.2*  3.2*   CHLORIDE mmol/L 101  --  98 96*   CO2 mmol/L 31.0*  --  28.0 27.8   BUN mg/dL 49*  --  53* 56*   CREATININE mg/dL 2.76*  --  3.34* 3.75*   GLUCOSE mg/dL 108*  --  144* 111*   CALCIUM mg/dL 7.6*  --  7.5* 7.6*     Results from last 7 days   Lab Units 04/17/25 2148 04/17/25 2011   HSTROP T ng/L 822* 1,013*     Results from last 7 days   Lab Units 04/21/25  0343 04/20/25  1149 04/20/25  0641 04/19/25  0913   WBC 10*3/mm3 8.29  --  9.47 8.66   HEMOGLOBIN g/dL 8.5*  --  8.8* 9.1*   HEMATOCRIT % 22.8*  --  23.5* 24.5*   PLATELETS 10*3/mm3 42* 44* 42* 47*     Results from last 7 days   Lab Units 04/20/25  1149 04/18/25  0337 04/17/25 2011 "   INR  1.80* 1.79* 1.64*   APTT seconds 40.0*  --  34.5         Results from last 7 days   Lab Units 04/21/25  0343   MAGNESIUM mg/dL 1.7         Results from last 7 days   Lab Units 04/18/25  1303   PH, ARTERIAL pH units 7.431   PO2 ART mm Hg 114.3*   PCO2, ARTERIAL mm Hg 37.2   HCO3 ART mmol/L 24.8       I reviewed the patient's new clinical results.  I personally viewed and interpreted the patient's chest x-ray.        Medication Review:   cefTRIAXone, 2,000 mg, Intravenous, Q24H   Followed by  [START ON 4/24/2025] cephalexin, 500 mg, Nasogastric, Q12H  folic acid 1 mg in sodium chloride 0.9 % 50 mL IVPB, 1 mg, Intravenous, Daily  insulin regular, 2-7 Units, Subcutaneous, Q6H  lactulose, 20 g, Nasogastric, TID  midodrine, 10 mg, Nasogastric, Q8H  mupirocin, 1 Application, Each Nare, BID  potassium chloride, 20 mEq, Intravenous, Q1H  sodium chloride, 10 mL, Intravenous, Q12H  thiamine (B-1) IV, 200 mg, Intravenous, Q8H   Followed by  [START ON 4/23/2025] thiamine, 100 mg, Nasogastric, Daily  vancomycin, 125 mg, Nasogastric, Q6H        norepinephrine, 0.02-0.3 mcg/kg/min, Last Rate: 0.08 mcg/kg/min (04/21/25 0344)  Pharmacy Consult,         ASSESSMENT:   Acute metabolic encephalopathy  Sepsis present on admission due to E. coli enteritis  Acute hypoxemic respiratory failure on heated high flow  Septic shock present on admission causing encephalopathy and acute  Kidney injury  Decompensated cirrhosis  Acute pancreatitis by imaging  Non-STEMI  Hyponatremia  Hypokalemia  Acute kidney injury  Acute liver injury  Anemia  Thrombocytopenia  Alcohol abuse  Acute HFrEF    PLAN:  Mental status remains poor.  Likely related to hepatic encephalopathy.  Dobbhoff tube has been placed and lactulose initiated.  GI following and managing.  Start tube feeds if okay with GI.  Source of infection E. coli enteritis with C. difficile positive  Continue current antibiotics as per IDs recommendations.  Patient on Rocephin with p.o.  vancomycin.  Creatinine improving.  Further fluid electrolyte management per nephrology  Wean off pressors maintain MAP greater than 65  Platelets stable.  Appreciate input from hematology.  Further workup per hematology.  Thiamine IV to continue  Patient critically ill  ICU core measures          Alvaro Cornejo MD  04/21/25  08:38 EDT            Electronically signed by Alvaro Cornejo MD at 04/21/25 1036       Veena Patel MD at 04/21/25 0627          [facility]  Inpatient Progress Note    Reason for Follow Up:  Decompensated cirrhosis     Subjective     Interval History:   Remains on levophed  3 BM yesterday  Coretrak placed yesterday - tip in stomach    Objective     Vital Signs  Temp:  [97.5 °F (36.4 °C)-97.9 °F (36.6 °C)] 97.5 °F (36.4 °C)  Heart Rate:  [] 126  Resp:  [14-20] 20  BP: ()/(37-95) 112/70  Body mass index is 25.47 kg/m².    Intake/Output Summary (Last 24 hours) at 4/21/2025 0627  Last data filed at 4/21/2025 0600  Gross per 24 hour   Intake 0 ml   Output 2350 ml   Net -2350 ml     I/O this shift:  In: -   Out: 650 [Urine:650]     Physical Exam:   General: patient awake, confused, slightly agitated   Eyes: Normal lids and lashes, no scleral icterus   Neck: supple, normal ROM   Skin: warm and dry, not jaundiced   Cardiovascular: Tachycardic, regular   Pulm: clear to auscultation bilaterally, regular and unlabored   Abdomen: soft, nontender, distended; normal bowel sounds   Extremities: no rash or edema       Results Review:     I reviewed the patient's new clinical results.    Results from last 7 days   Lab Units 04/21/25  0343 04/20/25  1149 04/20/25  0641 04/19/25  0913   WBC 10*3/mm3 8.29  --  9.47 8.66   HEMOGLOBIN g/dL 8.5*  --  8.8* 9.1*   HEMATOCRIT % 22.8*  --  23.5* 24.5*   PLATELETS 10*3/mm3 42* 44* 42* 47*     Results from last 7 days   Lab Units 04/21/25  0343 04/20/25  1149 04/20/25  0641 04/20/25  0111   SODIUM mmol/L 146*  --  142 141    POTASSIUM mmol/L 3.0* 3.2* 3.5 3.2*  3.2*   CHLORIDE mmol/L 101  --  98 96*   CO2 mmol/L 31.0*  --  28.0 27.8   BUN mg/dL 49*  --  53* 56*   CREATININE mg/dL 2.76*  --  3.34* 3.75*   CALCIUM mg/dL 7.6*  --  7.5* 7.6*   BILIRUBIN mg/dL 8.3*  --  8.2* 8.9*   ALK PHOS U/L 147*  --  148* 159*   ALT (SGPT) U/L 180*  --  196* 207*   AST (SGOT) U/L 397*  --  426* 434*   GLUCOSE mg/dL 108*  --  144* 111*     Results from last 7 days   Lab Units 04/20/25  1149 04/18/25  0337 04/17/25 2011   INR  1.80* 1.79* 1.64*     Lab Results   Lab Value Date/Time    LIPASE 449 (H) 04/17/2025 2011       Radiology:  XR Abdomen KUB   Final Result   1.Cortrak catheter tip probably in the distal body of the stomach.           This report was finalized on 4/20/2025 2:02 PM by Dr. Dexter Quinonez M.D on Workstation: [id number]          XR Chest Post CVA Port   Final Result       As described.               This report was finalized on 4/18/2025 4:06 PM by Dr. David Smith M.D on Workstation: [id number]          XR Chest 1 View   Final Result      CT Abdomen Pelvis Without Contrast   Final Result       1. Marked hepatic steatosis. Patient is noted to have moderate ascites.   2. There is some inflammatory stranding surrounding the pancreas, as   well as some trace fluid. Appearance may reflect acute pancreatitis.   There is no pancreatic ductal dilatation, and no organized   peripancreatic collections are seen.   3. High density material within the gallbladder may reflect stones or   sludge.   4. Thick walled appearance to the ascending and transverse colon   appearance may be related to portal colopathy, although correlation with   any evidence of colitis is recommended.   5. Indeterminate ground glass pulmonary nodule measuring 11 mm. For a   ground glass nodule >=6 mm, recommend CT at 6-12 months to confirm   persistence, then CT every 2 years until 5 years. If solid component(s)   or growth develops, consider resection.     Radiation dose reduction techniques were utilized, including automated   exposure control and exposure modulation based on body size.           This report was finalized on 4/17/2025 10:40 PM by Dr. Adrianna Fleming M.D on Workstation: [id number]          XR Chest 1 View   Final Result   As described.       This report was finalized on 4/17/2025 8:19 PM by Dr. David Smith M.D on Workstation: [id number]          US Paracentesis    (Results Pending)       Assessment & Plan     Active Hospital Problems    Diagnosis     **Sepsis      All problems are new to me today  Assessment:  Decompensated cirrhosis w/ascites  Alcohol use  Sepsis  Acute kidney injury/CKD  CHF w/ischemic CM-h/o AICD  Enteropathogenic E. Coli  C. difficile considered colonization  Pancreatic inflammatory change on imaging      Plan:  Pt rcving 5 d course of rocephin and empiric treatment for c diff w/vanc  Renal function with some mild improvement-nephrology following  Core track placed yesterday-receiving lactulose.    Start tube feeds.    I discussed the patients findings and my recommendations with patient.            Veena Patel M.D.  [facility]  [phone]              Electronically signed by Veena Patel MD at 04/21/25 0903           Carmelo To MD at 04/20/25 1013             LOS: 3 days   Patient Care Team:  Zoe Desai MD as PCP - General (Family Medicine)  Corbin Bailey MD as Surgeon (Neurosurgery)    Chief Complaint:  Following for CHF    Interval History:   Remains encephalopathic today    Objective   Vital Signs  Temp:  [97.4 °F (36.3 °C)-98.2 °F (36.8 °C)] 97.9 °F (36.6 °C)  Heart Rate:  [100-133] 126  Resp:  [14-21] 14  BP: ()/() 93/61    Intake/Output Summary (Last 24 hours) at 4/20/2025 1013  Last data filed at 4/20/2025 1010  Gross per 24 hour   Intake 489.7 ml   Output 4600 ml   Net -4110.3 ml       Comfortable NAD  PERRL, conjunctivae clear  Neck supple, no  JVD or thyromegaly appreciated  S1/S2 RRR, no m/r/g  Lungs CTA B, normal effort  Abdomen S/NT/ND (+) BS, no HSM appreciated  Extremities warm, no clubbing, cyanosis, 1+ anasarca noted  No visible or palpable skin lesions   slightly lethargic but arouses to voice.  Encephalopathic     Results Review:      Results from last 7 days   Lab Units 04/20/25  0641 04/20/25  0111 04/19/25  1800 04/19/25  0523   SODIUM mmol/L 142 141  --  138   POTASSIUM mmol/L 3.5 3.2*  3.2* 2.9* 2.8*   CHLORIDE mmol/L 98 96*  --  95*   CO2 mmol/L 28.0 27.8  --  26.2   BUN mg/dL 53* 56*  --  61*   CREATININE mg/dL 3.34* 3.75*  --  4.86*   GLUCOSE mg/dL 144* 111*  --  131*   CALCIUM mg/dL 7.5* 7.6*  --  7.4*     Results from last 7 days   Lab Units 04/17/25 2148 04/17/25 2011   HSTROP T ng/L 822* 1,013*     Results from last 7 days   Lab Units 04/20/25  0641 04/19/25  0913 04/18/25 0337   WBC 10*3/mm3 9.47 8.66 8.67   HEMOGLOBIN g/dL 8.8* 9.1* 9.2*   HEMATOCRIT % 23.5* 24.5* 24.9*   PLATELETS 10*3/mm3 42* 47* 71*     Results from last 7 days   Lab Units 04/18/25  0337 04/17/25 2011   INR  1.79* 1.64*   APTT seconds  --  34.5         Results from last 7 days   Lab Units 04/20/25  0641   MAGNESIUM mg/dL 2.4           I reviewed the patient's new clinical results.  I personally viewed and interpreted the patient's EKG/Telemetry data        Medication Review:   cefTRIAXone, 2,000 mg, Intravenous, Q24H   Followed by  [START ON 4/24/2025] cephalexin, 500 mg, Oral, Q12H  folic acid 1 mg in sodium chloride 0.9 % 50 mL IVPB, 1 mg, Intravenous, Daily  insulin regular, 2-7 Units, Subcutaneous, Q6H  lactulose, 20 g, Oral, TID  LORazepam, 1 mg, Intravenous, Q6H  midodrine, 10 mg, Oral, Q8H  mupirocin, 1 Application, Each Nare, BID  sodium chloride, 10 mL, Intravenous, Q12H  thiamine (B-1) IV, 200 mg, Intravenous, Q8H   Followed by  [START ON 4/23/2025] thiamine, 100 mg, Oral, Daily  vancomycin, 125 mg, Oral, Q6H        norepinephrine, 0.02-0.3  mcg/kg/min, Last Rate: 0.22 mcg/kg/min (04/20/25 0837)  Pharmacy Consult,         Assessment & Plan       Sepsis    Nausea, vomiting and diarrhea  EHEC positive stool with C. difficile positivity  Anasarca, multifactorial  Severe KEATON on CKD.  Nephrology following.  He is receiving some fluids today due to elevated uric acid  Decompensated hepatic cirrhosis  Moderate ascites on CT  Acute on chronic systolic CHF, secondary to ischemic cardiomyopathy with  of the RCA, maximally vascularized on cath 2022  Would consider metoprolol succinate if BP stabilizes, currently requiring midodrine and was previously on norepinephrine  have to hold for now  Nephrology managing diuretics, will not tolerate other GDMT due to his comorbidities and KEATON  Status post ICD placement due to persistent ischemic cardiomyopathy follows with [facility] cardiology  UTI with urinary retention with Rae catheter insertion  Transaminase elevation, likely related to cirrhosis and decompensation  Hypoalbuminemia  Heavy alcohol use, daily  Thrombocytopenia secondary to cirrhosis multifactorial causes per above.  Hematology to see today.  Encephalopathy, suspected metabolic    Carmelo To MD  04/20/25  10:13 EDT      Part of this note may be an electronic transcription/translation of spoken language to printed text using the Dragon Dictation System.     Electronically signed by Carmelo To MD at 04/20/25 5905       Alvaro Cornejo MD at 04/20/25 0949                [facility]         Dr Alvaro Cornejo   LOS: 3 days   Patient Care Team:  Zoe Desai MD as PCP - General (Family Medicine)  Corbin Bailey MD as Surgeon (Neurosurgery)    Chief Complaint: Sepsis with septic shock E. coli enteritis with C. difficile positive other issues as listed below    Interval History: Sleepy lethargic.  Levophed drip ongoing.  Opens eyes and attempts to follow simple commands.    REVIEW OF SYSTEMS:   Unable to get with patient's current  "condition    Ventilator/Non-Invasive Ventilation Settings (From admission, onward)      None        Heated high flow 60 L with 85% FiO2      Vital Signs  Temp:  [97.4 °F (36.3 °C)-98.2 °F (36.8 °C)] 97.9 °F (36.6 °C)  Heart Rate:  [100-133] 107  Resp:  [14-21] 14  BP: ()/() 96/65    Intake/Output Summary (Last 24 hours) at 4/20/2025 0921  Last data filed at 4/20/2025 0800  Gross per 24 hour   Intake 489.7 ml   Output 4000 ml   Net -3510.3 ml     Flowsheet Rows      Flowsheet Row First Filed Value   Admission Height 188 cm (74\") Documented at 04/17/2025 1957   Admission Weight 104 kg (230 lb) Documented at 04/17/2025 2110            Physical Exam:  Patient is examined using the personal protective equipment as per guidelines from infection control for this particular patient as enacted.  Hand hygiene was performed before and after patient interaction.   General Appearance:  Acutely ill and poorly arousable.  Not following commands for me.  ENT normocephalic atraumatic  Neck midline trachea, no thyromegaly   Lungs:   Diminished breath sounds bilaterally    Heart:    Regular rhythm and normal rate, normal S1 and S2, no            murmur, no gallop, no rub, no click   Chest Wall:    No abnormalities observed   Abdomen:   Soft mild diffuse tenderness no rebound or guarding   Extremities:   Moves all extremities well, trace to 1+ edema, no cyanosis, no             redness  CNS confused  Skin no rashes no nodules  Musculoskeletal no cyanosis no clubbing normal range of motion     Results Review:        Results from last 7 days   Lab Units 04/20/25  0641 04/20/25  0111 04/19/25  1800 04/19/25  0523   SODIUM mmol/L 142 141  --  138   POTASSIUM mmol/L 3.5 3.2*  3.2* 2.9* 2.8*   CHLORIDE mmol/L 98 96*  --  95*   CO2 mmol/L 28.0 27.8  --  26.2   BUN mg/dL 53* 56*  --  61*   CREATININE mg/dL 3.34* 3.75*  --  4.86*   GLUCOSE mg/dL 144* 111*  --  131*   CALCIUM mg/dL 7.5* 7.6*  --  7.4*     Results from last 7 days "   Lab Units 04/17/25  2148 04/17/25 2011   HSTROP T ng/L 822* 1,013*     Results from last 7 days   Lab Units 04/20/25  0641 04/19/25  0913 04/18/25 0337   WBC 10*3/mm3 9.47 8.66 8.67   HEMOGLOBIN g/dL 8.8* 9.1* 9.2*   HEMATOCRIT % 23.5* 24.5* 24.9*   PLATELETS 10*3/mm3 42* 47* 71*     Results from last 7 days   Lab Units 04/18/25  0337 04/17/25 2011   INR  1.79* 1.64*   APTT seconds  --  34.5         Results from last 7 days   Lab Units 04/20/25  0641   MAGNESIUM mg/dL 2.4         Results from last 7 days   Lab Units 04/18/25  1303   PH, ARTERIAL pH units 7.431   PO2 ART mm Hg 114.3*   PCO2, ARTERIAL mm Hg 37.2   HCO3 ART mmol/L 24.8       I reviewed the patient's new clinical results.  I personally viewed and interpreted the patient's chest x-ray.        Medication Review:   cefTRIAXone, 2,000 mg, Intravenous, Q24H   Followed by  [START ON 4/24/2025] cephalexin, 500 mg, Oral, Q12H  folic acid 1 mg in sodium chloride 0.9 % 50 mL IVPB, 1 mg, Intravenous, Daily  insulin regular, 2-7 Units, Subcutaneous, Q6H  LORazepam, 1 mg, Intravenous, Q6H  midodrine, 10 mg, Oral, Q8H  mupirocin, 1 Application, Each Nare, BID  sodium chloride, 10 mL, Intravenous, Q12H  thiamine (B-1) IV, 200 mg, Intravenous, Q8H   Followed by  [START ON 4/23/2025] thiamine, 100 mg, Oral, Daily  vancomycin, 125 mg, Oral, Q6H        norepinephrine, 0.02-0.3 mcg/kg/min, Last Rate: 0.22 mcg/kg/min (04/20/25 0849)  Pharmacy Consult,         ASSESSMENT:   Acute metabolic encephalopathy  Sepsis present on admission due to E. coli enteritis  Acute hypoxemic respiratory failure on heated high flow  Septic shock present on admission causing encephalopathy and acute  Kidney injury  Decompensated cirrhosis  Acute pancreatitis by imaging  Non-STEMI  Hyponatremia  Hypokalemia  Acute kidney injury  Acute liver injury  Anemia  Thrombocytopenia  Alcohol abuse  Acute HFrEF    PLAN:  Mental status remains poor.  Possibly related to encephalopathy.  Have asked  "nursing staff to place Dobbhoff tube and start lactulose ASAP.  If unable to take oral lactulose will require lactulose enema.  Source of infection E. coli enteritis with C. difficile positive  Appreciate input from infectious diseases.  Further antibiotics per infectious diseases.  Creatinine improving.  Further fluid electrolyte management per nephrology  Wean off pressors maintain MAP greater than 65  Low platelets continue to decline likely from sepsis and cirrhosis.  Consult hematology  Thiamine IV to continue  Patient critically ill  ICU core measures    Critical care time 35 minutes      Alvaro Cornejo MD  04/20/25  09:21 EDT            Electronically signed by Alvaro Cornejo MD at 04/20/25 0924           Alvaro Cornejo MD at 04/19/25 1015                [facility]         Dr Alvaro Cornejo   LOS: 2 days   Patient Care Team:  Zoe Desai MD as PCP - General (Family Medicine)  Corbin Bailey MD as Surgeon (Neurosurgery)    Chief Complaint: Sepsis with septic shock E. coli enteritis with C. difficile positive other issues as listed below    Interval History: Events noted chart reviewed.  Patient currently on Levophed and heated high flow.  Confused poorly arousable attempts to open eyes.    REVIEW OF SYSTEMS:   Unable to get with patient's current condition    Ventilator/Non-Invasive Ventilation Settings (From admission, onward)      None        Heated high flow 60 L with 85% FiO2      Vital Signs  Temp:  [97.6 °F (36.4 °C)-98.6 °F (37 °C)] 97.6 °F (36.4 °C)  Heart Rate:  [] 100  Resp:  [20-26] 20  BP: ()/() 88/64    Intake/Output Summary (Last 24 hours) at 4/19/2025 1016  Last data filed at 4/19/2025 0745  Gross per 24 hour   Intake 2742.5 ml   Output 7625 ml   Net -4882.5 ml     Flowsheet Rows      Flowsheet Row First Filed Value   Admission Height 188 cm (74\") Documented at 04/17/2025 1957   Admission Weight 104 kg (230 lb) Documented at 04/17/2025 2110      "       Physical Exam:  Patient is examined using the personal protective equipment as per guidelines from infection control for this particular patient as enacted.  Hand hygiene was performed before and after patient interaction.   General Appearance:  Acutely ill and poorly arousable.  Not following commands for me.  ENT normocephalic atraumatic  Neck midline trachea, no thyromegaly   Lungs:   Diminished breath sounds bilaterally    Heart:    Regular rhythm and normal rate, normal S1 and S2, no            murmur, no gallop, no rub, no click   Chest Wall:    No abnormalities observed   Abdomen:   Soft mild diffuse tenderness no rebound or guarding   Extremities:   Moves all extremities well, trace to 1+ edema, no cyanosis, no             redness  CNS confused  Skin no rashes no nodules  Musculoskeletal no cyanosis no clubbing normal range of motion     Results Review:        Results from last 7 days   Lab Units 04/19/25  0523 04/18/25  1413 04/18/25 0337   SODIUM mmol/L 138 131* 135*   POTASSIUM mmol/L 2.8* 4.1 3.4*   CHLORIDE mmol/L 95* 92* 92*   CO2 mmol/L 26.2 22.2 22.6   BUN mg/dL 61* 62* 61*   CREATININE mg/dL 4.86* 5.87* 6.11*   GLUCOSE mg/dL 131* 142* 124*   CALCIUM mg/dL 7.4* 7.0* 7.3*     Results from last 7 days   Lab Units 04/17/25 2148 04/17/25 2011   HSTROP T ng/L 822* 1,013*     Results from last 7 days   Lab Units 04/19/25  0913 04/18/25 0337 04/17/25 2011   WBC 10*3/mm3 8.66 8.67 8.77   HEMOGLOBIN g/dL 9.1* 9.2* 10.8*   HEMATOCRIT % 24.5* 24.9* 30.7*   PLATELETS 10*3/mm3 47* 71* 83*     Results from last 7 days   Lab Units 04/18/25 0337 04/17/25 2011   INR  1.79* 1.64*   APTT seconds  --  34.5         Results from last 7 days   Lab Units 04/19/25 0523   MAGNESIUM mg/dL 1.8         Results from last 7 days   Lab Units 04/18/25  1303   PH, ARTERIAL pH units 7.431   PO2 ART mm Hg 114.3*   PCO2, ARTERIAL mm Hg 37.2   HCO3 ART mmol/L 24.8       I reviewed the patient's new clinical results.  I  personally viewed and interpreted the patient's chest x-ray.        Medication Review:   cefTRIAXone, 2,000 mg, Intravenous, Q24H  folic acid 1 mg in sodium chloride 0.9 % 50 mL IVPB, 1 mg, Intravenous, Daily  insulin regular, 2-7 Units, Subcutaneous, Q6H  LORazepam, 1 mg, Intravenous, Q6H  midodrine, 10 mg, Oral, Q8H  mupirocin, 1 Application, Each Nare, BID  senna-docusate sodium, 2 tablet, Oral, BID  sodium chloride, 10 mL, Intravenous, Q12H  thiamine (B-1) IV, 200 mg, Intravenous, Q8H   Followed by  [START ON 4/23/2025] thiamine, 100 mg, Oral, Daily        norepinephrine, 0.02-0.3 mcg/kg/min, Last Rate: 0.18 mcg/kg/min (04/19/25 0653)        ASSESSMENT:   Acute metabolic encephalopathy  Sepsis present on admission due to E. coli enteritis  Acute hypoxemic respiratory failure on heated high flow  Septic shock present on admission causing encephalopathy and acute  Kidney injury  Decompensated cirrhosis  Acute pancreatitis by imaging  Non-STEMI  Hyponatremia  Hypokalemia  Acute kidney injury  Acute liver injury  Anemia  Thrombocytopenia  Alcohol abuse  Acute HFrEF    PLAN:  Events noted chart reviewed  Source of infection E. coli enteritis with C. difficile positive  Patient currently on Rocephin to cover for E. coli enteritis.  I will add p.o. vancomycin for C. difficile colitis and consult infectious diseases  Creatinine improving.  Further fluid electrolyte management per nephrology  Wean off pressors maintain MAP greater than 65  Lactulose per Dobbhoff tube.  If okay with GI will place Dobbhoff tube  Low platelets continue to decline likely from sepsis and cirrhosis.  Continue to monitor.  No signs of active bleeding  Thiamine IV to continue  Patient critically ill  ICU core measures    Critical care time 35 minutes      Alvaro Cornejo MD  04/19/25  10:16 EDT            Electronically signed by Alvaro Cornejo MD at 04/19/25 5099       Carmelo To MD at 04/19/25 7808             LOS: 2 days   Patient Care  Team:  Zoe Desai MD as PCP - General (Family Medicine)  Corbin Bailey MD as Surgeon (Neurosurgery)    Chief Complaint:  Following for cardiomyopathy    Interval History:   No new complaints today, still intermittently encephalopathic    Objective   Vital Signs  Temp:  [97.6 °F (36.4 °C)-98.6 °F (37 °C)] 97.6 °F (36.4 °C)  Heart Rate:  [] 100  Resp:  [20-26] 20  BP: ()/() 88/64    Intake/Output Summary (Last 24 hours) at 4/19/2025 0957  Last data filed at 4/19/2025 0745  Gross per 24 hour   Intake 2742.5 ml   Output 7625 ml   Net -4882.5 ml     , Lethargic but arouses to voice  Comfortable NAD  PERRL, conjunctivae clear  Neck supple, no JVD or thyromegaly appreciated  S1/S2 RRR, no m/r/g  Lungs CTA B, normal effort  Abdomen S/NT/ND (+) BS, no HSM appreciated  Extremities warm, no clubbing, cyanosis, 1+ BLE edema   No visible or palpable skin lesions  Lethargic but arouses to voice.    Results Review:      Results from last 7 days   Lab Units 04/19/25  0523 04/18/25  1413 04/18/25 0337   SODIUM mmol/L 138 131* 135*   POTASSIUM mmol/L 2.8* 4.1 3.4*   CHLORIDE mmol/L 95* 92* 92*   CO2 mmol/L 26.2 22.2 22.6   BUN mg/dL 61* 62* 61*   CREATININE mg/dL 4.86* 5.87* 6.11*   GLUCOSE mg/dL 131* 142* 124*   CALCIUM mg/dL 7.4* 7.0* 7.3*     Results from last 7 days   Lab Units 04/17/25 2148 04/17/25 2011   HSTROP T ng/L 822* 1,013*     Results from last 7 days   Lab Units 04/19/25  0913 04/18/25 0337 04/17/25 2011   WBC 10*3/mm3 8.66 8.67 8.77   HEMOGLOBIN g/dL 9.1* 9.2* 10.8*   HEMATOCRIT % 24.5* 24.9* 30.7*   PLATELETS 10*3/mm3 47* 71* 83*     Results from last 7 days   Lab Units 04/18/25  0337 04/17/25 2011   INR  1.79* 1.64*   APTT seconds  --  34.5         Results from last 7 days   Lab Units 04/19/25  0523   MAGNESIUM mg/dL 1.8           I reviewed the patient's new clinical results.  I personally viewed and interpreted the patient's EKG/Telemetry data        Medication Review:    cefTRIAXone, 2,000 mg, Intravenous, Q24H  folic acid 1 mg in sodium chloride 0.9 % 50 mL IVPB, 1 mg, Intravenous, Daily  insulin regular, 2-7 Units, Subcutaneous, Q6H  LORazepam, 1 mg, Intravenous, Q6H  midodrine, 10 mg, Oral, Q8H  mupirocin, 1 Application, Each Nare, BID  senna-docusate sodium, 2 tablet, Oral, BID  sodium chloride, 10 mL, Intravenous, Q12H  thiamine (B-1) IV, 200 mg, Intravenous, Q8H   Followed by  [START ON 4/23/2025] thiamine, 100 mg, Oral, Daily        norepinephrine, 0.02-0.3 mcg/kg/min, Last Rate: 0.18 mcg/kg/min (04/19/25 0653)        Assessment & Plan       Sepsis    Nausea, vomiting and diarrhea  EHEC positive stool with C. difficile positivity  Anasarca, multifactorial  Severe KEATON on CKD  Decompensated hepatic cirrhosis  Moderate ascites on CT  Acute on chronic systolic CHF, secondary to ischemic cardiomyopathy with  of the RCA, maximally vascularized on cath 2022  Would consider metoprolol succinate if BP stabilizes, currently requiring midodrine and was previously on norepinephrine  have to hold for now  Nephrology managing diuretics, will not tolerate other GDMT due to his comorbidities and KEATON  Status post ICD placement due to persistent ischemic cardiomyopathy follows with [facility] cardiology  UTI with urinary retention with Rae catheter insertion  Transaminase elevation, likely related to cirrhosis and decompensation  Hypoalbuminemia  Heavy alcohol use, daily  Thrombocytopenia secondary to cirrhosis multifactorial causes per above  Encephalopathy, suspected metabolic    Unfortunately, not much to offer from the cardiac standpoint.  Echo April 18, 2025 completed showing Ongoing ischemic cardiomyopathy with an EF of 30% with an akinesis/aneurysmal inferior base from his known RCA disease    Carmelo To MD  04/19/25  09:57 EDT      Part of this note may be an electronic transcription/translation of spoken language to printed text using the Dragon Dictation System.    Electronically  signed by Carmelo To MD at 25 [phone]       Jose Mathis MD at 25 0935              [facility] Progress Note      Patient Name: Archie Oquendo  : 1965  MRN: [id number]  Primary Care Physician:  Zoe Desai MD  Date of admission: 2025    Subjective     Interval History:   Follow-up acute kidney injury on chronic kidney disease    The patient is confused on high flow oxygen.  Unable to give me any meaningful information.    Review of Systems:   Not obtained    Objective     Vitals:   Temp:  [97.6 °F (36.4 °C)-98.6 °F (37 °C)] 97.6 °F (36.4 °C)  Heart Rate:  [] 100  Resp:  [20-26] 20  BP: ()/() 88/64  Flow (L/min) (Oxygen Therapy):  [2-60] 60    Intake/Output Summary (Last 24 hours) at 2025 0938  Last data filed at 2025 0745  Gross per 24 hour   Intake 2742.5 ml   Output 7625 ml   Net -4882.5 ml       Physical Exam:    General Appearance: Obtunded, chronically ill, obese, no acute distress  Skin: warm and dry  HEENT: Oral mucosa is dry, on high flow oxygen via nasal cannula  Neck: No JVD  Lungs: Bilateral rhonchi, breathing effort not labored  Heart: RRR, normal S1 and S2, no rub  Abdomen: soft, nontender, distended with the presence of  : Rae catheter anchored in  Extremities: 4+ lower extremity edema  Neuro: Unable to assess    Scheduled Meds:     cefTRIAXone, 2,000 mg, Intravenous, Q24H  folic acid 1 mg in sodium chloride 0.9 % 50 mL IVPB, 1 mg, Intravenous, Daily  insulin regular, 2-7 Units, Subcutaneous, Q6H  LORazepam, 1 mg, Intravenous, Q6H  midodrine, 10 mg, Oral, Q8H  mupirocin, 1 Application, Each Nare, BID  senna-docusate sodium, 2 tablet, Oral, BID  sodium chloride, 10 mL, Intravenous, Q12H  thiamine (B-1) IV, 200 mg, Intravenous, Q8H   Followed by  [START ON 2025] thiamine, 100 mg, Oral, Daily      IV Meds:   norepinephrine, 0.02-0.3 mcg/kg/min, Last Rate: 0.18 mcg/kg/min (25 0653)        Results  Reviewed:   I have personally reviewed the results from the time of this admission to 4/19/2025 09:38 EDT     Results from last 7 days   Lab Units 04/19/25 0523 04/18/25 1413 04/18/25 0337 04/17/25 2011   SODIUM mmol/L 138 131* 135* 129*   POTASSIUM mmol/L 2.8* 4.1 3.4* 3.7   CHLORIDE mmol/L 95* 92* 92* 82*   CO2 mmol/L 26.2 22.2 22.6 23.6   BUN mg/dL 61* 62* 61* 63*   CREATININE mg/dL 4.86* 5.87* 6.11* 7.11*   CALCIUM mg/dL 7.4* 7.0* 7.3* 7.9*   BILIRUBIN mg/dL 7.9*  --  7.6* 8.3*   ALK PHOS U/L 136*  --  146* 167*   ALT (SGPT) U/L 197*  --  242* 293*   AST (SGOT) U/L 420*  --  557* 663*   GLUCOSE mg/dL 131* 142* 124* 137*       Estimated Creatinine Clearance: 21.9 mL/min (A) (by C-G formula based on SCr of 4.86 mg/dL (H)).    Results from last 7 days   Lab Units 04/19/25 0523 04/18/25 1413 04/18/25 0337 04/17/25 2011   MAGNESIUM mg/dL 1.8  --  1.7 1.7   PHOSPHORUS mg/dL 2.9 2.8 2.6  --        Results from last 7 days   Lab Units 04/17/25 2148   URIC ACID mg/dL 13.6*       Results from last 7 days   Lab Units 04/18/25 0337 04/17/25 2011   WBC 10*3/mm3 8.67 8.77   HEMOGLOBIN g/dL 9.2* 10.8*   PLATELETS 10*3/mm3 71* 83*       Results from last 7 days   Lab Units 04/18/25 0337 04/17/25 2011   INR  1.79* 1.64*       Assessment / Plan     ASSESSMENT:  KEATON on possible CKD (baseline SCr not clear, but had SCr 2.0 in 9/2024), nonoliguric, improving and multifactorial:  volume loss from N/V/D, hypotension, and UTI/sepsis.  Has peripheral edema; per kalemia potassium 2.8 magnesium improved up to 1.8 and phosphorus improved up to 2.9. PCR 2.8 g/g, creatinine is down to 4.86.  metabolic acidosis on arrival, along with elevated beta hydroxybutyrate and elevated lactate, most recent blood gases pH 7.43, pCO2 37.2..    Hyponatremia, improving, from alcoholism, cirrhosis, N/V/D and malnutrition.  Potassium today is 2.8 magnesium up to 1.8.  Hypocalcemia, likely in association with poor nutrition; has appropriate  elevation in PTH   Mixed AGMA d/t lactate acidosis and respiratory alkalosis  Sepsis, SBP 70s on admission, now improved on levo, also received empiric vancomycin and cefepime  N/V/D/possible colitis, stool sample positive for enteropathogenic E. coli and C. difficile, managed by primary team  Transaminitis, in association with alcohol abuse/cirrhosis, CT also suggested possible cholelithiasis or sludge.  Followed by primary team  Suspected decompensated cirrhosis, CT abdomen/pelvis showed hepatic steatosis with moderate ascites.  Had bedside paracentesis with 3 L removed  HFrEF s/p ICD placement, echo pending, awaiting cardiology evaluation  Macrocytic anemia, hemoglobin trending down, closely monitor, transfuse per primary team  Thrombocytopenia, in association liver disease, platelets 71,000.  Alcohol abuse, received thiamine, folic acid, and Ativan  UTI with retention, now with Rae catheter  Non-STEMI, awaiting cardiology evaluation  Severe hyperuricemia associated with decreased effective intravascular volume    PLAN:  Continue the same treatment  Replete potassium  No indication for dialysis  Surveillance labs    I reviewed the chart and other providers notes, reviewed labs.  I discussed the case with the patient's nurse.  Copied text in this note has been reviewed and is accurate as of 04/19/25.         Thank you for involving us in the care of Archie Oquendo.  Please feel free to call with any questions.    Jose Mathis MD  04/19/25  09:38 EDT    [facility]  [phone]    Please note that portions of this note were completed with a voice recognition program.    Electronically signed by Jose Mathis MD at 04/19/25 4418       Brandon Parker MD at 04/19/25 6182          [facility]  Inpatient Progress Note    Reason for Followup:  Decompensated cirrhosis    Subjective     Interval History:   Remains lethargic  3L removed via  bedside paracentesis yesterday    Current Facility-Administered Medications:     sennosides-docusate (PERICOLACE) 8.6-50 MG per tablet 2 tablet, 2 tablet, Oral, BID **AND** polyethylene glycol (MIRALAX) packet 17 g, 17 g, Oral, Daily PRN **AND** bisacodyl (DULCOLAX) EC tablet 5 mg, 5 mg, Oral, Daily PRN **AND** bisacodyl (DULCOLAX) suppository 10 mg, 10 mg, Rectal, Daily PRN, Vianey Jones, APRN    cefTRIAXone (ROCEPHIN) 2,000 mg in sodium chloride 0.9 % 100 mL MBP, 2,000 mg, Intravenous, Q24H, Marcos Rodas MD, Last Rate: 200 mL/hr at 25 1209, 2,000 mg at 25 1209    dextrose (D50W) (25 g/50 mL) IV injection 25 g, 25 g, Intravenous, Q15 Min PRN, Vianey Jones APRN    dextrose (GLUTOSE) oral gel 15 g, 15 g, Oral, Q15 Min PRN, Vianey Jones, APRN    fentaNYL citrate (PF) (SUBLIMAZE) injection 25 mcg, 25 mcg, Intravenous, Q2H PRN, Vianey Jones, APRN    folic acid 1 mg in sodium chloride 0.9 % 50 mL IVPB, 1 mg, Intravenous, Daily, Vianey Jones APRN, Last Rate: 100 mL/hr at 25 0840, 1 mg at 25 0840    glucagon (GLUCAGEN) injection 1 mg, 1 mg, Intramuscular, Q15 Min PRN, Vianey Jones, APRN    insulin regular (humuLIN R,novoLIN R) injection 2-7 Units, 2-7 Units, Subcutaneous, Q6H, Vianey Jones APRN, 2 Units at 25 1213    [] LORazepam (ATIVAN) injection 2 mg, 2 mg, Intravenous, Q6H, 2 mg at 25 1709 **FOLLOWED BY** LORazepam (ATIVAN) injection 1 mg, 1 mg, Intravenous, Q6H, Vianey Jones, APRN, 1 mg at 25 0518    LORazepam (ATIVAN) tablet 1 mg, 1 mg, Oral, Q1H PRN **OR** LORazepam (ATIVAN) injection 1 mg, 1 mg, Intravenous, Q1H PRN **OR** LORazepam (ATIVAN) tablet 2 mg, 2 mg, Oral, Q1H PRN **OR** LORazepam (ATIVAN) injection 2 mg, 2 mg, Intravenous, Q1H PRN, 2 mg at 25 1403 **OR** LORazepam (ATIVAN) injection 2 mg, 2 mg, Intravenous, Q15 Min PRN, 2 mg at 25 1515 **OR** LORazepam (ATIVAN) injection 2 mg, 2 mg, Intramuscular, Q15 Min  PRN, Vianey Jones APRN    Magnesium Standard Dose Replacement - Follow Nurse / BPA Driven Protocol, , Not Applicable, PRN, Vianey Jones APRN    midodrine (PROAMATINE) tablet 10 mg, 10 mg, Oral, Q8H, Marcos Rodas MD    mupirocin (BACTROBAN) 2 % nasal ointment 1 Application, 1 Application, Each Nare, BID, Vianey Jones APRN, 1 Application at 04/18/25 2059    nitroglycerin (NITROSTAT) SL tablet 0.4 mg, 0.4 mg, Sublingual, Q5 Min PRN, Vianey Jones APRN    norepinephrine (LEVOPHED) 8 mg in 250 mL NS infusion (premix), 0.02-0.3 mcg/kg/min, Intravenous, Titrated, Vianey Jones APRN, Last Rate: 33.2 mL/hr at 04/19/25 0653, 0.18 mcg/kg/min at 04/19/25 0653    ondansetron (ZOFRAN) injection 4 mg, 4 mg, Intravenous, Q6H PRN, Vianey Jones APRN    sodium chloride 0.9 % flush 10 mL, 10 mL, Intravenous, Q12H, Vianey Jones APRN, 10 mL at 04/18/25 2100    sodium chloride 0.9 % flush 10 mL, 10 mL, Intravenous, PRN, Vianey Jones APRN    sodium chloride 0.9 % infusion 40 mL, 40 mL, Intravenous, PRN, Vianey Jones APRN    thiamine (B-1) injection 200 mg, 200 mg, Intravenous, Q8H, 200 mg at 04/19/25 0518 **FOLLOWED BY** [START ON 4/23/2025] thiamine (VITAMIN B-1) tablet 100 mg, 100 mg, Oral, Daily, Vianey Jones APRN    Objective     Vital Signs  Temp:  [97.9 °F (36.6 °C)-98.6 °F (37 °C)] 98.6 °F (37 °C)  Heart Rate:  [] 88  Resp:  [23-26] 23  BP: ()/() 92/50  Body mass index is 27.46 kg/m².    Intake/Output Summary (Last 24 hours) at 4/19/2025 0657  Last data filed at 4/19/2025 0556  Gross per 24 hour   Intake 2742.5 ml   Output 6975 ml   Net -4232.5 ml     I/O this shift:  In: 465.5 [I.V.:465.5]  Out: 4475 [Urine:4475]     Physical Exam:   General: patient intubated, no distress   Abdomen: distended with +ve fluid wave     Results Review:     I reviewed the patient's new clinical results.    Results from last 7 days   Lab Units 04/18/25  0337 04/17/25 2011   WBC  10*3/mm3 8.67 8.77   HEMOGLOBIN g/dL 9.2* 10.8*   HEMATOCRIT % 24.9* 30.7*   PLATELETS 10*3/mm3 71* 83*     Results from last 7 days   Lab Units 04/18/25  1413 04/18/25  0337 04/17/25 2011   SODIUM mmol/L 131* 135* 129*   POTASSIUM mmol/L 4.1 3.4* 3.7   CHLORIDE mmol/L 92* 92* 82*   CO2 mmol/L 22.2 22.6 23.6   BUN mg/dL 62* 61* 63*   CREATININE mg/dL 5.87* 6.11* 7.11*   CALCIUM mg/dL 7.0* 7.3* 7.9*   BILIRUBIN mg/dL  --  7.6* 8.3*   ALK PHOS U/L  --  146* 167*   ALT (SGPT) U/L  --  242* 293*   AST (SGOT) U/L  --  557* 663*   GLUCOSE mg/dL 142* 124* 137*     Results from last 7 days   Lab Units 04/18/25  0337 04/17/25 2011   INR  1.79* 1.64*     Lab Results   Lab Value Date/Time    LIPASE 449 (H) 04/17/2025 2011       Radiology:  [unfilled]      Assessment & Plan   Assessment:    Decompensated cirrhosis   Alcohol abuse   Sepsis   NSTEMI   KEATON    All problems new to me today    Plan:   Paracentesis yesterday, no fluid sent for studies.  Continue empiric abx  Continue midodrine for KEATON.  On levophed, renal following  Lactulose via [facility]  Palliative care consult ongoing          Brandon Parker M.D.  [facility]  [address] [place] [address]  Office: [phone]               Electronically signed by Brandon Parker MD at 04/19/25 0983       Marcos Rodas MD at 04/18/25 1308          Called stat to bedside.  Patient's condition is much worse.  He is hypotensive and hypoxic.  Patient's respirations have become quite shallow.  He is now hypoxic, requiring 100% nonrebreather due to constant oxygen desaturations on nasal cannula.  He is still confused.  He will wake up to sternal rub and become aggravated and then he falls back asleep.  The patient is also requiring additional IV pressors.  We will have to place a feeding tube in place to start midodrine.  He needs a central venous line for IV pressors and critical care management.  He may need intubation if  "his hypoxemia worsens.  He may have hepatorenal syndrome.  I explained all of this to his daughter Aspen over the phone.  She is his decision-maker for healthcare.  At this point after we discussed goals of care she would like full medical treatment including mechanical ventilation and hemodialysis but no CPR.    Total critical care time 40 minutes    Electronically signed by Marcos Rodas MD at 04/18/25 1308       Marcos Rodas MD at 04/18/25 1015          Dr. JL Rodas    [facility] INTENSIVE CARE        Patient ID:  Name:  Archie Oquendo  MRN:  [id number]  1965  59 y.o.  male            CC/Reason for visit: Acute renal injury, non-ST elevation MI, urinary retention, alcohol abuse, HFrEF, ICD in place    Interval hx: Patient is confused and somnolent, cannot give me a reasonable history  Admitted last night with multiple medical problems.  Notes of APRN and physicians reviewed from last night  Being treated for sepsis  Positive for enteropathogenic E. coli    ROS: Confused    I reviewed old medical records.  Past medical history, social history and family history: Unchanged from admission H&P.      Vitals:  Vitals:    04/18/25 0900 04/18/25 0902 04/18/25 0915 04/18/25 0930   BP: 104/50 108/50 109/63 120/43   Pulse: 94 91 93 96   Resp:       Temp:       TempSrc:       SpO2: 96% 95% 96% 98%   Weight:  98 kg (216 lb)     Height:  188 cm (74\")             Body mass index is 27.73 kg/m².    Intake/Output Summary (Last 24 hours) at 4/18/2025 1015  Last data filed at 4/18/2025 0541  Gross per 24 hour   Intake 5359.54 ml   Output 600 ml   Net 4759.54 ml       Exam:  GEN:  No distress but looks much older than stated age, appears chronically ill  Somnolent and confused  LUNGS: Clear breath sounds bilat, no use of accessory muscles  CV:  Irregularly irregular rhythm, without murmur, there is leg edema  ABD:  Quite distended but no rebound.  There is ascitic wave present      Scheduled meds:  " cefepime, 2,000 mg, Intravenous, Q24H  folic acid 1 mg in sodium chloride 0.9 % 50 mL IVPB, 1 mg, Intravenous, Daily  insulin regular, 2-7 Units, Subcutaneous, Q6H  LORazepam, 2 mg, Intravenous, Q6H   Followed by  LORazepam, 1 mg, Intravenous, Q6H  midodrine, 10 mg, Oral, Q8H  mupirocin, 1 Application, Each Nare, BID  senna-docusate sodium, 2 tablet, Oral, BID  sodium chloride, 10 mL, Intravenous, Q12H  thiamine (B-1) IV, 200 mg, Intravenous, Q8H   Followed by  [START ON 4/23/2025] thiamine, 100 mg, Oral, Daily      IV meds:                      norepinephrine, 0.02-0.3 mcg/kg/min, Last Rate: 0.24 mcg/kg/min (04/18/25 0834)  Pharmacy to dose vancomycin,   Pharmacy To Dose:,         Data Review:   I reviewed the patient's medications and new clinical results.          Lab Results   Component Value Date    CALCIUM 7.3 (L) 04/18/2025    PHOS 2.6 04/18/2025    MG 1.7 04/18/2025    MG 1.7 04/17/2025     Results from last 7 days   Lab Units 04/18/25  0337 04/17/25 2011   SODIUM mmol/L 135* 129*   POTASSIUM mmol/L 3.4* 3.7   CHLORIDE mmol/L 92* 82*   CO2 mmol/L 22.6 23.6   BUN mg/dL 61* 63*   CREATININE mg/dL 6.11* 7.11*   CALCIUM mg/dL 7.3* 7.9*   BILIRUBIN mg/dL 7.6* 8.3*   ALK PHOS U/L 146* 167*   ALT (SGPT) U/L 242* 293*   AST (SGOT) U/L 557* 663*   GLUCOSE mg/dL 124* 137*   WBC 10*3/mm3 8.67 8.77   HEMOGLOBIN g/dL 9.2* 10.8*   PLATELETS 10*3/mm3 71* 83*   INR  1.79* 1.64*   PROBNP pg/mL  --  >70,000.0*   PROCALCITONIN ng/mL  --  7.65*         Results from last 7 days   Lab Units 04/17/25 2148 04/17/25 2011   HSTROP T ng/L 822* 1,013*         Microbiology Results (last 10 days)       Procedure Component Value - Date/Time    Gastrointestinal Panel, PCR - Stool, Per Rectum [[id number]]  (Abnormal) Collected: 04/18/25 0031    Lab Status: Final result Specimen: Stool from Per Rectum Updated: 04/18/25 0216     Campylobacter Not Detected     Plesiomonas shigelloides Not Detected     Salmonella Not Detected     Vibrio Not  Detected     Vibrio cholerae Not Detected     Yersinia enterocolitica Not Detected     Enteroaggregative E. coli (EAEC) Not Detected     Enteropathogenic E. coli (EPEC) Detected     Enterotoxigenic E. coli (ETEC) lt/st Not Detected     Shiga-like toxin-producing E. coli (STEC) stx1/stx2 Not Detected     Shigella/Enteroinvasive E. coli (EIEC) Not Detected     Cryptosporidium Not Detected     Cyclospora cayetanensis Not Detected     Entamoeba histolytica Not Detected     Giardia lamblia Not Detected     Adenovirus F40/41 Not Detected     Astrovirus Not Detected     Norovirus GI/GII Not Detected     Rotavirus A Not Detected     Sapovirus (I, II, IV or V) Not Detected    Clostridioides difficile Toxin - Stool, Per Rectum [[id number]]  (Abnormal) Collected: 04/18/25 0031    Lab Status: Final result Specimen: Stool from Per Rectum Updated: 04/18/25 0139    Narrative:      The following orders were created for panel order Clostridioides difficile Toxin - Stool, Per Rectum.  Procedure                               Abnormality         Status                     ---------                               -----------         ------                     Clostridioides difficile...[[id number]]  Abnormal            Final result                 Please view results for these tests on the individual orders.    Clostridioides difficile Toxin, PCR - Stool, Per Rectum [[id number]]  (Abnormal) Collected: 04/18/25 0031    Lab Status: Final result Specimen: Stool from Per Rectum Updated: 04/18/25 0139     Toxigenic C. difficile by PCR Positive    Narrative:      DNA from a toxigenic strain of C.difficile has been detected. Antigen testing for the presence of free C.difficile toxin is currently in progress, to help determine the clinical significance of this PCR result.     Clostridioides difficile toxin Ag, Reflex - Stool, Per Rectum [[id number]]  (Normal) Collected: 04/18/25 0031    Lab Status: Final result Specimen: Stool from Per Rectum  Updated: 04/18/25 0214     C.diff Toxin Ag Negative    Narrative:      DNA from a toxigenic strain of C.difficile was detected, although the free toxin itself was not detected. These findings are consistent with C.difficile colonization and may not reflect actual C.difficile infection. Clinical correlation needed.    COVID-19 and FLU A/B PCR, 1 HR TAT - Swab, Nasopharynx [[id number]]  (Normal) Collected: 04/17/25 2014    Lab Status: Final result Specimen: Swab from Nasopharynx Updated: 04/17/25 2041     COVID19 Not Detected     Influenza A PCR Not Detected     Influenza B PCR Not Detected    Narrative:      Fact sheet for providers: https://www.fda.gov/media/767175/download    Fact sheet for patients: https://www.fda.gov/media/940334/download    Test performed by PCR.                 ASSESSMENT:   Acute metabolic encephalopathy  Sepsis present on admission due to E. coli enteritis  Septic shock present on admission causing encephalopathy and acute kidney injury  Non-STEMI  Hyponatremia  Hypokalemia  Acute kidney injury  Acute liver injury  Anemia  Thrombocytopenia  Alcohol abuse  Acute HFrEF      PLAN:  Patient is critically ill.  Requiring Levophed  We will give additional IV fluids.  If he does not tolerate IV fluids, may need intubation with mechanical ventilation.  Echocardiogram shows low ejection fraction, cardiology consulted.  Continue IV pressors for now  Gastroenterology and nephrology have been consulted.  Patient has E. coli enteritis and acute kidney injury with sepsis.  May have hepatorenal syndrome.  Insert feeding tube and start midodrine as well, but will defer to GI whether they want octreotide.  No indication for hemodialysis at this time but may end up requiring hemodialysis depending on urine output and chemistries.  Has urethral bladder Rae catheter in place, continue to monitor urinary output hourly.  Patient was started on cefepime and vancomycin empirically for sepsis.  Can stop  vancomycin.  De-escalate antibiotics tomorrow depending on additional labs and microbiology results  Received folic acid and thiamine    Total critical care time 35 minutes    I reviewed the chart and other providers notes and reviewed labs.  Copied text in this note has been reviewed and is accurate as of today      Marcos Rodas MD  4/18/2025    Electronically signed by Marcos Rodas MD at 04/18/25 1306       Dwayne Campuzano MD at 04/17/25 2144          Full nephrology consult to follow in AM.  D/W Dr Bates and chart reviewed.  Though waste products quite high, BUN/Cr 63/7, there is no acute indication for HD with K 3.7 and bicarb 23 (though note AG 23).  Rae been placed and checking CT abd.  Hypotensive initially, BP has improved with IVF bolus.  CXR shows no significant central congestion    Electronically signed by Dwayne Campuzano MD at 04/17/25 2150       Antony Weston MD at 04/17/25 2140          MD Attestation of AMADEO Note    I, Antony Weston MD, have reviewed the history and plan as obtained by VERONICA Barrett. I have independently examined the patient and I personally performed >50% of the management in this split shared patient. My physical exam confirms her documented findings and I agree with the plan as listed, with the following additions/modifications:    Brief Summary Statement  59 y.o. male with history of congestive heart failure, alcohol abuse, and hypertension who presented to the hospital with weakness.  Patient complaining of nausea, vomiting, diarrhea for the past 2 days duration.  Also with worsening lower extremity swelling and abdominal distention.  Does have a history of heart failure for which she has not taken his diuretics in many days.  Also with significant alcohol abuse history of over 6 drinks a day, states he has not drank in over 6 days.  Marked laboratory abnormalities including acute renal failure, transaminitis, lactic acidosis, troponin  elevation and marked BNP elevation, positive UA.    Attending Physical Exam  Temp:  [97.5 °F (36.4 °C)] 97.5 °F (36.4 °C)  Heart Rate:  [95-98] 95  Resp:  [16] 16  BP: ()/(44-58) 143/58  General: Alert, nontoxic, NAD, weak appearing  HEENT: NC/AT, EOMI, MMM  Neck: Supple, trachea midline  Cardiac: RRR, no murmur, gallops, rubs  Pulmonary: Clear to auscultation bilaterally, no adventitious breath sounds, normal respiratory effort  GI: Marked distention  Extremities: Warm, well perfused, 2+ LE edema  Skin: no visible rash  Neuro: CN II - XII grossly intact  Psychiatry: Normal mood and affect    Remainder of detailed HPI is as noted above and has been reviewed by me for completeness.  Assessment/Plan    Acute renal failure  Suspected decompensated cirrhosis  Non-ST elevation myocardial infarction  Urinary tract infection  Urinary retention  Hyponatremia  Lactic acidosis  Anion gap metabolic acidosis  Transaminitis  Markedly elevated BNP  History of alcohol abuse  Hypertension  Lumbar disc herniation, spinal stenosis, lumbar radiculopathy, status post lumbar fusion  Status post thoracic fusion    Patient presented to the hospital with weakness and ultimately found to have acute renal failure, suspected decompensated cirrhosis, lactic acidosis, positive UA.      Patient with significant alcohol abuse history, states that last drink was over 2 days ago, will need [facility] protocol for concerns for withdrawal.    Significant abdominal distention, hepatomegaly on CT abdomen pelvis with official read pending, transaminitis, elevated INR, thrombocytopenia all consistent with suspected decompensated cirrhosis.  Will have GI evaluate in the morning.  Already on antibiotics, will cover SBP.  GI PCR pending.    Acute renal failure, suspect HRS versus urinary retention.  Rae catheter placed.  Renal labs ordered.  Nephrology consulted.  Blood pressures improved after fluid resuscitation, suspect intravascularly dry while  overall fluid overloaded.    History of heart failure with recent noncompliance with diuretics.  Significant fluid overload on evaluation.  BNP markedly elevated.  Troponin downtrending.  No chest pain or signs of ACS.  Echocardiogram ordered.  Cardiology consult pending.    Concern for infection with markedly elevated procalcitonin, likely urinary source versus SBP.  On empiric vancomycin and cefepime.    Patient is in critical condition with multiorgan failure including cardiac, hepatic, renal.  Prognosis is guarded.  Palliative has been consulted.    Critical care time: 45 minutes    See above section for further detailed assessment and plan developed with APRN which I have reviewed.      Electronically signed by Antony Weston MD, 4/17/2025, 21:40 EDT.     Addendum  =========================  C. difficile antigen and toxin negative, PCR positive indicating carrier status without active infection.  Also EPEC positive.  Supportive care, does not indicate p.o. vancomycin treatment at this time.      Electronically signed by Antony Weston MD at 04/18/25 0656

## 2025-04-21 NOTE — PROGRESS NOTES
"      [facility]         Dr John Sayied   LOS: 4 days   Patient Care Team:  Zoe Desai MD as PCP - General (Family Medicine)  Corbin Bailey MD as Surgeon (Neurosurgery)    Chief Complaint: Sepsis with septic shock E. coli enteritis with C. difficile positive other issues as listed below    Interval History: Remains sleepy lethargic.  Dobbhoff tube has been placed.  Currently on Levophed drip ongoing.  REVIEW OF SYSTEMS:   Unable to get with patient's current condition    Ventilator/Non-Invasive Ventilation Settings (From admission, onward)      None        Heated high flow 60 L with 85% FiO2      Vital Signs  Temp:  [97.5 °F (36.4 °C)-97.8 °F (36.6 °C)] 97.8 °F (36.6 °C)  Heart Rate:  [] 120  Resp:  [16-20] 18  BP: ()/(45-95) 128/70    Intake/Output Summary (Last 24 hours) at 4/21/2025 0838  Last data filed at 4/21/2025 0600  Gross per 24 hour   Intake --   Output 2350 ml   Net -2350 ml     Flowsheet Rows      Flowsheet Row First Filed Value   Admission Height 188 cm (74\") Documented at 04/17/2025 1957   Admission Weight 104 kg (230 lb) Documented at 04/17/2025 2110            Physical Exam:  Patient is examined using the personal protective equipment as per guidelines from infection control for this particular patient as enacted.  Hand hygiene was performed before and after patient interaction.   General Appearance:  Acutely ill and poorly arousable.  Not following commands for me.  ENT normocephalic atraumatic  Neck midline trachea, no thyromegaly   Lungs:   Diminished breath sounds bilaterally    Heart:    Regular rhythm and normal rate, normal S1 and S2, no            murmur, no gallop, no rub, no click   Chest Wall:    No abnormalities observed   Abdomen:   Soft mild diffuse tenderness no rebound or guarding   Extremities:   Moves all extremities well, trace to 1+ edema, no cyanosis, no             redness  CNS confused  Skin no rashes no nodules  Musculoskeletal no " cyanosis no clubbing normal range of motion     Results Review:        Results from last 7 days   Lab Units 04/21/25  0343 04/20/25  1149 04/20/25  0641 04/20/25  0111   SODIUM mmol/L 146*  --  142 141   POTASSIUM mmol/L 3.0* 3.2* 3.5 3.2*  3.2*   CHLORIDE mmol/L 101  --  98 96*   CO2 mmol/L 31.0*  --  28.0 27.8   BUN mg/dL 49*  --  53* 56*   CREATININE mg/dL 2.76*  --  3.34* 3.75*   GLUCOSE mg/dL 108*  --  144* 111*   CALCIUM mg/dL 7.6*  --  7.5* 7.6*     Results from last 7 days   Lab Units 04/17/25 2148 04/17/25 2011   HSTROP T ng/L 822* 1,013*     Results from last 7 days   Lab Units 04/21/25  0343 04/20/25  1149 04/20/25  0641 04/19/25  0913   WBC 10*3/mm3 8.29  --  9.47 8.66   HEMOGLOBIN g/dL 8.5*  --  8.8* 9.1*   HEMATOCRIT % 22.8*  --  23.5* 24.5*   PLATELETS 10*3/mm3 42* 44* 42* 47*     Results from last 7 days   Lab Units 04/20/25  1149 04/18/25  0337 04/17/25 2011   INR  1.80* 1.79* 1.64*   APTT seconds 40.0*  --  34.5         Results from last 7 days   Lab Units 04/21/25  0343   MAGNESIUM mg/dL 1.7         Results from last 7 days   Lab Units 04/18/25  1303   PH, ARTERIAL pH units 7.431   PO2 ART mm Hg 114.3*   PCO2, ARTERIAL mm Hg 37.2   HCO3 ART mmol/L 24.8       I reviewed the patient's new clinical results.  I personally viewed and interpreted the patient's chest x-ray.        Medication Review:   cefTRIAXone, 2,000 mg, Intravenous, Q24H   Followed by  [START ON 4/24/2025] cephalexin, 500 mg, Nasogastric, Q12H  folic acid 1 mg in sodium chloride 0.9 % 50 mL IVPB, 1 mg, Intravenous, Daily  insulin regular, 2-7 Units, Subcutaneous, Q6H  lactulose, 20 g, Nasogastric, TID  midodrine, 10 mg, Nasogastric, Q8H  mupirocin, 1 Application, Each Nare, BID  potassium chloride, 20 mEq, Intravenous, Q1H  sodium chloride, 10 mL, Intravenous, Q12H  thiamine (B-1) IV, 200 mg, Intravenous, Q8H   Followed by  [START ON 4/23/2025] thiamine, 100 mg, Nasogastric, Daily  vancomycin, 125 mg, Nasogastric,  Q6H        norepinephrine, 0.02-0.3 mcg/kg/min, Last Rate: 0.08 mcg/kg/min (04/21/25 9756)  Pharmacy Consult,         ASSESSMENT:   Acute metabolic encephalopathy  Sepsis present on admission due to E. coli enteritis  Acute hypoxemic respiratory failure on heated high flow  Septic shock present on admission causing encephalopathy and acute  Kidney injury  Decompensated cirrhosis  Acute pancreatitis by imaging  Non-STEMI  Hyponatremia  Hypokalemia  Acute kidney injury  Acute liver injury  Anemia  Thrombocytopenia  Alcohol abuse  Acute HFrEF    PLAN:  Mental status remains poor.  Likely related to hepatic encephalopathy.  Dobbhoff tube has been placed and lactulose initiated.  GI following and managing.  Start tube feeds if okay with GI.  Source of infection E. coli enteritis with C. difficile positive  Continue current antibiotics as per IDs recommendations.  Patient on Rocephin with p.o. vancomycin.  Creatinine improving.  Further fluid electrolyte management per nephrology  Wean off pressors maintain MAP greater than 65  Platelets stable.  Appreciate input from hematology.  Further workup per hematology.  Thiamine IV to continue  Patient critically ill  ICU core measures          Alvaro Cornejo MD  04/21/25  08:38 EDT

## 2025-04-21 NOTE — PROGRESS NOTES
LOS: 4 days   Patient Care Team:  Zoe Desai MD as PCP - General (Family Medicine)  Corbin Bailey MD as Surgeon (Neurosurgery)    Chief Complaint: Follow-up acute on chronic systolic CHF, cardiomyopathy, type II NSTEMI.    Interval History: Remains very lethargic.  Remains fairly ill.  He is still on Levophed.    Vital Signs:  Temp:  [97.5 °F (36.4 °C)-98.1 °F (36.7 °C)] 98.1 °F (36.7 °C)  Heart Rate:  [] 102  Resp:  [18-22] 22  BP: ()/(45-95) 100/85    Intake/Output Summary (Last 24 hours) at 4/21/2025 1357  Last data filed at 4/21/2025 0600  Gross per 24 hour   Intake --   Output 1750 ml   Net -1750 ml       Physical Exam:   General Appearance:    No acute distress, appears ill.  Arouses but somnolent.   Lungs:     Clear to auscultation bilaterally     Heart:    Regular rhythm and normal rate.  II/VI SM throughout.    Abdomen:     Soft, nontender, distended.    Extremities:   3+ edema of the lower extremities.     Results Review:    Results from last 7 days   Lab Units 04/21/25  0343   SODIUM mmol/L 146*   POTASSIUM mmol/L 3.0*   CHLORIDE mmol/L 101   CO2 mmol/L 31.0*   BUN mg/dL 49*   CREATININE mg/dL 2.76*   GLUCOSE mg/dL 108*   CALCIUM mg/dL 7.6*     Results from last 7 days   Lab Units 04/17/25  2148 04/17/25 2011   HSTROP T ng/L 822* 1,013*     Results from last 7 days   Lab Units 04/21/25  0343   WBC 10*3/mm3 8.29   HEMOGLOBIN g/dL 8.5*   HEMATOCRIT % 22.8*   PLATELETS 10*3/mm3 42*     Results from last 7 days   Lab Units 04/20/25  1149 04/18/25  0337 04/17/25 2011   INR  1.80* 1.79* 1.64*   APTT seconds 40.0*  --  34.5         Results from last 7 days   Lab Units 04/21/25  0343   MAGNESIUM mg/dL 1.7           I reviewed the patient's new clinical results.        Assessment:  1.  Nausea, vomiting, and diarrhea  2.  Enteropathogenic E. coli positive stool sample and C. difficile toxin positive  3.  Anasarca, multifactorial  4.  Severe acute kidney injury with chronic kidney disease  5.   Decompensated cirrhosis of the liver  6.  Moderate ascites by CT  7.  Acute on chronic systolic CHF, EF 30% by echo on 4/18/2025  8.  Elevated troponin, likely type II NSTEMI secondary to #1, #2, #4, #5, and #9  9.  Coronary artery disease,  of the RCA by cath in 2022  10.  Status post ICD placement  11.  Urinary tract infection with retention, now with Rae catheter  12.  Significant transaminitis  13.  Hypoalbuminemia, contributing to volume overload  14.  Anemia, likely multifactorial  15.  Heavy daily alcohol use  16.  Thrombocytopenia  17.  Suspected medication noncompliance  18.  Metabolic encephalopathy    Plan:  - Continues to have metabolic encephalopathy and poor mental status.    -Volume management per nephrology.  He got 1 L of saline yesterday.  Very difficult balance volume status in the situation.    -Remains on Levophed, which greatly limits his potential guideline directed medical therapy.    -Hematology is following.  Continue to await HIT antibody results.  Suspect thrombocytopenia secondary to sepsis and potential DIC.  Platelet count is 42.    -Overall prognosis still seems very poor.    Darrion Arroyo MD  04/21/25  13:57 EDT

## 2025-04-21 NOTE — PLAN OF CARE
Goal Outcome Evaluation:    No acute events overnight. VSS, bm x3, uop 650ml.

## 2025-04-21 NOTE — NURSING NOTE
Spoke with patient's daughter (Patricia) over the phone. She confirmed CODE status is no chest compressions. They remain OK with intubation, if necessary. She would like to continue full scope of treatment with goal of recovery. She has questions about discharge planning that I will defer to case management. Palliative care will sign off. Please re-consult if we can be of service.

## 2025-04-21 NOTE — PROGRESS NOTES
[facility] Progress Note      Patient Name: Archie Oquendo  : 1965  MRN: [id number]  Primary Care Physician:  Zoe Desai MD  Date of admission: 2025    Subjective     Interval History:   Follow-up on KEATON on CKD    Review of Systems:   Obtunded  Continues to have profuse D; tube feeds to begin soon  On Levophed 0.08 mcg w low/soft BPs  UOP 2.4 L (received 1L NS yesterday)    Objective     Vitals:   Temp:  [97.5 °F (36.4 °C)-98.1 °F (36.7 °C)] 98.1 °F (36.7 °C)  Heart Rate:  [] 103  Resp:  [18-22] 22  BP: ()/() 97/69  Flow (L/min) (Oxygen Therapy):  [2-3] 2    Intake/Output Summary (Last 24 hours) at 2025 1453  Last data filed at 2025 0600  Gross per 24 hour   Intake --   Output 1750 ml   Net -1750 ml       Physical Exam:    General Appearance: Obtunded, chronically ill, obese, NAD  Skin: warm and dry  HEENT: Oral mucosa is dry,   Neck: No JVD  Lungs: Bilateral rhonchi, breathing effort not labored on 2 L/min  Heart: RR, tachycardic, no rub  Abdomen: soft, BS+, NG  : Rae catheter anchored  Extremities: 2-3+ BLE edema  Neuro: Unable to assess    Scheduled Meds:     cefTRIAXone, 2,000 mg, Intravenous, Q24H   Followed by  [START ON 2025] cephalexin, 500 mg, Nasogastric, Q12H  folic acid 1 mg in sodium chloride 0.9 % 50 mL IVPB, 1 mg, Intravenous, Daily  insulin regular, 2-7 Units, Subcutaneous, Q6H  lactulose, 20 g, Nasogastric, TID  Menthol-Zinc Oxide, 1 Application, Topical, BID  midodrine, 10 mg, Nasogastric, Q8H  mupirocin, 1 Application, Each Nare, BID  sodium chloride, 10 mL, Intravenous, Q12H  sodium chloride, 10 mL, Intravenous, Q12H  sodium chloride, 10 mL, Intravenous, Q12H  sodium chloride, 10 mL, Intravenous, Q12H  sodium chloride, 10 mL, Intravenous, Q12H  thiamine (B-1) IV, 200 mg, Intravenous, Q8H   Followed by  [START ON 2025] thiamine, 100 mg, Nasogastric, Daily  vancomycin, 125 mg, Nasogastric, Q6H      IV Meds:    dextrose, 75 mL/hr  norepinephrine, 0.02-0.3 mcg/kg/min, Last Rate: 0.08 mcg/kg/min (04/21/25 0344)  Pharmacy Consult,         Results Reviewed:   I have personally reviewed the results from the time of this admission to 4/21/2025 14:53 EDT     Results from last 7 days   Lab Units 04/21/25  0343 04/20/25  1149 04/20/25  0641 04/20/25  0111   SODIUM mmol/L 146*  --  142 141   POTASSIUM mmol/L 3.0* 3.2* 3.5 3.2*  3.2*   CHLORIDE mmol/L 101  --  98 96*   CO2 mmol/L 31.0*  --  28.0 27.8   BUN mg/dL 49*  --  53* 56*   CREATININE mg/dL 2.76*  --  3.34* 3.75*   CALCIUM mg/dL 7.6*  --  7.5* 7.6*   BILIRUBIN mg/dL 8.3*  --  8.2* 8.9*   ALK PHOS U/L 147*  --  148* 159*   ALT (SGPT) U/L 180*  --  196* 207*   AST (SGOT) U/L 397*  --  426* 434*   GLUCOSE mg/dL 108*  --  144* 111*       Estimated Creatinine Clearance: 35.8 mL/min (A) (by C-G formula based on SCr of 2.76 mg/dL (H)).    Results from last 7 days   Lab Units 04/21/25  0343 04/20/25  0641 04/20/25  0111 04/19/25  0523   MAGNESIUM mg/dL 1.7 2.4 1.5* 1.8   PHOSPHORUS mg/dL 2.5 2.6  --  2.9       Results from last 7 days   Lab Units 04/21/25  0343 04/20/25  0641 04/17/25  2148   URIC ACID mg/dL 15.0* 14.9* 13.6*       Results from last 7 days   Lab Units 04/21/25  0343 04/20/25  1149 04/20/25  0641 04/19/25  0913 04/18/25  0337 04/17/25 2011   WBC 10*3/mm3 8.29  --  9.47 8.66 8.67 8.77   HEMOGLOBIN g/dL 8.5*  --  8.8* 9.1* 9.2* 10.8*   PLATELETS 10*3/mm3 42* 44* 42* 47* 71* 83*       Results from last 7 days   Lab Units 04/20/25  1149 04/18/25  0337 04/17/25 2011   INR  1.80* 1.79* 1.64*       Assessment / Plan     ASSESSMENT:  KEATON on possible CKD (baseline SCr not clear, but had SCr 2.0 in 9/2024), nonoliguric, improving, multifactorial:  volume loss from N/V/D, hypotension, and UTI/sepsis. Has peripheral edema now, though albumin 2.7.  Has a low potassium and magnesium.    Hypernatremic, likely due to free water deficit  Hypocalcemia, likely in association with  poor nutrition, corrected calcium 8.6  Mixed AGMA d/t lactate acidosis & ketosis, and respiratory alkalosis  Sepsis due to EPEC and C-diff colitis, SBP 70s on admission, on levo and oral midodrine still.  On IV Rocephin & Vanc and PO Keflex  Transaminases, in association with alcohol abuse/cirrhosis  Decompensated cirrhosis, CT abdomen/pelvis showed hepatic steatosis with moderate ascites.  Had bedside paracentesis 3 L on 4/18, followed by GI  Acute on chronic systolic CHF, cardiology planning on starting metoprolol succinate if BP stabilizes  Anemia, followed by hematology  Thrombocytopenia, in association with sepsis/DIC, evaluated by hematology  Alcohol abuse, on CIWA protocol  UTI with retention, now with Rae catheter  Dysphagia, GI starting TF today      PLAN:  Water flushes 30 mL/h  Will give an additional liter of D5W given profuse diarrhea and worsening hypernatremia  Replace potassium and magnesium  Surveillance lab      Thank you for involving us in the care of Archie Oquendo.  Please feel free to call with any questions.    Jc Lemus MD  04/21/25  14:53 EDT    [facility]  [phone]    Please note that portions of this note were completed with a voice recognition program.

## 2025-04-21 NOTE — CONSULTS
Nutrition Services    Patient Name: Archie Oquedno  YOB: 1965  MRN: [id number]  Admission date: 4/17/2025    Assessment Date:  04/21/25    Initiate Isosource 1.5 @ 30 mL/hr with 30 mL/hr FWF. Do not advance TF rate. Monitor for TF tolerance. Would appreciate physician's input on water flushes.     COMPREHENSIVE NUTRITION EVALUATION      Reason for Encounter NPO/Clear Liquid Diet Status, TF Assessment   Diagnosis/Problem Admission Diagnosis:  Abdominal distension [R14.0]  Thrombocytopenia [D69.6]  Elevated liver enzymes [R74.8]  Elevated troponin [R79.89]  Acute UTI [N39.0]  KEATON (acute kidney injury) [N17.9]  Elevated bilirubin [R17]  Elevated lactic acid level [R79.89]  Elevated lipase [R74.8]  Sepsis [A41.9]  Elevated procalcitonin [R79.89]  Ascites due to alcoholic hepatitis [K70.11]  Anemia, unspecified type [D64.9]  Sepsis, due to unspecified organism, unspecified whether acute organ dysfunction present [A41.9]    Problem List:    Sepsis    Hx: HFrEF, ETOH abuse, decompensated cirrhosis   Narrative 4/21: Pt admitted to [facility] with weakness. Cortrak placed by nursing 4/20. Plan for TF initiation. Attending recommending feeds of 20-30 mL/hr to assess for tolerance. RD visited pt at bedside. Pt opened eyes but unable to communicate with RD. NFPE completed, pt with significant edema, unable to dx malnutrition at this time with NFPE results.         PO Diet NPO Diet NPO Type: Tube Feeding   Allergies NKFA   Supplements n/a   PO Intake % NPO       Chewing/Swallowing Difficulty other:cortrak in place, unable to take PO at this time       Nutrition Access    NG   TF/TPN Order Plan to initiate TF 4/21   TF/TPN Observation Plan to initiate TF 4/21       Medications reviewed abx, folic acid, insulin, lactulose, thiamine, KCl, levo    Labs  reviewed  Hypernatremia - will address with TF, FWF  Hypokalemia - KCl in place  Elevated liver enzymes - GI following        Physical Findings on oxygen therapy      "Edema 1+ (trace), 2+ (mild), 3+ (moderate), 4+ (severe)   1+ perineum, legs, knees  2+ L hand, hand, scrotum  3+ arms, wrists  4+ abdomen, ankles, feet  3+ to 4+ generalized    GI Function last bowel movement: 4/21, loose, c-diff   Skin Status pressure injury: stage 2, location of wound: bilateral midline coccyx     Lines/Drains central line/PICC, Cortrak, NG tube   I/O net fluid loss and amount/timeframe:~6L since admission per I/O documentation          Height  Weight  BMI  Weight Trend     Height: 188 cm (74\")  Weight: 90 kg (198 lb 6.6 oz) (04/21/25 0543)  Body mass index is 25.47 kg/m².  Loss, Amount/Timeframe: 8.3% wt loss since admission, significant fluid loss, edema         Estimated Nutrition Needs      Weight used  86.4 kg, IBW    Calories  0239-7006 kcal/day (30-35 kcal/kg)    Protein  130 g/day (1.5 gm/kg)    Fluid   (Defer to physician, nephrology)      Enteral Nutrition Prescription Initial Goal:  *initial goal conservative d/t risk of RFS     Isosource 1.5 at 30 mL/hr + 30 mL/hr water flush      End Goal:    Isosource 1.5 at 80 mL/hr + water flush per clinical course     Calories  2640 kcals (within range)    Protein  120 g (92%)    Free water  1334 mL   Flushes       The above end goal rate is for 22 hrs/day to assume interruptions for ADLs. Water flushes adjusted based on clinical picture + Rx flushes/IV fluids      Parenteral Nutrition Prescription -       NFPE Date Completed: 4/21  significant edema, unable to assess muscle/fat wasting. Pt unable to provide hx to support malnutrition dx.        Nutrition Problem (PES) Problem: Inadequate Oral Intake  Etiology: Factors Affecting Nutrition - pt obtunded    Signs/Symptoms: EN Intake/Delivery       Intervention/Plan Initiate Isosource 1.5 @ 30 mL/hr with 30 mL/hr FWF. Do not advance TF rate. Monitor for TF tolerance. Would appreciate physician's input on water flushes.     RD to follow up per protocol.     Results from last 7 days   Lab Units " "04/21/25  0343 04/20/25  1149 04/20/25  0641 04/20/25  0111   SODIUM mmol/L 146*  --  142 141   POTASSIUM mmol/L 3.0* 3.2* 3.5 3.2*  3.2*   CHLORIDE mmol/L 101  --  98 96*   CO2 mmol/L 31.0*  --  28.0 27.8   BUN mg/dL 49*  --  53* 56*   CREATININE mg/dL 2.76*  --  3.34* 3.75*   CALCIUM mg/dL 7.6*  --  7.5* 7.6*   BILIRUBIN mg/dL 8.3*  --  8.2* 8.9*   ALK PHOS U/L 147*  --  148* 159*   ALT (SGPT) U/L 180*  --  196* 207*   AST (SGOT) U/L 397*  --  426* 434*   GLUCOSE mg/dL 108*  --  144* 111*     Results from last 7 days   Lab Units 04/21/25  0343 04/20/25  0641 04/20/25  0111   MAGNESIUM mg/dL 1.7 2.4 1.5*   PHOSPHORUS mg/dL 2.5 2.6  --    HEMOGLOBIN g/dL 8.5* 8.8*  --    HEMATOCRIT % 22.8* 23.5*  --      No results found for: \"HGBA1C\"  Wt Readings from Last 10 Encounters:   04/21/25 90 kg (198 lb 6.6 oz)   03/07/24 100 kg (221 lb 6.4 oz)   11/08/23 97.9 kg (215 lb 12.8 oz)   08/04/23 97.9 kg (215 lb 12.8 oz)   05/16/23 98.1 kg (216 lb 3.2 oz)   05/05/23 98.1 kg (216 lb 3.2 oz)   04/11/23 94.8 kg (209 lb)   04/05/23 98.8 kg (217 lb 12.8 oz)   03/02/23 96.4 kg (212 lb 9.6 oz)   02/13/23 96.4 kg (212 lb 8.4 oz)       Electronically signed by:  Faye Hanley RD  04/21/25 12:41 EDT    "

## 2025-04-21 NOTE — PLAN OF CARE
Problem: Adult Inpatient Plan of Care  Goal: Plan of Care Review  Outcome: Progressing  Goal: Patient-Specific Goal (Individualized)  Outcome: Progressing  Goal: Absence of Hospital-Acquired Illness or Injury  Outcome: Progressing  Intervention: Identify and Manage Fall Risk  Recent Flowsheet Documentation  Taken 4/21/2025 1702 by Eris Valentine RN  Safety Promotion/Fall Prevention: safety round/check completed  Taken 4/21/2025 1657 by Eris Valentine RN  Safety Promotion/Fall Prevention: safety round/check completed  Taken 4/21/2025 1546 by Eris Valentine RN  Safety Promotion/Fall Prevention: safety round/check completed  Taken 4/21/2025 1455 by Eris Valentine RN  Safety Promotion/Fall Prevention: safety round/check completed  Taken 4/21/2025 1230 by Eris Valentine RN  Safety Promotion/Fall Prevention: safety round/check completed  Taken 4/21/2025 1000 by Eris Valentine RN  Safety Promotion/Fall Prevention: safety round/check completed  Taken 4/21/2025 0930 by Eris Valentine RN  Safety Promotion/Fall Prevention: safety round/check completed  Taken 4/21/2025 0800 by Eris Valentine RN  Safety Promotion/Fall Prevention: safety round/check completed  Intervention: Prevent Skin Injury  Recent Flowsheet Documentation  Taken 4/21/2025 1657 by Eris Valentine RN  Body Position: turned  Taken 4/21/2025 1455 by Eris Valentine RN  Body Position: turned  Taken 4/21/2025 1230 by Eris Valentine RN  Body Position: turned  Taken 4/21/2025 1000 by Eris Valentine RN  Body Position: turned  Taken 4/21/2025 0930 by Eris Valentine RN  Body Position: turned  Skin Protection: incontinence pads utilized  Taken 4/21/2025 0800 by Eris Valentine RN  Body Position: turned  Intervention: Prevent and Manage VTE (Venous Thromboembolism) Risk  Recent Flowsheet Documentation  Taken 4/21/2025 0930 by Eris Valentine RN  VTE Prevention/Management:   SCDs (sequential compression devices) off   bilateral  Intervention: Prevent Infection  Recent  Flowsheet Documentation  Taken 4/21/2025 1702 by Eris Valentine RN  Infection Prevention:   single patient room provided   environmental surveillance performed   hand hygiene promoted  Taken 4/21/2025 1657 by Eris Valentine RN  Infection Prevention:   environmental surveillance performed   hand hygiene promoted   single patient room provided  Taken 4/21/2025 1546 by Eris Valentine RN  Infection Prevention:   single patient room provided   environmental surveillance performed   hand hygiene promoted  Taken 4/21/2025 1455 by rEis Valentine RN  Infection Prevention:   environmental surveillance performed   hand hygiene promoted   single patient room provided  Taken 4/21/2025 1230 by Eris Valentine RN  Infection Prevention:   environmental surveillance performed   hand hygiene promoted   single patient room provided  Taken 4/21/2025 1000 by Eris Valentine RN  Infection Prevention:   environmental surveillance performed   hand hygiene promoted   single patient room provided  Taken 4/21/2025 0930 by Eris Valentine RN  Infection Prevention:   environmental surveillance performed   hand hygiene promoted   single patient room provided  Taken 4/21/2025 0800 by Eris Valentine RN  Infection Prevention:   environmental surveillance performed   hand hygiene promoted   single patient room provided  Goal: Optimal Comfort and Wellbeing  Outcome: Progressing  Intervention: Provide Person-Centered Care  Recent Flowsheet Documentation  Taken 4/21/2025 0930 by Eris Valentine RN  Trust Relationship/Rapport:   care explained   choices provided  Goal: Readiness for Transition of Care  Outcome: Progressing   Goal Outcome Evaluation:

## 2025-04-22 NOTE — PROGRESS NOTES
LOS: 5 days   Patient Care Team:  Zoe Desai MD as PCP - General (Family Medicine)  Corbin Bailey MD as Surgeon (Neurosurgery)    Chief Complaint: Follow-up acute on chronic systolic CHF, cardiomyopathy, type II NSTEMI.    Interval History: Remains very lethargic.  Remains on Levophed.  Remains in sinus tachycardia in the 120s.    Vital Signs:  Temp:  [98 °F (36.7 °C)-98.3 °F (36.8 °C)] 98 °F (36.7 °C)  Heart Rate:  [] 127  Resp:  [16-22] 18  BP: ()/() 105/85    Intake/Output Summary (Last 24 hours) at 4/22/2025 0823  Last data filed at 4/21/2025 1825  Gross per 24 hour   Intake 2060 ml   Output 375 ml   Net 1685 ml       Physical Exam:   General Appearance:    No acute distress, appears ill.  Arouses but very somnolent.   Lungs:     Clear to auscultation bilaterally     Heart:    Regular rhythm and tachycardic rate.  II/VI SM throughout.    Abdomen:     Soft, nontender, distended.    Extremities:   3+ edema of the lower extremities.     Results Review:    Results from last 7 days   Lab Units 04/22/25  0534   SODIUM mmol/L 148*   POTASSIUM mmol/L 4.1   CHLORIDE mmol/L 106   CO2 mmol/L 29.0   BUN mg/dL 48*   CREATININE mg/dL 3.10*   GLUCOSE mg/dL 155*   CALCIUM mg/dL 8.1*     Results from last 7 days   Lab Units 04/22/25  0534 04/17/25 2148 04/17/25 2011   CK TOTAL U/L 249*  --   --    HSTROP T ng/L  --  822* 1,013*     Results from last 7 days   Lab Units 04/22/25  0534   WBC 10*3/mm3 9.37   HEMOGLOBIN g/dL 8.2*   HEMATOCRIT % 24.0*   PLATELETS 10*3/mm3 48*     Results from last 7 days   Lab Units 04/20/25  1149 04/18/25  0337 04/17/25 2011   INR  1.80* 1.79* 1.64*   APTT seconds 40.0*  --  34.5         Results from last 7 days   Lab Units 04/22/25  0534   MAGNESIUM mg/dL 1.7           I reviewed the patient's new clinical results.        Assessment:  1.  Nausea, vomiting, and diarrhea  2.  Enteropathogenic E. coli positive stool sample and C. difficile toxin positive  3.  Anasarca,  multifactorial  4.  Severe acute kidney injury with chronic kidney disease  5.  Decompensated cirrhosis of the liver  6.  Moderate ascites by CT  7.  Acute on chronic systolic CHF, EF 30% by echo on 4/18/2025  8.  Elevated troponin, likely type II NSTEMI secondary to #1, #2, #4, #5, and #9  9.  Coronary artery disease,  of the RCA by cath in 2022  10.  Status post ICD placement  11.  Urinary tract infection with retention, now with Rae catheter  12.  Significant transaminitis  13.  Hypoalbuminemia, contributing to volume overload  14.  Anemia, likely multifactorial  15.  Heavy daily alcohol use  16.  Thrombocytopenia  17.  Suspected medication noncompliance  18.  Metabolic encephalopathy  19.  Sinus tachycardia    Plan:  -Continues to have metabolic encephalopathy and poor mental status.  He continues to require Levophed.  He is in sinus tachycardia.    -Volume management per nephrology.  He got 1 L of saline yesterday.  Very difficult balance volume status in the situation.    -Remains on Levophed, which greatly limits his potential guideline directed medical therapy.  The Levophed may also be driving tachycardia.  I spoke with Dr. Park about this earlier today.  I would like to avoid yareli if possible given the afterload effects on his cardiomyopathy and mitral regurgitation.  However, we are going to switch to vasopressin.  I also spoke to Dr. Cornejo, and he is going to change the pressor.  Appreciate both their help.    -Hematology is following.  Continue to await HIT antibody results.  Suspect thrombocytopenia secondary to sepsis and potential DIC.  Platelet count is 48.    -Per nephrology, continue D5W at 75 cc per hour over the next 24 hours.    -Remains extremely ill with multiple comorbidities.  His long-term prognosis is guarded at best.    Darrion Arroyo MD  04/22/25  08:23 EDT

## 2025-04-22 NOTE — PROGRESS NOTES
[facility]  Inpatient Progress Note    Reason for Follow Up:  Decompensated cirrhosis     Subjective     Interval History:   TF initiated yesterday  Levophed changed to vasopressin today due to tachycardia  4 BM yesterday-large volume  Last dose lorazepam 4/20  He is confused-not oriented    Current Facility-Administered Medications:     cefTRIAXone (ROCEPHIN) 2,000 mg in sodium chloride 0.9 % 100 mL MBP, 2,000 mg, Intravenous, Q24H, Last Rate: 200 mL/hr at 04/21/25 1253, 2,000 mg at 04/21/25 1253 **FOLLOWED BY** [START ON 4/24/2025] cephalexin (KEFLEX) capsule 500 mg, 500 mg, Nasogastric, Q12H, Antony Weston MD    dextrose (D50W) (25 g/50 mL) IV injection 25 g, 25 g, Intravenous, Q15 Min PRN, Vianey Jones APRN    dextrose (D5W) 5 % infusion, 75 mL/hr, Intravenous, Continuous, Anjana Park MD, Last Rate: 75 mL/hr at 04/22/25 0905, 75 mL/hr at 04/22/25 0905    dextrose (GLUTOSE) oral gel 15 g, 15 g, Oral, Q15 Min PRN, Vianey Jones APRN    fentaNYL citrate (PF) (SUBLIMAZE) injection 25 mcg, 25 mcg, Intravenous, Q2H PRN, Vianey Jones APRN, 25 mcg at 04/21/25 1425    folic acid 1 mg in sodium chloride 0.9 % 50 mL IVPB, 1 mg, Intravenous, Daily, Vianey Jones APRN, Last Rate: 100 mL/hr at 04/22/25 0905, 1 mg at 04/22/25 0905    glucagon (GLUCAGEN) injection 1 mg, 1 mg, Intramuscular, Q15 Min PRN, Vianey Jones APRN    insulin regular (humuLIN R,novoLIN R) injection 2-7 Units, 2-7 Units, Subcutaneous, Q6H, Vianey Jones APRN, 2 Units at 04/22/25 0908    lactulose (CHRONULAC) 10 GM/15ML solution 20 g, 20 g, Nasogastric, TID, Antony Weston MD, 20 g at 04/22/25 0907    LORazepam (ATIVAN) tablet 1 mg, 1 mg, Nasogastric, Q1H PRN **OR** LORazepam (ATIVAN) injection 1 mg, 1 mg, Intravenous, Q1H PRN, 1 mg at 04/21/25 1030 **OR** LORazepam (ATIVAN) tablet 2 mg, 2 mg, Nasogastric, Q1H PRN **OR** LORazepam (ATIVAN) injection 2 mg, 2 mg, Intravenous, Q1H PRN **OR**  LORazepam (ATIVAN) injection 2 mg, 2 mg, Intravenous, Q15 Min PRN **OR** LORazepam (ATIVAN) injection 2 mg, 2 mg, Intramuscular, Q15 Min PRN, Antony Weston MD    Menthol-Zinc Oxide 1 Application, 1 Application, Topical, BID, Antony Weston MD, 1 Application at 04/22/25 0908    midodrine (PROAMATINE) tablet 10 mg, 10 mg, Nasogastric, Q8H, Antony Weston MD, 10 mg at 04/22/25 0304    mupirocin (BACTROBAN) 2 % nasal ointment 1 Application, 1 Application, Each Nare, BID, Vianey Jones APRN, 1 Application at 04/22/25 0905    nitroglycerin (NITROSTAT) SL tablet 0.4 mg, 0.4 mg, Sublingual, Q5 Min PRN, Vianey Jnoes APRN    norepinephrine (LEVOPHED) 8 mg in 250 mL NS infusion (premix), 0.02-0.3 mcg/kg/min, Intravenous, Titrated, Vianey Jones APRN, Last Rate: 7.37 mL/hr at 04/22/25 0814, 0.04 mcg/kg/min at 04/22/25 0814    ondansetron (ZOFRAN) injection 4 mg, 4 mg, Intravenous, Q6H PRN, Vianey Jones APRN, 4 mg at 04/20/25 0933    oxyCODONE (ROXICODONE) immediate release tablet 5 mg, 5 mg, Oral, Q6H PRN, Alvaro Cornejo MD, 5 mg at 04/22/25 0905    Pharmacy Consult, , Not Applicable, Continuous PRN, Alvaro Cornejo MD    potassium phosphate 15 mmol in 0.9% normal saline 250 mL IVPB, 15 mmol, Intravenous, Once, Anjana Park MD, 15 mmol at 04/22/25 0906    sodium chloride 0.9 % flush 10 mL, 10 mL, Intravenous, Q12H, Vianey Jones APRN, 10 mL at 04/22/25 0908    sodium chloride 0.9 % flush 10 mL, 10 mL, Intravenous, PRN, Vianey Jones R, APRN    sodium chloride 0.9 % flush 10 mL, 10 mL, Intravenous, Q12H, Antony Weston MD, 10 mL at 04/22/25 0907    sodium chloride 0.9 % flush 10 mL, 10 mL, Intravenous, Q12H, Antony Weston MD, 10 mL at 04/22/25 0907    sodium chloride 0.9 % flush 10 mL, 10 mL, Intravenous, Q12H, Antony Weston MD, 10 mL at 04/22/25 0907    sodium chloride 0.9 % flush 10 mL, 10 mL, Intravenous, Q12H, Antony Weston MD, 10 mL at 04/22/25 0907    sodium  chloride 0.9 % flush 10 mL, 10 mL, Intravenous, PRN, Antony Weston MD    sodium chloride 0.9 % flush 20 mL, 20 mL, Intravenous, PRN, Antony Weston MD    sodium chloride 0.9 % infusion 40 mL, 40 mL, Intravenous, PRN, Vianey Jones, VERONICA    sodium chloride 0.9 % infusion 40 mL, 40 mL, Intravenous, PRN, Antony Weston MD    thiamine (B-1) injection 200 mg, 200 mg, Intravenous, Q8H, 200 mg at 04/22/25 0533 **FOLLOWED BY** [START ON 4/23/2025] thiamine (VITAMIN B-1) tablet 100 mg, 100 mg, Nasogastric, Daily, Antony Weston MD    vancomycin oral solution reconstituted 125 mg, 125 mg, Nasogastric, Q6H, Antony Weston MD, 125 mg at 04/22/25 0533    vasopressin (PITRESSIN) 20 units in 100 mL NS infusion, 0.03 Units/min, Intravenous, Continuous, Alvaro Cornejo MD  Review of Systems:    Positive for diarrhea, no abdominal distention fevers or chills    Objective     Vital Signs  Temp:  [97.5 °F (36.4 °C)-98.3 °F (36.8 °C)] 97.5 °F (36.4 °C)  Heart Rate:  [] 99  Resp:  [16-22] 16  BP: ()/() 118/79  Body mass index is 25.76 kg/m².    Intake/Output Summary (Last 24 hours) at 4/22/2025 1120  Last data filed at 4/21/2025 1825  Gross per 24 hour   Intake 2000 ml   Output 375 ml   Net 1625 ml     No intake/output data recorded.     Physical Exam:   General: patient awake, alert and cooperative   Eyes: Normal lids and lashes, + scleral icterus   Neck: supple, normal ROM   Skin: warm and dry, + jaundiced   Cardiovascular: tachycardic, regular   Pulm: regular and unlabored   Abdomen: soft, nontender, nondistended; normal bowel sounds   Extremities: no rash or edema   Psychiatric: Normal mood and behavior; memory intact     Results Review:     I reviewed the patient's new clinical results.    Results from last 7 days   Lab Units 04/22/25  0534 04/21/25  0343 04/20/25  1149 04/20/25  0641   WBC 10*3/mm3 9.37 8.29  --  9.47   HEMOGLOBIN g/dL 8.2* 8.5*  --  8.8*   HEMATOCRIT % 24.0* 22.8*  --   23.5*   PLATELETS 10*3/mm3 48* 42* 44* 42*     Results from last 7 days   Lab Units 04/22/25  0534 04/21/25  1420 04/21/25  0343 04/20/25  1149 04/20/25  0641   SODIUM mmol/L 148*  --  146*  --  142   POTASSIUM mmol/L 4.1 3.6 3.0*   < > 3.5   CHLORIDE mmol/L 106  --  101  --  98   CO2 mmol/L 29.0  --  31.0*  --  28.0   BUN mg/dL 48*  --  49*  --  53*   CREATININE mg/dL 3.10*  --  2.76*  --  3.34*   CALCIUM mg/dL 8.1*  --  7.6*  --  7.5*   BILIRUBIN mg/dL 9.1*  --  8.3*  --  8.2*   ALK PHOS U/L 160*  --  147*  --  148*   ALT (SGPT) U/L 170*  --  180*  --  196*   AST (SGOT) U/L 368*  --  397*  --  426*   GLUCOSE mg/dL 155*  --  108*  --  144*    < > = values in this interval not displayed.     Results from last 7 days   Lab Units 04/20/25  1149 04/18/25  0337 04/17/25 2011   INR  1.80* 1.79* 1.64*     Lab Results   Lab Value Date/Time    LIPASE 449 (H) 04/17/2025 2011       Radiology:  XR Abdomen KUB   Final Result   1.Cortrak catheter tip probably in the distal body of the stomach.           This report was finalized on 4/20/2025 2:02 PM by Dr. Dexter Quinonez M.D on Workstation: [id number]          XR Chest Post CVA Port   Final Result       As described.               This report was finalized on 4/18/2025 4:06 PM by Dr. David Smith M.D on Workstation: [id number]          XR Chest 1 View   Final Result      CT Abdomen Pelvis Without Contrast   Final Result       1. Marked hepatic steatosis. Patient is noted to have moderate ascites.   2. There is some inflammatory stranding surrounding the pancreas, as   well as some trace fluid. Appearance may reflect acute pancreatitis.   There is no pancreatic ductal dilatation, and no organized   peripancreatic collections are seen.   3. High density material within the gallbladder may reflect stones or   sludge.   4. Thick walled appearance to the ascending and transverse colon   appearance may be related to portal colopathy, although correlation with   any  evidence of colitis is recommended.   5. Indeterminate ground glass pulmonary nodule measuring 11 mm. For a   ground glass nodule >=6 mm, recommend CT at 6-12 months to confirm   persistence, then CT every 2 years until 5 years. If solid component(s)   or growth develops, consider resection.    Radiation dose reduction techniques were utilized, including automated   exposure control and exposure modulation based on body size.           This report was finalized on 4/17/2025 10:40 PM by Dr. Adrianna Fleming M.D on Workstation: [id number]          XR Chest 1 View   Final Result   As described.       This report was finalized on 4/17/2025 8:19 PM by Dr. David Smith M.D on Workstation: [id number]          US Paracentesis    (Results Pending)       Assessment & Plan     Active Hospital Problems    Diagnosis     **Sepsis        Assessment:  Decompensated cirrhosis w/ascites  Alcohol use  Sepsis  Acute kidney injury/CKD  CHF w/ischemic CM-h/o AICD  Enteropathogenic E. Coli  C. difficile considered colonization  Pancreatic inflammatory change on imaging      Plan:  Pt rcving 5 d course of rocephin and empiric treatment for c diff w/vanc  Renal function with some mild improvement-nephrology following  Core track placed yesterday   TF initiated -she is added free water flushes.  Will decrease lactulose to 1 dose daily-worsening renal function noted and he had multiple bowel movements yesterday.  Will reassess tomorrow.  I discussed the patients findings and my recommendations with patient, nursing staff, and Dr Park .            Veena Patel M.D.  [facility]  [phone]

## 2025-04-22 NOTE — PROGRESS NOTES
LOS: 5 days   Patient Care Team:  Zoe Desai MD as PCP - General (Family Medicine)  Corbin Bailey MD as Surgeon (Neurosurgery)    Chief Complaint: Sepsis, liver cirrhosis, thrombocytopenia and anemia    Interval History:  4/21/2025 patient is afebrile.  Not responding to verbal communication, however nursing staff reports patient starts to follow some command.  Stable Platelets 42,000, hemoglobin 8.5.      4/22/2025 patient is afebrile.  Per nursing staff, patient was able to tell his name today.  Overall still has hepatic encephalopathy.  Platelets 48,000, hemoglobin 8.2.  Total bilirubin 9.1 and elevated , .     A comprehensive 14 point review of systems was performed and was negative except as mentioned.    Vital Signs:  Temp:  [97.5 °F (36.4 °C)-98.3 °F (36.8 °C)] 97.7 °F (36.5 °C)  Heart Rate:  [] 112  Resp:  [16-18] 16  BP: ()/() 102/74    Intake/Output Summary (Last 24 hours) at 4/22/2025 1730  Last data filed at 4/22/2025 1522  Gross per 24 hour   Intake 2000 ml   Output 1025 ml   Net 975 ml       Physical Exam:  GENERAL:  Well-developed, male diffusely jaundiced in no acute distress.    SKIN:  Warm, dry without rashes, purpura or petechiae.  HEENT:  Normocephalic.  Jaundice.  Pupils sluggish to light.  CHEST: Normal respiratory effort.  Lungs clear to auscultation. Good airflow.  CARDIAC:  Regular rate and rhythm. Normal S1,S2.  ABDOMEN:  Soft, no tender.  Bowel sounds normal.  EXTREMITIES:   Right leg edema more than the left leg.      Results Review:   CBC:      Lab 04/22/25  0534 04/21/25  0343 04/20/25  1149 04/20/25  0641 04/19/25  0913 04/18/25  0337   WBC 9.37 8.29  --  9.47 8.66 8.67   HEMOGLOBIN 8.2* 8.5*  --  8.8* 9.1* 9.2*   HEMATOCRIT 24.0* 22.8*  --  23.5* 24.5* 24.9*   PLATELETS 48* 42* 44* 42* 47* 71*   NEUTROS ABS 7.40* 4.95  --  7.67* 5.89 6.84   IMMATURE GRANS (ABS)  --  0.14*  --   --   --  0.07*   LYMPHS ABS  --  1.73  --   --   --  1.03   MONOS  ABS  --  1.25*  --   --   --  0.65   EOS ABS 0.09 0.16  --  0.09 0.26 0.08   MCV 98.7* 99.1*  --  99.6* 100.0* 102.9*     CMP:        Lab 04/22/25  1234 04/22/25  0534 04/21/25  1420 04/21/25  0343 04/20/25  1149 04/20/25  0641 04/20/25  0111 04/19/25  1800 04/19/25  0523 04/18/25  1413 04/18/25  0856 04/18/25  0337 04/17/25 2011   SODIUM 142 148*  --  146*  --  142 141  --  138   < >  --    < > 129*   POTASSIUM 4.4 4.1 3.6 3.0* 3.2* 3.5 3.2*  3.2*   < > 2.8*   < >  --    < > 3.7   CHLORIDE 104 106  --  101  --  98 96*  --  95*   < >  --    < > 82*   CO2 28.7 29.0  --  31.0*  --  28.0 27.8  --  26.2   < >  --    < > 23.6   ANION GAP 9.3 13.0  --  14.0  --  16.0* 17.2*  --  16.8*   < >  --    < > 23.4*   BUN 50* 48*  --  49*  --  53* 56*  --  61*   < >  --    < > 63*   CREATININE 3.40* 3.10*  --  2.76*  --  3.34* 3.75*  --  4.86*   < >  --    < > 7.11*   EGFR 19.7* 22.0*  --  25.3*  --  20.1* 17.5*  --  12.8*   < >  --    < > 8.1*   GLUCOSE 153* 155*  --  108*  --  144* 111*  --  131*   < >  --    < > 137*   CALCIUM 8.0* 8.1*  --  7.6*  --  7.5* 7.6*  --  7.4*   < >  --    < > 7.9*   IONIZED CALCIUM  --   --   --   --   --   --   --   --   --   --  0.94*  --   --    MAGNESIUM  --  1.7  --  1.7  --  2.4 1.5*  --  1.8  --   --    < > 1.7   PHOSPHORUS 3.6 1.4*  --  2.5  --  2.6  --   --  2.9   < >  --    < >  --    TOTAL PROTEIN  --  5.9*  --  5.5*  --  5.6* 6.0  --  5.8*  --   --    < > 6.8   ALBUMIN 2.5* 2.7*  --  2.7*  --  2.9* 3.0*  --  2.8*   < >  --    < > 3.2*   GLOBULIN  --  3.2  --  2.8  --  2.7 3.0  --  3.0  --   --    < > 3.6   ALT (SGPT)  --  170*  --  180*  --  196* 207*  --  197*  --   --    < > 293*   AST (SGOT)  --  368*  --  397*  --  426* 434*  --  420*  --   --    < > 663*   BILIRUBIN  --  9.1*  --  8.3*  --  8.2* 8.9*  --  7.9*  --   --    < > 8.3*   BILIRUBIN DIRECT  --   --   --  6.5*  --   --   --   --   --   --   --   --   --    ALK PHOS  --  160*  --  147*  --  148* 159*  --  136*  --   --     < > 167*   LIPASE  --   --   --   --   --   --   --   --   --   --   --   --  449*    < > = values in this interval not displayed.     Lab Results   Component Value Date    [id number] >2,000 (H) 04/20/2025     Lab Results   Component Value Date    FOLATE 12.50 04/21/2025     Lab Results   Component Value Date    IRON 34 (L) 04/21/2025    LABIRON 30 04/21/2025    TRANSFERRIN 76 (L) 04/21/2025    TIBC 113 (L) 04/21/2025    FERRITIN 3,650.00 (H) 04/21/2025     LDH   Date Value Ref Range Status   04/21/2025 733 (H) 135 - 225 U/L Final     Uric Acid   Date Value Ref Range Status   04/21/2025 15.0 (H) 3.4 - 7.0 mg/dL Final      Latest Reference Range & Units 04/21/25 03:43   Haptoglobin 30 - 200 mg/dL <10 (L)      Latest Reference Range & Units 04/20/25 11:49   FDP Less Than 5 mcg/mL  Greater than or equal to 20 mcg/mL !   Fibrinogen 219 - 464 mg/dL 216 (L)   Protime 11.7 - 14.2 Seconds 21.0 (H)   INR 0.90 - 1.10  1.80 (H)   PTT 22.7 - 35.4 seconds 40.0 (H)       Results from last 7 days   Lab Units 04/22/25  0534 04/17/25  2148 04/17/25 2011   CK TOTAL U/L 249*  --   --    HSTROP T ng/L  --  822* 1,013*     Results from last 7 days   Lab Units 04/22/25  1234 04/20/25  1149 04/18/25  0337 04/17/25 2011   INR  1.72* 1.80* 1.79* 1.64*   APTT seconds 40.4* 40.0*  --  34.5         Results from last 7 days   Lab Units 04/22/25  0534   MAGNESIUM mg/dL 1.7           I reviewed the patient's new clinical results.        Assessment:     *Thrombocytopenia  Normal platelet count and 2023.  Acute drop in platelet count likely secondary to sepsis.  4/17/2025 platelet count 83,000  4/20/2025 platelet count has dropped to 42,000.  Obtain IPF, B12, folic acid level.  Also obtain HIT antibody.  Most likely thrombocytopenia secondary to sepsis.  Hold anticoagulation at this time.  Also obtain fibrin split products, fibrinogen, PT PTT INR  Also with underlying cirrhosis of the liver and ascites noted on the CT abdomen.  ?  Acute  pancreatitis on CT abdomen.  Spleen is normal in size.  Elevated IPF 12.8% on 4/20/2025 with platelets 44,000.  Elevated FTP and decreased fibrinogen, elevated PT/INR PTT, fits with DIC patient.   4/21/2025 platelets 42,000.  HIT antibody negative.   4/22/2025 platelets 48,000.        *Anemia  Most likely secondary to acute ongoing infection.  Hemoglobin 8.8.  Continue to monitor  on 4/20/2025 Severely elevated ferritin 3650 with free iron 34, fits with inflammatory changes.  Normal folate 12.5, and supratherapeutic B12 > 2000.    4/21/2025 hemoglobin 8.5, elevated  and suppressed haptoglobin <10.  hemolysis?  Or the suppressed haptoglobin due to abnormal liver failure?  since patient is clinically improving according to nursing staff, we will hold steroids for now because of his severe infection with C. difficile and EPEC colitis.   4/22/2025 hemoglobin 8.2.  No evidence of bleeding.         *E. coli sepsis  On ceftriaxone per ID.  Continue antibiotics per ID recommendations.    *. C. difficile colitis.   On p.o. vancomycin.     *Cirrhosis of the liver  Ascites noted on CT abdomen.  Gastroenterology consulted  On 4/21/25 Improving but still significant elevated total bilirubin 8.3, ,  and alk phosphatase 147.  Recommend supportive therapy.  4/22/2025 total bilirubin 9.1, ,  and alk phos 160.     *Ascites  Management per GI  Status post paracentesis, 3 L of fluid removed on 4/18/2025     *KEATON  Per nephrology note creatinine around 2 in September 2024.  Thought to be secondary to sepsis.  Improving, creatinine has decreased to 3.34.  On 4/21/2025 creatinine 2.76 BUN 49.   On 4/22/2025 creatinine 3.10 BUN 48.      Recommendations  HIT antibody negative.  Continue treatment for sepsis from EPEC, and C. difficile colitis.  Most likely thrombocytopenia secondary to sepsis/DIC  We do not believe that this is TTP or HUS as creatinine is steadily improving.  Anemia also due to sepsis,  possible hemolysis.  Hold steroids for now.    Continue to monitor CBC CMP, PT/INR closely.      patient is critically ill.      I spoke with nursing staff at bedside.     I reviewed notes from GI, nephrology, pulmonology and cardiology services.      Wild Delgado MD PhD  04/22/25  17:30 EDT

## 2025-04-22 NOTE — PROGRESS NOTES
[facility] Progress Note      Patient Name: Archie Oquendo  : 1965  MRN: [id number]  Primary Care Physician:  Zoe Desai MD  Date of admission: 2025    Subjective     Interval History:   Follow-up KEATON on CKD of unknown baseline.  Urine output not recorded overnight.  Has Rae catheter.  IV fluid intake not recorded.  Weight up.  Tachycardic in the 120s blood pressure still requiring Levophed.  0.06 mcg/kg/min.  4 episodes of diarrhea on day shift yesterday night shift not recorded. Very lethargic.  Complains of abdominal pain.   Review of Systems:   As noted above    Objective     Vitals:   Temp:  [97.8 °F (36.6 °C)-98.3 °F (36.8 °C)] 98 °F (36.7 °C)  Heart Rate:  [] 127  Resp:  [16-22] 18  BP: ()/() 105/85  Flow (L/min) (Oxygen Therapy):  [5-10] 5    Intake/Output Summary (Last 24 hours) at 2025 0744  Last data filed at 2025 1825  Gross per 24 hour   Intake 2060 ml   Output 375 ml   Net 1685 ml       Physical Exam:    General Appearance: Lethargic but does answer some yes/no questions with a lot of verbal prompting.  Core track in place.  Very jaundiced.  Skin: warm and dry  HEENT: Oral mucosa dry.  Core track in place.  Sclerae icteric.  Neck: Right IJ central line (2025)  Lungs: Few upper airway rhonchi.  Unlabored on 5 L nasal cannula.  Heart: RRR, tachycardic.  Abdomen: soft, nontender to palpation., distended.  Few bowel sounds.  : Rae catheter in place  Extremities: Trace lower extremity edema  Neuro: Lethargic but does answer some yes/no questions.  Keeps eyes closed during exam.  Restless.  Moving in bed.    Scheduled Meds:     cefTRIAXone, 2,000 mg, Intravenous, Q24H   Followed by  [START ON 2025] cephalexin, 500 mg, Nasogastric, Q12H  folic acid 1 mg in sodium chloride 0.9 % 50 mL IVPB, 1 mg, Intravenous, Daily  insulin regular, 2-7 Units, Subcutaneous, Q6H  lactulose, 20 g, Nasogastric, TID  magnesium sulfate, 2  g, Intravenous, Once  Menthol-Zinc Oxide, 1 Application, Topical, BID  midodrine, 10 mg, Nasogastric, Q8H  mupirocin, 1 Application, Each Nare, BID  potassium phosphate, 15 mmol, Intravenous, Once  sodium chloride, 10 mL, Intravenous, Q12H  sodium chloride, 10 mL, Intravenous, Q12H  sodium chloride, 10 mL, Intravenous, Q12H  sodium chloride, 10 mL, Intravenous, Q12H  sodium chloride, 10 mL, Intravenous, Q12H  thiamine (B-1) IV, 200 mg, Intravenous, Q8H   Followed by  [START ON 4/23/2025] thiamine, 100 mg, Nasogastric, Daily  vancomycin, 125 mg, Nasogastric, Q6H      IV Meds:   dextrose, 75 mL/hr  norepinephrine, 0.02-0.3 mcg/kg/min, Last Rate: 0.08 mcg/kg/min (04/21/25 0344)  Pharmacy Consult,         Results Reviewed:   I have personally reviewed the results from the time of this admission to 4/22/2025 07:44 EDT     Results from last 7 days   Lab Units 04/22/25  0534 04/21/25  1420 04/21/25  0343 04/20/25  1149 04/20/25  0641   SODIUM mmol/L 148*  --  146*  --  142   POTASSIUM mmol/L 4.1 3.6 3.0*   < > 3.5   CHLORIDE mmol/L 106  --  101  --  98   CO2 mmol/L 29.0  --  31.0*  --  28.0   BUN mg/dL 48*  --  49*  --  53*   CREATININE mg/dL 3.10*  --  2.76*  --  3.34*   CALCIUM mg/dL 8.1*  --  7.6*  --  7.5*   BILIRUBIN mg/dL 9.1*  --  8.3*  --  8.2*   ALK PHOS U/L 160*  --  147*  --  148*   ALT (SGPT) U/L 170*  --  180*  --  196*   AST (SGOT) U/L 368*  --  397*  --  426*   GLUCOSE mg/dL 155*  --  108*  --  144*    < > = values in this interval not displayed.       Estimated Creatinine Clearance: 32.2 mL/min (A) (by C-G formula based on SCr of 3.1 mg/dL (H)).    Results from last 7 days   Lab Units 04/22/25  0534 04/21/25  0343 04/20/25  0641   MAGNESIUM mg/dL 1.7 1.7 2.4   PHOSPHORUS mg/dL 1.4* 2.5 2.6       Results from last 7 days   Lab Units 04/21/25  0343 04/20/25  0641 04/17/25  2148   URIC ACID mg/dL 15.0* 14.9* 13.6*       Results from last 7 days   Lab Units 04/21/25  0343 04/20/25  1149 04/20/25  0641  04/19/25  0913 04/18/25  0337 04/17/25 2011   WBC 10*3/mm3 8.29  --  9.47 8.66 8.67 8.77   HEMOGLOBIN g/dL 8.5*  --  8.8* 9.1* 9.2* 10.8*   PLATELETS 10*3/mm3 42* 44* 42* 47* 71* 83*       Results from last 7 days   Lab Units 04/20/25  1149 04/18/25  0337 04/17/25 2011   INR  1.80* 1.79* 1.64*       Assessment / Plan     ASSESSMENT:  Acute kidney injury on probable CKD but baseline unknown.  Urine not recorded.  IV fluid intake not recorded.  Creatinine increased again today.  Ongoing pressor requirement, tachycardia.  Continue free water in the form of IV D5W as well as increasing free water per core track.  2.  Diarrhea.  Enteropathogenic E. coli.  Also on lactulose for hepatic encephalopathy.  His ammonia level 418 was 30.  Recheck that this morning.  Probably should cut back on the lactulose.  Likely C. difficile colonization.  3.  Pancreatitis on CT.  4.  Alcohol abuse.  Withdrawal.  5.  Decompensated cirrhosis with ascites.  Transaminases stable to slightly improved.  Bilirubin continues to rise.  6.  Ischemic cardiomyopathy with acute on chronic heart failure reduced ejection fraction EF 30%.  Moderate mitral valve regurgitation.  ICD in place.  Hypotension with tachycardia.  May need to change pressor to yareli.  7.  Thrombocytopenia.  HIT antibody pending from 4/21/2025.  8.  Anemia.  PLAN:  Continue IV D5W at 75 cc an hour for 24 hours  Reordered strict intake and output  Increase free water per core track to 100 cc/h  Will discuss reducing lactulose with GI  Replace phosphorus as K-Phos IV.  Recheck chemistries this afternoon.  7. Remains very critically ill .  Thank you for involving us in the care of Archie Oquendo.  Please feel free to call with any questions.    Anjana Park MD  04/22/25  07:44 EDT    [facility]  [phone]    Please note that portions of this note were completed with a voice recognition program.

## 2025-04-22 NOTE — PROGRESS NOTES
"      [facility]         Dr John Sayied   LOS: 5 days   Patient Care Team:  Zoe Desai MD as PCP - General (Family Medicine)  Corbin Bailey MD as Surgeon (Neurosurgery)    Chief Complaint: Sepsis with septic shock E. coli enteritis with C. difficile positive other issues as listed below    Interval History: Jaundice Sleepy lethargic.  Dobbhoff tube in place.  Tube feeds currently ongoing.  Tachycardic with Levophed ongoing.  REVIEW OF SYSTEMS:   Unable to get with patient's current condition    Ventilator/Non-Invasive Ventilation Settings (From admission, onward)      None        Heated high flow 60 L with 85% FiO2      Vital Signs  Temp:  [97.5 °F (36.4 °C)-98.3 °F (36.8 °C)] 97.5 °F (36.4 °C)  Heart Rate:  [] 99  Resp:  [16-22] 16  BP: ()/() 118/79    Intake/Output Summary (Last 24 hours) at 4/22/2025 0852  Last data filed at 4/21/2025 1825  Gross per 24 hour   Intake 2060 ml   Output 375 ml   Net 1685 ml     Flowsheet Rows      Flowsheet Row First Filed Value   Admission Height 188 cm (74\") Documented at 04/17/2025 1957   Admission Weight 104 kg (230 lb) Documented at 04/17/2025 2110            Physical Exam:  Patient is examined using the personal protective equipment as per guidelines from infection control for this particular patient as enacted.  Hand hygiene was performed before and after patient interaction.   General Appearance:  Acutely ill and poorly arousable.  Not following commands for me.  ENT normocephalic atraumatic  Neck midline trachea, no thyromegaly   Lungs:   Diminished breath sounds bilaterally    Heart:    Regular rhythm and normal rate, normal S1 and S2, no            murmur, no gallop, no rub, no click   Chest Wall:    No abnormalities observed   Abdomen:   Soft mild diffuse tenderness no rebound or guarding   Extremities:   Moves all extremities well, trace to 1+ edema, no cyanosis, no             redness  CNS confused  Skin " jaundiced  Musculoskeletal no cyanosis no clubbing normal range of motion     Results Review:        Results from last 7 days   Lab Units 04/22/25  0534 04/21/25  1420 04/21/25  0343 04/20/25  1149 04/20/25  0641   SODIUM mmol/L 148*  --  146*  --  142   POTASSIUM mmol/L 4.1 3.6 3.0*   < > 3.5   CHLORIDE mmol/L 106  --  101  --  98   CO2 mmol/L 29.0  --  31.0*  --  28.0   BUN mg/dL 48*  --  49*  --  53*   CREATININE mg/dL 3.10*  --  2.76*  --  3.34*   GLUCOSE mg/dL 155*  --  108*  --  144*   CALCIUM mg/dL 8.1*  --  7.6*  --  7.5*    < > = values in this interval not displayed.     Results from last 7 days   Lab Units 04/22/25  0534 04/17/25  2148 04/17/25 2011   CK TOTAL U/L 249*  --   --    HSTROP T ng/L  --  822* 1,013*     Results from last 7 days   Lab Units 04/22/25  0534 04/21/25  0343 04/20/25  1149 04/20/25  0641   WBC 10*3/mm3 9.37 8.29  --  9.47   HEMOGLOBIN g/dL 8.2* 8.5*  --  8.8*   HEMATOCRIT % 24.0* 22.8*  --  23.5*   PLATELETS 10*3/mm3 48* 42* 44* 42*     Results from last 7 days   Lab Units 04/20/25  1149 04/18/25 0337 04/17/25 2011   INR  1.80* 1.79* 1.64*   APTT seconds 40.0*  --  34.5         Results from last 7 days   Lab Units 04/22/25  0534   MAGNESIUM mg/dL 1.7         Results from last 7 days   Lab Units 04/18/25  1303   PH, ARTERIAL pH units 7.431   PO2 ART mm Hg 114.3*   PCO2, ARTERIAL mm Hg 37.2   HCO3 ART mmol/L 24.8       I reviewed the patient's new clinical results.  I personally viewed and interpreted the patient's chest x-ray.        Medication Review:   cefTRIAXone, 2,000 mg, Intravenous, Q24H   Followed by  [START ON 4/24/2025] cephalexin, 500 mg, Nasogastric, Q12H  folic acid 1 mg in sodium chloride 0.9 % 50 mL IVPB, 1 mg, Intravenous, Daily  insulin regular, 2-7 Units, Subcutaneous, Q6H  lactulose, 20 g, Nasogastric, TID  magnesium sulfate, 2 g, Intravenous, Once  Menthol-Zinc Oxide, 1 Application, Topical, BID  midodrine, 10 mg, Nasogastric, Q8H  mupirocin, 1 Application,  Each Nare, BID  potassium phosphate, 15 mmol, Intravenous, Once  sodium chloride, 10 mL, Intravenous, Q12H  sodium chloride, 10 mL, Intravenous, Q12H  sodium chloride, 10 mL, Intravenous, Q12H  sodium chloride, 10 mL, Intravenous, Q12H  sodium chloride, 10 mL, Intravenous, Q12H  thiamine (B-1) IV, 200 mg, Intravenous, Q8H   Followed by  [START ON 4/23/2025] thiamine, 100 mg, Nasogastric, Daily  vancomycin, 125 mg, Nasogastric, Q6H        dextrose, 75 mL/hr  norepinephrine, 0.02-0.3 mcg/kg/min, Last Rate: 0.08 mcg/kg/min (04/21/25 0344)  Pharmacy Consult,   vasopressin, 0.03 Units/min        ASSESSMENT:   Acute metabolic encephalopathy  Sepsis present on admission due to E. coli enteritis  Acute hypoxemic respiratory failure on heated high flow  Septic shock present on admission causing encephalopathy and acute  Kidney injury  Decompensated cirrhosis  Acute pancreatitis by imaging  Non-STEMI  Hyponatremia  Hypokalemia  Acute kidney injury  Acute liver injury  Anemia  Thrombocytopenia  Alcohol abuse  Acute HFrEF    PLAN:  Mental status remains poor.  Likely related to hepatic encephalopathy.  Dobbhoff tube has been placed and tube feeds and lactulose initiated.  GI following and managing.    Source of infection E. coli enteritis with C. difficile positive  Continue current antibiotics as per IDs recommendations.  Patient on Rocephin with p.o. vancomycin.  Creatinine remained stable.  Further fluid electrolyte management per nephrology  Discussed with cardiology.  They would like for us to switch to vasopressin and discontinue Levophed as it may be causing some tachycardia.  Will attempt to wean off pressors as tolerated  Platelets stable.  Appreciate input from hematology.  Further workup per hematology.  Thiamine IV to continue  Patient remained critically ill  ICU core measures          Alvaro Cornejo MD  04/22/25  08:52 EDT

## 2025-04-22 NOTE — CASE MANAGEMENT/SOCIAL WORK
Continued Stay Note  [facility]     Patient Name: Archie Oquendo  MRN: [id number]  Today's Date: 4/22/2025    Admit Date: 4/17/2025    Plan: Pending clinical progress   Discharge Plan       Row Name 04/22/25 1154       Plan    Plan Pending clinical progress    Plan Comments Spoke with patients arnold Gomez over the phone. Explained the levels of psot acute care to Patricia. Explained Access team VS  duties in patients care. verbalizes understaning. Patient currently critical status and not able to work with PT/OT at this time. DaughterPatricia verbalizes understanding to all instruction given.                          Sarah Lopez RN

## 2025-04-22 NOTE — PLAN OF CARE
Problem: Adult Inpatient Plan of Care  Goal: Plan of Care Review  Outcome: Progressing  Goal: Patient-Specific Goal (Individualized)  Outcome: Progressing  Goal: Absence of Hospital-Acquired Illness or Injury  Outcome: Progressing  Intervention: Identify and Manage Fall Risk  Recent Flowsheet Documentation  Taken 4/22/2025 1554 by Eris Valentine RN  Safety Promotion/Fall Prevention: safety round/check completed  Taken 4/22/2025 1400 by Eris Valentine RN  Safety Promotion/Fall Prevention: safety round/check completed  Taken 4/22/2025 1200 by Eris Valentine RN  Safety Promotion/Fall Prevention: safety round/check completed  Taken 4/22/2025 1100 by Eris Valentine RN  Safety Promotion/Fall Prevention: safety round/check completed  Taken 4/22/2025 1000 by Eris Valentine RN  Safety Promotion/Fall Prevention: safety round/check completed  Taken 4/22/2025 0905 by Eris Valentine RN  Safety Promotion/Fall Prevention: safety round/check completed  Taken 4/22/2025 0800 by Eris Valentine RN  Safety Promotion/Fall Prevention: safety round/check completed  Intervention: Prevent Skin Injury  Recent Flowsheet Documentation  Taken 4/22/2025 1554 by Eris Valentine RN  Body Position: turned  Taken 4/22/2025 1400 by Eris Valentine RN  Body Position: turned  Taken 4/22/2025 1300 by Eris Valentine RN  Body Position: turned  Taken 4/22/2025 1200 by Eris Valentine RN  Body Position: turned  Taken 4/22/2025 1000 by Eris Valentine RN  Body Position: turned  Taken 4/22/2025 0800 by Eris Valentine RN  Body Position: turned  Skin Protection: incontinence pads utilized  Intervention: Prevent and Manage VTE (Venous Thromboembolism) Risk  Recent Flowsheet Documentation  Taken 4/22/2025 0800 by Eris Valentine RN  VTE Prevention/Management:   bilateral   SCDs (sequential compression devices) off  Intervention: Prevent Infection  Recent Flowsheet Documentation  Taken 4/22/2025 1554 by Eris Valentine RN  Infection Prevention:   environmental  surveillance performed   hand hygiene promoted   single patient room provided  Taken 4/22/2025 1400 by Eris Valentine RN  Infection Prevention:   environmental surveillance performed   hand hygiene promoted   single patient room provided  Taken 4/22/2025 1200 by Eris Valentine RN  Infection Prevention:   environmental surveillance performed   hand hygiene promoted   single patient room provided  Taken 4/22/2025 1100 by Eris Valentine RN  Infection Prevention:   single patient room provided   environmental surveillance performed   hand hygiene promoted  Taken 4/22/2025 1000 by Eris Valentine RN  Infection Prevention:   environmental surveillance performed   hand hygiene promoted   single patient room provided  Taken 4/22/2025 0905 by Eris Valentine RN  Infection Prevention:   single patient room provided   environmental surveillance performed   hand hygiene promoted  Taken 4/22/2025 0800 by Eris Valentine RN  Infection Prevention:   environmental surveillance performed   hand hygiene promoted   single patient room provided  Goal: Optimal Comfort and Wellbeing  Outcome: Progressing  Intervention: Monitor Pain and Promote Comfort  Recent Flowsheet Documentation  Taken 4/22/2025 0800 by Eris Valentine RN  Pain Management Interventions: pain management plan reviewed with patient/caregiver  Intervention: Provide Person-Centered Care  Recent Flowsheet Documentation  Taken 4/22/2025 0800 by Eris Valentine RN  Trust Relationship/Rapport:   care explained   choices provided  Goal: Readiness for Transition of Care  Outcome: Progressing   Goal Outcome Evaluation:

## 2025-04-22 NOTE — PROGRESS NOTES
Nutrition Services    Patient Name: Archie Oquendo  YOB: 1965  MRN: [id number]  Admission date: 4/17/2025    PROGRESS NOTE      Encounter Information: Checking in on TF initiation. Nephrology following - increased water flushes to 100 mL/hr.        PO Diet: NPO Diet NPO Type: Tube Feeding   PO Supplements: n/a   PO Intake:  -       Current nutrition support: Isosource 1.5 @ 30 mL/hr with 100 mL/hr FWF   Nutrition support review: Infusing as ordered per documentation        Weight: Weight: 91 kg (200 lb 9.9 oz) (04/22/25 0645)       Medications: reviewed  Abx, folic acid, insulin, lactulose, IV mag sulfate, IV potassium phosphate, thiamine, D5   Labs: reviewed   Hypophosphatemia - IV replacement  Hypernatremia - nephrology addressing with FWF       GI Function:  last bowel movement: 4/21, loose       Nutrition Intervention Updates: Awaiting feedback on TF rate advancement. Continue TF at 30 mL/hr at this time. Nephrology managing water flushes.        Results from last 7 days   Lab Units 04/22/25  0534 04/21/25  1420 04/21/25  0343 04/20/25  1149 04/20/25  0641   SODIUM mmol/L 148*  --  146*  --  142   POTASSIUM mmol/L 4.1 3.6 3.0*   < > 3.5   CHLORIDE mmol/L 106  --  101  --  98   CO2 mmol/L 29.0  --  31.0*  --  28.0   BUN mg/dL 48*  --  49*  --  53*   CREATININE mg/dL 3.10*  --  2.76*  --  3.34*   CALCIUM mg/dL 8.1*  --  7.6*  --  7.5*   BILIRUBIN mg/dL 9.1*  --  8.3*  --  8.2*   ALK PHOS U/L 160*  --  147*  --  148*   ALT (SGPT) U/L 170*  --  180*  --  196*   AST (SGOT) U/L 368*  --  397*  --  426*   GLUCOSE mg/dL 155*  --  108*  --  144*    < > = values in this interval not displayed.     Results from last 7 days   Lab Units 04/22/25  0534 04/21/25  0343 04/20/25  0641   MAGNESIUM mg/dL 1.7 1.7 2.4   PHOSPHORUS mg/dL 1.4* 2.5 2.6   HEMOGLOBIN g/dL 8.2* 8.5* 8.8*   HEMATOCRIT % 24.0* 22.8* 23.5*     COVID19   Date Value Ref Range Status   04/17/2025 Not Detected Not Detected - Ref. Range Final  "    No results found for: \"HGBA1C\"    RD to follow up per protocol.    Electronically signed by:  Faye Hanley RD  04/22/25 09:27 EDT    "

## 2025-04-23 NOTE — PROGRESS NOTES
LOS: 6 days   Patient Care Team:  Zoe Desai MD as PCP - General (Family Medicine)  Corbin Bailey MD as Surgeon (Neurosurgery)    Chief Complaint: Sepsis, liver cirrhosis, thrombocytopenia and anemia    Interval History:  4/21/2025 patient is afebrile.  Not responding to verbal communication, however nursing staff reports patient starts to follow some command.  Stable Platelets 42,000, hemoglobin 8.5.      4/22/2025 patient is afebrile.  Per nursing staff, patient was able to tell his name today.  Overall still has hepatic encephalopathy.  Platelets 48,000, hemoglobin 8.2.  Total bilirubin 9.1 and elevated , .     4/23/2025 patient afebrile.  Mental status waxes and wanes.  According to nursing staff, patient was able to have limited conversation when he was awake.  Persistent low platelets 41,000, hemoglobin 8.5.  Improving but still elevated total bilirubin 7.9  and .    A comprehensive 14 point review of systems was performed and was negative except as mentioned.    Vital Signs:  Temp:  [97.6 °F (36.4 °C)-98.5 °F (36.9 °C)] 98.5 °F (36.9 °C)  Heart Rate:  [] 107  Resp:  [18-20] 20  BP: ()/() 98/73    Intake/Output Summary (Last 24 hours) at 4/23/2025 1445  Last data filed at 4/23/2025 0800  Gross per 24 hour   Intake 5780 ml   Output 800 ml   Net 4980 ml       Physical Exam:  GENERAL:  Well-developed male in no acute distress.  Patient is asleep, cannot be awakened.  SKIN:  Warm, dry without rashes, diffusely jaundiced.   HEENT:  Normocephalic.    CHEST: Normal respiratory effort.  Lungs clear to auscultation. Good airflow.  CARDIAC:  Regular rate and rhythm. Normal S1,S2.  ABDOMEN:  Soft, no tender.  Bowel sounds normal.  EXTREMITIES:   Right leg edema more than the left leg.      Results Review:   CBC:      Lab 04/23/25  0506 04/22/25  0534 04/21/25  0343 04/20/25  1149 04/20/25  0641 04/19/25  0913 04/18/25  0337   WBC 11.60* 9.37 8.29  --  9.47 8.66 8.67    HEMOGLOBIN 8.5* 8.2* 8.5*  --  8.8* 9.1* 9.2*   HEMATOCRIT 23.1* 24.0* 22.8*  --  23.5* 24.5* 24.9*   PLATELETS 41* 48* 42* 44* 42* 47* 71*   NEUTROS ABS 7.69* 7.40* 4.95  --  7.67* 5.89 6.84   IMMATURE GRANS (ABS) 0.24*  --  0.14*  --   --   --  0.07*   LYMPHS ABS 2.00  --  1.73  --   --   --  1.03   MONOS ABS 1.42*  --  1.25*  --   --   --  0.65   EOS ABS 0.17 0.09 0.16  --  0.09 0.26 0.08   .3* 98.7* 99.1*  --  99.6* 100.0* 102.9*     CMP:        Lab 04/23/25  0506 04/22/25  1234 04/22/25  0534 04/21/25  1420 04/21/25  0343 04/20/25  1149 04/20/25  0641 04/20/25  0111 04/18/25  1413 04/18/25  0856 04/18/25  0337 04/17/25 2011   SODIUM 140 142 148*  --  146*  --  142 141   < >  --    < > 129*   POTASSIUM 4.7 4.4 4.1 3.6 3.0*   < > 3.5 3.2*  3.2*   < >  --    < > 3.7   CHLORIDE 101 104 106  --  101  --  98 96*   < >  --    < > 82*   CO2 27.1 28.7 29.0  --  31.0*  --  28.0 27.8   < >  --    < > 23.6   ANION GAP 11.9 9.3 13.0  --  14.0  --  16.0* 17.2*   < >  --    < > 23.4*   BUN 50* 50* 48*  --  49*  --  53* 56*   < >  --    < > 63*   CREATININE 3.65* 3.40* 3.10*  --  2.76*  --  3.34* 3.75*   < >  --    < > 7.11*   EGFR 18.1* 19.7* 22.0*  --  25.3*  --  20.1* 17.5*   < >  --    < > 8.1*   GLUCOSE 137* 153* 155*  --  108*  --  144* 111*   < >  --    < > 137*   CALCIUM 7.9* 8.0* 8.1*  --  7.6*  --  7.5* 7.6*   < >  --    < > 7.9*   IONIZED CALCIUM  --   --   --   --   --   --   --   --   --  0.94*  --   --    MAGNESIUM 1.7  --  1.7  --  1.7  --  2.4 1.5*   < >  --    < > 1.7   PHOSPHORUS 5.2* 3.6 1.4*  --  2.5  --  2.6  --    < >  --    < >  --    TOTAL PROTEIN 5.7*  --  5.9*  --  5.5*  --  5.6* 6.0   < >  --    < > 6.8   ALBUMIN 2.6* 2.5* 2.7*  --  2.7*  --  2.9* 3.0*   < >  --    < > 3.2*   GLOBULIN 3.1  --  3.2  --  2.8  --  2.7 3.0   < >  --    < > 3.6   ALT (SGPT) 159*  --  170*  --  180*  --  196* 207*   < >  --    < > 293*   AST (SGOT) 300*  --  368*  --  397*  --  426* 434*   < >  --    < > 663*    BILIRUBIN 7.9*  --  9.1*  --  8.3*  --  8.2* 8.9*   < >  --    < > 8.3*   BILIRUBIN DIRECT  --   --   --   --  6.5*  --   --   --   --   --   --   --    ALK PHOS 165*  --  160*  --  147*  --  148* 159*   < >  --    < > 167*   LIPASE  --   --   --   --   --   --   --   --   --   --   --  449*    < > = values in this interval not displayed.     Lab Results   Component Value Date    GFIFPARF44 >2,000 (H) 04/20/2025     Lab Results   Component Value Date    FOLATE 12.50 04/21/2025     Lab Results   Component Value Date    IRON 34 (L) 04/21/2025    LABIRON 30 04/21/2025    TRANSFERRIN 76 (L) 04/21/2025    TIBC 113 (L) 04/21/2025    FERRITIN 3,650.00 (H) 04/21/2025     LDH   Date Value Ref Range Status   04/21/2025 733 (H) 135 - 225 U/L Final     Uric Acid   Date Value Ref Range Status   04/21/2025 15.0 (H) 3.4 - 7.0 mg/dL Final      Latest Reference Range & Units 04/21/25 03:43   Haptoglobin 30 - 200 mg/dL <10 (L)      Latest Reference Range & Units 04/20/25 11:49   FDP Less Than 5 mcg/mL  Greater than or equal to 20 mcg/mL !   Fibrinogen 219 - 464 mg/dL 216 (L)   Protime 11.7 - 14.2 Seconds 21.0 (H)   INR 0.90 - 1.10  1.80 (H)   PTT 22.7 - 35.4 seconds 40.0 (H)       Results from last 7 days   Lab Units 04/22/25  0534 04/17/25  2148 04/17/25 2011   CK TOTAL U/L 249*  --   --    HSTROP T ng/L  --  822* 1,013*     Results from last 7 days   Lab Units 04/23/25  0506 04/22/25  1234 04/20/25  1149   INR  1.61* 1.72* 1.80*   APTT seconds 41.9* 40.4* 40.0*         Results from last 7 days   Lab Units 04/23/25  0506   MAGNESIUM mg/dL 1.7           I reviewed the patient's new clinical results.        Assessment:     *Thrombocytopenia  Normal platelet count and 2023.  Acute drop in platelet count likely secondary to sepsis.  4/17/2025 platelet count 83,000  4/20/2025 platelet count has dropped to 42,000.  Obtain IPF, B12, folic acid level.  Also obtain HIT antibody.  Most likely thrombocytopenia secondary to sepsis.  Hold  anticoagulation at this time.  Also obtain fibrin split products, fibrinogen, PT PTT INR  Also with underlying cirrhosis of the liver and ascites noted on the CT abdomen.  ?  Acute pancreatitis on CT abdomen.  Spleen is normal in size.  Elevated IPF 12.8% on 4/20/2025 with platelets 44,000.  Elevated FTP and decreased fibrinogen, elevated PT/INR PTT, fits with DIC patient.   4/21/2025 platelets 42,000.  HIT antibody negative.   4/22/2025 platelets 48,000.  4/23/2025 platelets 41,000.         *Anemia  Most likely secondary to acute ongoing infection.  Hemoglobin 8.8.  Continue to monitor  on 4/20/2025 Severely elevated ferritin 3650 with free iron 34, fits with inflammatory changes.  Normal folate 12.5, and supratherapeutic B12 > 2000.    4/21/2025 hemoglobin 8.5, elevated  and suppressed haptoglobin <10.  hemolysis?  Or the suppressed haptoglobin due to abnormal liver failure?  since patient is clinically improving according to nursing staff, we will hold steroids for now because of his severe infection with C. difficile and EPEC colitis.   4/22/2025 hemoglobin 8.2.  No evidence of bleeding.   4/23/2025 hemoglobin 8.5.         *E. coli sepsis  On ceftriaxone per ID.  Continue antibiotics per ID recommendations.    *. C. difficile colitis.   On p.o. vancomycin.     *Cirrhosis of the liver with hepatic encephalopathy  Ascites noted on CT abdomen.  Gastroenterology consulted  On 4/21/25 Improving but still significant elevated total bilirubin 8.3, ,  and alk phosphatase 147.  Recommend supportive therapy.  4/22/2025 total bilirubin 9.1, ,  and alk phos 160.   4/23/2025 improving but still elevated total bilirubin 7.9  and .    *.  Coagulopathy secondary to liver cirrhosis and acute liver injury.  4/20/2025 INR 1.8, PTT 21.0 seconds.    4/22/2025 INR 1.72 and PTT 20.2 seconds.  No evidence for bleeding.   4/23/2025 improved INR 1.61 PT 19.2 seconds due to improving liver  function..       *Ascites  Management per GI  Status post paracentesis, 3 L of fluid removed on 4/18/2025     *KEATON  Per nephrology note creatinine around 2 in September 2024.  Thought to be secondary to sepsis.  Improving, creatinine has decreased to 3.34.  On 4/21/2025 creatinine 2.76 BUN 49.   On 4/22/2025 creatinine 3.10 BUN 48.   4/23/2025 creatinine 3.65 BUN 50.  Continue followed by nephrology.       Recommendations  Continue treatment for sepsis from EPEC, and C. difficile colitis.  Most likely thrombocytopenia secondary to sepsis/DIC  Multiorgan failure with liver, kidney.  Anemia also due to sepsis, possible hemolysis.  Hold steroids for now.    Continue to monitor CBC CMP, PT/INR closely.  Overall poor prognosis.       patient is critically ill.      I spoke with nursing staff.     I reviewed notes from GI, nephrology, cardiology, pulmonology services.      Wild Delgado MD PhD  04/23/25  14:45 EDT

## 2025-04-23 NOTE — CONSULTS
See Previous notes.    Spoke with patient's daughter, Patricia, over the phone. She plans to be at the hospital today at 3:45 and is requesting that the doctor come and speak with them. It is evident that she needs further explanation regarding patient's clinical condition and prognosis. Dr. Cornejo notified. Palliative care will continue to follow for support.

## 2025-04-23 NOTE — PROGRESS NOTES
Nutrition Services    Patient Name: Archie Oquendo  YOB: 1965  MRN: [id number]  Admission date: 4/17/2025    PROGRESS NOTE      Encounter Information: Checking in on TF tolerance. Nephrology decreased water flushes to 50 mL/hr. Abdomen distended this morning. Attending had nurse hold TF. CT AP today.        PO Diet: NPO Diet NPO Type: Tube Feeding   PO Supplements: n/a   PO Intake:  -       Current nutrition support: Isosource 1.5 @ 30 mL/hr with 50 mL/hr FWF + banatrol TID   Nutrition support review: TF on hold r/t abdominal distention       Weight: Weight: 94.6 kg (208 lb 8.9 oz) (04/23/25 0500)       Medications: reviewed  Abx, folic acid, lasix, insulin, lactulose dc'd 4/23, thiamine, levo, vaso   Labs: reviewed   Elevated liver enzymes - GI following  Hypernatremia - corrected  Hyperphosphatemia - possible over correction from previous hypophosphatemia  Elevated BUN/creatinine - nephrology following        GI Function:  last bowel movement: 4/23, reportedly 5 Bms 4/22, lactulose dc'd by GI       Nutrition Intervention Updates: TF on hold. Monitor for ability to resume TF.    Goal to meet estimated energy/protein needs: Isosource 1.5 at 80 mL/hr        Results from last 7 days   Lab Units 04/23/25  0506 04/22/25  1234 04/22/25  0534 04/21/25  1420 04/21/25  0343   SODIUM mmol/L 140 142 148*  --  146*   POTASSIUM mmol/L 4.7 4.4 4.1   < > 3.0*   CHLORIDE mmol/L 101 104 106  --  101   CO2 mmol/L 27.1 28.7 29.0  --  31.0*   BUN mg/dL 50* 50* 48*  --  49*   CREATININE mg/dL 3.65* 3.40* 3.10*  --  2.76*   CALCIUM mg/dL 7.9* 8.0* 8.1*  --  7.6*   BILIRUBIN mg/dL 7.9*  --  9.1*  --  8.3*   ALK PHOS U/L 165*  --  160*  --  147*   ALT (SGPT) U/L 159*  --  170*  --  180*   AST (SGOT) U/L 300*  --  368*  --  397*   GLUCOSE mg/dL 137* 153* 155*  --  108*    < > = values in this interval not displayed.     Results from last 7 days   Lab Units 04/23/25  0506 04/22/25  1234 04/22/25  0534 04/21/25  0343  "  MAGNESIUM mg/dL 1.7  --  1.7 1.7   PHOSPHORUS mg/dL 5.2*   < > 1.4* 2.5   HEMOGLOBIN g/dL 8.5*  --  8.2* 8.5*   HEMATOCRIT % 23.1*  --  24.0* 22.8*    < > = values in this interval not displayed.     COVID19   Date Value Ref Range Status   04/17/2025 Not Detected Not Detected - Ref. Range Final     No results found for: \"HGBA1C\"    RD to follow up per protocol.    Electronically signed by:  Faye Hanley RD  04/23/25 13:38 EDT    "

## 2025-04-23 NOTE — PROGRESS NOTES
LOS: 6 days   Patient Care Team:  Zoe Desai MD as PCP - General (Family Medicine)  Corbin Bailey MD as Surgeon (Neurosurgery)    Chief Complaint: Follow-up acute on chronic systolic CHF, cardiomyopathy, type II NSTEMI.    Interval History: His eyes were open this morning, although he did not follow any commands.  He looks prominently jaundiced today.  Remains on vasopressin.  His systolic blood pressure this morning was in the 80s.  His heart rate overnight appears to have been slightly better.  He is still in sinus rhythm.    Vital Signs:  Temp:  [97.6 °F (36.4 °C)-98 °F (36.7 °C)] 97.6 °F (36.4 °C)  Heart Rate:  [] 87  Resp:  [18-20] 20  BP: ()/() 85/67    Intake/Output Summary (Last 24 hours) at 4/23/2025 0819  Last data filed at 4/23/2025 0500  Gross per 24 hour   Intake 5780 ml   Output 750 ml   Net 5030 ml       Physical Exam:   General Appearance:    No acute distress, appears ill.  Eyes open, but does not follow commands.  Prominently jaundiced.   Lungs:     Rhonchi anteriorly bilaterally.    Heart:    Regular rhythm and mildly tachycardic rate.  II/VI SM throughout.    Abdomen:     Soft, mildly tender, distended.    Extremities:   2+ edema of the lower extremities.  1+ edema of the hands and upper extremities.     Results Review:    Results from last 7 days   Lab Units 04/23/25  0506   SODIUM mmol/L 140   POTASSIUM mmol/L 4.7   CHLORIDE mmol/L 101   CO2 mmol/L 27.1   BUN mg/dL 50*   CREATININE mg/dL 3.65*   GLUCOSE mg/dL 137*   CALCIUM mg/dL 7.9*     Results from last 7 days   Lab Units 04/22/25  0534 04/17/25  2148 04/17/25 2011   CK TOTAL U/L 249*  --   --    HSTROP T ng/L  --  822* 1,013*     Results from last 7 days   Lab Units 04/23/25  0506   WBC 10*3/mm3 11.60*   HEMOGLOBIN g/dL 8.5*   HEMATOCRIT % 23.1*   PLATELETS 10*3/mm3 41*     Results from last 7 days   Lab Units 04/23/25  0506 04/22/25  1234 04/20/25  1149   INR  1.61* 1.72* 1.80*   APTT seconds 41.9* 40.4* 40.0*          Results from last 7 days   Lab Units 04/23/25  0506   MAGNESIUM mg/dL 1.7           I reviewed the patient's new clinical results.        Assessment:  1.  Nausea, vomiting, and diarrhea  2.  Enteropathogenic E. coli positive stool sample and C. difficile toxin positive  3.  Anasarca, multifactorial  4.  Severe acute kidney injury with chronic kidney disease  5.  Decompensated cirrhosis of the liver  6.  Moderate ascites by CT  7.  Acute on chronic systolic CHF, EF 30% by echo on 4/18/2025  8.  Elevated troponin, likely type II NSTEMI secondary to #1, #2, #4, #5, and #9  9.  Coronary artery disease,  of the RCA by cath in 2022  10.  Status post ICD placement  11.  Urinary tract infection with retention, now with Rae catheter  12.  Significant transaminitis  13.  Hypoalbuminemia, contributing to volume overload  14.  Anemia, likely multifactorial  15.  Heavy daily alcohol use  16.  Thrombocytopenia with potential DIC  17.  Suspected medication noncompliance  18.  Metabolic encephalopathy  19.  Sinus tachycardia    Plan:  -Continues to have metabolic encephalopathy and poor mental status.  He looks more jaundiced today.     -Levophed switched to vasopressin yesterday because of sinus tachycardia.  His heart rate was better overnight, but was still mildly tachycardic this morning (although his systolic blood pressure was in the 80s).  The vasopressin likely needs to be titrated upwards of possible.  He is also on midodrine 10 mg per tube every 8 hours.    -Volume management per nephrology.  D5W discontinued.  Lasix 60 mg IV x 1 ordered per Dr. Park.    -Hypotension and renal dysfunction greatly limit his guideline directed medical therapy for CHF and cardiomyopathy.    -HIT antibody was negative.  Hematology is following.  Platelet count 41.  Suspect thrombocytopenia secondary to liver dysfunction, sepsis, and potential DIC.    -Worsening abdominal distension noted per Dr. Park.    -Discussed with   Xavier this morning.  Patient seems to have a very poor prognosis at this point with multisystem organ failure.    Darrion Arroyo MD  04/23/25  08:19 EDT

## 2025-04-23 NOTE — PROGRESS NOTES
"      [facility]         Dr John Sayied   LOS: 6 days   Patient Care Team:  Zoe Desai MD as PCP - General (Family Medicine)  Corbin Bailey MD as Surgeon (Neurosurgery)    Chief Complaint: Sepsis with septic shock E. coli enteritis with C. difficile positive other issues as listed below    Interval History: Looks worse today.  Abdominal distention more confusion.  Dobbhoff tube currently in place.  On vasopressin drip.  Overnight had to use Levophed to maintain MAP greater than 60.      REVIEW OF SYSTEMS:   Unable to get with patient's current condition    Ventilator/Non-Invasive Ventilation Settings (From admission, onward)      None        Heated high flow 60 L with 85% FiO2      Vital Signs  Temp:  [97.6 °F (36.4 °C)-98 °F (36.7 °C)] 97.6 °F (36.4 °C)  Heart Rate:  [] 87  Resp:  [18-20] 20  BP: ()/() 85/67    Intake/Output Summary (Last 24 hours) at 4/23/2025 0859  Last data filed at 4/23/2025 0500  Gross per 24 hour   Intake 5780 ml   Output 750 ml   Net 5030 ml     Flowsheet Rows      Flowsheet Row First Filed Value   Admission Height 188 cm (74\") Documented at 04/17/2025 1957   Admission Weight 104 kg (230 lb) Documented at 04/17/2025 2110            Physical Exam:  Patient is examined using the personal protective equipment as per guidelines from infection control for this particular patient as enacted.  Hand hygiene was performed before and after patient interaction.   General Appearance:  Acutely ill and poorly arousable.  Not following commands for me.  ENT normocephalic atraumatic  Neck midline trachea, no thyromegaly   Lungs:   Diminished breath sounds bilaterally    Heart:    Regular rhythm and normal rate, normal S1 and S2, no            murmur, no gallop, no rub, no click   Chest Wall:    No abnormalities observed   Abdomen:   Soft mild diffuse tenderness no rebound or guarding   Extremities:   Moves all extremities well, trace to 1+ edema, no cyanosis, no   "           redness  CNS confused  Skin jaundiced  Musculoskeletal no cyanosis no clubbing normal range of motion     Results Review:        Results from last 7 days   Lab Units 04/23/25  0506 04/22/25  1234 04/22/25  0534   SODIUM mmol/L 140 142 148*   POTASSIUM mmol/L 4.7 4.4 4.1   CHLORIDE mmol/L 101 104 106   CO2 mmol/L 27.1 28.7 29.0   BUN mg/dL 50* 50* 48*   CREATININE mg/dL 3.65* 3.40* 3.10*   GLUCOSE mg/dL 137* 153* 155*   CALCIUM mg/dL 7.9* 8.0* 8.1*     Results from last 7 days   Lab Units 04/22/25  0534 04/17/25  2148 04/17/25 2011   CK TOTAL U/L 249*  --   --    HSTROP T ng/L  --  822* 1,013*     Results from last 7 days   Lab Units 04/23/25  0506 04/22/25  0534 04/21/25  0343   WBC 10*3/mm3 11.60* 9.37 8.29   HEMOGLOBIN g/dL 8.5* 8.2* 8.5*   HEMATOCRIT % 23.1* 24.0* 22.8*   PLATELETS 10*3/mm3 41* 48* 42*     Results from last 7 days   Lab Units 04/23/25  0506 04/22/25  1234 04/20/25  1149   INR  1.61* 1.72* 1.80*   APTT seconds 41.9* 40.4* 40.0*         Results from last 7 days   Lab Units 04/23/25  0506   MAGNESIUM mg/dL 1.7         Results from last 7 days   Lab Units 04/18/25  1303   PH, ARTERIAL pH units 7.431   PO2 ART mm Hg 114.3*   PCO2, ARTERIAL mm Hg 37.2   HCO3 ART mmol/L 24.8       I reviewed the patient's new clinical results.  I personally viewed and interpreted the patient's chest x-ray.        Medication Review:   cefTRIAXone, 2,000 mg, Intravenous, Q24H   Followed by  [START ON 4/24/2025] cephalexin, 500 mg, Nasogastric, Q12H  folic acid, 1 mg, Nasogastric, Daily  furosemide, 60 mg, Intravenous, Once  insulin regular, 2-7 Units, Subcutaneous, Q6H  Menthol-Zinc Oxide, 1 Application, Topical, BID  midodrine, 10 mg, Nasogastric, Q8H  rifAXIMin, 550 mg, Nasogastric, Q12H  sodium chloride, 10 mL, Intravenous, Q12H  sodium chloride, 10 mL, Intravenous, Q12H  sodium chloride, 10 mL, Intravenous, Q12H  sodium chloride, 10 mL, Intravenous, Q12H  sodium chloride, 10 mL, Intravenous, Q12H  thiamine,  100 mg, Nasogastric, Daily  vancomycin, 125 mg, Nasogastric, Q6H        norepinephrine, 0.02-0.3 mcg/kg/min, Last Rate: Stopped (04/23/25 0156)  Pharmacy Consult,   vasopressin, 0.03 Units/min, Last Rate: 0.03 Units/min (04/22/25 3452)        ASSESSMENT:   Acute metabolic encephalopathy  Sepsis present on admission due to E. coli enteritis  Acute hypoxemic respiratory failure on heated high flow  Septic shock present on admission causing encephalopathy and acute  Kidney injury  Decompensated cirrhosis  Acute pancreatitis by imaging  Non-STEMI  Hyponatremia  Hypokalemia  Acute kidney injury  Acute liver injury  Anemia  Thrombocytopenia  Alcohol abuse  Acute HFrEF    PLAN:  Mental status worse with abdominal distention.  Will get CT abdomen pelvis with oral contrast to evaluate further.  Dobbhoff tube in place.  Will hold tube feeds for now until CT abdomen pelvis complete.    Source of infection E. coli enteritis with C. difficile positive  Continue current antibiotics as per IDs recommendations.  Patient on Rocephin with p.o. vancomycin.  Slightly worse leukocytosis continue to monitor.  Creatinine  progressively getting worse..  Further fluid electrolyte management per nephrology  Use vasopressin as pressors maintain MAP greater than 60.  Platelets have been fluctuating..  Appreciate input from hematology.  Further workup per hematology.  Thiamine IV to continue  Patient remained critically ill  I had a detailed discussion with patient daughter this morning.  She will have further discussion with other family members to decide on goals of care.  Patient currently DNR.  Overall prognosis remains guarded.  ICU core measures    Critical care time 35 minutes      Alvaro Cornejo MD  04/23/25  08:59 EDT

## 2025-04-23 NOTE — PROGRESS NOTES
[facility]  Inpatient Progress Note    Reason for Follow Up:  Decompensated cirrhosis     Subjective     Interval History:   5 BM yesterday despite decreasing lactulose     Still confused and restrained    On vasopressin - less tachy since change    Current Facility-Administered Medications:     cefTRIAXone (ROCEPHIN) 2,000 mg in sodium chloride 0.9 % 100 mL MBP, 2,000 mg, Intravenous, Q24H, Last Rate: 200 mL/hr at 04/22/25 1450, 2,000 mg at 04/22/25 1450 **FOLLOWED BY** [START ON 4/24/2025] cephalexin (KEFLEX) capsule 500 mg, 500 mg, Nasogastric, Q12H, Antony Weston MD    dextrose (D50W) (25 g/50 mL) IV injection 25 g, 25 g, Intravenous, Q15 Min PRN, Vianey Jones, VERONICA    dextrose (D5W) 5 % infusion, 75 mL/hr, Intravenous, Continuous, Anjana Park MD, Last Rate: 75 mL/hr at 04/22/25 1940, 75 mL/hr at 04/22/25 1940    dextrose (GLUTOSE) oral gel 15 g, 15 g, Oral, Q15 Min PRN, Vianey Jones, APRN    folic acid (FOLVITE) tablet 1 mg, 1 mg, Nasogastric, Daily, Alvaro Cornejo MD    glucagon (GLUCAGEN) injection 1 mg, 1 mg, Intramuscular, Q15 Min PRN, Vianey Jones, APRN    insulin regular (humuLIN R,novoLIN R) injection 2-7 Units, 2-7 Units, Subcutaneous, Q6H, Vianey Jones, APRN, 2 Units at 04/22/25 1451    lactulose (CHRONULAC) 10 GM/15ML solution 10 g, 10 g, Nasogastric, Daily, Veena Patel MD    Menthol-Zinc Oxide 1 Application, 1 Application, Topical, BID, Antony Weston MD, 1 Application at 04/22/25 2105    midodrine (PROAMATINE) tablet 10 mg, 10 mg, Nasogastric, Q8H, Antony Weston MD, 10 mg at 04/23/25 0156    nitroglycerin (NITROSTAT) SL tablet 0.4 mg, 0.4 mg, Sublingual, Q5 Min PRN, Vianey Jones R, APRN    norepinephrine (LEVOPHED) 8 mg in 250 mL NS infusion (premix), 0.02-0.3 mcg/kg/min, Intravenous, Titrated, Vianey Jones, APRN, Stopped at 04/23/25 0156    ondansetron (ZOFRAN) injection 4 mg, 4 mg, Intravenous, Q6H PRN, Vianey Jones,  APRN, 4 mg at 04/20/25 0933    oxyCODONE (ROXICODONE) immediate release tablet 5 mg, 5 mg, Oral, Q6H PRN, Alvaro Cornejo MD, 5 mg at 04/22/25 1823    Pharmacy Consult, , Not Applicable, Continuous PRN, Alvaro Cornejo MD    riFAXIMin (XIFAXAN) tablet 550 mg, 550 mg, Nasogastric, Q12H, Veena Patel MD, 550 mg at 04/22/25 2105    sodium chloride 0.9 % flush 10 mL, 10 mL, Intravenous, Q12H, Vianey Jones APRN, 10 mL at 04/22/25 2106    sodium chloride 0.9 % flush 10 mL, 10 mL, Intravenous, PRN, Vianey Jones APRN    sodium chloride 0.9 % flush 10 mL, 10 mL, Intravenous, Q12H, Antony Weston MD, 10 mL at 04/22/25 2106    sodium chloride 0.9 % flush 10 mL, 10 mL, Intravenous, Q12H, Antony Weston MD, 10 mL at 04/22/25 2106    sodium chloride 0.9 % flush 10 mL, 10 mL, Intravenous, Q12H, Antony Weston MD, 10 mL at 04/22/25 2106    sodium chloride 0.9 % flush 10 mL, 10 mL, Intravenous, Q12H, Antony Weston MD, 10 mL at 04/22/25 2106    sodium chloride 0.9 % flush 10 mL, 10 mL, Intravenous, Trista LOPEZ Sandeep K, MD    sodium chloride 0.9 % flush 20 mL, 20 mL, Intravenous, Trista LOPEZ Sandeep K, MD    sodium chloride 0.9 % infusion 40 mL, 40 mL, Intravenous, PRNRobert Haley R, APRN    sodium chloride 0.9 % infusion 40 mL, 40 mL, Intravenous, Trista LOPEZ Sandeep K, MD    [COMPLETED] thiamine (B-1) injection 200 mg, 200 mg, Intravenous, Q8H, 200 mg at 04/22/25 1451 **FOLLOWED BY** thiamine (VITAMIN B-1) tablet 100 mg, 100 mg, Nasogastric, Daily, Antony Weston MD    vancomycin oral solution reconstituted 125 mg, 125 mg, Nasogastric, Q6H, Antony Weston MD, 125 mg at 04/23/25 0530    vasopressin (PITRESSIN) 20 units in 100 mL NS infusion, 0.03 Units/min, Intravenous, Continuous, Alvaro Cornejo MD, Last Rate: 9 mL/hr at 04/22/25 2253, 0.03 Units/min at 04/22/25 2253  Review of Systems:    Positive for abdominal distention, no blood in the stool fever or chills    Objective      Vital Signs  Temp:  [97.5 °F (36.4 °C)-98 °F (36.7 °C)] 97.6 °F (36.4 °C)  Heart Rate:  [] 89  Resp:  [16-20] 20  BP: ()/() 90/67  Body mass index is 26.78 kg/m².    Intake/Output Summary (Last 24 hours) at 4/23/2025 0614  Last data filed at 4/23/2025 0500  Gross per 24 hour   Intake 5780 ml   Output 1100 ml   Net 4680 ml     I/O this shift:  In: 1822 [I.V.:895; Other:631; NG/GT:296]  Out: 100 [Urine:100]     Physical Exam:   General: patient awake,, confused but follows commands   Eyes: Normal lids and lashes,+ scleral icterus   Neck: supple, normal ROM   Skin: warm and dry, + jaundiced   Cardiovascular: Tachycardia, regular   Pulm: clear to auscultation bilaterally, regular and unlabored   Abdomen: soft, nontender, distended; normal bowel sounds   Extremities: no rash or edema   Psychiatric: Normal mood and behavior; memory intact     Results Review:     I reviewed the patient's new clinical results.    Results from last 7 days   Lab Units 04/23/25  0506 04/22/25  0534 04/21/25  0343   WBC 10*3/mm3 11.60* 9.37 8.29   HEMOGLOBIN g/dL 8.5* 8.2* 8.5*   HEMATOCRIT % 23.1* 24.0* 22.8*   PLATELETS 10*3/mm3 41* 48* 42*     Results from last 7 days   Lab Units 04/23/25  0506 04/22/25  1234 04/22/25  0534 04/21/25  1420 04/21/25  0343   SODIUM mmol/L 140 142 148*  --  146*   POTASSIUM mmol/L 4.7 4.4 4.1   < > 3.0*   CHLORIDE mmol/L 101 104 106  --  101   CO2 mmol/L 27.1 28.7 29.0  --  31.0*   BUN mg/dL 50* 50* 48*  --  49*   CREATININE mg/dL 3.65* 3.40* 3.10*  --  2.76*   CALCIUM mg/dL 7.9* 8.0* 8.1*  --  7.6*   BILIRUBIN mg/dL 7.9*  --  9.1*  --  8.3*   ALK PHOS U/L 165*  --  160*  --  147*   ALT (SGPT) U/L 159*  --  170*  --  180*   AST (SGOT) U/L 300*  --  368*  --  397*   GLUCOSE mg/dL 137* 153* 155*  --  108*    < > = values in this interval not displayed.     Results from last 7 days   Lab Units 04/23/25  0506 04/22/25  1234 04/20/25  1149   INR  1.61* 1.72* 1.80*     Lab Results   Lab Value  Date/Time    LIPASE 449 (H) 04/17/2025 2011       Radiology:  XR Abdomen KUB   Final Result   1.Cortrak catheter tip probably in the distal body of the stomach.           This report was finalized on 4/20/2025 2:02 PM by Dr. Dexter Quinonez M.D on Workstation: [id number]          XR Chest Post CVA Port   Final Result       As described.               This report was finalized on 4/18/2025 4:06 PM by Dr. David Smith M.D on Workstation: [id number]          XR Chest 1 View   Final Result      CT Abdomen Pelvis Without Contrast   Final Result       1. Marked hepatic steatosis. Patient is noted to have moderate ascites.   2. There is some inflammatory stranding surrounding the pancreas, as   well as some trace fluid. Appearance may reflect acute pancreatitis.   There is no pancreatic ductal dilatation, and no organized   peripancreatic collections are seen.   3. High density material within the gallbladder may reflect stones or   sludge.   4. Thick walled appearance to the ascending and transverse colon   appearance may be related to portal colopathy, although correlation with   any evidence of colitis is recommended.   5. Indeterminate ground glass pulmonary nodule measuring 11 mm. For a   ground glass nodule >=6 mm, recommend CT at 6-12 months to confirm   persistence, then CT every 2 years until 5 years. If solid component(s)   or growth develops, consider resection.    Radiation dose reduction techniques were utilized, including automated   exposure control and exposure modulation based on body size.           This report was finalized on 4/17/2025 10:40 PM by Dr. Adrianna Fleming M.D on Workstation: [id number]          XR Chest 1 View   Final Result   As described.       This report was finalized on 4/17/2025 8:19 PM by Dr. David Smith M.D on Workstation: [id number]              Assessment & Plan     Active Hospital Problems    Diagnosis     **Sepsis        Assessment:  Decompensated  cirrhosis w/ascites  Alcohol use  Sepsis  Acute kidney injury/CKD  CHF w/ischemic CM-h/o AICD  Enteropathogenic E. Coli  C. difficile considered colonization  Pancreatic inflammatory change on imaging      Plan:  5 BMs yesterday despite decreasing lactulose to once daily-and has a BM this morning.  Will hold lactulose for now.  Stools have tube feed appearance.  Creatinine worsening   Continue TF  Wbc increasing - rec check paracentesis to r/o sbp, CT ordered by pulm.  Will f/u    I discussed the patients findings and my recommendations with patient and nursing staff.            Veena Patel M.D.  [facility]  [phone]

## 2025-04-23 NOTE — PROGRESS NOTES
[facility] Progress Note      Patient Name: Archie Oquendo  : 1965  MRN: [id number]  Primary Care Physician:  Zoe Desai MD  Date of admission: 2025    Subjective     Interval History:   Follow-up KEATON on CKD of unknown baseline.  Urine output 1100 total but only 100 recorded overnight.  Has Rae catheter.  5.7 L in.  Now on vasopressin and Levophed.  Blood pressures mostly in the 80s systolic.  Review of Systems:   As noted above    Objective     Vitals:   Temp:  [97.5 °F (36.4 °C)-98 °F (36.7 °C)] 97.6 °F (36.4 °C)  Heart Rate:  [] 87  Resp:  [16-20] 20  BP: ()/() 85/67  Flow (L/min) (Oxygen Therapy):  [5] 5    Intake/Output Summary (Last 24 hours) at 2025 0756  Last data filed at 2025 0500  Gross per 24 hour   Intake 5780 ml   Output 1100 ml   Net 4680 ml       Physical Exam:    General Appearance: Lethargic.  Oral secretions thick.   Does open his eyes on command and opens his mouth.  Only intermittently follows other commands.  Skin: warm and dry  HEENT: Oral mucosa dry with thick secretions. (Discussed with RN to suction and do oral care.)  Core track in place.  Sclerae icteric.  Neck: Right IJ central line (2025)  Lungs: Few upper airway rhonchi.  Unlabored on 5 L nasal cannula.  Heart: RRR, tachycardic.  Abdomen: soft, more distended.  Tender to palpation.  Grimaces when palpated.  No guarding.  Few bowel sounds.  : Rae catheter in place  Extremities: 2+ lower ext edema. 1+ upper ext edema .  Neuro: Lethargic.  Does open his eyes to command, but does not keep them open.    Scheduled Meds:     cefTRIAXone, 2,000 mg, Intravenous, Q24H   Followed by  [START ON 2025] cephalexin, 500 mg, Nasogastric, Q12H  folic acid, 1 mg, Nasogastric, Daily  insulin regular, 2-7 Units, Subcutaneous, Q6H  Menthol-Zinc Oxide, 1 Application, Topical, BID  midodrine, 10 mg, Nasogastric, Q8H  rifAXIMin, 550 mg, Nasogastric, Q12H  sodium  chloride, 10 mL, Intravenous, Q12H  sodium chloride, 10 mL, Intravenous, Q12H  sodium chloride, 10 mL, Intravenous, Q12H  sodium chloride, 10 mL, Intravenous, Q12H  sodium chloride, 10 mL, Intravenous, Q12H  thiamine, 100 mg, Nasogastric, Daily  vancomycin, 125 mg, Nasogastric, Q6H      IV Meds:   dextrose, 75 mL/hr, Last Rate: 75 mL/hr (04/22/25 1940)  norepinephrine, 0.02-0.3 mcg/kg/min, Last Rate: Stopped (04/23/25 0156)  Pharmacy Consult,   vasopressin, 0.03 Units/min, Last Rate: 0.03 Units/min (04/22/25 7673)        Results Reviewed:   I have personally reviewed the results from the time of this admission to 4/23/2025 07:56 EDT     Results from last 7 days   Lab Units 04/23/25  0506 04/22/25  1234 04/22/25  0534 04/21/25  1420 04/21/25  0343   SODIUM mmol/L 140 142 148*  --  146*   POTASSIUM mmol/L 4.7 4.4 4.1   < > 3.0*   CHLORIDE mmol/L 101 104 106  --  101   CO2 mmol/L 27.1 28.7 29.0  --  31.0*   BUN mg/dL 50* 50* 48*  --  49*   CREATININE mg/dL 3.65* 3.40* 3.10*  --  2.76*   CALCIUM mg/dL 7.9* 8.0* 8.1*  --  7.6*   BILIRUBIN mg/dL 7.9*  --  9.1*  --  8.3*   ALK PHOS U/L 165*  --  160*  --  147*   ALT (SGPT) U/L 159*  --  170*  --  180*   AST (SGOT) U/L 300*  --  368*  --  397*   GLUCOSE mg/dL 137* 153* 155*  --  108*    < > = values in this interval not displayed.       Estimated Creatinine Clearance: 28.4 mL/min (A) (by C-G formula based on SCr of 3.65 mg/dL (H)).    Results from last 7 days   Lab Units 04/23/25  0506 04/22/25  1234 04/22/25  0534 04/21/25  0343   MAGNESIUM mg/dL 1.7  --  1.7 1.7   PHOSPHORUS mg/dL 5.2* 3.6 1.4* 2.5       Results from last 7 days   Lab Units 04/21/25  0343 04/20/25  0641 04/17/25  2148   URIC ACID mg/dL 15.0* 14.9* 13.6*       Results from last 7 days   Lab Units 04/23/25  0506 04/22/25  0534 04/21/25  0343 04/20/25  1149 04/20/25  0641 04/19/25  0913   WBC 10*3/mm3 11.60* 9.37 8.29  --  9.47 8.66   HEMOGLOBIN g/dL 8.5* 8.2* 8.5*  --  8.8* 9.1*   PLATELETS 10*3/mm3 41* 48*  42* 44* 42* 47*       Results from last 7 days   Lab Units 04/23/25  0506 04/22/25  1234 04/20/25  1149 04/18/25  0337 04/17/25 2011   INR  1.61* 1.72* 1.80* 1.79* 1.64*       Assessment / Plan     ASSESSMENT:  Acute kidney injury on probable CKD but baseline unknown.  Significant drop in urine output overnight correlating with hypotension.  Creatinine increased again today as expected with ongoing hypotension..  Ongoing pressor requirement, tachycardia.  Hypernatremia improved.  2.  Diarrhea.  Enteropathogenic E. coli.  Lactulose stopped yesterday and rifaximin added.   Likely C. difficile colonization.  3.  Pancreatitis on CT. abdominal tenderness and distention on exam.  May need to repeat CT scan.  4.  Alcohol abuse.  Withdrawal.  5.  Decompensated cirrhosis with ascites.  Transaminases stable to slightly improved.  Bilirubin unchanged at 7.9.  6.  Ischemic cardiomyopathy with acute on chronic heart failure reduced ejection fraction EF 30%.  Moderate mitral valve regurgitation.  ICD in place.  Hypotension with tachycardia.  Now on vasopressin and low-dose Levophed.  Heart rate had been better, but 116 this morning..  7.  Thrombocytopenia.  HIT antibody negative from 4/21/2025.  Platelets 41,000  8.  Anemia.  Hemoglobin stable at 8.5.  PLAN:  Discontinue D5W.  1 dose IV diuretic this morning.  Will discuss abdominal distention with Dr. Cornejo.  May need a CT of his abdomen.  Aggressive oral care discussed with nursing  Remains very critically ill.  Very guarded prognosis.  Thank you for involving us in the care of Arhcie Oquendo.  Please feel free to call with any questions.    Anjana Park MD  04/23/25  07:56 EDT    [facility]  [phone]    Please note that portions of this note were completed with a voice recognition program.

## 2025-04-23 NOTE — PAYOR COMM NOTE
"Bj Oquendo (59 y.o. Male)                           ATTENTION ;CONTINUED CLINICALS CASE [id number]                           REPLY TO [facility] DEPT  [phone] OR CALL              Date of Birth   1965    Social Security Number       [address]   [address] [place] [address]    Home Phone   [phone]    MRN   [id number]       Jehovah's witness   Patient Refused    Marital Status                               Admission Date   4/17/2025    Admission Type   Emergency    Admitting Provider   Antony Weston MD    Attending Provider   Antony Weston MD    Department, Room/Bed   [facility] CORONARY [facility], N339/1       Discharge Date       Discharge Disposition       Discharge Destination                                 Attending Provider: Antony Weston MD    Allergies: No Known Allergies    Isolation: Spore   Infection: C.difficile (04/18/25), Other (04/18/25)   Code Status: No CPR    Ht: 188 cm (74\")   Wt: 94.6 kg (208 lb 8.9 oz)    Admission Cmt: None   Principal Problem: Sepsis [A41.9]                   Active Insurance as of 4/17/2025       Primary Coverage       Payor Plan Insurance Group Employer/Plan Group    [employer] BLUE CROSS [employer] BLUE CROSS BLUE SHIELD PPO [id number]       Payor Plan Address Payor Plan Phone Number Payor Plan Fax Number Effective Dates    [address] [phone]  1/1/2025 - None Entered    [facility] [id number]         Subscriber Name Subscriber Birth Date Member ID       BJ OQUENDO 1965 [id number]                     Emergency Contacts        (Rel.) Home Phone Work Phone Mobile Phone    Patricia Oquendo (Daughter) -- -- [phone]    IBIS OQUENDO (Daughter) -- -- [phone]    Kedar waite (Other) -- -- [phone]              Vital Signs (last day)       Date/Time Temp Temp src Pulse Resp BP Patient Position SpO2    04/23/25 1533 98 (36.7) Oral 93 -- 71/48 -- --    04/23/25 1207 98.5 (36.9) " Oral -- -- -- -- --    04/23/25 1124 -- -- 107 -- 98/73 -- --    04/23/25 0946 -- -- 115 -- 110/86 -- --    04/23/25 0903 -- -- 105 -- 89/46 -- --    04/23/25 0800 -- -- 118 -- 82/67 -- 95    04/23/25 0745 -- -- 113 -- 94/70 -- 95    04/23/25 0740 97.6 (36.4) Oral -- -- -- -- --    04/23/25 0730 -- -- 125 -- -- -- 89    04/23/25 0715 -- -- 104 -- 123/113 -- 89    04/23/25 0700 -- -- 111 -- 110/98 -- 91    04/23/25 0630 -- -- 87 -- 85/67 -- 100    04/23/25 0615 -- -- 88 -- 92/61 -- 100    04/23/25 0600 -- -- 90 -- 91/63 -- 100    04/23/25 0545 -- -- 91 -- 90/59 -- 100    04/23/25 0530 -- -- 90 -- 92/67 -- 99    04/23/25 0515 -- -- 89 -- 86/65 -- 99    04/23/25 0500 -- -- 89 -- 90/67 -- 98    04/23/25 0445 -- -- 92 -- 84/64 -- 96    04/23/25 0430 -- -- 93 -- 89/67 -- 97    04/23/25 0415 -- -- 95 -- 84/67 -- 98    04/23/25 0400 -- -- 93 -- 88/68 -- 98    04/23/25 0345 -- -- 93 -- 87/70 -- 98    04/23/25 0330 -- -- 93 -- 94/64 -- 98    04/23/25 0315 97.6 (36.4) Axillary 95 20 91/66 Lying 97    04/23/25 0300 -- -- 94 -- 88/67 -- 97    04/23/25 0245 -- -- 94 -- 84/67 -- 97    04/23/25 0230 -- -- 95 -- 84/70 -- 97    04/23/25 0215 -- -- 97 -- 88/69 -- 97    04/23/25 0200 -- -- 96 -- 103/68 -- 97    04/23/25 0145 -- -- 92 -- 105/68 -- 98    04/23/25 0130 -- -- 95 -- 99/69 -- 98    04/23/25 0115 -- -- 94 -- 98/74 -- 99    04/23/25 0100 -- -- 94 -- 86/70 -- 98    04/23/25 0045 -- -- 95 -- 88/60 -- 98    04/23/25 0041 -- -- 97 -- 76/63 -- 97    04/23/25 0030 -- -- 96 -- 83/49 -- 97    04/23/25 0015 97.6 (36.4) Axillary 103 18 113/98 Lying 98    04/23/25 0000 -- -- 105 -- 79/59 -- 98    04/22/25 2345 -- -- 107 -- 78/56 -- 98    04/22/25 2330 -- -- 105 -- 98/62 -- 99    04/22/25 2315 -- -- 98 -- 95/73 -- 100    04/22/25 2300 -- -- 105 -- 127/94 -- 98    04/22/25 2245 -- -- 113 -- 111/69 -- 97    04/22/25 2230 -- -- 101 -- 97/68 -- 98    04/22/25 2215 -- -- 93 -- 92/71 -- 92    04/22/25 2130 -- -- 94 -- 95/72 -- 92    04/22/25  2115 -- -- 95 -- 92/68 -- 91    04/22/25 2100 -- -- 95 -- 80/63 -- 91    04/22/25 2045 -- -- 96 -- 83/61 -- 90    04/22/25 2030 -- -- 98 -- 78/61 -- 89    04/22/25 2015 -- -- 98 -- 76/55 -- 93    04/22/25 2000 -- -- 96 -- 85/59 -- 92    04/22/25 1945 -- -- 100 -- 70/52 -- 94    04/22/25 1930 -- -- 102 -- 81/54 -- 90    04/22/25 1915 -- -- 105 -- 86/61 -- 91    04/22/25 1900 98 (36.7) Axillary 106 18 89/64 Lying 88    04/22/25 1830 -- -- 122 -- 118/79 -- 99    04/22/25 1800 -- -- 114 -- 103/82 -- 97    04/22/25 1745 -- -- 116 -- 95/76 -- 99    04/22/25 1730 -- -- 121 -- 93/68 -- 95    04/22/25 1715 -- -- 118 -- 98/77 -- 98    04/22/25 1714 97.7 (36.5) Axillary -- -- -- -- --    04/22/25 1700 -- -- 117 -- 96/68 -- 99    04/22/25 1645 -- -- 118 -- 112/90 -- 98    04/22/25 1630 -- -- 120 -- 107/88 -- 99    04/22/25 1615 -- -- 121 -- 103/67 -- 99    04/22/25 1600 -- -- 112 -- 102/74 -- 97    04/22/25 1545 -- -- 106 -- 87/59 -- 99    04/22/25 1530 -- -- 113 -- 80/57 -- 99    04/22/25 1522 -- -- 111 -- 77/65 -- --    04/22/25 1515 -- -- 107 -- 75/48 -- 100    04/22/25 1500 -- -- 116 -- 101/81 -- 99    04/22/25 1445 -- -- 120 -- 115/88 -- 99    04/22/25 1430 -- -- 112 -- 103/83 -- 99    04/22/25 1415 -- -- 123 -- 103/68 -- 100    04/22/25 1400 -- -- 124 -- 112/92 -- 98    04/22/25 1345 -- -- 111 -- 105/69 -- 99    04/22/25 1330 -- -- 109 -- 127/115 -- 99    04/22/25 1315 -- -- 125 -- 106/81 -- 98    04/22/25 1300 -- -- 113 -- 108/57 -- 99    04/22/25 1245 -- -- 115 -- 98/78 -- 98    04/22/25 1241 -- -- 110 -- 97/69 -- --    04/22/25 1230 -- -- 124 -- 97/69 -- 97    04/22/25 1215 -- -- 122 -- 106/72 -- 97    04/22/25 1200 -- -- 121 -- 99/65 -- 98    04/22/25 1159 -- -- 123 -- 103/53 -- --    04/22/25 1145 -- -- 110 -- 103/53 -- 96    04/22/25 1130 -- -- 116 -- 84/60 -- 96    04/22/25 1115 -- -- 114 -- 81/65 -- 100    04/22/25 1114 97.9 (36.6) Oral -- -- -- -- --    04/22/25 1100 -- -- 104 -- 75/46 -- 100    04/22/25 1045 --  -- 103 -- 81/59 -- 100    04/22/25 1030 -- -- 105 -- 71/60 -- 100    04/22/25 1015 -- -- 108 -- 90/52 -- 100    04/22/25 1000 -- -- 107 -- 76/62 -- 100    04/22/25 0945 -- -- 106 -- 73/51 -- 100    04/22/25 0930 -- -- 123 -- 102/86 -- 100    04/22/25 0915 -- -- 121 -- 110/73 -- 100    04/22/25 0900 -- -- 120 -- 93/75 -- 100    04/22/25 0845 -- -- -- -- 81/53 -- --    04/22/25 0830 -- -- -- -- 109/97 -- --    04/22/25 0815 -- -- 130 -- 110/84 -- 100    04/22/25 0800 97.5 (36.4) Oral 99 16 118/79 Lying 100    04/22/25 0745 -- -- 115 -- 119/59 -- 93    04/22/25 0730 -- -- 126 -- 104/70 -- 100    04/22/25 0715 -- -- 113 -- 121/81 -- 98    04/22/25 0700 -- -- 111 -- 96/59 -- 98    04/22/25 0645 -- -- 127 -- 105/85 -- 80    04/22/25 0630 -- -- 123 -- 100/79 -- 100    04/22/25 0615 -- -- 124 -- 80/66 -- 99    04/22/25 0600 -- -- 129 -- 132/79 -- 99    04/22/25 0545 -- -- 116 -- -- -- 99    04/22/25 0530 -- -- 127 -- 125/103 -- 99    04/22/25 0515 -- -- 124 -- 90/79 -- 100    04/22/25 0500 -- -- 125 -- 104/77 -- 93    04/22/25 0445 -- -- 125 -- 102/81 -- 94    04/22/25 0430 -- -- 127 -- 102/83 -- 100    04/22/25 0415 -- -- 124 -- 101/80 -- 99    04/22/25 0400 98 (36.7) Axillary 122 18 123/72 Lying 100    04/22/25 0351 98.3 (36.8) Axillary 124 18 -- -- 100    04/22/25 0345 -- -- 125 -- 129/70 -- 100    04/22/25 0330 -- -- 126 -- 121/78 -- 99    04/22/25 0300 -- -- 125 -- 108/77 -- 99    04/22/25 0245 -- -- 126 -- 107/78 -- 97    04/22/25 0230 -- -- 119 -- 91/81 -- 99    04/22/25 0215 -- -- 121 -- 110/73 -- 96    04/22/25 0200 -- -- 120 -- 100/63 -- 100    04/22/25 0145 -- -- 114 -- 101/80 -- 100    04/22/25 0130 -- -- 96 -- 102/60 -- 100    04/22/25 0115 -- -- 85 -- 72/49 -- 100    04/22/25 0100 98.3 (36.8) Axillary 97 18 73/54 Lying 99    04/22/25 0045 -- -- 100 -- 81/62 -- 100    04/22/25 0030 -- -- 124 -- 112/74 -- 100    04/22/25 0015 -- -- 127 -- 93/74 -- 92    04/22/25 0000 98 (36.7) Axillary 124 16 98/66 Lying 95           Oxygen Therapy (last day)       Date/Time SpO2 Device (Oxygen Therapy) Flow (L/min) (Oxygen Therapy) Oxygen Concentration (%) ETCO2 (mmHg)    04/23/25 0800 95 nasal cannula 5 -- --    04/23/25 0745 95 -- -- -- --    04/23/25 0730 89 -- -- -- --    04/23/25 0715 89 -- -- -- --    04/23/25 0700 91 -- -- -- --    04/23/25 0630 100 -- -- -- --    04/23/25 0615 100 -- -- -- --    04/23/25 0600 100 -- -- -- --    04/23/25 0545 100 -- -- -- --    04/23/25 0530 99 -- -- -- --    04/23/25 0515 99 -- -- -- --    04/23/25 0500 98 -- -- -- --    04/23/25 0445 96 -- -- -- --    04/23/25 0430 97 -- -- -- --    04/23/25 0415 98 -- -- -- --    04/23/25 0400 98 nasal cannula 5 -- --    04/23/25 0345 98 -- -- -- --    04/23/25 0330 98 -- -- -- --    04/23/25 0315 97 -- -- -- --    04/23/25 0300 97 -- -- -- --    04/23/25 0245 97 -- -- -- --    04/23/25 0230 97 -- -- -- --    04/23/25 0215 97 -- -- -- --    04/23/25 0200 97 -- -- -- --    04/23/25 0145 98 -- -- -- --    04/23/25 0130 98 -- -- -- --    04/23/25 0115 99 -- -- -- --    04/23/25 0100 98 -- -- -- --    04/23/25 0045 98 -- -- -- --    04/23/25 0041 97 -- -- -- --    04/23/25 0030 97 -- -- -- --    04/23/25 0015 98 -- -- -- --    04/23/25 0000 98 nasal cannula 5 -- --    04/22/25 2345 98 -- -- -- --    04/22/25 2330 99 -- -- -- --    04/22/25 2315 100 -- -- -- --    04/22/25 2300 98 -- -- -- --    04/22/25 2245 97 -- -- -- --    04/22/25 2230 98 -- -- -- --    04/22/25 2215 92 -- -- -- --    04/22/25 2130 92 -- -- -- --    04/22/25 2115 91 -- -- -- --    04/22/25 2100 91 -- -- -- --    04/22/25 2045 90 -- -- -- --    04/22/25 2030 89 -- -- -- --    04/22/25 2015 93 -- -- -- --    04/22/25 2000 92 nasal cannula 5 -- --    04/22/25 1945 94 -- -- -- --    04/22/25 1930 90 -- -- -- --    04/22/25 1915 91 -- -- -- --    04/22/25 1900 88 -- -- -- --    04/22/25 1830 99 -- -- -- --    04/22/25 1800 97 -- -- -- --    04/22/25 1745 99 -- -- -- --    04/22/25 1730 95 -- -- -- --     04/22/25 1715 98 -- -- -- --    04/22/25 1700 99 -- -- -- --    04/22/25 1645 98 -- -- -- --    04/22/25 1630 99 -- -- -- --    04/22/25 1615 99 -- -- -- --    04/22/25 1600 97 -- -- -- --    04/22/25 1545 99 -- -- -- --    04/22/25 1530 99 -- -- -- --    04/22/25 1515 100 -- -- -- --    04/22/25 1500 99 -- -- -- --    04/22/25 1445 99 -- -- -- --    04/22/25 1430 99 -- -- -- --    04/22/25 1415 100 -- -- -- --    04/22/25 1400 98 -- -- -- --    04/22/25 1345 99 -- -- -- --    04/22/25 1330 99 -- -- -- --    04/22/25 1315 98 -- -- -- --    04/22/25 1300 99 -- -- -- --    04/22/25 1245 98 -- -- -- --    04/22/25 1230 97 -- -- -- --    04/22/25 1215 97 -- -- -- --    04/22/25 1200 98 -- -- -- --    04/22/25 1145 96 -- -- -- --    04/22/25 1130 96 -- -- -- --    04/22/25 1115 100 -- -- -- --    04/22/25 1100 100 -- -- -- --    04/22/25 1045 100 -- -- -- --    04/22/25 1030 100 -- -- -- --    04/22/25 1015 100 -- -- -- --    04/22/25 1000 100 -- -- -- --    04/22/25 0945 100 -- -- -- --    04/22/25 0930 100 -- -- -- --    04/22/25 0915 100 -- -- -- --    04/22/25 0900 100 -- -- -- --    04/22/25 0815 100 -- -- -- --    04/22/25 0800 100 nasal cannula 5 -- --    04/22/25 0745 93 -- -- -- --    04/22/25 0730 100 -- -- -- --    04/22/25 0715 98 -- -- -- --    04/22/25 0700 98 -- -- -- --    04/22/25 0645 80 -- -- -- --    04/22/25 0630 100 -- -- -- --    04/22/25 0615 99 -- -- -- --    04/22/25 0600 99 -- -- -- --    04/22/25 0545 99 -- -- -- --    04/22/25 0530 99 -- -- -- --    04/22/25 0515 100 -- -- -- --    04/22/25 0500 93 -- -- -- --    04/22/25 0445 94 -- -- -- --    04/22/25 0430 100 -- -- -- --    04/22/25 0415 99 -- -- -- --    04/22/25 0400 100 nasal cannula 5 -- --    04/22/25 0351 100 -- -- -- --    04/22/25 0345 100 -- -- -- --    04/22/25 0330 99 -- -- -- --    04/22/25 0300 99 -- -- -- --    04/22/25 0245 97 -- -- -- --    04/22/25 0230 99 -- -- -- --    04/22/25 0215 96 -- -- -- --    04/22/25 0200 100  -- -- -- --    04/22/25 0145 100 -- -- -- --    04/22/25 0130 100 -- -- -- --    04/22/25 0115 100 -- -- -- --    04/22/25 0100 99 nasal cannula with ETCO2 10 -- --    04/22/25 0045 100 -- -- -- --    04/22/25 0030 100 -- -- -- --    04/22/25 0015 92 -- -- -- --    04/22/25 0000 95 nasal cannula 5 -- --          Lines, Drains & Airways       Active LDAs       Name Placement date Placement time Site Days    CVC Quadruple Lumen 04/18/25 Non-tunneled Right Internal jugular 04/18/25  1500  Internal jugular  5    Peripheral IV 04/17/25 2000 18 G Anterior;Right;Upper Arm 04/17/25 2000  Arm  5    Peripheral IV 04/17/25 2030 18 G Anterior;Left;Upper Arm 04/17/25 2030  Arm  5    NG/OG Tube Nasogastric 04/20/25  1424  --  3    Urethral Catheter Straight-tip 16 Fr. 04/17/25 2045  -- 5                  Current Facility-Administered Medications   Medication Dose Route Frequency Provider Last Rate Last Admin    [START ON 4/24/2025] cephalexin (KEFLEX) capsule 500 mg  500 mg Nasogastric Q12H Antony Weston MD        dextrose (D50W) (25 g/50 mL) IV injection 25 g  25 g Intravenous Q15 Min PRN Vianey Joens APRN        dextrose (GLUTOSE) oral gel 15 g  15 g Oral Q15 Min PRN Vianey Jones APRN        folic acid (FOLVITE) tablet 1 mg  1 mg Nasogastric Daily Alvaro Cornejo MD   1 mg at 04/23/25 0809    glucagon (GLUCAGEN) injection 1 mg  1 mg Intramuscular Q15 Min PRN Vianey Jones APRN        insulin regular (humuLIN R,novoLIN R) injection 2-7 Units  2-7 Units Subcutaneous Q6H Vianey Jones APRN   2 Units at 04/22/25 1451    Menthol-Zinc Oxide 1 Application  1 Application Topical BID Antony Weston MD   1 Application at 04/23/25 0810    midodrine (PROAMATINE) tablet 10 mg  10 mg Nasogastric Q8H Antony Weston MD   10 mg at 04/23/25 1124    nitroglycerin (NITROSTAT) SL tablet 0.4 mg  0.4 mg Sublingual Q5 Min PRN Vianey Jones, APRN        norepinephrine (LEVOPHED) 8 mg in 250 mL NS infusion (premix)   0.02-0.3 mcg/kg/min Intravenous Titrated Vianey Jones APRN 7.37 mL/hr at 04/23/25 1533 0.04 mcg/kg/min at 04/23/25 1533    ondansetron (ZOFRAN) injection 4 mg  4 mg Intravenous Q6H PRN Vianey Jones APRN   4 mg at 04/20/25 0933    oxyCODONE (ROXICODONE) immediate release tablet 5 mg  5 mg Oral Q6H PRN Alvaro Cornejo MD   5 mg at 04/23/25 1419    Pharmacy Consult   Not Applicable Continuous PRN Alvaro Cornejo MD        riFAXIMin (XIFAXAN) tablet 550 mg  550 mg Nasogastric Q12H Veena Patel MD   550 mg at 04/23/25 0809    sodium chloride 0.9 % flush 10 mL  10 mL Intravenous Q12H Vianey Jones APRN   10 mL at 04/23/25 0810    sodium chloride 0.9 % flush 10 mL  10 mL Intravenous PRN Vianey Jones APRN        sodium chloride 0.9 % flush 10 mL  10 mL Intravenous Q12H Antony Weston MD   10 mL at 04/23/25 0810    sodium chloride 0.9 % flush 10 mL  10 mL Intravenous Q12H Antony Weston MD   10 mL at 04/23/25 0810    sodium chloride 0.9 % flush 10 mL  10 mL Intravenous Q12H Antony Weston MD   10 mL at 04/23/25 0810    sodium chloride 0.9 % flush 10 mL  10 mL Intravenous Q12H Antony Weston MD   10 mL at 04/23/25 0810    sodium chloride 0.9 % flush 10 mL  10 mL Intravenous PRN Antony Weston MD        sodium chloride 0.9 % flush 20 mL  20 mL Intravenous PRN Antony Weston MD        sodium chloride 0.9 % infusion 40 mL  40 mL Intravenous PRN Vianey Jones APRN        sodium chloride 0.9 % infusion 40 mL  40 mL Intravenous PRN Antony Weston MD        thiamine (VITAMIN B-1) tablet 100 mg  100 mg Nasogastric Daily Antony Weston MD   100 mg at 04/23/25 0809    vancomycin oral solution reconstituted 125 mg  125 mg Nasogastric Q6H Antony Weston MD   125 mg at 04/23/25 1413    vasopressin (PITRESSIN) 20 units in 100 mL NS infusion  0.03 Units/min Intravenous Continuous Alvaro Cornejo MD 9 mL/hr at 04/23/25 0946 0.03 Units/min at 04/23/25 0946     Orders (last 24  "hrs)        Start     Ordered    05/13/25 0900  vancomycin (VANCOCIN) capsule 125 mg  Every Other Day,   Status:  Discontinued         04/19/25 1025    05/06/25 0900  vancomycin (VANCOCIN) capsule 125 mg  Every 24 Hours,   Status:  Discontinued         04/19/25 1025    04/29/25 0900  vancomycin (VANCOCIN) capsule 125 mg  Every 12 Hours,   Status:  Discontinued         04/19/25 1025    04/25/25 0000  Change Transparent Dressing & Needleless Connectors Every 7 Days  Weekly        Comments: Per [facility] Policy    04/21/25 1031    04/24/25 1230  cephalexin (KEFLEX) capsule 500 mg  Every 12 Hours Scheduled,   Status:  Discontinued        Placed in \"Followed by\" Linked Group    04/19/25 1047    04/24/25 1230  cephalexin (KEFLEX) capsule 500 mg  Every 12 Hours Scheduled        Placed in \"Followed by\" Linked Group    04/20/25 1721    04/24/25 0600  Ammonia  Morning Draw         04/23/25 0618    04/23/25 1604  Ammonia  Once         04/23/25 1603    04/23/25 1500  diatrizoate meglumine-sodium (GASTROGRAFIN) 66-10 % oral solution 30 mL  Once         04/23/25 1400    04/23/25 1233  POC Glucose Once  PROCEDURE ONCE        Comments: Complete no more than 45 minutes prior to patient eating      04/23/25 1231    04/23/25 1200  Oral Care  Every 4 Hours       04/23/25 0802    04/23/25 1136  Obtain Informed Consent  Once         04/23/25 1135    04/23/25 1136  Obtain Informed Consent  Once         04/23/25 1135    04/23/25 1119  US Paracentesis  1 Time Imaging         04/23/25 1119    04/23/25 1111  Inpatient Palliative Care Team Consult  Once        Provider:  (Not yet assigned)    04/23/25 1110    04/23/25 0915  Hold tube feeds  Misc Nursing Order (Specify)  Once        Comments: Hold tube feeds    04/23/25 0914    04/23/25 0914  CT Abdomen Pelvis Without Contrast  1 Time Imaging         04/23/25 0913    04/23/25 0901  Restraints Non-Violent or Non-Self Destructive  Calendar Day         04/23/25 0900    04/23/25 0900  thiamine (VITAMIN " "B-1) tablet 100 mg  Daily,   Status:  Discontinued        Placed in \"Followed by\" Linked Group    04/17/25 2302    04/23/25 0900  thiamine (VITAMIN B-1) tablet 100 mg  Daily        Placed in \"Followed by\" Linked Group    04/20/25 1721    04/23/25 0900  lactulose (CHRONULAC) 10 GM/15ML solution 10 g  Daily,   Status:  Discontinued         04/22/25 1122    04/23/25 0900  folic acid (FOLVITE) tablet 1 mg  Daily         04/22/25 1225    04/23/25 0900  furosemide (LASIX) injection 60 mg  Once         04/23/25 0805    04/23/25 0600  CBC Auto Differential  PROCEDURE ONCE         04/22/25 2201 04/23/25 0532  Manual Differential  Once,   Status:  Canceled         04/23/25 0531    04/23/25 0507  POC Glucose Once  PROCEDURE ONCE        Comments: Complete no more than 45 minutes prior to patient eating      04/23/25 0505    04/23/25 0018  POC Glucose Once  PROCEDURE ONCE        Comments: Complete no more than 45 minutes prior to patient eating      04/23/25 0016    04/23/25 0009  POC Glucose Once  PROCEDURE ONCE        Comments: Complete no more than 45 minutes prior to patient eating      04/23/25 0005    04/22/25 1832  POC Glucose Once  PROCEDURE ONCE        Comments: Complete no more than 45 minutes prior to patient eating      04/22/25 1830    04/22/25 1627  Tube Feeding: Formula: Isosource 1.5 (Jevity 1.5); Feeding Type: Continuous; Start at: 30 mL/hr; Then Advance By: Do Not Advance; Water Flush: 50 mL; Every: 1 hour; Water Bolus: None  Diet Effective Now         04/22/25 1626    04/22/25 1215  riFAXIMin (XIFAXAN) tablet 550 mg  Every 12 Hours Scheduled         04/22/25 1123    04/22/25 0945  vasopressin (PITRESSIN) 20 units in 100 mL NS infusion  Continuous         04/22/25 0851    04/22/25 0830  dextrose (D5W) 5 % infusion  Continuous,   Status:  Discontinued         04/22/25 0737    04/22/25 0600  Daily CHG Bath While Central Line in Place  Daily       04/21/25 1031    04/22/25 0600  Protime-INR  Daily       " "04/21/25 1109    04/22/25 0600  aPTT  Daily       04/21/25 1109    04/21/25 1800  POC Glucose Q6H  Every 6 Hours      Comments: Complete no more than 45 minutes prior to patient eating      04/21/25 1322    04/21/25 1800  Dietary Nutrition Supplements Other (see comment); Banatrol, given 1 packet 3X daily via cortrak to thicken stool, safe with cdiff  Daily With Breakfast, Lunch & Dinner       04/21/25 1322    04/21/25 1622  oxyCODONE (ROXICODONE) immediate release tablet 5 mg  Every 6 Hours PRN         04/21/25 1623    04/21/25 1323  Daily Weights  Daily       04/21/25 1322    04/21/25 1321  Tube Feeding Assessment and I/O  Every Shift       04/21/25 1322    04/21/25 1230  cefTRIAXone (ROCEPHIN) 2,000 mg in sodium chloride 0.9 % 100 mL MBP  Every 24 Hours        Placed in \"Followed by\" Linked Group    04/20/25 1721    04/21/25 1130  sodium chloride 0.9 % flush 10 mL  Every 12 Hours Scheduled         04/21/25 1031    04/21/25 1130  sodium chloride 0.9 % flush 10 mL  Every 12 Hours Scheduled         04/21/25 1031    04/21/25 1130  sodium chloride 0.9 % flush 10 mL  Every 12 Hours Scheduled         04/21/25 1031    04/21/25 1130  sodium chloride 0.9 % flush 10 mL  Every 12 Hours Scheduled         04/21/25 1031    04/21/25 1030  sodium chloride 0.9 % flush 10 mL  As Needed         04/21/25 1031    04/21/25 1030  sodium chloride 0.9 % flush 20 mL  As Needed         04/21/25 1031    04/21/25 1030  sodium chloride 0.9 % infusion 40 mL  As Needed         04/21/25 1031    04/21/25 1030  Menthol-Zinc Oxide 1 Application  2 Times Daily         04/21/25 0935    04/21/25 0906  Daily Weights  Daily       04/21/25 0905    04/21/25 0905  Tube Feeding Assessment and I/O  Every Shift       04/21/25 0905    04/20/25 1815  midodrine (PROAMATINE) tablet 10 mg  Every 8 Hours         04/20/25 1721    04/20/25 1815  vancomycin oral solution reconstituted 125 mg  Every 6 Hours Scheduled         04/20/25 1723    04/20/25 1717  LORazepam " "(ATIVAN) tablet 1 mg  Every 1 Hour PRN        Placed in \"Or\" Linked Group    04/20/25 1721    04/20/25 1717  LORazepam (ATIVAN) injection 1 mg  Every 1 Hour PRN        Placed in \"Or\" Linked Group    04/20/25 1721    04/20/25 1717  LORazepam (ATIVAN) tablet 2 mg  Every 1 Hour PRN        Placed in \"Or\" Linked Group    04/20/25 1721    04/20/25 1717  LORazepam (ATIVAN) injection 2 mg  Every 1 Hour PRN        Placed in \"Or\" Linked Group    04/20/25 1721    04/20/25 1717  LORazepam (ATIVAN) injection 2 mg  Every 15 Minutes PRN        Placed in \"Or\" Linked Group    04/20/25 1721    04/20/25 1717  LORazepam (ATIVAN) injection 2 mg  Every 15 Minutes PRN        Placed in \"Or\" Linked Group    04/20/25 1721    04/19/25 1025  Pharmacy Consult  Continuous PRN         04/19/25 1025    04/18/25 0600  Phosphorus  Daily       04/17/25 2250    04/18/25 0600  Magnesium  Daily       04/17/25 2250    04/18/25 0600  CBC & Differential  Daily       04/17/25 2250    04/18/25 0600  Comprehensive Metabolic Panel  Daily       04/17/25 2250 04/18/25 0215  norepinephrine (LEVOPHED) 8 mg in 250 mL NS infusion (premix)  Titrated         04/18/25 0117    04/18/25 0000  POC Glucose Q6H  Every 6 Hours      Comments: Complete no more than 45 minutes prior to patient eating      04/17/25 2259 04/18/25 0000  insulin regular (humuLIN R,novoLIN R) injection 2-7 Units  Every 6 Hours Scheduled         04/17/25 2259 04/18/25 0000  Vital Signs Every Hour and Per Hospital Policy Based on Patient Condition  Every Hour       04/17/25 2301 04/18/25 0000  Intake & Output  Every Hour       04/17/25 2301 04/17/25 2317  sodium chloride 0.9 % flush 10 mL  Every 12 Hours Scheduled         04/17/25 2301 04/17/25 2303  fentaNYL citrate (PF) (SUBLIMAZE) injection 25 mcg  Every 2 Hours PRN         04/17/25 2303 04/17/25 2302  Daily Weights  Daily       04/17/25 2301 04/17/25 2302  Daily CHG Bath While in Critical Care  Daily      Comments: Use for " "admission bath and length of critical care stay.    04/17/25 2301 04/17/25 2301  ondansetron (ZOFRAN) injection 4 mg  Every 6 Hours PRN         04/17/25 2301 04/17/25 2301  Oral Care - Patient Not on NPPV & Not Intubated  Every Shift,   Status:  Canceled       04/17/25 2301 04/17/25 2300  nitroglycerin (NITROSTAT) SL tablet 0.4 mg  Every 5 Minutes PRN         04/17/25 2301 04/17/25 2300  sodium chloride 0.9 % flush 10 mL  As Needed         04/17/25 2301 04/17/25 2300  sodium chloride 0.9 % infusion 40 mL  As Needed         04/17/25 2301 04/17/25 2259  dextrose (GLUTOSE) oral gel 15 g  Every 15 Minutes PRN         04/17/25 2259 04/17/25 2259  dextrose (D50W) (25 g/50 mL) IV injection 25 g  Every 15 Minutes PRN         04/17/25 2259 04/17/25 2259  glucagon (GLUCAGEN) injection 1 mg  Every 15 Minutes PRN         04/17/25 2259 04/17/25 2028  Urinary Catheter Care  Every Shift      Placed in \"And\" Linked Group    04/17/25 2027 04/01/25 0000  lisinopril (PRINIVIL,ZESTRIL) 20 MG tablet  Daily         04/18/25 0000    04/01/25 0000  potassium chloride ER (K-TAB) 20 MEQ tablet controlled-release ER tablet  2 Times Daily         04/18/25 0000    04/01/25 0000  bumetanide (BUMEX) 2 MG tablet  2 Times Daily         04/18/25 0000    04/01/25 0000  cyclobenzaprine (FLEXERIL) 10 MG tablet  2 Times Daily PRN         04/18/25 0000    Unscheduled  Follow Hypoglycemia Standing Orders For Blood Glucose <70 & Notify Provider of Treatment  As Needed      Comments: Follow Hypoglycemia Orders As Outlined in Process Instructions (Open Order Report to View Full Instructions)  Notify Provider Any Time Hypoglycemia Treatment is Administered    04/17/25 2259    Unscheduled  Obtain Pre & Post Sedation Scores With Every Sedative Dose - Hold For POSS Greater Than 2 or RASS of -3 or Less  As Needed       04/17/25 2302    Unscheduled  Apply Pressure Dressing to Paracentesis Site if Issues With Leaking  As Needed       " 04/18/25 1408    Unscheduled  Oxygen Therapy- Heated High Flow Nasal Cannula; Titrate 30-60 LPM Per SpO2; 90 - 95%  Continuous PRN       04/18/25 2325    Unscheduled  Alex & Document Feeding Tube Depth (in cm)  As Needed       04/21/25 0905    Unscheduled  Verify Feeding Tube Placement Upon Insertion & As Needed  As Needed       04/21/25 0905    Unscheduled  Flush Feeding Tube With 30-50mL Water As Needed  As Needed       04/21/25 0905    Unscheduled  Wound Care  As Needed       04/21/25 0935    Unscheduled  Change Dressing to IV Site As Needed When Damp, Loose or Soiled  As Needed       04/21/25 1031    Unscheduled  Change Needleless Connectors  As Needed      Comments: Change Needleless Connectors When:  - Administration Set Changed  - Dressing Changed  - Removed For Any Reason  - Residual Blood or Debris Within Connector  - Prior to Drawing Blood Cultures  - Contamination of Connector  - After Administration of Blood or Blood Components    04/21/25 1031    Unscheduled  Alex & Document Feeding Tube Depth (in cm)  As Needed       04/21/25 1322    Unscheduled  Verify Feeding Tube Placement Upon Insertion & As Needed  As Needed       04/21/25 1322    Unscheduled  Flush Feeding Tube With 30-50mL Water As Needed  As Needed       04/21/25 1322    --  bismuth subsalicylate (PEPTO BISMOL) 262 MG/15ML suspension  Every 6 Hours PRN         04/18/25 0101    Signed and Held  Body Fluid Cell Count With Differential - Body Fluid, Peritoneum  STAT         Signed and Held    Signed and Held  Albumin, Fluid - Body Fluid, Peritoneum  STAT         Signed and Held    Signed and Held  Protein, Body Fluid - Body Fluid, Peritoneum  STAT         Signed and Held    Signed and Held  Body Fluid Culture - Body Fluid, Peritoneum  STAT         Signed and Held    Signed and Held  Anaerobic Culture - Body Fluid, Peritoneum  STAT         Signed and Held    Signed and Held  albumin human 25 % IV SOLN 12.5 g  Once         Signed and Held    Signed  "and Held  Body Fluid Cell Count With Differential - Body Fluid, Peritoneum  STAT         Signed and Held    Signed and Held  Body Fluid Culture - Body Fluid, Peritoneum  STAT         Signed and Held    Signed and Held  Protein, Body Fluid - Body Fluid, Peritoneum  STAT         Signed and Held    Signed and Held  Albumin, Fluid - Body Fluid, Peritoneum  STAT         Signed and Held                     Physician Progress Notes (last 24 hours)        Alvaro Cornejo MD at 04/23/25 0859                [facility]         Dr Alvaro Cornejo   LOS: 6 days   Patient Care Team:  Zoe Desai MD as PCP - General (Family Medicine)  Corbin Bailey MD as Surgeon (Neurosurgery)    Chief Complaint: Sepsis with septic shock E. coli enteritis with C. difficile positive other issues as listed below    Interval History: Looks worse today.  Abdominal distention more confusion.  Dobbhoff tube currently in place.  On vasopressin drip.  Overnight had to use Levophed to maintain MAP greater than 60.      REVIEW OF SYSTEMS:   Unable to get with patient's current condition    Ventilator/Non-Invasive Ventilation Settings (From admission, onward)      None        Heated high flow 60 L with 85% FiO2      Vital Signs  Temp:  [97.6 °F (36.4 °C)-98 °F (36.7 °C)] 97.6 °F (36.4 °C)  Heart Rate:  [] 87  Resp:  [18-20] 20  BP: ()/() 85/67    Intake/Output Summary (Last 24 hours) at 4/23/2025 0859  Last data filed at 4/23/2025 0500  Gross per 24 hour   Intake 5780 ml   Output 750 ml   Net 5030 ml     Flowsheet Rows      Flowsheet Row First Filed Value   Admission Height 188 cm (74\") Documented at 04/17/2025 1957   Admission Weight 104 kg (230 lb) Documented at 04/17/2025 2110            Physical Exam:  Patient is examined using the personal protective equipment as per guidelines from infection control for this particular patient as enacted.  Hand hygiene was performed before and after patient interaction.   General " Appearance:  Acutely ill and poorly arousable.  Not following commands for me.  ENT normocephalic atraumatic  Neck midline trachea, no thyromegaly   Lungs:   Diminished breath sounds bilaterally    Heart:    Regular rhythm and normal rate, normal S1 and S2, no            murmur, no gallop, no rub, no click   Chest Wall:    No abnormalities observed   Abdomen:   Soft mild diffuse tenderness no rebound or guarding   Extremities:   Moves all extremities well, trace to 1+ edema, no cyanosis, no             redness  CNS confused  Skin jaundiced  Musculoskeletal no cyanosis no clubbing normal range of motion     Results Review:        Results from last 7 days   Lab Units 04/23/25  0506 04/22/25  1234 04/22/25  0534   SODIUM mmol/L 140 142 148*   POTASSIUM mmol/L 4.7 4.4 4.1   CHLORIDE mmol/L 101 104 106   CO2 mmol/L 27.1 28.7 29.0   BUN mg/dL 50* 50* 48*   CREATININE mg/dL 3.65* 3.40* 3.10*   GLUCOSE mg/dL 137* 153* 155*   CALCIUM mg/dL 7.9* 8.0* 8.1*     Results from last 7 days   Lab Units 04/22/25  0534 04/17/25  2148 04/17/25 2011   CK TOTAL U/L 249*  --   --    HSTROP T ng/L  --  822* 1,013*     Results from last 7 days   Lab Units 04/23/25  0506 04/22/25  0534 04/21/25  0343   WBC 10*3/mm3 11.60* 9.37 8.29   HEMOGLOBIN g/dL 8.5* 8.2* 8.5*   HEMATOCRIT % 23.1* 24.0* 22.8*   PLATELETS 10*3/mm3 41* 48* 42*     Results from last 7 days   Lab Units 04/23/25  0506 04/22/25  1234 04/20/25  1149   INR  1.61* 1.72* 1.80*   APTT seconds 41.9* 40.4* 40.0*         Results from last 7 days   Lab Units 04/23/25  0506   MAGNESIUM mg/dL 1.7         Results from last 7 days   Lab Units 04/18/25  1303   PH, ARTERIAL pH units 7.431   PO2 ART mm Hg 114.3*   PCO2, ARTERIAL mm Hg 37.2   HCO3 ART mmol/L 24.8       I reviewed the patient's new clinical results.  I personally viewed and interpreted the patient's chest x-ray.        Medication Review:   cefTRIAXone, 2,000 mg, Intravenous, Q24H   Followed by  [START ON 4/24/2025] cephalexin,  500 mg, Nasogastric, Q12H  folic acid, 1 mg, Nasogastric, Daily  furosemide, 60 mg, Intravenous, Once  insulin regular, 2-7 Units, Subcutaneous, Q6H  Menthol-Zinc Oxide, 1 Application, Topical, BID  midodrine, 10 mg, Nasogastric, Q8H  rifAXIMin, 550 mg, Nasogastric, Q12H  sodium chloride, 10 mL, Intravenous, Q12H  sodium chloride, 10 mL, Intravenous, Q12H  sodium chloride, 10 mL, Intravenous, Q12H  sodium chloride, 10 mL, Intravenous, Q12H  sodium chloride, 10 mL, Intravenous, Q12H  thiamine, 100 mg, Nasogastric, Daily  vancomycin, 125 mg, Nasogastric, Q6H        norepinephrine, 0.02-0.3 mcg/kg/min, Last Rate: Stopped (04/23/25 0156)  Pharmacy Consult,   vasopressin, 0.03 Units/min, Last Rate: 0.03 Units/min (04/22/25 7707)        ASSESSMENT:   Acute metabolic encephalopathy  Sepsis present on admission due to E. coli enteritis  Acute hypoxemic respiratory failure on heated high flow  Septic shock present on admission causing encephalopathy and acute  Kidney injury  Decompensated cirrhosis  Acute pancreatitis by imaging  Non-STEMI  Hyponatremia  Hypokalemia  Acute kidney injury  Acute liver injury  Anemia  Thrombocytopenia  Alcohol abuse  Acute HFrEF    PLAN:  Mental status worse with abdominal distention.  Will get CT abdomen pelvis with oral contrast to evaluate further.  Dobbhoff tube in place.  Will hold tube feeds for now until CT abdomen pelvis complete.    Source of infection E. coli enteritis with C. difficile positive  Continue current antibiotics as per IDs recommendations.  Patient on Rocephin with p.o. vancomycin.  Slightly worse leukocytosis continue to monitor.  Creatinine  progressively getting worse..  Further fluid electrolyte management per nephrology  Use vasopressin as pressors maintain MAP greater than 60.  Platelets have been fluctuating..  Appreciate input from hematology.  Further workup per hematology.  Thiamine IV to continue  Patient remained critically ill  I had a detailed discussion  with patient daughter this morning.  She will have further discussion with other family members to decide on goals of care.  Patient currently DNR.  Overall prognosis remains guarded.  ICU core measures    Critical care time 35 minutes      Alvaro Cornejo MD  04/23/25  08:59 EDT            Electronically signed by Alvaro Cornejo MD at 04/23/25 0916       Darrion Arroyo MD at 04/23/25 0819           LOS: 6 days   Patient Care Team:  Zoe Desai MD as PCP - General (Family Medicine)  Corbin Bailey MD as Surgeon (Neurosurgery)    Chief Complaint: Follow-up acute on chronic systolic CHF, cardiomyopathy, type II NSTEMI.    Interval History: His eyes were open this morning, although he did not follow any commands.  He looks prominently jaundiced today.  Remains on vasopressin.  His systolic blood pressure this morning was in the 80s.  His heart rate overnight appears to have been slightly better.  He is still in sinus rhythm.    Vital Signs:  Temp:  [97.6 °F (36.4 °C)-98 °F (36.7 °C)] 97.6 °F (36.4 °C)  Heart Rate:  [] 87  Resp:  [18-20] 20  BP: ()/() 85/67    Intake/Output Summary (Last 24 hours) at 4/23/2025 0819  Last data filed at 4/23/2025 0500  Gross per 24 hour   Intake 5780 ml   Output 750 ml   Net 5030 ml       Physical Exam:   General Appearance:    No acute distress, appears ill.  Eyes open, but does not follow commands.  Prominently jaundiced.   Lungs:     Rhonchi anteriorly bilaterally.    Heart:    Regular rhythm and mildly tachycardic rate.  II/VI SM throughout.    Abdomen:     Soft, mildly tender, distended.    Extremities:   2+ edema of the lower extremities.  1+ edema of the hands and upper extremities.     Results Review:    Results from last 7 days   Lab Units 04/23/25  0506   SODIUM mmol/L 140   POTASSIUM mmol/L 4.7   CHLORIDE mmol/L 101   CO2 mmol/L 27.1   BUN mg/dL 50*   CREATININE mg/dL 3.65*   GLUCOSE mg/dL 137*   CALCIUM mg/dL 7.9*     Results from last 7 days    Lab Units 04/22/25  0534 04/17/25  2148 04/17/25 2011   CK TOTAL U/L 249*  --   --    HSTROP T ng/L  --  822* 1,013*     Results from last 7 days   Lab Units 04/23/25  0506   WBC 10*3/mm3 11.60*   HEMOGLOBIN g/dL 8.5*   HEMATOCRIT % 23.1*   PLATELETS 10*3/mm3 41*     Results from last 7 days   Lab Units 04/23/25  0506 04/22/25  1234 04/20/25  1149   INR  1.61* 1.72* 1.80*   APTT seconds 41.9* 40.4* 40.0*         Results from last 7 days   Lab Units 04/23/25  0506   MAGNESIUM mg/dL 1.7           I reviewed the patient's new clinical results.        Assessment:  1.  Nausea, vomiting, and diarrhea  2.  Enteropathogenic E. coli positive stool sample and C. difficile toxin positive  3.  Anasarca, multifactorial  4.  Severe acute kidney injury with chronic kidney disease  5.  Decompensated cirrhosis of the liver  6.  Moderate ascites by CT  7.  Acute on chronic systolic CHF, EF 30% by echo on 4/18/2025  8.  Elevated troponin, likely type II NSTEMI secondary to #1, #2, #4, #5, and #9  9.  Coronary artery disease,  of the RCA by cath in 2022  10.  Status post ICD placement  11.  Urinary tract infection with retention, now with Rae catheter  12.  Significant transaminitis  13.  Hypoalbuminemia, contributing to volume overload  14.  Anemia, likely multifactorial  15.  Heavy daily alcohol use  16.  Thrombocytopenia with potential DIC  17.  Suspected medication noncompliance  18.  Metabolic encephalopathy  19.  Sinus tachycardia    Plan:  -Continues to have metabolic encephalopathy and poor mental status.  He looks more jaundiced today.     -Levophed switched to vasopressin yesterday because of sinus tachycardia.  His heart rate was better overnight, but was still mildly tachycardic this morning (although his systolic blood pressure was in the 80s).  The vasopressin likely needs to be titrated upwards of possible.  He is also on midodrine 10 mg per tube every 8 hours.    -Volume management per nephrology.  D5W  discontinued.  Lasix 60 mg IV x 1 ordered per Dr. Park.    -Hypotension and renal dysfunction greatly limit his guideline directed medical therapy for CHF and cardiomyopathy.    -HIT antibody was negative.  Hematology is following.  Platelet count 41.  Suspect thrombocytopenia secondary to liver dysfunction, sepsis, and potential DIC.    -Worsening abdominal distension noted per Dr. Park.    -Discussed with Dr. Park this morning.  Patient seems to have a very poor prognosis at this point with multisystem organ failure.    Darrion Arroyo MD  25  08:19 EDT         Electronically signed by Darrion Arroyo MD at 25 0849       Anjana Park MD at 25 0756              [facility] Progress Note      Patient Name: Archie Oquendo  : 1965  MRN: [id number]  Primary Care Physician:  Zoe Desai MD  Date of admission: 2025    Subjective     Interval History:   Follow-up KEATON on CKD of unknown baseline.  Urine output 1100 total but only 100 recorded overnight.  Has Rae catheter.  5.7 L in.  Now on vasopressin and Levophed.  Blood pressures mostly in the 80s systolic.  Review of Systems:   As noted above    Objective     Vitals:   Temp:  [97.5 °F (36.4 °C)-98 °F (36.7 °C)] 97.6 °F (36.4 °C)  Heart Rate:  [] 87  Resp:  [16-20] 20  BP: ()/() 85/67  Flow (L/min) (Oxygen Therapy):  [5] 5    Intake/Output Summary (Last 24 hours) at 2025 0756  Last data filed at 2025 0500  Gross per 24 hour   Intake 5780 ml   Output 1100 ml   Net 4680 ml       Physical Exam:    General Appearance: Lethargic.  Oral secretions thick.   Does open his eyes on command and opens his mouth.  Only intermittently follows other commands.  Skin: warm and dry  HEENT: Oral mucosa dry with thick secretions. (Discussed with RN to suction and do oral care.)  Core track in place.  Sclerae icteric.  Neck: Right IJ central line (2025)  Lungs: Few upper  airway rhonchi.  Unlabored on 5 L nasal cannula.  Heart: RRR, tachycardic.  Abdomen: soft, more distended.  Tender to palpation.  Grimaces when palpated.  No guarding.  Few bowel sounds.  : Rae catheter in place  Extremities: 2+ lower ext edema. 1+ upper ext edema .  Neuro: Lethargic.  Does open his eyes to command, but does not keep them open.    Scheduled Meds:     cefTRIAXone, 2,000 mg, Intravenous, Q24H   Followed by  [START ON 4/24/2025] cephalexin, 500 mg, Nasogastric, Q12H  folic acid, 1 mg, Nasogastric, Daily  insulin regular, 2-7 Units, Subcutaneous, Q6H  Menthol-Zinc Oxide, 1 Application, Topical, BID  midodrine, 10 mg, Nasogastric, Q8H  rifAXIMin, 550 mg, Nasogastric, Q12H  sodium chloride, 10 mL, Intravenous, Q12H  sodium chloride, 10 mL, Intravenous, Q12H  sodium chloride, 10 mL, Intravenous, Q12H  sodium chloride, 10 mL, Intravenous, Q12H  sodium chloride, 10 mL, Intravenous, Q12H  thiamine, 100 mg, Nasogastric, Daily  vancomycin, 125 mg, Nasogastric, Q6H      IV Meds:   dextrose, 75 mL/hr, Last Rate: 75 mL/hr (04/22/25 1940)  norepinephrine, 0.02-0.3 mcg/kg/min, Last Rate: Stopped (04/23/25 0156)  Pharmacy Consult,   vasopressin, 0.03 Units/min, Last Rate: 0.03 Units/min (04/22/25 1369)        Results Reviewed:   I have personally reviewed the results from the time of this admission to 4/23/2025 07:56 EDT     Results from last 7 days   Lab Units 04/23/25  0506 04/22/25  1234 04/22/25  0534 04/21/25  1420 04/21/25  0343   SODIUM mmol/L 140 142 148*  --  146*   POTASSIUM mmol/L 4.7 4.4 4.1   < > 3.0*   CHLORIDE mmol/L 101 104 106  --  101   CO2 mmol/L 27.1 28.7 29.0  --  31.0*   BUN mg/dL 50* 50* 48*  --  49*   CREATININE mg/dL 3.65* 3.40* 3.10*  --  2.76*   CALCIUM mg/dL 7.9* 8.0* 8.1*  --  7.6*   BILIRUBIN mg/dL 7.9*  --  9.1*  --  8.3*   ALK PHOS U/L 165*  --  160*  --  147*   ALT (SGPT) U/L 159*  --  170*  --  180*   AST (SGOT) U/L 300*  --  368*  --  397*   GLUCOSE mg/dL 137* 153* 155*  --   108*    < > = values in this interval not displayed.       Estimated Creatinine Clearance: 28.4 mL/min (A) (by C-G formula based on SCr of 3.65 mg/dL (H)).    Results from last 7 days   Lab Units 04/23/25  0506 04/22/25  1234 04/22/25  0534 04/21/25  0343   MAGNESIUM mg/dL 1.7  --  1.7 1.7   PHOSPHORUS mg/dL 5.2* 3.6 1.4* 2.5       Results from last 7 days   Lab Units 04/21/25  0343 04/20/25  0641 04/17/25  2148   URIC ACID mg/dL 15.0* 14.9* 13.6*       Results from last 7 days   Lab Units 04/23/25  0506 04/22/25  0534 04/21/25  0343 04/20/25  1149 04/20/25  0641 04/19/25  0913   WBC 10*3/mm3 11.60* 9.37 8.29  --  9.47 8.66   HEMOGLOBIN g/dL 8.5* 8.2* 8.5*  --  8.8* 9.1*   PLATELETS 10*3/mm3 41* 48* 42* 44* 42* 47*       Results from last 7 days   Lab Units 04/23/25  0506 04/22/25  1234 04/20/25  1149 04/18/25  0337 04/17/25 2011   INR  1.61* 1.72* 1.80* 1.79* 1.64*       Assessment / Plan     ASSESSMENT:  Acute kidney injury on probable CKD but baseline unknown.  Significant drop in urine output overnight correlating with hypotension.  Creatinine increased again today as expected with ongoing hypotension..  Ongoing pressor requirement, tachycardia.  Hypernatremia improved.  2.  Diarrhea.  Enteropathogenic E. coli.  Lactulose stopped yesterday and rifaximin added.   Likely C. difficile colonization.  3.  Pancreatitis on CT. abdominal tenderness and distention on exam.  May need to repeat CT scan.  4.  Alcohol abuse.  Withdrawal.  5.  Decompensated cirrhosis with ascites.  Transaminases stable to slightly improved.  Bilirubin unchanged at 7.9.  6.  Ischemic cardiomyopathy with acute on chronic heart failure reduced ejection fraction EF 30%.  Moderate mitral valve regurgitation.  ICD in place.  Hypotension with tachycardia.  Now on vasopressin and low-dose Levophed.  Heart rate had been better, but 116 this morning..  7.  Thrombocytopenia.  HIT antibody negative from 4/21/2025.  Platelets 41,000  8.  Anemia.   Hemoglobin stable at 8.5.  PLAN:  Discontinue D5W.  1 dose IV diuretic this morning.  Will discuss abdominal distention with Dr. Cornejo.  May need a CT of his abdomen.  Aggressive oral care discussed with nursing  Remains very critically ill.  Very guarded prognosis.  Thank you for involving us in the care of Archie Oquendo.  Please feel free to call with any questions.    Anjana Park MD  04/23/25  07:56 EDT    [facility]  [phone]    Please note that portions of this note were completed with a voice recognition program.    Electronically signed by Anjana Park MD at 04/23/25 0804       Veena Patel MD at 04/23/25 0614          [facility]  Inpatient Progress Note    Reason for Follow Up:  Decompensated cirrhosis     Subjective     Interval History:   5 BM yesterday despite decreasing lactulose     Still confused and restrained    On vasopressin - less tachy since change    Current Facility-Administered Medications:     cefTRIAXone (ROCEPHIN) 2,000 mg in sodium chloride 0.9 % 100 mL MBP, 2,000 mg, Intravenous, Q24H, Last Rate: 200 mL/hr at 04/22/25 1450, 2,000 mg at 04/22/25 1450 **FOLLOWED BY** [START ON 4/24/2025] cephalexin (KEFLEX) capsule 500 mg, 500 mg, Nasogastric, Q12H, Antony Westno MD    dextrose (D50W) (25 g/50 mL) IV injection 25 g, 25 g, Intravenous, Q15 Min PRN, Vianey Jones APRN    dextrose (D5W) 5 % infusion, 75 mL/hr, Intravenous, Continuous, Anjana Park MD, Last Rate: 75 mL/hr at 04/22/25 1940, 75 mL/hr at 04/22/25 1940    dextrose (GLUTOSE) oral gel 15 g, 15 g, Oral, Q15 Min PRN, Vianey Jones APRN    folic acid (FOLVITE) tablet 1 mg, 1 mg, Nasogastric, Daily, Alvaro Cornejo MD    glucagon (GLUCAGEN) injection 1 mg, 1 mg, Intramuscular, Q15 Min PRN, Dresner, Vianey R, APRN    insulin regular (humuLIN R,novoLIN R) injection 2-7 Units, 2-7 Units, Subcutaneous, Q6H, Vianey Jones, APRN, 2 Units  at 04/22/25 1451    lactulose (CHRONULAC) 10 GM/15ML solution 10 g, 10 g, Nasogastric, Daily, Veena Patel MD    Menthol-Zinc Oxide 1 Application, 1 Application, Topical, BID, Antony Weston MD, 1 Application at 04/22/25 2105    midodrine (PROAMATINE) tablet 10 mg, 10 mg, Nasogastric, Q8H, Antony Weston MD, 10 mg at 04/23/25 0156    nitroglycerin (NITROSTAT) SL tablet 0.4 mg, 0.4 mg, Sublingual, Q5 Min PRN, Vianey Jones APRN    norepinephrine (LEVOPHED) 8 mg in 250 mL NS infusion (premix), 0.02-0.3 mcg/kg/min, Intravenous, Titrated, Vianey Jones APRN, Stopped at 04/23/25 0156    ondansetron (ZOFRAN) injection 4 mg, 4 mg, Intravenous, Q6H PRN, Vianey Jones APRN, 4 mg at 04/20/25 0933    oxyCODONE (ROXICODONE) immediate release tablet 5 mg, 5 mg, Oral, Q6H PRN, Alvaro Cornejo MD, 5 mg at 04/22/25 1823    Pharmacy Consult, , Not Applicable, Continuous PRN, Alvaro Cornejo MD    riFAXIMin (XIFAXAN) tablet 550 mg, 550 mg, Nasogastric, Q12H, Veena Patel MD, 550 mg at 04/22/25 2105    sodium chloride 0.9 % flush 10 mL, 10 mL, Intravenous, Q12H, Vianey Jones APRN, 10 mL at 04/22/25 2106    sodium chloride 0.9 % flush 10 mL, 10 mL, Intravenous, PRN, Vianey Jones APRN    sodium chloride 0.9 % flush 10 mL, 10 mL, Intravenous, Q12H, Antony Weston MD, 10 mL at 04/22/25 2106    sodium chloride 0.9 % flush 10 mL, 10 mL, Intravenous, Q12H, Antony Weston MD, 10 mL at 04/22/25 2106    sodium chloride 0.9 % flush 10 mL, 10 mL, Intravenous, Q12H, Antony Weston MD, 10 mL at 04/22/25 2106    sodium chloride 0.9 % flush 10 mL, 10 mL, Intravenous, Q12H, Antony Weston MD, 10 mL at 04/22/25 2106    sodium chloride 0.9 % flush 10 mL, 10 mL, Intravenous, PRN, Antony Weston MD    sodium chloride 0.9 % flush 20 mL, 20 mL, Intravenous, PRN, Antony Weston MD    sodium chloride 0.9 % infusion 40 mL, 40 mL, Intravenous, PRN, Vianey Jones, APRN    sodium chloride 0.9 %  infusion 40 mL, 40 mL, Intravenous, PRN, Antony Weston MD    [COMPLETED] thiamine (B-1) injection 200 mg, 200 mg, Intravenous, Q8H, 200 mg at 04/22/25 1451 **FOLLOWED BY** thiamine (VITAMIN B-1) tablet 100 mg, 100 mg, Nasogastric, Daily, Antony Weston MD    vancomycin oral solution reconstituted 125 mg, 125 mg, Nasogastric, Q6H, Antony Weston MD, 125 mg at 04/23/25 0530    vasopressin (PITRESSIN) 20 units in 100 mL NS infusion, 0.03 Units/min, Intravenous, Continuous, Alvaro Cornejo MD, Last Rate: 9 mL/hr at 04/22/25 2253, 0.03 Units/min at 04/22/25 2253  Review of Systems:    Positive for abdominal distention, no blood in the stool fever or chills    Objective     Vital Signs  Temp:  [97.5 °F (36.4 °C)-98 °F (36.7 °C)] 97.6 °F (36.4 °C)  Heart Rate:  [] 89  Resp:  [16-20] 20  BP: ()/() 90/67  Body mass index is 26.78 kg/m².    Intake/Output Summary (Last 24 hours) at 4/23/2025 0614  Last data filed at 4/23/2025 0500  Gross per 24 hour   Intake 5780 ml   Output 1100 ml   Net 4680 ml     I/O this shift:  In: 1822 [I.V.:895; Other:631; NG/GT:296]  Out: 100 [Urine:100]     Physical Exam:   General: patient awake,, confused but follows commands   Eyes: Normal lids and lashes,+ scleral icterus   Neck: supple, normal ROM   Skin: warm and dry, + jaundiced   Cardiovascular: Tachycardia, regular   Pulm: clear to auscultation bilaterally, regular and unlabored   Abdomen: soft, nontender, distended; normal bowel sounds   Extremities: no rash or edema   Psychiatric: Normal mood and behavior; memory intact     Results Review:     I reviewed the patient's new clinical results.    Results from last 7 days   Lab Units 04/23/25  0506 04/22/25  0534 04/21/25  0343   WBC 10*3/mm3 11.60* 9.37 8.29   HEMOGLOBIN g/dL 8.5* 8.2* 8.5*   HEMATOCRIT % 23.1* 24.0* 22.8*   PLATELETS 10*3/mm3 41* 48* 42*     Results from last 7 days   Lab Units 04/23/25  0506 04/22/25  1234 04/22/25  0534 04/21/25  1420  04/21/25  0343   SODIUM mmol/L 140 142 148*  --  146*   POTASSIUM mmol/L 4.7 4.4 4.1   < > 3.0*   CHLORIDE mmol/L 101 104 106  --  101   CO2 mmol/L 27.1 28.7 29.0  --  31.0*   BUN mg/dL 50* 50* 48*  --  49*   CREATININE mg/dL 3.65* 3.40* 3.10*  --  2.76*   CALCIUM mg/dL 7.9* 8.0* 8.1*  --  7.6*   BILIRUBIN mg/dL 7.9*  --  9.1*  --  8.3*   ALK PHOS U/L 165*  --  160*  --  147*   ALT (SGPT) U/L 159*  --  170*  --  180*   AST (SGOT) U/L 300*  --  368*  --  397*   GLUCOSE mg/dL 137* 153* 155*  --  108*    < > = values in this interval not displayed.     Results from last 7 days   Lab Units 04/23/25  0506 04/22/25  1234 04/20/25  1149   INR  1.61* 1.72* 1.80*     Lab Results   Lab Value Date/Time    LIPASE 449 (H) 04/17/2025 2011       Radiology:  XR Abdomen KUB   Final Result   1.Cortrak catheter tip probably in the distal body of the stomach.           This report was finalized on 4/20/2025 2:02 PM by Dr. Dexter Quinonez M.D on Workstation: [id number]          XR Chest Post CVA Port   Final Result       As described.               This report was finalized on 4/18/2025 4:06 PM by Dr. David Smith M.D on Workstation: [id number]          XR Chest 1 View   Final Result      CT Abdomen Pelvis Without Contrast   Final Result       1. Marked hepatic steatosis. Patient is noted to have moderate ascites.   2. There is some inflammatory stranding surrounding the pancreas, as   well as some trace fluid. Appearance may reflect acute pancreatitis.   There is no pancreatic ductal dilatation, and no organized   peripancreatic collections are seen.   3. High density material within the gallbladder may reflect stones or   sludge.   4. Thick walled appearance to the ascending and transverse colon   appearance may be related to portal colopathy, although correlation with   any evidence of colitis is recommended.   5. Indeterminate ground glass pulmonary nodule measuring 11 mm. For a   ground glass nodule >=6 mm, recommend CT  at 6-12 months to confirm   persistence, then CT every 2 years until 5 years. If solid component(s)   or growth develops, consider resection.    Radiation dose reduction techniques were utilized, including automated   exposure control and exposure modulation based on body size.           This report was finalized on 4/17/2025 10:40 PM by Dr. Adrianna Fleming M.D on Workstation: [id number]          XR Chest 1 View   Final Result   As described.       This report was finalized on 4/17/2025 8:19 PM by Dr. David Smith M.D on Workstation: [id number]              Assessment & Plan     Active Hospital Problems    Diagnosis     **Sepsis        Assessment:  Decompensated cirrhosis w/ascites  Alcohol use  Sepsis  Acute kidney injury/CKD  CHF w/ischemic CM-h/o AICD  Enteropathogenic E. Coli  C. difficile considered colonization  Pancreatic inflammatory change on imaging      Plan:  5 BMs yesterday despite decreasing lactulose to once daily-and has a BM this morning.  Will hold lactulose for now.  Stools have tube feed appearance.  Creatinine worsening   Continue TF  Wbc increasing - rec check paracentesis to r/o sbp, CT ordered by pulm.  Will f/u    I discussed the patients findings and my recommendations with patient and nursing staff.            Veena Patle M.D.  [facility]  [phone]              Electronically signed by Veena Patel MD at 04/23/25 5353

## 2025-04-23 NOTE — PLAN OF CARE
Problem: Adult Inpatient Plan of Care  Goal: Plan of Care Review  Outcome: Progressing  Goal: Patient-Specific Goal (Individualized)  Outcome: Progressing  Goal: Absence of Hospital-Acquired Illness or Injury  Outcome: Progressing  Intervention: Identify and Manage Fall Risk  Recent Flowsheet Documentation  Taken 4/23/2025 1600 by Eris Valentine RN  Safety Promotion/Fall Prevention: safety round/check completed  Taken 4/23/2025 1500 by Eris Valentine RN  Safety Promotion/Fall Prevention: safety round/check completed  Taken 4/23/2025 1419 by Eris Valentine RN  Safety Promotion/Fall Prevention: safety round/check completed  Taken 4/23/2025 1215 by Eris Valentine RN  Safety Promotion/Fall Prevention: safety round/check completed  Taken 4/23/2025 1156 by Eris Valentine RN  Safety Promotion/Fall Prevention: safety round/check completed  Taken 4/23/2025 0800 by Eris Valentine RN  Safety Promotion/Fall Prevention: safety round/check completed  Intervention: Prevent Skin Injury  Recent Flowsheet Documentation  Taken 4/23/2025 1600 by Eris Valentine RN  Body Position: turned  Taken 4/23/2025 1419 by Eris Valentine RN  Body Position: turned  Taken 4/23/2025 1215 by Eris Valentine RN  Body Position: turned  Taken 4/23/2025 1156 by Eris Valentine RN  Body Position: turned  Taken 4/23/2025 0800 by Eris Valentine RN  Body Position: turned  Skin Protection: incontinence pads utilized  Intervention: Prevent and Manage VTE (Venous Thromboembolism) Risk  Recent Flowsheet Documentation  Taken 4/23/2025 0800 by Eris Valentine RN  VTE Prevention/Management:   bilateral   SCDs (sequential compression devices) off  Intervention: Prevent Infection  Recent Flowsheet Documentation  Taken 4/23/2025 1600 by Eris Valentine RN  Infection Prevention:   environmental surveillance performed   hand hygiene promoted   single patient room provided  Taken 4/23/2025 1500 by Eris Valentine RN  Infection Prevention:   single patient room provided    environmental surveillance performed   hand hygiene promoted  Taken 4/23/2025 1419 by Eris Valentine RN  Infection Prevention:   environmental surveillance performed   hand hygiene promoted   single patient room provided  Taken 4/23/2025 1215 by Eris Valentine RN  Infection Prevention:   environmental surveillance performed   hand hygiene promoted   single patient room provided  Taken 4/23/2025 1156 by Eris Valentine RN  Infection Prevention:   environmental surveillance performed   hand hygiene promoted   single patient room provided  Taken 4/23/2025 0800 by Eris Valentine RN  Infection Prevention:   environmental surveillance performed   hand hygiene promoted   single patient room provided  Goal: Optimal Comfort and Wellbeing  Outcome: Progressing  Intervention: Monitor Pain and Promote Comfort  Recent Flowsheet Documentation  Taken 4/23/2025 0800 by Eris Valentine RN  Pain Management Interventions: pain management plan reviewed with patient/caregiver  Intervention: Provide Person-Centered Care  Recent Flowsheet Documentation  Taken 4/23/2025 0800 by Eris Valentine RN  Trust Relationship/Rapport:   thoughts/feelings acknowledged   choices provided   care explained  Goal: Readiness for Transition of Care  Outcome: Progressing   Goal Outcome Evaluation:

## 2025-04-24 NOTE — PROGRESS NOTES
LOS: 7 days   Patient Care Team:  Zoe Desai MD as PCP - General (Family Medicine)  Corbin Bailey MD as Surgeon (Neurosurgery)    Chief Complaint: Sepsis, liver cirrhosis, thrombocytopenia and anemia    Interval History:  4/21/2025 patient is afebrile.  Not responding to verbal communication, however nursing staff reports patient starts to follow some command.  Stable Platelets 42,000, hemoglobin 8.5.      4/22/2025 patient is afebrile.  Per nursing staff, patient was able to tell his name today.  Overall still has hepatic encephalopathy.  Platelets 48,000, hemoglobin 8.2.  Total bilirubin 9.1 and elevated , .     4/23/2025 patient afebrile.  Mental status waxes and wanes.  According to nursing staff, patient was able to have limited conversation when he was awake.  Persistent low platelets 41,000, hemoglobin 8.5.  Improving but still elevated total bilirubin 7.9  and .    4/24/2025 patient is afebrile.  Mental status waxes and wanes.  Further deteriorating platelets 35,000 with elevated IPF 17.0%.  Also trending down hemoglobin 7.1.  Improving total bilirubin 7.5.      A comprehensive 14 point review of systems was performed and was negative except as mentioned.    Vital Signs:  Temp:  [98 °F (36.7 °C)-98.9 °F (37.2 °C)] 98.9 °F (37.2 °C)  Heart Rate:  [] 117  BP: ()/() 91/77    Intake/Output Summary (Last 24 hours) at 4/24/2025 0917  Last data filed at 4/24/2025 0315  Gross per 24 hour   Intake 2719 ml   Output 1950 ml   Net 769 ml       Physical Exam:  GENERAL:  Well-developed male in no acute distress.  Patient is asleep, cannot be awakened.  SKIN:  Warm, dry without rashes, diffusely jaundiced.   HEENT:  Normocephalic.    CHEST: Normal respiratory effort.  Lungs clear to auscultation. Good airflow.  CARDIAC:  Regular rate and rhythm. Normal S1,S2.  ABDOMEN:  Soft, no tender.  Bowel sounds normal.  EXTREMITIES:   Right leg edema more than the left  leg.      Results Review:   CBC:      Lab 04/24/25  0321 04/23/25  0506 04/22/25  0534 04/21/25  0343 04/20/25  1149 04/20/25  0641 04/19/25  0913 04/18/25  0337   WBC 14.18* 11.60* 9.37 8.29  --  9.47   < > 8.67   HEMOGLOBIN 7.1* 8.5* 8.2* 8.5*  --  8.8*   < > 9.2*   HEMATOCRIT 21.3* 23.1* 24.0* 22.8*  --  23.5*   < > 24.9*   PLATELETS 34* 41* 48* 42* 44* 42*   < > 71*   NEUTROS ABS 10.03* 7.69* 7.40* 4.95  --  7.67*   < > 6.84   IMMATURE GRANS (ABS) 0.25* 0.24*  --  0.14*  --   --   --  0.07*   LYMPHS ABS 2.05 2.00  --  1.73  --   --   --  1.03   MONOS ABS 1.64* 1.42*  --  1.25*  --   --   --  0.65   EOS ABS 0.15 0.17 0.09 0.16  --  0.09   < > 0.08   MCV 99.5* 101.3* 98.7* 99.1*  --  99.6*   < > 102.9*    < > = values in this interval not displayed.   IPF 17.0% 4/24/2025.    CMP:        Lab 04/24/25  0321 04/23/25  0506 04/22/25  1234 04/22/25  0534 04/21/25  1420 04/21/25  0343 04/20/25  1149 04/20/25  0641 04/18/25  1413 04/18/25  0856 04/18/25  0337 04/17/25 2011   SODIUM 135* 140 142 148*  --  146*  --  142   < >  --    < > 129*   POTASSIUM 4.2 4.7 4.4 4.1 3.6 3.0*   < > 3.5   < >  --    < > 3.7   CHLORIDE 94* 101 104 106  --  101  --  98   < >  --    < > 82*   CO2 28.0 27.1 28.7 29.0  --  31.0*  --  28.0   < >  --    < > 23.6   ANION GAP 13.0 11.9 9.3 13.0  --  14.0  --  16.0*   < >  --    < > 23.4*   BUN 49* 50* 50* 48*  --  49*  --  53*   < >  --    < > 63*   CREATININE 3.07* 3.65* 3.40* 3.10*  --  2.76*  --  3.34*   < >  --    < > 7.11*   EGFR 22.3* 18.1* 19.7* 22.0*  --  25.3*  --  20.1*   < >  --    < > 8.1*   GLUCOSE 116* 137* 153* 155*  --  108*  --  144*   < >  --    < > 137*   CALCIUM 7.9* 7.9* 8.0* 8.1*  --  7.6*  --  7.5*   < >  --    < > 7.9*   IONIZED CALCIUM  --   --   --   --   --   --   --   --   --  0.94*  --   --    MAGNESIUM 1.3* 1.7  --  1.7  --  1.7  --  2.4   < >  --    < > 1.7   PHOSPHORUS 3.0 5.2* 3.6 1.4*  --  2.5  --  2.6   < >  --    < >  --    TOTAL PROTEIN 5.5* 5.7*  --  5.9*  --  " 5.5*  --  5.6*   < >  --    < > 6.8   ALBUMIN 2.5* 2.6* 2.5* 2.7*  --  2.7*  --  2.9*   < >  --    < > 3.2*   GLOBULIN 3.0 3.1  --  3.2  --  2.8  --  2.7   < >  --    < > 3.6   ALT (SGPT) 139* 159*  --  170*  --  180*  --  196*   < >  --    < > 293*   AST (SGOT) 256* 300*  --  368*  --  397*  --  426*   < >  --    < > 663*   BILIRUBIN 7.5* 7.9*  --  9.1*  --  8.3*  --  8.2*   < >  --    < > 8.3*   BILIRUBIN DIRECT  --   --   --   --   --  6.5*  --   --   --   --   --   --    ALK PHOS 155* 165*  --  160*  --  147*  --  148*   < >  --    < > 167*   LIPASE  --   --   --   --   --   --   --   --   --   --   --  449*    < > = values in this interval not displayed.     Lab Results   Component Value Date    [id number] >2,000 (H) 04/20/2025     Lab Results   Component Value Date    FOLATE 12.50 04/21/2025     Lab Results   Component Value Date    IRON 34 (L) 04/21/2025    LABIRON 30 04/21/2025    TRANSFERRIN 76 (L) 04/21/2025    TIBC 113 (L) 04/21/2025    FERRITIN 3,650.00 (H) 04/21/2025     No results found for: \"LDH\", \"URICACID\"     Latest Reference Range & Units 04/21/25 03:43   Haptoglobin 30 - 200 mg/dL <10 (L)      Latest Reference Range & Units 04/20/25 11:49   FDP Less Than 5 mcg/mL  Greater than or equal to 20 mcg/mL !   Fibrinogen 219 - 464 mg/dL 216 (L)   Protime 11.7 - 14.2 Seconds 21.0 (H)   INR 0.90 - 1.10  1.80 (H)   PTT 22.7 - 35.4 seconds 40.0 (H)       Results from last 7 days   Lab Units 04/22/25  0534 04/17/25  2148 04/17/25 2011   CK TOTAL U/L 249*  --   --    HSTROP T ng/L  --  822* 1,013*     Results from last 7 days   Lab Units 04/24/25  0321 04/23/25  0506 04/22/25  1234   INR  1.72* 1.61* 1.72*   APTT seconds 41.6* 41.9* 40.4*         Results from last 7 days   Lab Units 04/24/25  0321   MAGNESIUM mg/dL 1.3*           I reviewed the patient's new clinical results.        Assessment:     *Thrombocytopenia  Normal platelet count and 2023.  Acute drop in platelet count likely secondary to " sepsis.  4/17/2025 platelet count 83,000  4/20/2025 platelet count has dropped to 42,000.  Obtain IPF, B12, folic acid level.  Also obtain HIT antibody.  Most likely thrombocytopenia secondary to sepsis.  Hold anticoagulation at this time.  Also obtain fibrin split products, fibrinogen, PT PTT INR  Also with underlying cirrhosis of the liver and ascites noted on the CT abdomen.  ?  Acute pancreatitis on CT abdomen.  Spleen is normal in size.  Elevated IPF 12.8% on 4/20/2025 with platelets 44,000.  Elevated FTP and decreased fibrinogen, elevated PT/INR PTT, fits with DIC patient.   4/21/2025 platelets 42,000.  HIT antibody negative.   4/22/2025 platelets 48,000.  4/23/2025 platelets 41,000.   4/24/2025 further worsening platelets 35,000 with IPF 17.0%.  This patient has ITP.  We recommended to start the patient empirically on IV Solu-Medrol 125 mg IV every 12 hours for treatment of ITP.        *Anemia  Most likely secondary to acute ongoing infection.  Hemoglobin 8.8.  Continue to monitor  on 4/20/2025 Severely elevated ferritin 3650 with free iron 34, fits with inflammatory changes.  Normal folate 12.5, and supratherapeutic B12 > 2000.    4/21/2025 hemoglobin 8.5, elevated  and suppressed haptoglobin <10.  hemolysis?  Or the suppressed haptoglobin due to abnormal liver failure?  since patient is clinically improving according to nursing staff, we will hold steroids for now because of his severe infection with C. difficile and EPEC colitis.   4/22/2025 hemoglobin 8.2.  No evidence of bleeding.   4/23/2025 hemoglobin 8.5.   4/24/2025 trending down hemoglobin 7.1.  Suspect the patient have hemolysis due to elevated LDH, and the suppressed haptoglobin.  Will start the patient on IV Solu-Medrol for both ITP and hemolysis.        *E. coli sepsis  On ceftriaxone per ID.  Continue antibiotics per ID recommendations.    *. C. difficile colitis.   On p.o. vancomycin.     *Cirrhosis of the liver with hepatic  encephalopathy  Ascites noted on CT abdomen.  Gastroenterology consulted  On 4/21/25 Improving but still significant elevated total bilirubin 8.3, ,  and alk phosphatase 147.  Recommend supportive therapy.  4/22/2025 total bilirubin 9.1, ,  and alk phos 160.   4/23/2025 improving but still elevated total bilirubin 7.9  and .  4/24/2025 total bilirubin 7.5,  , alk phos 155.  Liver panel slowly improving.    *.  Coagulopathy secondary to liver cirrhosis and acute liver injury.  4/20/2025 INR 1.8, PTT 21.0 seconds.    4/22/2025 INR 1.72 and PTT 20.2 seconds.  No evidence for bleeding.   4/23/2025 improved INR 1.61 PT 19.2 seconds due to improving liver function.  4/24/2025 INR 1.72, PT 20.3 seconds.       *Ascites  Management per GI  Status post paracentesis, 3 L of fluid removed on 4/18/2025     *KEATON  Per nephrology note creatinine around 2 in September 2024.  Thought to be secondary to sepsis.  Improving, creatinine has decreased to 3.34.  On 4/21/2025 creatinine 2.76 BUN 49.   On 4/22/2025 creatinine 3.10 BUN 48.   4/23/2025 creatinine 3.65 BUN 50.  Continue followed by nephrology.  4/24/2025 creatinine 3.07 and BUN 49.       Recommendations  Continue treatment for sepsis from EPEC, and C. difficile colitis.  Thrombocytopenia multifactorial with elements of ITP, as well as secondary to sepsis/DIC.  Will start the patient on IV Solu-Medrol 125 mg every 12 hours.  Improving multiorgan failure with liver and kidney.  Anemia also due to sepsis, possible hemolysis.  Will start IV Solu-Medrol today.  Continue to monitor CBC CMP, IPF, LHD haptoglobin PT/INR in the morning.  Overall poor prognosis.       patient is critically ill.      I spoke with nursing staff.     I discussed that with the GI service Dr. Patel, no contraindication to start the steroids from GI perspective.  I also reviewed the notes from nephrology, cardiology, pulmonology services.      Wild  MD Danny PhD  04/24/25  09:17 EDT

## 2025-04-24 NOTE — PROGRESS NOTES
LOS: 7 days   Patient Care Team:  Zoe Desai MD as PCP - General (Family Medicine)  Corbin Bailey MD as Surgeon (Neurosurgery)    Chief Complaint: Follow-up acute on chronic systolic CHF, cardiomyopathy, type II NSTEMI.    Interval History: Remains lethargic, but slightly more alert today.  Continues to have intermittent sinus tachycardia.  Remains on vasopressin.    Vital Signs:  Temp:  [98 °F (36.7 °C)-98.9 °F (37.2 °C)] 98.4 °F (36.9 °C)  Heart Rate:  [] 85  BP: ()/() 94/69    Intake/Output Summary (Last 24 hours) at 4/24/2025 1319  Last data filed at 4/24/2025 1105  Gross per 24 hour   Intake 2719 ml   Output 2750 ml   Net -31 ml       Physical Exam:   General Appearance:    No acute distress, appears ill.  Somnolent, but opens eyes.  Prominently jaundiced.   Lungs:     Rhonchi anteriorly bilaterally.    Heart:    Regular rhythm and mildly tachycardic rate.  II/VI SM throughout.    Abdomen:     Soft, mildly tender, distended.    Extremities:   2+ edema of the lower extremities.  1+ edema of the hands and upper extremities.     Results Review:    Results from last 7 days   Lab Units 04/24/25  0321   SODIUM mmol/L 135*   POTASSIUM mmol/L 4.2   CHLORIDE mmol/L 94*   CO2 mmol/L 28.0   BUN mg/dL 49*   CREATININE mg/dL 3.07*   GLUCOSE mg/dL 116*   CALCIUM mg/dL 7.9*     Results from last 7 days   Lab Units 04/22/25  0534 04/17/25  2148 04/17/25 2011   CK TOTAL U/L 249*  --   --    HSTROP T ng/L  --  822* 1,013*     Results from last 7 days   Lab Units 04/24/25  0321   WBC 10*3/mm3 14.18*   HEMOGLOBIN g/dL 7.1*   HEMATOCRIT % 21.3*   PLATELETS 10*3/mm3 35*  34*     Results from last 7 days   Lab Units 04/24/25  0321 04/23/25  0506 04/22/25  1234   INR  1.72* 1.61* 1.72*   APTT seconds 41.6* 41.9* 40.4*         Results from last 7 days   Lab Units 04/24/25  0321   MAGNESIUM mg/dL 1.3*           I reviewed the patient's new clinical results.        Assessment:  1.  Nausea, vomiting, and  diarrhea  2.  Enteropathogenic E. coli positive stool sample and C. difficile toxin positive  3.  Anasarca, multifactorial  4.  Severe acute kidney injury with chronic kidney disease  5.  Decompensated cirrhosis of the liver  6.  Moderate ascites by CT  7.  Acute on chronic systolic CHF, EF 30% by echo on 4/18/2025  8.  Elevated troponin, likely type II NSTEMI secondary to #1, #2, #4, #5, and #9  9.  Coronary artery disease,  of the RCA by cath in 2022  10.  Status post ICD placement  11.  Urinary tract infection with retention, now with Rae catheter  12.  Significant transaminitis  13.  Hypoalbuminemia, contributing to volume overload  14.  Anemia, likely multifactorial  15.  Heavy daily alcohol use  16.  Thrombocytopenia with potential DIC  17.  Suspected medication noncompliance  18.  Metabolic encephalopathy  19.  Sinus tachycardia    Plan:  -Continues to have metabolic encephalopathy and poor mental status.  He was slightly more responsive today.    - Continue pressor support with vasopressin..  He is also on midodrine 10 mg per tube every 8 hours.  Still intermittently tachycardia, but better than on Levophed.    -Volume management per nephrology.  Lasix 60 mg IV given again today.    -Hypotension and renal dysfunction greatly limit his guideline directed medical therapy for CHF and cardiomyopathy.    -HIT antibody was negative.  Hematology is following.  Platelet count now in the 30s.  Suspect thrombocytopenia secondary to liver dysfunction, sepsis, and potential DIC.    -Possible transfusion given worsening anemia.    -Still with multisystem organ failure.  Poor overall prognosis.    Darrion Arroyo MD  04/24/25  13:19 EDT

## 2025-04-24 NOTE — PROGRESS NOTES
[facility] Progress Note      Patient Name: Archie Oquendo  : 1965  MRN: [id number]  Primary Care Physician:  Zoe Desai MD  Date of admission: 2025    Subjective     Interval History:   Follow-up KEATON on CKD of unknown baseline.  Urine output improved past 24 hours.  1 dose Lasix.  2 L out.  10 bowel movements.  Blood pressure overall more stable.  Now only on vasopressin.  Nasal cannula 5 L.  Remains tachycardic.  Weight not consistent.  Review of Systems:   As noted above    Objective     Vitals:   Temp:  [97.6 °F (36.4 °C)-98.8 °F (37.1 °C)] 98.5 °F (36.9 °C)  Heart Rate:  [] 117  BP: ()/() 104/77  Flow (L/min) (Oxygen Therapy):  [5] 5    Intake/Output Summary (Last 24 hours) at 2025 0729  Last data filed at 2025 0315  Gross per 24 hour   Intake 2719 ml   Output 2000 ml   Net 719 ml       Physical Exam:    General Appearance: Sleeping but does awaken.  More interactive.  Does make brief eye contact.  Answers some questions.  Can tell me he is at [facility].  Gives me his birthdate.  Complains of abdominal pain.  Skin: warm and dry.  Jaundiced.  HEENT: Oral mucosa improved today.  Core track in place.  Sclerae icteric.  Neck: Right IJ central line (2025)  Lungs: Few upper airway rhonchi.  Unlabored on 5 L nasal cannula.  Heart: RRR, tachycardic.  Abdomen: softer, less distended.  Tender to palpation.   No guarding.  Few bowel sounds.  : Rae catheter in place  Extremities: 2+ lower ext edema. 1+ upper ext edema .  Neuro: A little improved.  Able to answer a few questions.  More aware.    Scheduled Meds:     cephalexin, 500 mg, Nasogastric, Q12H  folic acid, 1 mg, Nasogastric, Daily  insulin regular, 2-7 Units, Subcutaneous, Q6H  magnesium sulfate, 2 g, Intravenous, Once  Menthol-Zinc Oxide, 1 Application, Topical, BID  midodrine, 10 mg, Nasogastric, Q8H  rifAXIMin, 550 mg, Nasogastric, Q12H  sodium chloride, 10 mL,  Intravenous, Q12H  sodium chloride, 10 mL, Intravenous, Q12H  sodium chloride, 10 mL, Intravenous, Q12H  sodium chloride, 10 mL, Intravenous, Q12H  sodium chloride, 10 mL, Intravenous, Q12H  thiamine, 100 mg, Nasogastric, Daily  vancomycin, 125 mg, Nasogastric, Q6H      IV Meds:   norepinephrine, 0.02-0.3 mcg/kg/min, Last Rate: Stopped (04/23/25 2108)  Pharmacy Consult,   vasopressin, 0.03 Units/min, Last Rate: 0.03 Units/min (04/23/25 2108)        Results Reviewed:   I have personally reviewed the results from the time of this admission to 4/24/2025 07:29 EDT     Results from last 7 days   Lab Units 04/24/25  0321 04/23/25  0506 04/22/25  1234 04/22/25  0534   SODIUM mmol/L 135* 140 142 148*   POTASSIUM mmol/L 4.2 4.7 4.4 4.1   CHLORIDE mmol/L 94* 101 104 106   CO2 mmol/L 28.0 27.1 28.7 29.0   BUN mg/dL 49* 50* 50* 48*   CREATININE mg/dL 3.07* 3.65* 3.40* 3.10*   CALCIUM mg/dL 7.9* 7.9* 8.0* 8.1*   BILIRUBIN mg/dL 7.5* 7.9*  --  9.1*   ALK PHOS U/L 155* 165*  --  160*   ALT (SGPT) U/L 139* 159*  --  170*   AST (SGOT) U/L 256* 300*  --  368*   GLUCOSE mg/dL 116* 137* 153* 155*       Estimated Creatinine Clearance: 31.9 mL/min (A) (by C-G formula based on SCr of 3.07 mg/dL (H)).    Results from last 7 days   Lab Units 04/24/25  0321 04/23/25  0506 04/22/25  1234 04/22/25  0534   MAGNESIUM mg/dL 1.3* 1.7  --  1.7   PHOSPHORUS mg/dL 3.0 5.2* 3.6 1.4*       Results from last 7 days   Lab Units 04/21/25  0343 04/20/25  0641 04/17/25  2148   URIC ACID mg/dL 15.0* 14.9* 13.6*       Results from last 7 days   Lab Units 04/24/25  0321 04/23/25  0506 04/22/25  0534 04/21/25  0343 04/20/25  1149 04/20/25  0641   WBC 10*3/mm3 14.18* 11.60* 9.37 8.29  --  9.47   HEMOGLOBIN g/dL 7.1* 8.5* 8.2* 8.5*  --  8.8*   PLATELETS 10*3/mm3 34* 41* 48* 42* 44* 42*       Results from last 7 days   Lab Units 04/24/25  0321 04/23/25  0506 04/22/25  1234 04/20/25  1149 04/18/25  0337   INR  1.72* 1.61* 1.72* 1.80* 1.79*       Assessment / Plan      ASSESSMENT:  Acute kidney injury on probable CKD but baseline unknown.  Nonoliguric.  Good response to IV Lasix but intake greater than output.  Creatinine better today..  Ongoing pressor requirement, tachycardia.  Hypernatremia improved.  Hypotension, still requiring vasopressin but overall hemodynamically seems more stable.  Remains very tachycardic.  2.  Diarrhea.  Enteropathogenic E. coli.  Lactulose stopped yesterday and rifaximin added.   Likely C. difficile colonization.  3.  Pancreatitis on CT. right hemicolon thickening.  Having multiple bowel movements.  GI following.  4.  Alcohol abuse.  Withdrawal.  5.  Decompensated cirrhosis with ascites.  Transaminases stable to slightly improved.  Bilirubin 7.5, unchanged..  6.  Ischemic cardiomyopathy with acute on chronic heart failure reduced ejection fraction EF 30%.  Moderate mitral valve regurgitation.  ICD in place.  Hypotension with tachycardia.  Now on IV vasopressin in addition to midodrine per feeding tube.  7.  Thrombocytopenia.  HIT antibody negative from 4/21/2025.  Platelets 34K.  8.  Anemia.  Hemoglobin following.  8.5 down to 7.1 today.  Would favor transfusion for his hemodynamics.  PLAN:  Another dose of IV Lasix today.  Will discuss possible packed red blood cell transfusion with pulmonary.  Reduce to feed free water to 50 cc every 4 hours  Consider changing tube feed to an elemental formula.  Replace magnesium IV  Remains very critically ill.  Very guarded prognosis.  Thank you for involving us in the care of Archie Oquendo.  Please feel free to call with any questions.    Anjana Park MD  04/24/25  07:29 EDT    [facility] \[facility]\""  [phone]    Please note that portions of this note were completed with a voice recognition program.

## 2025-04-24 NOTE — PROGRESS NOTES
"      [facility]         Dr John Sayied   LOS: 7 days   Patient Care Team:  Zoe Desai MD as PCP - General (Family Medicine)  Corbin Bailey MD as Surgeon (Neurosurgery)    Chief Complaint: Sepsis with septic shock E. coli enteritis with C. difficile positive other issues as listed below    Interval History: More awake this morning.  He attempts to follow commands.  Abdominal distention looks much less.  Tube feeds currently ongoing.  On vasopressin drip ongoing.      REVIEW OF SYSTEMS:   Unable to get with patient's current condition    Ventilator/Non-Invasive Ventilation Settings (From admission, onward)      None        Heated high flow 60 L with 85% FiO2      Vital Signs  Temp:  [98 °F (36.7 °C)-98.9 °F (37.2 °C)] 98.9 °F (37.2 °C)  Heart Rate:  [] 117  BP: ()/() 104/77    Intake/Output Summary (Last 24 hours) at 4/24/2025 0853  Last data filed at 4/24/2025 0315  Gross per 24 hour   Intake 2719 ml   Output 1950 ml   Net 769 ml     Flowsheet Rows      Flowsheet Row First Filed Value   Admission Height 188 cm (74\") Documented at 04/17/2025 1957   Admission Weight 104 kg (230 lb) Documented at 04/17/2025 2110            Physical Exam:  Patient is examined using the personal protective equipment as per guidelines from infection control for this particular patient as enacted.  Hand hygiene was performed before and after patient interaction.   General Appearance:  Acutely ill and poorly arousable.  Not following commands for me.  ENT normocephalic atraumatic  Neck midline trachea, no thyromegaly   Lungs:   Diminished breath sounds bilaterally    Heart:    Regular rhythm and normal rate, normal S1 and S2, no            murmur, no gallop, no rub, no click   Chest Wall:    No abnormalities observed   Abdomen:   Soft mild diffuse tenderness no rebound or guarding   Extremities:   Moves all extremities well, trace to 1+ edema, no cyanosis, no             redness  CNS " confused  Skin jaundiced  Musculoskeletal no cyanosis no clubbing normal range of motion     Results Review:        Results from last 7 days   Lab Units 04/24/25  0321 04/23/25  0506 04/22/25  1234   SODIUM mmol/L 135* 140 142   POTASSIUM mmol/L 4.2 4.7 4.4   CHLORIDE mmol/L 94* 101 104   CO2 mmol/L 28.0 27.1 28.7   BUN mg/dL 49* 50* 50*   CREATININE mg/dL 3.07* 3.65* 3.40*   GLUCOSE mg/dL 116* 137* 153*   CALCIUM mg/dL 7.9* 7.9* 8.0*     Results from last 7 days   Lab Units 04/22/25  0534 04/17/25 2148 04/17/25 2011   CK TOTAL U/L 249*  --   --    HSTROP T ng/L  --  822* 1,013*     Results from last 7 days   Lab Units 04/24/25  0321 04/23/25  0506 04/22/25  0534   WBC 10*3/mm3 14.18* 11.60* 9.37   HEMOGLOBIN g/dL 7.1* 8.5* 8.2*   HEMATOCRIT % 21.3* 23.1* 24.0*   PLATELETS 10*3/mm3 34* 41* 48*     Results from last 7 days   Lab Units 04/24/25  0321 04/23/25  0506 04/22/25  1234   INR  1.72* 1.61* 1.72*   APTT seconds 41.6* 41.9* 40.4*         Results from last 7 days   Lab Units 04/24/25  0321   MAGNESIUM mg/dL 1.3*         Results from last 7 days   Lab Units 04/18/25  1303   PH, ARTERIAL pH units 7.431   PO2 ART mm Hg 114.3*   PCO2, ARTERIAL mm Hg 37.2   HCO3 ART mmol/L 24.8       I reviewed the patient's new clinical results.  I personally viewed and interpreted the patient's chest x-ray.        Medication Review:   cephalexin, 500 mg, Nasogastric, Q12H  folic acid, 1 mg, Nasogastric, Daily  furosemide, 60 mg, Intravenous, Once  insulin regular, 2-7 Units, Subcutaneous, Q6H  magnesium sulfate, 2 g, Intravenous, Once  Menthol-Zinc Oxide, 1 Application, Topical, BID  midodrine, 10 mg, Nasogastric, Q8H  rifAXIMin, 550 mg, Nasogastric, Q12H  sodium chloride, 10 mL, Intravenous, Q12H  sodium chloride, 10 mL, Intravenous, Q12H  sodium chloride, 10 mL, Intravenous, Q12H  sodium chloride, 10 mL, Intravenous, Q12H  sodium chloride, 10 mL, Intravenous, Q12H  thiamine, 100 mg, Nasogastric, Daily  vancomycin, 125 mg,  Nasogastric, Q6H        norepinephrine, 0.02-0.3 mcg/kg/min, Last Rate: Stopped (04/23/25 2108)  Pharmacy Consult,   vasopressin, 0.03 Units/min, Last Rate: 0.03 Units/min (04/23/25 2108)        ASSESSMENT:   Acute metabolic encephalopathy  Sepsis present on admission due to E. coli enteritis  Acute hypoxemic respiratory failure on heated high flow  Septic shock present on admission causing encephalopathy and acute  Kidney injury  Decompensated cirrhosis  Acute pancreatitis by imaging  Non-STEMI  Hyponatremia  Hypokalemia  Acute kidney injury  Acute liver injury  Anemia  Thrombocytopenia  Alcohol abuse  Acute HFrEF    PLAN:  Clinically better this morning.  Mental status improved  CT abdomen pelvis noted concerns for pancreatitis noted.  GI following..    Source of infection E. coli enteritis with C. difficile positive  Continue current antibiotics as per IDs recommendations.  Patient on Rocephin with p.o. vancomycin.  Slightly worse leukocytosis continue to monitor.  Creatinine  progressively getting worse..  Further fluid electrolyte management per nephrology  Use vasopressin as pressors maintain MAP greater than 60.  Platelets have been fluctuating..  Appreciate input from hematology.  Further workup per hematology.  Thiamine IV to continue  Patient remained critically ill  I had a detailed discussion with patient daughter daughters yesterday.  Patient remains DNR.  ICU core measures          Alvaro Cornejo MD  04/24/25  08:53 EDT

## 2025-04-24 NOTE — PROGRESS NOTES
[facility]  Inpatient Progress Note    Reason for Follow Up: Decompensated cirrhosis    Subjective     Interval History:   10 bowel movements recorded yesterday-did not receive any lactulose yesterday.  Last dose 4/22    Paracentesis yesterday with no SBP.  Not awake or alert today.  On both Levophed and vasopressin today.    CT completed yesterday-reviewed by me-no new findings    Current Facility-Administered Medications:     [COMPLETED] cefTRIAXone (ROCEPHIN) 2,000 mg in sodium chloride 0.9 % 100 mL MBP, 2,000 mg, Intravenous, Q24H, Last Rate: 200 mL/hr at 04/23/25 1413, 2,000 mg at 04/23/25 1413 **FOLLOWED BY** cephalexin (KEFLEX) capsule 500 mg, 500 mg, Nasogastric, Q12H, Antony Weston MD    dextrose (D50W) (25 g/50 mL) IV injection 25 g, 25 g, Intravenous, Q15 Min PRN, Vianey Jones R, APRN    dextrose (GLUTOSE) oral gel 15 g, 15 g, Oral, Q15 Min PRN, Vianey Jones, APRN    folic acid (FOLVITE) tablet 1 mg, 1 mg, Nasogastric, Daily, Alvaro Cornejo MD, 1 mg at 04/24/25 0916    glucagon (GLUCAGEN) injection 1 mg, 1 mg, Intramuscular, Q15 Min PRN, Vianey Jones, APRN    insulin regular (humuLIN R,novoLIN R) injection 2-7 Units, 2-7 Units, Subcutaneous, Q6H, Vianey Jones, APRN, 2 Units at 04/22/25 1451    Menthol-Zinc Oxide 1 Application, 1 Application, Topical, BID, Antony Weston MD, 1 Application at 04/24/25 0916    midodrine (PROAMATINE) tablet 10 mg, 10 mg, Nasogastric, Q8H, Antony Weston MD, 10 mg at 04/24/25 0915    nitroglycerin (NITROSTAT) SL tablet 0.4 mg, 0.4 mg, Sublingual, Q5 Min PRN, Vianey Jones, APRN    norepinephrine (LEVOPHED) 8 mg in 250 mL NS infusion (premix), 0.02-0.3 mcg/kg/min, Intravenous, Titrated, Vianey Jones, VERONICA, Last Rate: 3.69 mL/hr at 04/24/25 0949, 0.02 mcg/kg/min at 04/24/25 0949    ondansetron (ZOFRAN) injection 4 mg, 4 mg, Intravenous, Q6H PRN, Vianey Jones, APRN, 4 mg at 04/20/25 0933    oxyCODONE (ROXICODONE)  immediate release tablet 5 mg, 5 mg, Oral, Q6H PRN, Alvaro Cornejo MD, 5 mg at 04/23/25 1419    Pharmacy Consult, , Not Applicable, Continuous PRN, Alvaro Cornejo MD    riFAXIMin (XIFAXAN) tablet 550 mg, 550 mg, Nasogastric, Q12H, Veena Patel MD, 550 mg at 04/24/25 0915    sodium chloride 0.9 % flush 10 mL, 10 mL, Intravenous, Q12H, Vianey Jones, APRN, 10 mL at 04/24/25 0917    sodium chloride 0.9 % flush 10 mL, 10 mL, Intravenous, PRN, Vianey Jones, APRN    sodium chloride 0.9 % flush 10 mL, 10 mL, Intravenous, Q12H, Antony Weston MD, 10 mL at 04/24/25 0917    sodium chloride 0.9 % flush 10 mL, 10 mL, Intravenous, Q12H, Antony Weston MD, 10 mL at 04/24/25 0917    sodium chloride 0.9 % flush 10 mL, 10 mL, Intravenous, Q12H, Antony Weston MD, 10 mL at 04/24/25 0917    sodium chloride 0.9 % flush 10 mL, 10 mL, Intravenous, Q12H, Antony Weston MD, 10 mL at 04/24/25 0917    sodium chloride 0.9 % flush 10 mL, 10 mL, Intravenous, Trista LOPEZ Sandeep K, MD    sodium chloride 0.9 % flush 20 mL, 20 mL, Intravenous, PRN, Antony Weston MD    sodium chloride 0.9 % infusion 40 mL, 40 mL, Intravenous, PRN, Vianey Jones APRN    sodium chloride 0.9 % infusion 40 mL, 40 mL, Intravenous, Trista LOPEZ Sandeep K, MD    [COMPLETED] thiamine (B-1) injection 200 mg, 200 mg, Intravenous, Q8H, 200 mg at 04/22/25 2041 **FOLLOWED BY** thiamine (VITAMIN B-1) tablet 100 mg, 100 mg, Nasogastric, Daily, Antony Weston MD, 100 mg at 04/24/25 0915    vancomycin oral solution reconstituted 125 mg, 125 mg, Nasogastric, Q6H, Antony Weston MD, 125 mg at 04/24/25 0515    vasopressin (PITRESSIN) 20 units in 100 mL NS infusion, 0.03 Units/min, Intravenous, Continuous, Alvaro Cornejo MD, Last Rate: 9 mL/hr at 04/24/25 0926, 0.03 Units/min at 04/24/25 0926  Review of Systems:    No nausea or vomiting, no fevers or chills    Objective     Vital Signs  Temp:  [98 °F (36.7 °C)-98.9 °F (37.2 °C)] 98.9 °F  (37.2 °C)  Heart Rate:  [] 109  BP: ()/() 78/53  Body mass index is 25.25 kg/m².    Intake/Output Summary (Last 24 hours) at 4/24/2025 1006  Last data filed at 4/24/2025 0315  Gross per 24 hour   Intake 2719 ml   Output 1950 ml   Net 769 ml     No intake/output data recorded.     Physical Exam:   General: patient confused, does not follow commands   Eyes: Normal lids and lashes, +scleral icterus   Neck: supple, normal ROM   Skin: warm and dry, + jaundiced   Cardiovascular: Tachycardia, regular   Pulm: clear to auscultation bilaterally, regular and unlabored   Abdomen: soft, nontender, nondistended; normal bowel sounds   Extremities: 1+ bilateral lower extremity edema   Psychiatric: Confused     Results Review:     I reviewed the patient's new clinical results.    Results from last 7 days   Lab Units 04/24/25 0321 04/23/25  0506 04/22/25  0534   WBC 10*3/mm3 14.18* 11.60* 9.37   HEMOGLOBIN g/dL 7.1* 8.5* 8.2*   HEMATOCRIT % 21.3* 23.1* 24.0*   PLATELETS 10*3/mm3 34* 41* 48*     Results from last 7 days   Lab Units 04/24/25 0321 04/23/25  0506 04/22/25  1234 04/22/25  0534   SODIUM mmol/L 135* 140 142 148*   POTASSIUM mmol/L 4.2 4.7 4.4 4.1   CHLORIDE mmol/L 94* 101 104 106   CO2 mmol/L 28.0 27.1 28.7 29.0   BUN mg/dL 49* 50* 50* 48*   CREATININE mg/dL 3.07* 3.65* 3.40* 3.10*   CALCIUM mg/dL 7.9* 7.9* 8.0* 8.1*   BILIRUBIN mg/dL 7.5* 7.9*  --  9.1*   ALK PHOS U/L 155* 165*  --  160*   ALT (SGPT) U/L 139* 159*  --  170*   AST (SGOT) U/L 256* 300*  --  368*   GLUCOSE mg/dL 116* 137* 153* 155*     Results from last 7 days   Lab Units 04/24/25 0321 04/23/25  0506 04/22/25  1234   INR  1.72* 1.61* 1.72*     Lab Results   Lab Value Date/Time    LIPASE 449 (H) 04/17/2025 2011       Radiology:  CT Abdomen Pelvis Without Contrast   Final Result   FINDINGS AND IMPRESSION:   Please note evaluation is suboptimal without venous contrast as well as   due to streak artifact and beam hardening from the patient's  arms being   There is as well as significant streak artifact from spinal hardware..       Small bilateral pleural effusions are present with significant overlying   opacification and volume loss within the lung bases which appears to   have increased since 4/17/2025, however is incompletely visualized.   Findings can be further evaluated with chest CT if clinically   indicated..       Enteric tube terminates in stomach. Somewhat nodular area of   opacification along the anterior aspect of the right middle lobe, as   before.       No findings of small bowel obstruction with contrast reaching the large   bowel. The appendix is visualized; however, there is partially obscured   by adjacent ascites and soft tissue thickening, limiting evaluation;   however, it measures up to approximately 0.8 cm, grossly similar that   seen on 4/17/2025.       There is significant hepatic steatosis. The gallbladder is not   distended. No gross abnormality seen in the spleen, kidneys or adrenal   glands. No hydronephrosis. Bladder is decompressed by Rae catheter and   not well evaluated.       FAT STRANDING AND SOFT TISSUE THICKENING CONTACTS PORTIONS OF THE   PANCREAS. PANCREATITIS CANNOT BE EXCLUDED IN THE APPROPRIATE CONTEXT AND   CORRELATION PATIENT HISTORY AND LIPASE LEVELS RECOMMENDED.       ASYMMETRIC THICKENING OF THE RIGHT HEMICOLON IS PRESENT, AS BEFORE,   SUGGESTIVE OF PORTAL HYPERTENSIVE COLOPATHY AND/OR COLITIS IN THE   APPROPRIATE CONTEXT. CORRELATION WITH PATIENT HISTORY IS RECOMMENDED.       While no definite free intraperitoneal air is seen, please note   evaluation is significantly suboptimal due to the above-stated   notations. The degree of ascites appears to have decreased status post   paracentesis no suspicious lytic or blastic osseous lesion. For the   purpose of this dictation, last well-formed disc base referred to as   L5-S1. Postsurgical changes from fusion of T10-S1 with posterior rods   and pedicle screws.                                This report was finalized on 4/23/2025 11:33 PM by Dr. Surya Vee M.D on Workstation: [id number]          XR Abdomen KUB   Final Result   1.Cortrak catheter tip probably in the distal body of the stomach.           This report was finalized on 4/20/2025 2:02 PM by Dr. Dexter Quinonez M.D on Workstation: [id number]          XR Chest Post CVA Port   Final Result       As described.               This report was finalized on 4/18/2025 4:06 PM by Dr. David Smith M.D on Workstation: [id number]          XR Chest 1 View   Final Result      CT Abdomen Pelvis Without Contrast   Final Result       1. Marked hepatic steatosis. Patient is noted to have moderate ascites.   2. There is some inflammatory stranding surrounding the pancreas, as   well as some trace fluid. Appearance may reflect acute pancreatitis.   There is no pancreatic ductal dilatation, and no organized   peripancreatic collections are seen.   3. High density material within the gallbladder may reflect stones or   sludge.   4. Thick walled appearance to the ascending and transverse colon   appearance may be related to portal colopathy, although correlation with   any evidence of colitis is recommended.   5. Indeterminate ground glass pulmonary nodule measuring 11 mm. For a   ground glass nodule >=6 mm, recommend CT at 6-12 months to confirm   persistence, then CT every 2 years until 5 years. If solid component(s)   or growth develops, consider resection.    Radiation dose reduction techniques were utilized, including automated   exposure control and exposure modulation based on body size.           This report was finalized on 4/17/2025 10:40 PM by Dr. Adrianna Fleming M.D on Workstation: [id number]          XR Chest 1 View   Final Result   As described.       This report was finalized on 4/17/2025 8:19 PM by Dr. David Smith M.D on Workstation: [id number]          US Paracentesis    (Results Pending)        Assessment & Plan     Active Hospital Problems    Diagnosis     **Sepsis        Assessment:  Decompensated cirrhosis w/ascites  Alcohol use  Sepsis  Acute kidney injury/CKD  CHF w/ischemic CM-h/o AICD  Enteropathogenic E. Coli  C. difficile considered colonization  Pancreatic inflammatory change on imaging  Coagulopathy  Thrombocytopenia      Plan:  Continues to have diarrhea which I think is the aftermath of the lactulose he was previously taking.  He has not received since 4/2 2.  Continue to follow to school volume.  Remains on vancomycin for C. difficile.  Paracentesis yesterday negative for SBP  Continue tube feeds at 30 cc/h.  Parameters worsening, prognosis is poor.    I discussed the patients findings and my recommendations with patient and nursing staff.            Veena Patel M.D.  [facility]  [phone]

## 2025-04-24 NOTE — PLAN OF CARE
Problem: Adult Inpatient Plan of Care  Goal: Absence of Hospital-Acquired Illness or Injury  Intervention: Prevent Skin Injury  Recent Flowsheet Documentation  Taken 4/24/2025 0600 by Davion Pressley RN  Body Position:   heels elevated   legs elevated   neutral body alignment   neutral head position   weight shifting   tilted  Taken 4/24/2025 0500 by Davion Pressley RN  Body Position:   heels elevated   legs elevated   neutral head position   neutral body alignment   weight shifting  Taken 4/24/2025 0400 by Davion Pressley RN  Body Position:   heels elevated   legs elevated   neutral head position   neutral body alignment   weight shifting  Skin Protection: incontinence pads utilized  Taken 4/24/2025 0300 by Davion Pressley RN  Body Position:   tilted   weight shifting   neutral body alignment   neutral head position   legs elevated   heels elevated  Taken 4/24/2025 0200 by Davion Pressley RN  Body Position:   weight shifting   neutral head position   neutral body alignment   legs elevated   heels elevated  Taken 4/24/2025 0100 by Davion Pressley RN  Body Position:   heels elevated   legs elevated   neutral head position   neutral body alignment   weight shifting  Taken 4/24/2025 0000 by Davion Pressley RN  Body Position:   turned   neutral body alignment   neutral head position   legs elevated   heels elevated   weight shifting  Skin Protection: incontinence pads utilized  Taken 4/23/2025 2300 by Davion Pressley RN  Body Position:   turned   neutral body alignment   neutral head position   weight shifting   tilted   legs elevated   heels elevated  Taken 4/23/2025 2200 by Davion Pressley RN  Body Position:   heels elevated   legs elevated   neutral body alignment   neutral head position   weight shifting  Taken 4/23/2025 2100 by Davion Pressley RN  Body Position:   heels elevated   legs elevated   neutral body alignment   neutral head position   weight shifting   upper extremity elevated   tilted  Taken 4/23/2025  2000 by Davion Pressley RN  Body Position:   turned   neutral body alignment   neutral head position   weight shifting   tilted   heels elevated  Skin Protection:   incontinence pads utilized   silicone foam dressing in place   skin sealant/moisture barrier applied     Problem: Sepsis/Septic Shock  Goal: Absence of Infection Signs and Symptoms  Outcome: Progressing  Intervention: Initiate Sepsis Management  Recent Flowsheet Documentation  Taken 4/24/2025 0600 by Davion Pressley RN  Infection Prevention:   environmental surveillance performed   hand hygiene promoted   rest/sleep promoted  Taken 4/24/2025 0500 by Davion Pressley RN  Infection Prevention:   environmental surveillance performed   hand hygiene promoted   rest/sleep promoted  Taken 4/24/2025 0400 by Davion Pressley RN  Infection Prevention:   environmental surveillance performed   hand hygiene promoted   rest/sleep promoted  Isolation Precautions: precautions maintained  Taken 4/24/2025 0300 by Davion Pressley RN  Infection Prevention:   environmental surveillance performed   hand hygiene promoted   rest/sleep promoted   single patient room provided  Isolation Precautions:   precautions maintained   contact   spore  Taken 4/24/2025 0200 by Davion Pressley RN  Infection Prevention:   environmental surveillance performed   hand hygiene promoted   rest/sleep promoted  Isolation Precautions:   precautions maintained   contact   spore  Taken 4/24/2025 0100 by Davion Pressley RN  Infection Prevention:   environmental surveillance performed   hand hygiene promoted   rest/sleep promoted  Taken 4/24/2025 0000 by Davion Pressley RN  Infection Prevention:   environmental surveillance performed   hand hygiene promoted   rest/sleep promoted  Taken 4/23/2025 2300 by Davion Pressley RN  Infection Prevention:   environmental surveillance performed   hand hygiene promoted   rest/sleep promoted  Isolation Precautions: precautions maintained  Taken 4/23/2025 2200 by Huan  LUKE Ricardo  Infection Prevention:   environmental surveillance performed   hand hygiene promoted   rest/sleep promoted   single patient room provided  Taken 4/23/2025 2100 by Davion Pressley RN  Infection Prevention:   environmental surveillance performed   hand hygiene promoted   rest/sleep promoted   single patient room provided  Taken 4/23/2025 2000 by Davion Pressley RN  Infection Prevention:   environmental surveillance performed   hand hygiene promoted   rest/sleep promoted  Isolation Precautions:   precautions maintained   contact   spore  Intervention: Promote Recovery  Recent Flowsheet Documentation  Taken 4/24/2025 0600 by Davion Pressley RN  Activity Management: bedrest  Taken 4/24/2025 0500 by Davion Pressley RN  Activity Management: bedrest  Taken 4/24/2025 0400 by Davion Pressley RN  Activity Management: bedrest  Taken 4/24/2025 0300 by Davion Pressley RN  Activity Management: bedrest  Taken 4/24/2025 0200 by Davion Pressley RN  Activity Management: bedrest  Taken 4/23/2025 2300 by Davion Pressley RN  Activity Management: bedrest  Taken 4/23/2025 2200 by Davion Pressley RN  Activity Management: bedrest  Taken 4/23/2025 2100 by Davion Pressley RN  Activity Management: bedrest  Taken 4/23/2025 2000 by Davion Pressley RN  Activity Management: bedrest     Problem: Sepsis/Septic Shock  Goal: Absence of Infection Signs and Symptoms  Intervention: Promote Recovery  Recent Flowsheet Documentation  Taken 4/24/2025 0600 by Davion Pressley RN  Activity Management: bedrest  Taken 4/24/2025 0500 by Davion Pressley RN  Activity Management: bedrest  Taken 4/24/2025 0400 by Davion Pressley RN  Activity Management: bedrest  Taken 4/24/2025 0300 by Davion Pressley RN  Activity Management: bedrest  Taken 4/24/2025 0200 by Davion Pressley RN  Activity Management: bedrest  Taken 4/23/2025 2300 by Davion Pressley RN  Activity Management: bedrest  Taken 4/23/2025 2200 by Davion Pressley RN  Activity Management:  bedrest  Taken 4/23/2025 2100 by Davion Pressley, RN  Activity Management: bedrest  Taken 4/23/2025 2000 by Davion Pressley, RN  Activity Management: bedrest   Goal Outcome Evaluation:      Patient is alert and oriented to person and place. Able to follow some commands and moves all extremities. Levophed has been turned off, but still needs vaso for blood pressure support. Patient has had multiple loose bowel movements. Urine output has been adequate. Color of urine is dark tea colored. Hgb and Platelet count has dropped compared to previous labs. Hem/Onc notified. See morning labs.     Progress: no change

## 2025-04-25 NOTE — CONSULTS
[facility] Visit Report    Archie Oquendo  [id number]  4/25/2025    [facility] consult received and records reviewed with [facility] medical officer Dr Anjana Leon . Patient is medically eligible for services at discharge--not HSB eligible at this time due to family wanting to keep pressor support this evening so patient's mother and brother can come this evening to see patient before discontinuing pressor support and transitioning to CMO tomorrow.      Met with patient daughter Lauren Oquendo at bedside. Patient was awake but would not answer questions or participate in discussion. Detailed explanation of services provided including HSB. Family wishes are for patient to transition to CMO and transfer to [facility] [facility] tomorrow. Family confirm plans for patient's mother and brother to visit this evening.    Family has also requested that the ICD be deactivated today. Misti BUTLER was notified and states will make arrangements.    This [facility] RN will follow up with family in the AM and meet with family sometime tomorrow, depending on what time the planned transition to CMO we be.    Thank you for allowing [facility] to participate with this patient's and family care needs.    Leisa Ortiz, LUKE   [facility] Admissions Coordinator  [phone]

## 2025-04-25 NOTE — PROGRESS NOTES
[facility] Progress Note      Patient Name: Archie Oquendo  : 1965  MRN: [id number]  Primary Care Physician:  Zoe Desai MD  Date of admission: 2025    Subjective     Interval History:   Follow-up KEAT[facility] on CKD of unknown baseline.      On vasopressin for BP support  Tube feeds infusing; water flushes 50 mL every 4 hours  Eyes open, but nonverbal; does track with eyes  UOP yesterday 2.8 L    Review of Systems:   As noted above    Objective     Vitals:   Temp:  [97.4 °F (36.3 °C)-99.3 °F (37.4 °C)] 98 °F (36.7 °C)  Heart Rate:  [] 84  Resp:  [11] 11  BP: ()/(50-87) 69/50  Flow (L/min) (Oxygen Therapy):  [5] 5    Intake/Output Summary (Last 24 hours) at 2025 1753  Last data filed at 2025 0623  Gross per 24 hour   Intake --   Output 1250 ml   Net -1250 ml       Physical Exam:    General Appearance: Awake but nonverbal, eyes track, chronically ill   Skin: warm and dry.  Jaundiced.  HEENT: Oral mucosa moist, core track in place.  Sclerae icteric.  Neck: Right IJ central line (2025)  Lungs: Few upper airway rhonchi.  Unlabored on 5 L via nasal cannula.  Heart: RRR, no rub  Abdomen: softer, distended.  Tender to palpation but no G or R; hypoactive BS  : Rae catheter in place  Extremities: 2+ lower ext edema. 1+ upper ext edema .  Neuro: Awake but poorly interactive    Scheduled Meds:     cephalexin, 500 mg, Nasogastric, Q12H  folic acid, 1 mg, Nasogastric, Daily  insulin regular, 2-7 Units, Subcutaneous, Q6H  Menthol-Zinc Oxide, 1 Application, Topical, BID  methylPREDNISolone sodium succinate, 125 mg, Intravenous, Q12H  midodrine, 10 mg, Nasogastric, Q8H  rifAXIMin, 550 mg, Nasogastric, Q12H  sodium chloride, 10 mL, Intravenous, Q12H  sodium chloride, 10 mL, Intravenous, Q12H  sodium chloride, 10 mL, Intravenous, Q12H  sodium chloride, 10 mL, Intravenous, Q12H  sodium chloride, 10 mL, Intravenous, Q12H  thiamine, 100 mg, Nasogastric,  Daily  vancomycin, 125 mg, Nasogastric, Q6H      IV Meds:   norepinephrine, 0.02-0.3 mcg/kg/min, Last Rate: Stopped (04/25/25 1443)  Pharmacy Consult,   vasopressin, 0.03 Units/min, Last Rate: 0.03 Units/min (04/25/25 1005)        Results Reviewed:   I have personally reviewed the results from the time of this admission to 4/25/2025 17:53 EDT     Results from last 7 days   Lab Units 04/25/25 0527 04/24/25 0321 04/23/25  0506   SODIUM mmol/L 135* 135* 140   POTASSIUM mmol/L 4.3 4.2 4.7   CHLORIDE mmol/L 93* 94* 101   CO2 mmol/L 28.3 28.0 27.1   BUN mg/dL 50* 49* 50*   CREATININE mg/dL 2.31* 3.07* 3.65*   CALCIUM mg/dL 8.0* 7.9* 7.9*   BILIRUBIN mg/dL 8.7* 7.5* 7.9*   ALK PHOS U/L 164* 155* 165*   ALT (SGPT) U/L 129* 139* 159*   AST (SGOT) U/L 213* 256* 300*   GLUCOSE mg/dL 158* 116* 137*       Estimated Creatinine Clearance: 42.7 mL/min (A) (by C-G formula based on SCr of 2.31 mg/dL (H)).    Results from last 7 days   Lab Units 04/25/25 0527 04/24/25 0321 04/23/25  0506   MAGNESIUM mg/dL 1.7 1.3* 1.7   PHOSPHORUS mg/dL 3.6 3.0 5.2*       Results from last 7 days   Lab Units 04/21/25  0343 04/20/25  0641   URIC ACID mg/dL 15.0* 14.9*       Results from last 7 days   Lab Units 04/25/25  0527 04/24/25  0321 04/23/25  0506 04/22/25  0534 04/21/25  0343   WBC 10*3/mm3 12.17* 14.18* 11.60* 9.37 8.29   HEMOGLOBIN g/dL 8.0* 7.1* 8.5* 8.2* 8.5*   PLATELETS 10*3/mm3 53*  53* 35*  34* 41* 48* 42*       Results from last 7 days   Lab Units 04/25/25  0527 04/24/25  0321 04/23/25  0506 04/22/25  1234 04/20/25  1149   INR  1.73* 1.72* 1.61* 1.72* 1.80*       Assessment / Plan     ASSESSMENT:  Acute kidney injury on probable CKD but baseline unknown, nonoliguric, improving.  Still volume-overloaded with edema and ascites.  Electrolytes stable other than hypervolemic hyponatremia  Hypotension, still requiring vasopressin but overall hemodynamically seems more stable  Diarrhea with enteropathogenic E. Coli  Pancreatitis and  transaminitis  Alcohol abuse with withdrawal  Decompensated cirrhosis with ascites  Ischemic cardiomyopathy with acute on chronic CHF and reduced EF 30%.  Has moderate MR.  ICD in place  Thrombocytopenia; HIT antibody negative from 4/21  Anemia    PLAN:  No IV Lasix today  Family reviewing goals of care; consideration for comfort-based care  Surveillance labs    Thank you for involving us in the care of Archie Oquendo.  Please feel free to call with any questions.    Jc Lemus MD  04/25/25  17:53 EDT    [facility]  [phone]    Please note that portions of this note were completed with a voice recognition program.

## 2025-04-25 NOTE — TREATMENT PLAN
Spoke with patient's daughter Patricia by phone. We discussed inpatient palliative care unit, medications used to alleviate suffering, and [facility] consult. She is going to talk to the rest of the family but likely planning on transitioning to comfort measures tomorrow 4/26. She was agreeable to hospice consult, which was placed. Updated Dr. Cornejo and LUKE Chappell.

## 2025-04-25 NOTE — PROGRESS NOTES
Nutrition Services    Patient Name: Archie Oquendo  YOB: 1965  MRN: [id number]  Admission date: 4/17/2025    PROGRESS NOTE      Encounter Information: Checking in on TF tolerance.     Continues to have diarrhea (x 10 bowel movements per GI note). Will trial changing tube feeds to semi-elemental formula with soluble fiber from Banitrol to hopefully ameliorate diarrhea.     Nephrology decreased water flush to 50 mL q 4 hrs r/t hyponatremia.        PO Diet: NPO Diet NPO Type: Tube Feeding   PO Supplements: n/a   PO Intake:  -       Current nutrition support: Isosource 1.5 @ 30 mL/hr with 50 mL/hr q 4 hrs water flush + banatrol TID   Nutrition support review: Documented infusing as ordered        Weight: Weight: 89.9 kg (198 lb 3.1 oz) (04/25/25 0519)       Medications: reviewed  Abx, folic acid, lasix, insulin, lactulose dc'd 4/23, thiamine, levo, vaso   Labs: Hyponatremia-Noted water flush decreased  Elevated BUN/Cr-Nephrology following  Elevated Liver Enzymes- GI following        GI Function:  Per GI had x10 bowel movements        Nutrition Intervention Updates: Today's Goal   *initial goal conservative d/t intolerance      Peptamen 1.5 at 30 mL/hr + 50mL q 4 hrs water flush   ___________________________________________________    Eventual end goal once tolerance improves    Peptamen 1.5 at 75 mL/hr + water flush based on clinical picture     Calories  2475 kcals (96%)   Protein  112 g (93%)   Free water  1274 mL   Flushes  Pending      The above end goal rate is for 22 hrs/day to assume interruptions for ADLs    Water flushes adjusted based on clinical picture + Rx flushes/IV fluids. Specialized formula chosen r/t Diarrhea.          Results from last 7 days   Lab Units 04/25/25  0527 04/24/25  0321 04/23/25  0506   SODIUM mmol/L 135* 135* 140   POTASSIUM mmol/L 4.3 4.2 4.7   CHLORIDE mmol/L 93* 94* 101   CO2 mmol/L 28.3 28.0 27.1   BUN mg/dL 50* 49* 50*   CREATININE mg/dL 2.31* 3.07* 3.65*  "  CALCIUM mg/dL 8.0* 7.9* 7.9*   BILIRUBIN mg/dL 8.7* 7.5* 7.9*   ALK PHOS U/L 164* 155* 165*   ALT (SGPT) U/L 129* 139* 159*   AST (SGOT) U/L 213* 256* 300*   GLUCOSE mg/dL 158* 116* 137*     Results from last 7 days   Lab Units 04/25/25  0527 04/24/25  0321 04/23/25  0506   MAGNESIUM mg/dL 1.7 1.3* 1.7   PHOSPHORUS mg/dL 3.6 3.0 5.2*   HEMOGLOBIN g/dL 8.0* 7.1* 8.5*   HEMATOCRIT % 21.8* 21.3* 23.1*     COVID19   Date Value Ref Range Status   04/17/2025 Not Detected Not Detected - Ref. Range Final     No results found for: \"HGBA1C\"    RD to follow up per protocol.    Electronically signed by:  Radha Cuello RD  04/25/25 10:44 EDT    "

## 2025-04-25 NOTE — PROGRESS NOTES
"      [facility]         Dr John Sayied   LOS: 8 days   Patient Care Team:  Zoe Desai MD as PCP - General (Family Medicine)  Corbin Bailey MD as Surgeon (Neurosurgery)    Chief Complaint: Sepsis with septic shock E. coli enteritis with C. difficile positive other issues as listed below    Interval History:  sleepy lethargic opens eyes attempts to follow commands.  He is back on vasopressin and Levophed currently.  Jaundiced.      REVIEW OF SYSTEMS:   Unable to get with patient's current condition    Ventilator/Non-Invasive Ventilation Settings (From admission, onward)      None        Heated high flow 60 L with 85% FiO2      Vital Signs  Temp:  [97.4 °F (36.3 °C)-99.3 °F (37.4 °C)] 98 °F (36.7 °C)  Heart Rate:  [] 126  Resp:  [11] 11  BP: ()/(41-91) 103/79    Intake/Output Summary (Last 24 hours) at 4/25/2025 0952  Last data filed at 4/25/2025 0623  Gross per 24 hour   Intake --   Output 2800 ml   Net -2800 ml     Flowsheet Rows      Flowsheet Row First Filed Value   Admission Height 188 cm (74\") Documented at 04/17/2025 1957   Admission Weight 104 kg (230 lb) Documented at 04/17/2025 2110            Physical Exam:  Patient is examined using the personal protective equipment as per guidelines from infection control for this particular patient as enacted.  Hand hygiene was performed before and after patient interaction.   General Appearance:  Acutely ill and poorly arousable.  Not following commands for me.  ENT normocephalic atraumatic  Neck midline trachea, no thyromegaly   Lungs:   Diminished breath sounds bilaterally    Heart:    Regular rhythm and normal rate, normal S1 and S2, no            murmur, no gallop, no rub, no click   Chest Wall:    No abnormalities observed   Abdomen:   Soft mild diffuse tenderness no rebound or guarding   Extremities:   Moves all extremities well, trace to 1+ edema, no cyanosis, no             redness  CNS confused  Skin " jaundiced  Musculoskeletal no cyanosis no clubbing normal range of motion     Results Review:        Results from last 7 days   Lab Units 04/25/25  0527 04/24/25 0321 04/23/25  0506   SODIUM mmol/L 135* 135* 140   POTASSIUM mmol/L 4.3 4.2 4.7   CHLORIDE mmol/L 93* 94* 101   CO2 mmol/L 28.3 28.0 27.1   BUN mg/dL 50* 49* 50*   CREATININE mg/dL 2.31* 3.07* 3.65*   GLUCOSE mg/dL 158* 116* 137*   CALCIUM mg/dL 8.0* 7.9* 7.9*     Results from last 7 days   Lab Units 04/22/25  0534   CK TOTAL U/L 249*     Results from last 7 days   Lab Units 04/25/25  0527 04/24/25  0321 04/23/25  0506   WBC 10*3/mm3 12.17* 14.18* 11.60*   HEMOGLOBIN g/dL 8.0* 7.1* 8.5*   HEMATOCRIT % 21.8* 21.3* 23.1*   PLATELETS 10*3/mm3 53*  53* 35*  34* 41*     Results from last 7 days   Lab Units 04/25/25  0527 04/24/25  0321 04/23/25  0506   INR  1.73* 1.72* 1.61*   APTT seconds 40.3* 41.6* 41.9*         Results from last 7 days   Lab Units 04/25/25  0527   MAGNESIUM mg/dL 1.7         Results from last 7 days   Lab Units 04/18/25  1303   PH, ARTERIAL pH units 7.431   PO2 ART mm Hg 114.3*   PCO2, ARTERIAL mm Hg 37.2   HCO3 ART mmol/L 24.8       I reviewed the patient's new clinical results.  I personally viewed and interpreted the patient's chest x-ray.        Medication Review:   cephalexin, 500 mg, Nasogastric, Q12H  folic acid, 1 mg, Nasogastric, Daily  insulin regular, 2-7 Units, Subcutaneous, Q6H  Menthol-Zinc Oxide, 1 Application, Topical, BID  methylPREDNISolone sodium succinate, 125 mg, Intravenous, Q12H  midodrine, 10 mg, Nasogastric, Q8H  rifAXIMin, 550 mg, Nasogastric, Q12H  sodium chloride, 10 mL, Intravenous, Q12H  sodium chloride, 10 mL, Intravenous, Q12H  sodium chloride, 10 mL, Intravenous, Q12H  sodium chloride, 10 mL, Intravenous, Q12H  sodium chloride, 10 mL, Intravenous, Q12H  thiamine, 100 mg, Nasogastric, Daily  vancomycin, 125 mg, Nasogastric, Q6H        norepinephrine, 0.02-0.3 mcg/kg/min, Last Rate: Stopped (04/25/25  0804)  Pharmacy Consult,   vasopressin, 0.03 Units/min, Last Rate: 0.03 Units/min (04/24/25 4587)        ASSESSMENT:   Acute metabolic encephalopathy  Sepsis present on admission due to E. coli enteritis  Acute hypoxemic respiratory failure on heated high flow  Septic shock present on admission causing encephalopathy and acute  Kidney injury  Decompensated cirrhosis  Acute pancreatitis by imaging  Non-STEMI  Hyponatremia  Hypokalemia  Acute kidney injury  Acute liver injury  Anemia  Thrombocytopenia  Alcohol abuse  Acute HFrEF    PLAN:  Mental status waxing and waning but currently able to protect airway.  CT abdomen pelvis noted concerns for pancreatitis noted.  GI following..  Tube feeds per GI.  Source of infection E. coli enteritis with C. difficile positive.  Continue current antibiotics as per IDs recommendations.  Patient on Rocephin with p.o. vancomycin.  Improving leukocytosis continue to monitor.  Creatinine improving.  Further fluid electrolyte management per nephrology  Use vasopressin and Levophed as pressors maintain MAP greater than 60.  Platelets have been fluctuating..  Appreciate input from hematology.  Further workup per hematology.  Thiamine IV to continue  Patient remained critically ill  I had a detailed discussion with patient daughter daughters.  Patient remains DNR.  Family waiting options moving forward  ICU core measures          Alvaro Cornejo MD  04/25/25  09:52 EDT

## 2025-04-25 NOTE — PAYOR COMM NOTE
"Bj Oquendo (59 y.o. Male)                                   ATTENTION; CONTINUED CLINICALS CASE [id number]                              REPLY TO [facility] DEPT  [id number]           Date of Birth   1965    Social Security Number       Address   [address] [place] [address]    Home Phone   [phone]    MRN   [id number]       Protestant   Patient Refused    Marital Status                               Admission Date   4/17/2025    Admission Type   Emergency    Admitting Provider   Antony Weston MD    Attending Provider   Antony Weston MD    Department, Room/Bed   [facility], N339/1       Discharge Date       Discharge Disposition       Discharge Destination                                 Attending Provider: Antony Weston MD    Allergies: No Known Allergies    Isolation: Spore   Infection: C.difficile (04/18/25), Other (04/18/25)   Code Status: No CPR    Ht: 188 cm (74\")   Wt: 89.9 kg (198 lb 3.1 oz)    Admission Cmt: None   Principal Problem: Sepsis [A41.9]                   Active Insurance as of 4/17/2025       Primary Coverage       Payor Plan Insurance Group Employer/Plan Group    [employer] BLUE CROSS [employer]UE [employer]OSS BLUE SHIELD PPO [id number]       Payor Plan Address Payor Plan Phone Number Payor Plan Fax Number Effective Dates    [address] [phone]  1/1/2025 - None Entered    [facility] [id number]         Subscriber Name Subscriber Birth Date Member ID       BJ OQUENDO 1965 [id number]                     Emergency Contacts        (Rel.) Home Phone Work Phone Mobile Phone    Patricia Oquendo (Daughter) -- -- [phone]    IBIS OQUENDO (Daughter) -- -- [phone]    Kedar waite (Other) -- -- [phone]              Vital Signs (last 2 days)       Date/Time Temp Temp src Pulse Resp BP Patient Position SpO2    04/25/25 1342 -- -- 84 -- 69/50 -- --    04/25/25 1005 -- -- 119 -- 94/65 -- --    " 04/25/25 0941 -- -- 126 -- 103/79 -- --    04/25/25 0729 98 (36.7) Axillary 79 11 83/55 Lying --    04/25/25 0425 99.3 (37.4) Oral 111 -- 102/71 Lying 97    04/25/25 0345 -- -- 113 -- 95/66 -- 95    04/25/25 0330 -- -- 103 -- 99/77 -- 95    04/25/25 0315 -- -- 119 -- 105/57 -- 97    04/25/25 0300 -- -- 123 -- 99/79 -- 97    04/25/25 0245 -- -- 119 -- 104/74 -- 97    04/25/25 0230 -- -- 122 -- 109/75 -- 96    04/25/25 0215 -- -- 122 -- 108/74 -- 96    04/25/25 0200 -- -- 120 -- 106/78 -- 95    04/25/25 0145 -- -- 120 -- 108/72 -- 98    04/25/25 0130 -- -- -- -- 92/70 -- 97    04/25/25 0122 97.8 (36.6) Oral -- -- -- -- --    04/24/25 2345 -- -- 78 -- 87/61 -- 98    04/24/25 2330 -- -- 81 -- 94/63 -- 98    04/24/25 2315 -- -- 82 -- 88/61 -- 98    04/24/25 2300 -- -- 82 -- 89/60 -- 98    04/24/25 2245 -- -- 106 -- 93/66 -- 90    04/24/25 2230 -- -- 87 -- 91/66 -- 100    04/24/25 2215 -- -- 92 -- 97/85 -- 98    04/24/25 2200 -- -- 89 -- 87/64 -- 98    04/24/25 2145 -- -- 87 -- -- -- 98    04/24/25 2130 -- -- 92 -- 93/60 -- 96    04/24/25 2115 -- -- 92 -- 82/58 -- 97    04/24/25 2100 -- -- 93 -- 83/58 -- 97    04/24/25 2045 -- -- 95 -- 93/57 -- 95    04/24/25 2030 -- -- 113 -- 105/73 -- 94    04/24/25 2015 -- -- 119 -- 106/74 -- 96    04/24/25 2000 -- -- 125 -- 108/76 -- 95    04/24/25 1949 97.4 (36.3) Oral 122 -- 122/74 -- 96    04/24/25 1945 -- -- 125 -- 112/83 -- 96    04/24/25 1930 -- -- 124 -- 104/87 -- 98    04/24/25 1915 -- -- 122 -- 84/71 -- 100    04/24/25 1900 -- -- 119 -- 125/82 -- 97    04/24/25 1845 -- -- 113 -- 103/76 -- 96    04/24/25 1830 -- -- 115 -- 85/70 -- 99    04/24/25 1816 -- -- 112 -- 110/73 -- 99    04/24/25 1815 -- -- 111 -- 102/69 -- 99    04/24/25 1800 -- -- 95 -- 105/71 -- 94    04/24/25 1745 -- -- 113 -- 104/77 -- --    04/24/25 1730 -- -- 115 -- 95/82 -- 98    04/24/25 1715 -- -- 112 -- 106/74 -- 98    04/24/25 1700 -- -- 97 -- 99/66 -- 95    04/24/25 1645 -- -- 114 -- 109/65 -- 100     04/24/25 1630 -- -- 116 -- 89/77 -- 95    04/24/25 1615 -- -- 118 -- 103/72 -- 94    04/24/25 1600 -- -- 120 -- 109/67 -- 96    04/24/25 1545 -- -- 117 -- -- -- 99    04/24/25 1530 97.7 (36.5) Oral 110 -- 112/73 -- 94    04/24/25 1515 -- -- 110 -- 107/82 -- 98    04/24/25 1500 -- -- 95 -- 124/76 -- 98    04/24/25 1445 -- -- 109 -- 97/85 -- 98    04/24/25 1430 -- -- 112 -- 100/70 -- 97    04/24/25 1420 -- -- 117 -- 79/54 -- --    04/24/25 1415 -- -- 115 -- -- -- 95    04/24/25 1400 -- -- 122 -- 75/54 -- 94    04/24/25 1345 -- -- 125 -- -- -- 91    04/24/25 1330 -- -- -- -- 103/76 -- 91    04/24/25 1315 -- -- 107 -- 101/91 -- 91    04/24/25 1300 -- -- 119 -- 106/70 -- 97    04/24/25 1245 -- -- 111 -- 111/70 -- 96    04/24/25 1230 -- -- 100 -- 103/55 -- 100    04/24/25 1215 -- -- 84 -- 92/67 -- 100    04/24/25 1200 -- -- 84 -- 96/59 -- 100    04/24/25 1145 -- -- 85 -- 89/68 -- 100    04/24/25 1130 -- -- 83 -- 96/70 -- 100    04/24/25 1115 -- -- 85 -- 92/54 -- 100    04/24/25 1110 -- -- 85 -- 94/69 -- --    04/24/25 1109 98.4 (36.9) Oral -- -- -- -- --    04/24/25 1105 -- -- 92 -- 77/41 -- --    04/24/25 1100 -- -- 87 -- 77/41 -- 100    04/24/25 1045 -- -- 86 -- 77/52 -- 99    04/24/25 1030 -- -- 104 -- 97/66 -- 98    04/24/25 1017 -- -- 93 -- 71/49 -- --    04/24/25 1015 -- -- 93 -- 71/49 -- 100    04/24/25 1000 -- -- 118 -- 91/67 -- 100    04/24/25 0949 -- -- 109 -- 78/53 -- --    04/24/25 0945 -- -- 117 -- 78/53 -- 100    04/24/25 0930 -- -- 117 -- 89/67 -- 98    04/24/25 0926 -- -- 123 -- 82/60 -- --    04/24/25 0915 -- -- 119 -- 91/77 -- 99    04/24/25 0900 -- -- 115 -- 100/75 -- 100    04/24/25 0845 -- -- 117 -- 107/74 -- 99    04/24/25 0830 -- -- 102 -- 105/70 -- 99    04/24/25 0815 -- -- 115 -- 112/71 -- 100    04/24/25 0800 98.9 (37.2) Oral 115 -- 100/74 -- 94    04/24/25 0645 -- -- 117 -- 104/77 -- 97    04/24/25 0630 -- -- 118 -- 98/69 -- 97    04/24/25 0615 -- -- 119 -- 102/80 -- 92    04/24/25 0600 -- --  118 -- 105/73 -- 98    04/24/25 0545 -- -- 106 -- 104/83 -- 100    04/24/25 0530 -- -- 114 -- 99/70 -- 99    04/24/25 0515 -- -- 116 -- 109/57 -- 99    04/24/25 0500 -- -- 117 -- 69/44 -- 100    04/24/25 0445 -- -- 118 -- 103/67 -- 99    04/24/25 0435 98.5 (36.9) Oral 119 -- -- -- 100    04/24/25 0430 -- -- 106 -- 98/66 -- 98    04/24/25 0300 -- -- 110 -- 112/71 -- 95    04/24/25 0245 -- -- 112 -- 105/71 -- 99    04/24/25 0230 -- -- 112 -- 115/81 -- 99    04/24/25 0215 -- -- 116 -- 104/72 -- 100    04/24/25 0200 -- -- 108 -- 106/67 -- 98    04/24/25 0145 -- -- 108 -- 99/73 -- 97    04/24/25 0130 -- -- 112 -- 102/64 -- 98    04/24/25 0115 -- -- 121 -- 131/68 -- 99    04/24/25 0100 -- -- 118 -- 94/83 -- 92    04/24/25 0045 -- -- 118 -- 102/66 -- 92    04/24/25 0030 -- -- 117 -- 91/76 -- 100    04/24/25 0015 -- -- 118 -- 93/62 -- 93    04/24/25 0000 -- -- 116 -- 96/68 -- 97    04/23/25 2352 98.8 (37.1) Oral 106 -- 104/63 -- 100    04/23/25 2315 -- -- 114 -- 105/63 -- 99    04/23/25 2300 -- -- 116 -- 100/68 -- 99    04/23/25 2245 -- -- 119 -- 96/79 -- 98    04/23/25 2230 -- -- 116 -- 103/65 -- 100    04/23/25 2215 -- -- 119 -- 102/61 -- 100    04/23/25 2200 -- -- 117 -- 100/69 -- 94    04/23/25 2145 -- -- 117 -- 93/66 -- 97    04/23/25 2130 -- -- 117 -- 101/66 -- 96    04/23/25 2115 -- -- 121 -- 109/70 -- 99    04/23/25 2100 -- -- 117 -- 104/70 -- 97    04/23/25 2045 -- -- 120 -- 110/68 -- 100    04/23/25 2037 98.7 (37.1) Oral -- -- -- -- --    04/23/25 2000 -- -- 122 -- 115/73 -- 96    04/23/25 1945 -- -- 123 -- 118/75 -- 93    04/23/25 1930 -- -- 118 -- 111/76 -- 100    04/23/25 1915 -- -- 104 -- 113/70 -- 100    04/23/25 1900 -- -- 115 -- 115/72 -- 99    04/23/25 1845 -- -- 115 -- 117/70 -- 96    04/23/25 1836 -- -- 119 -- 117/77 -- --    04/23/25 1830 -- -- 115 -- 117/77 -- 98    04/23/25 1815 -- -- 115 -- 116/83 -- 96    04/23/25 1800 -- -- 109 -- 123/74 -- 95    04/23/25 1715 -- -- 117 -- 114/102 -- 92    04/23/25  1700 -- -- 100 -- 106/81 -- 95    04/23/25 1645 -- -- 101 -- 118/78 -- 97    04/23/25 1630 -- -- 101 -- 113/82 -- 99    04/23/25 1615 -- -- 89 -- -- -- 99    04/23/25 1600 -- -- 88 -- 89/58 -- 100    04/23/25 1545 -- -- 88 -- 87/54 -- 100    04/23/25 1533 98 (36.7) Oral 93 -- 71/48 -- --    04/23/25 1530 -- -- 95 -- 71/48 -- 99    04/23/25 1515 -- -- 97 -- 64/46 -- 99    04/23/25 1500 -- -- 98 -- 90/57 -- 98    04/23/25 1445 -- -- 100 -- 93/59 -- 96    04/23/25 1430 -- -- 117 -- 112/67 -- 98    04/23/25 1415 -- -- 118 -- 117/94 -- 97    04/23/25 1400 -- -- 120 -- 103/82 -- 99    04/23/25 1345 -- -- 116 -- 94/75 -- 98    04/23/25 1330 -- -- 112 -- 109/73 -- 97    04/23/25 1315 -- -- 116 -- 128/69 -- 99    04/23/25 1300 -- -- -- -- 90/64 -- 95    04/23/25 1245 -- -- 104 -- 101/81 -- 99    04/23/25 1230 -- -- 115 -- 103/69 -- 99    04/23/25 1215 -- -- 117 -- 102/64 -- 98    04/23/25 1207 98.5 (36.9) Oral -- -- -- -- --    04/23/25 1200 -- -- 103 -- 107/72 -- 99    04/23/25 1145 -- -- 115 -- 104/66 -- 98    04/23/25 1130 -- -- 118 -- 105/81 -- 90    04/23/25 1124 -- -- 107 -- 98/73 -- --    04/23/25 1115 -- -- 116 -- 98/73 -- 98    04/23/25 1100 -- -- 117 -- 109/68 -- 100    04/23/25 1045 -- -- 113 -- 100/77 -- 98    04/23/25 1030 -- -- 117 -- 102/71 -- 98    04/23/25 1015 -- -- 112 -- 105/61 -- 96    04/23/25 1000 -- -- 106 -- 105/82 -- 100    04/23/25 0946 -- -- 115 -- 110/86 -- --    04/23/25 0945 -- -- 116 -- 110/86 -- 100    04/23/25 0930 -- -- 108 -- 108/69 -- 99    04/23/25 0915 -- -- 114 -- 111/67 -- 100    04/23/25 0903 -- -- 105 -- 89/46 -- --    04/23/25 0900 -- -- 111 -- 102/70 -- 97    04/23/25 0845 -- -- 114 -- -- -- 100    04/23/25 0830 -- -- -- -- -- -- 96    04/23/25 0815 -- -- 114 -- -- -- 93    04/23/25 0800 -- -- 118 -- 82/67 -- 95    04/23/25 0745 -- -- 113 -- 94/70 -- 95    04/23/25 0740 97.6 (36.4) Oral -- -- -- -- --    04/23/25 0730 -- -- 125 -- -- -- 89    04/23/25 0715 -- -- 104 -- 123/113 --  89    04/23/25 0700 -- -- 111 -- 110/98 -- 91    04/23/25 0630 -- -- 87 -- 85/67 -- 100    04/23/25 0615 -- -- 88 -- 92/61 -- 100    04/23/25 0600 -- -- 90 -- 91/63 -- 100    04/23/25 0545 -- -- 91 -- 90/59 -- 100    04/23/25 0530 -- -- 90 -- 92/67 -- 99    04/23/25 0515 -- -- 89 -- 86/65 -- 99    04/23/25 0500 -- -- 89 -- 90/67 -- 98    04/23/25 0445 -- -- 92 -- 84/64 -- 96    04/23/25 0430 -- -- 93 -- 89/67 -- 97    04/23/25 0415 -- -- 95 -- 84/67 -- 98    04/23/25 0400 -- -- 93 -- 88/68 -- 98    04/23/25 0345 -- -- 93 -- 87/70 -- 98    04/23/25 0330 -- -- 93 -- 94/64 -- 98    04/23/25 0315 97.6 (36.4) Axillary 95 20 91/66 Lying 97    04/23/25 0300 -- -- 94 -- 88/67 -- 97    04/23/25 0245 -- -- 94 -- 84/67 -- 97    04/23/25 0230 -- -- 95 -- 84/70 -- 97    04/23/25 0215 -- -- 97 -- 88/69 -- 97    04/23/25 0200 -- -- 96 -- 103/68 -- 97    04/23/25 0145 -- -- 92 -- 105/68 -- 98    04/23/25 0130 -- -- 95 -- 99/69 -- 98    04/23/25 0115 -- -- 94 -- 98/74 -- 99    04/23/25 0100 -- -- 94 -- 86/70 -- 98    04/23/25 0045 -- -- 95 -- 88/60 -- 98    04/23/25 0041 -- -- 97 -- 76/63 -- 97    04/23/25 0030 -- -- 96 -- 83/49 -- 97    04/23/25 0015 97.6 (36.4) Axillary 103 18 113/98 Lying 98    04/23/25 0000 -- -- 105 -- 79/59 -- 98          Oxygen Therapy (last 2 days)       Date/Time SpO2 Device (Oxygen Therapy) Flow (L/min) (Oxygen Therapy) Oxygen Concentration (%) ETCO2 (mmHg)    04/25/25 0425 97 -- -- -- --    04/25/25 0400 -- nasal cannula 5 -- --    04/25/25 0345 95 -- -- -- --    04/25/25 0330 95 -- -- -- --    04/25/25 0315 97 -- -- -- --    04/25/25 0300 97 -- -- -- --    04/25/25 0245 97 -- -- -- --    04/25/25 0230 96 -- -- -- --    04/25/25 0215 96 -- -- -- --    04/25/25 0200 95 -- -- -- --    04/25/25 0145 98 -- -- -- --    04/25/25 0130 97 -- -- -- --    04/25/25 0000 -- nasal cannula 5 -- --    04/24/25 2345 98 -- -- -- --    04/24/25 2330 98 -- -- -- --    04/24/25 2315 98 -- -- -- --    04/24/25 2300 98 -- --  -- --    04/24/25 2245 90 -- -- -- --    04/24/25 2230 100 -- -- -- --    04/24/25 2215 98 -- -- -- --    04/24/25 2200 98 -- -- -- --    04/24/25 2145 98 -- -- -- --    04/24/25 2130 96 -- -- -- --    04/24/25 2115 97 -- -- -- --    04/24/25 2100 97 -- -- -- --    04/24/25 2045 95 -- -- -- --    04/24/25 2030 94 -- -- -- --    04/24/25 2024 -- nasal cannula 5 -- --    04/24/25 2015 96 -- -- -- --    04/24/25 2000 95 -- -- -- --    04/24/25 1949 96 -- -- -- --    04/24/25 1945 96 -- -- -- --    04/24/25 1930 98 -- -- -- --    04/24/25 1915 100 -- -- -- --    04/24/25 1900 97 -- -- -- --    04/24/25 1845 96 -- -- -- --    04/24/25 1830 99 -- -- -- --    04/24/25 1816 99 -- -- -- --    04/24/25 1815 99 -- -- -- --    04/24/25 1800 94 -- -- -- --    04/24/25 1730 98 -- -- -- --    04/24/25 1715 98 -- -- -- --    04/24/25 1700 95 -- -- -- --    04/24/25 1645 100 -- -- -- --    04/24/25 1630 95 -- -- -- --    04/24/25 1615 94 -- -- -- --    04/24/25 1600 96 nasal cannula 5 -- --    04/24/25 1545 99 -- -- -- --    04/24/25 1530 94 -- -- -- --    04/24/25 1515 98 -- -- -- --    04/24/25 1500 98 -- -- -- --    04/24/25 1445 98 -- -- -- --    04/24/25 1430 97 -- -- -- --    04/24/25 1415 95 -- -- -- --    04/24/25 1400 94 -- -- -- --    04/24/25 1345 91 -- -- -- --    04/24/25 1330 91 -- -- -- --    04/24/25 1315 91 -- -- -- --    04/24/25 1300 97 -- -- -- --    04/24/25 1245 96 -- -- -- --    04/24/25 1230 100 -- -- -- --    04/24/25 1215 100 -- -- -- --    04/24/25 1200 100 nasal cannula 5 -- --    04/24/25 1145 100 -- -- -- --    04/24/25 1130 100 -- -- -- --    04/24/25 1115 100 -- -- -- --    04/24/25 1100 100 -- -- -- --    04/24/25 1045 99 -- -- -- --    04/24/25 1030 98 -- -- -- --    04/24/25 1015 100 -- -- -- --    04/24/25 1000 100 -- -- -- --    04/24/25 0945 100 -- -- -- --    04/24/25 0930 98 -- -- -- --    04/24/25 0915 99 -- -- -- --    04/24/25 0900 100 -- -- -- --    04/24/25 0845 99 -- -- -- --    04/24/25  0830 99 -- -- -- --    04/24/25 0815 100 -- -- -- --    04/24/25 0800 94 nasal cannula 5 -- --    04/24/25 0645 97 -- -- -- --    04/24/25 0630 97 -- -- -- --    04/24/25 0615 92 -- -- -- --    04/24/25 0600 98 -- -- -- --    04/24/25 0545 100 -- -- -- --    04/24/25 0530 99 -- -- -- --    04/24/25 0515 99 -- -- -- --    04/24/25 0500 100 -- -- -- --    04/24/25 0445 99 -- -- -- --    04/24/25 0435 100 -- -- -- --    04/24/25 0430 98 -- -- -- --    04/24/25 0400 -- nasal cannula 5 -- --    04/24/25 0300 95 -- -- -- --    04/24/25 0245 99 -- -- -- --    04/24/25 0230 99 -- -- -- --    04/24/25 0215 100 -- -- -- --    04/24/25 0200 98 -- -- -- --    04/24/25 0145 97 -- -- -- --    04/24/25 0130 98 -- -- -- --    04/24/25 0115 99 -- -- -- --    04/24/25 0100 92 -- -- -- --    04/24/25 0045 92 -- -- -- --    04/24/25 0030 100 -- -- -- --    04/24/25 0015 93 -- -- -- --    04/24/25 0000 97 nasal cannula 5 -- --    04/23/25 2352 100 -- -- -- --    04/23/25 2315 99 -- -- -- --    04/23/25 2300 99 -- -- -- --    04/23/25 2245 98 -- -- -- --    04/23/25 2230 100 -- -- -- --    04/23/25 2215 100 -- -- -- --    04/23/25 2200 94 -- -- -- --    04/23/25 2145 97 -- -- -- --    04/23/25 2130 96 -- -- -- --    04/23/25 2115 99 -- -- -- --    04/23/25 2100 97 -- -- -- --    04/23/25 2045 100 -- -- -- --    04/23/25 2000 96 nasal cannula 5 -- --    04/23/25 1945 93 -- -- -- --    04/23/25 1930 100 -- -- -- --    04/23/25 1915 100 -- -- -- --    04/23/25 1900 99 -- -- -- --    04/23/25 1845 96 -- -- -- --    04/23/25 1830 98 -- -- -- --    04/23/25 1815 96 -- -- -- --    04/23/25 1800 95 -- -- -- --    04/23/25 1715 92 -- -- -- --    04/23/25 1700 95 -- -- -- --    04/23/25 1645 97 -- -- -- --    04/23/25 1630 99 -- -- -- --    04/23/25 1615 99 -- -- -- --    04/23/25 1600 100 -- -- -- --    04/23/25 1545 100 -- -- -- --    04/23/25 1530 99 -- -- -- --    04/23/25 1515 99 -- -- -- --    04/23/25 1500 98 -- -- -- --    04/23/25 1445 96  -- -- -- --    04/23/25 1430 98 -- -- -- --    04/23/25 1415 97 -- -- -- --    04/23/25 1400 99 -- -- -- --    04/23/25 1345 98 -- -- -- --    04/23/25 1330 97 -- -- -- --    04/23/25 1315 99 -- -- -- --    04/23/25 1300 95 -- -- -- --    04/23/25 1245 99 -- -- -- --    04/23/25 1230 99 -- -- -- --    04/23/25 1215 98 -- -- -- --    04/23/25 1200 99 -- -- -- --    04/23/25 1145 98 -- -- -- --    04/23/25 1130 90 -- -- -- --    04/23/25 1115 98 -- -- -- --    04/23/25 1100 100 -- -- -- --    04/23/25 1045 98 -- -- -- --    04/23/25 1030 98 -- -- -- --    04/23/25 1015 96 -- -- -- --    04/23/25 1000 100 -- -- -- --    04/23/25 0945 100 -- -- -- --    04/23/25 0930 99 -- -- -- --    04/23/25 0915 100 -- -- -- --    04/23/25 0900 97 -- -- -- --    04/23/25 0845 100 -- -- -- --    04/23/25 0830 96 -- -- -- --    04/23/25 0815 93 -- -- -- --    04/23/25 0800 95 nasal cannula 5 -- --    04/23/25 0745 95 -- -- -- --    04/23/25 0730 89 -- -- -- --    04/23/25 0715 89 -- -- -- --    04/23/25 0700 91 -- -- -- --    04/23/25 0630 100 -- -- -- --    04/23/25 0615 100 -- -- -- --    04/23/25 0600 100 -- -- -- --    04/23/25 0545 100 -- -- -- --    04/23/25 0530 99 -- -- -- --    04/23/25 0515 99 -- -- -- --    04/23/25 0500 98 -- -- -- --    04/23/25 0445 96 -- -- -- --    04/23/25 0430 97 -- -- -- --    04/23/25 0415 98 -- -- -- --    04/23/25 0400 98 nasal cannula 5 -- --    04/23/25 0345 98 -- -- -- --    04/23/25 0330 98 -- -- -- --    04/23/25 0315 97 -- -- -- --    04/23/25 0300 97 -- -- -- --    04/23/25 0245 97 -- -- -- --    04/23/25 0230 97 -- -- -- --    04/23/25 0215 97 -- -- -- --    04/23/25 0200 97 -- -- -- --    04/23/25 0145 98 -- -- -- --    04/23/25 0130 98 -- -- -- --    04/23/25 0115 99 -- -- -- --    04/23/25 0100 98 -- -- -- --    04/23/25 0045 98 -- -- -- --    04/23/25 0041 97 -- -- -- --    04/23/25 0030 97 -- -- -- --    04/23/25 0015 98 -- -- -- --    04/23/25 0000 98 nasal cannula 5 -- --           Lines, Drains & Airways       Active LDAs       Name Placement date Placement time Site Days    CVC Quadruple Lumen 04/18/25 Non-tunneled Right Internal jugular 04/18/25  1500  Internal jugular  7    Peripheral IV 04/17/25 2000 18 G Anterior;Right;Upper Arm 04/17/25 2000  Arm  7    Peripheral IV 04/17/25 2030 18 G Anterior;Left;Upper Arm 04/17/25 2030  Arm  7    NG/OG Tube Nasogastric 04/20/25  1424  --  5    Urethral Catheter Straight-tip 16 Fr. 04/17/25 2045  -- 7                   None            Orders (last 48 hrs)        Start     Ordered    05/13/25 0900  vancomycin (VANCOCIN) capsule 125 mg  Every Other Day,   Status:  Discontinued         04/19/25 1025 05/06/25 0900  vancomycin (VANCOCIN) capsule 125 mg  Every 24 Hours,   Status:  Discontinued         04/19/25 1025 04/29/25 0900  vancomycin (VANCOCIN) capsule 125 mg  Every 12 Hours,   Status:  Discontinued         04/19/25 1025    04/25/25 1546  POC Glucose Once  PROCEDURE ONCE        Comments: Complete no more than 45 minutes prior to patient eating      04/25/25 1538    04/25/25 1135  Inpatient Hospice / [facility] Consult  Once        Specialty:  Hospice and Palliative Medicine  Provider:  (Not yet assigned)    04/25/25 1134    04/25/25 1105  Tube Feeding: Formula: Other; : Peptamen 1.5; Feeding Type: Continuous; Start at: 30 mL/hr; Then Advance By: Do Not Advance; Water Flush: 50 mL; Every: 4 hours; Water Bolus: None  Diet Effective Now         04/25/25 1104    04/25/25 1104  POC Glucose Once  PROCEDURE ONCE        Comments: Complete no more than 45 minutes prior to patient eating      04/25/25 1101    04/25/25 0600  Lactate Dehydrogenase  Daily       04/24/25 1643    04/25/25 0600  Haptoglobin  Daily       04/24/25 1643    04/25/25 0600  Immature Platelet Fraction  Daily       04/24/25 1643    04/25/25 0600  CBC Auto Differential  PROCEDURE ONCE         04/24/25 2201    04/25/25 0529  POC Glucose Once  PROCEDURE ONCE        Comments:  "Complete no more than 45 minutes prior to patient eating      04/25/25 0527    04/25/25 0022  POC Glucose Once  PROCEDURE ONCE        Comments: Complete no more than 45 minutes prior to patient eating      04/25/25 0019    04/25/25 0000  Change Transparent Dressing & Needleless Connectors Every 7 Days  Weekly        Comments: Per [facility] Policy    04/21/25 1031    04/24/25 1932  Restraints Non-Violent or Non-Self Destructive  Calendar Day         04/24/25 1932    04/24/25 1813  POC Glucose Once  PROCEDURE ONCE        Comments: Complete no more than 45 minutes prior to patient eating      04/24/25 1811    04/24/25 1730  methylPREDNISolone sodium succinate (SOLU-Medrol) injection 125 mg  Every 12 Hours         04/24/25 1631    04/24/25 1230  cephalexin (KEFLEX) capsule 500 mg  Every 12 Hours Scheduled,   Status:  Discontinued        Placed in \"Followed by\" Linked Group    04/19/25 1047    04/24/25 1230  cephalexin (KEFLEX) capsule 500 mg  Every 12 Hours Scheduled        Placed in \"Followed by\" Linked Group    04/20/25 1721    04/24/25 1220  POC Glucose Once  PROCEDURE ONCE        Comments: Complete no more than 45 minutes prior to patient eating      04/24/25 1216    04/24/25 0920  Immature Platelet Fraction  Once         04/24/25 0919    04/24/25 0830  furosemide (LASIX) injection 60 mg  Once         04/24/25 0737    04/24/25 0800  magnesium sulfate 2g/50 mL (PREMIX) infusion  Once         04/24/25 0703    04/24/25 0737  Tube Feeding: Formula: Isosource 1.5 (Jevity 1.5); Feeding Type: Continuous; Start at: 30 mL/hr; Then Advance By: Do Not Advance; Water Flush: 50 mL; Every: 4 hours; Water Bolus: None  Diet Effective Now,   Status:  Canceled         04/24/25 0736    04/24/25 0600  Ammonia  Morning Draw         04/23/25 0618    04/24/25 0600  CBC Auto Differential  PROCEDURE ONCE         04/23/25 2201    04/24/25 0511  POC Glucose Once  PROCEDURE ONCE        Comments: Complete no more than 45 minutes prior to patient " "eating      04/24/25 0507    04/24/25 0338  Manual Differential  Once,   Status:  Canceled         04/24/25 0337    04/23/25 2323  POC Glucose Once  PROCEDURE ONCE        Comments: Complete no more than 45 minutes prior to patient eating      04/23/25 2320    04/23/25 1840  Body fluid differential - Body Fluid, Peritoneum  Once         04/23/25 1839    04/23/25 1825  POC Glucose Once  PROCEDURE ONCE        Comments: Complete no more than 45 minutes prior to patient eating      04/23/25 1822    04/23/25 1800  lidocaine (XYLOCAINE) 1 % injection 10 mL  Once         04/23/25 1711    04/23/25 1200  Oral Care  Every 4 Hours       04/23/25 0802    04/23/25 0900  thiamine (VITAMIN B-1) tablet 100 mg  Daily        Placed in \"Followed by\" Linked Group    04/20/25 1721    04/23/25 0900  folic acid (FOLVITE) tablet 1 mg  Daily         04/22/25 1225    04/22/25 1215  riFAXIMin (XIFAXAN) tablet 550 mg  Every 12 Hours Scheduled         04/22/25 1123    04/22/25 0945  vasopressin (PITRESSIN) 20 units in 100 mL NS infusion  Continuous         04/22/25 0851    04/22/25 0600  Daily CHG Bath While Central Line in Place  Daily       04/21/25 1031    04/22/25 0600  Protime-INR  Daily       04/21/25 1109    04/22/25 0600  aPTT  Daily       04/21/25 1109    04/21/25 1800  POC Glucose Q6H  Every 6 Hours      Comments: Complete no more than 45 minutes prior to patient eating      04/21/25 1322    04/21/25 1800  Dietary Nutrition Supplements Other (see comment); Banatrol, given 1 packet 3X daily via cortrak to thicken stool, safe with cdiff  Daily With Breakfast, Lunch & Dinner       04/21/25 1322    04/21/25 1622  oxyCODONE (ROXICODONE) immediate release tablet 5 mg  Every 6 Hours PRN         04/21/25 1623    04/21/25 1323  Daily Weights  Daily       04/21/25 1322    04/21/25 1321  Tube Feeding Assessment and I/O  Every Shift       04/21/25 1322    04/21/25 1130  sodium chloride 0.9 % flush 10 mL  Every 12 Hours Scheduled         04/21/25 " 1031    04/21/25 1130  sodium chloride 0.9 % flush 10 mL  Every 12 Hours Scheduled         04/21/25 1031    04/21/25 1130  sodium chloride 0.9 % flush 10 mL  Every 12 Hours Scheduled         04/21/25 1031    04/21/25 1130  sodium chloride 0.9 % flush 10 mL  Every 12 Hours Scheduled         04/21/25 1031    04/21/25 1030  sodium chloride 0.9 % flush 10 mL  As Needed         04/21/25 1031    04/21/25 1030  sodium chloride 0.9 % flush 20 mL  As Needed         04/21/25 1031    04/21/25 1030  sodium chloride 0.9 % infusion 40 mL  As Needed         04/21/25 1031    04/21/25 1030  Menthol-Zinc Oxide 1 Application  2 Times Daily         04/21/25 0935    04/21/25 0906  Daily Weights  Daily       04/21/25 0905    04/21/25 0905  Tube Feeding Assessment and I/O  Every Shift       04/21/25 0905    04/20/25 1815  midodrine (PROAMATINE) tablet 10 mg  Every 8 Hours         04/20/25 1721    04/20/25 1815  vancomycin oral solution reconstituted 125 mg  Every 6 Hours Scheduled         04/20/25 1723    04/19/25 1025  Pharmacy Consult  Continuous PRN         04/19/25 1025    04/18/25 0600  Phosphorus  Daily       04/17/25 2250    04/18/25 0600  Magnesium  Daily       04/17/25 2250    04/18/25 0600  CBC & Differential  Daily       04/17/25 2250    04/18/25 0600  Comprehensive Metabolic Panel  Daily       04/17/25 2250    04/18/25 0215  norepinephrine (LEVOPHED) 8 mg in 250 mL NS infusion (premix)  Titrated         04/18/25 0117    04/18/25 0000  POC Glucose Q6H  Every 6 Hours      Comments: Complete no more than 45 minutes prior to patient eating      04/17/25 2259 04/18/25 0000  insulin regular (humuLIN R,novoLIN R) injection 2-7 Units  Every 6 Hours Scheduled         04/17/25 2259 04/18/25 0000  Vital Signs Every Hour and Per Hospital Policy Based on Patient Condition  Every Hour       04/17/25 2301 04/18/25 0000  Intake & Output  Every Hour       04/17/25 2301    04/17/25 3284  sodium chloride 0.9 % flush 10 mL  Every 12  "Hours Scheduled         04/17/25 2301 04/17/25 2302  Daily Weights  Daily       04/17/25 2301 04/17/25 2302  Daily CHG Bath While in Critical Care  Daily      Comments: Use for admission bath and length of critical care stay.    04/17/25 2301 04/17/25 2301  ondansetron (ZOFRAN) injection 4 mg  Every 6 Hours PRN         04/17/25 2301 04/17/25 2301  Oral Care - Patient Not on NPPV & Not Intubated  Every Shift,   Status:  Canceled       04/17/25 2301 04/17/25 2300  nitroglycerin (NITROSTAT) SL tablet 0.4 mg  Every 5 Minutes PRN         04/17/25 2301 04/17/25 2300  sodium chloride 0.9 % flush 10 mL  As Needed         04/17/25 2301 04/17/25 2300  sodium chloride 0.9 % infusion 40 mL  As Needed         04/17/25 2301 04/17/25 2259  dextrose (GLUTOSE) oral gel 15 g  Every 15 Minutes PRN         04/17/25 2259 04/17/25 2259  dextrose (D50W) (25 g/50 mL) IV injection 25 g  Every 15 Minutes PRN         04/17/25 2259 04/17/25 2259  glucagon (GLUCAGEN) injection 1 mg  Every 15 Minutes PRN         04/17/25 2259 04/17/25 2028  Urinary Catheter Care  Every Shift      Placed in \"And\" Linked Group    04/17/25 2027 04/01/25 0000  lisinopril (PRINIVIL,ZESTRIL) 20 MG tablet  Daily         04/18/25 0000    04/01/25 0000  potassium chloride ER (K-TAB) 20 MEQ tablet controlled-release ER tablet  2 Times Daily         04/18/25 0000    04/01/25 0000  bumetanide (BUMEX) 2 MG tablet  2 Times Daily         04/18/25 0000    04/01/25 0000  cyclobenzaprine (FLEXERIL) 10 MG tablet  2 Times Daily PRN         04/18/25 0000    Unscheduled  Follow Hypoglycemia Standing Orders For Blood Glucose <70 & Notify Provider of Treatment  As Needed      Comments: Follow Hypoglycemia Orders As Outlined in Process Instructions (Open Order Report to View Full Instructions)  Notify Provider Any Time Hypoglycemia Treatment is Administered    04/17/25 2259    Unscheduled  Obtain Pre & Post Sedation Scores With Every Sedative Dose - " Hold For POSS Greater Than 2 or RASS of -3 or Less  As Needed       04/17/25 2302    Unscheduled  Apply Pressure Dressing to Paracentesis Site if Issues With Leaking  As Needed       04/18/25 1408    Unscheduled  Oxygen Therapy- Heated High Flow Nasal Cannula; Titrate 30-60 LPM Per SpO2; 90 - 95%  Continuous PRN       04/18/25 2325    Unscheduled  Alex & Document Feeding Tube Depth (in cm)  As Needed       04/21/25 0905    Unscheduled  Verify Feeding Tube Placement Upon Insertion & As Needed  As Needed       04/21/25 0905    Unscheduled  Flush Feeding Tube With 30-50mL Water As Needed  As Needed       04/21/25 0905    Unscheduled  Wound Care  As Needed       04/21/25 0935    Unscheduled  Change Dressing to IV Site As Needed When Damp, Loose or Soiled  As Needed       04/21/25 1031    Unscheduled  Change Needleless Connectors  As Needed      Comments: Change Needleless Connectors When:  - Administration Set Changed  - Dressing Changed  - Removed For Any Reason  - Residual Blood or Debris Within Connector  - Prior to Drawing Blood Cultures  - Contamination of Connector  - After Administration of Blood or Blood Components    04/21/25 1031    Unscheduled  Alex & Document Feeding Tube Depth (in cm)  As Needed       04/21/25 1322    Unscheduled  Verify Feeding Tube Placement Upon Insertion & As Needed  As Needed       04/21/25 1322    Unscheduled  Flush Feeding Tube With 30-50mL Water As Needed  As Needed       04/21/25 1322    --  bismuth subsalicylate (PEPTO BISMOL) 262 MG/15ML suspension  Every 6 Hours PRN         04/18/25 0101    Signed and Held  Body Fluid Cell Count With Differential - Body Fluid, Peritoneum  STAT         Signed and Held    Signed and Held  Albumin, Fluid - Body Fluid, Peritoneum  STAT         Signed and Held    Signed and Held  Protein, Body Fluid - Body Fluid, Peritoneum  STAT         Signed and Held    Signed and Held  Body Fluid Culture - Body Fluid, Peritoneum  STAT         Signed and Held     Signed and Held  Anaerobic Culture - Body Fluid, Peritoneum  STAT         Signed and Held    Signed and Held  albumin human 25 % IV SOLN 12.5 g  Once         Signed and Held    Pending  Ammonia  Morning Draw         Pending                     Physician Progress Notes (last 48 hours)        Darrion Arroyo MD at 04/25/25 1250           LOS: 8 days   Patient Care Team:  Zoe Desai MD as PCP - General (Family Medicine)  Corbin Bailey MD as Surgeon (Neurosurgery)    Chief Complaint: Follow-up acute on chronic systolic CHF, cardiomyopathy, type II NSTEMI.    Interval History: Remains lethargic, but arouses.  Continues with sinus tachycardia.  He is back on Levophed, and is also on vasopressin.    Vital Signs:  Temp:  [97.4 °F (36.3 °C)-99.3 °F (37.4 °C)] 98 °F (36.7 °C)  Heart Rate:  [] 119  Resp:  [11] 11  BP: ()/(54-91) 94/65    Intake/Output Summary (Last 24 hours) at 4/25/2025 1251  Last data filed at 4/25/2025 0623  Gross per 24 hour   Intake --   Output 2000 ml   Net -2000 ml       Physical Exam:   General Appearance:    No acute distress, appears ill.  Somnolent, but opens eyes.  Prominently jaundiced.   Lungs:     Rhonchi anteriorly bilaterally.    Heart:    Regular rhythm and mildly tachycardic rate.  II/VI SM throughout.    Abdomen:     Soft, mildly tender, distended.    Extremities:   2+ edema of the lower extremities.  1+ edema of the hands and upper extremities.     Results Review:    Results from last 7 days   Lab Units 04/25/25  0527   SODIUM mmol/L 135*   POTASSIUM mmol/L 4.3   CHLORIDE mmol/L 93*   CO2 mmol/L 28.3   BUN mg/dL 50*   CREATININE mg/dL 2.31*   GLUCOSE mg/dL 158*   CALCIUM mg/dL 8.0*     Results from last 7 days   Lab Units 04/22/25  0534   CK TOTAL U/L 249*     Results from last 7 days   Lab Units 04/25/25  0527   WBC 10*3/mm3 12.17*   HEMOGLOBIN g/dL 8.0*   HEMATOCRIT % 21.8*   PLATELETS 10*3/mm3 53*  53*     Results from last 7 days   Lab Units 04/25/25  0572  04/24/25  0321 04/23/25  0506   INR  1.73* 1.72* 1.61*   APTT seconds 40.3* 41.6* 41.9*         Results from last 7 days   Lab Units 04/25/25  0527   MAGNESIUM mg/dL 1.7           I reviewed the patient's new clinical results.        Assessment:  1.  Nausea, vomiting, and diarrhea  2.  Enteropathogenic E. coli positive stool sample and C. difficile toxin positive  3.  Anasarca, multifactorial  4.  Severe acute kidney injury with chronic kidney disease  5.  Decompensated cirrhosis of the liver  6.  Moderate ascites by CT  7.  Acute on chronic systolic CHF, EF 30% by echo on 4/18/2025  8.  Elevated troponin, likely type II NSTEMI secondary to #1, #2, #4, #5, and #9  9.  Coronary artery disease,  of the RCA by cath in 2022  10.  Status post ICD placement  11.  Urinary tract infection with retention, now with Rae catheter  12.  Significant transaminitis  13.  Hypoalbuminemia, contributing to volume overload  14.  Anemia, likely multifactorial  15.  Heavy daily alcohol use  16.  Thrombocytopenia with potential DIC  17.  Suspected medication noncompliance  18.  Metabolic encephalopathy  19.  Sinus tachycardia    Plan:  -Continues to have metabolic encephalopathy and poor mental status.     - He is now back on Levophed in addition to vasopressin for pressor support.  He is also on midodrine 10 mg per tube every 8 hours.  Continues to have sinus tachycardia.    -Volume management per nephrology.  Received Lasix yesterday.    -Hypotension and renal dysfunction greatly limit his guideline directed medical therapy for CHF and cardiomyopathy.    -HIT antibody was negative.  Hematology is following.  Platelet count now in the 50s.  Suspect thrombocytopenia secondary to liver dysfunction, sepsis, and potential DIC.    -Continues to have issues with anemia.    - Noted palliative care notes.  Potential transition to comfort care tomorrow.  I agree with this completely.  I really feel he is unlikely to have any meaningful  "recovery care.    Darrion Arroyo MD  04/25/25  12:51 EDT         Electronically signed by Darrion Arroyo MD at 04/25/25 1253       Alvaro Cornejo MD at 04/25/25 0952                [facility]         Dr Alvaro Cornejo   LOS: 8 days   Patient Care Team:  Zoe Desai MD as PCP - General (Family Medicine)  Corbin Bailey MD as Surgeon (Neurosurgery)    Chief Complaint: Sepsis with septic shock E. coli enteritis with C. difficile positive other issues as listed below    Interval History:  sleepy lethargic opens eyes attempts to follow commands.  He is back on vasopressin and Levophed currently.  Jaundiced.      REVIEW OF SYSTEMS:   Unable to get with patient's current condition    Ventilator/Non-Invasive Ventilation Settings (From admission, onward)      None        Heated high flow 60 L with 85% FiO2      Vital Signs  Temp:  [97.4 °F (36.3 °C)-99.3 °F (37.4 °C)] 98 °F (36.7 °C)  Heart Rate:  [] 126  Resp:  [11] 11  BP: ()/(41-91) 103/79    Intake/Output Summary (Last 24 hours) at 4/25/2025 0952  Last data filed at 4/25/2025 0623  Gross per 24 hour   Intake --   Output 2800 ml   Net -2800 ml     Flowsheet Rows      Flowsheet Row First Filed Value   Admission Height 188 cm (74\") Documented at 04/17/2025 1957   Admission Weight 104 kg (230 lb) Documented at 04/17/2025 2110            Physical Exam:  Patient is examined using the personal protective equipment as per guidelines from infection control for this particular patient as enacted.  Hand hygiene was performed before and after patient interaction.   General Appearance:  Acutely ill and poorly arousable.  Not following commands for me.  ENT normocephalic atraumatic  Neck midline trachea, no thyromegaly   Lungs:   Diminished breath sounds bilaterally    Heart:    Regular rhythm and normal rate, normal S1 and S2, no            murmur, no gallop, no rub, no click   Chest Wall:    No abnormalities observed   Abdomen:   Soft " mild diffuse tenderness no rebound or guarding   Extremities:   Moves all extremities well, trace to 1+ edema, no cyanosis, no             redness  CNS confused  Skin jaundiced  Musculoskeletal no cyanosis no clubbing normal range of motion     Results Review:        Results from last 7 days   Lab Units 04/25/25  0527 04/24/25 0321 04/23/25  0506   SODIUM mmol/L 135* 135* 140   POTASSIUM mmol/L 4.3 4.2 4.7   CHLORIDE mmol/L 93* 94* 101   CO2 mmol/L 28.3 28.0 27.1   BUN mg/dL 50* 49* 50*   CREATININE mg/dL 2.31* 3.07* 3.65*   GLUCOSE mg/dL 158* 116* 137*   CALCIUM mg/dL 8.0* 7.9* 7.9*     Results from last 7 days   Lab Units 04/22/25  0534   CK TOTAL U/L 249*     Results from last 7 days   Lab Units 04/25/25  0527 04/24/25  0321 04/23/25  0506   WBC 10*3/mm3 12.17* 14.18* 11.60*   HEMOGLOBIN g/dL 8.0* 7.1* 8.5*   HEMATOCRIT % 21.8* 21.3* 23.1*   PLATELETS 10*3/mm3 53*  53* 35*  34* 41*     Results from last 7 days   Lab Units 04/25/25  0527 04/24/25 0321 04/23/25  0506   INR  1.73* 1.72* 1.61*   APTT seconds 40.3* 41.6* 41.9*         Results from last 7 days   Lab Units 04/25/25  0527   MAGNESIUM mg/dL 1.7         Results from last 7 days   Lab Units 04/18/25  1303   PH, ARTERIAL pH units 7.431   PO2 ART mm Hg 114.3*   PCO2, ARTERIAL mm Hg 37.2   HCO3 ART mmol/L 24.8       I reviewed the patient's new clinical results.  I personally viewed and interpreted the patient's chest x-ray.        Medication Review:   cephalexin, 500 mg, Nasogastric, Q12H  folic acid, 1 mg, Nasogastric, Daily  insulin regular, 2-7 Units, Subcutaneous, Q6H  Menthol-Zinc Oxide, 1 Application, Topical, BID  methylPREDNISolone sodium succinate, 125 mg, Intravenous, Q12H  midodrine, 10 mg, Nasogastric, Q8H  rifAXIMin, 550 mg, Nasogastric, Q12H  sodium chloride, 10 mL, Intravenous, Q12H  sodium chloride, 10 mL, Intravenous, Q12H  sodium chloride, 10 mL, Intravenous, Q12H  sodium chloride, 10 mL, Intravenous, Q12H  sodium chloride, 10 mL,  Intravenous, Q12H  thiamine, 100 mg, Nasogastric, Daily  vancomycin, 125 mg, Nasogastric, Q6H        norepinephrine, 0.02-0.3 mcg/kg/min, Last Rate: Stopped (04/25/25 0804)  Pharmacy Consult,   vasopressin, 0.03 Units/min, Last Rate: 0.03 Units/min (04/24/25 2239)        ASSESSMENT:   Acute metabolic encephalopathy  Sepsis present on admission due to E. coli enteritis  Acute hypoxemic respiratory failure on heated high flow  Septic shock present on admission causing encephalopathy and acute  Kidney injury  Decompensated cirrhosis  Acute pancreatitis by imaging  Non-STEMI  Hyponatremia  Hypokalemia  Acute kidney injury  Acute liver injury  Anemia  Thrombocytopenia  Alcohol abuse  Acute HFrEF    PLAN:  Mental status waxing and waning but currently able to protect airway.  CT abdomen pelvis noted concerns for pancreatitis noted.  GI following..  Tube feeds per GI.  Source of infection E. coli enteritis with C. difficile positive.  Continue current antibiotics as per IDs recommendations.  Patient on Rocephin with p.o. vancomycin.  Improving leukocytosis continue to monitor.  Creatinine improving.  Further fluid electrolyte management per nephrology  Use vasopressin and Levophed as pressors maintain MAP greater than 60.  Platelets have been fluctuating..  Appreciate input from hematology.  Further workup per hematology.  Thiamine IV to continue  Patient remained critically ill  I had a detailed discussion with patient daughter daughters.  Patient remains DNR.  Family waiting options moving forward  ICU core measures          Alvaro Cornejo MD  04/25/25  09:52 EDT            Electronically signed by Alvaro Cornejo MD at 04/25/25 0980       Veena Patel MD at 04/25/25 0674          [facility]  Inpatient Progress Note    Reason for Follow Up:  Decompensated cirrhosis     Subjective     Interval History:   Stool output yesterday much improved off lactulose - 2 BM  He is much more awake and  alert and conversive today.  He denies abdominal pain    Current Facility-Administered Medications:     [COMPLETED] cefTRIAXone (ROCEPHIN) 2,000 mg in sodium chloride 0.9 % 100 mL MBP, 2,000 mg, Intravenous, Q24H, Last Rate: 200 mL/hr at 04/23/25 1413, 2,000 mg at 04/23/25 1413 **FOLLOWED BY** cephalexin (KEFLEX) capsule 500 mg, 500 mg, Nasogastric, Q12H, Antony Weston MD, 500 mg at 04/24/25 2010    dextrose (D50W) (25 g/50 mL) IV injection 25 g, 25 g, Intravenous, Q15 Min PRN, Vianey Jones APRN    dextrose (GLUTOSE) oral gel 15 g, 15 g, Oral, Q15 Min PRN, Vianey Jones, APRN    folic acid (FOLVITE) tablet 1 mg, 1 mg, Nasogastric, Daily, Alvaro Cornejo MD, 1 mg at 04/23/25 0809    glucagon (GLUCAGEN) injection 1 mg, 1 mg, Intramuscular, Q15 Min PRN, Vianey Jones, APRN    insulin regular (humuLIN R,novoLIN R) injection 2-7 Units, 2-7 Units, Subcutaneous, Q6H, Vianye Jones APRN, 2 Units at 04/25/25 0546    Menthol-Zinc Oxide 1 Application, 1 Application, Topical, BID, Antony Weston MD, 1 Application at 04/24/25 2010    methylPREDNISolone sodium succinate (SOLU-Medrol) injection 125 mg, 125 mg, Intravenous, Q12H, Wild Delgado MD PhD, 125 mg at 04/25/25 0546    midodrine (PROAMATINE) tablet 10 mg, 10 mg, Nasogastric, Q8H, Antony Weston MD, 10 mg at 04/25/25 0314    nitroglycerin (NITROSTAT) SL tablet 0.4 mg, 0.4 mg, Sublingual, Q5 Min PRN, Vianey Jones, APRN    norepinephrine (LEVOPHED) 8 mg in 250 mL NS infusion (premix), 0.02-0.3 mcg/kg/min, Intravenous, Titrated, Vianey Jones APRN, Last Rate: 3.69 mL/hr at 04/25/25 0313, 0.02 mcg/kg/min at 04/25/25 0313    ondansetron (ZOFRAN) injection 4 mg, 4 mg, Intravenous, Q6H PRN, Vianey Jones APRN, 4 mg at 04/20/25 0933    oxyCODONE (ROXICODONE) immediate release tablet 5 mg, 5 mg, Oral, Q6H PRN, Alvaro Cornejo MD, 5 mg at 04/24/25 2024    Pharmacy Consult, , Not Applicable, Continuous PRN, Alvaro Cornejo MD    riFAXIMin  (XIFAXAN) tablet 550 mg, 550 mg, Nasogastric, Q12H, Veena Patel MD, 550 mg at 04/24/25 2010    sodium chloride 0.9 % flush 10 mL, 10 mL, Intravenous, Q12H, Vianey Jones, APRN, 10 mL at 04/24/25 2011    sodium chloride 0.9 % flush 10 mL, 10 mL, Intravenous, PRN, Vianey Jones, APRN    sodium chloride 0.9 % flush 10 mL, 10 mL, Intravenous, Q12H, Antony Weston MD, 10 mL at 04/24/25 2011    sodium chloride 0.9 % flush 10 mL, 10 mL, Intravenous, Q12H, Antony Weston MD, 10 mL at 04/24/25 2011    sodium chloride 0.9 % flush 10 mL, 10 mL, Intravenous, Q12H, Antony Weston MD, 10 mL at 04/24/25 2011    sodium chloride 0.9 % flush 10 mL, 10 mL, Intravenous, Q12H, Antony Weston MD, 10 mL at 04/24/25 2010    sodium chloride 0.9 % flush 10 mL, 10 mL, Intravenous, PRN, Antony Weston MD    sodium chloride 0.9 % flush 20 mL, 20 mL, Intravenous, PRN, Antony Weston MD    sodium chloride 0.9 % infusion 40 mL, 40 mL, Intravenous, PRN, Vianey Jones, APRN    sodium chloride 0.9 % infusion 40 mL, 40 mL, Intravenous, PRN, Antony Weston MD    [COMPLETED] thiamine (B-1) injection 200 mg, 200 mg, Intravenous, Q8H, 200 mg at 04/22/25 1451 **FOLLOWED BY** thiamine (VITAMIN B-1) tablet 100 mg, 100 mg, Nasogastric, Daily, Antony Weston MD, 100 mg at 04/24/25 0915    vancomycin oral solution reconstituted 125 mg, 125 mg, Nasogastric, Q6H, Antony Weston MD, 125 mg at 04/25/25 0546    vasopressin (PITRESSIN) 20 units in 100 mL NS infusion, 0.03 Units/min, Intravenous, Continuous, Alvaro Cornejo MD, Last Rate: 9 mL/hr at 04/24/25 2234, 0.03 Units/min at 04/24/25 2234  Review of Systems:    Negative for nausea vomiting fever chills    Objective     Vital Signs  Temp:  [97.4 °F (36.3 °C)-99.3 °F (37.4 °C)] 99.3 °F (37.4 °C)  Heart Rate:  [] 111  BP: ()/(41-91) 102/71  Body mass index is 25.45 kg/m².    Intake/Output Summary (Last 24 hours) at 4/25/2025 6222  Last data filed at  4/25/2025 0623  Gross per 24 hour   Intake --   Output 2800 ml   Net -2800 ml     I/O this shift:  In: -   Out: 1250 [Urine:1250]     Physical Exam:   General: patient awake, alert and cooperative   Eyes: Normal lids and lashes, + scleral icterus   Neck: supple, normal ROM   Skin: warm and dry, + jaundiced   Cardiovascular: regular rhythm and rate    Pulm:  regular and unlabored   Abdomen: soft, tender in the right upper quadrant without rebound or guarding, nondistended; normal bowel sounds         Results Review:     I reviewed the patient's new clinical results.    Results from last 7 days   Lab Units 04/25/25  0527 04/24/25 0321 04/23/25  0506   WBC 10*3/mm3 12.17* 14.18* 11.60*   HEMOGLOBIN g/dL 8.0* 7.1* 8.5*   HEMATOCRIT % 21.8* 21.3* 23.1*   PLATELETS 10*3/mm3 53*  53* 35*  34* 41*     Results from last 7 days   Lab Units 04/25/25  0527 04/24/25 0321 04/23/25  0506   SODIUM mmol/L 135* 135* 140   POTASSIUM mmol/L 4.3 4.2 4.7   CHLORIDE mmol/L 93* 94* 101   CO2 mmol/L 28.3 28.0 27.1   BUN mg/dL 50* 49* 50*   CREATININE mg/dL 2.31* 3.07* 3.65*   CALCIUM mg/dL 8.0* 7.9* 7.9*   BILIRUBIN mg/dL 8.7* 7.5* 7.9*   ALK PHOS U/L 164* 155* 165*   ALT (SGPT) U/L 129* 139* 159*   AST (SGOT) U/L 213* 256* 300*   GLUCOSE mg/dL 158* 116* 137*     Results from last 7 days   Lab Units 04/24/25  0321 04/23/25  0506 04/22/25  1234   INR  1.72* 1.61* 1.72*     Lab Results   Lab Value Date/Time    LIPASE 449 (H) 04/17/2025 2011       Radiology:  CT Abdomen Pelvis Without Contrast   Final Result   FINDINGS AND IMPRESSION:   Please note evaluation is suboptimal without venous contrast as well as   due to streak artifact and beam hardening from the patient's arms being   There is as well as significant streak artifact from spinal hardware..       Small bilateral pleural effusions are present with significant overlying   opacification and volume loss within the lung bases which appears to   have increased since 4/17/2025, however  is incompletely visualized.   Findings can be further evaluated with chest CT if clinically   indicated..       Enteric tube terminates in stomach. Somewhat nodular area of   opacification along the anterior aspect of the right middle lobe, as   before.       No findings of small bowel obstruction with contrast reaching the large   bowel. The appendix is visualized; however, there is partially obscured   by adjacent ascites and soft tissue thickening, limiting evaluation;   however, it measures up to approximately 0.8 cm, grossly similar that   seen on 4/17/2025.       There is significant hepatic steatosis. The gallbladder is not   distended. No gross abnormality seen in the spleen, kidneys or adrenal   glands. No hydronephrosis. Bladder is decompressed by Rae catheter and   not well evaluated.       FAT STRANDING AND SOFT TISSUE THICKENING CONTACTS PORTIONS OF THE   PANCREAS. PANCREATITIS CANNOT BE EXCLUDED IN THE APPROPRIATE CONTEXT AND   CORRELATION PATIENT HISTORY AND LIPASE LEVELS RECOMMENDED.       ASYMMETRIC THICKENING OF THE RIGHT HEMICOLON IS PRESENT, AS BEFORE,   SUGGESTIVE OF PORTAL HYPERTENSIVE COLOPATHY AND/OR COLITIS IN THE   APPROPRIATE CONTEXT. CORRELATION WITH PATIENT HISTORY IS RECOMMENDED.       While no definite free intraperitoneal air is seen, please note   evaluation is significantly suboptimal due to the above-stated   notations. The degree of ascites appears to have decreased status post   paracentesis no suspicious lytic or blastic osseous lesion. For the   purpose of this dictation, last well-formed disc base referred to as   L5-S1. Postsurgical changes from fusion of T10-S1 with posterior rods   and pedicle screws.                               This report was finalized on 4/23/2025 11:33 PM by Dr. Surya Vee M.D on Workstation: [id number]          US Paracentesis   Final Result   Ultrasound-guided paracentesis as described       This report was finalized on 4/24/2025 11:59 AM by  Dr. Maksim Pressley M.D on Workstation: [id number]          XR Abdomen KUB   Final Result   1.Cortrak catheter tip probably in the distal body of the stomach.           This report was finalized on 4/20/2025 2:02 PM by Dr. Dexter Quinonez M.D on Workstation: [id number]          XR Chest Post CVA Port   Final Result       As described.               This report was finalized on 4/18/2025 4:06 PM by Dr. David Smith M.D on Workstation: [id number]          XR Chest 1 View   Final Result      CT Abdomen Pelvis Without Contrast   Final Result       1. Marked hepatic steatosis. Patient is noted to have moderate ascites.   2. There is some inflammatory stranding surrounding the pancreas, as   well as some trace fluid. Appearance may reflect acute pancreatitis.   There is no pancreatic ductal dilatation, and no organized   peripancreatic collections are seen.   3. High density material within the gallbladder may reflect stones or   sludge.   4. Thick walled appearance to the ascending and transverse colon   appearance may be related to portal colopathy, although correlation with   any evidence of colitis is recommended.   5. Indeterminate ground glass pulmonary nodule measuring 11 mm. For a   ground glass nodule >=6 mm, recommend CT at 6-12 months to confirm   persistence, then CT every 2 years until 5 years. If solid component(s)   or growth develops, consider resection.    Radiation dose reduction techniques were utilized, including automated   exposure control and exposure modulation based on body size.           This report was finalized on 4/17/2025 10:40 PM by Dr. Adrianna Fleming M.D on Workstation: [id number]          XR Chest 1 View   Final Result   As described.       This report was finalized on 4/17/2025 8:19 PM by Dr. David Smith M.D on Workstation: [id number]              Assessment & Plan     Active Hospital Problems    Diagnosis     **Sepsis        Assessment:  Decompensated  cirrhosis w/ascites  Alcohol use  Sepsis  Acute kidney injury/CKD  CHF w/ischemic CM-h/o AICD  Enteropathogenic E. Coli  C. difficile considered colonization  Pancreatic inflammatory change on imaging  Coagulopathy  Thrombocytopenia      Plan:  Continues to have diarrhea which I think is the aftermath of the lactulose he was previously taking.  He has not received since 4/2 2.  Continue to follow to school volume.  Remains on vancomycin for C. difficile.  Continue xifaxan for encephalopathy.  Repeat ammonia in am  Paracentesis 4/24 negative for SBP  Continue tube feeds at 30 cc/h.  More awake and alert today with some improvement in labs (creatinine)    I discussed the patients findings and my recommendations with patient.            Veena Patel M.D.  [facility]  [phone]              Electronically signed by Veena Patel MD at 04/25/25 1136       Darrion Arroyo MD at 04/24/25 1319           LOS: 7 days   Patient Care Team:  Zoe Desai MD as PCP - General (Family Medicine)  Corbin Bailey MD as Surgeon (Neurosurgery)    Chief Complaint: Follow-up acute on chronic systolic CHF, cardiomyopathy, type II NSTEMI.    Interval History: Remains lethargic, but slightly more alert today.  Continues to have intermittent sinus tachycardia.  Remains on vasopressin.    Vital Signs:  Temp:  [98 °F (36.7 °C)-98.9 °F (37.2 °C)] 98.4 °F (36.9 °C)  Heart Rate:  [] 85  BP: ()/() 94/69    Intake/Output Summary (Last 24 hours) at 4/24/2025 1319  Last data filed at 4/24/2025 1105  Gross per 24 hour   Intake 2719 ml   Output 2750 ml   Net -31 ml       Physical Exam:   General Appearance:    No acute distress, appears ill.  Somnolent, but opens eyes.  Prominently jaundiced.   Lungs:     Rhonchi anteriorly bilaterally.    Heart:    Regular rhythm and mildly tachycardic rate.  II/VI SM throughout.    Abdomen:     Soft, mildly tender, distended.    Extremities:   2+ edema of  the lower extremities.  1+ edema of the hands and upper extremities.     Results Review:    Results from last 7 days   Lab Units 04/24/25  0321   SODIUM mmol/L 135*   POTASSIUM mmol/L 4.2   CHLORIDE mmol/L 94*   CO2 mmol/L 28.0   BUN mg/dL 49*   CREATININE mg/dL 3.07*   GLUCOSE mg/dL 116*   CALCIUM mg/dL 7.9*     Results from last 7 days   Lab Units 04/22/25  0534 04/17/25  2148 04/17/25 2011   CK TOTAL U/L 249*  --   --    HSTROP T ng/L  --  822* 1,013*     Results from last 7 days   Lab Units 04/24/25  0321   WBC 10*3/mm3 14.18*   HEMOGLOBIN g/dL 7.1*   HEMATOCRIT % 21.3*   PLATELETS 10*3/mm3 35*  34*     Results from last 7 days   Lab Units 04/24/25  0321 04/23/25  0506 04/22/25  1234   INR  1.72* 1.61* 1.72*   APTT seconds 41.6* 41.9* 40.4*         Results from last 7 days   Lab Units 04/24/25  0321   MAGNESIUM mg/dL 1.3*           I reviewed the patient's new clinical results.        Assessment:  1.  Nausea, vomiting, and diarrhea  2.  Enteropathogenic E. coli positive stool sample and C. difficile toxin positive  3.  Anasarca, multifactorial  4.  Severe acute kidney injury with chronic kidney disease  5.  Decompensated cirrhosis of the liver  6.  Moderate ascites by CT  7.  Acute on chronic systolic CHF, EF 30% by echo on 4/18/2025  8.  Elevated troponin, likely type II NSTEMI secondary to #1, #2, #4, #5, and #9  9.  Coronary artery disease,  of the RCA by cath in 2022  10.  Status post ICD placement  11.  Urinary tract infection with retention, now with Rae catheter  12.  Significant transaminitis  13.  Hypoalbuminemia, contributing to volume overload  14.  Anemia, likely multifactorial  15.  Heavy daily alcohol use  16.  Thrombocytopenia with potential DIC  17.  Suspected medication noncompliance  18.  Metabolic encephalopathy  19.  Sinus tachycardia    Plan:  -Continues to have metabolic encephalopathy and poor mental status.  He was slightly more responsive today.    - Continue pressor support with  vasopressin..  He is also on midodrine 10 mg per tube every 8 hours.  Still intermittently tachycardia, but better than on Levophed.    -Volume management per nephrology.  Lasix 60 mg IV given again today.    -Hypotension and renal dysfunction greatly limit his guideline directed medical therapy for CHF and cardiomyopathy.    -HIT antibody was negative.  Hematology is following.  Platelet count now in the 30s.  Suspect thrombocytopenia secondary to liver dysfunction, sepsis, and potential DIC.    -Possible transfusion given worsening anemia.    -Still with multisystem organ failure.  Poor overall prognosis.    Darrion Arroyo MD  04/24/25  13:19 EDT         Electronically signed by Darrion Arroyo MD at 04/24/25 1322       Veena Patel MD at 04/24/25 1006          [facility]  Inpatient Progress Note    Reason for Follow Up: Decompensated cirrhosis    Subjective     Interval History:   10 bowel movements recorded yesterday-did not receive any lactulose yesterday.  Last dose 4/22    Paracentesis yesterday with no SBP.  Not awake or alert today.  On both Levophed and vasopressin today.    CT completed yesterday-reviewed by me-no new findings    Current Facility-Administered Medications:     [COMPLETED] cefTRIAXone (ROCEPHIN) 2,000 mg in sodium chloride 0.9 % 100 mL MBP, 2,000 mg, Intravenous, Q24H, Last Rate: 200 mL/hr at 04/23/25 1413, 2,000 mg at 04/23/25 1413 **FOLLOWED BY** cephalexin (KEFLEX) capsule 500 mg, 500 mg, Nasogastric, Q12H, Antony Weston MD    dextrose (D50W) (25 g/50 mL) IV injection 25 g, 25 g, Intravenous, Q15 Min PRN, Vianey Jones, VERONICA    dextrose (GLUTOSE) oral gel 15 g, 15 g, Oral, Q15 Min PRN, Vianey Jones APRN    folic acid (FOLVITE) tablet 1 mg, 1 mg, Nasogastric, Daily, Alvaro Cornejo MD, 1 mg at 04/24/25 0916    glucagon (GLUCAGEN) injection 1 mg, 1 mg, Intramuscular, Q15 Min PRN, Vianey Jones, VERONICA    insulin regular (humuLIN  R,novoLIN R) injection 2-7 Units, 2-7 Units, Subcutaneous, Q6H, Vianey Jones APRN, 2 Units at 04/22/25 1451    Menthol-Zinc Oxide 1 Application, 1 Application, Topical, BID, Antony Weston MD, 1 Application at 04/24/25 0916    midodrine (PROAMATINE) tablet 10 mg, 10 mg, Nasogastric, Q8H, Antony Weston MD, 10 mg at 04/24/25 0915    nitroglycerin (NITROSTAT) SL tablet 0.4 mg, 0.4 mg, Sublingual, Q5 Min PRN, Vianey Jones APRN    norepinephrine (LEVOPHED) 8 mg in 250 mL NS infusion (premix), 0.02-0.3 mcg/kg/min, Intravenous, Titrated, Vianey Jones APRN, Last Rate: 3.69 mL/hr at 04/24/25 0949, 0.02 mcg/kg/min at 04/24/25 0949    ondansetron (ZOFRAN) injection 4 mg, 4 mg, Intravenous, Q6H PRN, Vianey Jones APRN, 4 mg at 04/20/25 0933    oxyCODONE (ROXICODONE) immediate release tablet 5 mg, 5 mg, Oral, Q6H PRN, Alvaro Cornejo MD, 5 mg at 04/23/25 1419    Pharmacy Consult, , Not Applicable, Continuous PRN, Alvaro Cornejo MD    riFAXIMin (XIFAXAN) tablet 550 mg, 550 mg, Nasogastric, Q12H, Veena Patel MD, 550 mg at 04/24/25 0915    sodium chloride 0.9 % flush 10 mL, 10 mL, Intravenous, Q12H, Vianey Jones APRN, 10 mL at 04/24/25 0917    sodium chloride 0.9 % flush 10 mL, 10 mL, Intravenous, PRN, Vianey Jones, APRN    sodium chloride 0.9 % flush 10 mL, 10 mL, Intravenous, Q12H, Antony Weston MD, 10 mL at 04/24/25 0917    sodium chloride 0.9 % flush 10 mL, 10 mL, Intravenous, Q12H, Antony Weston MD, 10 mL at 04/24/25 0917    sodium chloride 0.9 % flush 10 mL, 10 mL, Intravenous, Q12H, Antony Weston MD, 10 mL at 04/24/25 0917    sodium chloride 0.9 % flush 10 mL, 10 mL, Intravenous, Q12H, Antony Weston MD, 10 mL at 04/24/25 0917    sodium chloride 0.9 % flush 10 mL, 10 mL, Intravenous, PRN, Antony Weston MD    sodium chloride 0.9 % flush 20 mL, 20 mL, Intravenous, PRNTrista Sandeep K, MD    sodium chloride 0.9 % infusion 40 mL, 40 mL, Intravenous, PRN,  Vianey Jones, APRN    sodium chloride 0.9 % infusion 40 mL, 40 mL, Intravenous, PRN, Antony Weston MD    [COMPLETED] thiamine (B-1) injection 200 mg, 200 mg, Intravenous, Q8H, 200 mg at 04/22/25 1451 **FOLLOWED BY** thiamine (VITAMIN B-1) tablet 100 mg, 100 mg, Nasogastric, Daily, Antony Weston MD, 100 mg at 04/24/25 0915    vancomycin oral solution reconstituted 125 mg, 125 mg, Nasogastric, Q6H, Antony Weston MD, 125 mg at 04/24/25 0515    vasopressin (PITRESSIN) 20 units in 100 mL NS infusion, 0.03 Units/min, Intravenous, Continuous, Alvaro Cornejo MD, Last Rate: 9 mL/hr at 04/24/25 0926, 0.03 Units/min at 04/24/25 0926  Review of Systems:    No nausea or vomiting, no fevers or chills    Objective     Vital Signs  Temp:  [98 °F (36.7 °C)-98.9 °F (37.2 °C)] 98.9 °F (37.2 °C)  Heart Rate:  [] 109  BP: ()/() 78/53  Body mass index is 25.25 kg/m².    Intake/Output Summary (Last 24 hours) at 4/24/2025 1006  Last data filed at 4/24/2025 0315  Gross per 24 hour   Intake 2719 ml   Output 1950 ml   Net 769 ml     No intake/output data recorded.     Physical Exam:   General: patient confused, does not follow commands   Eyes: Normal lids and lashes, +scleral icterus   Neck: supple, normal ROM   Skin: warm and dry, + jaundiced   Cardiovascular: Tachycardia, regular   Pulm: clear to auscultation bilaterally, regular and unlabored   Abdomen: soft, nontender, nondistended; normal bowel sounds   Extremities: 1+ bilateral lower extremity edema   Psychiatric: Confused     Results Review:     I reviewed the patient's new clinical results.    Results from last 7 days   Lab Units 04/24/25  0321 04/23/25  0506 04/22/25  0534   WBC 10*3/mm3 14.18* 11.60* 9.37   HEMOGLOBIN g/dL 7.1* 8.5* 8.2*   HEMATOCRIT % 21.3* 23.1* 24.0*   PLATELETS 10*3/mm3 34* 41* 48*     Results from last 7 days   Lab Units 04/24/25  0321 04/23/25  0506 04/22/25  1234 04/22/25  0534   SODIUM mmol/L 135* 140 142 148*    POTASSIUM mmol/L 4.2 4.7 4.4 4.1   CHLORIDE mmol/L 94* 101 104 106   CO2 mmol/L 28.0 27.1 28.7 29.0   BUN mg/dL 49* 50* 50* 48*   CREATININE mg/dL 3.07* 3.65* 3.40* 3.10*   CALCIUM mg/dL 7.9* 7.9* 8.0* 8.1*   BILIRUBIN mg/dL 7.5* 7.9*  --  9.1*   ALK PHOS U/L 155* 165*  --  160*   ALT (SGPT) U/L 139* 159*  --  170*   AST (SGOT) U/L 256* 300*  --  368*   GLUCOSE mg/dL 116* 137* 153* 155*     Results from last 7 days   Lab Units 04/24/25  0321 04/23/25  0506 04/22/25  1234   INR  1.72* 1.61* 1.72*     Lab Results   Lab Value Date/Time    LIPASE 449 (H) 04/17/2025 2011       Radiology:  CT Abdomen Pelvis Without Contrast   Final Result   FINDINGS AND IMPRESSION:   Please note evaluation is suboptimal without venous contrast as well as   due to streak artifact and beam hardening from the patient's arms being   There is as well as significant streak artifact from spinal hardware..       Small bilateral pleural effusions are present with significant overlying   opacification and volume loss within the lung bases which appears to   have increased since 4/17/2025, however is incompletely visualized.   Findings can be further evaluated with chest CT if clinically   indicated..       Enteric tube terminates in stomach. Somewhat nodular area of   opacification along the anterior aspect of the right middle lobe, as   before.       No findings of small bowel obstruction with contrast reaching the large   bowel. The appendix is visualized; however, there is partially obscured   by adjacent ascites and soft tissue thickening, limiting evaluation;   however, it measures up to approximately 0.8 cm, grossly similar that   seen on 4/17/2025.       There is significant hepatic steatosis. The gallbladder is not   distended. No gross abnormality seen in the spleen, kidneys or adrenal   glands. No hydronephrosis. Bladder is decompressed by Rae catheter and   not well evaluated.       FAT STRANDING AND SOFT TISSUE THICKENING CONTACTS  PORTIONS OF THE   PANCREAS. PANCREATITIS CANNOT BE EXCLUDED IN THE APPROPRIATE CONTEXT AND   CORRELATION PATIENT HISTORY AND LIPASE LEVELS RECOMMENDED.       ASYMMETRIC THICKENING OF THE RIGHT HEMICOLON IS PRESENT, AS BEFORE,   SUGGESTIVE OF PORTAL HYPERTENSIVE COLOPATHY AND/OR COLITIS IN THE   APPROPRIATE CONTEXT. CORRELATION WITH PATIENT HISTORY IS RECOMMENDED.       While no definite free intraperitoneal air is seen, please note   evaluation is significantly suboptimal due to the above-stated   notations. The degree of ascites appears to have decreased status post   paracentesis no suspicious lytic or blastic osseous lesion. For the   purpose of this dictation, last well-formed disc base referred to as   L5-S1. Postsurgical changes from fusion of T10-S1 with posterior rods   and pedicle screws.                               This report was finalized on 4/23/2025 11:33 PM by Dr. Surya Vee M.D on Workstation: [id number]          XR Abdomen KUB   Final Result   1.Cortrak catheter tip probably in the distal body of the stomach.           This report was finalized on 4/20/2025 2:02 PM by Dr. Dexter Quinonez M.D on Workstation: [id number]          XR Chest Post CVA Port   Final Result       As described.               This report was finalized on 4/18/2025 4:06 PM by Dr. David Smith M.D on Workstation: [id number]          XR Chest 1 View   Final Result      CT Abdomen Pelvis Without Contrast   Final Result       1. Marked hepatic steatosis. Patient is noted to have moderate ascites.   2. There is some inflammatory stranding surrounding the pancreas, as   well as some trace fluid. Appearance may reflect acute pancreatitis.   There is no pancreatic ductal dilatation, and no organized   peripancreatic collections are seen.   3. High density material within the gallbladder may reflect stones or   sludge.   4. Thick walled appearance to the ascending and transverse colon   appearance may be related to  portal colopathy, although correlation with   any evidence of colitis is recommended.   5. Indeterminate ground glass pulmonary nodule measuring 11 mm. For a   ground glass nodule >=6 mm, recommend CT at 6-12 months to confirm   persistence, then CT every 2 years until 5 years. If solid component(s)   or growth develops, consider resection.    Radiation dose reduction techniques were utilized, including automated   exposure control and exposure modulation based on body size.           This report was finalized on 4/17/2025 10:40 PM by Dr. Adrianna Fleming M.D on Workstation: [id number]          XR Chest 1 View   Final Result   As described.       This report was finalized on 4/17/2025 8:19 PM by Dr. David Smith M.D on Workstation: [id number]          US Paracentesis    (Results Pending)       Assessment & Plan     Active Hospital Problems    Diagnosis     **Sepsis        Assessment:  Decompensated cirrhosis w/ascites  Alcohol use  Sepsis  Acute kidney injury/CKD  CHF w/ischemic CM-h/o AICD  Enteropathogenic E. Coli  C. difficile considered colonization  Pancreatic inflammatory change on imaging  Coagulopathy  Thrombocytopenia      Plan:  Continues to have diarrhea which I think is the aftermath of the lactulose he was previously taking.  He has not received since 4/2 2.  Continue to follow to school volume.  Remains on vancomycin for C. difficile.  Paracentesis yesterday negative for SBP  Continue tube feeds at 30 cc/h.  Parameters worsening, prognosis is poor.    I discussed the patients findings and my recommendations with patient and nursing staff.            Veena Patel M.D.  [facility]  [phone]              Electronically signed by Veena Patel MD at 04/24/25 1612       Wild Delgado MD PhD at 04/24/25 0914           LOS: 7 days   Patient Care Team:  Zoe Desai MD as PCP - General (Family Medicine)  Corbin Bailey MD as Surgeon (Neurosurgery)    Chief  Complaint: Sepsis, liver cirrhosis, thrombocytopenia and anemia    Interval History:  4/21/2025 patient is afebrile.  Not responding to verbal communication, however nursing staff reports patient starts to follow some command.  Stable Platelets 42,000, hemoglobin 8.5.      4/22/2025 patient is afebrile.  Per nursing staff, patient was able to tell his name today.  Overall still has hepatic encephalopathy.  Platelets 48,000, hemoglobin 8.2.  Total bilirubin 9.1 and elevated , .     4/23/2025 patient afebrile.  Mental status waxes and wanes.  According to nursing staff, patient was able to have limited conversation when he was awake.  Persistent low platelets 41,000, hemoglobin 8.5.  Improving but still elevated total bilirubin 7.9  and .    4/24/2025 patient is afebrile.  Mental status waxes and wanes.  Further deteriorating platelets 35,000 with elevated IPF 17.0%.  Also trending down hemoglobin 7.1.  Improving total bilirubin 7.5.      A comprehensive 14 point review of systems was performed and was negative except as mentioned.    Vital Signs:  Temp:  [98 °F (36.7 °C)-98.9 °F (37.2 °C)] 98.9 °F (37.2 °C)  Heart Rate:  [] 117  BP: ()/() 91/77    Intake/Output Summary (Last 24 hours) at 4/24/2025 0917  Last data filed at 4/24/2025 0315  Gross per 24 hour   Intake 2719 ml   Output 1950 ml   Net 769 ml       Physical Exam:  GENERAL:  Well-developed male in no acute distress.  Patient is asleep, cannot be awakened.  SKIN:  Warm, dry without rashes, diffusely jaundiced.   HEENT:  Normocephalic.    CHEST: Normal respiratory effort.  Lungs clear to auscultation. Good airflow.  CARDIAC:  Regular rate and rhythm. Normal S1,S2.  ABDOMEN:  Soft, no tender.  Bowel sounds normal.  EXTREMITIES:   Right leg edema more than the left leg.      Results Review:   CBC:      Lab 04/24/25  0321 04/23/25  0506 04/22/25  0534 04/21/25  0343 04/20/25  1149 04/20/25  0641 04/19/25  0913  04/18/25  0337   WBC 14.18* 11.60* 9.37 8.29  --  9.47   < > 8.67   HEMOGLOBIN 7.1* 8.5* 8.2* 8.5*  --  8.8*   < > 9.2*   HEMATOCRIT 21.3* 23.1* 24.0* 22.8*  --  23.5*   < > 24.9*   PLATELETS 34* 41* 48* 42* 44* 42*   < > 71*   NEUTROS ABS 10.03* 7.69* 7.40* 4.95  --  7.67*   < > 6.84   IMMATURE GRANS (ABS) 0.25* 0.24*  --  0.14*  --   --   --  0.07*   LYMPHS ABS 2.05 2.00  --  1.73  --   --   --  1.03   MONOS ABS 1.64* 1.42*  --  1.25*  --   --   --  0.65   EOS ABS 0.15 0.17 0.09 0.16  --  0.09   < > 0.08   MCV 99.5* 101.3* 98.7* 99.1*  --  99.6*   < > 102.9*    < > = values in this interval not displayed.   IPF 17.0% 4/24/2025.    CMP:        Lab 04/24/25  0321 04/23/25  0506 04/22/25  1234 04/22/25  0534 04/21/25  1420 04/21/25  0343 04/20/25  1149 04/20/25  0641 04/18/25  1413 04/18/25  0856 04/18/25  0337 04/17/25 2011   SODIUM 135* 140 142 148*  --  146*  --  142   < >  --    < > 129*   POTASSIUM 4.2 4.7 4.4 4.1 3.6 3.0*   < > 3.5   < >  --    < > 3.7   CHLORIDE 94* 101 104 106  --  101  --  98   < >  --    < > 82*   CO2 28.0 27.1 28.7 29.0  --  31.0*  --  28.0   < >  --    < > 23.6   ANION GAP 13.0 11.9 9.3 13.0  --  14.0  --  16.0*   < >  --    < > 23.4*   BUN 49* 50* 50* 48*  --  49*  --  53*   < >  --    < > 63*   CREATININE 3.07* 3.65* 3.40* 3.10*  --  2.76*  --  3.34*   < >  --    < > 7.11*   EGFR 22.3* 18.1* 19.7* 22.0*  --  25.3*  --  20.1*   < >  --    < > 8.1*   GLUCOSE 116* 137* 153* 155*  --  108*  --  144*   < >  --    < > 137*   CALCIUM 7.9* 7.9* 8.0* 8.1*  --  7.6*  --  7.5*   < >  --    < > 7.9*   IONIZED CALCIUM  --   --   --   --   --   --   --   --   --  0.94*  --   --    MAGNESIUM 1.3* 1.7  --  1.7  --  1.7  --  2.4   < >  --    < > 1.7   PHOSPHORUS 3.0 5.2* 3.6 1.4*  --  2.5  --  2.6   < >  --    < >  --    TOTAL PROTEIN 5.5* 5.7*  --  5.9*  --  5.5*  --  5.6*   < >  --    < > 6.8   ALBUMIN 2.5* 2.6* 2.5* 2.7*  --  2.7*  --  2.9*   < >  --    < > 3.2*   GLOBULIN 3.0 3.1  --  3.2  --  2.8   "--  2.7   < >  --    < > 3.6   ALT (SGPT) 139* 159*  --  170*  --  180*  --  196*   < >  --    < > 293*   AST (SGOT) 256* 300*  --  368*  --  397*  --  426*   < >  --    < > 663*   BILIRUBIN 7.5* 7.9*  --  9.1*  --  8.3*  --  8.2*   < >  --    < > 8.3*   BILIRUBIN DIRECT  --   --   --   --   --  6.5*  --   --   --   --   --   --    ALK PHOS 155* 165*  --  160*  --  147*  --  148*   < >  --    < > 167*   LIPASE  --   --   --   --   --   --   --   --   --   --   --  449*    < > = values in this interval not displayed.     Lab Results   Component Value Date    EUPZENXT29 >2,000 (H) 04/20/2025     Lab Results   Component Value Date    FOLATE 12.50 04/21/2025     Lab Results   Component Value Date    IRON 34 (L) 04/21/2025    LABIRON 30 04/21/2025    TRANSFERRIN 76 (L) 04/21/2025    TIBC 113 (L) 04/21/2025    FERRITIN 3,650.00 (H) 04/21/2025     No results found for: \"LDH\", \"URICACID\"     Latest Reference Range & Units 04/21/25 03:43   Haptoglobin 30 - 200 mg/dL <10 (L)      Latest Reference Range & Units 04/20/25 11:49   FDP Less Than 5 mcg/mL  Greater than or equal to 20 mcg/mL !   Fibrinogen 219 - 464 mg/dL 216 (L)   Protime 11.7 - 14.2 Seconds 21.0 (H)   INR 0.90 - 1.10  1.80 (H)   PTT 22.7 - 35.4 seconds 40.0 (H)       Results from last 7 days   Lab Units 04/22/25  0534 04/17/25 2148 04/17/25 2011   CK TOTAL U/L 249*  --   --    HSTROP T ng/L  --  822* 1,013*     Results from last 7 days   Lab Units 04/24/25  0321 04/23/25  0506 04/22/25  1234   INR  1.72* 1.61* 1.72*   APTT seconds 41.6* 41.9* 40.4*         Results from last 7 days   Lab Units 04/24/25  0321   MAGNESIUM mg/dL 1.3*           I reviewed the patient's new clinical results.        Assessment:     *Thrombocytopenia  Normal platelet count and 2023.  Acute drop in platelet count likely secondary to sepsis.  4/17/2025 platelet count 83,000  4/20/2025 platelet count has dropped to 42,000.  Obtain IPF, B12, folic acid level.  Also obtain HIT " antibody.  Most likely thrombocytopenia secondary to sepsis.  Hold anticoagulation at this time.  Also obtain fibrin split products, fibrinogen, PT PTT INR  Also with underlying cirrhosis of the liver and ascites noted on the CT abdomen.  ?  Acute pancreatitis on CT abdomen.  Spleen is normal in size.  Elevated IPF 12.8% on 4/20/2025 with platelets 44,000.  Elevated FTP and decreased fibrinogen, elevated PT/INR PTT, fits with DIC patient.   4/21/2025 platelets 42,000.  HIT antibody negative.   4/22/2025 platelets 48,000.  4/23/2025 platelets 41,000.   4/24/2025 further worsening platelets 35,000 with IPF 17.0%.  This patient has ITP.  We recommended to start the patient empirically on IV Solu-Medrol 125 mg IV every 12 hours for treatment of ITP.        *Anemia  Most likely secondary to acute ongoing infection.  Hemoglobin 8.8.  Continue to monitor  on 4/20/2025 Severely elevated ferritin 3650 with free iron 34, fits with inflammatory changes.  Normal folate 12.5, and supratherapeutic B12 > 2000.    4/21/2025 hemoglobin 8.5, elevated  and suppressed haptoglobin <10.  hemolysis?  Or the suppressed haptoglobin due to abnormal liver failure?  since patient is clinically improving according to nursing staff, we will hold steroids for now because of his severe infection with C. difficile and EPEC colitis.   4/22/2025 hemoglobin 8.2.  No evidence of bleeding.   4/23/2025 hemoglobin 8.5.   4/24/2025 trending down hemoglobin 7.1.  Suspect the patient have hemolysis due to elevated LDH, and the suppressed haptoglobin.  Will start the patient on IV Solu-Medrol for both ITP and hemolysis.        *E. coli sepsis  On ceftriaxone per ID.  Continue antibiotics per ID recommendations.    *. C. difficile colitis.   On p.o. vancomycin.     *Cirrhosis of the liver with hepatic encephalopathy  Ascites noted on CT abdomen.  Gastroenterology consulted  On 4/21/25 Improving but still significant elevated total bilirubin 8.3, AST  397,  and alk phosphatase 147.  Recommend supportive therapy.  4/22/2025 total bilirubin 9.1, ,  and alk phos 160.   4/23/2025 improving but still elevated total bilirubin 7.9  and .  4/24/2025 total bilirubin 7.5,  , alk phos 155.  Liver panel slowly improving.    *.  Coagulopathy secondary to liver cirrhosis and acute liver injury.  4/20/2025 INR 1.8, PTT 21.0 seconds.    4/22/2025 INR 1.72 and PTT 20.2 seconds.  No evidence for bleeding.   4/23/2025 improved INR 1.61 PT 19.2 seconds due to improving liver function.  4/24/2025 INR 1.72, PT 20.3 seconds.       *Ascites  Management per GI  Status post paracentesis, 3 L of fluid removed on 4/18/2025     *KEATON  Per nephrology note creatinine around 2 in September 2024.  Thought to be secondary to sepsis.  Improving, creatinine has decreased to 3.34.  On 4/21/2025 creatinine 2.76 BUN 49.   On 4/22/2025 creatinine 3.10 BUN 48.   4/23/2025 creatinine 3.65 BUN 50.  Continue followed by nephrology.  4/24/2025 creatinine 3.07 and BUN 49.       Recommendations  Continue treatment for sepsis from EPEC, and C. difficile colitis.  Thrombocytopenia multifactorial with elements of ITP, as well as secondary to sepsis/DIC.  Will start the patient on IV Solu-Medrol 125 mg every 12 hours.  Improving multiorgan failure with liver and kidney.  Anemia also due to sepsis, possible hemolysis.  Will start IV Solu-Medrol today.  Continue to monitor CBC CMP, IPF, LHD haptoglobin PT/INR in the morning.  Overall poor prognosis.       patient is critically ill.      I spoke with nursing staff.     I discussed that with the GI service Dr. Patel, no contraindication to start the steroids from GI perspective.  I also reviewed the notes from nephrology, cardiology, pulmonology services.      Wild Delgado MD PhD  04/24/25  09:17 EDT        Electronically signed by Wild Delgado MD PhD at 04/24/25 1096       Alvaro Cornejo MD at 04/24/25 4532   "              [facility]         Dr John Sayied   LOS: 7 days   Patient Care Team:  Zoe Desai MD as PCP - General (Family Medicine)  Corbin Bailey MD as Surgeon (Neurosurgery)    Chief Complaint: Sepsis with septic shock E. coli enteritis with C. difficile positive other issues as listed below    Interval History: More awake this morning.  He attempts to follow commands.  Abdominal distention looks much less.  Tube feeds currently ongoing.  On vasopressin drip ongoing.      REVIEW OF SYSTEMS:   Unable to get with patient's current condition    Ventilator/Non-Invasive Ventilation Settings (From admission, onward)      None        Heated high flow 60 L with 85% FiO2      Vital Signs  Temp:  [98 °F (36.7 °C)-98.9 °F (37.2 °C)] 98.9 °F (37.2 °C)  Heart Rate:  [] 117  BP: ()/() 104/77    Intake/Output Summary (Last 24 hours) at 4/24/2025 0853  Last data filed at 4/24/2025 0315  Gross per 24 hour   Intake 2719 ml   Output 1950 ml   Net 769 ml     Flowsheet Rows      Flowsheet Row First Filed Value   Admission Height 188 cm (74\") Documented at 04/17/2025 1957   Admission Weight 104 kg (230 lb) Documented at 04/17/2025 2110            Physical Exam:  Patient is examined using the personal protective equipment as per guidelines from infection control for this particular patient as enacted.  Hand hygiene was performed before and after patient interaction.   General Appearance:  Acutely ill and poorly arousable.  Not following commands for me.  ENT normocephalic atraumatic  Neck midline trachea, no thyromegaly   Lungs:   Diminished breath sounds bilaterally    Heart:    Regular rhythm and normal rate, normal S1 and S2, no            murmur, no gallop, no rub, no click   Chest Wall:    No abnormalities observed   Abdomen:   Soft mild diffuse tenderness no rebound or guarding   Extremities:   Moves all extremities well, trace to 1+ edema, no cyanosis, no             redness  CNS " confused  Skin jaundiced  Musculoskeletal no cyanosis no clubbing normal range of motion     Results Review:        Results from last 7 days   Lab Units 04/24/25  0321 04/23/25  0506 04/22/25  1234   SODIUM mmol/L 135* 140 142   POTASSIUM mmol/L 4.2 4.7 4.4   CHLORIDE mmol/L 94* 101 104   CO2 mmol/L 28.0 27.1 28.7   BUN mg/dL 49* 50* 50*   CREATININE mg/dL 3.07* 3.65* 3.40*   GLUCOSE mg/dL 116* 137* 153*   CALCIUM mg/dL 7.9* 7.9* 8.0*     Results from last 7 days   Lab Units 04/22/25  0534 04/17/25 2148 04/17/25 2011   CK TOTAL U/L 249*  --   --    HSTROP T ng/L  --  822* 1,013*     Results from last 7 days   Lab Units 04/24/25  0321 04/23/25  0506 04/22/25  0534   WBC 10*3/mm3 14.18* 11.60* 9.37   HEMOGLOBIN g/dL 7.1* 8.5* 8.2*   HEMATOCRIT % 21.3* 23.1* 24.0*   PLATELETS 10*3/mm3 34* 41* 48*     Results from last 7 days   Lab Units 04/24/25  0321 04/23/25  0506 04/22/25  1234   INR  1.72* 1.61* 1.72*   APTT seconds 41.6* 41.9* 40.4*         Results from last 7 days   Lab Units 04/24/25  0321   MAGNESIUM mg/dL 1.3*         Results from last 7 days   Lab Units 04/18/25  1303   PH, ARTERIAL pH units 7.431   PO2 ART mm Hg 114.3*   PCO2, ARTERIAL mm Hg 37.2   HCO3 ART mmol/L 24.8       I reviewed the patient's new clinical results.  I personally viewed and interpreted the patient's chest x-ray.        Medication Review:   cephalexin, 500 mg, Nasogastric, Q12H  folic acid, 1 mg, Nasogastric, Daily  furosemide, 60 mg, Intravenous, Once  insulin regular, 2-7 Units, Subcutaneous, Q6H  magnesium sulfate, 2 g, Intravenous, Once  Menthol-Zinc Oxide, 1 Application, Topical, BID  midodrine, 10 mg, Nasogastric, Q8H  rifAXIMin, 550 mg, Nasogastric, Q12H  sodium chloride, 10 mL, Intravenous, Q12H  sodium chloride, 10 mL, Intravenous, Q12H  sodium chloride, 10 mL, Intravenous, Q12H  sodium chloride, 10 mL, Intravenous, Q12H  sodium chloride, 10 mL, Intravenous, Q12H  thiamine, 100 mg, Nasogastric, Daily  vancomycin, 125 mg,  Nasogastric, Q6H        norepinephrine, 0.02-0.3 mcg/kg/min, Last Rate: Stopped (25)  Pharmacy Consult,   vasopressin, 0.03 Units/min, Last Rate: 0.03 Units/min (25)        ASSESSMENT:   Acute metabolic encephalopathy  Sepsis present on admission due to E. coli enteritis  Acute hypoxemic respiratory failure on heated high flow  Septic shock present on admission causing encephalopathy and acute  Kidney injury  Decompensated cirrhosis  Acute pancreatitis by imaging  Non-STEMI  Hyponatremia  Hypokalemia  Acute kidney injury  Acute liver injury  Anemia  Thrombocytopenia  Alcohol abuse  Acute HFrEF    PLAN:  Clinically better this morning.  Mental status improved  CT abdomen pelvis noted concerns for pancreatitis noted.  GI following..    Source of infection E. coli enteritis with C. difficile positive  Continue current antibiotics as per IDs recommendations.  Patient on Rocephin with p.o. vancomycin.  Slightly worse leukocytosis continue to monitor.  Creatinine  progressively getting worse..  Further fluid electrolyte management per nephrology  Use vasopressin as pressors maintain MAP greater than 60.  Platelets have been fluctuating..  Appreciate input from hematology.  Further workup per hematology.  Thiamine IV to continue  Patient remained critically ill  I had a detailed discussion with patient daughter daughters yesterday.  Patient remains DNR.  ICU core measures          Alvaro Cornejo MD  25  08:53 EDT            Electronically signed by Alvaro Cornejo MD at 25 0856       Anjana Park MD at 25 0729              [facility] Progress Note      Patient Name: Archie Oquendo  : 1965  MRN: [id number]  Primary Care Physician:  Zoe Desai MD  Date of admission: 2025    Subjective     Interval History:   Follow-up KEATON on CKD of unknown baseline.  Urine output improved past 24 hours.  1 dose Lasix.  2 L out.  10 bowel movements.   Blood pressure overall more stable.  Now only on vasopressin.  Nasal cannula 5 L.  Remains tachycardic.  Weight not consistent.  Review of Systems:   As noted above    Objective     Vitals:   Temp:  [97.6 °F (36.4 °C)-98.8 °F (37.1 °C)] 98.5 °F (36.9 °C)  Heart Rate:  [] 117  BP: ()/() 104/77  Flow (L/min) (Oxygen Therapy):  [5] 5    Intake/Output Summary (Last 24 hours) at 4/24/2025 0704  Last data filed at 4/24/2025 0315  Gross per 24 hour   Intake 2719 ml   Output 2000 ml   Net 719 ml       Physical Exam:    General Appearance: Sleeping but does awaken.  More interactive.  Does make brief eye contact.  Answers some questions.  Can tell me he is at [facility].  Gives me his birthdate.  Complains of abdominal pain.  Skin: warm and dry.  Jaundiced.  HEENT: Oral mucosa improved today.  Core track in place.  Sclerae icteric.  Neck: Right IJ central line (4/18/2025)  Lungs: Few upper airway rhonchi.  Unlabored on 5 L nasal cannula.  Heart: RRR, tachycardic.  Abdomen: softer, less distended.  Tender to palpation.   No guarding.  Few bowel sounds.  : Rae catheter in place  Extremities: 2+ lower ext edema. 1+ upper ext edema .  Neuro: A little improved.  Able to answer a few questions.  More aware.    Scheduled Meds:     cephalexin, 500 mg, Nasogastric, Q12H  folic acid, 1 mg, Nasogastric, Daily  insulin regular, 2-7 Units, Subcutaneous, Q6H  magnesium sulfate, 2 g, Intravenous, Once  Menthol-Zinc Oxide, 1 Application, Topical, BID  midodrine, 10 mg, Nasogastric, Q8H  rifAXIMin, 550 mg, Nasogastric, Q12H  sodium chloride, 10 mL, Intravenous, Q12H  sodium chloride, 10 mL, Intravenous, Q12H  sodium chloride, 10 mL, Intravenous, Q12H  sodium chloride, 10 mL, Intravenous, Q12H  sodium chloride, 10 mL, Intravenous, Q12H  thiamine, 100 mg, Nasogastric, Daily  vancomycin, 125 mg, Nasogastric, Q6H      IV Meds:   norepinephrine, 0.02-0.3 mcg/kg/min, Last Rate: Stopped (04/23/25 2108)  Pharmacy Consult,    vasopressin, 0.03 Units/min, Last Rate: 0.03 Units/min (04/23/25 2108)        Results Reviewed:   I have personally reviewed the results from the time of this admission to 4/24/2025 07:29 EDT     Results from last 7 days   Lab Units 04/24/25  0321 04/23/25  0506 04/22/25  1234 04/22/25  0534   SODIUM mmol/L 135* 140 142 148*   POTASSIUM mmol/L 4.2 4.7 4.4 4.1   CHLORIDE mmol/L 94* 101 104 106   CO2 mmol/L 28.0 27.1 28.7 29.0   BUN mg/dL 49* 50* 50* 48*   CREATININE mg/dL 3.07* 3.65* 3.40* 3.10*   CALCIUM mg/dL 7.9* 7.9* 8.0* 8.1*   BILIRUBIN mg/dL 7.5* 7.9*  --  9.1*   ALK PHOS U/L 155* 165*  --  160*   ALT (SGPT) U/L 139* 159*  --  170*   AST (SGOT) U/L 256* 300*  --  368*   GLUCOSE mg/dL 116* 137* 153* 155*       Estimated Creatinine Clearance: 31.9 mL/min (A) (by C-G formula based on SCr of 3.07 mg/dL (H)).    Results from last 7 days   Lab Units 04/24/25  0321 04/23/25  0506 04/22/25  1234 04/22/25  0534   MAGNESIUM mg/dL 1.3* 1.7  --  1.7   PHOSPHORUS mg/dL 3.0 5.2* 3.6 1.4*       Results from last 7 days   Lab Units 04/21/25  0343 04/20/25  0641 04/17/25  2148   URIC ACID mg/dL 15.0* 14.9* 13.6*       Results from last 7 days   Lab Units 04/24/25  0321 04/23/25  0506 04/22/25  0534 04/21/25  0343 04/20/25  1149 04/20/25  0641   WBC 10*3/mm3 14.18* 11.60* 9.37 8.29  --  9.47   HEMOGLOBIN g/dL 7.1* 8.5* 8.2* 8.5*  --  8.8*   PLATELETS 10*3/mm3 34* 41* 48* 42* 44* 42*       Results from last 7 days   Lab Units 04/24/25  0321 04/23/25  0506 04/22/25  1234 04/20/25  1149 04/18/25  0337   INR  1.72* 1.61* 1.72* 1.80* 1.79*       Assessment / Plan     ASSESSMENT:  Acute kidney injury on probable CKD but baseline unknown.  Nonoliguric.  Good response to IV Lasix but intake greater than output.  Creatinine better today..  Ongoing pressor requirement, tachycardia.  Hypernatremia improved.  Hypotension, still requiring vasopressin but overall hemodynamically seems more stable.  Remains very tachycardic.  2.  Diarrhea.   Enteropathogenic E. coli.  Lactulose stopped yesterday and rifaximin added.   Likely C. difficile colonization.  3.  Pancreatitis on CT. right hemicolon thickening.  Having multiple bowel movements.  GI following.  4.  Alcohol abuse.  Withdrawal.  5.  Decompensated cirrhosis with ascites.  Transaminases stable to slightly improved.  Bilirubin 7.5, unchanged..  6.  Ischemic cardiomyopathy with acute on chronic heart failure reduced ejection fraction EF 30%.  Moderate mitral valve regurgitation.  ICD in place.  Hypotension with tachycardia.  Now on IV vasopressin in addition to midodrine per feeding tube.  7.  Thrombocytopenia.  HIT antibody negative from 4/21/2025.  Platelets 34K.  8.  Anemia.  Hemoglobin following.  8.5 down to 7.1 today.  Would favor transfusion for his hemodynamics.  PLAN:  Another dose of IV Lasix today.  Will discuss possible packed red blood cell transfusion with pulmonary.  Reduce to feed free water to 50 cc every 4 hours  Consider changing tube feed to an elemental formula.  Replace magnesium IV  Remains very critically ill.  Very guarded prognosis.  Thank you for involving us in the care of Archie Oquendo.  Please feel free to call with any questions.    Anjana Park MD  04/24/25  07:29 EDT    [facility]  [phone]    Please note that portions of this note were completed with a voice recognition program.    Electronically signed by Anjana Park MD at 04/24/25 3999

## 2025-04-25 NOTE — PROGRESS NOTES
[facility], [facility]  Inpatient Progress Note    Reason for Follow Up:  Decompensated cirrhosis     Subjective     Interval History:   Stool output yesterday much improved off lactulose - 2 BM  He is much more awake and alert and conversive today.  He denies abdominal pain    Current Facility-Administered Medications:     [COMPLETED] cefTRIAXone (ROCEPHIN) 2,000 mg in sodium chloride 0.9 % 100 mL MBP, 2,000 mg, Intravenous, Q24H, Last Rate: 200 mL/hr at 04/23/25 1413, 2,000 mg at 04/23/25 1413 **FOLLOWED BY** cephalexin (KEFLEX) capsule 500 mg, 500 mg, Nasogastric, Q12H, Antony Weston MD, 500 mg at 04/24/25 2010    dextrose (D50W) (25 g/50 mL) IV injection 25 g, 25 g, Intravenous, Q15 Min PRN, Vianey Jones, APRN    dextrose (GLUTOSE) oral gel 15 g, 15 g, Oral, Q15 Min PRN, Vianey Jones, APRN    folic acid (FOLVITE) tablet 1 mg, 1 mg, Nasogastric, Daily, Alvaro Cornejo MD, 1 mg at 04/23/25 0809    glucagon (GLUCAGEN) injection 1 mg, 1 mg, Intramuscular, Q15 Min PRN, Vianey Jones, APRN    insulin regular (humuLIN R,novoLIN R) injection 2-7 Units, 2-7 Units, Subcutaneous, Q6H, Vianey Jones, APRN, 2 Units at 04/25/25 0546    Menthol-Zinc Oxide 1 Application, 1 Application, Topical, BID, Antony Weston MD, 1 Application at 04/24/25 2010    methylPREDNISolone sodium succinate (SOLU-Medrol) injection 125 mg, 125 mg, Intravenous, Q12H, Wild Delgado MD PhD, 125 mg at 04/25/25 0546    midodrine (PROAMATINE) tablet 10 mg, 10 mg, Nasogastric, Q8H, Antony Weston MD, 10 mg at 04/25/25 0314    nitroglycerin (NITROSTAT) SL tablet 0.4 mg, 0.4 mg, Sublingual, Q5 Min PRN, Vianey Jones, APRN    norepinephrine (LEVOPHED) 8 mg in 250 mL NS infusion (premix), 0.02-0.3 mcg/kg/min, Intravenous, Titrated, Vianey Jones, VERONICA, Last Rate: 3.69 mL/hr at 04/25/25 0313, 0.02 mcg/kg/min at 04/25/25 0313    ondansetron (ZOFRAN) injection 4 mg, 4 mg, Intravenous, Q6H PRN, Vianey Jones,  APRN, 4 mg at 04/20/25 0933    oxyCODONE (ROXICODONE) immediate release tablet 5 mg, 5 mg, Oral, Q6H PRN, Alvaro Cornejo MD, 5 mg at 04/24/25 2024    Pharmacy Consult, , Not Applicable, Continuous PRN, Alvaro Cornejo MD    riFAXIMin (XIFAXAN) tablet 550 mg, 550 mg, Nasogastric, Q12H, Veena Patel MD, 550 mg at 04/24/25 2010    sodium chloride 0.9 % flush 10 mL, 10 mL, Intravenous, Q12H, Vianey Jones APRN, 10 mL at 04/24/25 2011    sodium chloride 0.9 % flush 10 mL, 10 mL, Intravenous, PRN, Vianey Jones APRN    sodium chloride 0.9 % flush 10 mL, 10 mL, Intravenous, Q12H, Antony Weston MD, 10 mL at 04/24/25 2011    sodium chloride 0.9 % flush 10 mL, 10 mL, Intravenous, Q12H, Antony Weston MD, 10 mL at 04/24/25 2011    sodium chloride 0.9 % flush 10 mL, 10 mL, Intravenous, Q12H, Antony Weston MD, 10 mL at 04/24/25 2011    sodium chloride 0.9 % flush 10 mL, 10 mL, Intravenous, Q12H, Antony Weston MD, 10 mL at 04/24/25 2010    sodium chloride 0.9 % flush 10 mL, 10 mL, Intravenous, Trista LOPEZ Sandeep K, MD    sodium chloride 0.9 % flush 20 mL, 20 mL, Intravenous, Trista LOPEZ Sandeep K, MD    sodium chloride 0.9 % infusion 40 mL, 40 mL, Intravenous, PRNRobert Haley R, APRN    sodium chloride 0.9 % infusion 40 mL, 40 mL, Intravenous, Trista LOPEZ Sandeep K, MD    [COMPLETED] thiamine (B-1) injection 200 mg, 200 mg, Intravenous, Q8H, 200 mg at 04/22/25 1451 **FOLLOWED BY** thiamine (VITAMIN B-1) tablet 100 mg, 100 mg, Nasogastric, Daily, Antony Weston MD, 100 mg at 04/24/25 0915    vancomycin oral solution reconstituted 125 mg, 125 mg, Nasogastric, Q6H, Antony Weston MD, 125 mg at 04/25/25 0546    vasopressin (PITRESSIN) 20 units in 100 mL NS infusion, 0.03 Units/min, Intravenous, Continuous, Alvaro Cornejo MD, Last Rate: 9 mL/hr at 04/24/25 2234, 0.03 Units/min at 04/24/25 2234  Review of Systems:    Negative for nausea vomiting fever chills    Objective     Vital  Signs  Temp:  [97.4 °F (36.3 °C)-99.3 °F (37.4 °C)] 99.3 °F (37.4 °C)  Heart Rate:  [] 111  BP: ()/(41-91) 102/71  Body mass index is 25.45 kg/m².    Intake/Output Summary (Last 24 hours) at 4/25/2025 0638  Last data filed at 4/25/2025 0623  Gross per 24 hour   Intake --   Output 2800 ml   Net -2800 ml     I/O this shift:  In: -   Out: 1250 [Urine:1250]     Physical Exam:   General: patient awake, alert and cooperative   Eyes: Normal lids and lashes, + scleral icterus   Neck: supple, normal ROM   Skin: warm and dry, + jaundiced   Cardiovascular: regular rhythm and rate    Pulm:  regular and unlabored   Abdomen: soft, tender in the right upper quadrant without rebound or guarding, nondistended; normal bowel sounds         Results Review:     I reviewed the patient's new clinical results.    Results from last 7 days   Lab Units 04/25/25  0527 04/24/25  0321 04/23/25  0506   WBC 10*3/mm3 12.17* 14.18* 11.60*   HEMOGLOBIN g/dL 8.0* 7.1* 8.5*   HEMATOCRIT % 21.8* 21.3* 23.1*   PLATELETS 10*3/mm3 53*  53* 35*  34* 41*     Results from last 7 days   Lab Units 04/25/25  0527 04/24/25  0321 04/23/25  0506   SODIUM mmol/L 135* 135* 140   POTASSIUM mmol/L 4.3 4.2 4.7   CHLORIDE mmol/L 93* 94* 101   CO2 mmol/L 28.3 28.0 27.1   BUN mg/dL 50* 49* 50*   CREATININE mg/dL 2.31* 3.07* 3.65*   CALCIUM mg/dL 8.0* 7.9* 7.9*   BILIRUBIN mg/dL 8.7* 7.5* 7.9*   ALK PHOS U/L 164* 155* 165*   ALT (SGPT) U/L 129* 139* 159*   AST (SGOT) U/L 213* 256* 300*   GLUCOSE mg/dL 158* 116* 137*     Results from last 7 days   Lab Units 04/24/25  0321 04/23/25  0506 04/22/25  1234   INR  1.72* 1.61* 1.72*     Lab Results   Lab Value Date/Time    LIPASE 449 (H) 04/17/2025 2011       Radiology:  CT Abdomen Pelvis Without Contrast   Final Result   FINDINGS AND IMPRESSION:   Please note evaluation is suboptimal without venous contrast as well as   due to streak artifact and beam hardening from the patient's arms being   There is as well as  significant streak artifact from spinal hardware..       Small bilateral pleural effusions are present with significant overlying   opacification and volume loss within the lung bases which appears to   have increased since 4/17/2025, however is incompletely visualized.   Findings can be further evaluated with chest CT if clinically   indicated..       Enteric tube terminates in stomach. Somewhat nodular area of   opacification along the anterior aspect of the right middle lobe, as   before.       No findings of small bowel obstruction with contrast reaching the large   bowel. The appendix is visualized; however, there is partially obscured   by adjacent ascites and soft tissue thickening, limiting evaluation;   however, it measures up to approximately 0.8 cm, grossly similar that   seen on 4/17/2025.       There is significant hepatic steatosis. The gallbladder is not   distended. No gross abnormality seen in the spleen, kidneys or adrenal   glands. No hydronephrosis. Bladder is decompressed by Rae catheter and   not well evaluated.       FAT STRANDING AND SOFT TISSUE THICKENING CONTACTS PORTIONS OF THE   PANCREAS. PANCREATITIS CANNOT BE EXCLUDED IN THE APPROPRIATE CONTEXT AND   CORRELATION PATIENT HISTORY AND LIPASE LEVELS RECOMMENDED.       ASYMMETRIC THICKENING OF THE RIGHT HEMICOLON IS PRESENT, AS BEFORE,   SUGGESTIVE OF PORTAL HYPERTENSIVE COLOPATHY AND/OR COLITIS IN THE   APPROPRIATE CONTEXT. CORRELATION WITH PATIENT HISTORY IS RECOMMENDED.       While no definite free intraperitoneal air is seen, please note   evaluation is significantly suboptimal due to the above-stated   notations. The degree of ascites appears to have decreased status post   paracentesis no suspicious lytic or blastic osseous lesion. For the   purpose of this dictation, last well-formed disc base referred to as   L5-S1. Postsurgical changes from fusion of T10-S1 with posterior rods   and pedicle screws.                                This report was finalized on 4/23/2025 11:33 PM by Dr. Surya Vee M.D on Workstation: [id number]          US Paracentesis   Final Result   Ultrasound-guided paracentesis as described       This report was finalized on 4/24/2025 11:59 AM by Dr. Maksim Pressley M.D on Workstation: [id number]          XR Abdomen KUB   Final Result   1.Cortrak catheter tip probably in the distal body of the stomach.           This report was finalized on 4/20/2025 2:02 PM by Dr. Dexter Quinonez M.D on Workstation: [id number]          XR Chest Post CVA Port   Final Result       As described.               This report was finalized on 4/18/2025 4:06 PM by Dr. David Smith M.D on Workstation: [id number]          XR Chest 1 View   Final Result      CT Abdomen Pelvis Without Contrast   Final Result       1. Marked hepatic steatosis. Patient is noted to have moderate ascites.   2. There is some inflammatory stranding surrounding the pancreas, as   well as some trace fluid. Appearance may reflect acute pancreatitis.   There is no pancreatic ductal dilatation, and no organized   peripancreatic collections are seen.   3. High density material within the gallbladder may reflect stones or   sludge.   4. Thick walled appearance to the ascending and transverse colon   appearance may be related to portal colopathy, although correlation with   any evidence of colitis is recommended.   5. Indeterminate ground glass pulmonary nodule measuring 11 mm. For a   ground glass nodule >=6 mm, recommend CT at 6-12 months to confirm   persistence, then CT every 2 years until 5 years. If solid component(s)   or growth develops, consider resection.    Radiation dose reduction techniques were utilized, including automated   exposure control and exposure modulation based on body size.           This report was finalized on 4/17/2025 10:40 PM by Dr. Adrianna Fleming M.D on Workstation: [id number]          XR Chest 1 View   Final Result    As described.       This report was finalized on 4/17/2025 8:19 PM by Dr. David Smith M.D on Workstation: [id number]              Assessment & Plan     Active Hospital Problems    Diagnosis     **Sepsis        Assessment:  Decompensated cirrhosis w/ascites  Alcohol use  Sepsis  Acute kidney injury/CKD  CHF w/ischemic CM-h/o AICD  Enteropathogenic E. Coli  C. difficile considered colonization  Pancreatic inflammatory change on imaging  Coagulopathy  Thrombocytopenia      Plan:  Continues to have diarrhea which I think is the aftermath of the lactulose he was previously taking.  He has not received since 4/2 2.  Continue to follow to school volume.  Remains on vancomycin for C. difficile.  Continue xifaxan for encephalopathy.  Repeat ammonia in am  Paracentesis 4/24 negative for SBP  Continue tube feeds at 30 cc/h.  More awake and alert today with some improvement in labs (creatinine)    I discussed the patients findings and my recommendations with patient.            Veena Patel M.D.  [facility]  [phone]

## 2025-04-25 NOTE — PROGRESS NOTES
LOS: 8 days   Patient Care Team:  Zoe Desai MD as PCP - General (Family Medicine)  Corbin Bailey MD as Surgeon (Neurosurgery)    Chief Complaint: Sepsis, liver cirrhosis, thrombocytopenia and anemia    Interval History:  4/21/2025 patient is afebrile.  Not responding to verbal communication, however nursing staff reports patient starts to follow some command.  Stable Platelets 42,000, hemoglobin 8.5.      4/22/2025 patient is afebrile.  Per nursing staff, patient was able to tell his name today.  Overall still has hepatic encephalopathy.  Platelets 48,000, hemoglobin 8.2.  Total bilirubin 9.1 and elevated , .     4/23/2025 patient afebrile.  Mental status waxes and wanes.  According to nursing staff, patient was able to have limited conversation when he was awake.  Persistent low platelets 41,000, hemoglobin 8.5.  Improving but still elevated total bilirubin 7.9  and .    4/24/2025 patient is afebrile.  Mental status waxes and wanes.  Further deteriorating platelets 35,000 with elevated IPF 17.0%.  Also trending down hemoglobin 7.1.  Improving total bilirubin 7.5.    4/25/2025 no clinical improvement of condition despite improved the platelets at 53,000, and the hemoglobin 8.0.  BUN 50, creatinine 2.31 and a total bilirubin of 8.7.    A comprehensive 14 point review of systems was performed and was negative except as mentioned.    Vital Signs:  Temp:  [97.4 °F (36.3 °C)-99.3 °F (37.4 °C)] 98 °F (36.7 °C)  Heart Rate:  [] 84  Resp:  [11] 11  BP: ()/(50-87) 69/50    Intake/Output Summary (Last 24 hours) at 4/25/2025 1613  Last data filed at 4/25/2025 0623  Gross per 24 hour   Intake --   Output 1250 ml   Net -1250 ml       Physical Exam:  GENERAL:  Well-developed male in no acute distress.  Patient not able to have verbal communication.  SKIN:  Warm, dry without rashes, diffusely jaundiced.   HEENT:  Normocephalic.    CHEST: Normal respiratory effort.  Lungs clear to  auscultation. Good airflow.  CARDIAC:  Regular rate and rhythm. Normal S1,S2.  ABDOMEN:  Soft, no tender.  Bowel sounds normal.  EXTREMITIES:   Right leg edema more than the left leg.      Results Review:   CBC:      Lab 04/25/25  0527 04/24/25  0321 04/23/25  0506 04/22/25  0534 04/21/25  0343   WBC 12.17* 14.18* 11.60* 9.37 8.29   HEMOGLOBIN 8.0* 7.1* 8.5* 8.2* 8.5*   HEMATOCRIT 21.8* 21.3* 23.1* 24.0* 22.8*   PLATELETS 53*  53* 35*  34* 41* 48* 42*   NEUTROS ABS 10.32* 10.03* 7.69* 7.40* 4.95   IMMATURE GRANS (ABS) 0.18* 0.25* 0.24*  --  0.14*   LYMPHS ABS 1.19 2.05 2.00  --  1.73   MONOS ABS 0.46 1.64* 1.42*  --  1.25*   EOS ABS 0.00 0.15 0.17 0.09 0.16   MCV 97.8* 99.5* 101.3* 98.7* 99.1*   IPF 17.0% 4/24/2025.    CMP:        Lab 04/25/25  0527 04/24/25  0321 04/23/25  0506 04/22/25  1234 04/22/25  0534 04/21/25  1420 04/21/25  0343   SODIUM 135* 135* 140 142 148*  --  146*   POTASSIUM 4.3 4.2 4.7 4.4 4.1   < > 3.0*   CHLORIDE 93* 94* 101 104 106  --  101   CO2 28.3 28.0 27.1 28.7 29.0  --  31.0*   ANION GAP 13.7 13.0 11.9 9.3 13.0  --  14.0   BUN 50* 49* 50* 50* 48*  --  49*   CREATININE 2.31* 3.07* 3.65* 3.40* 3.10*  --  2.76*   EGFR 31.4* 22.3* 18.1* 19.7* 22.0*  --  25.3*   GLUCOSE 158* 116* 137* 153* 155*  --  108*   CALCIUM 8.0* 7.9* 7.9* 8.0* 8.1*  --  7.6*   MAGNESIUM 1.7 1.3* 1.7  --  1.7  --  1.7   PHOSPHORUS 3.6 3.0 5.2* 3.6 1.4*  --  2.5   TOTAL PROTEIN 6.0 5.5* 5.7*  --  5.9*  --  5.5*   ALBUMIN 2.6* 2.5* 2.6* 2.5* 2.7*  --  2.7*   GLOBULIN 3.4 3.0 3.1  --  3.2  --  2.8   ALT (SGPT) 129* 139* 159*  --  170*  --  180*   AST (SGOT) 213* 256* 300*  --  368*  --  397*   BILIRUBIN 8.7* 7.5* 7.9*  --  9.1*  --  8.3*   BILIRUBIN DIRECT  --   --   --   --   --   --  6.5*   ALK PHOS 164* 155* 165*  --  160*  --  147*    < > = values in this interval not displayed.     Lab Results   Component Value Date    [id number] >2,000 (H) 04/20/2025     Lab Results   Component Value Date    FOLATE 12.50 04/21/2025      Lab Results   Component Value Date    IRON 34 (L) 04/21/2025    LABIRON 30 04/21/2025    TRANSFERRIN 76 (L) 04/21/2025    TIBC 113 (L) 04/21/2025    FERRITIN 3,650.00 (H) 04/21/2025     LDH   Date Value Ref Range Status   04/25/2025 693 (H) 135 - 225 U/L Final        Latest Reference Range & Units 04/21/25 03:43   Haptoglobin 30 - 200 mg/dL <10 (L)      Latest Reference Range & Units 04/20/25 11:49   FDP Less Than 5 mcg/mL  Greater than or equal to 20 mcg/mL !   Fibrinogen 219 - 464 mg/dL 216 (L)   Protime 11.7 - 14.2 Seconds 21.0 (H)   INR 0.90 - 1.10  1.80 (H)   PTT 22.7 - 35.4 seconds 40.0 (H)       Results from last 7 days   Lab Units 04/22/25  0534   CK TOTAL U/L 249*     Results from last 7 days   Lab Units 04/25/25  0527 04/24/25  0321 04/23/25  0506   INR  1.73* 1.72* 1.61*   APTT seconds 40.3* 41.6* 41.9*         Results from last 7 days   Lab Units 04/25/25  0527   MAGNESIUM mg/dL 1.7           I reviewed the patient's new clinical results.        Assessment:     *Thrombocytopenia  Normal platelet count and 2023.  Acute drop in platelet count likely secondary to sepsis.  4/17/2025 platelet count 83,000  4/20/2025 platelet count has dropped to 42,000.  Obtain IPF, B12, folic acid level.  Also obtain HIT antibody.  Most likely thrombocytopenia secondary to sepsis.  Hold anticoagulation at this time.  Also obtain fibrin split products, fibrinogen, PT PTT INR  Also with underlying cirrhosis of the liver and ascites noted on the CT abdomen.  ?  Acute pancreatitis on CT abdomen.  Spleen is normal in size.  Elevated IPF 12.8% on 4/20/2025 with platelets 44,000.  Elevated FTP and decreased fibrinogen, elevated PT/INR PTT, fits with DIC patient.   4/21/2025 platelets 42,000.  HIT antibody negative.   4/22/2025 platelets 48,000.  4/23/2025 platelets 41,000.   4/24/2025 further worsening platelets 35,000 with IPF 17.0%.  This patient has ITP.  We recommended to start the patient empirically on IV Solu-Medrol 125  mg IV every 12 hours for treatment of ITP.  4/25/2025 slightly improved platelets 53,000 with IPF 20.9%.        *Anemia  Most likely secondary to acute ongoing infection.  Hemoglobin 8.8.  Continue to monitor  on 4/20/2025 Severely elevated ferritin 3650 with free iron 34, fits with inflammatory changes.  Normal folate 12.5, and supratherapeutic B12 > 2000.    4/21/2025 hemoglobin 8.5, elevated  and suppressed haptoglobin <10.  hemolysis?  Or the suppressed haptoglobin due to abnormal liver failure?  since patient is clinically improving according to nursing staff, we will hold steroids for now because of his severe infection with C. difficile and EPEC colitis.   4/22/2025 hemoglobin 8.2.  No evidence of bleeding.   4/23/2025 hemoglobin 8.5.   4/24/2025 trending down hemoglobin 7.1.  Suspect the patient have hemolysis due to elevated LDH, and the suppressed haptoglobin.  Will start the patient on IV Solu-Medrol for both ITP and hemolysis.  4/25/2025 slightly improved hemoglobin 8.0.        *E. coli sepsis  On ceftriaxone per ID.  Continue antibiotics per ID recommendations.    *. C. difficile colitis.   On p.o. vancomycin.     *Cirrhosis of the liver with hepatic encephalopathy  Ascites noted on CT abdomen.  Gastroenterology consulted  On 4/21/25 Improving but still significant elevated total bilirubin 8.3, ,  and alk phosphatase 147.  Recommend supportive therapy.  4/22/2025 total bilirubin 9.1, ,  and alk phos 160.   4/23/2025 improving but still elevated total bilirubin 7.9  and .  4/24/2025 total bilirubin 7.5,  , alk phos 155.  Liver panel slowly improving.  4/25/2025 to the bilirubin 8.7,    *.  Coagulopathy secondary to liver cirrhosis and acute liver injury.  4/20/2025 INR 1.8, PTT 21.0 seconds.    4/22/2025 INR 1.72 and PTT 20.2 seconds.  No evidence for bleeding.   4/23/2025 improved INR 1.61 PT 19.2 seconds due to improving liver  function.  4/24/2025 INR 1.72, PT 20.3 seconds.  4/25/2025 INR 1.73 PTT 40.3 seconds.       *Ascites  Management per GI  Status post paracentesis, 3 L of fluid removed on 4/18/2025     *KEATON  Per nephrology note creatinine around 2 in September 2024.  Thought to be secondary to sepsis.  Improving, creatinine has decreased to 3.34.  On 4/21/2025 creatinine 2.76 BUN 49.   On 4/22/2025 creatinine 3.10 BUN 48.   4/23/2025 creatinine 3.65 BUN 50.  Continue followed by nephrology.  4/24/2025 creatinine 3.07 and BUN 49.  4/25/2025, creatinine 2.7 and BUN 50.       Recommendations:  Family members leaning towards palliative care however still requires active treatment today.    Continue treatment for sepsis from EPEC, and C. difficile colitis.  Continue on IV Solu-Medrol 125 mg every 12 hours.    Anemia also due to sepsis, possible hemolysis.  Will start IV Solu-Medrol today.  Continue to monitor CBC CMP, IPF, LHD haptoglobin PT/INR in the morning.  Overall poor prognosis.       patient is critically ill.  I spoke with daughter at the bedside.  It seems the family is moving towards palliative care.    I spoke with nursing staff.     We will sign off.  Please call if any questions.      Wild Delgado MD PhD  04/25/25  16:13 EDT

## 2025-04-25 NOTE — PROGRESS NOTES
LOS: 8 days   Patient Care Team:  Zoe Desai MD as PCP - General (Family Medicine)  Corbin Bailey MD as Surgeon (Neurosurgery)    Chief Complaint: Follow-up acute on chronic systolic CHF, cardiomyopathy, type II NSTEMI.    Interval History: Remains lethargic, but arouses.  Continues with sinus tachycardia.  He is back on Levophed, and is also on vasopressin.    Vital Signs:  Temp:  [97.4 °F (36.3 °C)-99.3 °F (37.4 °C)] 98 °F (36.7 °C)  Heart Rate:  [] 119  Resp:  [11] 11  BP: ()/(54-91) 94/65    Intake/Output Summary (Last 24 hours) at 4/25/2025 1251  Last data filed at 4/25/2025 0623  Gross per 24 hour   Intake --   Output 2000 ml   Net -2000 ml       Physical Exam:   General Appearance:    No acute distress, appears ill.  Somnolent, but opens eyes.  Prominently jaundiced.   Lungs:     Rhonchi anteriorly bilaterally.    Heart:    Regular rhythm and mildly tachycardic rate.  II/VI SM throughout.    Abdomen:     Soft, mildly tender, distended.    Extremities:   2+ edema of the lower extremities.  1+ edema of the hands and upper extremities.     Results Review:    Results from last 7 days   Lab Units 04/25/25  0527   SODIUM mmol/L 135*   POTASSIUM mmol/L 4.3   CHLORIDE mmol/L 93*   CO2 mmol/L 28.3   BUN mg/dL 50*   CREATININE mg/dL 2.31*   GLUCOSE mg/dL 158*   CALCIUM mg/dL 8.0*     Results from last 7 days   Lab Units 04/22/25  0534   CK TOTAL U/L 249*     Results from last 7 days   Lab Units 04/25/25  0527   WBC 10*3/mm3 12.17*   HEMOGLOBIN g/dL 8.0*   HEMATOCRIT % 21.8*   PLATELETS 10*3/mm3 53*  53*     Results from last 7 days   Lab Units 04/25/25  0527 04/24/25  0321 04/23/25  0506   INR  1.73* 1.72* 1.61*   APTT seconds 40.3* 41.6* 41.9*         Results from last 7 days   Lab Units 04/25/25  0527   MAGNESIUM mg/dL 1.7           I reviewed the patient's new clinical results.        Assessment:  1.  Nausea, vomiting, and diarrhea  2.  Enteropathogenic E. coli positive stool sample and C.  difficile toxin positive  3.  Anasarca, multifactorial  4.  Severe acute kidney injury with chronic kidney disease  5.  Decompensated cirrhosis of the liver  6.  Moderate ascites by CT  7.  Acute on chronic systolic CHF, EF 30% by echo on 4/18/2025  8.  Elevated troponin, likely type II NSTEMI secondary to #1, #2, #4, #5, and #9  9.  Coronary artery disease,  of the RCA by cath in 2022  10.  Status post ICD placement  11.  Urinary tract infection with retention, now with Rae catheter  12.  Significant transaminitis  13.  Hypoalbuminemia, contributing to volume overload  14.  Anemia, likely multifactorial  15.  Heavy daily alcohol use  16.  Thrombocytopenia with potential DIC  17.  Suspected medication noncompliance  18.  Metabolic encephalopathy  19.  Sinus tachycardia    Plan:  -Continues to have metabolic encephalopathy and poor mental status.     - He is now back on Levophed in addition to vasopressin for pressor support.  He is also on midodrine 10 mg per tube every 8 hours.  Continues to have sinus tachycardia.    -Volume management per nephrology.  Received Lasix yesterday.    -Hypotension and renal dysfunction greatly limit his guideline directed medical therapy for CHF and cardiomyopathy.    -HIT antibody was negative.  Hematology is following.  Platelet count now in the 50s.  Suspect thrombocytopenia secondary to liver dysfunction, sepsis, and potential DIC.    -Continues to have issues with anemia.    - Noted palliative care notes.  Potential transition to comfort care tomorrow.  I agree with this completely.  I really feel he is unlikely to have any meaningful recovery care.    Darrion Arroyo MD  04/25/25  12:51 EDT

## 2025-04-25 NOTE — PROGRESS NOTES
Continued Stay Note  [facility]     Patient Name: Archie Oquendo  MRN: [id number]  Today's Date: 4/25/2025    Admit Date: 4/17/2025    Plan: Pending progess and Palliative consult 4/25   Discharge Plan       Row Name 04/25/25 1208       Plan    Plan Pending progess and Palliative consult 4/25    Plan Comments CCP following with tx team to assist if any DC needs do arise.  Noted palliative care met with pt daughter and Palliative care is being consider for pt.                   Discharge Codes    No documentation.                 Expected Discharge Date and Time       Expected Discharge Date Expected Discharge Time    Apr 25, 2025               PAZ Amador

## 2025-04-26 PROBLEM — Z51.5 TERMINAL CARE: Status: ACTIVE | Noted: 2025-01-01

## 2025-04-26 NOTE — PROGRESS NOTES
[facility]  Inpatient Progress Note    Reason for Follow Up: Decompensated cirrhosis    Subjective     Interval History:   Answers questions appropriately today, no evidence of GI bleeding, minimal confusion    Current Facility-Administered Medications:     [COMPLETED] cefTRIAXone (ROCEPHIN) 2,000 mg in sodium chloride 0.9 % 100 mL MBP, 2,000 mg, Intravenous, Q24H, Last Rate: 200 mL/hr at 04/23/25 1413, 2,000 mg at 04/23/25 1413 **FOLLOWED BY** cephalexin (KEFLEX) capsule 500 mg, 500 mg, Nasogastric, Q12H, Antony Weston MD, 500 mg at 04/26/25 0904    dextrose (D50W) (25 g/50 mL) IV injection 25 g, 25 g, Intravenous, Q15 Min PRN, Vianey Jones R, APRN    dextrose (GLUTOSE) oral gel 15 g, 15 g, Oral, Q15 Min PRN, Vianey Jones, APRN    folic acid (FOLVITE) tablet 1 mg, 1 mg, Nasogastric, Daily, Alvaro Cornejo MD, 1 mg at 04/26/25 0904    glucagon (GLUCAGEN) injection 1 mg, 1 mg, Intramuscular, Q15 Min PRN, Vianey Jones, APRN    insulin regular (humuLIN R,novoLIN R) injection 2-7 Units, 2-7 Units, Subcutaneous, Q6H, Vianey Jones, APRN, 2 Units at 04/26/25 0648    Menthol-Zinc Oxide 1 Application, 1 Application, Topical, BID, Antony Weston MD, 1 Application at 04/26/25 0905    methylPREDNISolone sodium succinate (SOLU-Medrol) injection 125 mg, 125 mg, Intravenous, Q12H, Wild Delgado MD PhD, 125 mg at 04/26/25 0536    midodrine (PROAMATINE) tablet 10 mg, 10 mg, Nasogastric, Q8H, Antony Weston MD, 10 mg at 04/26/25 0917    morphine injection 2 mg, 2 mg, Intravenous, Q1H PRN, Herve Wilkinson MD    nitroglycerin (NITROSTAT) SL tablet 0.4 mg, 0.4 mg, Sublingual, Q5 Min PRN, Vianey Jones, APRN    norepinephrine (LEVOPHED) 8 mg in 250 mL NS infusion (premix), 0.02-0.3 mcg/kg/min, Intravenous, Titrated, Alvaro Cornejo MD, Stopped at 04/25/25 1443    ondansetron (ZOFRAN) injection 4 mg, 4 mg, Intravenous, Q6H PRN, Vianey Jones, APRN, 4 mg at 04/20/25 0939     oxyCODONE (ROXICODONE) immediate release tablet 5 mg, 5 mg, Oral, Q6H PRN, Alvaro Cornejo MD, 5 mg at 04/24/25 2024    Pharmacy Consult: Change PPI order to famotidine (unless documented GI bleed OR history of GI bleed). Also, pharmacy to discontinue all antiperistaltic agents and stool softeners/laxatives., , Not Applicable, Continuous PRN, Alvaro Cornejo MD    riFAXIMin (XIFAXAN) tablet 550 mg, 550 mg, Nasogastric, Q12H, Veena Patel MD, 550 mg at 04/26/25 0904    sodium chloride 0.9 % flush 10 mL, 10 mL, Intravenous, Q12H, Vianey Jones, APRN, 10 mL at 04/26/25 0905    sodium chloride 0.9 % flush 10 mL, 10 mL, Intravenous, PRN, Vianey Jones, APRN    sodium chloride 0.9 % flush 10 mL, 10 mL, Intravenous, Q12H, Antony Weston MD, 10 mL at 04/26/25 0905    sodium chloride 0.9 % flush 10 mL, 10 mL, Intravenous, Q12H, Antony Weston MD, 10 mL at 04/26/25 0905    sodium chloride 0.9 % flush 10 mL, 10 mL, Intravenous, Q12H, Antony Weston MD, 10 mL at 04/26/25 0905    sodium chloride 0.9 % flush 10 mL, 10 mL, Intravenous, Q12H, Antony Weston MD, 10 mL at 04/26/25 0905    sodium chloride 0.9 % flush 10 mL, 10 mL, Intravenous, PRNTrista Sandeep K, MD    sodium chloride 0.9 % flush 20 mL, 20 mL, Intravenous, Trista LOPEZ Sandeep K, MD    sodium chloride 0.9 % infusion 40 mL, 40 mL, Intravenous, PRN, Vianey Jones, APRN    sodium chloride 0.9 % infusion 40 mL, 40 mL, Intravenous, Trista LOPEZ Sandeep K, MD    [COMPLETED] thiamine (B-1) injection 200 mg, 200 mg, Intravenous, Q8H, 200 mg at 04/22/25 1451 **FOLLOWED BY** thiamine (VITAMIN B-1) tablet 100 mg, 100 mg, Nasogastric, Daily, Antony Weston MD, 100 mg at 04/26/25 0904    vancomycin oral solution reconstituted 125 mg, 125 mg, Nasogastric, Q6H, Antony Weston MD, 125 mg at 04/26/25 0536    vasopressin (PITRESSIN) 20 units in 100 mL NS infusion, 0.03 Units/min, Intravenous, Continuous, Alvaro Cornejo MD, Last Rate: 9 mL/hr  at 04/26/25 1000, 0.03 Units/min at 04/26/25 1000   there is weakness of fatigue all other systems reviewed and negative    Objective     Vital Signs  Temp:  [97.5 °F (36.4 °C)-98.6 °F (37 °C)] 98.6 °F (37 °C)  Heart Rate:  [] 111  Resp:  [17-20] 17  BP: ()/() 95/69  Body mass index is 25.22 kg/m².    Intake/Output Summary (Last 24 hours) at 4/26/2025 1134  Last data filed at 4/26/2025 1000  Gross per 24 hour   Intake 714 ml   Output 874 ml   Net -160 ml     I/O this shift:  In: -   Out: 300 [Urine:300]     Physical Exam:   General: patient awake, alert and cooperative   Eyes: Normal lids and lashes, no scleral icterus   Neck: supple, normal ROM   Skin: warm and dry, not jaundiced   Cardiovascular: regular rhythm and rate, no murmurs auscultated   Pulm: clear to auscultation bilaterally, regular and unlabored   Abdomen: soft, nontender, nondistended; normal bowel sounds   Extremities: no rash or edema   Psychiatric: Normal mood and behavior; memory intact     Results Review:     I reviewed the patient's new clinical results.    Results from last 7 days   Lab Units 04/26/25  0608 04/25/25  0527 04/24/25  0321   WBC 10*3/mm3 19.33* 12.17* 14.18*   HEMOGLOBIN g/dL 7.1* 8.0* 7.1*   HEMATOCRIT % 19.2* 21.8* 21.3*   PLATELETS 10*3/mm3 54*  67* 53*  53* 35*  34*     Results from last 7 days   Lab Units 04/26/25  0608 04/25/25  0527 04/24/25  0321   SODIUM mmol/L 138 135* 135*   POTASSIUM mmol/L 4.0 4.3 4.2   CHLORIDE mmol/L 96* 93* 94*   CO2 mmol/L 28.0 28.3 28.0   BUN mg/dL 60* 50* 49*   CREATININE mg/dL 2.09* 2.31* 3.07*   CALCIUM mg/dL 7.8* 8.0* 7.9*   BILIRUBIN mg/dL 7.6* 8.7* 7.5*   ALK PHOS U/L 171* 164* 155*   ALT (SGPT) U/L 107* 129* 139*   AST (SGOT) U/L 210* 213* 256*   GLUCOSE mg/dL 140* 158* 116*     Results from last 7 days   Lab Units 04/26/25  0608 04/25/25  0527 04/24/25  0321   INR  1.67* 1.73* 1.72*     Lab Results   Lab Value Date/Time    LIPASE 449 (H) 04/17/2025 2011        Radiology:  CT Abdomen Pelvis Without Contrast   Final Result   FINDINGS AND IMPRESSION:   Please note evaluation is suboptimal without venous contrast as well as   due to streak artifact and beam hardening from the patient's arms being   There is as well as significant streak artifact from spinal hardware..       Small bilateral pleural effusions are present with significant overlying   opacification and volume loss within the lung bases which appears to   have increased since 4/17/2025, however is incompletely visualized.   Findings can be further evaluated with chest CT if clinically   indicated..       Enteric tube terminates in stomach. Somewhat nodular area of   opacification along the anterior aspect of the right middle lobe, as   before.       No findings of small bowel obstruction with contrast reaching the large   bowel. The appendix is visualized; however, there is partially obscured   by adjacent ascites and soft tissue thickening, limiting evaluation;   however, it measures up to approximately 0.8 cm, grossly similar that   seen on 4/17/2025.       There is significant hepatic steatosis. The gallbladder is not   distended. No gross abnormality seen in the spleen, kidneys or adrenal   glands. No hydronephrosis. Bladder is decompressed by Rae catheter and   not well evaluated.       FAT STRANDING AND SOFT TISSUE THICKENING CONTACTS PORTIONS OF THE   PANCREAS. PANCREATITIS CANNOT BE EXCLUDED IN THE APPROPRIATE CONTEXT AND   CORRELATION PATIENT HISTORY AND LIPASE LEVELS RECOMMENDED.       ASYMMETRIC THICKENING OF THE RIGHT HEMICOLON IS PRESENT, AS BEFORE,   SUGGESTIVE OF PORTAL HYPERTENSIVE COLOPATHY AND/OR COLITIS IN THE   APPROPRIATE CONTEXT. CORRELATION WITH PATIENT HISTORY IS RECOMMENDED.       While no definite free intraperitoneal air is seen, please note   evaluation is significantly suboptimal due to the above-stated   notations. The degree of ascites appears to have decreased status post    paracentesis no suspicious lytic or blastic osseous lesion. For the   purpose of this dictation, last well-formed disc base referred to as   L5-S1. Postsurgical changes from fusion of T10-S1 with posterior rods   and pedicle screws.                               This report was finalized on 4/23/2025 11:33 PM by Dr. Surya Vee M.D on Workstation: [id number]          US Paracentesis   Final Result   Ultrasound-guided paracentesis as described       This report was finalized on 4/24/2025 11:59 AM by Dr. Maksim Pressley M.D on Workstation: [id number]          XR Abdomen KUB   Final Result   1.Cortrak catheter tip probably in the distal body of the stomach.           This report was finalized on 4/20/2025 2:02 PM by Dr. Dexter Quinonez M.D on Workstation: [id number]          XR Chest Post CVA Port   Final Result       As described.               This report was finalized on 4/18/2025 4:06 PM by Dr. David Smith M.D on Workstation: [id number]          XR Chest 1 View   Final Result      CT Abdomen Pelvis Without Contrast   Final Result       1. Marked hepatic steatosis. Patient is noted to have moderate ascites.   2. There is some inflammatory stranding surrounding the pancreas, as   well as some trace fluid. Appearance may reflect acute pancreatitis.   There is no pancreatic ductal dilatation, and no organized   peripancreatic collections are seen.   3. High density material within the gallbladder may reflect stones or   sludge.   4. Thick walled appearance to the ascending and transverse colon   appearance may be related to portal colopathy, although correlation with   any evidence of colitis is recommended.   5. Indeterminate ground glass pulmonary nodule measuring 11 mm. For a   ground glass nodule >=6 mm, recommend CT at 6-12 months to confirm   persistence, then CT every 2 years until 5 years. If solid component(s)   or growth develops, consider resection.    Radiation dose reduction  techniques were utilized, including automated   exposure control and exposure modulation based on body size.           This report was finalized on 4/17/2025 10:40 PM by Dr. Adrianna Fleming M.D on Workstation: [id number]          XR Chest 1 View   Final Result   As described.       This report was finalized on 4/17/2025 8:19 PM by Dr. David Smith M.D on Workstation: [id number]              Assessment & Plan     Active Hospital Problems    Diagnosis     **Sepsis        Assessment:  Decompensated cirrhosis w/ascites  Alcohol use  Sepsis  Acute kidney injury/CKD  CHF w/ischemic CM-h/o AICD  Enteropathogenic E. Coli  C. difficile considered colonization  Pancreatic inflammatory change on imaging  Coagulopathy  Thrombocytopenia        Plan:  Vancomycin for diarrhea  Xifaxan for encephalopathy  Paracentesis 4/24 negative for SBP  Continue tube feeds at 30 cc/h.  More awake and alert today with some improvement in labs (creatinine)  Family reviewing goals of care, consideration for comfort measures only     I discussed the patients findings and my recommendations with patient and nursing staff.    Brandon Genao MD

## 2025-04-26 NOTE — DISCHARGE SUMMARY
Patient Identification:  Name: Archie Oquendo  Age: 59 y.o.  Sex: male  :  1965  MRN: [id number]  Primary Care Physician: Zoe Desai MD    Admit date: 2025  Discharge date and time:  25  Discharged Condition: serious    Discharge Diagnoses:    Sepsis  End of life care  Acute metabolic encephalopathy  Sepsis present on admission due to E. coli enteritis  Acute hypoxemic respiratory failure on heated high flow  Septic shock present on admission causing encephalopathy   Septic shock present on admission causing acute kidney injury  Decompensated liver cirrhosis  Acute pancreatitis by imaging  Non-STEMI  Hyponatremia  Hypokalemia  Anemia  Thrombocytopenia  Alcohol abuse  Acute HFrEF    Hospital Course: Archie Oquendo presented to [facility] 9 days ago with generalized weakness.  He had been having some acid reflux and retching.  He was diagnosed with E. coli enteritis.  He was admitted to the intensive care unit where he was treated with IV fluids, broad-spectrum antibiotics and sepsis protocol for shock.  He was found to have acute kidney injury.  He also has a history of alcohol abuse and liver failure.  He was seen in consultation by gastroenterology, nephrology, cardiology and hematology.  Ultimately he failed to respond to intensive therapy.  After discussion with the family about goals of care they decided that the patient would not want to be placed on artificial life support.  He unfortunately has not done well and he is now being transferred to inpatient scattered bed for hospice care      Consults:   IP CONSULT TO NEPHROLOGY  IP CONSULT TO PULMONOLOGY  IP CONSULT TO PALLIATIVE CARE TEAM  IP CONSULT TO GASTROENTEROLOGY  IP CONSULT TO CASE MANAGEMENT   IP CONSULT TO INFECTIOUS DISEASES  IP CONSULT TO HEMATOLOGY AND ONCOLOGY  IP CONSULT TO NUTRITION SERVICES  IP CONSULT TO PALLIATIVE CARE TEAM  IP CONSULT TO HOSPICE  IP CONSULT TO HOSPICE  IP  CONSULT TO HOSPICE    Significant Diagnostic Studies:   Imaging Results (Most Recent)       Procedure Component Value Units Date/Time    US Paracentesis [[id number]] Collected: 04/24/25 1159    Specimen: Body Fluid Updated: 04/24/25 1202    Narrative:      ULTRASOUND GUIDED PARACENTESIS     HISTORY: Ascites     Informed consent was obtained. The abdomen was prepped and draped in the  usual sterile fashion with 2% chlorhexidine. Lidocaine was used for  local anesthesia.     A 5 Arabic catheter was inserted into the right lower quadrant using  ultrasound guidance. 4.4 L of orange-colored ascites was removed. Sample  was sent to the lab.     Confirmatory images were obtained.     Patient tolerated the procedure well without immediate complications.       Impression:      Ultrasound-guided paracentesis as described     This report was finalized on 4/24/2025 11:59 AM by Dr. Maksim Pressley M.D on Workstation: [id number]       CT Abdomen Pelvis Without Contrast [[id number]] Collected: 04/23/25 2312     Updated: 04/23/25 2336    Narrative:      CT ABDOMEN AND PELVIS WITHOUT IV CONTRAST     HISTORY: Abdominal distention     TECHNIQUE: Radiation dose reduction techniques were utilized, including  automated exposure control and exposure modulation based on body size.   3 mm images were obtained through the abdomen and pelvis without IV  contrast.     COMPARISON: CT abdomen pelvis 4/17/2025       Impression:      FINDINGS AND IMPRESSION:  Please note evaluation is suboptimal without venous contrast as well as  due to streak artifact and beam hardening from the patient's arms being  There is as well as significant streak artifact from spinal hardware..     Small bilateral pleural effusions are present with significant overlying  opacification and volume loss within the lung bases which appears to  have increased since 4/17/2025, however is incompletely visualized.  Findings can be further evaluated with chest CT if  clinically  indicated..     Enteric tube terminates in stomach. Somewhat nodular area of  opacification along the anterior aspect of the right middle lobe, as  before.     No findings of small bowel obstruction with contrast reaching the large  bowel. The appendix is visualized; however, there is partially obscured  by adjacent ascites and soft tissue thickening, limiting evaluation;  however, it measures up to approximately 0.8 cm, grossly similar that  seen on 4/17/2025.     There is significant hepatic steatosis. The gallbladder is not  distended. No gross abnormality seen in the spleen, kidneys or adrenal  glands. No hydronephrosis. Bladder is decompressed by Rae catheter and  not well evaluated.     FAT STRANDING AND SOFT TISSUE THICKENING CONTACTS PORTIONS OF THE  PANCREAS. PANCREATITIS CANNOT BE EXCLUDED IN THE APPROPRIATE CONTEXT AND  CORRELATION PATIENT HISTORY AND LIPASE LEVELS RECOMMENDED.     ASYMMETRIC THICKENING OF THE RIGHT HEMICOLON IS PRESENT, AS BEFORE,  SUGGESTIVE OF PORTAL HYPERTENSIVE COLOPATHY AND/OR COLITIS IN THE  APPROPRIATE CONTEXT. CORRELATION WITH PATIENT HISTORY IS RECOMMENDED.     While no definite free intraperitoneal air is seen, please note  evaluation is significantly suboptimal due to the above-stated  notations. The degree of ascites appears to have decreased status post  paracentesis no suspicious lytic or blastic osseous lesion. For the  purpose of this dictation, last well-formed disc base referred to as  L5-S1. Postsurgical changes from fusion of T10-S1 with posterior rods  and pedicle screws.                       This report was finalized on 4/23/2025 11:33 PM by Dr. Surya Vee M.D on Workstation: [id number]       XR Abdomen KUB [[id number]] Collected: 04/20/25 1358     Updated: 04/20/25 1405    Narrative:      XR ABDOMEN KUB-4/20/2025     HISTORY: Cortrak placement.     The Cortrak catheter tip is seen to the left of the mid lumbar spine  probably in the distal  stomach. Bowel gas pattern is unremarkable.  Thoracolumbar fusion hardware is seen.       Impression:      1.Cortrak catheter tip probably in the distal body of the stomach.        This report was finalized on 4/20/2025 2:02 PM by Dr. Dexter Quinonez M.D on Workstation: [id number]       XR Chest Post CVA Port [[id number]] Collected: 04/18/25 1558     Updated: 04/18/25 1609    Narrative:      XR CHEST POST CVA PORT-     INDICATIONS: Central line placement.     TECHNIQUE: Frontal views of the chest     COMPARISON: 4/18/2025     FINDINGS:     Right IJ catheter extends to the cavoatrial junction region. The heart  size is borderline. Left-sided pacemaker, cardiac leads are seen. Small  likely atelectasis or infiltrate at the bases. No large pleural  effusion. No pneumothorax. No acute osseous process.       Impression:         As described.           This report was finalized on 4/18/2025 4:06 PM by Dr. David Smith M.D on Workstation: [id number]       XR Chest 1 View [[id number]] Collected: 04/18/25 1322     Updated: 04/18/25 1327    Narrative:      XR CHEST 1 VW-     HISTORY: Decreased saturations.     COMPARISON: 4/17/2025     FINDINGS:    A single view of the chest was obtained. Limited evaluation due to  patient rotation to the left and low lung volumes. There is a left chest  cardiac defibrillator, similar to prior. The cardiac silhouette is upper  normal to mildly enlarged. Mediastinal and hilar contours are similar to  prior. Left basilar opacity is similar to prior. There is a suspected  trace pleural effusion at least on the left. There is partial image  spinal fusion hardware.     This report was finalized on 4/18/2025 1:24 PM by Dr. Lou Turpin M.D  on Workstation: [id number]       CT Abdomen Pelvis Without Contrast [[id number]] Collected: 04/17/25 2231     Updated: 04/17/25 2243    Narrative:      CT OF THE ABDOMEN AND PELVIS WITHOUT CONTRAST     HISTORY: Abdominal distention and abnormal elevated  liver function  tests.     COMPARISON: None available.     TECHNIQUE: Axial CT imaging was obtained through the abdomen and pelvis.  No IV contrast was administered.     FINDINGS:  The patient is noted to have small bilateral pleural effusions. There is  dependent atelectasis. Area of groundglass attenuation is noted within  the right middle lobe, measuring 1.1 cm. There are coronary artery  calcifications. The liver is markedly steatotic. It is enlarged,  measuring up to 20.1 cm. There is high density material within the  gallbladder, which may reflect stones or sludge. The spleen and adrenal  glands are normal. There is some inflammatory stranding and trace fluid  surrounding the pancreas. Acute pancreatitis is not excluded. Organized  peripancreatic collection is not seen. The stomach and duodenum appear  normal. There is no hydronephrosis on either side. There are aortoiliac  calcifications. A Rae catheter is present within the urinary bladder.  Prostate gland is within normal limits. There is no bowel obstruction.  There is colonic diverticulosis. There is no evidence of appendicitis.  There is a thick walled appearance to the ascending and transverse  colon. Some of the appearance may be related to portal colopathy,  although correlation with any evidence of colitis is recommended. The  patient is noted to have moderate ascites. There are changes of  extensive posterior thoracolumbar spinal fusion. There is some body wall  edema.       Impression:         1. Marked hepatic steatosis. Patient is noted to have moderate ascites.  2. There is some inflammatory stranding surrounding the pancreas, as  well as some trace fluid. Appearance may reflect acute pancreatitis.  There is no pancreatic ductal dilatation, and no organized  peripancreatic collections are seen.  3. High density material within the gallbladder may reflect stones or  sludge.  4. Thick walled appearance to the ascending and transverse  colon  appearance may be related to portal colopathy, although correlation with  any evidence of colitis is recommended.  5. Indeterminate ground glass pulmonary nodule measuring 11 mm. For a  ground glass nodule >=6 mm, recommend CT at 6-12 months to confirm  persistence, then CT every 2 years until 5 years. If solid component(s)  or growth develops, consider resection.   Radiation dose reduction techniques were utilized, including automated  exposure control and exposure modulation based on body size.        This report was finalized on 4/17/2025 10:40 PM by Dr. Adrianna Fleming M.D on Workstation: [id number]       XR Chest 1 View [[id number]] Collected: 04/17/25 2018     Updated: 04/17/25 2022    Narrative:      XR CHEST 1 VW-     HISTORY: Male who is 59 years-old, short of breath     TECHNIQUE: Frontal view of the chest     COMPARISON: None available     FINDINGS: The heart size is borderline. Left-sided pacemaker, cardiac  leads are seen. Pulmonary vasculature is unremarkable. Small left  basilar atelectasis/infiltrate/effusion, follow-up recommended. No  pneumothorax. No acute osseous process.       Impression:      As described.     This report was finalized on 4/17/2025 8:19 PM by Dr. David Smith M.D on Workstation: [id number]                 Results from last 7 days   Lab Units 04/23/25  1635 04/22/25  1234   BLOODCX   --  No growth at 4 days   BODYFLDCX  No growth at 3 days  --      TEST  RESULTS PENDING AT DISCHARGE  Pending Labs       Order Current Status    Blood Culture - Blood, Blood, Central Line Preliminary result    Body Fluid Culture - Body Fluid, Peritoneum Preliminary result    Fungus Culture - Peritoneal Fluid, Peritoneum Preliminary result            Discharge Exam:  He is calm, denies any complaints  Diminished breath sounds bilaterally  Abdomen is distended with ascites  Edema is present     Disposition:  hospice        Total time spent discharging patient less than 30  minutes.    Signed:  Marcos Rodas MD  4/26/2025  16:55 EDT

## 2025-04-26 NOTE — PROGRESS NOTES
Dr. JL Rodas    [facility]        Patient ID:  Name:  Archie Oquendo  MRN:  [id number]  1965  59 y.o.  male            CC/Reason for visit: E. coli enteritis    Interval hx: Patient is interacting some verbally, denies complaints  Family at bedside    ROS: No chest pain    Vitals:  Vitals:    04/26/25 0917 04/26/25 1000 04/26/25 1100 04/26/25 1200   BP: 94/64 95/69 101/70 99/72   BP Location:   Right arm    Patient Position:   Lying    Pulse: 109 111 111 105   Resp:  17 20    Temp:  98.6 °F (37 °C) 98.4 °F (36.9 °C)    TempSrc:  Oral Oral    SpO2:  98% 98% 98%   Weight:       Height:               Body mass index is 25.22 kg/m².    Intake/Output Summary (Last 24 hours) at 4/26/2025 1326  Last data filed at 4/26/2025 1000  Gross per 24 hour   Intake 714 ml   Output 874 ml   Net -160 ml       Exam:  Opens eyes, nods  Decreased breath sounds bilaterally, no wheezing  Distant heart tones, no murmurs, there is leg edema bilaterally  Soft, distended, ascitic wave present  Genitals: Rae catheter in place    Scheduled meds:  insulin regular, 2-7 Units, Subcutaneous, Q6H  Menthol-Zinc Oxide, 1 Application, Topical, BID  sodium chloride, 10 mL, Intravenous, Q12H  sodium chloride, 10 mL, Intravenous, Q12H  sodium chloride, 10 mL, Intravenous, Q12H  sodium chloride, 10 mL, Intravenous, Q12H  sodium chloride, 10 mL, Intravenous, Q12H  thiamine, 100 mg, Nasogastric, Daily      IV meds:                      Pharmacy Consult,         Data Review:   I reviewed the patient's medications and new clinical results.          Lab Results   Component Value Date    CALCIUM 7.8 (L) 04/26/2025    PHOS 3.1 04/26/2025    MG 1.7 04/26/2025    MG 1.7 04/25/2025    MG 1.3 (L) 04/24/2025     Results from last 7 days   Lab Units 04/26/25  0608 04/25/25  0527 04/24/25  0321   SODIUM mmol/L 138 135* 135*   POTASSIUM mmol/L 4.0 4.3 4.2   CHLORIDE mmol/L 96* 93* 94*   CO2 mmol/L 28.0 28.3 28.0   BUN mg/dL 60* 50*  49*   CREATININE mg/dL 2.09* 2.31* 3.07*   CALCIUM mg/dL 7.8* 8.0* 7.9*   BILIRUBIN mg/dL 7.6* 8.7* 7.5*   ALK PHOS U/L 171* 164* 155*   ALT (SGPT) U/L 107* 129* 139*   AST (SGOT) U/L 210* 213* 256*   GLUCOSE mg/dL 140* 158* 116*   WBC 10*3/mm3 19.33* 12.17* 14.18*   HEMOGLOBIN g/dL 7.1* 8.0* 7.1*   PLATELETS 10*3/mm3 54*  67* 53*  53* 35*  34*   INR  1.67* 1.73* 1.72*     Results from last 7 days   Lab Units 04/23/25  1635 04/22/25  1234   BLOODCX   --  No growth at 4 days   BODYFLDCX  No growth at 3 days  --      Results from last 7 days   Lab Units 04/22/25  0534   CK TOTAL U/L 249*         Microbiology Results (last 10 days)       Procedure Component Value - Date/Time    Body Fluid Culture - Body Fluid, Peritoneum [[id number]] Collected: 04/23/25 1635    Lab Status: Preliminary result Specimen: Body Fluid from Peritoneum Updated: 04/26/25 0631     Body Fluid Culture No growth at 3 days     Gram Stain No WBCs or organisms seen    Blood Culture - Blood, Blood, Central Line [[id number]]  (Normal) Collected: 04/22/25 1234    Lab Status: Preliminary result Specimen: Blood, Central Line Updated: 04/26/25 1300     Blood Culture No growth at 4 days    Body Fluid Culture - Body Fluid, Peritoneal Catheter [[id number]] Collected: 04/18/25 1411    Lab Status: Final result Specimen: Body Fluid from Peritoneal Catheter Updated: 04/23/25 0725     Body Fluid Culture No growth at 5 days     Gram Stain No WBCs or organisms seen    Fungus Culture - Peritoneal Fluid, Peritoneum [[id number]] Collected: 04/18/25 1411    Lab Status: Preliminary result Specimen: Peritoneal Fluid from Peritoneum Updated: 04/25/25 1431     Fungus Culture No fungus isolated at 1 week    Gastrointestinal Panel, PCR - Stool, Per Rectum [[id number]]  (Abnormal) Collected: 04/18/25 0031    Lab Status: Final result Specimen: Stool from Per Rectum Updated: 04/18/25 0216     Campylobacter Not Detected     Plesiomonas shigelloides Not Detected     Salmonella Not  Detected     Vibrio Not Detected     Vibrio cholerae Not Detected     Yersinia enterocolitica Not Detected     Enteroaggregative E. coli (EAEC) Not Detected     Enteropathogenic E. coli (EPEC) Detected     Enterotoxigenic E. coli (ETEC) lt/st Not Detected     Shiga-like toxin-producing E. coli (STEC) stx1/stx2 Not Detected     Shigella/Enteroinvasive E. coli (EIEC) Not Detected     Cryptosporidium Not Detected     Cyclospora cayetanensis Not Detected     Entamoeba histolytica Not Detected     Giardia lamblia Not Detected     Adenovirus F40/41 Not Detected     Astrovirus Not Detected     Norovirus GI/GII Not Detected     Rotavirus A Not Detected     Sapovirus (I, II, IV or V) Not Detected    Clostridioides difficile Toxin - Stool, Per Rectum [[id number]]  (Abnormal) Collected: 04/18/25 0031    Lab Status: Final result Specimen: Stool from Per Rectum Updated: 04/18/25 0139    Narrative:      The following orders were created for panel order Clostridioides difficile Toxin - Stool, Per Rectum.  Procedure                               Abnormality         Status                     ---------                               -----------         ------                     Clostridioides difficile...[[id number]]  Abnormal            Final result                 Please view results for these tests on the individual orders.    Clostridioides difficile Toxin, PCR - Stool, Per Rectum [[id number]]  (Abnormal) Collected: 04/18/25 0031    Lab Status: Final result Specimen: Stool from Per Rectum Updated: 04/18/25 0139     Toxigenic C. difficile by PCR Positive    Narrative:      DNA from a toxigenic strain of C.difficile has been detected. Antigen testing for the presence of free C.difficile toxin is currently in progress, to help determine the clinical significance of this PCR result.     Clostridioides difficile toxin Ag, Reflex - Stool, Per Rectum [[id number]]  (Normal) Collected: 04/18/25 0031    Lab Status: Final result Specimen:  Stool from Per Rectum Updated: 04/18/25 0214     C.diff Toxin Ag Negative    Narrative:      DNA from a toxigenic strain of C.difficile was detected, although the free toxin itself was not detected. These findings are consistent with C.difficile colonization and may not reflect actual C.difficile infection. Clinical correlation needed.    Blood Culture - Blood, Arm, Left [[id number]]  (Normal) Collected: 04/17/25 2148    Lab Status: Final result Specimen: Blood from Arm, Left Updated: 04/22/25 2215     Blood Culture No growth at 5 days    COVID-19 and FLU A/B PCR, 1 HR TAT - Swab, Nasopharynx [[id number]]  (Normal) Collected: 04/17/25 2014    Lab Status: Final result Specimen: Swab from Nasopharynx Updated: 04/17/25 2041     COVID19 Not Detected     Influenza A PCR Not Detected     Influenza B PCR Not Detected    Narrative:      Fact sheet for providers: https://www.fda.gov/media/480464/download    Fact sheet for patients: https://www.fda.gov/media/727821/download    Test performed by PCR.                 ASSESSMENT:   Acute metabolic encephalopathy  Sepsis present on admission due to E. coli enteritis  Acute hypoxemic respiratory failure on heated high flow  Septic shock present on admission causing encephalopathy and acute  Kidney injury  Decompensated cirrhosis  Acute pancreatitis by imaging  Non-STEMI  Hyponatremia  Hypokalemia  Acute kidney injury  Acute liver injury  Anemia  Thrombocytopenia  Alcohol abuse  Acute HFrEF      PLAN:  Patient's condition has deteriorated.  Despite adequate antibiotics and supportive care in the ICU, his condition has gradually deteriorated to the point where family has decided to proceed with comfort measures only, palliative care and allow for natural death.  We are transferring the patient out of critical care to inpatient scattered hospice bed now        Marcos Rodas MD  4/26/2025

## 2025-04-26 NOTE — PROGRESS NOTES
LOS: 9 days   Patient Care Team:  Zoe Desai MD as PCP - General (Family Medicine)  Corbin Bailey MD as Surgeon (Neurosurgery)    Chief Complaint: Follow-up acute on chronic systolic CHF, cardiomyopathy, type II NSTEMI.    Interval History: Remains lethargic when I saw him this morning.  Remains on Levophed and vasopressin.  He was still tachycardic.    Vital Signs:  Temp:  [97.5 °F (36.4 °C)-98.6 °F (37 °C)] 98.4 °F (36.9 °C)  Heart Rate:  [] 105  Resp:  [17-20] 20  BP: ()/() 99/72    Intake/Output Summary (Last 24 hours) at 4/26/2025 1501  Last data filed at 4/26/2025 1000  Gross per 24 hour   Intake 714 ml   Output 874 ml   Net -160 ml       Physical Exam:   General Appearance:    No acute distress, appears ill.  Somnolent, but opens eyes.  Prominently jaundiced.   Lungs:     Rhonchi anteriorly bilaterally.    Heart:    Regular rhythm and mildly tachycardic rate.  II/VI SM throughout.    Abdomen:     Soft, mildly tender, distended.    Extremities:   2+ edema of the lower extremities.  1+ edema of the hands and upper extremities.     Results Review:    Results from last 7 days   Lab Units 04/26/25  0608   SODIUM mmol/L 138   POTASSIUM mmol/L 4.0   CHLORIDE mmol/L 96*   CO2 mmol/L 28.0   BUN mg/dL 60*   CREATININE mg/dL 2.09*   GLUCOSE mg/dL 140*   CALCIUM mg/dL 7.8*     Results from last 7 days   Lab Units 04/22/25  0534   CK TOTAL U/L 249*     Results from last 7 days   Lab Units 04/26/25  0608   WBC 10*3/mm3 19.33*   HEMOGLOBIN g/dL 7.1*   HEMATOCRIT % 19.2*   PLATELETS 10*3/mm3 54*  67*     Results from last 7 days   Lab Units 04/26/25  0608 04/25/25  0527 04/24/25  0321   INR  1.67* 1.73* 1.72*   APTT seconds 37.1* 40.3* 41.6*         Results from last 7 days   Lab Units 04/26/25  0608   MAGNESIUM mg/dL 1.7           I reviewed the patient's new clinical results.        Assessment:  1.  Nausea, vomiting, and diarrhea  2.  Enteropathogenic E. coli positive stool sample and C.  difficile toxin positive  3.  Anasarca, multifactorial  4.  Severe acute kidney injury with chronic kidney disease  5.  Decompensated cirrhosis of the liver  6.  Moderate ascites by CT  7.  Acute on chronic systolic CHF, EF 30% by echo on 4/18/2025  8.  Elevated troponin, likely type II NSTEMI secondary to #1, #2, #4, #5, and #9  9.  Coronary artery disease,  of the RCA by cath in 2022  10.  Status post ICD placement  11.  Urinary tract infection with retention, now with Rae catheter  12.  Significant transaminitis  13.  Hypoalbuminemia, contributing to volume overload  14.  Anemia, likely multifactorial  15.  Heavy daily alcohol use  16.  Thrombocytopenia with potential DIC  17.  Suspected medication noncompliance  18.  Metabolic encephalopathy  19.  Sinus tachycardia    Plan:  -After I saw him this morning, his family decided to make him palliative care and comfort measures only.  He has been transferred to [facility] at the time of this note.  I completely agree with this decision as he was unlikely to make any significant meaningful recovery here.      -Cardiology will sign off.  Please call back if needed.    Darrion Arroyo MD  04/26/25  15:01 EDT

## 2025-04-26 NOTE — CONSULTS
HSB Admission: 4/26/25  [facility] ID: [id number]  Diagnosis: S-CHF (I50.2), CAD (I25.10) HTN (I10) ANASARCA (R60.1) NSTEMI TYPE 2 (I21.A1) UTI (N39.0) SEPSIS (a41.9) ALCOHOLIC CIRRHOSIS OF THE LIVER (K70.31) HEPATIC ENCEPHALOPATHY (K76.82) ICD (Z95.810) DEACTIVATED IN ICU TODAY PER JAVED BUTLER    Symptom Management: ANXIETY/DYSPNEA/PAIN    Met with DTR CASSIDY TODD AND DTR IBIS TODD at bedside. Explanation of services provided with a focus on HSB ([facility] Scattered [facility]). Answered all questions, discussed medications, symptom management needs, and goals of care. [facility] services selected. [facility] consent forms completed and signed by DTR/NOK/OLDEST CHILD CASSIDY TODD WITH AGREEMENT FROM IBIS TODD. [facility] information provided and encouraged to reach out with any needs.    Benefit change completed and copy left with Renée Moctezuma CCP. [facility] daily visits will begin tomorrow 4/27/25.    Please call with any questions, concerns, or change in patient status. Thank you for allowing us the opportunity to participate in the care of this patient/family.    Leisa Ortiz, RN, BSN  [facility] Admissions  [phone]

## 2025-04-26 NOTE — PROGRESS NOTES
[facility] Progress Note      Patient Name: Archie Oquendo  : 1965  MRN: [id number]  Primary Care Physician:  Zoe Desai MD  Date of admission: 2025    Subjective     Interval History:   Follow-up KEATON on CKD of unknown baseline.      Remains on vasopressin for BP support  Tube feeds infusing; water flushes 50 mL every 4 hours  More interactive today; answers a few questions; complains of SOB and abdominal pain  UOP yesterday 0.6 L    Review of Systems:   As noted above    Objective     Vitals:   Temp:  [97.5 °F (36.4 °C)-98.6 °F (37 °C)] 98.6 °F (37 °C)  Heart Rate:  [] 109  Resp:  [17-20] 17  BP: ()/() 94/64  Flow (L/min) (Oxygen Therapy):  [5] 5    Intake/Output Summary (Last 24 hours) at 2025 1126  Last data filed at 2025 1000  Gross per 24 hour   Intake 714 ml   Output 874 ml   Net -160 ml       Physical Exam:    General Appearance: Awake, conversant, chronically ill, flat affect  Skin: warm and dry.  Jaundiced.  HEENT: Oral mucosa moist, core track in place.  Sclerae icteric.  Neck: Right IJ central line (2025)  Lungs: Few upper airway rhonchi.  Unlabored on 5 L via nasal cannula.  Heart: RR, tachycardic no rub  Abdomen: Soft, distended.  Tender to palpation but no G or R; hypoactive BS  : Rae catheter in place  Extremities: 2+ lower ext edema. 1+ upper ext edema .  Neuro: Awake; slightly more interactive today than yesterday    Scheduled Meds:     cephalexin, 500 mg, Nasogastric, Q12H  folic acid, 1 mg, Nasogastric, Daily  insulin regular, 2-7 Units, Subcutaneous, Q6H  Menthol-Zinc Oxide, 1 Application, Topical, BID  methylPREDNISolone sodium succinate, 125 mg, Intravenous, Q12H  midodrine, 10 mg, Nasogastric, Q8H  rifAXIMin, 550 mg, Nasogastric, Q12H  sodium chloride, 10 mL, Intravenous, Q12H  sodium chloride, 10 mL, Intravenous, Q12H  sodium chloride, 10 mL, Intravenous, Q12H  sodium chloride, 10 mL, Intravenous,  Q12H  sodium chloride, 10 mL, Intravenous, Q12H  thiamine, 100 mg, Nasogastric, Daily  vancomycin, 125 mg, Nasogastric, Q6H      IV Meds:   norepinephrine, 0.02-0.3 mcg/kg/min, Last Rate: Stopped (04/25/25 1443)  Pharmacy Consult,   vasopressin, 0.03 Units/min, Last Rate: 0.03 Units/min (04/26/25 1000)        Results Reviewed:   I have personally reviewed the results from the time of this admission to 4/26/2025 11:26 EDT     Results from last 7 days   Lab Units 04/26/25  0608 04/25/25  0527 04/24/25  0321   SODIUM mmol/L 138 135* 135*   POTASSIUM mmol/L 4.0 4.3 4.2   CHLORIDE mmol/L 96* 93* 94*   CO2 mmol/L 28.0 28.3 28.0   BUN mg/dL 60* 50* 49*   CREATININE mg/dL 2.09* 2.31* 3.07*   CALCIUM mg/dL 7.8* 8.0* 7.9*   BILIRUBIN mg/dL 7.6* 8.7* 7.5*   ALK PHOS U/L 171* 164* 155*   ALT (SGPT) U/L 107* 129* 139*   AST (SGOT) U/L 210* 213* 256*   GLUCOSE mg/dL 140* 158* 116*       Estimated Creatinine Clearance: 46.8 mL/min (A) (by C-G formula based on SCr of 2.09 mg/dL (H)).    Results from last 7 days   Lab Units 04/26/25  0608 04/25/25  0527 04/24/25  0321   MAGNESIUM mg/dL 1.7 1.7 1.3*   PHOSPHORUS mg/dL 3.1 3.6 3.0       Results from last 7 days   Lab Units 04/21/25  0343 04/20/25  0641   URIC ACID mg/dL 15.0* 14.9*       Results from last 7 days   Lab Units 04/26/25  0608 04/25/25  0527 04/24/25  0321 04/23/25  0506 04/22/25  0534   WBC 10*3/mm3 19.33* 12.17* 14.18* 11.60* 9.37   HEMOGLOBIN g/dL 7.1* 8.0* 7.1* 8.5* 8.2*   PLATELETS 10*3/mm3 54*  67* 53*  53* 35*  34* 41* 48*       Results from last 7 days   Lab Units 04/26/25  0608 04/25/25  0527 04/24/25  0321 04/23/25  0506 04/22/25  1234   INR  1.67* 1.73* 1.72* 1.61* 1.72*       Assessment / Plan     ASSESSMENT:  KEATON on probable CKD but baseline unknown, nonoliguric, improving.  Still volume-overloaded with edema and ascites.  Electrolytes stable  Hypotension, still requiring vasopressin  Diarrhea with enteropathogenic E. coli  Pancreatitis and  transaminitis  Alcohol abuse with withdrawal  Decompensated cirrhosis with ascites  Ischemic cardiomyopathy with acute on chronic CHF and reduced EF 30%.  Has moderate MR.  ICD in place  Thrombocytopenia; HIT antibody negative from 4/21  Anemia    PLAN:  No IV Lasix today  Family reviewing goals of care; consideration for comfort-based care  Reduce frequency of labs    Thank you for involving us in the care of Archie Oquendo.  Please feel free to call with any questions.    Jc Lemus MD  04/26/25  11:26 EDT    [facility]  [phone]    Please note that portions of this note were completed with a voice recognition program.

## 2025-04-26 NOTE — PLAN OF CARE
Problem: Adult Inpatient Plan of Care  Goal: Plan of Care Review  Outcome: Progressing  Goal: Patient-Specific Goal (Individualized)  Outcome: Progressing  Goal: Absence of Hospital-Acquired Illness or Injury  Outcome: Progressing  Intervention: Identify and Manage Fall Risk  Recent Flowsheet Documentation  Taken 4/26/2025 0800 by Joleen Medina RN  Safety Promotion/Fall Prevention: safety round/check completed  Intervention: Prevent Skin Injury  Recent Flowsheet Documentation  Taken 4/26/2025 0800 by Joleen Medina RN  Body Position:   turned   right   heels elevated  Skin Protection:   incontinence pads utilized   pectin skin barriers applied   protective footwear used   skin sealant/moisture barrier applied  Intervention: Prevent and Manage VTE (Venous Thromboembolism) Risk  Recent Flowsheet Documentation  Taken 4/26/2025 0800 by Joleen Medina RN  VTE Prevention/Management: SCDs (sequential compression devices) on  Goal: Optimal Comfort and Wellbeing  Outcome: Progressing  Intervention: Provide Person-Centered Care  Recent Flowsheet Documentation  Taken 4/26/2025 0800 by Joleen Medina RN  Trust Relationship/Rapport:   care explained   emotional support provided   empathic listening provided   questions answered   thoughts/feelings acknowledged  Goal: Readiness for Transition of Care  Outcome: Progressing     Problem: Fall Injury Risk  Goal: Absence of Fall and Fall-Related Injury  Outcome: Progressing  Intervention: Promote Injury-Free Environment  Recent Flowsheet Documentation  Taken 4/26/2025 0800 by Joleen Medina RN  Safety Promotion/Fall Prevention: safety round/check completed     Problem: Sepsis/Septic Shock  Goal: Optimal Coping  Outcome: Progressing  Intervention: Support Patient and Family Response  Recent Flowsheet Documentation  Taken 4/26/2025 0800 by Joleen Medina RN  Supportive Measures:   active listening utilized   positive reinforcement provided   relaxation techniques  promoted   verbalization of feelings encouraged  Goal: Absence of Bleeding  Outcome: Progressing  Goal: Blood Glucose Level Within Target Range  Outcome: Progressing  Goal: Absence of Infection Signs and Symptoms  Outcome: Progressing  Goal: Optimal Nutrition Delivery  Outcome: Progressing     Problem: Skin Injury Risk Increased  Goal: Skin Health and Integrity  Outcome: Progressing  Intervention: Optimize Skin Protection  Recent Flowsheet Documentation  Taken 4/26/2025 0800 by Joleen Medina RN  Pressure Reduction Techniques:   heels elevated off bed   positioned off wounds  Head of Bed (HOB) Positioning: HOB at 30-45 degrees  Pressure Reduction Devices: heel offloading device utilized  Skin Protection:   incontinence pads utilized   pectin skin barriers applied   protective footwear used   skin sealant/moisture barrier applied     Problem: Comorbidity Management  Goal: Maintenance of Heart Failure Symptom Control  Outcome: Progressing  Goal: Blood Pressure in Desired Range  Outcome: Progressing     Problem: Restraint, Nonviolent  Goal: Absence of Harm or Injury  Outcome: Progressing  Intervention: Implement Least Restrictive Safety Strategies  Recent Flowsheet Documentation  Taken 4/26/2025 0800 by Joleen Medina RN  Medical Device Protection: IV pole/bag removed from visual field  Diversional Activities: television  Intervention: Protect Dignity, Rights and Personal Wellbeing  Recent Flowsheet Documentation  Taken 4/26/2025 0800 by Joleen Medina RN  Trust Relationship/Rapport:   care explained   emotional support provided   empathic listening provided   questions answered   thoughts/feelings acknowledged  Intervention: Protect Skin and Joint Integrity  Recent Flowsheet Documentation  Taken 4/26/2025 0800 by Joleen Medina RN  Body Position:   turned   right   heels elevated  Skin Protection:   incontinence pads utilized   pectin skin barriers applied   protective footwear used   skin sealant/moisture  barrier applied  Range of Motion: ROM (range of motion) performed     Problem: Enteral Nutrition  Goal: Absence of Aspiration Signs and Symptoms  Outcome: Progressing  Intervention: Minimize Aspiration Risk  Recent Flowsheet Documentation  Taken 4/26/2025 0800 by Joleen Medina, RN  Head of Bed (HOB) Positioning: HOB at 30-45 degrees  Goal: Safe, Effective Therapy Delivery  Outcome: Progressing  Goal: Feeding Tolerance  Outcome: Progressing   Goal Outcome Evaluation:

## 2025-04-27 PROBLEM — Z51.5: Status: ACTIVE | Noted: 2025-01-01

## 2025-04-27 PROBLEM — K76.82 ENCEPHALOPATHY, HEPATIC: Status: ACTIVE | Noted: 2025-01-01

## 2025-04-27 PROBLEM — I50.23 ACUTE ON CHRONIC HFREF (HEART FAILURE WITH REDUCED EJECTION FRACTION): Status: ACTIVE | Noted: 2025-01-01

## 2025-04-27 PROBLEM — R74.01 TRANSAMINITIS: Status: ACTIVE | Noted: 2025-01-01

## 2025-04-27 PROBLEM — K70.31 ASCITES DUE TO ALCOHOLIC CIRRHOSIS: Status: ACTIVE | Noted: 2025-01-01

## 2025-04-27 PROBLEM — A04.0: Status: ACTIVE | Noted: 2025-01-01

## 2025-04-27 PROBLEM — I34.0 MODERATE MITRAL VALVE REGURGITATION: Status: ACTIVE | Noted: 2025-01-01

## 2025-04-27 PROBLEM — N04.9 ANASARCA ASSOCIATED WITH DISORDER OF KIDNEY: Status: ACTIVE | Noted: 2025-01-01

## 2025-04-27 PROBLEM — K76.6 PORTAL HYPERTENSION: Status: ACTIVE | Noted: 2025-01-01

## 2025-04-27 PROBLEM — A04.72 CLOSTRIDIUM DIFFICILE COLITIS: Status: ACTIVE | Noted: 2025-01-01

## 2025-04-27 PROBLEM — N17.9 AKI (ACUTE KIDNEY INJURY): Status: ACTIVE | Noted: 2025-01-01

## 2025-04-27 PROBLEM — J90 PLEURAL EFFUSION, BILATERAL: Status: ACTIVE | Noted: 2025-01-01

## 2025-04-27 PROBLEM — I25.10 CORONARY ARTERY DISEASE INVOLVING NATIVE CORONARY ARTERY OF NATIVE HEART WITHOUT ANGINA PECTORIS: Status: ACTIVE | Noted: 2025-01-01

## 2025-04-27 PROBLEM — R17 JAUNDICE: Status: ACTIVE | Noted: 2025-01-01

## 2025-04-27 PROBLEM — Z45.02: Status: ACTIVE | Noted: 2025-01-01

## 2025-04-27 PROBLEM — D69.6 THROMBOCYTOPENIA: Status: ACTIVE | Noted: 2025-01-01

## 2025-04-27 PROBLEM — K70.30 ALCOHOLIC CIRRHOSIS: Status: ACTIVE | Noted: 2025-01-01

## 2025-04-27 NOTE — DISCHARGE SUMMARY
[facility]  Discharge As      Date of Admisssion:  2025  Date of Death:  2025  Time of Death:  9:27 AM    Patient Care Team:  Zoe Desai MD as PCP - General (Family Medicine)  Melvin Chin MD as Attending Provider (Hospice and Palliative Medicine)    Final Diagnosis:     Acute on chronic HFrEF (heart failure with reduced ejection fraction)    Hospice care patient    Encephalopathy, hepatic    Colitis due to enteropathogenic Escherichia coli    Clostridium difficile colitis    Transaminitis    KEATON (acute kidney injury)    Alcoholic cirrhosis    Portal hypertension    Thrombocytopenia    Moderate mitral valve regurgitation    Pleural effusion, bilateral    Coronary artery disease involving native coronary artery of native heart without angina pectoris    Anasarca associated with disorder of kidney    Ascites due to alcoholic cirrhosis    Encounter for disabling ICD in hospice patient 2025    Jaundice      Hospital Course  Patient was a 59 y.o. male who was discharged from acute care and readmitted as a hospice scattered bed patient 2025.  I was asked to assume his care.  Please see my history and physical dated 2025 at 0700 hrs.  Subsequently, I was called that the patient's respirations ceased and no pulse palpable. No heart sounds audible. I pronounced the patient at 0927 hours.    Time: I spent 65 minutes on this discharge activity which included: face-to-face encounter with the patient, face-to-face encounter with the patient's 2 daughters, reviewing the data in the system, coordination of the care with the nursing staff as well as documentation and entering orders.       Melvin Chin MD  Hospice and Palliative Medicine  25  10:29 EDT

## 2025-04-27 NOTE — H&P
[facility]  Palliative Care/Hospice Admit/Consult Note     Referring Provider: Marcos Rodas MD   Reason for Consultation: Hospice care  Date of Admission:  4/26/2025    Patient Care Team:  Zoe Desai MD as PCP - General (Family Medicine)  Melvin Chin MD as Attending Provider (Hospice and Palliative Medicine)    Chief complaint: Heart failure with reduced ejection fraction    History of present illness:  The patient is a 59 y.o. male who presented to [facility] 4/17/2025 with generalized weakness.  He had been having acid reflux and retching.  He was diagnosed with E. coli enteritis.  He was admitted to the intensive care unit where he was treated with IV fluids, broad-spectrum antibiotics and sepsis protocol for shock.  He was found to have acute kidney injury. He also has a history of alcohol abuse and liver cirrhosis/failure. He was seen in consultation by gastroenterology, nephrology, cardiology and hematology. Ultimately, despite maximal medical treatment, he failed to respond.   After discussion with the family about goals of care, they decided that the patient would not want to be placed on artificial life support.  Hospice was asked to evaluate the patient and all agreed to discharge him from acute care and readmit him as a hospice scattered bed patient.  I was asked to assume his care.    At the time of my evaluation, the patient's 2 daughters were in the room.  The patient was lying slightly on his right side.  Respirations were slow with occasional inspiratory snore related to his respiratory effort.  The patient was not awake and no ROS was obtainable.    Review of Systems  Review of systems could not be obtained due to   patient sedation status. patient unresponsive.    Palliative Performance Scale  Palliative Performance Scale Score: 10%  [place] Symptom Assessment System Revised  Pain Score: 2   ESAS Tiredness Score: Worst possible tiredness  ESAS Nausea  Score: No nausea  ESAS Depression Score: No depression  ESAS Anxiety Score: 3  ESAS Drowsiness Score: Worst possible drowsiness  ESAS Lack of Appetite Score: Worst lack of appetite  ESAS Wellbeing Score: 3  ESAS Dyspnea Score: 1  ESAS Other Problem Score: Best possible response  ESAS Source of Information: healthcare professional caregiver  ESAS Intervention: medicated/see MAR  ESAS Intervention Response: tolerated    History  Past Medical History:   Diagnosis Date    Abnormal gait     Abscess of back     CHF (congestive heart failure) 06/2022    Coronary artery disease     ONE VESSEL    Foot drop, left     Hypertension     Low back pain     Lumbar disc herniation     Numbness in both legs     MOSTLY IN KNEES DOWN    Pain in left knee     Sleep apnea     DOES NOT WEAR CPAP    Spinal stenosis     Spinal stenosis of lumbar region    ,   Past Surgical History:   Procedure Laterality Date    BACK SURGERY      BEDSIDE PARACENTESIS  4/18/2025    CARDIAC CATHETERIZATION  06/2022    STATES NO CAD    COLONOSCOPY  2013    INCISION AND DRAINAGE TRUNK N/A 02/13/2023    Procedure: Wound debridement and drainage;  Surgeon: Nj Mix MD;  Location: [facility];  Service: Neurosurgery;  Laterality: N/A;    INSERT CENTRAL LINE AT BEDSIDE  4/18/2025    LUMBAR DISCECTOMY  2000    LUMBAR DISCECTOMY FUSION INSTRUMENTATION Left 06/05/2019    Procedure: LUMBAR THREE LUMBAR FOUR  AND LUMBAR FIVE LUMBAR SIX DECOMPRESSION. THORACIC ELEVEN TO SACREL TWO POSTERIOR LATERAL ARTHRODESIS(FUSION) WITH SPINAL INSTRUMENTATION. REMOVAL OF LUMBAR INSTRUMENTATION.;  Surgeon: Corbin Bailey MD;  Location: [facility];  Service: Neurosurgery    LUMBAR FUSION  2008    VASECTOMY     ,   Family History   Problem Relation Age of Onset    Diabetes Mother     Hypertension Father     Gout Father     Sleep apnea Father     Malig Hyperthermia Neg Hx    , and   Social History     Socioeconomic History    Marital status:     Number of  children: 2   Tobacco Use    Smoking status: Former     Current packs/day: 0.00     Average packs/day: 0.5 packs/day for 10.0 years (5.0 ttl pk-yrs)     Types: Cigarettes     Start date: 2009     Quit date: 2019     Years since quittin.8    Smokeless tobacco: Never   Vaping Use    Vaping status: Never Used   Substance and Sexual Activity    Alcohol use: Yes     Alcohol/week: 5.0 standard drinks of alcohol     Types: 5 Cans of beer per week    Drug use: No    Sexual activity: Not Currently     Partners: Female     Birth control/protection: Vasectomy     E-cigarette/Vaping    E-cigarette/Vaping Use Never User      E-cigarette/Vaping Substances     E-cigarette/Vaping Devices      Allergy Patient has no known allergies.    Vital Signs   Temp:  [94.6 °F (34.8 °C)-98.6 °F (37 °C)] 94.6 °F (34.8 °C)  Heart Rate:  [] 119  Resp:  [14-20] 14  BP: ()/(55-72) 87/55  Device (Oxygen Therapy): nasal cannulaFlow (L/min) (Oxygen Therapy):  [2] 2SpO2:  [73 %-98 %] 73 %    Physical Exam:  General Appearance:   Not awake and appears in no acute distress lying slightly on his right side with head of bed elevated 10-20 degrees, chronically ill-appearing older appearing male   Head:    Normocephalic, without obvious abnormality, atraumatic   Eyes:            Lids and lashes normal, conjunctivae and sclerae normal, no icterus   Ears:    Ears appear intact with no abnormalities noted   Throat:   No oral lesions, oral mucosa somewhat dry with mouth open    Neck:   No adenopathy, supple, trachea midline, no thyromegaly   Back:     No scoliosis present   Lungs:     Clear to auscultation except for snoring with inspiratory effort, respirations with slight increase inspiratory effort and slight decreased tidal volume breath, Quad-Lumen right IJ    Heart:    Regular rhythm and normal rate       Abdomen:     Soft and non-tender, non-distended   Genitalia:    Deferred   Extremities:  No edema, pale and ashen and cyanosis  evident    Pulses:  Radial pulses palpable    Skin:   No bleeding, jaundice         Neurologic:  Not awake to test      Results Review:   I reviewed the patient's new clinical results.    Impression:      Acute on chronic HFrEF (heart failure with reduced ejection fraction)    Hospice care patient    Encephalopathy, hepatic    Colitis due to enteropathogenic Escherichia coli    Clostridium difficile colitis    Transaminitis    KEATON (acute kidney injury)    Alcoholic cirrhosis    Portal hypertension    Thrombocytopenia    Moderate mitral valve regurgitation    Pleural effusion, bilateral    Coronary artery disease involving native coronary artery of native heart without angina pectoris    Anasarca associated with disorder of kidney    Ascites due to alcoholic cirrhosis    Encounter for disabling ICD in hospice patient 4/26/2025    Jaundice      Plan:  I reviewed the patient's admission and previous medical records.  I reviewed with the patient's 2 daughters at bedside.  I examined the patient.  With medications previously administered, the patient appears comfortable.  The patient has required 2 doses of 4 mg IV morphine, 2 doses last evening, and 2 doses of 2 mg IV Ativan, 2 doses last evening, thus far today.  Medications will be continued and adjusted as needed for symptom management for comfort.  No attempts at resuscitation will be made.  The patient's daughters are aware of their fathers terminal condition.  I answered all of their questions.      Melvin Chin MD  Hospice and Palliative Medicine  04/27/25  08:30 EDT

## 2025-04-27 NOTE — PLAN OF CARE
Goal Outcome Evaluation:  PPS 10%. Responsive to pain overnight. Medications titrated for symptom management. Last dose given was: 4mg morphine, 2mg ativan, and 0.4mg robinul. Daughters at bedside and supportive of comfort care. Education and support provided. Rae in place. Suctioning and recovery position utilized. Will continue to monitor per palliative goals of care.

## 2025-04-27 NOTE — CASE MANAGEMENT/SOCIAL WORK
Case Management Discharge Note    Final Note: [facility] scattered bed on 4/27/25. LUKE Arzate/CCP     Selected Continued Care - Discharged on 4/26/2025 Admission date: 4/17/2025 - Discharge disposition: Hospice/Medical Facility ([facility])

## 2025-04-28 PROBLEM — Z51.5 END OF LIFE CARE: Status: ACTIVE | Noted: 2025-04-28

## 2025-04-28 LAB
BACTERIA FLD CULT: NORMAL
GRAM STN SPEC: NORMAL

## 2025-04-28 NOTE — PROGRESS NOTES
Case Management Discharge Note      Final Note: [facility] scattered bed on 4/27/25. Carito RN/CCP         Selected Continued Care - Discharged on 4/26/2025 Admission date: 4/17/2025 - Discharge disposition: Hospice/Medical Facility ([facility])      Destination Coordination complete.      Service Provider Services Address Phone Fax Patient Preferred    [facility] [phone] [phone] --              Durable Medical Equipment    No services have been selected for the patient.                Dialysis/Infusion    No services have been selected for the patient.                Home Medical Care    No services have been selected for the patient.                Therapy    No services have been selected for the patient.                Community Resources    No services have been selected for the patient.                Community & DME    No services have been selected for the patient.                         Final Discharge Disposition Code: 51 - hospice medical facility

## 2025-04-28 NOTE — PROGRESS NOTES
Case Management Discharge Note      Final Note: [facility] scattered bed on 4/26/25. TODD Moctezuma RN, CCP         Selected Continued Care - Discharged on 4/26/2025 Admission date: 4/17/2025 - Discharge disposition: Hospice/Medical Facility ([facility])      Destination Coordination complete.      Service Provider Services Address Phone Fax Patient Preferred    [facility] [phone] [phone] --              Durable Medical Equipment    No services have been selected for the patient.                Dialysis/Infusion    No services have been selected for the patient.                Home Medical Care    No services have been selected for the patient.                Therapy    No services have been selected for the patient.                Community Resources    No services have been selected for the patient.                Community & DME    No services have been selected for the patient.                         Final Discharge Disposition Code: 51 - hospice medical facility

## 2025-05-02 LAB — FUNGUS WND CULT: NORMAL

## 2025-05-09 LAB — FUNGUS WND CULT: NORMAL

## 2025-05-16 LAB — FUNGUS WND CULT: NORMAL

## (undated) DEVICE — SMOKE EVACUATION TUBING WITH 7/8 IN TO 1/4 IN REDUCER: Brand: BUFFALO FILTER

## (undated) DEVICE — ADHS LIQ MASTISOL 2/3ML

## (undated) DEVICE — TBG PENCL TELESCP MEGADYNE SMOKE EVAC 10FT

## (undated) DEVICE — GLV SURG SENSICARE PI LF PF 7.5 GRN STRL

## (undated) DEVICE — CONN TBG Y 5 IN 1 LF STRL

## (undated) DEVICE — 3.0MM PRECISION NEURO (MATCH HEAD)

## (undated) DEVICE — 3M™ IOBAN™ 2 ANTIMICROBIAL INCISE DRAPE 6640EZ: Brand: IOBAN™ 2

## (undated) DEVICE — SUT MNCRYL 4/0 PS2 18 IN

## (undated) DEVICE — DRSNG TELFA PAD NONADH STR 1S 3X4IN

## (undated) DEVICE — 3M™ IOBAN™ 2 ANTIMICROBIAL INCISE DRAPE 6650EZ: Brand: IOBAN™ 2

## (undated) DEVICE — LOU MINOR PROCEDURE: Brand: MEDLINE INDUSTRIES, INC.

## (undated) DEVICE — INTENDED FOR TISSUE SEPARATION, AND OTHER PROCEDURES THAT REQUIRE A SHARP SURGICAL BLADE TO PUNCTURE OR CUT.: Brand: BARD-PARKER ® STAINLESS STEEL BLADES

## (undated) DEVICE — DRP SLUSH WARMR MACH 52X66IN OM-ORS-301

## (undated) DEVICE — PK ATS CUST W CARDIOTOMY RESEVOIR

## (undated) DEVICE — TOWEL,OR,DSP,ST,BLUE,STD,4/PK,20PK/CS: Brand: MEDLINE

## (undated) DEVICE — 3M™ STERI-STRIP™ COMPOUND BENZOIN TINCTURE 40 BAGS/CARTON 4 CARTONS/CASE C1544: Brand: 3M™ STERI-STRIP™

## (undated) DEVICE — DRSNG TELFA PAD NONADH STR 1S 3X8IN

## (undated) DEVICE — THE MILL DISPOSABLE - MEDIUM

## (undated) DEVICE — 1010 S-DRAPE TOWEL DRAPE 10/BX: Brand: STERI-DRAPE™

## (undated) DEVICE — DISPOSABLE 9450003 ELECTRO TWST PAIR 8CH

## (undated) DEVICE — TOTAL TRAY, 16FR 10ML SIL FOLEY, URN: Brand: MEDLINE

## (undated) DEVICE — PATIENT RETURN ELECTRODE, SINGLE-USE, CONTACT QUALITY MONITORING, ADULT, WITH 9FT CORD, FOR PATIENTS WEIGING OVER 33LBS. (15KG): Brand: MEGADYNE

## (undated) DEVICE — DISPOSABLE IRRIGATION BIPOLAR CORD, M1000 TYPE: Brand: KIRWAN

## (undated) DEVICE — ELECTRD BLD EDGE/INSUL1P 2.4X5.1MM STRL

## (undated) DEVICE — ENCORE® LATEX ORTHO SIZE 8.5, STERILE LATEX POWDER-FREE SURGICAL GLOVE: Brand: ENCORE

## (undated) DEVICE — NDL SPINE 20G 3 1/2 YEL STRL 1P/U

## (undated) DEVICE — COL BONE ANSPACH

## (undated) DEVICE — SUT MNCRYL PLS ANTIB UD 4/0 PS2 18IN

## (undated) DEVICE — GLV SURG SENSICARE MICRO PF LF 8 STRL

## (undated) DEVICE — SHLD ANGIO 2LAYR CIR FEN

## (undated) DEVICE — DRP C/ARM 41X74IN

## (undated) DEVICE — ANTIBACTERIAL UNDYED BRAIDED (POLYGLACTIN 910), SYNTHETIC ABSORBABLE SUTURE: Brand: COATED VICRYL

## (undated) DEVICE — SPNG GZ WOVN 4X4IN 12PLY 10/BX STRL

## (undated) DEVICE — CODMAN® SURGICAL PATTIES 3/4" X 3/4" (1.91CM X 1.91CM): Brand: CODMAN®

## (undated) DEVICE — SYR CONTRL PRESS/LO FIX/M/LL W/THMB/RNG 10ML

## (undated) DEVICE — PK NEURO SPINE 40

## (undated) DEVICE — GLV SURG SENSICARE W/ALOE PF LF 7.5 STRL

## (undated) DEVICE — 3M™ STERI-STRIP™ REINFORCED ADHESIVE SKIN CLOSURES, R1547, 1/2 IN X 4 IN (12 MM X 100 MM), 6 STRIPS/ENVELOPE: Brand: 3M™ STERI-STRIP™

## (undated) DEVICE — SCREW 54840045555 4.75 ATS MAS 5.5X55
Type: IMPLANTABLE DEVICE | Site: SPINE LUMBAR | Status: NON-FUNCTIONAL
Brand: CD HORIZON® SPINAL SYSTEM
Removed: 2019-06-05

## (undated) DEVICE — GLV SURG BIOGEL LTX PF 7 1/2

## (undated) DEVICE — IRRIGATOR BULB ASEPTO 60CC STRL

## (undated) DEVICE — TUBING, SUCTION, 1/4" X 20', STRAIGHT: Brand: MEDLINE INDUSTRIES, INC.

## (undated) DEVICE — DRN WND JP RND W TROC SIL 15F 3/16IN

## (undated) DEVICE — ADHS SKIN DERMABOND TOP ADVANCED

## (undated) DEVICE — GOWN,PREVENTION PLUS,XXLARGE,STERILE: Brand: MEDLINE

## (undated) DEVICE — DRAPE,REIN 53X77,STERILE: Brand: MEDLINE

## (undated) DEVICE — 3M™ STERI-DRAPE™ INSTRUMENT POUCH 1018: Brand: STERI-DRAPE™

## (undated) DEVICE — JACKSON-PRATT 100CC BULB RESERVOIR: Brand: CARDINAL HEALTH

## (undated) DEVICE — DRP MICROSCOPE 4 BINOCULAR CV 54X150IN

## (undated) DEVICE — APPL CHLORAPREP W/TINT 26ML ORNG

## (undated) DEVICE — TRAP FLD MINIVAC MEGADYNE 100ML

## (undated) DEVICE — SUT VIC 0 CT1 CR8 18IN J840D

## (undated) DEVICE — CABLE 2344000 POWEREASE NIM: Brand: POWEREASE™ INSTRUMENTS

## (undated) DEVICE — DRP STRL STERILEZ ZFLD

## (undated) DEVICE — MAGNETIC DRAPE: Brand: DEVON

## (undated) DEVICE — DRSNG SURESITE WNDW 4X4.5

## (undated) DEVICE — NDL HYPO PRECISIONGLIDE REG 20G 1 1/2

## (undated) DEVICE — Device

## (undated) DEVICE — GLV SURG BIOGEL LTX PF 7

## (undated) DEVICE — NDL HYPO PRECISIONGLIDE REG 25G 1 1/2

## (undated) DEVICE — 3M™ STERI-STRIP™ ANTIMICROBIAL SKIN CLOSURES 1/2 INCH X 4 INCHES 50/CARTON 4 CARTONS/CASE A1847: Brand: 3M™ STERI-STRIP™

## (undated) DEVICE — SPHR MARKR STEALTH STATION

## (undated) DEVICE — SUT SILK 2/0 FS BLK 18IN 685G

## (undated) DEVICE — ROD 1475200500 4.75 CCM STRT 500M LN
Type: IMPLANTABLE DEVICE | Site: SPINE LUMBAR | Status: NON-FUNCTIONAL
Brand: CD HORIZON® SPINAL SYSTEM
Removed: 2019-06-05

## (undated) DEVICE — MEDI-VAC YANKAUER SUCTION HANDLE W/BULBOUS TIP: Brand: CARDINAL HEALTH

## (undated) DEVICE — SPONGE,LAP,12"X12",XR,ST,5/PK,40PK/CS: Brand: MEDLINE

## (undated) DEVICE — 6.0MM PRECISION ROUND